# Patient Record
Sex: FEMALE | Race: WHITE | NOT HISPANIC OR LATINO | Employment: OTHER | URBAN - METROPOLITAN AREA
[De-identification: names, ages, dates, MRNs, and addresses within clinical notes are randomized per-mention and may not be internally consistent; named-entity substitution may affect disease eponyms.]

---

## 2020-11-20 ENCOUNTER — OFFICE VISIT (OUTPATIENT)
Dept: URGENT CARE | Facility: CLINIC | Age: 50
End: 2020-11-20
Payer: MEDICARE

## 2020-11-20 VITALS
SYSTOLIC BLOOD PRESSURE: 154 MMHG | HEIGHT: 64 IN | HEART RATE: 71 BPM | OXYGEN SATURATION: 99 % | BODY MASS INDEX: 38.41 KG/M2 | RESPIRATION RATE: 16 BRPM | DIASTOLIC BLOOD PRESSURE: 81 MMHG | WEIGHT: 225 LBS | TEMPERATURE: 98 F

## 2020-11-20 DIAGNOSIS — N30.00 ACUTE CYSTITIS WITHOUT HEMATURIA: Primary | ICD-10-CM

## 2020-11-20 DIAGNOSIS — R30.0 DYSURIA: ICD-10-CM

## 2020-11-20 LAB
BILIRUB UR QL STRIP: NEGATIVE
GLUCOSE UR QL STRIP: NEGATIVE
KETONES UR QL STRIP: NEGATIVE
LEUKOCYTE ESTERASE UR QL STRIP: POSITIVE
PH, POC UA: 7 (ref 5–8)
POC BLOOD, URINE: NEGATIVE
POC NITRATES, URINE: NEGATIVE
PROT UR QL STRIP: NEGATIVE
SP GR UR STRIP: 1.02 (ref 1–1.03)
UROBILINOGEN UR STRIP-ACNC: NORMAL (ref 0.1–1.1)

## 2020-11-20 PROCEDURE — 99203 PR OFFICE/OUTPT VISIT, NEW, LEVL III, 30-44 MIN: ICD-10-PCS | Mod: 25,S$GLB,, | Performed by: PHYSICIAN ASSISTANT

## 2020-11-20 PROCEDURE — 81003 URINALYSIS AUTO W/O SCOPE: CPT | Mod: QW,S$GLB,, | Performed by: PHYSICIAN ASSISTANT

## 2020-11-20 PROCEDURE — 99203 OFFICE O/P NEW LOW 30 MIN: CPT | Mod: 25,S$GLB,, | Performed by: PHYSICIAN ASSISTANT

## 2020-11-20 PROCEDURE — 81003 POCT URINALYSIS, DIPSTICK, AUTOMATED, W/O SCOPE: ICD-10-PCS | Mod: QW,S$GLB,, | Performed by: PHYSICIAN ASSISTANT

## 2020-11-20 RX ORDER — SERTRALINE HYDROCHLORIDE 50 MG/1
50 TABLET, FILM COATED ORAL DAILY
COMMUNITY
End: 2021-04-21 | Stop reason: SDUPTHER

## 2020-11-20 RX ORDER — CEPHALEXIN 500 MG/1
500 CAPSULE ORAL EVERY 6 HOURS
Qty: 28 CAPSULE | Refills: 0 | Status: SHIPPED | OUTPATIENT
Start: 2020-11-20 | End: 2020-11-27

## 2020-11-20 RX ORDER — LOSARTAN POTASSIUM 25 MG/1
25 TABLET ORAL DAILY
COMMUNITY
End: 2021-04-21 | Stop reason: SDUPTHER

## 2020-11-20 NOTE — PROGRESS NOTES
"Subjective:       Patient ID: Starla Fisher is a 50 y.o. female.    Vitals:  height is 5' 4" (1.626 m) and weight is 102.1 kg (225 lb). Her oral temperature is 97.9 °F (36.6 °C). Her blood pressure is 154/81 (abnormal) and her pulse is 71. Her respiration is 16 and oxygen saturation is 99%.     Chief Complaint: Urinary Tract Infection    Pt states she has a uti x3 days  Reports dysuria, frequency, urgency, foul-smelling urine, dark urine for the past 3 days.  Denies fevers.  She is nauseated but denies any vomiting.    Urinary Tract Infection   This is a new problem. The current episode started in the past 7 days. The problem occurs every urination. The problem has been gradually worsening. The quality of the pain is described as aching and burning. The pain is at a severity of 10/10. The pain is severe. There has been no fever. She is not sexually active. Associated symptoms include a discharge, frequency, nausea and urgency. Pertinent negatives include no chills, hematuria, vomiting or rash. She has tried nothing for the symptoms. The treatment provided no relief.       Constitution: Negative for chills, fatigue and fever.   HENT: Negative for congestion and sore throat.    Neck: Negative for painful lymph nodes.   Cardiovascular: Negative for chest pain and leg swelling.   Eyes: Negative for double vision and blurred vision.   Respiratory: Negative for cough and shortness of breath.    Gastrointestinal: Positive for nausea. Negative for abdominal pain, vomiting and diarrhea.   Genitourinary: Positive for frequency, urgency, urine decreased, vaginal pain, vaginal discharge and vaginal odor. Negative for dysuria, hematuria, history of kidney stones, painful menstruation, irregular menstruation, missed menses, heavy menstrual bleeding, ovarian cysts, genital trauma, vaginal bleeding, painful intercourse, genital sore, painful ejaculation and pelvic pain.   Musculoskeletal: Negative for joint pain, joint " swelling, back pain, muscle cramps and muscle ache.   Skin: Negative for color change, pale, rash, lesion and bruising.   Allergic/Immunologic: Negative for seasonal allergies.   Neurological: Negative for dizziness, history of vertigo, light-headedness, passing out and headaches.   Hematologic/Lymphatic: Negative for swollen lymph nodes.   Psychiatric/Behavioral: Negative for nervous/anxious, sleep disturbance and depression. The patient is not nervous/anxious.        Objective:      Physical Exam   Constitutional: She is oriented to person, place, and time. She appears well-developed.  Non-toxic appearance. She does not appear ill. No distress.   HENT:   Head: Normocephalic and atraumatic.   Ears:   Right Ear: External ear normal.   Left Ear: External ear normal.   Nose: Nose normal.   Mouth/Throat: Mucous membranes are normal.   Eyes: Conjunctivae and lids are normal.   Neck: Trachea normal and full passive range of motion without pain. Neck supple.   Cardiovascular: Normal rate, regular rhythm and normal heart sounds.   Pulmonary/Chest: Effort normal and breath sounds normal. No respiratory distress.   Abdominal: Soft. Normal appearance and bowel sounds are normal. She exhibits no distension, no abdominal bruit, no pulsatile midline mass and no mass. There is abdominal tenderness in the suprapubic area. There is no left CVA tenderness and no right CVA tenderness.   Musculoskeletal: Normal range of motion.   Neurological: She is alert and oriented to person, place, and time. She has normal strength.   Skin: Skin is warm, dry, intact, not diaphoretic and not pale. Psychiatric: Her speech is normal and behavior is normal. Judgment and thought content normal.   Nursing note and vitals reviewed.    Results for orders placed or performed in visit on 11/20/20   POCT Urinalysis, Dipstick, Automated, W/O Scope   Result Value Ref Range    POC Blood, Urine Negative Negative    POC Bilirubin, Urine Negative Negative    POC  Urobilinogen, Urine normal 0.1 - 1.1    POC Ketones, Urine Negative Negative    POC Protein, Urine Negative Negative    POC Nitrates, Urine Negative Negative    POC Glucose, Urine Negative Negative    pH, UA 7.0 5 - 8    POC Specific Gravity, Urine 1.020 1.003 - 1.029    POC Leukocytes, Urine Positive (A) Negative           Assessment:       1. Acute cystitis without hematuria    2. Dysuria        Plan:         Acute cystitis without hematuria  -     cephALEXin (KEFLEX) 500 MG capsule; Take 1 capsule (500 mg total) by mouth every 6 (six) hours. for 7 days  Dispense: 28 capsule; Refill: 0  -     Urine culture    Dysuria  -     POCT Urinalysis, Dipstick, Automated, W/O Scope         Labs reviewed, pertinent imaging reviewed, previous medical records, medical history, surgical history, social history, family history reviewed.    Patient Instructions   Start antibiotics, drink plenty of water.  Rest.  If you develop new, worsen, change in symptoms, please return or go to the emergency room.  Follow-up with primary care in 1 week    Please return here or go to the Emergency Department for any concerns or worsening of condition.  If you were prescribed antibiotics, please take them to completion.  If you were prescribed a narcotic medication, do not drive or operate heavy equipment or machinery while taking these medications.  Please follow up with your primary care doctor or specialist as needed.  Please drink plenty of fluids.  Please get plenty of rest.  If you were prescribed Pyridium (phenazopyridine), please be aware that if you wear contact lens that this medication may stain your contacts.  While taking this medication it is recommended that you do not wear your contacts until 24 hours after your last dose.  Please follow up with your primary care doctor or specialist as needed.    If you  smoke, please stop smoking.    Please arrange follow up with your primary medical clinic as soon as possible. You must  "understand that you've received an Urgent Care treatment only and that you may be released before all of your medical problems are known or treated. You, the patient, will arrange for follow up as instructed. If your symptoms worsen or fail to improve you should go to the Emergency Room.  WE CANNOT RULE OUT ALL POSSIBLE CAUSES OF YOUR SYMPTOMS IN THE URGENT CARE SETTING PLEASE GO TO THE ER IF YOU FEELS YOUR CONDITION IS WORSENING OR YOU WOULD LIKE EMERGENT EVALUATION.      Bladder Infection, Female (Adult)    Urine is normally doesn't have any bacteria in it. But bacteria can get into the urinary tract from the skin around the rectum. Or they can travel in the blood from elsewhere in the body. Once they are in your urinary tract, they can cause infection in the urethra (urethritis), the bladder (cystitis), or the kidneys (pyelonephritis).  The most common place for an infection is in the bladder. This is called a bladder infection. This is one of the most common infections in women. Most bladder infections are easily treated. They are not serious unless the infection spreads to the kidney.  The phrases "bladder infection," "UTI," and "cystitis" are often used to describe the same thing. But they are not always the same. Cystitis is an inflammation of the bladder. The most common cause of cystitis is an infection.  Symptoms  The infection causes inflammation in the urethra and bladder. This causes many of the symptoms. The most common symptoms of a bladder infection are:  · Pain or burning when urinating  · Having to urinate more often than usual  · Urgent need to urinate  · Only a small amount of urine comes out  · Blood in urine  · Abdominal discomfort. This is usually in the lower abdomen above the pubic bone.  · Cloudy urine  · Strong- or bad-smelling urine  · Unable to urinate (urinary retention)  · Unable to hold urine in (urinary incontinence)  · Fever  · Loss of appetite  · Confusion (in older " adults)  Causes  Bladder infections are not contagious. You can't get one from someone else, from a toilet seat, or from sharing a bath.  The most common cause of bladder infections is bacteria from the bowels. The bacteria get onto the skin around the opening of the urethra. From there, they can get into the urine and travel up to the bladder, causing inflammation and infection. This usually happens because of:  · Wiping improperly after urinating. Always wipe from front to back.  · Bowel incontinence  · Pregnancy  · Procedures such as having a catheter inserted  · Older age  · Not emptying your bladder. This can allow bacteria a chance to grow in your urine.  · Dehydration  · Constipation  · Sex  · Use of a diaphragm for birth control   Treatment  Bladder infections are diagnosed by a urine test. They are treated with antibiotics and usually clear up quickly without complications. Treatment helps prevent a more serious kidney infection.  Medicines  Medicines can help in the treatment of a bladder infection:  · Take antibiotics until they are used up, even if you feel better. It is important to finish them to make sure the infection has cleared.  · You can use acetaminophen or ibuprofen for pain, fever, or discomfort, unless another medicine was prescribed. If you have chronic liver or kidney disease, talk with your healthcare provider before using these medicines. Also talk with your provider if you've ever had a stomach ulcer or gastrointestinal bleeding, or are taking blood-thinner medicines.  · If you are given phenazopydridine to reduce burning with urination, it will cause your urine to become a bright orange color. This can stain clothing.  Care and prevention  These self-care steps can help prevent future infections:  · Drink plenty of fluids to prevent dehydration and flush out your bladder. Do this unless you must restrict fluids for other health reasons, or your doctor told you not to.  · Proper cleaning  after going to the bathroom is important. Wipe from front to back after using the toilet to prevent the spread of bacteria.  · Urinate more often. Don't try to hold urine in for a long time.  · Wear loose-fitting clothes and cotton underwear. Avoid tight-fitting pants.  · Improve your diet and prevent constipation. Eat more fresh fruit and vegetables, and fiber, and less junk and fatty foods.  · Avoid sex until your symptoms are gone.  · Avoid caffeine, alcohol, and spicy foods. These can irritate your bladder.  · Urinate right after intercourse to flush out your bladder.  · If you use birth control pills and have frequent bladder infections, discuss it with your doctor.  Follow-up care  Call your healthcare provider if all symptoms are not gone after 3 days of treatment. This is especially important if you have repeat infections.  If a culture was done, you will be told if your treatment needs to be changed. If directed, you can call to find out the results.  If X-rays were done, you will be told if the results will affect your treatment.  Call 911  Call 911 if any of the following occur:  · Trouble breathing  · Hard to wake up or confusion  · Fainting or loss of consciousness  · Rapid heart rate  When to seek medical advice  Call your healthcare provider right away if any of these occur:  · Fever of 100.4ºF (38.0ºC) or higher, or as directed by your healthcare provider  · Symptoms are not better by the third day of treatment  · Back or belly (abdominal) pain that gets worse  · Repeated vomiting, or unable to keep medicine down  · Weakness or dizziness  · Vaginal discharge  · Pain, redness, or swelling in the outer vaginal area (labia)  Date Last Reviewed: 10/1/2016  © 0345-0620 [employer]. [address] [place] [address], [place] [address]. All rights reserved. This information is not intended as a substitute for professional medical care. Always follow your healthcare professional's  instructions.

## 2020-11-21 NOTE — PATIENT INSTRUCTIONS
Start antibiotics, drink plenty of water.  Rest.  If you develop new, worsen, change in symptoms, please return or go to the emergency room.  Follow-up with primary care in 1 week    Please return here or go to the Emergency Department for any concerns or worsening of condition.  If you were prescribed antibiotics, please take them to completion.  If you were prescribed a narcotic medication, do not drive or operate heavy equipment or machinery while taking these medications.  Please follow up with your primary care doctor or specialist as needed.  Please drink plenty of fluids.  Please get plenty of rest.  If you were prescribed Pyridium (phenazopyridine), please be aware that if you wear contact lens that this medication may stain your contacts.  While taking this medication it is recommended that you do not wear your contacts until 24 hours after your last dose.  Please follow up with your primary care doctor or specialist as needed.    If you  smoke, please stop smoking.    Please arrange follow up with your primary medical clinic as soon as possible. You must understand that you've received an Urgent Care treatment only and that you may be released before all of your medical problems are known or treated. You, the patient, will arrange for follow up as instructed. If your symptoms worsen or fail to improve you should go to the Emergency Room.  WE CANNOT RULE OUT ALL POSSIBLE CAUSES OF YOUR SYMPTOMS IN THE URGENT CARE SETTING PLEASE GO TO THE ER IF YOU FEELS YOUR CONDITION IS WORSENING OR YOU WOULD LIKE EMERGENT EVALUATION.      Bladder Infection, Female (Adult)    Urine is normally doesn't have any bacteria in it. But bacteria can get into the urinary tract from the skin around the rectum. Or they can travel in the blood from elsewhere in the body. Once they are in your urinary tract, they can cause infection in the urethra (urethritis), the bladder (cystitis), or the kidneys (pyelonephritis).  The most common  "place for an infection is in the bladder. This is called a bladder infection. This is one of the most common infections in women. Most bladder infections are easily treated. They are not serious unless the infection spreads to the kidney.  The phrases "bladder infection," "UTI," and "cystitis" are often used to describe the same thing. But they are not always the same. Cystitis is an inflammation of the bladder. The most common cause of cystitis is an infection.  Symptoms  The infection causes inflammation in the urethra and bladder. This causes many of the symptoms. The most common symptoms of a bladder infection are:  · Pain or burning when urinating  · Having to urinate more often than usual  · Urgent need to urinate  · Only a small amount of urine comes out  · Blood in urine  · Abdominal discomfort. This is usually in the lower abdomen above the pubic bone.  · Cloudy urine  · Strong- or bad-smelling urine  · Unable to urinate (urinary retention)  · Unable to hold urine in (urinary incontinence)  · Fever  · Loss of appetite  · Confusion (in older adults)  Causes  Bladder infections are not contagious. You can't get one from someone else, from a toilet seat, or from sharing a bath.  The most common cause of bladder infections is bacteria from the bowels. The bacteria get onto the skin around the opening of the urethra. From there, they can get into the urine and travel up to the bladder, causing inflammation and infection. This usually happens because of:  · Wiping improperly after urinating. Always wipe from front to back.  · Bowel incontinence  · Pregnancy  · Procedures such as having a catheter inserted  · Older age  · Not emptying your bladder. This can allow bacteria a chance to grow in your urine.  · Dehydration  · Constipation  · Sex  · Use of a diaphragm for birth control   Treatment  Bladder infections are diagnosed by a urine test. They are treated with antibiotics and usually clear up quickly without " complications. Treatment helps prevent a more serious kidney infection.  Medicines  Medicines can help in the treatment of a bladder infection:  · Take antibiotics until they are used up, even if you feel better. It is important to finish them to make sure the infection has cleared.  · You can use acetaminophen or ibuprofen for pain, fever, or discomfort, unless another medicine was prescribed. If you have chronic liver or kidney disease, talk with your healthcare provider before using these medicines. Also talk with your provider if you've ever had a stomach ulcer or gastrointestinal bleeding, or are taking blood-thinner medicines.  · If you are given phenazopydridine to reduce burning with urination, it will cause your urine to become a bright orange color. This can stain clothing.  Care and prevention  These self-care steps can help prevent future infections:  · Drink plenty of fluids to prevent dehydration and flush out your bladder. Do this unless you must restrict fluids for other health reasons, or your doctor told you not to.  · Proper cleaning after going to the bathroom is important. Wipe from front to back after using the toilet to prevent the spread of bacteria.  · Urinate more often. Don't try to hold urine in for a long time.  · Wear loose-fitting clothes and cotton underwear. Avoid tight-fitting pants.  · Improve your diet and prevent constipation. Eat more fresh fruit and vegetables, and fiber, and less junk and fatty foods.  · Avoid sex until your symptoms are gone.  · Avoid caffeine, alcohol, and spicy foods. These can irritate your bladder.  · Urinate right after intercourse to flush out your bladder.  · If you use birth control pills and have frequent bladder infections, discuss it with your doctor.  Follow-up care  Call your healthcare provider if all symptoms are not gone after 3 days of treatment. This is especially important if you have repeat infections.  If a culture was done, you will be  told if your treatment needs to be changed. If directed, you can call to find out the results.  If X-rays were done, you will be told if the results will affect your treatment.  Call 911  Call 911 if any of the following occur:  · Trouble breathing  · Hard to wake up or confusion  · Fainting or loss of consciousness  · Rapid heart rate  When to seek medical advice  Call your healthcare provider right away if any of these occur:  · Fever of 100.4ºF (38.0ºC) or higher, or as directed by your healthcare provider  · Symptoms are not better by the third day of treatment  · Back or belly (abdominal) pain that gets worse  · Repeated vomiting, or unable to keep medicine down  · Weakness or dizziness  · Vaginal discharge  · Pain, redness, or swelling in the outer vaginal area (labia)  Date Last Reviewed: 10/1/2016  © 2195-4185 [employer]. [address] [place] [address], [place] [address]. All rights reserved. This information is not intended as a substitute for professional medical care. Always follow your healthcare professional's instructions.

## 2020-11-24 ENCOUNTER — OFFICE VISIT (OUTPATIENT)
Dept: URGENT CARE | Facility: CLINIC | Age: 50
End: 2020-11-24
Payer: MEDICARE

## 2020-11-24 VITALS
HEIGHT: 64 IN | HEART RATE: 72 BPM | SYSTOLIC BLOOD PRESSURE: 138 MMHG | RESPIRATION RATE: 18 BRPM | OXYGEN SATURATION: 95 % | WEIGHT: 250 LBS | DIASTOLIC BLOOD PRESSURE: 86 MMHG | BODY MASS INDEX: 42.68 KG/M2 | TEMPERATURE: 98 F

## 2020-11-24 DIAGNOSIS — R11.2 INTRACTABLE VOMITING WITH NAUSEA, UNSPECIFIED VOMITING TYPE: Primary | ICD-10-CM

## 2020-11-24 DIAGNOSIS — G43.919 INTRACTABLE MIGRAINE WITHOUT STATUS MIGRAINOSUS, UNSPECIFIED MIGRAINE TYPE: ICD-10-CM

## 2020-11-24 PROCEDURE — 99214 PR OFFICE/OUTPT VISIT, EST, LEVL IV, 30-39 MIN: ICD-10-PCS | Mod: 25,S$GLB,, | Performed by: FAMILY MEDICINE

## 2020-11-24 PROCEDURE — 96372 THER/PROPH/DIAG INJ SC/IM: CPT | Mod: S$GLB,,, | Performed by: FAMILY MEDICINE

## 2020-11-24 PROCEDURE — 96372 PR INJECTION,THERAP/PROPH/DIAG2ST, IM OR SUBCUT: ICD-10-PCS | Mod: S$GLB,,, | Performed by: FAMILY MEDICINE

## 2020-11-24 PROCEDURE — 99214 OFFICE O/P EST MOD 30 MIN: CPT | Mod: 25,S$GLB,, | Performed by: FAMILY MEDICINE

## 2020-11-24 RX ORDER — ONDANSETRON 4 MG/1
4 TABLET, ORALLY DISINTEGRATING ORAL EVERY 6 HOURS PRN
Qty: 25 TABLET | Refills: 0 | Status: SHIPPED | OUTPATIENT
Start: 2020-11-24 | End: 2021-04-21 | Stop reason: ALTCHOICE

## 2020-11-24 RX ORDER — SUMATRIPTAN 50 MG/1
TABLET, FILM COATED ORAL
Qty: 10 TABLET | Refills: 2 | Status: SHIPPED | OUTPATIENT
Start: 2020-11-24 | End: 2021-04-22 | Stop reason: ALTCHOICE

## 2020-11-24 RX ORDER — KETOROLAC TROMETHAMINE 30 MG/ML
60 INJECTION, SOLUTION INTRAMUSCULAR; INTRAVENOUS
Status: COMPLETED | OUTPATIENT
Start: 2020-11-24 | End: 2020-11-24

## 2020-11-24 RX ORDER — KETOROLAC TROMETHAMINE 10 MG/1
TABLET, FILM COATED ORAL
Qty: 20 TABLET | Refills: 0 | Status: SHIPPED | OUTPATIENT
Start: 2020-11-24 | End: 2021-04-21 | Stop reason: ALTCHOICE

## 2020-11-24 RX ORDER — ONDANSETRON 2 MG/ML
4 INJECTION INTRAMUSCULAR; INTRAVENOUS
Status: COMPLETED | OUTPATIENT
Start: 2020-11-24 | End: 2020-11-24

## 2020-11-24 RX ADMIN — KETOROLAC TROMETHAMINE 60 MG: 30 INJECTION, SOLUTION INTRAMUSCULAR; INTRAVENOUS at 06:11

## 2020-11-24 RX ADMIN — ONDANSETRON 4 MG: 2 INJECTION INTRAMUSCULAR; INTRAVENOUS at 05:11

## 2020-11-24 NOTE — PATIENT INSTRUCTIONS

## 2020-11-24 NOTE — PROGRESS NOTES
"Subjective:       Patient ID: Starla Fisher is a 50 y.o. female.    Vitals:  height is 5' 4" (1.626 m) and weight is 113.4 kg (250 lb).     Chief Complaint: Emesis    Pt c/o vomiting and dizzy  50-year-old female with long history of migraine headaches complains of nausea vomiting since this morning with these onset of headache, denies any fever denies any cough congestion or URI symptoms patient does have history of migraine and usually takes Imitrex took 1 tablet without much relief and usually gets relief with Imitrex patient recently moved here from [place] lives with mother    Emesis   This is a new problem. The current episode started today. The problem occurs intermittently. The problem has been gradually worsening. The emesis has an appearance of bile. There has been no fever. Associated symptoms include dizziness and headaches. Treatments tried: imitrex. The treatment provided no relief.       Gastrointestinal: Positive for vomiting.   Neurological: Positive for dizziness and headaches.       Objective:      Physical Exam   Constitutional: She is oriented to person, place, and time. She appears well-developed. She is cooperative.  Non-toxic appearance. She does not appear ill. No distress.   HENT:   Head: Normocephalic and atraumatic.   Ears:   Right Ear: Hearing, tympanic membrane, external ear and ear canal normal.   Left Ear: Hearing, tympanic membrane, external ear and ear canal normal.   Nose: Nose normal. No mucosal edema, rhinorrhea or nasal deformity. No epistaxis. Right sinus exhibits no maxillary sinus tenderness and no frontal sinus tenderness. Left sinus exhibits no maxillary sinus tenderness and no frontal sinus tenderness.   Mouth/Throat: Uvula is midline, oropharynx is clear and moist and mucous membranes are normal. No trismus in the jaw. Normal dentition. No uvula swelling. No posterior oropharyngeal erythema.   Eyes: Conjunctivae and lids are normal. Right eye exhibits no discharge. Left " eye exhibits no discharge. No scleral icterus.   Neck: Trachea normal, normal range of motion, full passive range of motion without pain and phonation normal. Neck supple.   Cardiovascular: Normal rate, regular rhythm, normal heart sounds and normal pulses.   Pulmonary/Chest: Effort normal and breath sounds normal. No respiratory distress.   Abdominal: Soft. Normal appearance and bowel sounds are normal. She exhibits no distension, no pulsatile midline mass and no mass. There is no abdominal tenderness.   Musculoskeletal: Normal range of motion.         General: No deformity.   Neurological: She is alert and oriented to person, place, and time. She displays weakness. A cranial nerve deficit is present. She exhibits normal muscle tone. Gait abnormal. Coordination normal.   Skin: Skin is warm, dry, intact, not diaphoretic and not pale. Psychiatric: Her speech is normal and behavior is normal. Judgment and thought content normal.   Nursing note and vitals reviewed.        Assessment:       1. Intractable vomiting with nausea, unspecified vomiting type    2. Intractable migraine without status migrainosus, unspecified migraine type        Plan:         Intractable vomiting with nausea, unspecified vomiting type    Intractable migraine without status migrainosus, unspecified migraine type    Other orders  -     ondansetron injection 4 mg  -     ketorolac injection 60 mg  -     ketorolac (TORADOL) 10 mg tablet; Take one po q 8 hrs prn pain  Dispense: 20 tablet; Refill: 0  -     sumatriptan (IMITREX) 50 MG tablet; Take one po q 12 hours prn severe migraine  Dispense: 10 tablet; Refill: 2  -     ondansetron (ZOFRAN-ODT) 4 MG TbDL; Take 1 tablet (4 mg total) by mouth every 6 (six) hours as needed.  Dispense: 25 tablet; Refill: 0

## 2020-12-07 ENCOUNTER — OFFICE VISIT (OUTPATIENT)
Dept: OBSTETRICS AND GYNECOLOGY | Facility: CLINIC | Age: 50
End: 2020-12-07
Payer: COMMERCIAL

## 2020-12-07 VITALS
DIASTOLIC BLOOD PRESSURE: 80 MMHG | SYSTOLIC BLOOD PRESSURE: 120 MMHG | WEIGHT: 287.06 LBS | BODY MASS INDEX: 49.01 KG/M2 | HEIGHT: 64 IN

## 2020-12-07 DIAGNOSIS — N39.0 URINARY TRACT INFECTION WITHOUT HEMATURIA, SITE UNSPECIFIED: ICD-10-CM

## 2020-12-07 DIAGNOSIS — N39.3 URINARY, INCONTINENCE, STRESS FEMALE: ICD-10-CM

## 2020-12-07 DIAGNOSIS — Z01.419 WELL WOMAN EXAM WITH ROUTINE GYNECOLOGICAL EXAM: ICD-10-CM

## 2020-12-07 DIAGNOSIS — Z12.31 OTHER SCREENING MAMMOGRAM: ICD-10-CM

## 2020-12-07 DIAGNOSIS — Z12.4 SCREENING FOR CERVICAL CANCER: ICD-10-CM

## 2020-12-07 DIAGNOSIS — N89.8 VAGINAL DISCHARGE: ICD-10-CM

## 2020-12-07 DIAGNOSIS — R10.2 PELVIC PAIN: Primary | ICD-10-CM

## 2020-12-07 PROCEDURE — 99999 PR PBB SHADOW E&M-EST. PATIENT-LVL III: ICD-10-PCS | Mod: PBBFAC,,, | Performed by: OBSTETRICS & GYNECOLOGY

## 2020-12-07 PROCEDURE — 99213 OFFICE O/P EST LOW 20 MIN: CPT | Mod: PBBFAC,PO | Performed by: OBSTETRICS & GYNECOLOGY

## 2020-12-07 PROCEDURE — 88175 CYTOPATH C/V AUTO FLUID REDO: CPT

## 2020-12-07 PROCEDURE — 87624 HPV HI-RISK TYP POOLED RSLT: CPT

## 2020-12-07 PROCEDURE — 99386 PREV VISIT NEW AGE 40-64: CPT | Mod: S$PBB,,, | Performed by: OBSTETRICS & GYNECOLOGY

## 2020-12-07 PROCEDURE — 99999 PR PBB SHADOW E&M-EST. PATIENT-LVL III: CPT | Mod: PBBFAC,,, | Performed by: OBSTETRICS & GYNECOLOGY

## 2020-12-07 PROCEDURE — 99386 PR PREVENTIVE VISIT,NEW,40-64: ICD-10-PCS | Mod: S$PBB,,, | Performed by: OBSTETRICS & GYNECOLOGY

## 2020-12-09 ENCOUNTER — TELEPHONE (OUTPATIENT)
Dept: OBSTETRICS AND GYNECOLOGY | Facility: CLINIC | Age: 50
End: 2020-12-09

## 2020-12-09 LAB
CANDIDA RRNA VAG QL PROBE: POSITIVE
G VAGINALIS RRNA GENITAL QL PROBE: POSITIVE
T VAGINALIS RRNA GENITAL QL PROBE: NEGATIVE

## 2020-12-09 RX ORDER — METRONIDAZOLE 500 MG/1
500 TABLET ORAL EVERY 12 HOURS
Qty: 14 TABLET | Refills: 0 | Status: SHIPPED | OUTPATIENT
Start: 2020-12-09 | End: 2020-12-16

## 2020-12-09 RX ORDER — TERCONAZOLE 4 MG/G
1 CREAM VAGINAL NIGHTLY
Qty: 1 TUBE | Refills: 0 | Status: SHIPPED | OUTPATIENT
Start: 2020-12-09 | End: 2020-12-16

## 2020-12-09 NOTE — PROGRESS NOTES
"GYNECOLOGY  :  Starla Fisher is a 50 y.o.    Here today for  gyn evaluation   Here today for  Persistent leakage of urine that started 3-6 weeks ago, worsening during the last 2 weeks,   Associated with urgency and dysuria and nocturia. Wakes at night several times to void, and usually does not have time to get to the toilet  Even after voiding feels the " urge" to go . Urine is dark and has strong odor     Patient with learning disability , with previous BTL and endometrial ablation , for definitive contraception and to prevent menstrual cycles, done 7 years ago   No menstrual cycles since ablation     LAst PAP and mammogram in 2019  In [place] ( recently moved )    Past Medical History:   Diagnosis Date    GERD (gastroesophageal reflux disease)     Hypertension     Migraine headache      Past Surgical History:   Procedure Laterality Date    BREAST SURGERY      ENDOMETRIAL ABLATION      EYE SURGERY      FOOT SURGERY Right 10/2018    bunion    TONSILLECTOMY       Family History   Problem Relation Age of Onset    Atrial fibrillation Mother     Thyroid disease Mother     Dementia Mother     Diabetes Father     Cancer Paternal Grandfather      Social History     Tobacco Use    Smoking status: Never Smoker   Substance Use Topics    Alcohol use: No    Drug use: No     OB History    Para Term  AB Living   0 0 0 0 0 0   SAB TAB Ectopic Multiple Live Births   0 0 0 0 0       /80   Ht 5' 4" (1.626 m)   Wt 130.2 kg (287 lb 0.6 oz)   BMI 49.27 kg/m²     Last PAP=   LMP = -  Last Mammogram = 2019  Last Colonoscopy  =-      COMPREHENSIVE GYN HISTORY:  G 0 P 0     PAP History: Denies abnormal Paps.  Infection History: Denies STDs. Denies PID.  Benign History: Denies uterine fibroids. Denies ovarian cysts. Denies endometriosis.   Cancer History: Denies cervical cancer. Denies uterine cancer or hyperplasia. Denies ovarian cancer. Denies vulvar cancer or pre-cancer. Denies " vaginal cancer or pre-cancer. Denies breast cancer. Denies colon cancer.  Sexual Activity History: ( - ) Yes   ( x )   no   Menstrual History: Age of menarche: ( 15  )  years.   Contraception:  BTL    ROS  GENERAL: Denies significant weight gain or weight loss. Feeling well overall.  SKIN: Denies rash or lesions.  Normal skin turgor  HEAD: Denies head injury or headache.   NODES: Denies enlarged lymph nodes.   CHEST: Denies chest pain or shortness of breath.   CARDIOVASCULAR: Denies palpitations or left sided chest pain.   ABDOMEN: No abdominal pain,no  diarrhea,constipation  nausea, vomiting or rectal bleeding.   URINARY: normal  Frequency,no  Dysuria or burning on urination.   REPRODUCTIVE: Per HPI   BREASTS: The patient sometimes performs breast self-examination and denies pain, lumps, or nipple discharge.   HEMATOLOGIC: No easy bruisability or excessive bleeding.   MUSCULOSKELETAL: Denies joint pain or swelling.   NEUROLOGIC: Denies syncope or weakness.   PSYCHIATRIC: Denies depression, anxiety or mood swings.    Physical Exam     Constitutional: She is oriented to person, place, and time. She appears well-developed and well-nourished. No distress.   HENT:   Head: Normocephalic.   NECK: Neck symmetric without masses or thyromegaly.  Cardiovascular: Normal rate and regular rhythm.   Pulmonary/Chest: Effort normal and breath sounds normal. No respiratory distress. She has no wheezes.   Breasts: Symmetrical, no skin changes or visible lesions. No palpable masses, nipple discharge or adenopathy bilaterally.  Abdominal: Soft not distended. Bowel sounds are normal. She exhibits   no masses . No tenderness to palpation.   Genitourinary: Pelvic exam was performed with patient supine.   External genitalia normal no lesions.Normal hair distribution   Bimanual exam : difficult to evaluate pelvic organs due to body habitus. Urethra and bladder normal  No cystourethrocele     Extremities normal no cyanosis ,edema.          A/P Starla Fisher 50 y.o.     Dx=  1- Urinary Incontinence      Urgency   2- -Cervical cancer screen  -Breast cancer screen     3-morbid obesity   4-Learning disability     Procedures/Orders:  Pap smear  Mammogram  UA/ UCx/Udip  Affirm  STD testing   US    Assessment /Plan:  Will review UA and Urine culture to rule out infection as cause of the urinary urgency symptoms   No urinary stress incontinence       Patient was counseled today on A.C.S. Pap guidelines and recommendations for yearly pelvic exams, mammograms and monthly self breast exams; to see her PCP for other health maintenance, nutrition and weight gain counseling, discussed lab values.  Discussed colonoscopy recommendations every 10 years starting at age 50   Calcium and vit D daily intake     F/u in 2-3 weeks after US     Jefferson Copeland M.D.   OB/GYN

## 2020-12-09 NOTE — TELEPHONE ENCOUNTER
Affirm resulted  Positive for BV and Yeast .  Rx for flagyl and diflucan sent to pharmacy on file  Please inform patient    Jefferson Copeland M.D.   OB/GYN

## 2020-12-14 LAB
HPV HR 12 DNA SPEC QL NAA+PROBE: NEGATIVE
HPV16 AG SPEC QL: NEGATIVE
HPV18 DNA SPEC QL NAA+PROBE: NEGATIVE

## 2021-01-08 ENCOUNTER — TELEPHONE (OUTPATIENT)
Dept: GASTROENTEROLOGY | Facility: CLINIC | Age: 51
End: 2021-01-08

## 2021-01-09 ENCOUNTER — TELEPHONE (OUTPATIENT)
Dept: OBSTETRICS AND GYNECOLOGY | Facility: HOSPITAL | Age: 51
End: 2021-01-09

## 2021-01-09 RX ORDER — METRONIDAZOLE 500 MG/1
500 TABLET ORAL EVERY 12 HOURS
Qty: 14 TABLET | Refills: 0 | Status: SHIPPED | OUTPATIENT
Start: 2021-01-09 | End: 2021-01-16

## 2021-01-09 RX ORDER — TERCONAZOLE 4 MG/G
1 CREAM VAGINAL NIGHTLY
Qty: 1 TUBE | Refills: 0 | Status: SHIPPED | OUTPATIENT
Start: 2021-01-09 | End: 2021-01-16

## 2021-01-12 ENCOUNTER — TELEPHONE (OUTPATIENT)
Dept: OBSTETRICS AND GYNECOLOGY | Facility: CLINIC | Age: 51
End: 2021-01-12

## 2021-01-17 LAB
FINAL PATHOLOGIC DIAGNOSIS: NORMAL
Lab: NORMAL

## 2021-03-29 ENCOUNTER — IMMUNIZATION (OUTPATIENT)
Dept: PRIMARY CARE CLINIC | Facility: CLINIC | Age: 51
End: 2021-03-29
Payer: MEDICARE

## 2021-03-29 DIAGNOSIS — Z23 NEED FOR VACCINATION: Primary | ICD-10-CM

## 2021-03-29 PROCEDURE — 91301 PR SARS-COV-2 COVID-19 VACCINE, NO PRSV, 100MCG/0.5ML, IM: ICD-10-PCS | Mod: S$GLB,,, | Performed by: INTERNAL MEDICINE

## 2021-03-29 PROCEDURE — 0011A PR IMMUNIZ ADMIN, SARS-COV-2 COVID-19 VACC, 100MCG/0.5ML, 1ST DOSE: CPT | Mod: CV19,S$GLB,, | Performed by: INTERNAL MEDICINE

## 2021-03-29 PROCEDURE — 0011A PR IMMUNIZ ADMIN, SARS-COV-2 COVID-19 VACC, 100MCG/0.5ML, 1ST DOSE: ICD-10-PCS | Mod: CV19,S$GLB,, | Performed by: INTERNAL MEDICINE

## 2021-03-29 PROCEDURE — 91301 PR SARS-COV-2 COVID-19 VACCINE, NO PRSV, 100MCG/0.5ML, IM: CPT | Mod: S$GLB,,, | Performed by: INTERNAL MEDICINE

## 2021-03-29 RX ADMIN — Medication 0.5 ML: at 01:03

## 2021-04-08 ENCOUNTER — TELEPHONE (OUTPATIENT)
Dept: PODIATRY | Facility: CLINIC | Age: 51
End: 2021-04-08

## 2021-04-12 ENCOUNTER — OFFICE VISIT (OUTPATIENT)
Dept: UROLOGY | Facility: CLINIC | Age: 51
End: 2021-04-12
Payer: MEDICARE

## 2021-04-12 VITALS
HEIGHT: 64 IN | SYSTOLIC BLOOD PRESSURE: 159 MMHG | DIASTOLIC BLOOD PRESSURE: 84 MMHG | HEART RATE: 76 BPM | BODY MASS INDEX: 49.27 KG/M2

## 2021-04-12 DIAGNOSIS — R35.1 NOCTURIA: ICD-10-CM

## 2021-04-12 DIAGNOSIS — R35.0 URINARY FREQUENCY: Primary | ICD-10-CM

## 2021-04-12 DIAGNOSIS — Z76.89 ENCOUNTER TO ESTABLISH CARE: ICD-10-CM

## 2021-04-12 DIAGNOSIS — N39.41 URINARY INCONTINENCE, URGE: ICD-10-CM

## 2021-04-12 LAB — POC RESIDUAL URINE VOLUME: 129 ML (ref 0–100)

## 2021-04-12 PROCEDURE — 3008F PR BODY MASS INDEX (BMI) DOCUMENTED: ICD-10-PCS | Mod: CPTII,S$GLB,, | Performed by: NURSE PRACTITIONER

## 2021-04-12 PROCEDURE — 3008F BODY MASS INDEX DOCD: CPT | Mod: CPTII,S$GLB,, | Performed by: NURSE PRACTITIONER

## 2021-04-12 PROCEDURE — 1125F PR PAIN SEVERITY QUANTIFIED, PAIN PRESENT: ICD-10-PCS | Mod: S$GLB,,, | Performed by: NURSE PRACTITIONER

## 2021-04-12 PROCEDURE — 99999 PR PBB SHADOW E&M-EST. PATIENT-LVL IV: CPT | Mod: PBBFAC,,, | Performed by: NURSE PRACTITIONER

## 2021-04-12 PROCEDURE — 99204 OFFICE O/P NEW MOD 45 MIN: CPT | Mod: S$GLB,,, | Performed by: NURSE PRACTITIONER

## 2021-04-12 PROCEDURE — 51798 US URINE CAPACITY MEASURE: CPT | Mod: S$GLB,,, | Performed by: NURSE PRACTITIONER

## 2021-04-12 PROCEDURE — 51798 PR MEAS,POST-VOID RES,US,NON-IMAGING: ICD-10-PCS | Mod: S$GLB,,, | Performed by: NURSE PRACTITIONER

## 2021-04-12 PROCEDURE — 1125F AMNT PAIN NOTED PAIN PRSNT: CPT | Mod: S$GLB,,, | Performed by: NURSE PRACTITIONER

## 2021-04-12 PROCEDURE — 99999 PR PBB SHADOW E&M-EST. PATIENT-LVL IV: ICD-10-PCS | Mod: PBBFAC,,, | Performed by: NURSE PRACTITIONER

## 2021-04-12 PROCEDURE — 99204 PR OFFICE/OUTPT VISIT, NEW, LEVL IV, 45-59 MIN: ICD-10-PCS | Mod: S$GLB,,, | Performed by: NURSE PRACTITIONER

## 2021-04-12 RX ORDER — TROSPIUM CHLORIDE 20 MG/1
20 TABLET, FILM COATED ORAL DAILY
Qty: 30 TABLET | Refills: 1 | Status: SHIPPED | OUTPATIENT
Start: 2021-04-12 | End: 2021-04-12

## 2021-04-13 ENCOUNTER — LAB VISIT (OUTPATIENT)
Dept: LAB | Facility: HOSPITAL | Age: 51
End: 2021-04-13
Attending: PEDIATRICS
Payer: MEDICARE

## 2021-04-13 DIAGNOSIS — R35.0 URINARY FREQUENCY: ICD-10-CM

## 2021-04-13 LAB
BILIRUB UR QL STRIP: NEGATIVE
CAOX CRY UR QL COMP ASSIST: NORMAL
CLARITY UR REFRACT.AUTO: CLEAR
COLOR UR AUTO: YELLOW
GLUCOSE UR QL STRIP: NEGATIVE
HGB UR QL STRIP: NEGATIVE
KETONES UR QL STRIP: NEGATIVE
LEUKOCYTE ESTERASE UR QL STRIP: NEGATIVE
MICROSCOPIC COMMENT: NORMAL
NITRITE UR QL STRIP: NEGATIVE
PH UR STRIP: 6 [PH] (ref 5–8)
PROT UR QL STRIP: NEGATIVE
SP GR UR STRIP: >=1.03 (ref 1–1.03)
URN SPEC COLLECT METH UR: ABNORMAL
UROBILINOGEN UR STRIP-ACNC: NEGATIVE EU/DL

## 2021-04-13 PROCEDURE — 81000 URINALYSIS NONAUTO W/SCOPE: CPT | Performed by: NURSE PRACTITIONER

## 2021-04-13 PROCEDURE — 87086 URINE CULTURE/COLONY COUNT: CPT | Performed by: NURSE PRACTITIONER

## 2021-04-18 LAB
BACTERIA UR CULT: NORMAL
BACTERIA UR CULT: NORMAL

## 2021-04-19 ENCOUNTER — TELEPHONE (OUTPATIENT)
Dept: UROLOGY | Facility: CLINIC | Age: 51
End: 2021-04-19

## 2021-04-21 ENCOUNTER — OFFICE VISIT (OUTPATIENT)
Dept: FAMILY MEDICINE | Facility: CLINIC | Age: 51
End: 2021-04-21
Payer: MEDICARE

## 2021-04-21 ENCOUNTER — LAB VISIT (OUTPATIENT)
Dept: LAB | Facility: HOSPITAL | Age: 51
End: 2021-04-21
Attending: STUDENT IN AN ORGANIZED HEALTH CARE EDUCATION/TRAINING PROGRAM
Payer: MEDICARE

## 2021-04-21 VITALS
SYSTOLIC BLOOD PRESSURE: 132 MMHG | DIASTOLIC BLOOD PRESSURE: 82 MMHG | BODY MASS INDEX: 41.67 KG/M2 | HEART RATE: 87 BPM | WEIGHT: 244.06 LBS | HEIGHT: 64 IN | OXYGEN SATURATION: 96 %

## 2021-04-21 DIAGNOSIS — I10 ESSENTIAL HYPERTENSION: ICD-10-CM

## 2021-04-21 DIAGNOSIS — Z12.31 ENCOUNTER FOR SCREENING MAMMOGRAM FOR MALIGNANT NEOPLASM OF BREAST: ICD-10-CM

## 2021-04-21 DIAGNOSIS — E66.01 SEVERE OBESITY (BMI >= 40): ICD-10-CM

## 2021-04-21 DIAGNOSIS — G43.809 OTHER MIGRAINE WITHOUT STATUS MIGRAINOSUS, NOT INTRACTABLE: ICD-10-CM

## 2021-04-21 DIAGNOSIS — Z11.59 NEED FOR HEPATITIS C SCREENING TEST: ICD-10-CM

## 2021-04-21 DIAGNOSIS — F32.A DEPRESSION, UNSPECIFIED DEPRESSION TYPE: Primary | ICD-10-CM

## 2021-04-21 DIAGNOSIS — R79.9 ABNORMAL FINDING OF BLOOD CHEMISTRY, UNSPECIFIED: ICD-10-CM

## 2021-04-21 DIAGNOSIS — Z76.89 ENCOUNTER TO ESTABLISH CARE: ICD-10-CM

## 2021-04-21 DIAGNOSIS — K21.9 GASTROESOPHAGEAL REFLUX DISEASE, UNSPECIFIED WHETHER ESOPHAGITIS PRESENT: ICD-10-CM

## 2021-04-21 PROCEDURE — 80061 LIPID PANEL: CPT | Performed by: STUDENT IN AN ORGANIZED HEALTH CARE EDUCATION/TRAINING PROGRAM

## 2021-04-21 PROCEDURE — 80053 COMPREHEN METABOLIC PANEL: CPT | Performed by: STUDENT IN AN ORGANIZED HEALTH CARE EDUCATION/TRAINING PROGRAM

## 2021-04-21 PROCEDURE — 99499 RISK ADDL DX/OHS AUDIT: ICD-10-PCS | Mod: S$GLB,,, | Performed by: STUDENT IN AN ORGANIZED HEALTH CARE EDUCATION/TRAINING PROGRAM

## 2021-04-21 PROCEDURE — 84443 ASSAY THYROID STIM HORMONE: CPT | Performed by: STUDENT IN AN ORGANIZED HEALTH CARE EDUCATION/TRAINING PROGRAM

## 2021-04-21 PROCEDURE — 86803 HEPATITIS C AB TEST: CPT | Performed by: STUDENT IN AN ORGANIZED HEALTH CARE EDUCATION/TRAINING PROGRAM

## 2021-04-21 PROCEDURE — 99999 PR PBB SHADOW E&M-EST. PATIENT-LVL V: CPT | Mod: PBBFAC,,, | Performed by: STUDENT IN AN ORGANIZED HEALTH CARE EDUCATION/TRAINING PROGRAM

## 2021-04-21 PROCEDURE — 99999 PR PBB SHADOW E&M-EST. PATIENT-LVL V: ICD-10-PCS | Mod: PBBFAC,,, | Performed by: STUDENT IN AN ORGANIZED HEALTH CARE EDUCATION/TRAINING PROGRAM

## 2021-04-21 PROCEDURE — 83036 HEMOGLOBIN GLYCOSYLATED A1C: CPT | Performed by: STUDENT IN AN ORGANIZED HEALTH CARE EDUCATION/TRAINING PROGRAM

## 2021-04-21 PROCEDURE — 3008F PR BODY MASS INDEX (BMI) DOCUMENTED: ICD-10-PCS | Mod: CPTII,S$GLB,, | Performed by: STUDENT IN AN ORGANIZED HEALTH CARE EDUCATION/TRAINING PROGRAM

## 2021-04-21 PROCEDURE — 3008F BODY MASS INDEX DOCD: CPT | Mod: CPTII,S$GLB,, | Performed by: STUDENT IN AN ORGANIZED HEALTH CARE EDUCATION/TRAINING PROGRAM

## 2021-04-21 PROCEDURE — 99204 OFFICE O/P NEW MOD 45 MIN: CPT | Mod: S$GLB,,, | Performed by: STUDENT IN AN ORGANIZED HEALTH CARE EDUCATION/TRAINING PROGRAM

## 2021-04-21 PROCEDURE — 36415 COLL VENOUS BLD VENIPUNCTURE: CPT | Mod: PO | Performed by: STUDENT IN AN ORGANIZED HEALTH CARE EDUCATION/TRAINING PROGRAM

## 2021-04-21 PROCEDURE — 99499 UNLISTED E&M SERVICE: CPT | Mod: S$GLB,,, | Performed by: STUDENT IN AN ORGANIZED HEALTH CARE EDUCATION/TRAINING PROGRAM

## 2021-04-21 PROCEDURE — 1126F PR PAIN SEVERITY QUANTIFIED, NO PAIN PRESENT: ICD-10-PCS | Mod: S$GLB,,, | Performed by: STUDENT IN AN ORGANIZED HEALTH CARE EDUCATION/TRAINING PROGRAM

## 2021-04-21 PROCEDURE — 99204 PR OFFICE/OUTPT VISIT, NEW, LEVL IV, 45-59 MIN: ICD-10-PCS | Mod: S$GLB,,, | Performed by: STUDENT IN AN ORGANIZED HEALTH CARE EDUCATION/TRAINING PROGRAM

## 2021-04-21 PROCEDURE — 1126F AMNT PAIN NOTED NONE PRSNT: CPT | Mod: S$GLB,,, | Performed by: STUDENT IN AN ORGANIZED HEALTH CARE EDUCATION/TRAINING PROGRAM

## 2021-04-21 RX ORDER — PANTOPRAZOLE SODIUM 40 MG/1
40 TABLET, DELAYED RELEASE ORAL DAILY
Qty: 90 TABLET | Refills: 0 | Status: SHIPPED | OUTPATIENT
Start: 2021-04-21 | End: 2021-06-22

## 2021-04-21 RX ORDER — LOSARTAN POTASSIUM 25 MG/1
25 TABLET ORAL DAILY
Qty: 90 TABLET | Refills: 3 | Status: SHIPPED | OUTPATIENT
Start: 2021-04-21 | End: 2021-08-11 | Stop reason: SDUPTHER

## 2021-04-21 RX ORDER — TRAMADOL HYDROCHLORIDE 50 MG/1
TABLET ORAL
COMMUNITY
Start: 2021-03-08 | End: 2021-12-01 | Stop reason: ALTCHOICE

## 2021-04-21 RX ORDER — SERTRALINE HYDROCHLORIDE 50 MG/1
50 TABLET, FILM COATED ORAL DAILY
Qty: 30 TABLET | Refills: 0 | Status: SHIPPED | OUTPATIENT
Start: 2021-04-21 | End: 2021-05-21

## 2021-04-22 ENCOUNTER — TELEPHONE (OUTPATIENT)
Dept: FAMILY MEDICINE | Facility: CLINIC | Age: 51
End: 2021-04-22

## 2021-04-22 ENCOUNTER — OFFICE VISIT (OUTPATIENT)
Dept: SLEEP MEDICINE | Facility: CLINIC | Age: 51
End: 2021-04-22
Payer: MEDICARE

## 2021-04-22 VITALS
BODY MASS INDEX: 50.02 KG/M2 | SYSTOLIC BLOOD PRESSURE: 152 MMHG | WEIGHT: 293 LBS | DIASTOLIC BLOOD PRESSURE: 91 MMHG | HEART RATE: 85 BPM | HEIGHT: 64 IN

## 2021-04-22 DIAGNOSIS — G43.109 MIGRAINE WITH AURA AND WITHOUT STATUS MIGRAINOSUS, NOT INTRACTABLE: ICD-10-CM

## 2021-04-22 DIAGNOSIS — G43.809 OTHER MIGRAINE WITHOUT STATUS MIGRAINOSUS, NOT INTRACTABLE: ICD-10-CM

## 2021-04-22 DIAGNOSIS — G43.719 INTRACTABLE CHRONIC MIGRAINE WITHOUT AURA AND WITHOUT STATUS MIGRAINOSUS: ICD-10-CM

## 2021-04-22 DIAGNOSIS — I10 ESSENTIAL HYPERTENSION: ICD-10-CM

## 2021-04-22 DIAGNOSIS — G47.30 SLEEP APNEA, UNSPECIFIED TYPE: Primary | ICD-10-CM

## 2021-04-22 DIAGNOSIS — E66.01 MORBID OBESITY WITH BMI OF 50.0-59.9, ADULT: ICD-10-CM

## 2021-04-22 LAB
ALBUMIN SERPL BCP-MCNC: 3.6 G/DL (ref 3.5–5.2)
ALP SERPL-CCNC: 97 U/L (ref 55–135)
ALT SERPL W/O P-5'-P-CCNC: 14 U/L (ref 10–44)
ANION GAP SERPL CALC-SCNC: 9 MMOL/L (ref 8–16)
AST SERPL-CCNC: 17 U/L (ref 10–40)
BILIRUB SERPL-MCNC: 0.5 MG/DL (ref 0.1–1)
BUN SERPL-MCNC: 12 MG/DL (ref 6–20)
CALCIUM SERPL-MCNC: 9.2 MG/DL (ref 8.7–10.5)
CHLORIDE SERPL-SCNC: 110 MMOL/L (ref 95–110)
CHOLEST SERPL-MCNC: 202 MG/DL (ref 120–199)
CHOLEST/HDLC SERPL: 3.2 {RATIO} (ref 2–5)
CO2 SERPL-SCNC: 22 MMOL/L (ref 23–29)
CREAT SERPL-MCNC: 0.8 MG/DL (ref 0.5–1.4)
EST. GFR  (AFRICAN AMERICAN): >60 ML/MIN/1.73 M^2
EST. GFR  (NON AFRICAN AMERICAN): >60 ML/MIN/1.73 M^2
ESTIMATED AVG GLUCOSE: 108 MG/DL (ref 68–131)
GLUCOSE SERPL-MCNC: 84 MG/DL (ref 70–110)
HBA1C MFR BLD: 5.4 % (ref 4–5.6)
HCV AB SERPL QL IA: NEGATIVE
HDLC SERPL-MCNC: 64 MG/DL (ref 40–75)
HDLC SERPL: 31.7 % (ref 20–50)
LDLC SERPL CALC-MCNC: 123.6 MG/DL (ref 63–159)
NONHDLC SERPL-MCNC: 138 MG/DL
POTASSIUM SERPL-SCNC: 3.8 MMOL/L (ref 3.5–5.1)
PROT SERPL-MCNC: 7.8 G/DL (ref 6–8.4)
SODIUM SERPL-SCNC: 141 MMOL/L (ref 136–145)
TRIGL SERPL-MCNC: 72 MG/DL (ref 30–150)
TSH SERPL DL<=0.005 MIU/L-ACNC: 1.29 UIU/ML (ref 0.4–4)

## 2021-04-22 PROCEDURE — 99204 OFFICE O/P NEW MOD 45 MIN: CPT | Mod: S$GLB,,, | Performed by: NURSE PRACTITIONER

## 2021-04-22 PROCEDURE — 99204 PR OFFICE/OUTPT VISIT, NEW, LEVL IV, 45-59 MIN: ICD-10-PCS | Mod: S$GLB,,, | Performed by: NURSE PRACTITIONER

## 2021-04-22 PROCEDURE — 3008F BODY MASS INDEX DOCD: CPT | Mod: CPTII,S$GLB,, | Performed by: NURSE PRACTITIONER

## 2021-04-22 PROCEDURE — 99999 PR PBB SHADOW E&M-EST. PATIENT-LVL III: CPT | Mod: PBBFAC,,, | Performed by: NURSE PRACTITIONER

## 2021-04-22 PROCEDURE — 99999 PR PBB SHADOW E&M-EST. PATIENT-LVL III: ICD-10-PCS | Mod: PBBFAC,,, | Performed by: NURSE PRACTITIONER

## 2021-04-22 PROCEDURE — 3008F PR BODY MASS INDEX (BMI) DOCUMENTED: ICD-10-PCS | Mod: CPTII,S$GLB,, | Performed by: NURSE PRACTITIONER

## 2021-04-22 RX ORDER — ERENUMAB-AOOE 140 MG/ML
140 INJECTION, SOLUTION SUBCUTANEOUS
Qty: 1 ML | Refills: 11 | Status: SHIPPED | OUTPATIENT
Start: 2021-04-22 | End: 2021-08-11 | Stop reason: SDUPTHER

## 2021-04-22 RX ORDER — CEFUROXIME AXETIL 250 MG/1
6 TABLET ORAL
Qty: 3 ML | Refills: 5 | Status: SHIPPED | OUTPATIENT
Start: 2021-04-22 | End: 2021-05-22

## 2021-04-22 RX ORDER — SUMATRIPTAN SUCCINATE 100 MG/1
50-100 TABLET ORAL
Qty: 9 TABLET | Refills: 5 | Status: SHIPPED | OUTPATIENT
Start: 2021-04-22 | End: 2021-05-22

## 2021-04-22 RX ORDER — PROMETHAZINE HYDROCHLORIDE 25 MG/1
25 TABLET ORAL EVERY 4 HOURS PRN
Qty: 30 TABLET | Refills: 5 | Status: SHIPPED | OUTPATIENT
Start: 2021-04-22 | End: 2021-12-01 | Stop reason: SDUPTHER

## 2021-04-22 RX ORDER — TOPIRAMATE 50 MG/1
50 TABLET, FILM COATED ORAL 3 TIMES DAILY
Qty: 90 TABLET | Refills: 1 | Status: SHIPPED | OUTPATIENT
Start: 2021-04-22 | End: 2021-08-11 | Stop reason: SDUPTHER

## 2021-04-26 ENCOUNTER — HOSPITAL ENCOUNTER (OUTPATIENT)
Dept: RADIOLOGY | Facility: HOSPITAL | Age: 51
Discharge: HOME | End: 2021-04-26
Attending: STUDENT IN AN ORGANIZED HEALTH CARE EDUCATION/TRAINING PROGRAM
Payer: MEDICARE

## 2021-04-26 DIAGNOSIS — Z12.31 ENCOUNTER FOR SCREENING MAMMOGRAM FOR MALIGNANT NEOPLASM OF BREAST: ICD-10-CM

## 2021-04-26 PROCEDURE — 77067 SCR MAMMO BI INCL CAD: CPT | Mod: TC

## 2021-04-26 PROCEDURE — 77067 MAMMO DIGITAL SCREENING BILAT WITH TOMO: ICD-10-PCS | Mod: 26,,, | Performed by: RADIOLOGY

## 2021-04-26 PROCEDURE — 77063 MAMMO DIGITAL SCREENING BILAT WITH TOMO: ICD-10-PCS | Mod: 26,,, | Performed by: RADIOLOGY

## 2021-04-26 PROCEDURE — 77067 SCR MAMMO BI INCL CAD: CPT | Mod: 26,,, | Performed by: RADIOLOGY

## 2021-04-26 PROCEDURE — 77063 BREAST TOMOSYNTHESIS BI: CPT | Mod: 26,,, | Performed by: RADIOLOGY

## 2021-04-27 ENCOUNTER — TELEPHONE (OUTPATIENT)
Dept: SLEEP MEDICINE | Facility: OTHER | Age: 51
End: 2021-04-27

## 2021-04-28 ENCOUNTER — IMMUNIZATION (OUTPATIENT)
Dept: PRIMARY CARE CLINIC | Facility: CLINIC | Age: 51
End: 2021-04-28
Payer: MEDICARE

## 2021-04-28 DIAGNOSIS — Z23 NEED FOR VACCINATION: Primary | ICD-10-CM

## 2021-04-28 PROCEDURE — 91301 PR SARS-COV-2 COVID-19 VACCINE, NO PRSV, 100MCG/0.5ML, IM: ICD-10-PCS | Mod: S$GLB,,, | Performed by: INTERNAL MEDICINE

## 2021-04-28 PROCEDURE — 0012A PR IMMUNIZ ADMIN, SARS-COV-2 COVID-19 VACC, 100MCG/0.5ML, 2ND DOSE: CPT | Mod: CV19,S$GLB,, | Performed by: INTERNAL MEDICINE

## 2021-04-28 PROCEDURE — 0012A PR IMMUNIZ ADMIN, SARS-COV-2 COVID-19 VACC, 100MCG/0.5ML, 2ND DOSE: ICD-10-PCS | Mod: CV19,S$GLB,, | Performed by: INTERNAL MEDICINE

## 2021-04-28 PROCEDURE — 91301 PR SARS-COV-2 COVID-19 VACCINE, NO PRSV, 100MCG/0.5ML, IM: CPT | Mod: S$GLB,,, | Performed by: INTERNAL MEDICINE

## 2021-04-28 RX ADMIN — Medication 0.5 ML: at 03:04

## 2021-05-18 ENCOUNTER — TELEPHONE (OUTPATIENT)
Dept: SLEEP MEDICINE | Facility: CLINIC | Age: 51
End: 2021-05-18

## 2021-05-18 ENCOUNTER — TELEPHONE (OUTPATIENT)
Dept: SLEEP MEDICINE | Facility: OTHER | Age: 51
End: 2021-05-18

## 2021-05-19 ENCOUNTER — TELEPHONE (OUTPATIENT)
Dept: SLEEP MEDICINE | Facility: CLINIC | Age: 51
End: 2021-05-19

## 2021-05-19 ENCOUNTER — TELEPHONE (OUTPATIENT)
Dept: SLEEP MEDICINE | Facility: OTHER | Age: 51
End: 2021-05-19

## 2021-05-20 ENCOUNTER — TELEPHONE (OUTPATIENT)
Dept: SLEEP MEDICINE | Facility: CLINIC | Age: 51
End: 2021-05-20

## 2021-05-21 ENCOUNTER — OFFICE VISIT (OUTPATIENT)
Dept: SLEEP MEDICINE | Facility: CLINIC | Age: 51
End: 2021-05-21
Payer: MEDICARE

## 2021-05-21 VITALS — WEIGHT: 293 LBS | HEIGHT: 64 IN | BODY MASS INDEX: 50.02 KG/M2

## 2021-05-21 DIAGNOSIS — G47.30 SLEEP APNEA, UNSPECIFIED TYPE: ICD-10-CM

## 2021-05-21 DIAGNOSIS — G43.009 MIGRAINE WITHOUT AURA AND WITHOUT STATUS MIGRAINOSUS, NOT INTRACTABLE: Primary | ICD-10-CM

## 2021-05-21 PROCEDURE — 1126F AMNT PAIN NOTED NONE PRSNT: CPT | Mod: S$GLB,,, | Performed by: NURSE PRACTITIONER

## 2021-05-21 PROCEDURE — 99999 PR PBB SHADOW E&M-EST. PATIENT-LVL III: ICD-10-PCS | Mod: PBBFAC,,, | Performed by: NURSE PRACTITIONER

## 2021-05-21 PROCEDURE — 3008F BODY MASS INDEX DOCD: CPT | Mod: CPTII,S$GLB,, | Performed by: NURSE PRACTITIONER

## 2021-05-21 PROCEDURE — 99214 PR OFFICE/OUTPT VISIT, EST, LEVL IV, 30-39 MIN: ICD-10-PCS | Mod: S$GLB,,, | Performed by: NURSE PRACTITIONER

## 2021-05-21 PROCEDURE — 3008F PR BODY MASS INDEX (BMI) DOCUMENTED: ICD-10-PCS | Mod: CPTII,S$GLB,, | Performed by: NURSE PRACTITIONER

## 2021-05-21 PROCEDURE — 99214 OFFICE O/P EST MOD 30 MIN: CPT | Mod: S$GLB,,, | Performed by: NURSE PRACTITIONER

## 2021-05-21 PROCEDURE — 1126F PR PAIN SEVERITY QUANTIFIED, NO PAIN PRESENT: ICD-10-PCS | Mod: S$GLB,,, | Performed by: NURSE PRACTITIONER

## 2021-05-21 PROCEDURE — 99999 PR PBB SHADOW E&M-EST. PATIENT-LVL III: CPT | Mod: PBBFAC,,, | Performed by: NURSE PRACTITIONER

## 2021-05-21 RX ORDER — ONDANSETRON 4 MG/1
4 TABLET, ORALLY DISINTEGRATING ORAL EVERY 6 HOURS PRN
Qty: 30 TABLET | Refills: 1 | Status: SHIPPED | OUTPATIENT
Start: 2021-05-21 | End: 2021-12-01 | Stop reason: ALTCHOICE

## 2021-05-21 RX ORDER — SUMATRIPTAN 20 MG/1
1 SPRAY NASAL
Qty: 6 EACH | Refills: 3 | Status: SHIPPED | OUTPATIENT
Start: 2021-05-21 | End: 2021-08-11 | Stop reason: SDUPTHER

## 2021-05-21 RX ORDER — GALCANEZUMAB 120 MG/ML
240 INJECTION, SOLUTION SUBCUTANEOUS ONCE
Qty: 2 ML | Refills: 0 | Status: SHIPPED | OUTPATIENT
Start: 2021-05-21 | End: 2021-06-01

## 2021-05-21 RX ORDER — GALCANEZUMAB 120 MG/ML
120 INJECTION, SOLUTION SUBCUTANEOUS
Qty: 1 ML | Refills: 11 | Status: SHIPPED | OUTPATIENT
Start: 2021-05-21 | End: 2021-12-01 | Stop reason: ALTCHOICE

## 2021-05-28 ENCOUNTER — TELEPHONE (OUTPATIENT)
Dept: OBSTETRICS AND GYNECOLOGY | Facility: CLINIC | Age: 51
End: 2021-05-28

## 2021-06-03 ENCOUNTER — TELEPHONE (OUTPATIENT)
Dept: OBSTETRICS AND GYNECOLOGY | Facility: CLINIC | Age: 51
End: 2021-06-03

## 2021-06-03 ENCOUNTER — OFFICE VISIT (OUTPATIENT)
Dept: UROLOGY | Facility: CLINIC | Age: 51
End: 2021-06-03
Payer: MEDICARE

## 2021-06-03 VITALS
WEIGHT: 293 LBS | HEART RATE: 78 BPM | BODY MASS INDEX: 52.77 KG/M2 | SYSTOLIC BLOOD PRESSURE: 150 MMHG | DIASTOLIC BLOOD PRESSURE: 89 MMHG

## 2021-06-03 DIAGNOSIS — R32 URINARY INCONTINENCE, UNSPECIFIED TYPE: Primary | ICD-10-CM

## 2021-06-03 DIAGNOSIS — R35.0 URINARY FREQUENCY: ICD-10-CM

## 2021-06-03 LAB — POC RESIDUAL URINE VOLUME: 32 ML (ref 0–100)

## 2021-06-03 PROCEDURE — 3008F BODY MASS INDEX DOCD: CPT | Mod: CPTII,S$GLB,, | Performed by: NURSE PRACTITIONER

## 2021-06-03 PROCEDURE — 99213 PR OFFICE/OUTPT VISIT, EST, LEVL III, 20-29 MIN: ICD-10-PCS | Mod: S$GLB,,, | Performed by: NURSE PRACTITIONER

## 2021-06-03 PROCEDURE — 99999 PR PBB SHADOW E&M-EST. PATIENT-LVL II: CPT | Mod: PBBFAC,,, | Performed by: NURSE PRACTITIONER

## 2021-06-03 PROCEDURE — 99999 PR PBB SHADOW E&M-EST. PATIENT-LVL II: ICD-10-PCS | Mod: PBBFAC,,, | Performed by: NURSE PRACTITIONER

## 2021-06-03 PROCEDURE — 51798 POCT BLADDER SCAN: ICD-10-PCS | Mod: S$GLB,,, | Performed by: NURSE PRACTITIONER

## 2021-06-03 PROCEDURE — 51798 US URINE CAPACITY MEASURE: CPT | Mod: S$GLB,,, | Performed by: NURSE PRACTITIONER

## 2021-06-03 PROCEDURE — 3008F PR BODY MASS INDEX (BMI) DOCUMENTED: ICD-10-PCS | Mod: CPTII,S$GLB,, | Performed by: NURSE PRACTITIONER

## 2021-06-03 PROCEDURE — 99213 OFFICE O/P EST LOW 20 MIN: CPT | Mod: S$GLB,,, | Performed by: NURSE PRACTITIONER

## 2021-06-03 RX ORDER — TROSPIUM CHLORIDE ER 60 MG/1
60 CAPSULE ORAL DAILY
Qty: 30 CAPSULE | Refills: 2 | Status: SHIPPED | OUTPATIENT
Start: 2021-06-03 | End: 2021-09-13

## 2021-06-18 ENCOUNTER — TELEPHONE (OUTPATIENT)
Dept: SLEEP MEDICINE | Facility: OTHER | Age: 51
End: 2021-06-18

## 2021-06-23 ENCOUNTER — TELEPHONE (OUTPATIENT)
Dept: CARDIOLOGY | Facility: CLINIC | Age: 51
End: 2021-06-23

## 2021-06-28 ENCOUNTER — TELEPHONE (OUTPATIENT)
Dept: SLEEP MEDICINE | Facility: OTHER | Age: 51
End: 2021-06-28

## 2021-07-08 ENCOUNTER — TELEPHONE (OUTPATIENT)
Dept: SLEEP MEDICINE | Facility: OTHER | Age: 51
End: 2021-07-08

## 2021-07-13 ENCOUNTER — TELEPHONE (OUTPATIENT)
Dept: SLEEP MEDICINE | Facility: CLINIC | Age: 51
End: 2021-07-13

## 2021-07-16 ENCOUNTER — TELEPHONE (OUTPATIENT)
Dept: SLEEP MEDICINE | Facility: OTHER | Age: 51
End: 2021-07-16

## 2021-07-22 ENCOUNTER — OFFICE VISIT (OUTPATIENT)
Dept: GASTROENTEROLOGY | Facility: CLINIC | Age: 51
End: 2021-07-22
Payer: MEDICARE

## 2021-07-22 VITALS — HEIGHT: 64 IN | BODY MASS INDEX: 50.02 KG/M2 | WEIGHT: 293 LBS

## 2021-07-22 DIAGNOSIS — Z12.11 SCREENING FOR COLON CANCER: ICD-10-CM

## 2021-07-22 DIAGNOSIS — K21.9 GASTROESOPHAGEAL REFLUX DISEASE, UNSPECIFIED WHETHER ESOPHAGITIS PRESENT: Primary | ICD-10-CM

## 2021-07-22 PROCEDURE — 1125F PR PAIN SEVERITY QUANTIFIED, PAIN PRESENT: ICD-10-PCS | Mod: CPTII,S$GLB,, | Performed by: NURSE PRACTITIONER

## 2021-07-22 PROCEDURE — 3008F PR BODY MASS INDEX (BMI) DOCUMENTED: ICD-10-PCS | Mod: CPTII,S$GLB,, | Performed by: NURSE PRACTITIONER

## 2021-07-22 PROCEDURE — 3008F BODY MASS INDEX DOCD: CPT | Mod: CPTII,S$GLB,, | Performed by: NURSE PRACTITIONER

## 2021-07-22 PROCEDURE — 99204 OFFICE O/P NEW MOD 45 MIN: CPT | Mod: S$GLB,,, | Performed by: NURSE PRACTITIONER

## 2021-07-22 PROCEDURE — 99204 PR OFFICE/OUTPT VISIT, NEW, LEVL IV, 45-59 MIN: ICD-10-PCS | Mod: S$GLB,,, | Performed by: NURSE PRACTITIONER

## 2021-07-22 PROCEDURE — 99999 PR PBB SHADOW E&M-EST. PATIENT-LVL III: ICD-10-PCS | Mod: PBBFAC,,, | Performed by: NURSE PRACTITIONER

## 2021-07-22 PROCEDURE — 99999 PR PBB SHADOW E&M-EST. PATIENT-LVL III: CPT | Mod: PBBFAC,,, | Performed by: NURSE PRACTITIONER

## 2021-07-22 PROCEDURE — 1125F AMNT PAIN NOTED PAIN PRSNT: CPT | Mod: CPTII,S$GLB,, | Performed by: NURSE PRACTITIONER

## 2021-07-22 RX ORDER — SODIUM, POTASSIUM,MAG SULFATES 17.5-3.13G
1 SOLUTION, RECONSTITUTED, ORAL ORAL DAILY
Qty: 1 KIT | Refills: 0 | Status: SHIPPED | OUTPATIENT
Start: 2021-07-22 | End: 2021-12-01 | Stop reason: ALTCHOICE

## 2021-07-22 RX ORDER — PANTOPRAZOLE SODIUM 40 MG/1
40 TABLET, DELAYED RELEASE ORAL 2 TIMES DAILY
Qty: 90 TABLET | Refills: 0 | Status: SHIPPED | OUTPATIENT
Start: 2021-07-22 | End: 2021-08-11 | Stop reason: SDUPTHER

## 2021-07-26 ENCOUNTER — OFFICE VISIT (OUTPATIENT)
Dept: OBSTETRICS AND GYNECOLOGY | Facility: CLINIC | Age: 51
End: 2021-07-26
Payer: MEDICARE

## 2021-07-26 VITALS — DIASTOLIC BLOOD PRESSURE: 64 MMHG | WEIGHT: 293 LBS | BODY MASS INDEX: 51.96 KG/M2 | SYSTOLIC BLOOD PRESSURE: 122 MMHG

## 2021-07-26 DIAGNOSIS — N39.41 URGE INCONTINENCE OF URINE: ICD-10-CM

## 2021-07-26 DIAGNOSIS — N89.8 VAGINAL IRRITATION: ICD-10-CM

## 2021-07-26 DIAGNOSIS — N89.8 VAGINAL DISCHARGE: Primary | ICD-10-CM

## 2021-07-26 DIAGNOSIS — N76.0 VAGINOSIS: ICD-10-CM

## 2021-07-26 PROCEDURE — 1159F MED LIST DOCD IN RCRD: CPT | Mod: CPTII,S$GLB,, | Performed by: OBSTETRICS & GYNECOLOGY

## 2021-07-26 PROCEDURE — 1126F AMNT PAIN NOTED NONE PRSNT: CPT | Mod: CPTII,S$GLB,, | Performed by: OBSTETRICS & GYNECOLOGY

## 2021-07-26 PROCEDURE — 99214 PR OFFICE/OUTPT VISIT, EST, LEVL IV, 30-39 MIN: ICD-10-PCS | Mod: S$GLB,,, | Performed by: OBSTETRICS & GYNECOLOGY

## 2021-07-26 PROCEDURE — 1126F PR PAIN SEVERITY QUANTIFIED, NO PAIN PRESENT: ICD-10-PCS | Mod: CPTII,S$GLB,, | Performed by: OBSTETRICS & GYNECOLOGY

## 2021-07-26 PROCEDURE — 3044F HG A1C LEVEL LT 7.0%: CPT | Mod: CPTII,S$GLB,, | Performed by: OBSTETRICS & GYNECOLOGY

## 2021-07-26 PROCEDURE — 3008F BODY MASS INDEX DOCD: CPT | Mod: CPTII,S$GLB,, | Performed by: OBSTETRICS & GYNECOLOGY

## 2021-07-26 PROCEDURE — 1160F RVW MEDS BY RX/DR IN RCRD: CPT | Mod: CPTII,S$GLB,, | Performed by: OBSTETRICS & GYNECOLOGY

## 2021-07-26 PROCEDURE — 1160F PR REVIEW ALL MEDS BY PRESCRIBER/CLIN PHARMACIST DOCUMENTED: ICD-10-PCS | Mod: CPTII,S$GLB,, | Performed by: OBSTETRICS & GYNECOLOGY

## 2021-07-26 PROCEDURE — 99999 PR PBB SHADOW E&M-EST. PATIENT-LVL III: CPT | Mod: PBBFAC,,, | Performed by: OBSTETRICS & GYNECOLOGY

## 2021-07-26 PROCEDURE — 3078F DIAST BP <80 MM HG: CPT | Mod: CPTII,S$GLB,, | Performed by: OBSTETRICS & GYNECOLOGY

## 2021-07-26 PROCEDURE — 3074F SYST BP LT 130 MM HG: CPT | Mod: CPTII,S$GLB,, | Performed by: OBSTETRICS & GYNECOLOGY

## 2021-07-26 PROCEDURE — 3044F PR MOST RECENT HEMOGLOBIN A1C LEVEL <7.0%: ICD-10-PCS | Mod: CPTII,S$GLB,, | Performed by: OBSTETRICS & GYNECOLOGY

## 2021-07-26 PROCEDURE — 3074F PR MOST RECENT SYSTOLIC BLOOD PRESSURE < 130 MM HG: ICD-10-PCS | Mod: CPTII,S$GLB,, | Performed by: OBSTETRICS & GYNECOLOGY

## 2021-07-26 PROCEDURE — 3008F PR BODY MASS INDEX (BMI) DOCUMENTED: ICD-10-PCS | Mod: CPTII,S$GLB,, | Performed by: OBSTETRICS & GYNECOLOGY

## 2021-07-26 PROCEDURE — 87481 CANDIDA DNA AMP PROBE: CPT | Mod: 59 | Performed by: OBSTETRICS & GYNECOLOGY

## 2021-07-26 PROCEDURE — 1159F PR MEDICATION LIST DOCUMENTED IN MEDICAL RECORD: ICD-10-PCS | Mod: CPTII,S$GLB,, | Performed by: OBSTETRICS & GYNECOLOGY

## 2021-07-26 PROCEDURE — 3078F PR MOST RECENT DIASTOLIC BLOOD PRESSURE < 80 MM HG: ICD-10-PCS | Mod: CPTII,S$GLB,, | Performed by: OBSTETRICS & GYNECOLOGY

## 2021-07-26 PROCEDURE — 99999 PR PBB SHADOW E&M-EST. PATIENT-LVL III: ICD-10-PCS | Mod: PBBFAC,,, | Performed by: OBSTETRICS & GYNECOLOGY

## 2021-07-26 PROCEDURE — 99214 OFFICE O/P EST MOD 30 MIN: CPT | Mod: S$GLB,,, | Performed by: OBSTETRICS & GYNECOLOGY

## 2021-07-26 RX ORDER — SULFAMETHOXAZOLE AND TRIMETHOPRIM 800; 160 MG/1; MG/1
TABLET ORAL
COMMUNITY
Start: 2021-04-13 | End: 2021-12-01 | Stop reason: ALTCHOICE

## 2021-07-26 RX ORDER — TROSPIUM CHLORIDE 20 MG/1
TABLET, FILM COATED ORAL
COMMUNITY
Start: 2021-06-22 | End: 2021-09-13

## 2021-07-26 RX ORDER — CEFUROXIME AXETIL 250 MG/1
TABLET ORAL
COMMUNITY
Start: 2021-04-22 | End: 2021-12-01 | Stop reason: SDUPTHER

## 2021-07-27 ENCOUNTER — TELEPHONE (OUTPATIENT)
Dept: OBSTETRICS AND GYNECOLOGY | Facility: CLINIC | Age: 51
End: 2021-07-27
Payer: MEDICARE

## 2021-07-29 ENCOUNTER — TELEPHONE (OUTPATIENT)
Dept: OBSTETRICS AND GYNECOLOGY | Facility: CLINIC | Age: 51
End: 2021-07-29

## 2021-07-29 DIAGNOSIS — N32.81 OVERACTIVE BLADDER: ICD-10-CM

## 2021-07-29 DIAGNOSIS — N39.41 URGENCY INCONTINENCE: Primary | ICD-10-CM

## 2021-07-29 LAB
BACTERIAL VAGINOSIS DNA: NEGATIVE
CANDIDA GLABRATA DNA: POSITIVE
CANDIDA KRUSEI DNA: NEGATIVE
CANDIDA RRNA VAG QL PROBE: NEGATIVE
T VAGINALIS RRNA GENITAL QL PROBE: NEGATIVE

## 2021-07-29 RX ORDER — CLOTRIMAZOLE AND BETAMETHASONE DIPROPIONATE 10; .64 MG/G; MG/G
CREAM TOPICAL 2 TIMES DAILY
Qty: 15 G | Refills: 1 | Status: SHIPPED | OUTPATIENT
Start: 2021-07-29

## 2021-07-31 ENCOUNTER — TELEPHONE (OUTPATIENT)
Dept: OBSTETRICS AND GYNECOLOGY | Facility: CLINIC | Age: 51
End: 2021-07-31

## 2021-07-31 DIAGNOSIS — N39.41 URGENCY INCONTINENCE: Primary | ICD-10-CM

## 2021-07-31 RX ORDER — TERCONAZOLE 4 MG/G
1 CREAM VAGINAL NIGHTLY
Qty: 1 TUBE | Refills: 0 | Status: SHIPPED | OUTPATIENT
Start: 2021-07-31 | End: 2021-08-07

## 2021-08-02 ENCOUNTER — TELEPHONE (OUTPATIENT)
Dept: GASTROENTEROLOGY | Facility: CLINIC | Age: 51
End: 2021-08-02

## 2021-08-02 DIAGNOSIS — Z01.812 PRE-PROCEDURE LAB EXAM: ICD-10-CM

## 2021-08-04 ENCOUNTER — TELEPHONE (OUTPATIENT)
Dept: ADMINISTRATIVE | Facility: OTHER | Age: 51
End: 2021-08-04

## 2021-08-10 ENCOUNTER — TELEPHONE (OUTPATIENT)
Dept: UROLOGY | Facility: CLINIC | Age: 51
End: 2021-08-10

## 2021-08-11 ENCOUNTER — TELEPHONE (OUTPATIENT)
Dept: OBSTETRICS AND GYNECOLOGY | Facility: CLINIC | Age: 51
End: 2021-08-11

## 2021-08-11 DIAGNOSIS — G43.809 OTHER MIGRAINE WITHOUT STATUS MIGRAINOSUS, NOT INTRACTABLE: ICD-10-CM

## 2021-08-11 DIAGNOSIS — K21.9 GASTROESOPHAGEAL REFLUX DISEASE, UNSPECIFIED WHETHER ESOPHAGITIS PRESENT: ICD-10-CM

## 2021-08-11 DIAGNOSIS — I10 ESSENTIAL HYPERTENSION: ICD-10-CM

## 2021-08-11 RX ORDER — TOPIRAMATE 50 MG/1
50 TABLET, FILM COATED ORAL 3 TIMES DAILY
Qty: 90 TABLET | Refills: 1 | Status: SHIPPED | OUTPATIENT
Start: 2021-08-11 | End: 2021-12-01 | Stop reason: SDUPTHER

## 2021-08-11 RX ORDER — SERTRALINE HYDROCHLORIDE 50 MG/1
50 TABLET, FILM COATED ORAL DAILY
Qty: 90 TABLET | Refills: 1 | Status: SHIPPED | OUTPATIENT
Start: 2021-08-11 | End: 2021-12-01 | Stop reason: SDUPTHER

## 2021-08-11 RX ORDER — SUMATRIPTAN 20 MG/1
1 SPRAY NASAL
Qty: 6 EACH | Refills: 3 | Status: SHIPPED | OUTPATIENT
Start: 2021-08-11 | End: 2021-08-18 | Stop reason: SDUPTHER

## 2021-08-11 RX ORDER — LOSARTAN POTASSIUM 25 MG/1
25 TABLET ORAL DAILY
Qty: 90 TABLET | Refills: 3 | Status: SHIPPED | OUTPATIENT
Start: 2021-08-11 | End: 2021-09-28 | Stop reason: SDUPTHER

## 2021-08-11 RX ORDER — PANTOPRAZOLE SODIUM 40 MG/1
40 TABLET, DELAYED RELEASE ORAL 2 TIMES DAILY
Qty: 60 TABLET | Refills: 0 | Status: SHIPPED | OUTPATIENT
Start: 2021-08-11 | End: 2021-12-01 | Stop reason: SDUPTHER

## 2021-08-11 RX ORDER — ERENUMAB-AOOE 140 MG/ML
140 INJECTION, SOLUTION SUBCUTANEOUS
Qty: 1 ML | Refills: 11 | Status: SHIPPED | OUTPATIENT
Start: 2021-08-11 | End: 2021-12-01 | Stop reason: ALTCHOICE

## 2021-08-13 ENCOUNTER — TELEPHONE (OUTPATIENT)
Dept: SLEEP MEDICINE | Facility: CLINIC | Age: 51
End: 2021-08-13

## 2021-08-16 ENCOUNTER — OFFICE VISIT (OUTPATIENT)
Dept: PODIATRY | Facility: CLINIC | Age: 51
End: 2021-08-16
Payer: MEDICARE

## 2021-08-16 ENCOUNTER — HOSPITAL ENCOUNTER (OUTPATIENT)
Dept: RADIOLOGY | Facility: HOSPITAL | Age: 51
Discharge: HOME | End: 2021-08-16
Attending: PODIATRIST
Payer: MEDICARE

## 2021-08-16 VITALS — HEIGHT: 64 IN | BODY MASS INDEX: 51.96 KG/M2

## 2021-08-16 DIAGNOSIS — G89.29 CHRONIC HEEL PAIN, RIGHT: Primary | ICD-10-CM

## 2021-08-16 DIAGNOSIS — M77.50 TENDONITIS OF ANKLE: ICD-10-CM

## 2021-08-16 DIAGNOSIS — M72.2 PLANTAR FASCIITIS, RIGHT: ICD-10-CM

## 2021-08-16 DIAGNOSIS — M79.671 CHRONIC HEEL PAIN, RIGHT: Primary | ICD-10-CM

## 2021-08-16 DIAGNOSIS — M79.671 CHRONIC HEEL PAIN, RIGHT: ICD-10-CM

## 2021-08-16 DIAGNOSIS — G89.29 CHRONIC HEEL PAIN, RIGHT: ICD-10-CM

## 2021-08-16 PROCEDURE — 1125F PR PAIN SEVERITY QUANTIFIED, PAIN PRESENT: ICD-10-PCS | Mod: CPTII,S$GLB,, | Performed by: PODIATRIST

## 2021-08-16 PROCEDURE — 3044F PR MOST RECENT HEMOGLOBIN A1C LEVEL <7.0%: ICD-10-PCS | Mod: CPTII,S$GLB,, | Performed by: PODIATRIST

## 2021-08-16 PROCEDURE — 1160F PR REVIEW ALL MEDS BY PRESCRIBER/CLIN PHARMACIST DOCUMENTED: ICD-10-PCS | Mod: CPTII,S$GLB,, | Performed by: PODIATRIST

## 2021-08-16 PROCEDURE — 1159F MED LIST DOCD IN RCRD: CPT | Mod: CPTII,S$GLB,, | Performed by: PODIATRIST

## 2021-08-16 PROCEDURE — 73650 X-RAY EXAM OF HEEL: CPT | Mod: 26,RT,, | Performed by: RADIOLOGY

## 2021-08-16 PROCEDURE — 99203 OFFICE O/P NEW LOW 30 MIN: CPT | Mod: S$GLB,,, | Performed by: PODIATRIST

## 2021-08-16 PROCEDURE — 1159F PR MEDICATION LIST DOCUMENTED IN MEDICAL RECORD: ICD-10-PCS | Mod: CPTII,S$GLB,, | Performed by: PODIATRIST

## 2021-08-16 PROCEDURE — 1125F AMNT PAIN NOTED PAIN PRSNT: CPT | Mod: CPTII,S$GLB,, | Performed by: PODIATRIST

## 2021-08-16 PROCEDURE — 3008F BODY MASS INDEX DOCD: CPT | Mod: CPTII,S$GLB,, | Performed by: PODIATRIST

## 2021-08-16 PROCEDURE — 1160F RVW MEDS BY RX/DR IN RCRD: CPT | Mod: CPTII,S$GLB,, | Performed by: PODIATRIST

## 2021-08-16 PROCEDURE — 3008F PR BODY MASS INDEX (BMI) DOCUMENTED: ICD-10-PCS | Mod: CPTII,S$GLB,, | Performed by: PODIATRIST

## 2021-08-16 PROCEDURE — 99203 PR OFFICE/OUTPT VISIT, NEW, LEVL III, 30-44 MIN: ICD-10-PCS | Mod: S$GLB,,, | Performed by: PODIATRIST

## 2021-08-16 PROCEDURE — 99999 PR PBB SHADOW E&M-EST. PATIENT-LVL III: CPT | Mod: PBBFAC,,, | Performed by: PODIATRIST

## 2021-08-16 PROCEDURE — 3044F HG A1C LEVEL LT 7.0%: CPT | Mod: CPTII,S$GLB,, | Performed by: PODIATRIST

## 2021-08-16 PROCEDURE — 73650 X-RAY EXAM OF HEEL: CPT | Mod: TC,PN,RT

## 2021-08-16 PROCEDURE — 99999 PR PBB SHADOW E&M-EST. PATIENT-LVL III: ICD-10-PCS | Mod: PBBFAC,,, | Performed by: PODIATRIST

## 2021-08-16 PROCEDURE — 73650 XR CALCANEUS 2 VIEW RIGHT: ICD-10-PCS | Mod: 26,RT,, | Performed by: RADIOLOGY

## 2021-08-16 RX ORDER — MELOXICAM 15 MG/1
15 TABLET ORAL DAILY
Qty: 30 TABLET | Refills: 1 | Status: SHIPPED | OUTPATIENT
Start: 2021-08-16 | End: 2021-08-17

## 2021-08-17 RX ORDER — MELOXICAM 15 MG/1
TABLET ORAL
Qty: 90 TABLET | Refills: 0 | Status: SHIPPED | OUTPATIENT
Start: 2021-08-17 | End: 2021-12-01 | Stop reason: ALTCHOICE

## 2021-08-18 ENCOUNTER — TELEPHONE (OUTPATIENT)
Dept: OBSTETRICS AND GYNECOLOGY | Facility: CLINIC | Age: 51
End: 2021-08-18

## 2021-08-18 ENCOUNTER — TELEPHONE (OUTPATIENT)
Dept: SLEEP MEDICINE | Facility: CLINIC | Age: 51
End: 2021-08-18

## 2021-08-18 RX ORDER — SUMATRIPTAN 20 MG/1
1 SPRAY NASAL
Qty: 6 EACH | Refills: 3 | Status: SHIPPED | OUTPATIENT
Start: 2021-08-18 | End: 2021-12-01 | Stop reason: SDUPTHER

## 2021-09-09 ENCOUNTER — TELEPHONE (OUTPATIENT)
Dept: ENDOSCOPY | Facility: HOSPITAL | Age: 51
End: 2021-09-09

## 2021-09-09 ENCOUNTER — PATIENT OUTREACH (OUTPATIENT)
Dept: ADMINISTRATIVE | Facility: HOSPITAL | Age: 51
End: 2021-09-09

## 2021-09-13 ENCOUNTER — OFFICE VISIT (OUTPATIENT)
Dept: UROLOGY | Facility: CLINIC | Age: 51
End: 2021-09-13
Payer: MEDICARE

## 2021-09-13 VITALS
DIASTOLIC BLOOD PRESSURE: 81 MMHG | WEIGHT: 293 LBS | SYSTOLIC BLOOD PRESSURE: 145 MMHG | BODY MASS INDEX: 50.02 KG/M2 | HEIGHT: 64 IN | HEART RATE: 84 BPM

## 2021-09-13 DIAGNOSIS — R32 URINARY INCONTINENCE, UNSPECIFIED TYPE: ICD-10-CM

## 2021-09-13 DIAGNOSIS — R35.0 URINARY FREQUENCY: ICD-10-CM

## 2021-09-13 DIAGNOSIS — N32.81 OVERACTIVE BLADDER: ICD-10-CM

## 2021-09-13 DIAGNOSIS — N39.41 URGENCY INCONTINENCE: ICD-10-CM

## 2021-09-13 PROCEDURE — 99214 OFFICE O/P EST MOD 30 MIN: CPT | Mod: S$GLB,,, | Performed by: UROLOGY

## 2021-09-13 PROCEDURE — 1159F PR MEDICATION LIST DOCUMENTED IN MEDICAL RECORD: ICD-10-PCS | Mod: CPTII,S$GLB,, | Performed by: UROLOGY

## 2021-09-13 PROCEDURE — 3077F SYST BP >= 140 MM HG: CPT | Mod: CPTII,S$GLB,, | Performed by: UROLOGY

## 2021-09-13 PROCEDURE — 1160F PR REVIEW ALL MEDS BY PRESCRIBER/CLIN PHARMACIST DOCUMENTED: ICD-10-PCS | Mod: CPTII,S$GLB,, | Performed by: UROLOGY

## 2021-09-13 PROCEDURE — 99214 PR OFFICE/OUTPT VISIT, EST, LEVL IV, 30-39 MIN: ICD-10-PCS | Mod: S$GLB,,, | Performed by: UROLOGY

## 2021-09-13 PROCEDURE — 99999 PR PBB SHADOW E&M-EST. PATIENT-LVL V: ICD-10-PCS | Mod: PBBFAC,,, | Performed by: UROLOGY

## 2021-09-13 PROCEDURE — 3079F DIAST BP 80-89 MM HG: CPT | Mod: CPTII,S$GLB,, | Performed by: UROLOGY

## 2021-09-13 PROCEDURE — 3077F PR MOST RECENT SYSTOLIC BLOOD PRESSURE >= 140 MM HG: ICD-10-PCS | Mod: CPTII,S$GLB,, | Performed by: UROLOGY

## 2021-09-13 PROCEDURE — 99999 PR PBB SHADOW E&M-EST. PATIENT-LVL V: CPT | Mod: PBBFAC,,, | Performed by: UROLOGY

## 2021-09-13 PROCEDURE — 1159F MED LIST DOCD IN RCRD: CPT | Mod: CPTII,S$GLB,, | Performed by: UROLOGY

## 2021-09-13 PROCEDURE — 4010F PR ACE/ARB THEARPY RXD/TAKEN: ICD-10-PCS | Mod: CPTII,S$GLB,, | Performed by: UROLOGY

## 2021-09-13 PROCEDURE — 3008F PR BODY MASS INDEX (BMI) DOCUMENTED: ICD-10-PCS | Mod: CPTII,S$GLB,, | Performed by: UROLOGY

## 2021-09-13 PROCEDURE — 1160F RVW MEDS BY RX/DR IN RCRD: CPT | Mod: CPTII,S$GLB,, | Performed by: UROLOGY

## 2021-09-13 PROCEDURE — 4010F ACE/ARB THERAPY RXD/TAKEN: CPT | Mod: CPTII,S$GLB,, | Performed by: UROLOGY

## 2021-09-13 PROCEDURE — 3044F HG A1C LEVEL LT 7.0%: CPT | Mod: CPTII,S$GLB,, | Performed by: UROLOGY

## 2021-09-13 PROCEDURE — 3008F BODY MASS INDEX DOCD: CPT | Mod: CPTII,S$GLB,, | Performed by: UROLOGY

## 2021-09-13 PROCEDURE — 3079F PR MOST RECENT DIASTOLIC BLOOD PRESSURE 80-89 MM HG: ICD-10-PCS | Mod: CPTII,S$GLB,, | Performed by: UROLOGY

## 2021-09-13 PROCEDURE — 3044F PR MOST RECENT HEMOGLOBIN A1C LEVEL <7.0%: ICD-10-PCS | Mod: CPTII,S$GLB,, | Performed by: UROLOGY

## 2021-09-14 ENCOUNTER — TELEPHONE (OUTPATIENT)
Dept: GASTROENTEROLOGY | Facility: CLINIC | Age: 51
End: 2021-09-14

## 2021-09-21 ENCOUNTER — TELEPHONE (OUTPATIENT)
Dept: ENDOSCOPY | Facility: HOSPITAL | Age: 51
End: 2021-09-21

## 2021-09-22 ENCOUNTER — TELEPHONE (OUTPATIENT)
Dept: ENDOSCOPY | Facility: HOSPITAL | Age: 51
End: 2021-09-22

## 2021-09-22 ENCOUNTER — TELEPHONE (OUTPATIENT)
Dept: GASTROENTEROLOGY | Facility: CLINIC | Age: 51
End: 2021-09-22

## 2021-09-25 ENCOUNTER — HOSPITAL ENCOUNTER (EMERGENCY)
Facility: HOSPITAL | Age: 51
Discharge: HOME | End: 2021-09-25
Attending: EMERGENCY MEDICINE
Payer: MEDICARE

## 2021-09-25 VITALS
TEMPERATURE: 98 F | RESPIRATION RATE: 18 BRPM | DIASTOLIC BLOOD PRESSURE: 90 MMHG | OXYGEN SATURATION: 99 % | HEART RATE: 78 BPM | SYSTOLIC BLOOD PRESSURE: 163 MMHG

## 2021-09-25 DIAGNOSIS — G43.809 OTHER MIGRAINE WITHOUT STATUS MIGRAINOSUS, NOT INTRACTABLE: Primary | ICD-10-CM

## 2021-09-25 PROCEDURE — 96372 THER/PROPH/DIAG INJ SC/IM: CPT | Mod: HCNC

## 2021-09-25 PROCEDURE — 99284 EMERGENCY DEPT VISIT MOD MDM: CPT | Mod: 25,HCNC

## 2021-09-25 PROCEDURE — 63600175 PHARM REV CODE 636 W HCPCS: Mod: HCNC | Performed by: EMERGENCY MEDICINE

## 2021-09-25 RX ORDER — KETOROLAC TROMETHAMINE 30 MG/ML
60 INJECTION, SOLUTION INTRAMUSCULAR; INTRAVENOUS
Status: COMPLETED | OUTPATIENT
Start: 2021-09-25 | End: 2021-09-25

## 2021-09-25 RX ORDER — PROMETHAZINE HYDROCHLORIDE 25 MG/ML
25 INJECTION, SOLUTION INTRAMUSCULAR; INTRAVENOUS
Status: COMPLETED | OUTPATIENT
Start: 2021-09-25 | End: 2021-09-25

## 2021-09-25 RX ADMIN — PROMETHAZINE HYDROCHLORIDE 25 MG: 25 INJECTION INTRAMUSCULAR; INTRAVENOUS at 11:09

## 2021-09-25 RX ADMIN — KETOROLAC TROMETHAMINE 60 MG: 30 INJECTION, SOLUTION INTRAMUSCULAR at 11:09

## 2021-09-27 ENCOUNTER — TELEPHONE (OUTPATIENT)
Dept: UROLOGY | Facility: CLINIC | Age: 51
End: 2021-09-27

## 2021-09-28 DIAGNOSIS — I10 ESSENTIAL HYPERTENSION: ICD-10-CM

## 2021-09-28 RX ORDER — LOSARTAN POTASSIUM 25 MG/1
25 TABLET ORAL DAILY
Qty: 90 TABLET | Refills: 3 | Status: SHIPPED | OUTPATIENT
Start: 2021-09-28 | End: 2021-12-01 | Stop reason: SDUPTHER

## 2021-10-07 ENCOUNTER — TELEPHONE (OUTPATIENT)
Dept: FAMILY MEDICINE | Facility: CLINIC | Age: 51
End: 2021-10-07

## 2021-10-14 ENCOUNTER — TELEPHONE (OUTPATIENT)
Dept: PODIATRY | Facility: CLINIC | Age: 51
End: 2021-10-14

## 2021-10-28 ENCOUNTER — TELEPHONE (OUTPATIENT)
Dept: GASTROENTEROLOGY | Facility: CLINIC | Age: 51
End: 2021-10-28
Payer: MEDICARE

## 2021-10-30 ENCOUNTER — HOSPITAL ENCOUNTER (OUTPATIENT)
Dept: RADIOLOGY | Facility: HOSPITAL | Age: 51
Discharge: HOME | End: 2021-10-30
Attending: PODIATRIST
Payer: MEDICARE

## 2021-10-30 DIAGNOSIS — M79.671 CHRONIC HEEL PAIN, RIGHT: ICD-10-CM

## 2021-10-30 DIAGNOSIS — G89.29 CHRONIC HEEL PAIN, RIGHT: ICD-10-CM

## 2021-10-30 DIAGNOSIS — M77.50 TENDONITIS OF ANKLE: ICD-10-CM

## 2021-10-30 DIAGNOSIS — M72.2 PLANTAR FASCIITIS, RIGHT: ICD-10-CM

## 2021-10-30 PROCEDURE — 73721 MRI JNT OF LWR EXTRE W/O DYE: CPT | Mod: 26,HCNC,RT, | Performed by: RADIOLOGY

## 2021-10-30 PROCEDURE — 73721 MRI JNT OF LWR EXTRE W/O DYE: CPT | Mod: TC,HCNC,RT

## 2021-10-30 PROCEDURE — 73721 MRI ANKLE WITHOUT CONTRAST RIGHT: ICD-10-PCS | Mod: 26,HCNC,RT, | Performed by: RADIOLOGY

## 2021-11-01 ENCOUNTER — TELEPHONE (OUTPATIENT)
Dept: PODIATRY | Facility: CLINIC | Age: 51
End: 2021-11-01
Payer: MEDICARE

## 2021-11-03 ENCOUNTER — TELEPHONE (OUTPATIENT)
Dept: PODIATRY | Facility: CLINIC | Age: 51
End: 2021-11-03
Payer: MEDICARE

## 2021-11-05 ENCOUNTER — TELEPHONE (OUTPATIENT)
Dept: PODIATRY | Facility: HOSPITAL | Age: 51
End: 2021-11-05
Payer: MEDICARE

## 2021-11-06 ENCOUNTER — HOSPITAL ENCOUNTER (EMERGENCY)
Facility: HOSPITAL | Age: 51
Discharge: HOME | End: 2021-11-07
Attending: EMERGENCY MEDICINE
Payer: MEDICARE

## 2021-11-06 DIAGNOSIS — R51.9 NONINTRACTABLE HEADACHE, UNSPECIFIED CHRONICITY PATTERN, UNSPECIFIED HEADACHE TYPE: Primary | ICD-10-CM

## 2021-11-06 PROCEDURE — 99284 EMERGENCY DEPT VISIT MOD MDM: CPT | Mod: 25,HCNC

## 2021-11-06 RX ORDER — KETOROLAC TROMETHAMINE 30 MG/ML
30 INJECTION, SOLUTION INTRAMUSCULAR; INTRAVENOUS
Status: COMPLETED | OUTPATIENT
Start: 2021-11-07 | End: 2021-11-07

## 2021-11-06 RX ORDER — PROMETHAZINE HYDROCHLORIDE 25 MG/ML
25 INJECTION, SOLUTION INTRAMUSCULAR; INTRAVENOUS
Status: COMPLETED | OUTPATIENT
Start: 2021-11-07 | End: 2021-11-07

## 2021-11-06 RX ORDER — DIPHENHYDRAMINE HYDROCHLORIDE 50 MG/ML
25 INJECTION INTRAMUSCULAR; INTRAVENOUS
Status: COMPLETED | OUTPATIENT
Start: 2021-11-07 | End: 2021-11-07

## 2021-11-07 VITALS
RESPIRATION RATE: 18 BRPM | TEMPERATURE: 98 F | DIASTOLIC BLOOD PRESSURE: 67 MMHG | HEART RATE: 87 BPM | OXYGEN SATURATION: 100 % | WEIGHT: 293 LBS | BODY MASS INDEX: 53.38 KG/M2 | SYSTOLIC BLOOD PRESSURE: 141 MMHG

## 2021-11-07 PROCEDURE — 96372 THER/PROPH/DIAG INJ SC/IM: CPT | Mod: HCNC

## 2021-11-07 PROCEDURE — 63600175 PHARM REV CODE 636 W HCPCS: Mod: HCNC | Performed by: EMERGENCY MEDICINE

## 2021-11-07 RX ORDER — PROMETHAZINE HYDROCHLORIDE 25 MG/1
25 TABLET ORAL EVERY 6 HOURS PRN
Qty: 12 TABLET | Refills: 0 | Status: SHIPPED | OUTPATIENT
Start: 2021-11-07 | End: 2021-12-30

## 2021-11-07 RX ADMIN — DIPHENHYDRAMINE HYDROCHLORIDE 25 MG: 50 INJECTION INTRAMUSCULAR; INTRAVENOUS at 12:11

## 2021-11-07 RX ADMIN — PROMETHAZINE HYDROCHLORIDE 25 MG: 25 INJECTION INTRAMUSCULAR; INTRAVENOUS at 12:11

## 2021-11-07 RX ADMIN — KETOROLAC TROMETHAMINE 30 MG: 30 INJECTION, SOLUTION INTRAMUSCULAR at 12:11

## 2021-12-01 ENCOUNTER — OFFICE VISIT (OUTPATIENT)
Dept: FAMILY MEDICINE | Facility: CLINIC | Age: 51
End: 2021-12-01
Payer: MEDICARE

## 2021-12-01 VITALS
BODY MASS INDEX: 50.02 KG/M2 | OXYGEN SATURATION: 97 % | WEIGHT: 293 LBS | SYSTOLIC BLOOD PRESSURE: 138 MMHG | HEART RATE: 99 BPM | DIASTOLIC BLOOD PRESSURE: 78 MMHG | HEIGHT: 64 IN

## 2021-12-01 DIAGNOSIS — G43.009 MIGRAINE WITHOUT AURA AND WITHOUT STATUS MIGRAINOSUS, NOT INTRACTABLE: ICD-10-CM

## 2021-12-01 DIAGNOSIS — G43.719 INTRACTABLE CHRONIC MIGRAINE WITHOUT AURA AND WITHOUT STATUS MIGRAINOSUS: ICD-10-CM

## 2021-12-01 DIAGNOSIS — I10 ESSENTIAL HYPERTENSION: ICD-10-CM

## 2021-12-01 DIAGNOSIS — K21.9 GASTROESOPHAGEAL REFLUX DISEASE, UNSPECIFIED WHETHER ESOPHAGITIS PRESENT: ICD-10-CM

## 2021-12-01 DIAGNOSIS — G43.809 OTHER MIGRAINE WITHOUT STATUS MIGRAINOSUS, NOT INTRACTABLE: ICD-10-CM

## 2021-12-01 DIAGNOSIS — E66.01 MORBID OBESITY WITH BMI OF 50.0-59.9, ADULT: Primary | ICD-10-CM

## 2021-12-01 DIAGNOSIS — Z12.11 SCREENING FOR COLON CANCER: ICD-10-CM

## 2021-12-01 PROCEDURE — 4010F PR ACE/ARB THEARPY RXD/TAKEN: ICD-10-PCS | Mod: HCNC,CPTII,S$GLB, | Performed by: FAMILY MEDICINE

## 2021-12-01 PROCEDURE — 99499 RISK ADDL DX/OHS AUDIT: ICD-10-PCS | Mod: HCNC,S$GLB,, | Performed by: FAMILY MEDICINE

## 2021-12-01 PROCEDURE — 4010F ACE/ARB THERAPY RXD/TAKEN: CPT | Mod: HCNC,CPTII,S$GLB, | Performed by: FAMILY MEDICINE

## 2021-12-01 PROCEDURE — 99214 OFFICE O/P EST MOD 30 MIN: CPT | Mod: HCNC,S$GLB,, | Performed by: FAMILY MEDICINE

## 2021-12-01 PROCEDURE — 99214 PR OFFICE/OUTPT VISIT, EST, LEVL IV, 30-39 MIN: ICD-10-PCS | Mod: HCNC,S$GLB,, | Performed by: FAMILY MEDICINE

## 2021-12-01 PROCEDURE — 99499 UNLISTED E&M SERVICE: CPT | Mod: HCNC,S$GLB,, | Performed by: FAMILY MEDICINE

## 2021-12-01 PROCEDURE — 99999 PR PBB SHADOW E&M-EST. PATIENT-LVL III: CPT | Mod: PBBFAC,HCNC,, | Performed by: FAMILY MEDICINE

## 2021-12-01 PROCEDURE — 99999 PR PBB SHADOW E&M-EST. PATIENT-LVL III: ICD-10-PCS | Mod: PBBFAC,HCNC,, | Performed by: FAMILY MEDICINE

## 2021-12-01 RX ORDER — TRAMADOL HYDROCHLORIDE 50 MG/1
TABLET ORAL
OUTPATIENT
Start: 2021-12-01

## 2021-12-01 RX ORDER — CEFUROXIME AXETIL 250 MG/1
6 TABLET ORAL
Qty: 1 ML | Refills: 5 | Status: SHIPPED | OUTPATIENT
Start: 2021-12-01 | End: 2022-01-14 | Stop reason: SDUPTHER

## 2021-12-01 RX ORDER — SUMATRIPTAN 20 MG/1
1 SPRAY NASAL
Qty: 6 EACH | Refills: 3 | OUTPATIENT
Start: 2021-12-01 | End: 2021-12-31

## 2021-12-01 RX ORDER — TOPIRAMATE 50 MG/1
50 TABLET, FILM COATED ORAL 3 TIMES DAILY
Qty: 90 TABLET | Refills: 1 | Status: SHIPPED | OUTPATIENT
Start: 2021-12-01 | End: 2022-02-16

## 2021-12-01 RX ORDER — MELOXICAM 15 MG/1
15 TABLET ORAL DAILY
Qty: 90 TABLET | Refills: 0 | OUTPATIENT
Start: 2021-12-01

## 2021-12-01 RX ORDER — LOSARTAN POTASSIUM 25 MG/1
25 TABLET ORAL DAILY
Qty: 90 TABLET | Refills: 3 | OUTPATIENT
Start: 2021-12-01

## 2021-12-01 RX ORDER — ERENUMAB-AOOE 140 MG/ML
140 INJECTION, SOLUTION SUBCUTANEOUS
Qty: 1 ML | Refills: 11 | OUTPATIENT
Start: 2021-12-01

## 2021-12-01 RX ORDER — SUMATRIPTAN 20 MG/1
1 SPRAY NASAL
Qty: 6 EACH | Refills: 3 | Status: SHIPPED | OUTPATIENT
Start: 2021-12-01 | End: 2022-01-14 | Stop reason: SDUPTHER

## 2021-12-01 RX ORDER — SERTRALINE HYDROCHLORIDE 50 MG/1
50 TABLET, FILM COATED ORAL DAILY
Qty: 90 TABLET | Refills: 1 | Status: SHIPPED | OUTPATIENT
Start: 2021-12-01 | End: 2022-02-23

## 2021-12-01 RX ORDER — GALCANEZUMAB 120 MG/ML
120 INJECTION, SOLUTION SUBCUTANEOUS
Qty: 1 ML | Refills: 11 | OUTPATIENT
Start: 2021-12-01

## 2021-12-01 RX ORDER — PROMETHAZINE HYDROCHLORIDE 25 MG/1
25 TABLET ORAL EVERY 6 HOURS PRN
Qty: 12 TABLET | Refills: 0 | OUTPATIENT
Start: 2021-12-01

## 2021-12-01 RX ORDER — SERTRALINE HYDROCHLORIDE 50 MG/1
50 TABLET, FILM COATED ORAL DAILY
Qty: 90 TABLET | Refills: 1 | OUTPATIENT
Start: 2021-12-01 | End: 2022-03-01

## 2021-12-01 RX ORDER — SULFAMETHOXAZOLE AND TRIMETHOPRIM 800; 160 MG/1; MG/1
TABLET ORAL
OUTPATIENT
Start: 2021-12-01

## 2021-12-01 RX ORDER — CEFUROXIME AXETIL 250 MG/1
TABLET ORAL
OUTPATIENT
Start: 2021-12-01

## 2021-12-01 RX ORDER — TOPIRAMATE 50 MG/1
50 TABLET, FILM COATED ORAL 3 TIMES DAILY
Qty: 90 TABLET | Refills: 1 | OUTPATIENT
Start: 2021-12-01

## 2021-12-01 RX ORDER — PANTOPRAZOLE SODIUM 40 MG/1
40 TABLET, DELAYED RELEASE ORAL 2 TIMES DAILY
Qty: 60 TABLET | Refills: 0 | OUTPATIENT
Start: 2021-12-01 | End: 2021-12-31

## 2021-12-01 RX ORDER — LOSARTAN POTASSIUM 25 MG/1
25 TABLET ORAL DAILY
Qty: 90 TABLET | Refills: 3 | Status: SHIPPED | OUTPATIENT
Start: 2021-12-01 | End: 2022-02-24 | Stop reason: DRUGHIGH

## 2021-12-01 RX ORDER — PANTOPRAZOLE SODIUM 40 MG/1
40 TABLET, DELAYED RELEASE ORAL 2 TIMES DAILY
Qty: 60 TABLET | Refills: 0 | Status: SHIPPED | OUTPATIENT
Start: 2021-12-01 | End: 2022-01-14 | Stop reason: SDUPTHER

## 2021-12-01 RX ORDER — PROMETHAZINE HYDROCHLORIDE 25 MG/1
25 TABLET ORAL EVERY 4 HOURS PRN
Qty: 30 TABLET | Refills: 5 | OUTPATIENT
Start: 2021-12-01

## 2021-12-01 RX ORDER — ONDANSETRON 4 MG/1
4 TABLET, ORALLY DISINTEGRATING ORAL EVERY 6 HOURS PRN
Qty: 30 TABLET | Refills: 1 | OUTPATIENT
Start: 2021-12-01

## 2021-12-01 RX ORDER — SODIUM, POTASSIUM,MAG SULFATES 17.5-3.13G
1 SOLUTION, RECONSTITUTED, ORAL ORAL DAILY
Qty: 1 KIT | Refills: 0 | OUTPATIENT
Start: 2021-12-01

## 2021-12-01 RX ORDER — CLOTRIMAZOLE AND BETAMETHASONE DIPROPIONATE 10; .64 MG/G; MG/G
CREAM TOPICAL 2 TIMES DAILY
Qty: 15 G | Refills: 1 | OUTPATIENT
Start: 2021-12-01

## 2021-12-07 ENCOUNTER — OUTPATIENT CASE MANAGEMENT (OUTPATIENT)
Dept: ADMINISTRATIVE | Facility: OTHER | Age: 51
End: 2021-12-07
Payer: MEDICARE

## 2021-12-07 ENCOUNTER — PATIENT OUTREACH (OUTPATIENT)
Dept: ADMINISTRATIVE | Facility: OTHER | Age: 51
End: 2021-12-07
Payer: MEDICARE

## 2021-12-09 ENCOUNTER — TELEPHONE (OUTPATIENT)
Dept: GASTROENTEROLOGY | Facility: CLINIC | Age: 51
End: 2021-12-09
Payer: MEDICARE

## 2021-12-09 ENCOUNTER — TELEPHONE (OUTPATIENT)
Dept: ENDOSCOPY | Facility: HOSPITAL | Age: 51
End: 2021-12-09
Payer: MEDICARE

## 2021-12-29 ENCOUNTER — TELEPHONE (OUTPATIENT)
Dept: PODIATRY | Facility: CLINIC | Age: 51
End: 2021-12-29
Payer: MEDICARE

## 2021-12-30 ENCOUNTER — OFFICE VISIT (OUTPATIENT)
Dept: UROLOGY | Facility: CLINIC | Age: 51
End: 2021-12-30
Payer: MEDICARE

## 2021-12-30 ENCOUNTER — TELEPHONE (OUTPATIENT)
Dept: UROLOGY | Facility: CLINIC | Age: 51
End: 2021-12-30

## 2021-12-30 DIAGNOSIS — R32 URINARY INCONTINENCE, UNSPECIFIED TYPE: Primary | ICD-10-CM

## 2021-12-30 DIAGNOSIS — R23.8 SKIN BREAKDOWN: ICD-10-CM

## 2021-12-30 PROCEDURE — 99214 OFFICE O/P EST MOD 30 MIN: CPT | Mod: HCNC,S$GLB,, | Performed by: NURSE PRACTITIONER

## 2021-12-30 PROCEDURE — 1160F RVW MEDS BY RX/DR IN RCRD: CPT | Mod: HCNC,CPTII,S$GLB, | Performed by: NURSE PRACTITIONER

## 2021-12-30 PROCEDURE — 3044F HG A1C LEVEL LT 7.0%: CPT | Mod: HCNC,CPTII,S$GLB, | Performed by: NURSE PRACTITIONER

## 2021-12-30 PROCEDURE — 4010F PR ACE/ARB THEARPY RXD/TAKEN: ICD-10-PCS | Mod: HCNC,CPTII,S$GLB, | Performed by: NURSE PRACTITIONER

## 2021-12-30 PROCEDURE — 1159F MED LIST DOCD IN RCRD: CPT | Mod: HCNC,CPTII,S$GLB, | Performed by: NURSE PRACTITIONER

## 2021-12-30 PROCEDURE — 99214 PR OFFICE/OUTPT VISIT, EST, LEVL IV, 30-39 MIN: ICD-10-PCS | Mod: HCNC,S$GLB,, | Performed by: NURSE PRACTITIONER

## 2021-12-30 PROCEDURE — 1160F PR REVIEW ALL MEDS BY PRESCRIBER/CLIN PHARMACIST DOCUMENTED: ICD-10-PCS | Mod: HCNC,CPTII,S$GLB, | Performed by: NURSE PRACTITIONER

## 2021-12-30 PROCEDURE — 99999 PR PBB SHADOW E&M-EST. PATIENT-LVL III: CPT | Mod: PBBFAC,HCNC,, | Performed by: NURSE PRACTITIONER

## 2021-12-30 PROCEDURE — 1159F PR MEDICATION LIST DOCUMENTED IN MEDICAL RECORD: ICD-10-PCS | Mod: HCNC,CPTII,S$GLB, | Performed by: NURSE PRACTITIONER

## 2021-12-30 PROCEDURE — 99999 PR PBB SHADOW E&M-EST. PATIENT-LVL III: ICD-10-PCS | Mod: PBBFAC,HCNC,, | Performed by: NURSE PRACTITIONER

## 2021-12-30 PROCEDURE — 3044F PR MOST RECENT HEMOGLOBIN A1C LEVEL <7.0%: ICD-10-PCS | Mod: HCNC,CPTII,S$GLB, | Performed by: NURSE PRACTITIONER

## 2021-12-30 PROCEDURE — 4010F ACE/ARB THERAPY RXD/TAKEN: CPT | Mod: HCNC,CPTII,S$GLB, | Performed by: NURSE PRACTITIONER

## 2022-01-03 ENCOUNTER — TELEPHONE (OUTPATIENT)
Dept: UROLOGY | Facility: CLINIC | Age: 52
End: 2022-01-03
Payer: MEDICARE

## 2022-01-03 NOTE — TELEPHONE ENCOUNTER
----- Message from Vanessa Tomas RN sent at 12/30/2021  5:17 PM CST -----  Regarding: FW: Recovery time    ----- Message -----  From: Vanessa Weinberg MA  Sent: 12/30/2021  12:14 PM CST  To: Lenore Mccollum LPN, Stephanie Hernandez Staff  Subject: Recovery time                                    Just spoke with the patients sister Ms Ritter. She would like a call back from the nurse about the recovery time for the interstim procedure along with if her sister needs anyone to stay with her until she's recovered. Can you give her a call back please.

## 2022-01-07 ENCOUNTER — TELEPHONE (OUTPATIENT)
Dept: UROLOGY | Facility: CLINIC | Age: 52
End: 2022-01-07
Payer: MEDICARE

## 2022-01-07 NOTE — TELEPHONE ENCOUNTER
Spoke with pt and explained protocol, she will await call from Vanessa day before with times, etc..

## 2022-01-07 NOTE — TELEPHONE ENCOUNTER
----- Message from Vanessa Tomas RN sent at 1/6/2022  5:00 PM CST -----  Contact: Patient    ----- Message -----  From: Judith House  Sent: 1/6/2022   3:37 PM CST  To: Stephanie Hernandez Staff    Patient call in regarding coming up appointments    Patient stated having 2 surgery and requesting time and date for surgeries     Patient stated confused     Please assist    Patient can be reach at [phone]

## 2022-01-12 ENCOUNTER — PATIENT OUTREACH (OUTPATIENT)
Dept: ADMINISTRATIVE | Facility: OTHER | Age: 52
End: 2022-01-12
Payer: MEDICARE

## 2022-01-12 ENCOUNTER — TELEPHONE (OUTPATIENT)
Dept: UROLOGY | Facility: CLINIC | Age: 52
End: 2022-01-12
Payer: MEDICARE

## 2022-01-12 NOTE — PROGRESS NOTES
Health Maintenance Due   Topic Date Due    HIV Screening  Never done    Colorectal Cancer Screening  Never done    COVID-19 Vaccine (3 - Booster for Moderna series) 10/28/2021     Updates were requested from care everywhere.  Chart was reviewed for overdue Proactive [facility] Encounters ([facility]UL) topics (CRS, Breast Cancer Screening, Eye exam)  Health Maintenance has been updated.  LINKS immunization registry triggered.  Immunizations were reconciled.  Pt has open order for colonoscopy. FIT kit not ordered.

## 2022-01-12 NOTE — TELEPHONE ENCOUNTER
Called pt to confirm arrival time of 100pm for procedure on 1-18. Gave pt NPO instructions and gave pt opportunity to ask questions. Pt verbalized understanding.     Pt was informed that only 1 person would be allowed to accompany them the morning of surgery.  Pt verbalized understanding.

## 2022-01-13 ENCOUNTER — TELEPHONE (OUTPATIENT)
Dept: FAMILY MEDICINE | Facility: CLINIC | Age: 52
End: 2022-01-13
Payer: MEDICARE

## 2022-01-13 NOTE — TELEPHONE ENCOUNTER
----- Message from Danika Lee sent at 1/13/2022  1:23 PM CST -----  Contact: [phone]  Type:  Same Day Appointment Request    Caller is requesting a same day appointment.  Caller declined first available appointment listed below.    Name of Caller: pt   When is the first available appointment? Schedule not coming up   Symptoms: sinus infection , sore throat   Best Call Back Number: [phone]  Additional Information:

## 2022-01-13 NOTE — TELEPHONE ENCOUNTER
Contacted pt - she states that she has a severe sinue infection.  She tested Negative for covid with a home test.  Pt scheduled for Friday.     ----- Message from Andrei Gamez sent at 1/13/2022 10:45 AM CST -----  Contact: pt.  .Type:  Same Day Appointment Request    Caller is requesting a same day appointment.  Caller declined first available appointment listed below.    Name of Caller:pt  When is the first available appointment?01/18/22  Symptoms:sinus infection  Best Call Back Number:[phone]  Additional Information: Pt. Is requesting a sooner appt.  Not feeling well.

## 2022-01-14 ENCOUNTER — ANESTHESIA EVENT (OUTPATIENT)
Dept: SURGERY | Facility: HOSPITAL | Age: 52
End: 2022-01-14
Payer: MEDICARE

## 2022-01-14 ENCOUNTER — OFFICE VISIT (OUTPATIENT)
Dept: FAMILY MEDICINE | Facility: CLINIC | Age: 52
End: 2022-01-14
Payer: MEDICARE

## 2022-01-14 VITALS
BODY MASS INDEX: 50.02 KG/M2 | TEMPERATURE: 98 F | HEIGHT: 64 IN | HEART RATE: 106 BPM | OXYGEN SATURATION: 97 % | WEIGHT: 293 LBS

## 2022-01-14 DIAGNOSIS — R05.9 COUGH: ICD-10-CM

## 2022-01-14 DIAGNOSIS — R51.9 HEAD ACHE: ICD-10-CM

## 2022-01-14 DIAGNOSIS — K21.9 GASTROESOPHAGEAL REFLUX DISEASE, UNSPECIFIED WHETHER ESOPHAGITIS PRESENT: ICD-10-CM

## 2022-01-14 LAB
CTP QC/QA: YES
FLUAV AG NPH QL: NEGATIVE
FLUBV AG NPH QL: NEGATIVE
SARS-COV-2 RNA RESP QL NAA+PROBE: POSITIVE

## 2022-01-14 PROCEDURE — 3008F PR BODY MASS INDEX (BMI) DOCUMENTED: ICD-10-PCS | Mod: HCNC,CPTII,S$GLB, | Performed by: FAMILY MEDICINE

## 2022-01-14 PROCEDURE — 1159F PR MEDICATION LIST DOCUMENTED IN MEDICAL RECORD: ICD-10-PCS | Mod: HCNC,CPTII,S$GLB, | Performed by: FAMILY MEDICINE

## 2022-01-14 PROCEDURE — 99499 RISK ADDL DX/OHS AUDIT: ICD-10-PCS | Mod: HCNC,S$GLB,, | Performed by: FAMILY MEDICINE

## 2022-01-14 PROCEDURE — 99999 PR PBB SHADOW E&M-EST. PATIENT-LVL III: CPT | Mod: PBBFAC,HCNC,, | Performed by: FAMILY MEDICINE

## 2022-01-14 PROCEDURE — 99499 UNLISTED E&M SERVICE: CPT | Mod: HCNC,S$GLB,, | Performed by: FAMILY MEDICINE

## 2022-01-14 PROCEDURE — 1160F PR REVIEW ALL MEDS BY PRESCRIBER/CLIN PHARMACIST DOCUMENTED: ICD-10-PCS | Mod: HCNC,CPTII,S$GLB, | Performed by: FAMILY MEDICINE

## 2022-01-14 PROCEDURE — 99214 PR OFFICE/OUTPT VISIT, EST, LEVL IV, 30-39 MIN: ICD-10-PCS | Mod: 25,HCNC,S$GLB, | Performed by: FAMILY MEDICINE

## 2022-01-14 PROCEDURE — 99999 PR PBB SHADOW E&M-EST. PATIENT-LVL III: ICD-10-PCS | Mod: PBBFAC,HCNC,, | Performed by: FAMILY MEDICINE

## 2022-01-14 PROCEDURE — 1160F RVW MEDS BY RX/DR IN RCRD: CPT | Mod: HCNC,CPTII,S$GLB, | Performed by: FAMILY MEDICINE

## 2022-01-14 PROCEDURE — 87804 INFLUENZA ASSAY W/OPTIC: CPT | Mod: QW,HCNC,S$GLB, | Performed by: FAMILY MEDICINE

## 2022-01-14 PROCEDURE — 1159F MED LIST DOCD IN RCRD: CPT | Mod: HCNC,CPTII,S$GLB, | Performed by: FAMILY MEDICINE

## 2022-01-14 PROCEDURE — 99214 OFFICE O/P EST MOD 30 MIN: CPT | Mod: 25,HCNC,S$GLB, | Performed by: FAMILY MEDICINE

## 2022-01-14 PROCEDURE — 3008F BODY MASS INDEX DOCD: CPT | Mod: HCNC,CPTII,S$GLB, | Performed by: FAMILY MEDICINE

## 2022-01-14 PROCEDURE — 87804 POCT INFLUENZA A/B: ICD-10-PCS | Mod: 59,QW,HCNC,S$GLB | Performed by: FAMILY MEDICINE

## 2022-01-14 RX ORDER — CODEINE PHOSPHATE AND GUAIFENESIN 10; 100 MG/5ML; MG/5ML
5 SOLUTION ORAL NIGHTLY PRN
Qty: 118 ML | Refills: 0 | Status: SHIPPED | OUTPATIENT
Start: 2022-01-14 | End: 2022-04-20

## 2022-01-14 NOTE — PROGRESS NOTES
Patient, Starla Fisher (MRN #[id number]), presented with a recorded BMI of 51.77 kg/m^2 consistent with the definition of morbid obesity (ICD-10 E66.01). The patient's morbid obesity was monitored, evaluated, addressed and/or treated. This addendum to the medical record is made on 01/14/2022.

## 2022-01-14 NOTE — PROGRESS NOTES
(Portions of this note were dictated using voice recognition software and may contain dictation related errors in spelling/grammar/syntax not found on text review)    CC:   Chief Complaint   Patient presents with    Sore Throat    Sinusitis    Nasal Congestion    Medication Refill       HPI: 51 y.o. female patient of Tahir Mosquera MD    Presents with sore throat, nasal congestion last 3 days.  No fevers or chills.  Has had a headache and facial pressure.  Has had a cough, worse at night.  No vomiting or diarrhea.  No body aches.  No loss of taste or smell.  No sore throat No known sick contacts.  Had COVID test at home which was negative.  Medical history otherwise as below        Flu vaccine 12/01/2021  COVID vaccine 03/29/2021, 04/28/2021, has not had booster      Past Medical History:   Diagnosis Date    GERD (gastroesophageal reflux disease)     Hypertension     Migraine headache        Past Surgical History:   Procedure Laterality Date    BREAST SURGERY      ENDOMETRIAL ABLATION      EYE SURGERY      FOOT SURGERY Right 10/2018    bunion    TONSILLECTOMY      TOTAL REDUCTION MAMMOPLASTY Bilateral        Family History   Problem Relation Age of Onset    Atrial fibrillation Mother     Thyroid disease Mother     Dementia Mother     Diabetes Father     Prostate cancer Father     Cancer Paternal Grandfather     Breast cancer Maternal Grandmother     Migraines Sister        Social History     Tobacco Use    Smoking status: Never Smoker    Smokeless tobacco: Never Used   Substance Use Topics    Alcohol use: No    Drug use: No       Lab Results   Component Value Date    CHOL 202 (H) 04/21/2021    TRIG 72 04/21/2021    HDL 64 04/21/2021    ALT 14 04/21/2021    AST 17 04/21/2021    BILITOT 0.5 04/21/2021    ALKPHOS 97 04/21/2021     04/21/2021    K 3.8 04/21/2021     04/21/2021    CREATININE 0.8 04/21/2021    ESTGFRAFRICA >60.0 04/21/2021    EGFRNONAA >60.0 04/21/2021    CALCIUM 9.2  "04/21/2021    ALBUMIN 3.6 04/21/2021    BUN 12 04/21/2021    CO2 22 (L) 04/21/2021    TSH 1.292 04/21/2021    HGBA1C 5.4 04/21/2021    LDLCALC 123.6 04/21/2021    GLU 84 04/21/2021             Vital signs reviewed  Pulse 106   Temp 97.7 °F (36.5 °C)   Ht 5' 4" (1.626 m)   Wt (!) 136.8 kg (301 lb 9.4 oz)   SpO2 97%   BMI 51.77 kg/m²       PE:   APPEARANCE: Well nourished, well developed, in no acute distress.    HEAD: Normocephalic, atraumatic.  Does have some mild frontal and maxillary sinus tenderness to palpation  EYES: PERRL. EOMI.   Conjunctivae noninjected.  EARS: TM's intact. Light reflex normal. No retraction or perforation.  Serous effusion bilaterally  NOSE: Mucosa pink. Airway clear.  MOUTH & THROAT: No tonsillar enlargement. No pharyngeal erythema or exudate.   NECK: Supple with no cervical lymphadenopathy.    CHEST: Good inspiratory effort. Lungs clear to auscultation with no wheezes or crackles.  CARDIOVASCULAR: Normal S1, S2. No rubs, murmurs, or gallops.  ABDOMEN: Bowel sounds normal. Not distended. Soft. No tenderness or masses. No organomegaly.         IMPRESSION  1. Cough    2. Head ache            PLAN  COVID NAAT test:  Positive  Flu NAAT test:  Negative     No fever.  Normal oxygen saturation.  Will treat symptomatically 1st, advise Claritin D, Mucinex DM, (can try Cheratussin syrup at night to help with worsening nighttime cough) ibuprofen.   Red flag symptoms discussed of severe shortness of breath or progressive weakness.  Advised adequate hydration, staying mobile in the home.  Discussed isolation               "

## 2022-01-14 NOTE — ANESTHESIA PREPROCEDURE EVALUATION
01/14/2022  Starla Fisher is a 51 y.o., female.    Anesthesia Evaluation    I have reviewed the Patient Summary Reports.    I have reviewed the Nursing Notes. I have reviewed the NPO Status.      Review of Systems  Anesthesia Hx:  Personal Hx of Anesthesia complications, Post-Operative Nausea/Vomiting, with every anesthetic, treatment not known   Cardiovascular:   Hypertension    Renal/:    Urinary incontinence   Hepatic/GI:   GERD    Neurological:  Dx of Headaches, Migraine Headache   Endocrine:  Metabolic Disorders, Morbid Obesity / BMI > 40      Physical Exam  General:  Morbid Obesity    Airway/Jaw/Neck:  Airway Findings: Mouth Opening: Small, but > 3cm Tongue: Normal  General Airway Assessment: Adult  Jaw/Neck Findings:  Neck Findings:  Short Neck      Dental:  Dental Findings: In tact   Chest/Lungs:  Chest/Lungs Findings: Clear to auscultation, Normal Respiratory Rate     Heart/Vascular:  Heart Findings: Rate: Normal  Rhythm: Regular Rhythm  Sounds: Normal        Mental Status:  Mental Status Findings:  Cooperative, Alert and Oriented         Anesthesia Plan  Type of Anesthesia, risks & benefits discussed:  Anesthesia Type:  general    Patient's Preference: ga  Plan Factors:          Intra-op Monitoring Plan: standard ASA monitors  Intra-op Monitoring Plan Comments:   Post Op Pain Control Plan: per primary service following discharge from PACU  Post Op Pain Control Plan Comments:     Induction:   IV  Beta Blocker:  Patient is not currently on a Beta-Blocker (No further documentation required).       Informed Consent: Patient understands risks and agrees with Anesthesia plan.  Questions answered. Anesthesia consent signed with patient.  ASA Score: 3     Day of Surgery Review of History & Physical:    H&P update referred to the surgeon.         Ready For Surgery From Anesthesia Perspective.

## 2022-01-14 NOTE — TELEPHONE ENCOUNTER
----- Message from Andrei Gamez sent at 1/14/2022  4:42 PM CST -----  Contact: pt.  .Type:  Needs Medical Advice    Who Called: pt    Pharmacy name and phone #:  [id number] - [address] AT [facility]   Phone:  [phone]  Fax:  [phone]  Would the patient rather a call back or a response via [facility]? Call back  Best Call Back Number: [phone]   Additional Information: Needs a refill on her pantoprazole (PROTONIX) 40 MG tablet, sumatriptan (IMITREX STATDOSE) 6 mg/0.5 mL kit, SUMAtriptan (IMITREX) 20 mg/actuation nasal spray, headache pill also

## 2022-01-14 NOTE — PRE-PROCEDURE INSTRUCTIONS
SURAJ PT - reports severe head congestion, cough and rhinorrhea. Denies fever - home Covid test - on 1/13/22.   Appointment today - Friday 1/14/22 w/FM at 1530.  Informed pt to call Dr. Goss's office if symptoms persist or PCP advises postponing your SX.  Pt given my direct # to call w/questions - r/b/c     Preop instructions(bathing/wear loose fitting clothing/fasting/directions/location of surgery/ and preop medication instructions reviewed with patient). Clear liquids are allowed up to 2 hours before procedure.Clear liquids are:water,apple juice, jello, gatorade & powerade. Patient instructed to hold/stop all blood thinning medications, prior to surgery, following the pre-surgery recommended guidelines. Instructed to follow the surgeon's instructions if they differ from these.    Patient verbalized understanding.    Denies reports h/o PONV    Patient advised of the updated visitor policy.  Patient aware of the need to have someone drive them home following same -day surgery.      Patient given arrival time of 1300 to [facility]

## 2022-01-14 NOTE — PATIENT INSTRUCTIONS
COVID 19 test positive          SORE THROAT:  You may take throat lozenges such as Halls cough drops for sore throat pain.  Also, gargling with warm salt water may help with pain symptoms also.  Chloraseptic Spray is an over-the-counter anesthetic spray that may provide relief for sore throat pain. Over-the-counter pain relievers such as Tylenol (acetaminophen), Motrin/Advil  (ibuprofen), or Aleve (naproxen) may also provide relief for sore throat pain. These medications will also help for body aches and/or fever.      NASAL CONGESTION  You may try an oral decongestant such as pseudoephedrine ( brand-name Sudafed).  Note that this medication is available only behind the pharmacy counter.  There are some over-the-counter decongestants with a medicine called phenylephrine but these may not be as effective.  There are some antihistamine/decongestant combination medications (for example, Claritin-D, Zyrtec-D, Allegra-D).  Since these medications already have pseudoephedrine in them, you may take these in place of plain Sudafed.  These may be effective additionally if there is early element of allergy symptoms contributing to your nasal congestion. Although there is a caution  about using decongestants in individuals with hypertension, if you are stable on blood pressure medications and her blood pressure is well-controlled, temporary use of these medications should be okay just as long as you continue to monitor your blood pressures.  If your blood pressure is not controlled on medication therapy, you may need to avoid these pseudoephedrine-containing medications and try antihistamines alone such as plain Claritin, Zyrtec, Allegra.     You may also use a device called a Neti Pot to help with nasal congestion.  This is an effective treatment for helping clear out nasal congestion and drainage.  A Neti pot is a device in which a saltwater solution is flushed through the nasal passages to help clear out any congestion and  help relieve swelling.  You may buy this at any pharmacy.   It is safe to take even if you  have uncontrolled hypertension or are taking certain medications and cannot take other therapies as listed above. However, it is important that she is sterile or distilled water to mix with the solution given to avoid any chance of contamination during the flushing process.    Cough  Cough is a common symptom with most upper respiratory tract infections. Many times this can occur late in the process of a cold and may be the last symptom to resolve.  This is usually a typical pattern for the upper respiratory virus, and usually not a sign that you have a chest infection.  You may have some coughing with mucus.  While no treatments are proven to be absolutely beneficial for cough, some available therapies include guaifenesin with dextromethorphan ( Mucinex DM, Robitussin-DM). This may help to settle the cough and to thin out any mucus associated with the cough.  It is important to stay well hydrated to avoid having the mucus become very thick and difficult to cough up. Many times the cough aspect of an upper respiratory infection may take up to 2 weeks to completely clear.       Most viral upper respiratory infections may take up to 7-10 days to clear.  Many people get better before this time, but some may take the full 7-10 days to completely get better.        Signs that may indicate something more than an upper respiratory viral infection may include:    - Persistent high fever above 100.4 ( note that early low-grade fever may even occur with a viral upper respiratory infection but usually gets better within a few days)    - While discolored mucus such as yellow or green drainage from the nose is not a sign by itself of a bacterial infection, if you find that symptoms are lasting longer than 7-10 days and nasal drainage is becoming progressively more thick and discolored, this may be indicative of a bacterial sinus  infection    - If overall your congestion and headache symptoms get worse beyond the 7-10 day time frame    - If your symptoms started to improve by the  7-10 day time frame but then suddenly got worse    -if there is any severe shortness of breath or high weakness had developing, you may need further evaluation in the emergency department

## 2022-01-18 ENCOUNTER — ANESTHESIA (OUTPATIENT)
Dept: SURGERY | Facility: HOSPITAL | Age: 52
End: 2022-01-18
Payer: MEDICARE

## 2022-01-18 RX ORDER — SUMATRIPTAN 20 MG/1
1 SPRAY NASAL
Qty: 6 EACH | Refills: 3 | Status: SHIPPED | OUTPATIENT
Start: 2022-01-18 | End: 2022-03-24 | Stop reason: SDUPTHER

## 2022-01-18 RX ORDER — CEFUROXIME AXETIL 250 MG/1
6 TABLET ORAL
Qty: 1 ML | Refills: 5 | Status: SHIPPED | OUTPATIENT
Start: 2022-01-18 | End: 2022-03-24 | Stop reason: SDUPTHER

## 2022-01-18 RX ORDER — PANTOPRAZOLE SODIUM 40 MG/1
40 TABLET, DELAYED RELEASE ORAL 2 TIMES DAILY
Qty: 60 TABLET | Refills: 0 | Status: SHIPPED | OUTPATIENT
Start: 2022-01-18 | End: 2022-02-22 | Stop reason: SDUPTHER

## 2022-01-25 ENCOUNTER — RESEARCH ENCOUNTER (OUTPATIENT)
Dept: RESEARCH | Facility: HOSPITAL | Age: 52
End: 2022-01-25
Payer: MEDICARE

## 2022-01-25 NOTE — PROGRESS NOTES
[employer] PEER2 Study  IRB# [id number]  PI: Dr. David Brown  Subject Number: [id number]     Date: 25-Jan-2022     ICF version (date of IRB approval stamp): 19-Nov-2021   Clinical Investigation Plan version: 2.0  Protocol version for cohort: 1.0     The patient was consented for the [employer] PEER2 study in clinic today. Patient was accompanied by her family member.     The patient read through the consent form and the  went through it with them. The purpose of the study, length of the study, study visit and procedures, risks, benefits, responsibilities, alternative treatments, costs, compensation for injury, withdrawal notices, HIPAA authorization were fully discussed.      Patient was given ample time to ask questions. All questions were answered and the patient agreed to participate in the study. Patient then signed the consent form. A copy of the signed consent was given to the patient for their records and a copy has been scanned in to Epic, see Media tab. No study related procedures were performed prior to consent.

## 2022-01-25 NOTE — PROGRESS NOTES
[employer] [id number]  IRB# 2021.246  PI: Dr. Brown  Subject number: [id number]     Date: 25Jan2022     Baseline visit:      Patient was consented today in clinic. After signing the consent form, the following procedures were completed:     I. Patient's eligibility was reviewed      II. Demographics  1. Age: 51  2. Sex: Female   3. Race: white     II. year of diagnosis 2017  1. Primary diagnosis- UUI  2. Secondary Diagnosis- urgency frequency     III. Vital Signs Date of collection 7-Jan-2022  1. Weight- 136.8kgs  2. Height- 162.6 cms     III. Medical History   1. Has the subject ever been pregnant- no  2. Menopausal status- post menopausal (endometrial ablation)   3. History or presence of cardiac issues- no  4. History or presence of chronic urinary tract infections- yes  5. History or presence of constipation- no  6. History or presence of depression/ anxiety- yes  7. History or presence of diabetes mellitus- no   8. History or presence of diarrhea - no  9. History or presence of falls and/or fractures- no  10. History or presence of hypertension - yes  12. History or presence of inflammatory bowel disease- no  13. History or presence of internal and/or external anal sphincter defects- no  14. History or presence of irritable bowel syndrome- no  15. History or presence of low anterior resection syndrome- no  16. History or presence of pelvic and/or rectal pain- no  17. History or presence of pelvic floor surgery-no  18. History of treatments   · Sacral neuromodulation- no  · PTNM/PTNS- no  · Botox- no  · Cohort specific medications- yes-currently not on any medications    8oz caffeinated beverages a day average: 1-2      IV. Questionnaires completed:          1. CAO          2. OAB-q     V. Diary Instructions and distribution  Subject was provided with IRB approved OAB cohort diary booklet ans was provided training on the following:   · Diary instructions  · Diary definitions  · GCP practices were reviewed  with the subject  Patient was provided with a paper diary and fed ex envelope. She will do her 3 diary starting tonight at midnight and will be done on 28Jan22 at 11:59 pm. She will bring her diary back on the day of her surgery.  Study coordinators contact information was provided should any questions arise.

## 2022-01-29 ENCOUNTER — LAB VISIT (OUTPATIENT)
Dept: PRIMARY CARE CLINIC | Facility: CLINIC | Age: 52
End: 2022-01-29
Payer: MEDICARE

## 2022-01-29 DIAGNOSIS — R32 URINARY INCONTINENCE, UNSPECIFIED TYPE: ICD-10-CM

## 2022-01-29 PROCEDURE — U0003 INFECTIOUS AGENT DETECTION BY NUCLEIC ACID (DNA OR RNA); SEVERE ACUTE RESPIRATORY SYNDROME CORONAVIRUS 2 (SARS-COV-2) (CORONAVIRUS DISEASE [COVID-19]), AMPLIFIED PROBE TECHNIQUE, MAKING USE OF HIGH THROUGHPUT TECHNOLOGIES AS DESCRIBED BY CMS-2020-01-R: HCPCS | Mod: HCNC | Performed by: UROLOGY

## 2022-01-29 PROCEDURE — U0005 INFEC AGEN DETEC AMPLI PROBE: HCPCS | Performed by: UROLOGY

## 2022-01-30 LAB
SARS-COV-2 RNA RESP QL NAA+PROBE: NOT DETECTED
SARS-COV-2- CYCLE NUMBER: NORMAL

## 2022-02-01 ENCOUNTER — HOSPITAL ENCOUNTER (OUTPATIENT)
Facility: HOSPITAL | Age: 52
Discharge: HOME | End: 2022-02-01
Attending: UROLOGY | Admitting: UROLOGY
Payer: MEDICARE

## 2022-02-01 ENCOUNTER — RESEARCH ENCOUNTER (OUTPATIENT)
Dept: RESEARCH | Facility: HOSPITAL | Age: 52
End: 2022-02-01
Payer: MEDICARE

## 2022-02-01 VITALS
RESPIRATION RATE: 19 BRPM | WEIGHT: 293 LBS | HEIGHT: 64 IN | SYSTOLIC BLOOD PRESSURE: 138 MMHG | HEART RATE: 84 BPM | DIASTOLIC BLOOD PRESSURE: 60 MMHG | BODY MASS INDEX: 50.02 KG/M2 | TEMPERATURE: 98 F | OXYGEN SATURATION: 92 %

## 2022-02-01 DIAGNOSIS — N32.81 OVERACTIVE BLADDER: Primary | ICD-10-CM

## 2022-02-01 PROCEDURE — 71000016 HC POSTOP RECOV ADDL HR: Mod: HCNC | Performed by: UROLOGY

## 2022-02-01 PROCEDURE — 95972 ALYS CPLX SP/PN NPGT W/PRGRM: CPT | Mod: 59,HCNC,, | Performed by: UROLOGY

## 2022-02-01 PROCEDURE — 63600175 PHARM REV CODE 636 W HCPCS: Mod: HCNC | Performed by: NURSE ANESTHETIST, CERTIFIED REGISTERED

## 2022-02-01 PROCEDURE — 37000008 HC ANESTHESIA 1ST 15 MINUTES: Mod: HCNC | Performed by: UROLOGY

## 2022-02-01 PROCEDURE — D9220A PRA ANESTHESIA: ICD-10-PCS | Mod: HCNC,ANES,, | Performed by: ANESTHESIOLOGY

## 2022-02-01 PROCEDURE — 25000003 PHARM REV CODE 250: Mod: HCNC | Performed by: STUDENT IN AN ORGANIZED HEALTH CARE EDUCATION/TRAINING PROGRAM

## 2022-02-01 PROCEDURE — 64581 OPN IMPLTJ NEA SACRAL NERVE: CPT | Mod: HCNC,,, | Performed by: UROLOGY

## 2022-02-01 PROCEDURE — C1778 LEAD, NEUROSTIMULATOR: HCPCS | Mod: HCNC | Performed by: UROLOGY

## 2022-02-01 PROCEDURE — 71000044 HC DOSC ROUTINE RECOVERY FIRST HOUR: Mod: HCNC | Performed by: UROLOGY

## 2022-02-01 PROCEDURE — 71000015 HC POSTOP RECOV 1ST HR: Mod: HCNC | Performed by: UROLOGY

## 2022-02-01 PROCEDURE — D9220A PRA ANESTHESIA: Mod: HCNC,ANES,, | Performed by: ANESTHESIOLOGY

## 2022-02-01 PROCEDURE — D9220A PRA ANESTHESIA: Mod: HCNC,CRNA,, | Performed by: NURSE ANESTHETIST, CERTIFIED REGISTERED

## 2022-02-01 PROCEDURE — 76000 FLUOROSCOPY <1 HR PHYS/QHP: CPT | Mod: 26,59,HCNC, | Performed by: UROLOGY

## 2022-02-01 PROCEDURE — 36000706: Mod: HCNC | Performed by: UROLOGY

## 2022-02-01 PROCEDURE — 36000707: Mod: HCNC | Performed by: UROLOGY

## 2022-02-01 PROCEDURE — D9220A PRA ANESTHESIA: ICD-10-PCS | Mod: HCNC,CRNA,, | Performed by: NURSE ANESTHETIST, CERTIFIED REGISTERED

## 2022-02-01 PROCEDURE — 95972 PR PRG SPNL GEN CMPLX: ICD-10-PCS | Mod: 59,HCNC,, | Performed by: UROLOGY

## 2022-02-01 PROCEDURE — C1767 GENERATOR, NEURO NON-RECHARG: HCPCS | Mod: HCNC | Performed by: UROLOGY

## 2022-02-01 PROCEDURE — 25000003 PHARM REV CODE 250: Mod: HCNC | Performed by: NURSE ANESTHETIST, CERTIFIED REGISTERED

## 2022-02-01 PROCEDURE — 63600175 PHARM REV CODE 636 W HCPCS: Mod: HCNC | Performed by: STUDENT IN AN ORGANIZED HEALTH CARE EDUCATION/TRAINING PROGRAM

## 2022-02-01 PROCEDURE — 37000009 HC ANESTHESIA EA ADD 15 MINS: Mod: HCNC | Performed by: UROLOGY

## 2022-02-01 PROCEDURE — 64581 PR IMPLANTATION, NEUROSTIM ELECT ARRAY, OPEN, SACRAL NERVE: ICD-10-PCS | Mod: HCNC,,, | Performed by: UROLOGY

## 2022-02-01 PROCEDURE — 25000242 PHARM REV CODE 250 ALT 637 W/ HCPCS: Mod: HCNC | Performed by: NURSE ANESTHETIST, CERTIFIED REGISTERED

## 2022-02-01 PROCEDURE — 25000003 PHARM REV CODE 250: Mod: HCNC | Performed by: UROLOGY

## 2022-02-01 PROCEDURE — 76000 PR  FLUOROSCOPE EXAMINATION: ICD-10-PCS | Mod: 26,59,HCNC, | Performed by: UROLOGY

## 2022-02-01 DEVICE — LEAD INTERSTIM 2 SURESCAN 33CM: Type: IMPLANTABLE DEVICE | Site: BACK | Status: FUNCTIONAL

## 2022-02-01 RX ORDER — PROPOFOL 10 MG/ML
VIAL (ML) INTRAVENOUS
Status: DISCONTINUED | OUTPATIENT
Start: 2022-02-01 | End: 2022-02-01

## 2022-02-01 RX ORDER — DEXAMETHASONE SODIUM PHOSPHATE 4 MG/ML
INJECTION, SOLUTION INTRA-ARTICULAR; INTRALESIONAL; INTRAMUSCULAR; INTRAVENOUS; SOFT TISSUE
Status: DISCONTINUED | OUTPATIENT
Start: 2022-02-01 | End: 2022-02-01

## 2022-02-01 RX ORDER — PHENYLEPHRINE HYDROCHLORIDE 10 MG/ML
INJECTION INTRAVENOUS
Status: DISCONTINUED | OUTPATIENT
Start: 2022-02-01 | End: 2022-02-01

## 2022-02-01 RX ORDER — SUCCINYLCHOLINE CHLORIDE 20 MG/ML
INJECTION INTRAMUSCULAR; INTRAVENOUS
Status: DISCONTINUED | OUTPATIENT
Start: 2022-02-01 | End: 2022-02-01

## 2022-02-01 RX ORDER — ALBUTEROL SULFATE 90 UG/1
AEROSOL, METERED RESPIRATORY (INHALATION)
Status: DISCONTINUED | OUTPATIENT
Start: 2022-02-01 | End: 2022-02-01

## 2022-02-01 RX ORDER — ROCURONIUM BROMIDE 10 MG/ML
INJECTION, SOLUTION INTRAVENOUS
Status: DISCONTINUED | OUTPATIENT
Start: 2022-02-01 | End: 2022-02-01

## 2022-02-01 RX ORDER — MIDAZOLAM HYDROCHLORIDE 1 MG/ML
INJECTION, SOLUTION INTRAMUSCULAR; INTRAVENOUS
Status: DISCONTINUED | OUTPATIENT
Start: 2022-02-01 | End: 2022-02-01

## 2022-02-01 RX ORDER — VASOPRESSIN 20 [USP'U]/ML
INJECTION, SOLUTION INTRAMUSCULAR; SUBCUTANEOUS
Status: DISCONTINUED | OUTPATIENT
Start: 2022-02-01 | End: 2022-02-01

## 2022-02-01 RX ORDER — SODIUM CHLORIDE 9 MG/ML
INJECTION, SOLUTION INTRAVENOUS CONTINUOUS
Status: ACTIVE | OUTPATIENT
Start: 2022-02-01

## 2022-02-01 RX ORDER — LIDOCAINE HYDROCHLORIDE 10 MG/ML
1 INJECTION, SOLUTION EPIDURAL; INFILTRATION; INTRACAUDAL; PERINEURAL ONCE
Status: ACTIVE | OUTPATIENT
Start: 2022-02-01

## 2022-02-01 RX ORDER — FAMOTIDINE 10 MG/ML
INJECTION INTRAVENOUS
Status: COMPLETED
Start: 2022-02-01 | End: 2022-02-01

## 2022-02-01 RX ORDER — BUPIVACAINE HYDROCHLORIDE AND EPINEPHRINE 2.5; 5 MG/ML; UG/ML
INJECTION, SOLUTION EPIDURAL; INFILTRATION; INTRACAUDAL; PERINEURAL
Status: DISCONTINUED | OUTPATIENT
Start: 2022-02-01 | End: 2022-02-01 | Stop reason: HOSPADM

## 2022-02-01 RX ORDER — FENTANYL CITRATE 50 UG/ML
25 INJECTION, SOLUTION INTRAMUSCULAR; INTRAVENOUS EVERY 5 MIN PRN
Status: DISCONTINUED | OUTPATIENT
Start: 2022-02-01 | End: 2022-02-01 | Stop reason: HOSPADM

## 2022-02-01 RX ORDER — FAMOTIDINE 10 MG/ML
INJECTION INTRAVENOUS
Status: DISCONTINUED | OUTPATIENT
Start: 2022-02-01 | End: 2022-02-01

## 2022-02-01 RX ORDER — VANCOMYCIN HCL IN 5 % DEXTROSE 1G/250ML
1000 PLASTIC BAG, INJECTION (ML) INTRAVENOUS
Status: COMPLETED | OUTPATIENT
Start: 2022-02-01 | End: 2022-02-01

## 2022-02-01 RX ORDER — LIDOCAINE HYDROCHLORIDE 20 MG/ML
INJECTION INTRAVENOUS
Status: DISCONTINUED | OUTPATIENT
Start: 2022-02-01 | End: 2022-02-01

## 2022-02-01 RX ORDER — TRAMADOL HYDROCHLORIDE 50 MG/1
50 TABLET ORAL EVERY 6 HOURS
Qty: 5 TABLET | Refills: 0 | Status: ON HOLD | OUTPATIENT
Start: 2022-02-01 | End: 2022-02-15 | Stop reason: SDUPTHER

## 2022-02-01 RX ORDER — FENTANYL CITRATE 50 UG/ML
INJECTION, SOLUTION INTRAMUSCULAR; INTRAVENOUS
Status: DISCONTINUED | OUTPATIENT
Start: 2022-02-01 | End: 2022-02-01

## 2022-02-01 RX ADMIN — VASOPRESSIN 1 UNITS: 20 INJECTION, SOLUTION INTRAMUSCULAR; SUBCUTANEOUS at 12:02

## 2022-02-01 RX ADMIN — ALBUTEROL SULFATE 6 PUFF: 108 INHALANT RESPIRATORY (INHALATION) at 11:02

## 2022-02-01 RX ADMIN — FENTANYL CITRATE 75 MCG: 50 INJECTION, SOLUTION INTRAMUSCULAR; INTRAVENOUS at 10:02

## 2022-02-01 RX ADMIN — ROCURONIUM BROMIDE 5 MG: 10 INJECTION, SOLUTION INTRAVENOUS at 10:02

## 2022-02-01 RX ADMIN — PHENYLEPHRINE HYDROCHLORIDE 100 MCG: 10 INJECTION INTRAVENOUS at 11:02

## 2022-02-01 RX ADMIN — FENTANYL CITRATE 25 MCG: 50 INJECTION, SOLUTION INTRAMUSCULAR; INTRAVENOUS at 11:02

## 2022-02-01 RX ADMIN — SUGAMMADEX 550 MG: 100 INJECTION, SOLUTION INTRAVENOUS at 11:02

## 2022-02-01 RX ADMIN — MIDAZOLAM HYDROCHLORIDE 2 MG: 1 INJECTION, SOLUTION INTRAMUSCULAR; INTRAVENOUS at 10:02

## 2022-02-01 RX ADMIN — SODIUM CHLORIDE: 0.9 INJECTION, SOLUTION INTRAVENOUS at 10:02

## 2022-02-01 RX ADMIN — PROPOFOL 100 MG: 10 INJECTION, EMULSION INTRAVENOUS at 11:02

## 2022-02-01 RX ADMIN — VASOPRESSIN 1 UNITS: 20 INJECTION, SOLUTION INTRAMUSCULAR; SUBCUTANEOUS at 11:02

## 2022-02-01 RX ADMIN — PROPOFOL 200 MG: 10 INJECTION, EMULSION INTRAVENOUS at 10:02

## 2022-02-01 RX ADMIN — VANCOMYCIN HYDROCHLORIDE 1000 MG: 1 INJECTION, POWDER, LYOPHILIZED, FOR SOLUTION INTRAVENOUS at 10:02

## 2022-02-01 RX ADMIN — SUCCINYLCHOLINE CHLORIDE 140 MG: 20 INJECTION, SOLUTION INTRAMUSCULAR; INTRAVENOUS at 10:02

## 2022-02-01 RX ADMIN — LIDOCAINE HYDROCHLORIDE 80 MG: 20 INJECTION, SOLUTION INTRAVENOUS at 10:02

## 2022-02-01 RX ADMIN — DEXAMETHASONE SODIUM PHOSPHATE 8 MG: 4 INJECTION, SOLUTION INTRAMUSCULAR; INTRAVENOUS at 11:02

## 2022-02-01 RX ADMIN — FAMOTIDINE 20 MG: 10 INJECTION, SOLUTION INTRAVENOUS at 10:02

## 2022-02-01 NOTE — H&P
Urology [facility]    CC: UUi    HPI:  Starla Fisher is a 51 y.o. female with OAB, UUI and frequency.  She has been on Santura and Myrbetriq and these have provided inadequate relief.   She reports copious incontinence, using several adult diapers per day. She presents for staged SNM testing.       ROS:  Neg except per HPI    Past Medical History:   Diagnosis Date    GERD (gastroesophageal reflux disease)     Hypertension     Migraine headache        Past Surgical History:   Procedure Laterality Date    BREAST SURGERY      ENDOMETRIAL ABLATION      EYE SURGERY      FOOT SURGERY Right 10/2018    bunion    TONSILLECTOMY      TOTAL REDUCTION MAMMOPLASTY Bilateral        Social History     Socioeconomic History    Marital status: Single   Tobacco Use    Smoking status: Never Smoker    Smokeless tobacco: Never Used   Substance and Sexual Activity    Alcohol use: No    Drug use: No    Sexual activity: Not Currently     Partners: Male       Family History   Problem Relation Age of Onset    Atrial fibrillation Mother     Thyroid disease Mother     Dementia Mother     Diabetes Father     Prostate cancer Father     Cancer Paternal Grandfather     Breast cancer Maternal Grandmother     Migraines Sister        Review of patient's allergies indicates:  No Known Allergies    No current facility-administered medications on file prior to encounter.     Current Outpatient Medications on File Prior to Encounter   Medication Sig Dispense Refill    losartan (COZAAR) 25 MG tablet Take 1 tablet (25 mg total) by mouth once daily. 90 tablet 3    multivit-minerals/folic acid (ADULT MULTIVITAMIN GUMMIES ORAL) Take 1 tablet by mouth Daily. Continue to hold until after Surgery.      sertraline (ZOLOFT) 50 MG tablet Take 1 tablet (50 mg total) by mouth once daily. 90 tablet 1    topiramate (TOPAMAX) 50 MG tablet Take 1 tablet (50 mg total) by mouth 3 (three) times daily. 90 tablet 1    clotrimazole-betamethasone  "1-0.05% (LOTRISONE) cream Apply topically 2 (two) times daily. (Patient not taking: No sig reported) 15 g 1    mirabegron (MYRBETRIQ) 50 mg Tb24 Take 1 tablet (50 mg total) by mouth once daily. (Patient not taking: No sig reported) 30 tablet 11       Anticoagulation:  No    Physical Exam:  Estimated body mass index is 51.77 kg/m² as calculated from the following:    Height as of this encounter: 5' 4" (1.626 m).    Weight as of this encounter: 136.8 kg (301 lb 9.4 oz).    General: No acute distress, well developed. AAOx3  Head: Normocephalic, Atraumatic  Eyes: Extra-occular movements intact, No discharge  Neck: supple, symmetrical, trachea midline  Lungs: normal respiratory effort, no respiratory distress, no wheezes  CV: regular rate, 2+ pulses  Abdomen: soft, non-tender, non-distended, no organomegaly    MSK: no edema, no deformities, normal ROM  Skin: skin color, texture, turgor normal.  Neurologic: no focal deficits, sensation intact    Labs:      No results found for: WBC, HGB, HCT, MCV, PLT        BMP  Lab Results   Component Value Date     04/21/2021    K 3.8 04/21/2021     04/21/2021    CO2 22 (L) 04/21/2021    BUN 12 04/21/2021    CREATININE 0.8 04/21/2021    CALCIUM 9.2 04/21/2021    ANIONGAP 9 04/21/2021    ESTGFRAFRICA >60.0 04/21/2021    EGFRNONAA >60.0 04/21/2021       No results found for: PSA        Assessment: Starla Fisher is a 51 y.o. female with refractory OAB, UUI and urinary frequency.    Plan:     1. To OR on today for Stage 1 SNM.  2. Consents signed   3. I have explained the risk, benefits, and alternatives of the procedure in detail. The patient voices understanding and all questions have been answered. The patient agrees to proceed as planned.       Sathish Briseno MD    "

## 2022-02-01 NOTE — H&P (VIEW-ONLY)
Urology [facility]    CC: UUi    HPI:  Starla Fisher is a 51 y.o. female with OAB, UUI and frequency.  She has been on Santura and Myrbetriq and these have provided inadequate relief.   She reports copious incontinence, using several adult diapers per day. She presents for staged SNM testing.       ROS:  Neg except per HPI    Past Medical History:   Diagnosis Date    GERD (gastroesophageal reflux disease)     Hypertension     Migraine headache        Past Surgical History:   Procedure Laterality Date    BREAST SURGERY      ENDOMETRIAL ABLATION      EYE SURGERY      FOOT SURGERY Right 10/2018    bunion    TONSILLECTOMY      TOTAL REDUCTION MAMMOPLASTY Bilateral        Social History     Socioeconomic History    Marital status: Single   Tobacco Use    Smoking status: Never Smoker    Smokeless tobacco: Never Used   Substance and Sexual Activity    Alcohol use: No    Drug use: No    Sexual activity: Not Currently     Partners: Male       Family History   Problem Relation Age of Onset    Atrial fibrillation Mother     Thyroid disease Mother     Dementia Mother     Diabetes Father     Prostate cancer Father     Cancer Paternal Grandfather     Breast cancer Maternal Grandmother     Migraines Sister        Review of patient's allergies indicates:  No Known Allergies    No current facility-administered medications on file prior to encounter.     Current Outpatient Medications on File Prior to Encounter   Medication Sig Dispense Refill    losartan (COZAAR) 25 MG tablet Take 1 tablet (25 mg total) by mouth once daily. 90 tablet 3    multivit-minerals/folic acid (ADULT MULTIVITAMIN GUMMIES ORAL) Take 1 tablet by mouth Daily. Continue to hold until after Surgery.      sertraline (ZOLOFT) 50 MG tablet Take 1 tablet (50 mg total) by mouth once daily. 90 tablet 1    topiramate (TOPAMAX) 50 MG tablet Take 1 tablet (50 mg total) by mouth 3 (three) times daily. 90 tablet 1    clotrimazole-betamethasone  "1-0.05% (LOTRISONE) cream Apply topically 2 (two) times daily. (Patient not taking: No sig reported) 15 g 1    mirabegron (MYRBETRIQ) 50 mg Tb24 Take 1 tablet (50 mg total) by mouth once daily. (Patient not taking: No sig reported) 30 tablet 11       Anticoagulation:  No    Physical Exam:  Estimated body mass index is 51.77 kg/m² as calculated from the following:    Height as of this encounter: 5' 4" (1.626 m).    Weight as of this encounter: 136.8 kg (301 lb 9.4 oz).    General: No acute distress, well developed. AAOx3  Head: Normocephalic, Atraumatic  Eyes: Extra-occular movements intact, No discharge  Neck: supple, symmetrical, trachea midline  Lungs: normal respiratory effort, no respiratory distress, no wheezes  CV: regular rate, 2+ pulses  Abdomen: soft, non-tender, non-distended, no organomegaly    MSK: no edema, no deformities, normal ROM  Skin: skin color, texture, turgor normal.  Neurologic: no focal deficits, sensation intact    Labs:      No results found for: WBC, HGB, HCT, MCV, PLT        BMP  Lab Results   Component Value Date     04/21/2021    K 3.8 04/21/2021     04/21/2021    CO2 22 (L) 04/21/2021    BUN 12 04/21/2021    CREATININE 0.8 04/21/2021    CALCIUM 9.2 04/21/2021    ANIONGAP 9 04/21/2021    ESTGFRAFRICA >60.0 04/21/2021    EGFRNONAA >60.0 04/21/2021       No results found for: PSA        Assessment: Staral Fisher is a 51 y.o. female with refractory OAB, UUI and urinary frequency.    Plan:     1. To OR on today for Stage 1 SNM.  2. Consents signed   3. I have explained the risk, benefits, and alternatives of the procedure in detail. The patient voices understanding and all questions have been answered. The patient agrees to proceed as planned.       Sathish Briseno MD    "

## 2022-02-01 NOTE — DISCHARGE SUMMARY
[facility]  Discharge Note  Short Stay    Admit Date: 2/1/2022    Discharge Date and Time: 02/01/2022 1:12 PM      Attending Physician: David Brown MD     Discharge Provider: Sathish Briseno MD    Diagnoses:  There are no hospital problems to display for this patient.      Discharged Condition: stable    Hospital Course: Patient was admitted for interstim 1 and tolerated the procedure well with no complications. She was discharged home in good condition on the same day.       Final Diagnoses: Same as principal problem.    Disposition: Home or Self Care    Follow up/Patient Instructions:    Medications:  Reconciled Home Medications:   Current Discharge Medication List      START taking these medications    Details   traMADoL (ULTRAM) 50 mg tablet Take 1 tablet (50 mg total) by mouth every 6 (six) hours.  Qty: 5 tablet, Refills: 0         CONTINUE these medications which have NOT CHANGED    Details   losartan (COZAAR) 25 MG tablet Take 1 tablet (25 mg total) by mouth once daily.  Qty: 90 tablet, Refills: 3    Comments: .  Associated Diagnoses: Essential hypertension      multivit-minerals/folic acid (ADULT MULTIVITAMIN GUMMIES ORAL) Take 1 tablet by mouth Daily. Continue to hold until after Surgery.      sertraline (ZOLOFT) 50 MG tablet Take 1 tablet (50 mg total) by mouth once daily.  Qty: 90 tablet, Refills: 1      topiramate (TOPAMAX) 50 MG tablet Take 1 tablet (50 mg total) by mouth 3 (three) times daily.  Qty: 90 tablet, Refills: 1      clotrimazole-betamethasone 1-0.05% (LOTRISONE) cream Apply topically 2 (two) times daily.  Qty: 15 g, Refills: 1      guaiFENesin-codeine 100-10 mg/5 ml (CHERATUSSIN AC)  mg/5 mL syrup Take 5 mLs by mouth nightly as needed for Cough.  Qty: 118 mL, Refills: 0      mirabegron (MYRBETRIQ) 50 mg Tb24 Take 1 tablet (50 mg total) by mouth once daily.  Qty: 30 tablet, Refills: 11      pantoprazole (PROTONIX) 40 MG tablet Take 1 tablet (40 mg total) by mouth 2  (two) times daily.  Qty: 60 tablet, Refills: 0    Associated Diagnoses: Gastroesophageal reflux disease, unspecified whether esophagitis present      sumatriptan (IMITREX STATDOSE) 6 mg/0.5 mL kit Inject 0.5 mLs (6 mg total) into the skin as needed for Migraine.  Qty: 1 mL, Refills: 5      SUMAtriptan (IMITREX) 20 mg/actuation nasal spray 1 spray (20 mg total) by Nasal route as needed for Migraine (max 40mg/24hr).  Qty: 6 each, Refills: 3           Discharge Procedure Orders   Notify your health care provider if you experience any of the following:  severe uncontrolled pain     Notify your health care provider if you experience any of the following:  persistent nausea and vomiting or diarrhea     Notify your health care provider if you experience any of the following:  temperature >100.4     Activity as tolerated

## 2022-02-01 NOTE — ANESTHESIA PROCEDURE NOTES
Intubation    Date/Time: 2/1/2022 11:08 AM  Performed by: Corina Morse CRNA  Authorized by: Rhonda G Leopold, MD     Intubation:     Intubated:  Postinduction    Mask Ventilation:  Moderately difficult with oral airway    Attempts:  1    Attempted By:  Student    Method of Intubation:  Video laryngoscopy    Blade:  Orozco 3    Laryngeal View Grade: Grade I - full view of cords      Difficult Airway Encountered?: No      Complications:  None    Airway Device:  Oral endotracheal tube    Airway Device Size:  7.0    Style/Cuff Inflation:  Cuffed (inflated to minimal occlusive pressure)    Inflation Amount (mL):  5    Tube secured:  22    Secured at:  The lips    Placement Verified By:  Capnometry and Revisualization with laryngoscopy    Complicating Factors:  None    Findings Post-Intubation:  BS equal bilateral and atraumatic/condition of teeth unchanged

## 2022-02-01 NOTE — PATIENT INSTRUCTIONS
Interstim Post-Operative Care    You play an important role in your own recovery. You will be given a daily diary to document the  effectiveness of the Interstim implant.     Caring for Your Wound   After surgery you will have a dressing over the surgical site.  Do not remove or change the dressing. If your dressing becomes saturated or undone, you  may reinforce it with more tape and/or gauze but you should call the office to be evaluated.    Post Surgical Pain Management   It is normal to experience some discomfort post operatively, however the stimulation should not  be painful.   Take Tylenol 650mg 1-2 tabs every 4-6 hours as needed if you feel tenderness from surgical  incision (Do not to exceed 4000mg daily) or Motrin 200mg 1-2 tabs every 4-6 hours.  Warning: Do not take additional Tylenol while taking Percocet or Vicodin. All of these  products contain Acetaminophen, which can be toxic if taken in excess.    Stimulation   Stimulation is the pulling or tingling sensation felt in the pelvic area (near the vagina, scrotum  or anal area.) It should not be painful. If it is painful or you feel the stimulation sensation move  down the legs decrease the stimulation and call the office and speak to a nurse.   During the trial period (stage 1) you may notice that the Interstim device has improved your  continence which means it is working and on the correct setting. If you are having episodes of  incontinence you will need to increase your device setting by moving the dial up slowly.    Activity   Avoid heavy lifting or strenuous activity for 14 days after your surgery.   Frontal Showers or Sponge bath only for two weeks after each surgery. Avoid immersion in any  water until after your post-op appointment.    Symptoms to Report   Severe or worsening pain, unrelieved by pain medications.   Fever, greater than 101.5ºF, chills or sweats   Painful or problems urinating   Any problems with the device    If  you experience any of the above symptoms, call the [facility] u[facility] clinic at [phone] during business hours on weekdays. If after hours or on weekends, call [phone] and ask to speak with the urology resident on call.    You may also call the [employer] Patient Help Line for specific help troubleshooting your device.  For help with the temporary implant Call [phone].  For help with the permanent implant Call [phone].

## 2022-02-01 NOTE — OP NOTE
Neuromodulation Operative Note    Preoperative Diagnosis:   1. Urinary frequency  2. Urinary urgency  3. Urgency incontinence    Postoperative Diagnosis:  1. Urinary frequency  2. Urinary urgency  3. Urgency incontinence    Procedure:  1. Incision for implantation of neurostimulator electrode array; sacral nerve (71441)  2. Complex analysis and programming of implanted neurostimulator pulse generator (02031)  3. Fluoroscopic guidance of needle (35631-17)    Attending Surgeon: David Brown MD    Anesthesia: General Endotracheal anesthesia     EBL: <10 mL    Complications: None    Findings:   1. Lead Placement: right  2. Placement of Interstim II lead    Reason for Procedure: Starla Fisher is a very pleasant 51 y.o. female with a history of frequency, urgency and urgency incontinence. Patient has had an adequate trial of pharmacologic therapy including Myrbetriq and Sanctura used for an adequate duration and adequate dosage. Patient either did not have a satisfactory response or had intolerable side effects with each of these medications.  After discussing risks and benefits in detail and answering all questions, the patient has been consented to proceed with staged testing of sacral neuromodulation.     Procedure in Detail: Our standard sacral neuromodulation infection protocol was utilized preoperatively, intraoperatively and postoperatively, including perioperative antibiotics in accordance with that protocol. After informed consent was obtained and documented in the chart, She was brought to the OR suite where general endotracheal anesthesia was undertaken by the OR staff. She was then gently rolled into a prone position with all pressure points padded and chest rolls used to facilitate ventilation. A formal timeout was performed. She was prepped and draped in accordance with our infection protocol.  A ground pad was placed on the bottom of the patient's foot and the proximal ends of the test stimulation  cable were connected to the ground pad and the external neurostimulator (ENS).     The c-arm was moved into AP position and  AP images were obtained of the sacrum. The medial borders of the S3 foramina were identified and marked on the skin. The c-arm was then moved into the lateral position to identify the S3 foramen.The 5.0 inch needle was used and inserted along the most upper and medial aspect of the S3 foramen bilaterally and appropriate needle depth was visualized utilizing fluoroscopy. Opening motor thresholds with the needle tip just at the anterior edge of the sacrum were found to be less than or equal to 2.0 V on all electrodes.  The right motor response was found to be superior and the decision was made to proceed on this side.     The foramen needle stylet was removed and a directional guide was placed through the needle using markers on the guide to assure appropriate depth. The foramen needle was removed by sliding over the directional guide. A small vertical incision was made inclusive of the directional guide through the skin. The lead introducer with dilator was placed over the directional guide and utilizing  fluoroscopic guidance, the lead introducer was advanced until the tip of the dilator sheath was seen at the anterior edge of the sacrum. The dilator sheath was removed along with the directional guide. Under fluoroscopic guidance, the 978BB1 (Interstim II) lead with the curved-tip stylet directed laterally was advanced such that the most proximal contact was situated at the anterior edge of the sacrum.    All four electrode contacts were tested, observing fam and plantar flexion of the great toe utilizing the test stimulation cable and the external neurostimulator and controller. Opening thresholds with electrode contacts 1-4 less than or equal to 2.0 V on all electrodes. The introducer was retracted over the lead, deploying the tines. Retesting of all four electrodes confirmed  appropriate opening responses.     The potential internal neurostimulator pocket site was identified below the iliac crest and lateral to the sacrum. Local anesthetic was administered and an approximately 2cm incision was made into the subcutaneous tissue creating a connection pocket site. A vicryl anchoring suture was pre-placed in the medial aspect of the pocket through the fascia at the base.     The tunneling tool with sheath and dilator tip was placed from the lead insertion site subcutaneously to the small incised connection pocket site. The tunneling tool was removed and the lead was fed through the sheath, exiting at the connection pocket site. The sheath was removed. An incision was made at the contralateral percutaneous extension site. The tunneling tool was reassembled with the dilator tip excluding the tunneling tube. The tunneling tool was inserted at the pocket connection site and tunneled to the percutaneous extension exit site. The dilator tip was removed and replaced with the carrier tip. The connector end of the percutaneous extension was pressed fit into the carrier tip and the tunneling tool was pulled back to the connection pocket site. The connector end of the percutaneous extension was detached and the tunneling tool and carrier tip was removed. The lead and connector end of the percutaneous extension were cleaned and dried. The lead was inserted into percutaneous extension connection hub. The setscrew was tightened with the torque wrench until an audible click was heard.     The anchor suture was tied down just distal to the percutaneous extension connector and tension on the external portion of the extension resulted in no migration of the connector. The incision was irrigated with sterile water and closed with 3-0 Monocryl suture. The incisions were then covered with Tegaderm dressing with the addition of a Biopatch at the percutaneous extension exit site.     The patient was transferred to  the recovery room in stable condition. Using the controller and external neurostimulator, the patient was programmed to the electrode of optimum sensation and provided utilization instructions prior to discharge. Patient will complete a voiding diary during testing period to help document results of this test procedure.

## 2022-02-01 NOTE — TRANSFER OF CARE
"Anesthesia Transfer of Care Note    Patient: Starla Fisher    Procedure(s) Performed: Procedure(s) (LRB):  INSERTION, NEUROSTIMULATOR, TEMPORARY, SACRAL (N/A)    Patient location: [facility]    Anesthesia Type: general    Transport from OR: Transported from OR on 6-10 L/min O2 by face mask with adequate spontaneous ventilation    Post pain: adequate analgesia    Post assessment: no apparent anesthetic complications and tolerated procedure well    Post vital signs: stable    Level of consciousness: responds to stimulation and lethargic    Nausea/Vomiting: no nausea/vomiting    Complications: none    Transfer of care protocol was followed      Last vitals:   Visit Vitals  BP (!) 140/67 (BP Location: Right arm, Patient Position: Lying)   Pulse 90   Temp 36.7 °C (98 °F) (Temporal)   Resp 19   Ht 5' 4" (1.626 m)   Wt (!) 136.8 kg (301 lb 9.4 oz)   SpO2 100%   Breastfeeding No   BMI 51.77 kg/m²     "

## 2022-02-02 NOTE — ANESTHESIA POSTPROCEDURE EVALUATION
Anesthesia Post Evaluation    Patient: Starla Fisher    Procedure(s) Performed: Procedure(s) (LRB):  INSERTION, NEUROSTIMULATOR, TEMPORARY, SACRAL (N/A)    Final Anesthesia Type: general      Patient location during evaluation: [facility]  Patient participation: Yes- Able to Participate  Level of consciousness: awake  Post-procedure vital signs: reviewed and stable  Pain management: adequate  Airway patency: patent    PONV status at discharge: No PONV  Anesthetic complications: no      Cardiovascular status: blood pressure returned to baseline and stable  Respiratory status: spontaneous ventilation, unassisted and room air  Hydration status: euvolemic  Follow-up not needed.          Vitals Value Taken Time   /60 02/01/22 1501   Temp 36.7 °C (98 °F) 02/01/22 1245   Pulse 89 02/01/22 1509   Resp 19 02/01/22 1245   SpO2 92 % 02/01/22 1509   Vitals shown include unvalidated device data.      No case tracking events are documented in the log.      Pain/Anca Score: Anca Score: 10 (2/1/2022  2:00 PM)

## 2022-02-04 NOTE — PROGRESS NOTES
[employer] PEER2 study  IRB# [id number]  PI: Dr. Brown  Subject number: [id number]     Date: 01-Feb-2022     Lead Implant: OAB cohort     I. Patient's eligibility was reviewed and confirmed by PI prior to lead implant procedure (patient sent us diary pictures for review- eligibility confirmed on 31Jan22 and again on 01Feb22)              -Patient diary Day 1: 12 voids and leaks all urgent                                     Day 2: 8 voids and 8 leaks all urgent                                    Day 3: 10 voids and leaks all urgent   We will use the first 3 days of her diary because they were reviewed and eligibility was confirmed, patient continued doing the diary post 3 days.  II. Confirmed- no new adverse events to report     III. Change in medication- none      IV. During lead placement procedure the following data was collected and documented on study provided case report forms including   1. Device and procedure related information  2. Motor threshold assessment  Intra op- Signal acquisition was performed by trained and delegated [employer] research personnel. The PEER System was briefly disconnected by delegated site personnel, from the subject while they were moved to recovery.     V. Post procedure:  1. Sensory threshold was assessed by [employer] device rep and programming related data was collected by study personnel on study source worksheet.  Post op- The PEER system was reconnected to the subject by delegated site personnel. Signal acquisition was performed by trained [employer] research personnel.     V. Diary Instructions and distribution  Subject was provided with IRB approved OAB cohort diary booklet and was re- trained on the following:   · Diary instructions  · Diary definitions  · GCP practices were reviewed with the subject     Patient was provided with a paper diary and fed ex envelope. She will do her 3 day therapy evaluation diary. The diary will be reviewed prior to her device implant.  She  will bring her diary back on the day of her surgery. An extra diary was also provided.   's contact information was provided.

## 2022-02-08 ENCOUNTER — TELEPHONE (OUTPATIENT)
Dept: UROLOGY | Facility: CLINIC | Age: 52
End: 2022-02-08
Payer: MEDICARE

## 2022-02-08 NOTE — TELEPHONE ENCOUNTER
----- Message from Bonnie Benson sent at 2/8/2022  7:59 AM CST -----  Regarding: Surgery Inquiry  Regarding:Pt  called in regards to procedure time and covid test ?      Can patient be contacted on [id number]:No    Requesting Call Back number: [phone]

## 2022-02-08 NOTE — TELEPHONE ENCOUNTER
Neuromodulation Staged Testing Update Note    2/8/22    Indications for SNM testing:  frequency, urgency and urgency incontinence.       Previous Therapies     Behavioral Therapy: (fluid/dietary modification timed voiding/bladder training) -  provided no benefit   Myrbetriq 50 mg (provided no benefit after 2 months)   Sanctura (trospium) 20 mg bid (provided no benefit after 6-7 months)     Summary of Staged Test:    Baseline During SNM Staged Test   Daytime Frequency: 20x daily Daytime Frequency: 8-10x daily   Noturia: 4-6x Nocturia: 2-3x   UUI Episodes: 6 UUI Episodes: 0   Daily Pads: 5/day Daily Pads: 1/day   Voiding Difficulty: no Voiding Difficulty: no   PVR: 32ml (scan) PVR: N/R     Over the last week of testing, the patient has shown at least 50% improvement over baseline symptoms. The patient has recorded at least a 50% decrease in incontinence episodes averaged daily over a 3-day diary improving from 6 episodes to 0 per day.  She meets the FDA-established criteria for implantation and would like to proceed with IPG implantation.

## 2022-02-14 ENCOUNTER — TELEPHONE (OUTPATIENT)
Dept: UROLOGY | Facility: CLINIC | Age: 52
End: 2022-02-14
Payer: MEDICARE

## 2022-02-14 ENCOUNTER — ANESTHESIA EVENT (OUTPATIENT)
Dept: SURGERY | Facility: HOSPITAL | Age: 52
End: 2022-02-14
Payer: MEDICARE

## 2022-02-14 NOTE — TELEPHONE ENCOUNTER
Called pt to confirm arrival time of 800am for procedure on 2-15. Gave pt NPO instructions and gave pt opportunity to ask questions. Pt verbalized understanding.     Pt was informed that only 1 person would be allowed to accompany them the morning of surgery.  Pt verbalized understanding.

## 2022-02-14 NOTE — ANESTHESIA PREPROCEDURE EVALUATION
02/14/2022  Starla Fisher is a 51 y.o., female.    Anesthesia Evaluation    I have reviewed the Patient Summary Reports.    I have reviewed the Nursing Notes. I have reviewed the NPO Status.   I have reviewed the Medications.     Review of Systems  Anesthesia Hx:  Feb 2022 : Moderately difficult MV with oral airway. VDLx1, Grade 1 view with Orozco 3. Personal Hx of Anesthesia complications, Post-Operative Nausea/Vomiting, with every anesthetic, treatment not known   Social:  Non-Smoker    Cardiovascular:   Exercise tolerance: good Hypertension    Renal/:    Urinary incontinence   Hepatic/GI:   GERD, poorly controlled    Neurological:  Dx of Headaches, Migraine Headache   Endocrine:  Metabolic Disorders, Morbid Obesity / BMI > 40      Physical Exam  General:  Obesity    Airway/Jaw/Neck:  Airway Findings: Mouth Opening: Normal General Airway Assessment: Adult  Mallampati: III  TM Distance: Normal, at least 6 cm        Eyes/Ears/Nose:  Eyes/Ears/Nose Findings:    Dental:  Dental Findings: In tact   Chest/Lungs:  Chest/Lungs Findings: Normal Respiratory Rate     Heart/Vascular:  Heart Findings: Rate: Normal        Mental Status:  Mental Status Findings:  Cooperative, Alert and Oriented         Anesthesia Plan  Type of Anesthesia, risks & benefits discussed:  Anesthesia Type:  MAC    Patient's Preference:   Plan Factors:          Intra-op Monitoring Plan: standard ASA monitors  Intra-op Monitoring Plan Comments:   Post Op Pain Control Plan: multimodal analgesia, IV/PO Opioids PRN and per primary service following discharge from PACU  Post Op Pain Control Plan Comments:     Induction:   IV  Beta Blocker:  Patient is not currently on a Beta-Blocker (No further documentation required).       Informed Consent: Patient understands risks and agrees with Anesthesia plan.  Questions answered. Anesthesia consent  signed with patient.  ASA Score: 3     Day of Surgery Review of History & Physical:            Ready For Surgery From Anesthesia Perspective.

## 2022-02-15 ENCOUNTER — HOSPITAL ENCOUNTER (OUTPATIENT)
Facility: HOSPITAL | Age: 52
Discharge: HOME | End: 2022-02-15
Attending: UROLOGY | Admitting: UROLOGY
Payer: MEDICARE

## 2022-02-15 ENCOUNTER — ANESTHESIA (OUTPATIENT)
Dept: SURGERY | Facility: HOSPITAL | Age: 52
End: 2022-02-15
Payer: MEDICARE

## 2022-02-15 ENCOUNTER — RESEARCH ENCOUNTER (OUTPATIENT)
Dept: RESEARCH | Facility: HOSPITAL | Age: 52
End: 2022-02-15
Payer: MEDICARE

## 2022-02-15 VITALS
OXYGEN SATURATION: 95 % | HEART RATE: 76 BPM | HEIGHT: 64 IN | SYSTOLIC BLOOD PRESSURE: 114 MMHG | WEIGHT: 293 LBS | RESPIRATION RATE: 16 BRPM | DIASTOLIC BLOOD PRESSURE: 55 MMHG | TEMPERATURE: 98 F | BODY MASS INDEX: 50.02 KG/M2

## 2022-02-15 DIAGNOSIS — N39.41 URGE INCONTINENCE: Primary | ICD-10-CM

## 2022-02-15 PROCEDURE — C1767 GENERATOR, NEURO NON-RECHARG: HCPCS | Mod: HCNC | Performed by: UROLOGY

## 2022-02-15 PROCEDURE — 95972 PR PRG SPNL GEN CMPLX: ICD-10-PCS | Mod: 59,HCNC,, | Performed by: UROLOGY

## 2022-02-15 PROCEDURE — 37000009 HC ANESTHESIA EA ADD 15 MINS: Mod: HCNC | Performed by: UROLOGY

## 2022-02-15 PROCEDURE — 64590 INS/RPL PRPH SAC/GSTR NPG/R: CPT | Mod: HCNC,58,, | Performed by: UROLOGY

## 2022-02-15 PROCEDURE — 71000016 HC POSTOP RECOV ADDL HR: Mod: HCNC | Performed by: UROLOGY

## 2022-02-15 PROCEDURE — D9220A PRA ANESTHESIA: Mod: HCNC,CRNA,, | Performed by: NURSE ANESTHETIST, CERTIFIED REGISTERED

## 2022-02-15 PROCEDURE — 71000045 HC DOSC ROUTINE RECOVERY EA ADD'L HR: Mod: HCNC | Performed by: UROLOGY

## 2022-02-15 PROCEDURE — 36000707: Mod: HCNC | Performed by: UROLOGY

## 2022-02-15 PROCEDURE — D9220A PRA ANESTHESIA: ICD-10-PCS | Mod: HCNC,ANES,, | Performed by: ANESTHESIOLOGY

## 2022-02-15 PROCEDURE — 71000015 HC POSTOP RECOV 1ST HR: Mod: HCNC | Performed by: UROLOGY

## 2022-02-15 PROCEDURE — 63600175 PHARM REV CODE 636 W HCPCS: Mod: HCNC | Performed by: NURSE ANESTHETIST, CERTIFIED REGISTERED

## 2022-02-15 PROCEDURE — 25000003 PHARM REV CODE 250: Mod: HCNC | Performed by: NURSE ANESTHETIST, CERTIFIED REGISTERED

## 2022-02-15 PROCEDURE — 25000003 PHARM REV CODE 250: Mod: HCNC | Performed by: STUDENT IN AN ORGANIZED HEALTH CARE EDUCATION/TRAINING PROGRAM

## 2022-02-15 PROCEDURE — 95972 ALYS CPLX SP/PN NPGT W/PRGRM: CPT | Mod: 59,HCNC,, | Performed by: UROLOGY

## 2022-02-15 PROCEDURE — 63600175 PHARM REV CODE 636 W HCPCS: Mod: HCNC | Performed by: STUDENT IN AN ORGANIZED HEALTH CARE EDUCATION/TRAINING PROGRAM

## 2022-02-15 PROCEDURE — 64590 PR IMPLANT PERIPH/GASTRIC NEUROSTIM/RECEIVER: ICD-10-PCS | Mod: HCNC,58,, | Performed by: UROLOGY

## 2022-02-15 PROCEDURE — C1787 PATIENT PROGR, NEUROSTIM: HCPCS | Mod: HCNC | Performed by: UROLOGY

## 2022-02-15 PROCEDURE — 25000003 PHARM REV CODE 250: Mod: HCNC | Performed by: UROLOGY

## 2022-02-15 PROCEDURE — D9220A PRA ANESTHESIA: ICD-10-PCS | Mod: HCNC,CRNA,, | Performed by: NURSE ANESTHETIST, CERTIFIED REGISTERED

## 2022-02-15 PROCEDURE — 37000008 HC ANESTHESIA 1ST 15 MINUTES: Mod: HCNC | Performed by: UROLOGY

## 2022-02-15 PROCEDURE — D9220A PRA ANESTHESIA: Mod: HCNC,ANES,, | Performed by: ANESTHESIOLOGY

## 2022-02-15 PROCEDURE — 36000706: Mod: HCNC | Performed by: UROLOGY

## 2022-02-15 PROCEDURE — 71000044 HC DOSC ROUTINE RECOVERY FIRST HOUR: Mod: HCNC | Performed by: UROLOGY

## 2022-02-15 DEVICE — SYS INTERSTIM X RECHARGE FREE: Type: IMPLANTABLE DEVICE | Site: BACK | Status: FUNCTIONAL

## 2022-02-15 RX ORDER — PROCHLORPERAZINE EDISYLATE 5 MG/ML
5 INJECTION INTRAMUSCULAR; INTRAVENOUS EVERY 30 MIN PRN
Status: CANCELLED | OUTPATIENT
Start: 2022-02-15

## 2022-02-15 RX ORDER — ONDANSETRON 2 MG/ML
4 INJECTION INTRAMUSCULAR; INTRAVENOUS DAILY PRN
Status: CANCELLED | OUTPATIENT
Start: 2022-02-15

## 2022-02-15 RX ORDER — ONDANSETRON 2 MG/ML
INJECTION INTRAMUSCULAR; INTRAVENOUS
Status: DISCONTINUED | OUTPATIENT
Start: 2022-02-15 | End: 2022-02-15

## 2022-02-15 RX ORDER — PROPOFOL 10 MG/ML
VIAL (ML) INTRAVENOUS
Status: DISCONTINUED | OUTPATIENT
Start: 2022-02-15 | End: 2022-02-15

## 2022-02-15 RX ORDER — FAMOTIDINE 10 MG/ML
INJECTION INTRAVENOUS
Status: DISCONTINUED | OUTPATIENT
Start: 2022-02-15 | End: 2022-02-15

## 2022-02-15 RX ORDER — FENTANYL CITRATE 50 UG/ML
INJECTION, SOLUTION INTRAMUSCULAR; INTRAVENOUS
Status: DISCONTINUED | OUTPATIENT
Start: 2022-02-15 | End: 2022-02-15

## 2022-02-15 RX ORDER — HYDROMORPHONE HYDROCHLORIDE 1 MG/ML
0.5 INJECTION, SOLUTION INTRAMUSCULAR; INTRAVENOUS; SUBCUTANEOUS EVERY 5 MIN PRN
Status: CANCELLED | OUTPATIENT
Start: 2022-02-15

## 2022-02-15 RX ORDER — TRAMADOL HYDROCHLORIDE 50 MG/1
50 TABLET ORAL EVERY 6 HOURS
Qty: 5 TABLET | Refills: 0 | Status: SHIPPED | OUTPATIENT
Start: 2022-02-15

## 2022-02-15 RX ORDER — PROMETHAZINE HYDROCHLORIDE 25 MG/1
25 TABLET ORAL EVERY 6 HOURS PRN
COMMUNITY
End: 2022-07-01 | Stop reason: SDUPTHER

## 2022-02-15 RX ORDER — LIDOCAINE HYDROCHLORIDE 20 MG/ML
INJECTION, SOLUTION EPIDURAL; INFILTRATION; INTRACAUDAL; PERINEURAL
Status: DISCONTINUED | OUTPATIENT
Start: 2022-02-15 | End: 2022-02-15

## 2022-02-15 RX ORDER — MIDAZOLAM HYDROCHLORIDE 1 MG/ML
INJECTION, SOLUTION INTRAMUSCULAR; INTRAVENOUS
Status: DISCONTINUED | OUTPATIENT
Start: 2022-02-15 | End: 2022-02-15

## 2022-02-15 RX ORDER — FENTANYL CITRATE 50 UG/ML
25 INJECTION, SOLUTION INTRAMUSCULAR; INTRAVENOUS EVERY 5 MIN PRN
Status: CANCELLED | OUTPATIENT
Start: 2022-02-15

## 2022-02-15 RX ORDER — OXYCODONE HYDROCHLORIDE 5 MG/1
5 TABLET ORAL
Status: CANCELLED | OUTPATIENT
Start: 2022-02-15

## 2022-02-15 RX ORDER — BUPIVACAINE HYDROCHLORIDE AND EPINEPHRINE 2.5; 5 MG/ML; UG/ML
INJECTION, SOLUTION EPIDURAL; INFILTRATION; INTRACAUDAL; PERINEURAL
Status: DISCONTINUED | OUTPATIENT
Start: 2022-02-15 | End: 2022-02-15 | Stop reason: HOSPADM

## 2022-02-15 RX ADMIN — FENTANYL CITRATE 50 MCG: 50 INJECTION, SOLUTION INTRAMUSCULAR; INTRAVENOUS at 09:02

## 2022-02-15 RX ADMIN — ONDANSETRON 4 MG: 2 INJECTION INTRAMUSCULAR; INTRAVENOUS at 09:02

## 2022-02-15 RX ADMIN — MIDAZOLAM HYDROCHLORIDE 1 MG: 1 INJECTION, SOLUTION INTRAMUSCULAR; INTRAVENOUS at 09:02

## 2022-02-15 RX ADMIN — PROPOFOL 30 MG: 10 INJECTION, EMULSION INTRAVENOUS at 09:02

## 2022-02-15 RX ADMIN — LIDOCAINE HYDROCHLORIDE 60 MG: 20 INJECTION, SOLUTION EPIDURAL; INFILTRATION; INTRACAUDAL at 09:02

## 2022-02-15 RX ADMIN — VANCOMYCIN HYDROCHLORIDE 1500 MG: 1.5 INJECTION, POWDER, LYOPHILIZED, FOR SOLUTION INTRAVENOUS at 09:02

## 2022-02-15 RX ADMIN — PROPOFOL 20 MG: 10 INJECTION, EMULSION INTRAVENOUS at 09:02

## 2022-02-15 RX ADMIN — SODIUM CHLORIDE: 0.9 INJECTION, SOLUTION INTRAVENOUS at 09:02

## 2022-02-15 RX ADMIN — FAMOTIDINE 20 MG: 10 INJECTION, SOLUTION INTRAVENOUS at 09:02

## 2022-02-15 NOTE — PLAN OF CARE
REP at bedside discussing with Pt how long device lasts (5years); shows patient what is being implanted Round battery under skin). Rep states Keep site dry for next 24 hours; f/u in approx 2 weeks to veiw site. Avoid submerging in water. Okay to Have MRI but device/therapy has to be powered off for the MRI.

## 2022-02-15 NOTE — ANESTHESIA POSTPROCEDURE EVALUATION
Anesthesia Post Evaluation    Patient: Starla Fisher    Procedure(s) Performed: Procedure(s) (LRB):  INSERTION, NEUROSTIMULATOR, PERMANENT, SACRAL (N/A)    Final Anesthesia Type: general      Patient location during evaluation: [facility]  Patient participation: Yes- Able to Participate  Level of consciousness: awake and alert  Post-procedure vital signs: reviewed and stable  Pain management: adequate  Airway patency: patent  CARL mitigation strategies: Extubation while patient is awake  PONV status at discharge: No PONV  Anesthetic complications: no      Cardiovascular status: stable  Respiratory status: unassisted and spontaneous ventilation  Hydration status: euvolemic  Follow-up not needed.          Vitals Value Taken Time   /77 02/15/22 1201   Temp 36.6 °C (97.8 °F) 02/15/22 1001   Pulse 74 02/15/22 1205   Resp 16 02/15/22 1001   SpO2 97 % 02/15/22 1205   Vitals shown include unvalidated device data.      No case tracking events are documented in the log.      Pain/Anca Score: Anca Score: 10 (2/15/2022 11:00 AM)

## 2022-02-15 NOTE — DISCHARGE SUMMARY
[facility]  Discharge Note  Short Stay    Admit Date: 2/15/2022    Discharge Date and Time: 02/15/2022 9:54 AM      Attending Physician: David Brown MD     Discharge Provider: Lazaro Langley    Diagnoses:  Past Medical History:   Diagnosis Date    GERD (gastroesophageal reflux disease)     Hypertension     Migraine headache        Discharged Condition: good    Hospital Course: Patient was admitted for stage II interstim and tolerated the procedure well with no complications. The patient was discharged home in good condition on the same day.       Final Diagnoses: Same as principal problem.    Disposition: Home or Self Care    Follow up/Patient Instructions:    Medications:  Reconciled Home Medications:   Current Discharge Medication List      START taking these medications    Details   traMADoL (ULTRAM) 50 mg tablet Take 1 tablet (50 mg total) by mouth every 6 (six) hours.  Qty: 5 tablet, Refills: 0    Comments: Quantity prescribed more than 7 day supply? No         CONTINUE these medications which have NOT CHANGED    Details   guaiFENesin-codeine 100-10 mg/5 ml (CHERATUSSIN AC)  mg/5 mL syrup Take 5 mLs by mouth nightly as needed for Cough.  Qty: 118 mL, Refills: 0      losartan (COZAAR) 25 MG tablet Take 1 tablet (25 mg total) by mouth once daily.  Qty: 90 tablet, Refills: 3    Comments: .  Associated Diagnoses: Essential hypertension      multivit-minerals/folic acid (ADULT MULTIVITAMIN GUMMIES ORAL) Take 1 tablet by mouth Daily. Continue to hold until after Surgery.      pantoprazole (PROTONIX) 40 MG tablet Take 1 tablet (40 mg total) by mouth 2 (two) times daily.  Qty: 60 tablet, Refills: 0    Associated Diagnoses: Gastroesophageal reflux disease, unspecified whether esophagitis present      topiramate (TOPAMAX) 50 MG tablet Take 1 tablet (50 mg total) by mouth 3 (three) times daily.  Qty: 90 tablet, Refills: 1      clotrimazole-betamethasone 1-0.05% (LOTRISONE) cream Apply  topically 2 (two) times daily.  Qty: 15 g, Refills: 1      mirabegron (MYRBETRIQ) 50 mg Tb24 Take 1 tablet (50 mg total) by mouth once daily.  Qty: 30 tablet, Refills: 11      promethazine (PHENERGAN) 25 MG tablet Take 25 mg by mouth every 6 (six) hours as needed for Nausea.      sertraline (ZOLOFT) 50 MG tablet Take 1 tablet (50 mg total) by mouth once daily.  Qty: 90 tablet, Refills: 1      sumatriptan (IMITREX STATDOSE) 6 mg/0.5 mL kit Inject 0.5 mLs (6 mg total) into the skin as needed for Migraine.  Qty: 1 mL, Refills: 5      SUMAtriptan (IMITREX) 20 mg/actuation nasal spray 1 spray (20 mg total) by Nasal route as needed for Migraine (max 40mg/24hr).  Qty: 6 each, Refills: 3           Discharge Procedure Orders   Notify your health care provider if you experience any of the following:  temperature >100.4     Notify your health care provider if you experience any of the following:  persistent nausea and vomiting or diarrhea     Notify your health care provider if you experience any of the following:  severe uncontrolled pain     Notify your health care provider if you experience any of the following:  difficulty breathing or increased cough     Notify your health care provider if you experience any of the following:  severe persistent headache     Notify your health care provider if you experience any of the following:  persistent dizziness, light-headedness, or visual disturbances     Notify your health care provider if you experience any of the following:  increased confusion or weakness     Other restrictions (specify):   Order Comments: Interstim Post-Operative Care    You play an important role in your own recovery. You will be given a daily diary to document the  effectiveness of the Interstim implant.     Caring for Your Wound  · After surgery you will have a dressing over the surgical site.  Do not remove or change the dressing. If your dressing becomes saturated or undone, you  may reinforce it with  "more tape and/or gauze but you should call the office to be evaluated.    Post Surgical Pain Management  · It is normal to experience some discomfort post operatively, however the stimulation should not  be painful.  · Take Tylenol 650mg 1-2 tabs every 4-6 hours as needed if you feel tenderness from surgical  incision (Do not to exceed 4000mg daily) or Motrin 200mg 1-2 tabs every 4-6 hours.  Warning: Do not take additional Tylenol while taking Percocet or Vicodin. All of these  products contain Acetaminophen, which can be toxic if taken in excess.    Stimulation  · Stimulation is the "pulling" or "tingling" sensation felt in the pelvic area (near the vagina, scrotum  or anal area.) It should not be painful. If it is painful or you feel the stimulation sensation move  down the legs decrease the stimulation and call the office and speak to a nurse.   · During the trial period (stage 1) you may notice that the Interstim device has improved your  continence which means it is working and on the correct setting. If you are having episodes of  incontinence you will need to increase your device setting by moving the dial up slowly.    Activity  · Avoid heavy lifting or strenuous activity for 14 days after your surgery.  · Frontal Showers or Sponge bath only for two weeks after each surgery. Avoid immersion in any  water until after your post-op appointment.    Symptoms to Report  · Severe or worsening pain, unrelieved by pain medications.  · Fever, greater than 101.5ºF, chills or sweats  · Painful or problems urinating  · Any problems with the device    If you experience any of the above symptoms, call the [facility] clinic at [phone] during business hours on weekdays. If after hours or on weekends, call [phone] and ask to speak with the urology resident on call.    You may also call the [employer] Patient Help Line for specific help troubleshooting your device.  For help with the temporary implant Call " [phone].  For help with the permanent implant Call [phone].      Follow-up Information     David Brown MD On 3/31/2022.    Specialty: Urology  Contact information:  [address]  [facility] [address]  [phone]                         Discharge Procedure Orders (must include Diet, Follow-up, Activity):   Discharge Procedure Orders (must include Diet, Follow-up, Activity)   Notify your health care provider if you experience any of the following:  temperature >100.4     Notify your health care provider if you experience any of the following:  persistent nausea and vomiting or diarrhea     Notify your health care provider if you experience any of the following:  severe uncontrolled pain     Notify your health care provider if you experience any of the following:  difficulty breathing or increased cough     Notify your health care provider if you experience any of the following:  severe persistent headache     Notify your health care provider if you experience any of the following:  persistent dizziness, light-headedness, or visual disturbances     Notify your health care provider if you experience any of the following:  increased confusion or weakness     Other restrictions (specify):   Order Comments: Interstim Post-Operative Care    You play an important role in your own recovery. You will be given a daily diary to document the  effectiveness of the Interstim implant.     Caring for Your Wound  · After surgery you will have a dressing over the surgical site.  Do not remove or change the dressing. If your dressing becomes saturated or undone, you  may reinforce it with more tape and/or gauze but you should call the office to be evaluated.    Post Surgical Pain Management  · It is normal to experience some discomfort post operatively, however the stimulation should not  be painful.  · Take Tylenol 650mg 1-2 tabs every 4-6 hours as needed if you feel tenderness from surgical  incision (Do not  "to exceed 4000mg daily) or Motrin 200mg 1-2 tabs every 4-6 hours.  Warning: Do not take additional Tylenol while taking Percocet or Vicodin. All of these  products contain Acetaminophen, which can be toxic if taken in excess.    Stimulation  · Stimulation is the "pulling" or "tingling" sensation felt in the pelvic area (near the vagina, scrotum  or anal area.) It should not be painful. If it is painful or you feel the stimulation sensation move  down the legs decrease the stimulation and call the office and speak to a nurse.   · During the trial period (stage 1) you may notice that the Interstim device has improved your  continence which means it is working and on the correct setting. If you are having episodes of  incontinence you will need to increase your device setting by moving the dial up slowly.    Activity  · Avoid heavy lifting or strenuous activity for 14 days after your surgery.  · Frontal Showers or Sponge bath only for two weeks after each surgery. Avoid immersion in any  water until after your post-op appointment.    Symptoms to Report  · Severe or worsening pain, unrelieved by pain medications.  · Fever, greater than 101.5ºF, chills or sweats  · Painful or problems urinating  · Any problems with the device    If you experience any of the above symptoms, call the [facility] clinic at [phone] during business hours on weekdays. If after hours or on weekends, call [phone] and ask to speak with the urology resident on call.    You may also call the [employer] Patient Help Line for specific help troubleshooting your device.  For help with the temporary implant Call [phone].  For help with the permanent implant Call [phone].        "

## 2022-02-15 NOTE — OP NOTE
Neuromodulation Operative Note    Preoperative Diagnosis:   1. Urinary frequency  2. Urinary urgency  3. Urgency incontinence    Postoperative Diagnosis:  1. Urinary frequency  2. Urinary urgency  3. Urgency incontinence    Procedure:  1. Insertion of implantable pulse generator for neurostimulation (23691)  2. Complex analysis and programming of implanted neurostimulator pulse generator (17707)    Attending Surgeon: David Brown MD    Anesthesia: Monitored Anesthesia Care    EBL: <10 mL    Complications: None    Findings:   1. IPG Placement: right  2. Placement of Interstim II generator    Reason for Procedure: Starla Fisher is a very pleasant 51 y.o. female with a history of frequency, urgency and urgency incontinence. Patient has had an adequate trial of pharmacologic therapy including Myrbetriq and Sanctura used for an adequate duration and adequate dosage. Patient either did not have a satisfactory response or had intolerable side effects with each of these medications.   Over the last 2 weeks of testing, effects of SNM have been monitored for patient response. The patient has recorded at least a 50% decrease in incontinence episodes averaged daily over a 3-day diary improving from 6 episodes to 0 per day.  Given that the patient has demonstrated clinically significant response to testing of sacral neuromodulation, and desires permanent implantation, it is appropriate to proceed with implantation of an implantable pulse generator.     PROCEDURE IN DETAIL: Informed consent was obtained by explaining all risks and benefits of the procedure to the patient in detail. She was given appropriate perioperative antibiotics within 30 minutes prior to beginning the procedure. She was brought to the OR suite, where monitored anesthesia care was administered by anesthesia staff in a prone position. She was prepped and draped in accordance with our standard sacral neuromodulation infection protocol, which was  "utilized preoperatively, intraoperatively, and postoperatively.     A #15 scalpel blade was used to open the percutaneous extension incision and the percutaneous extension was delivered. This was  from the lead, cut and delivered sterilely from the field. The quadripolar lead was then attached to the Interstim II implantable pulse generator, which was pre-placed within the pocket. The single setscrew was tightened until a single "click" was heard.  The pocket had been thoroughly irrigated with sterile water. After impedance testing demonstrated no abnormalities on all electrode pairs, the incision was closed in 2 layers, deeply with a 2-0 Vicryl and superficially with a running 3-0 Monocryl. The incision was covered with Dermabond. She tolerated the procedure well and was transferred in stable condition to the recovery room.     Once awake and alert, complex electronic analysis and programming of the SNM pulse generator was performed, including setting active contact groups, amplitude, pulse width, frequency, cycling, and lockout parameters.     POSTOPERATIVE PLAN: We will plan to see her back in 6-8 weeks for continued evaluation.     "

## 2022-02-15 NOTE — TRANSFER OF CARE
"Anesthesia Transfer of Care Note    Patient: Starla Fisher    Procedure(s) Performed: Procedure(s) (LRB):  INSERTION, NEUROSTIMULATOR, PERMANENT, SACRAL (N/A)    Patient location: [facility]    Anesthesia Type: MAC    Transport from OR: Transported from OR on 2-3 L/min O2 by NC with adequate spontaneous ventilation    Post pain: adequate analgesia    Post assessment: no apparent anesthetic complications and tolerated procedure well    Post vital signs: stable    Level of consciousness: awake and alert    Nausea/Vomiting: no nausea/vomiting    Complications: none    Transfer of care protocol was followed      Last vitals:   Visit Vitals  BP (!) 158/78 (BP Location: Right arm, Patient Position: Lying)   Pulse 76   Temp 36.6 °C (97.8 °F) (Temporal)   Resp 16   Ht 5' 4" (1.626 m)   Wt 136.1 kg (300 lb)   LMP  (LMP Unknown)   SpO2 97%   Breastfeeding No   BMI 51.49 kg/m²     "

## 2022-02-15 NOTE — INTERVAL H&P NOTE
The patient has been examined and the H&P has been reviewed:    I concur with the findings and no changes have occurred since H&P was written.    Surgery risks, benefits and alternative options discussed and understood by patient/family.    Patient was assessed preoperatively, found to be appropriate in behavior. The risks, benefits, and alternatives to procedures were discussed, and questions were answered. Plan to proceed with described procedure. Patient with documented improvement in frequency, urgency, UUI >50%.    Patient Active Problem List    Diagnosis Date Noted    Essential hypertension 04/22/2021    Morbid obesity with BMI of 50.0-59.9, adult 04/22/2021    Migraine with aura and without status migrainosus, not intractable 04/22/2021    Intractable chronic migraine without aura 04/22/2021

## 2022-02-15 NOTE — PROGRESS NOTES
[employer] PEER2 study  IRB# [id number]  PI: Dr. Brown  Subject number: [id number]     Date: 01-Feb-2022     Device Implant: OAB cohort     I. Follow up signal acquisition was done in the clinic before the implant procedure by [employer] and research personnel, programming data was obtained and uploaded.      II. Patient's eligibility was reviewed and confirmed by PI prior to device implant               -Patient diary Day 1: 6 voids and no leaks                                     Day 2: 7 voids and no leaks                                    Day 3: 5 voids and no leaks   We will use the first 3 days of patients diary for study purposes. Investigator considers this as a successful therapy evaluation based on patient report and diary      III. Confirmed- no new adverse events to report     IV. Change in medication- none      V. Diary Instructions and distribution  Subject was provided with IRB approved OAB cohort diary booklet and was re- trained on the following:   · Diary instructions  · Diary definitions  · GCP practices were reviewed with the subject     's contact information was provided.Patient received her full implant today, we will see her in 3 months for her final study visit with her diary and questionnaires.

## 2022-02-15 NOTE — DISCHARGE INSTRUCTIONS
"Patient Education       Anesthesia   The Basics   Written by the doctors and editors at [facility]   What is anesthesia? -- "Anesthesia" is a medical term for different types of medicine people get before and during surgery or another procedure. These medicines are given to make sure you do not feel pain during the procedure. In some cases, like when you are "put to sleep" for surgery, the anesthesia medicines also prevent you from remembering it afterwards.  Anesthesia medicines are given by a doctor called an "anesthesiologist." Sometimes a "nurse anesthetist" is involved, too. These are nurses with special training in anesthesia.  What are the different types of anesthesia? -- There are 3 main types of anesthesia:  · Local - This type of anesthesia uses medicine to numb a small part of your body so you don't feel pain. It can be given as a cream, gel, or spray on the skin. It can also be given by an injection (shot) into the skin. You might be awake when you get local anesthesia.  Anesthesiologists give local anesthesia before minor surgery such as a skin or breast biopsy. (A biopsy involves taking a tiny sample of tissue using a needle.)  · Regional - This type of anesthesia blocks pain in one area of your body, such as an arm, leg, or the lower half of your body. If you get regional anesthesia, you might be awake. Or you might get medicines to make you relax and feel sleepy, called "sedatives." Sedatives are given through a thin tube that goes into a vein, called an "IV."   One type of regional anesthesia is called a "spinal block." The anesthesiologist puts a small needle in your lower back, and injects medicine to numb the nerves in your spine. It can be used for surgery done on your legs or inside your belly. Another type is an "epidural." The anesthesiologist uses a needle to put a small tube (called a "catheter") into your lower back, near the nerves in your spine. Some women get an epidural during " childbirth. Other people get one for a surgical procedure or to control pain after surgery.  · General - This type of anesthesia makes you unconscious so you can't feel, see, or hear anything during surgery. Some of the medicines are given through an IV. Others are gases that you breathe. You might also get a breathing tube to help you breathe. If this happens, the anesthesiologist will carefully place the tube in your throat while you are asleep during general anesthesia, and remove it before you wake up.  What does an anesthesiologist do? -- An anesthesiologist will meet with you before your surgery and ask you many questions, including:  · Do you have any health problems?  · Do you have any dental problems, such as loose teeth or false teeth?  · What medicines do you take, including over-the-counter medicines and supplements?  · Do you smoke, drink alcohol, or use any illegal drugs?  · Do you have any allergies to foods or medicines?  · Have you or any of your relatives ever had a problem with anesthesia medicines?  The type of anesthesia you get depends on:  · Your answers to the questions above  · The type of surgery or procedure you are having  In some cases, you might have a choice between different types of anesthesia.  Your anesthesiologist will also tell you how your anesthesia will be given and answer any questions you have. They will continually check your breathing, blood pressure, and heart rate during the procedure. If you have general anesthesia, your anesthesiologist also makes sure you stay unconscious.  Can I wake up during general anesthesia? -- It is very rare to wake up during general anesthesia (less than 1 patient in every 15,000 operations). Your anesthesiologist constantly adjusts the medicines to keep you from waking up.  Are there any side effects from anesthesia? -- Each type of anesthesia has possible side effects.  If you have a spinal block or epidural, the numbness will last for a  "few hours after your procedure before wearing off. Other side effects can include:  · Headache - There is a small chance of getting a type of headache that can last for several days. This is sometimes called a "spinal headache." It usually goes away on its own, but pain-relieving medicines and other treatments can help.  · Trouble urinating - Some patients have trouble emptying their bladders for a few hours after surgery until the anesthetic wears off.   If you have general anesthesia, you will likely feel a little groggy or confused for a short time after waking up. Other side effects can include:  · Feeling sick to your stomach (nausea) and throwing up (vomiting) - Your anesthesiologist can give you medicines for this problem.  · A sore throat - This can happen if you had a breathing tube. It usually gets better quickly.  What else should I know about anesthesia? -- Keeping you safe is your anesthesiologist's main concern. Before surgery, you should feel comfortable asking your anesthesiologist any questions about the risks and benefits of anesthesia, and the type of anesthesia that is best for you. After surgery, your anesthesiologist will check on you as you recover.  All topics are updated as new evidence becomes available and our peer review process is complete.  This topic retrieved from [employer] on: Sep 21, 2021.  Topic 53542 Version 10.0  Release: 29.4.2 - C29.263  © 2021 UpToDate, Inc. and/or its affiliates. All rights reserved.  Consumer Information Use and Disclaimer   This information is not specific medical advice and does not replace information you receive from your health care provider. This is only a brief summary of general information. It does NOT include all information about conditions, illnesses, injuries, tests, procedures, treatments, therapies, discharge instructions or life-style choices that may apply to you. You must talk with your health care provider for complete information about " your health and treatment options. This information should not be used to decide whether or not to accept your health care provider's advice, instructions or recommendations. Only your health care provider has the knowledge and training to provide advice that is right for you. The use of this information is governed by the [employer] End User License Agreement, available at https://www.[employer]/en/solutions/[employer]/about/shailesh.The use of [employer] content is governed by the [employer] Terms of Use. ©2021 [employer] All rights reserved.  Copyright   © 2021 [employer] and/or its affiliates. All rights reserved.

## 2022-02-22 DIAGNOSIS — K21.9 GASTROESOPHAGEAL REFLUX DISEASE, UNSPECIFIED WHETHER ESOPHAGITIS PRESENT: ICD-10-CM

## 2022-02-22 RX ORDER — PANTOPRAZOLE SODIUM 40 MG/1
40 TABLET, DELAYED RELEASE ORAL 2 TIMES DAILY
Qty: 60 TABLET | Refills: 0 | Status: SHIPPED | OUTPATIENT
Start: 2022-02-22 | End: 2022-04-20 | Stop reason: SDUPTHER

## 2022-02-22 NOTE — TELEPHONE ENCOUNTER
----- Message from Radha Sanchez sent at 2/22/2022  1:54 PM CST -----  Type:  Needs Medical Advice    Who Called: self  Reason:needs a refill on pantoprazole (PROTONIX) 40 MG tablet  Would the patient rather a call back or a response via [facility]? call  Best Call Back Number: [phone]  Additional Information: [id number] - [place] - [address] AT [facility] [address]   Phone:  [phone]  Fax:  [phone]

## 2022-02-23 ENCOUNTER — OFFICE VISIT (OUTPATIENT)
Dept: PSYCHIATRY | Facility: CLINIC | Age: 52
End: 2022-02-23
Payer: MEDICARE

## 2022-02-23 ENCOUNTER — TELEPHONE (OUTPATIENT)
Dept: PODIATRY | Facility: CLINIC | Age: 52
End: 2022-02-23
Payer: MEDICARE

## 2022-02-23 VITALS
HEART RATE: 78 BPM | WEIGHT: 293 LBS | DIASTOLIC BLOOD PRESSURE: 78 MMHG | BODY MASS INDEX: 51.46 KG/M2 | SYSTOLIC BLOOD PRESSURE: 160 MMHG

## 2022-02-23 DIAGNOSIS — F41.0 GENERALIZED ANXIETY DISORDER WITH PANIC ATTACKS: ICD-10-CM

## 2022-02-23 DIAGNOSIS — G47.00 INSOMNIA DISORDER, WITH NON-SLEEP DISORDER MENTAL COMORBIDITY: ICD-10-CM

## 2022-02-23 DIAGNOSIS — F41.1 GENERALIZED ANXIETY DISORDER WITH PANIC ATTACKS: ICD-10-CM

## 2022-02-23 DIAGNOSIS — F33.1 MAJOR DEPRESSIVE DISORDER, RECURRENT, MODERATE: Primary | ICD-10-CM

## 2022-02-23 PROCEDURE — 99999 PR PBB SHADOW E&M-EST. PATIENT-LVL III: CPT | Mod: PBBFAC,HCNC,, | Performed by: NURSE PRACTITIONER

## 2022-02-23 PROCEDURE — 3077F PR MOST RECENT SYSTOLIC BLOOD PRESSURE >= 140 MM HG: ICD-10-PCS | Mod: HCNC,CPTII,S$GLB, | Performed by: NURSE PRACTITIONER

## 2022-02-23 PROCEDURE — 99999 PR PBB SHADOW E&M-EST. PATIENT-LVL III: ICD-10-PCS | Mod: PBBFAC,HCNC,, | Performed by: NURSE PRACTITIONER

## 2022-02-23 PROCEDURE — 1159F PR MEDICATION LIST DOCUMENTED IN MEDICAL RECORD: ICD-10-PCS | Mod: HCNC,CPTII,S$GLB, | Performed by: NURSE PRACTITIONER

## 2022-02-23 PROCEDURE — 1160F PR REVIEW ALL MEDS BY PRESCRIBER/CLIN PHARMACIST DOCUMENTED: ICD-10-PCS | Mod: HCNC,CPTII,S$GLB, | Performed by: NURSE PRACTITIONER

## 2022-02-23 PROCEDURE — 99205 PR OFFICE/OUTPT VISIT, NEW, LEVL V, 60-74 MIN: ICD-10-PCS | Mod: HCNC,S$GLB,, | Performed by: NURSE PRACTITIONER

## 2022-02-23 PROCEDURE — 3044F PR MOST RECENT HEMOGLOBIN A1C LEVEL <7.0%: ICD-10-PCS | Mod: HCNC,CPTII,S$GLB, | Performed by: NURSE PRACTITIONER

## 2022-02-23 PROCEDURE — 3044F HG A1C LEVEL LT 7.0%: CPT | Mod: HCNC,CPTII,S$GLB, | Performed by: NURSE PRACTITIONER

## 2022-02-23 PROCEDURE — 99499 UNLISTED E&M SERVICE: CPT | Mod: HCNC,S$GLB,, | Performed by: NURSE PRACTITIONER

## 2022-02-23 PROCEDURE — 99499 RISK ADDL DX/OHS AUDIT: ICD-10-PCS | Mod: HCNC,S$GLB,, | Performed by: NURSE PRACTITIONER

## 2022-02-23 PROCEDURE — 4010F ACE/ARB THERAPY RXD/TAKEN: CPT | Mod: HCNC,CPTII,S$GLB, | Performed by: NURSE PRACTITIONER

## 2022-02-23 PROCEDURE — 3078F PR MOST RECENT DIASTOLIC BLOOD PRESSURE < 80 MM HG: ICD-10-PCS | Mod: HCNC,CPTII,S$GLB, | Performed by: NURSE PRACTITIONER

## 2022-02-23 PROCEDURE — 4010F PR ACE/ARB THEARPY RXD/TAKEN: ICD-10-PCS | Mod: HCNC,CPTII,S$GLB, | Performed by: NURSE PRACTITIONER

## 2022-02-23 PROCEDURE — 1159F MED LIST DOCD IN RCRD: CPT | Mod: HCNC,CPTII,S$GLB, | Performed by: NURSE PRACTITIONER

## 2022-02-23 PROCEDURE — 1160F RVW MEDS BY RX/DR IN RCRD: CPT | Mod: HCNC,CPTII,S$GLB, | Performed by: NURSE PRACTITIONER

## 2022-02-23 PROCEDURE — 99205 OFFICE O/P NEW HI 60 MIN: CPT | Mod: HCNC,S$GLB,, | Performed by: NURSE PRACTITIONER

## 2022-02-23 PROCEDURE — 3078F DIAST BP <80 MM HG: CPT | Mod: HCNC,CPTII,S$GLB, | Performed by: NURSE PRACTITIONER

## 2022-02-23 PROCEDURE — 3008F PR BODY MASS INDEX (BMI) DOCUMENTED: ICD-10-PCS | Mod: HCNC,CPTII,S$GLB, | Performed by: NURSE PRACTITIONER

## 2022-02-23 PROCEDURE — 3008F BODY MASS INDEX DOCD: CPT | Mod: HCNC,CPTII,S$GLB, | Performed by: NURSE PRACTITIONER

## 2022-02-23 PROCEDURE — 3077F SYST BP >= 140 MM HG: CPT | Mod: HCNC,CPTII,S$GLB, | Performed by: NURSE PRACTITIONER

## 2022-02-23 RX ORDER — TRAZODONE HYDROCHLORIDE 150 MG/1
TABLET ORAL
Qty: 30 TABLET | Refills: 5 | Status: SHIPPED | OUTPATIENT
Start: 2022-02-23 | End: 2022-06-06 | Stop reason: SDUPTHER

## 2022-02-23 RX ORDER — DIAZEPAM 5 MG/1
5 TABLET ORAL DAILY PRN
Qty: 15 TABLET | Refills: 2 | Status: SHIPPED | OUTPATIENT
Start: 2022-02-23 | End: 2022-08-04 | Stop reason: SDUPTHER

## 2022-02-23 RX ORDER — ESCITALOPRAM OXALATE 10 MG/1
10 TABLET ORAL DAILY
Qty: 30 TABLET | Refills: 5 | Status: SHIPPED | OUTPATIENT
Start: 2022-02-23 | End: 2022-03-11

## 2022-02-23 NOTE — PATIENT INSTRUCTIONS
1) Start Lexapro 10 mg daily    2)  Take Trazodone 150 mg 1/2 - 1 tablet before bed for sleep    3) Valium 5 mg as needed for severe anxiety or panic attacks. Don't take more than one tablet in a day and be careful to not drive. May increase risk for fall.     4) Set appointment with me in 4-6 weeks for virtual visit follow-up

## 2022-02-23 NOTE — PROGRESS NOTES
Outpatient Psychiatry Initial Visit (MD/NP)    2/23/2022    Starla Fisher, a 51 y.o. female, presenting for initial evaluation visit. Met with patient and her cousin.    Reason for Encounter: self-referral. Patient complains of depression and anxiety.    History of Present Illness:     Pt is a 51-year old female who presents for psychiatric evaluation. Depressed mom recently passed 2021 and dad in 2019.  Having a lot of grief.  Dad had pneumonia from parkinson's and mom had dementia and a stroke.  Endorses anxiety and depression symptoms of sadness, poor sleep and appetite,, excessive worrying and panic attacks with anxiety-induced GI discomfort. Hx of passive SI.  Currently denies SI/HI/AVH.     Past Psychiatric History:  No hx of inpatient treatment, no hx of addiction problems    Psychiatric Medications: currently taking  Zoloft but not consistent    Past trials include: Zoloft    Psychosocial History: Live with sister who is her POA.      Stressors/Triggers: death of parents, was caregiver of her parents,     Coping Skills:    Medical History:  Diagnosis    GERD (gastroesophageal reflux disease)    Hypertension    Migraine headache     Review Of Systems:     GENERAL:  No weight gain or loss  SKIN:  No rashes or lacerations  HEAD:  No headaches  EYES:  No exophthalmos, jaundice or blindness  EARS:  No dizziness, tinnitus or hearing loss  NOSE:  No changes in smell  MOUTH & THROAT:  No dyskinetic movements or obvious goiter  CHEST:  No shortness of breath, hyperventilation or cough  CARDIOVASCULAR:  No tachycardia or chest pain  ABDOMEN:  No nausea, vomiting, pain, constipation or diarrhea  URINARY:  No frequency, dysuria or sexual dysfunction  ENDOCRINE:  No polydipsia, polyuria  MUSCULOSKELETAL:  No pain or stiffness of the joints  NEUROLOGIC:  No weakness, sensory changes, seizures, confusion, memory loss, tremor or other abnormal movements    Current Evaluation:     Nutritional Screening: Considering  the patient's height and weight, medications, medical history and preferences, should a referral be made to the dietitian? no    Constitutional  Vitals:  Most recent vital signs, dated greater than 90 days prior to this appointment, were reviewed.    Vitals:    02/23/22 1105   BP: (!) 160/78   Pulse: 78   Weight: 136 kg (299 lb 13.2 oz)        General:  unremarkable, age appropriate     Musculoskeletal  Muscle Strength/Tone:  no tremor, no tic   Gait & Station:  non-ataxic     Psychiatric  Speech:  no latency; no press   Mood & Affect:  steady  congruent and appropriate   Thought Process:  normal and logical   Associations:  intact   Thought Content:  normal, no suicidality, no homicidality, delusions, or paranoia   Insight:  intact   Judgement: behavior is adequate to circumstances   Orientation:  grossly intact   Memory: intact for content of interview   Language: grossly intact   Attention Span & Concentration:  able to focus   Fund of Knowledge:  intact and appropriate to age and level of education     Relevant Elements of Neurological Exam: normal gait    Functioning in Relationships:  Spouse/partner: see above HPI  Peers: see above HPI  Employers: see above HPI    Laboratory Data  Lab Visit on 01/29/2022   Component Date Value Ref Range Status    SARS-CoV2 (COVID-19) Qualitative P* 01/29/2022 Not Detected  Not Detected Final    SARS-COV-2- Cycle Number 01/29/2022 N/A   Final       Medications  Outpatient Encounter Medications as of 2/23/2022   Medication Sig Dispense Refill    clotrimazole-betamethasone 1-0.05% (LOTRISONE) cream Apply topically 2 (two) times daily. (Patient not taking: No sig reported) 15 g 1    guaiFENesin-codeine 100-10 mg/5 ml (CHERATUSSIN AC)  mg/5 mL syrup Take 5 mLs by mouth nightly as needed for Cough. 118 mL 0    losartan (COZAAR) 25 MG tablet Take 1 tablet (25 mg total) by mouth once daily. 90 tablet 3    mirabegron (MYRBETRIQ) 50 mg Tb24 Take 1 tablet (50 mg total) by  mouth once daily. (Patient not taking: No sig reported) 30 tablet 11    multivit-minerals/folic acid (ADULT MULTIVITAMIN GUMMIES ORAL) Take 1 tablet by mouth Daily. Continue to hold until after Surgery.      pantoprazole (PROTONIX) 40 MG tablet Take 1 tablet (40 mg total) by mouth 2 (two) times daily. 60 tablet 0    promethazine (PHENERGAN) 25 MG tablet Take 25 mg by mouth every 6 (six) hours as needed for Nausea.      sertraline (ZOLOFT) 50 MG tablet Take 1 tablet (50 mg total) by mouth once daily. 90 tablet 1    sumatriptan (IMITREX STATDOSE) 6 mg/0.5 mL kit Inject 0.5 mLs (6 mg total) into the skin as needed for Migraine. 1 mL 5    SUMAtriptan (IMITREX) 20 mg/actuation nasal spray 1 spray (20 mg total) by Nasal route as needed for Migraine (max 40mg/24hr). 6 each 3    topiramate (TOPAMAX) 50 MG tablet TAKE 1 TABLET(50 MG) BY MOUTH THREE TIMES DAILY 270 tablet 0    traMADoL (ULTRAM) 50 mg tablet Take 1 tablet (50 mg total) by mouth every 6 (six) hours. 5 tablet 0     Facility-Administered Encounter Medications as of 2/23/2022   Medication Dose Route Frequency Provider Last Rate Last Admin    0.9%  NaCl infusion   Intravenous Continuous Sathish Briseno MD   Stopped at 02/15/22 0948    LIDOcaine (PF) 10 mg/ml (1%) injection 10 mg  1 mL Intradermal Once Sathish Briseno MD           Assessment - Diagnosis - Goals:     Impression:       ICD-10-CM ICD-9-CM   1. Major depressive disorder, recurrent, moderate  F33.1 296.32   2. Insomnia disorder, with non-sleep disorder mental comorbidity  G47.00 780.52   3. Generalized anxiety disorder with panic attacks  F41.1 300.02    F41.0 300.01       Strengths and Liabilities: Strength: Patient is motivated for change., Strength: Patient has positive support network., Strength: Patient is stable., Liability: Patient has intellectual deficits., Liability: Patient lacks social skills., Liability: Patient lacks coping skills.    Treatment Goals:  Specify outcomes written  in observable, behavioral terms:   Anxiety: acquiring relapse prevention skills, reducing negative automatic thoughts, reducing physical symptoms of anxiety and reducing time spent worrying (<30 minutes/day)  Depression: acquiring relapse prevention skills, increasing interest in usual activities, increasing motivation, increasing self-reward for positive behaviors (one/day), increasing self-reward for positive thoughts (one/day), increasing social contacts (three/week), reducing excessive guilt and reducing fatigue    Treatment Plan/Recommendations:   · Medication Management: The risks and benefits of medication were discussed with the patient.  · The treatment plan and follow up plan were reviewed with the patient.   · Reviewed available medical records and labs  1) Start Lexapro 10 mg daily    2)  Take Trazodone 150 mg 1/2 - 1 tablet before bed for sleep    3) Valium 5 mg as needed for severe anxiety or panic attacks. Don't take more than one tablet in a day and be careful to not drive. May increase risk for fall.     4) Set appointment with me in 4-6 weeks for virtual visit follow-up    Return to Clinic: 3 months    Counseling time: 35 minutes  Total time: 60 minutes  Consulting clinician was informed of the encounter and consult note.

## 2022-02-23 NOTE — TELEPHONE ENCOUNTER
----- Message from Andrei Gamez sent at 2/23/2022  2:38 PM CST -----  .Type:  Sooner Apoointment Request    Caller is requesting a sooner appointment.  Caller declined first available appointment listed below.  Caller will not accept being placed on the waitlist and is requesting a message be sent to doctor.  Name of Caller:pt  When is the first available appointment?03/31/22  Symptoms:foot spores pain f/u MRI  Would the patient rather a call back or a [facility]? Call back  Best Call Back Number:[phone]  Additional Information:

## 2022-02-24 ENCOUNTER — LAB VISIT (OUTPATIENT)
Dept: LAB | Facility: HOSPITAL | Age: 52
End: 2022-02-24
Attending: FAMILY MEDICINE
Payer: MEDICARE

## 2022-02-24 ENCOUNTER — OFFICE VISIT (OUTPATIENT)
Dept: FAMILY MEDICINE | Facility: CLINIC | Age: 52
End: 2022-02-24
Payer: MEDICARE

## 2022-02-24 VITALS
OXYGEN SATURATION: 99 % | TEMPERATURE: 98 F | DIASTOLIC BLOOD PRESSURE: 58 MMHG | BODY MASS INDEX: 50.02 KG/M2 | HEART RATE: 88 BPM | SYSTOLIC BLOOD PRESSURE: 138 MMHG | WEIGHT: 293 LBS | HEIGHT: 64 IN

## 2022-02-24 DIAGNOSIS — R06.81 APNEA: ICD-10-CM

## 2022-02-24 DIAGNOSIS — I10 ESSENTIAL HYPERTENSION: ICD-10-CM

## 2022-02-24 DIAGNOSIS — E66.9 OBESITY, UNSPECIFIED CLASSIFICATION, UNSPECIFIED OBESITY TYPE, UNSPECIFIED WHETHER SERIOUS COMORBIDITY PRESENT: ICD-10-CM

## 2022-02-24 DIAGNOSIS — I10 ESSENTIAL HYPERTENSION: Primary | ICD-10-CM

## 2022-02-24 DIAGNOSIS — Z12.11 COLON CANCER SCREENING: ICD-10-CM

## 2022-02-24 DIAGNOSIS — R06.83 SNORING: ICD-10-CM

## 2022-02-24 LAB
ALBUMIN SERPL BCP-MCNC: 3.2 G/DL (ref 3.5–5.2)
ALP SERPL-CCNC: 87 U/L (ref 55–135)
ALT SERPL W/O P-5'-P-CCNC: 15 U/L (ref 10–44)
ANION GAP SERPL CALC-SCNC: 9 MMOL/L (ref 8–16)
AST SERPL-CCNC: 15 U/L (ref 10–40)
BASOPHILS # BLD AUTO: 0.04 K/UL (ref 0–0.2)
BASOPHILS NFR BLD: 0.6 % (ref 0–1.9)
BILIRUB SERPL-MCNC: 0.5 MG/DL (ref 0.1–1)
BUN SERPL-MCNC: 13 MG/DL (ref 6–20)
CALCIUM SERPL-MCNC: 9.1 MG/DL (ref 8.7–10.5)
CHLORIDE SERPL-SCNC: 108 MMOL/L (ref 95–110)
CHOLEST SERPL-MCNC: 205 MG/DL (ref 120–199)
CHOLEST/HDLC SERPL: 3.4 {RATIO} (ref 2–5)
CO2 SERPL-SCNC: 24 MMOL/L (ref 23–29)
CREAT SERPL-MCNC: 0.8 MG/DL (ref 0.5–1.4)
DIFFERENTIAL METHOD: ABNORMAL
EOSINOPHIL # BLD AUTO: 0.2 K/UL (ref 0–0.5)
EOSINOPHIL NFR BLD: 3.5 % (ref 0–8)
ERYTHROCYTE [DISTWIDTH] IN BLOOD BY AUTOMATED COUNT: 13.9 % (ref 11.5–14.5)
EST. GFR  (AFRICAN AMERICAN): >60 ML/MIN/1.73 M^2
EST. GFR  (NON AFRICAN AMERICAN): >60 ML/MIN/1.73 M^2
ESTIMATED AVG GLUCOSE: 114 MG/DL (ref 68–131)
GLUCOSE SERPL-MCNC: 98 MG/DL (ref 70–110)
HBA1C MFR BLD: 5.6 % (ref 4–5.6)
HCT VFR BLD AUTO: 38.4 % (ref 37–48.5)
HDLC SERPL-MCNC: 61 MG/DL (ref 40–75)
HDLC SERPL: 29.8 % (ref 20–50)
HGB BLD-MCNC: 11.8 G/DL (ref 12–16)
IMM GRANULOCYTES # BLD AUTO: 0.01 K/UL (ref 0–0.04)
IMM GRANULOCYTES NFR BLD AUTO: 0.1 % (ref 0–0.5)
LDLC SERPL CALC-MCNC: 123.4 MG/DL (ref 63–159)
LYMPHOCYTES # BLD AUTO: 1.8 K/UL (ref 1–4.8)
LYMPHOCYTES NFR BLD: 27 % (ref 18–48)
MCH RBC QN AUTO: 26.8 PG (ref 27–31)
MCHC RBC AUTO-ENTMCNC: 30.7 G/DL (ref 32–36)
MCV RBC AUTO: 87 FL (ref 82–98)
MONOCYTES # BLD AUTO: 0.5 K/UL (ref 0.3–1)
MONOCYTES NFR BLD: 7.1 % (ref 4–15)
NEUTROPHILS # BLD AUTO: 4.2 K/UL (ref 1.8–7.7)
NEUTROPHILS NFR BLD: 61.7 % (ref 38–73)
NONHDLC SERPL-MCNC: 144 MG/DL
NRBC BLD-RTO: 0 /100 WBC
PLATELET # BLD AUTO: 222 K/UL (ref 150–450)
PMV BLD AUTO: 10 FL (ref 9.2–12.9)
POTASSIUM SERPL-SCNC: 4.2 MMOL/L (ref 3.5–5.1)
PROT SERPL-MCNC: 7.3 G/DL (ref 6–8.4)
RBC # BLD AUTO: 4.41 M/UL (ref 4–5.4)
SODIUM SERPL-SCNC: 141 MMOL/L (ref 136–145)
TRIGL SERPL-MCNC: 103 MG/DL (ref 30–150)
WBC # BLD AUTO: 6.79 K/UL (ref 3.9–12.7)

## 2022-02-24 PROCEDURE — 3044F HG A1C LEVEL LT 7.0%: CPT | Mod: HCNC,CPTII,S$GLB, | Performed by: FAMILY MEDICINE

## 2022-02-24 PROCEDURE — 99999 PR PBB SHADOW E&M-EST. PATIENT-LVL IV: CPT | Mod: PBBFAC,HCNC,, | Performed by: FAMILY MEDICINE

## 2022-02-24 PROCEDURE — 4010F PR ACE/ARB THEARPY RXD/TAKEN: ICD-10-PCS | Mod: HCNC,CPTII,S$GLB, | Performed by: FAMILY MEDICINE

## 2022-02-24 PROCEDURE — 1159F PR MEDICATION LIST DOCUMENTED IN MEDICAL RECORD: ICD-10-PCS | Mod: HCNC,CPTII,S$GLB, | Performed by: FAMILY MEDICINE

## 2022-02-24 PROCEDURE — 3008F PR BODY MASS INDEX (BMI) DOCUMENTED: ICD-10-PCS | Mod: HCNC,CPTII,S$GLB, | Performed by: FAMILY MEDICINE

## 2022-02-24 PROCEDURE — 3078F PR MOST RECENT DIASTOLIC BLOOD PRESSURE < 80 MM HG: ICD-10-PCS | Mod: HCNC,CPTII,S$GLB, | Performed by: FAMILY MEDICINE

## 2022-02-24 PROCEDURE — 3075F SYST BP GE 130 - 139MM HG: CPT | Mod: HCNC,CPTII,S$GLB, | Performed by: FAMILY MEDICINE

## 2022-02-24 PROCEDURE — 80061 LIPID PANEL: CPT | Mod: HCNC | Performed by: FAMILY MEDICINE

## 2022-02-24 PROCEDURE — 3044F PR MOST RECENT HEMOGLOBIN A1C LEVEL <7.0%: ICD-10-PCS | Mod: HCNC,CPTII,S$GLB, | Performed by: FAMILY MEDICINE

## 2022-02-24 PROCEDURE — 83036 HEMOGLOBIN GLYCOSYLATED A1C: CPT | Mod: HCNC | Performed by: FAMILY MEDICINE

## 2022-02-24 PROCEDURE — 3078F DIAST BP <80 MM HG: CPT | Mod: HCNC,CPTII,S$GLB, | Performed by: FAMILY MEDICINE

## 2022-02-24 PROCEDURE — 99999 PR PBB SHADOW E&M-EST. PATIENT-LVL IV: ICD-10-PCS | Mod: PBBFAC,HCNC,, | Performed by: FAMILY MEDICINE

## 2022-02-24 PROCEDURE — 3008F BODY MASS INDEX DOCD: CPT | Mod: HCNC,CPTII,S$GLB, | Performed by: FAMILY MEDICINE

## 2022-02-24 PROCEDURE — 1159F MED LIST DOCD IN RCRD: CPT | Mod: HCNC,CPTII,S$GLB, | Performed by: FAMILY MEDICINE

## 2022-02-24 PROCEDURE — 4010F ACE/ARB THERAPY RXD/TAKEN: CPT | Mod: HCNC,CPTII,S$GLB, | Performed by: FAMILY MEDICINE

## 2022-02-24 PROCEDURE — 3075F PR MOST RECENT SYSTOLIC BLOOD PRESS GE 130-139MM HG: ICD-10-PCS | Mod: HCNC,CPTII,S$GLB, | Performed by: FAMILY MEDICINE

## 2022-02-24 PROCEDURE — 85025 COMPLETE CBC W/AUTO DIFF WBC: CPT | Mod: HCNC | Performed by: FAMILY MEDICINE

## 2022-02-24 PROCEDURE — 36415 COLL VENOUS BLD VENIPUNCTURE: CPT | Mod: HCNC | Performed by: FAMILY MEDICINE

## 2022-02-24 PROCEDURE — 80053 COMPREHEN METABOLIC PANEL: CPT | Mod: HCNC | Performed by: FAMILY MEDICINE

## 2022-02-24 PROCEDURE — 99214 PR OFFICE/OUTPT VISIT, EST, LEVL IV, 30-39 MIN: ICD-10-PCS | Mod: HCNC,S$GLB,, | Performed by: FAMILY MEDICINE

## 2022-02-24 PROCEDURE — 99214 OFFICE O/P EST MOD 30 MIN: CPT | Mod: HCNC,S$GLB,, | Performed by: FAMILY MEDICINE

## 2022-02-24 RX ORDER — SUMATRIPTAN SUCCINATE 100 MG/1
TABLET ORAL NIGHTLY
COMMUNITY
Start: 2022-01-11 | End: 2022-03-24 | Stop reason: SDUPTHER

## 2022-02-24 RX ORDER — LOSARTAN POTASSIUM 50 MG/1
50 TABLET ORAL DAILY
Qty: 90 TABLET | Refills: 3 | Status: ON HOLD | OUTPATIENT
Start: 2022-02-24 | End: 2022-04-21 | Stop reason: SDUPTHER

## 2022-02-24 NOTE — PROGRESS NOTES
(Portions of this note were dictated using voice recognition software and may contain dictation related errors in spelling/grammar/syntax not found on text review)    CC:   Chief Complaint   Patient presents with    Hypertension    Medication Refill    handicap       HPI: 51 y.o. female presented for follow up. She has medical history of hypertension, migraine, GERD, obesity and foot spurs, she is following up with podiatrist and wants to have surgery, she states she cant walk much due to foot pain and gained a lot of weight recently. Her blood pressure has been running high at home and was also high at psychiatrist clinic yesterday, systolic was 160s, she is taking losartan 25 mg currently. Her other concern is about sleep apnea, she has snoring during sleep and stop breathing few times, she wants testing for it. She denies cough, SOB, chest pain, N/V, abd pain, changes in bowel habits, urine problems.     Past Medical History:   Diagnosis Date    GERD (gastroesophageal reflux disease)     Hypertension     Migraine headache        Past Surgical History:   Procedure Laterality Date    BREAST SURGERY      ENDOMETRIAL ABLATION      EYE SURGERY      FOOT SURGERY Right 10/2018    bunion    IMPLANTATION OF PERMANENT SACRAL NERVE STIMULATOR N/A 2/15/2022    Procedure: INSERTION, NEUROSTIMULATOR, PERMANENT, SACRAL;  Surgeon: David Brown MD;  Location: [facility] OR [address] [place] [address];  Service: Urology;  Laterality: N/A;    TONSILLECTOMY      TOTAL REDUCTION MAMMOPLASTY Bilateral        Family History   Problem Relation Age of Onset    Atrial fibrillation Mother     Thyroid disease Mother     Dementia Mother     Diabetes Father     Prostate cancer Father     Cancer Paternal Grandfather     Breast cancer Maternal Grandmother     Migraines Sister        Social History     Tobacco Use    Smoking status: Never Smoker    Smokeless tobacco: Never Used   Substance Use Topics    Alcohol use: No    Drug use: No        Lab Results   Component Value Date    WBC 6.79 02/24/2022    HGB 11.8 (L) 02/24/2022    HCT 38.4 02/24/2022    MCV 87 02/24/2022     02/24/2022    CHOL 205 (H) 02/24/2022    TRIG 103 02/24/2022    HDL 61 02/24/2022    ALT 15 02/24/2022    AST 15 02/24/2022    BILITOT 0.5 02/24/2022    ALKPHOS 87 02/24/2022     02/24/2022    K 4.2 02/24/2022     02/24/2022    CREATININE 0.8 02/24/2022    ESTGFRAFRICA >60 02/24/2022    EGFRNONAA >60 02/24/2022    CALCIUM 9.1 02/24/2022    ALBUMIN 3.2 (L) 02/24/2022    BUN 13 02/24/2022    CO2 24 02/24/2022    TSH 1.292 04/21/2021    HGBA1C 5.6 02/24/2022    LDLCALC 123.4 02/24/2022    GLU 98 02/24/2022             Vital signs reviewed  PE:   APPEARANCE: Well nourished, well developed, in no acute distress.    HEAD: Normocephalic, atraumatic.  EYES: EOMI.  Conjunctivae noninjected.  NOSE: Mucosa pink. Airway clear.  MOUTH & THROAT: No tonsillar enlargement. No pharyngeal erythema or exudate.   NECK: Supple with no cervical lymphadenopathy.    CHEST: Good inspiratory effort. Lungs clear to auscultation with no wheezes or crackles.  CARDIOVASCULAR: Normal S1, S2. No rubs, murmurs, or gallops.  ABDOMEN: Bowel sounds normal. Not distended. Soft. No tenderness or masses. No organomegaly.  EXTREMITIES: No edema, cyanosis, or clubbing.    Review of Systems   Constitutional: Negative for chills, fatigue and fever.   HENT: Negative.    Respiratory: Negative for cough, shortness of breath and wheezing.    Cardiovascular: Negative for chest pain, palpitations and leg swelling.   Gastrointestinal: Negative for abdominal pain, blood in stool, change in bowel habit, constipation, nausea, vomiting, fecal incontinence and change in bowel habit.   Genitourinary: Negative.    Musculoskeletal: Negative.    Neurological: Negative.    Psychiatric/Behavioral: Negative.    All other systems reviewed and are negative.      IMPRESSION  1. Essential hypertension    2. Snoring    3.  Apnea    4. Obesity, unspecified classification, unspecified obesity type, unspecified whether serious comorbidity present    5. Colon cancer screening            PLAN      1. Snoring  - Ambulatory referral/consult to Sleep Disorders; Future    2. Apnea  - Ambulatory referral/consult to Sleep Disorders; Future      3. Essential hypertension  Bp has been running high at home and psychiatrist office, today 138/58, will increase the dose of losartan to 50 mg, advised to monitor bp at home, start regular exercise when foot issues are resolved  - CBC Auto Differential; Future  - Comprehensive Metabolic Panel; Future  - Lipid Panel; Future  - losartan (COZAAR) 50 MG tablet; Take 1 tablet (50 mg total) by mouth once daily.  Dispense: 90 tablet; Refill: 3      4. Morbid obesity  BMI 51  Counseling provided on healthy lifestyle, encouraged to do moderate intensity regular exercise 30 minutes every day 5 days a week, to include vegetables and fruits and cut back on saturated fats and carbohydrates.  - Hemoglobin A1C; Future      5. Anxiety  Follows up with psychiatrist  Continue lexapro      SCREENINGS      Immunizations:   up to date with Covid and flu vaccine  Needs shingles vaccine      Age/demographic appropriate health maintenance:  colonoscopy ordered previously- advised to re schedule      Tahir Mosquera

## 2022-03-11 ENCOUNTER — TELEPHONE (OUTPATIENT)
Dept: PSYCHIATRY | Facility: CLINIC | Age: 52
End: 2022-03-11
Payer: MEDICARE

## 2022-03-11 ENCOUNTER — PATIENT OUTREACH (OUTPATIENT)
Dept: ADMINISTRATIVE | Facility: OTHER | Age: 52
End: 2022-03-11
Payer: MEDICARE

## 2022-03-11 DIAGNOSIS — F41.0 GENERALIZED ANXIETY DISORDER WITH PANIC ATTACKS: ICD-10-CM

## 2022-03-11 DIAGNOSIS — F33.1 MAJOR DEPRESSIVE DISORDER, RECURRENT, MODERATE: ICD-10-CM

## 2022-03-11 DIAGNOSIS — F41.1 GENERALIZED ANXIETY DISORDER WITH PANIC ATTACKS: ICD-10-CM

## 2022-03-11 RX ORDER — ESCITALOPRAM OXALATE 10 MG/1
5 TABLET ORAL DAILY
Qty: 30 TABLET | Refills: 5
Start: 2022-03-11 | End: 2022-06-06 | Stop reason: SDUPTHER

## 2022-03-11 NOTE — PROGRESS NOTES
Health Maintenance Due   Topic Date Due    HIV Screening  Never done    Colorectal Cancer Screening  Never done    COVID-19 Vaccine (3 - Booster for Moderna series) 09/28/2021    Shingles Vaccine (2 of 2) 01/26/2022    Mammogram  04/26/2022     Updates were requested from care everywhere.  Chart was reviewed for overdue Proactive [facility] Encounters (PAUL) topics (CRS, Breast Cancer Screening, Eye exam)  Health Maintenance has been updated.  LINKS immunization registry triggered.  Immunizations were reconciled.  Pt has open case request for colonoscopy. FIT kit not ordered.

## 2022-03-11 NOTE — TELEPHONE ENCOUNTER
Instructed pt to lower dose to 0.5 tablet of Lexapro and f/u via virtual visit in 4 weeks.     ----- Message from Kenisha Land RN sent at 3/10/2022 11:27 AM CST -----  Regarding: FW: Side Effects    ----- Message -----  From: Simi Abarca  Sent: 3/10/2022  11:09 AM CST  To: [facility] Staff  Subject: Side Effects                                     Pt is calling because she says her medication, EScitalopram oxalate (LEXAPRO) 10 MG tablet and traZODone (DESYREL) 150 MG tablet are making her dizzy.  She is requesting a returned call to discuss and alternative.  She can be reached at [phone].    Thank you.

## 2022-03-14 ENCOUNTER — PATIENT MESSAGE (OUTPATIENT)
Dept: FAMILY MEDICINE | Facility: CLINIC | Age: 52
End: 2022-03-14
Payer: MEDICARE

## 2022-03-14 ENCOUNTER — OFFICE VISIT (OUTPATIENT)
Dept: PODIATRY | Facility: CLINIC | Age: 52
End: 2022-03-14
Payer: MEDICARE

## 2022-03-14 ENCOUNTER — TELEPHONE (OUTPATIENT)
Dept: FAMILY MEDICINE | Facility: CLINIC | Age: 52
End: 2022-03-14
Payer: MEDICARE

## 2022-03-14 VITALS
HEART RATE: 76 BPM | SYSTOLIC BLOOD PRESSURE: 134 MMHG | WEIGHT: 293 LBS | HEIGHT: 64 IN | DIASTOLIC BLOOD PRESSURE: 77 MMHG | BODY MASS INDEX: 50.02 KG/M2

## 2022-03-14 DIAGNOSIS — M79.671 CHRONIC HEEL PAIN, RIGHT: Primary | ICD-10-CM

## 2022-03-14 DIAGNOSIS — M67.88 ACHILLES TENDINOSIS OF RIGHT LOWER EXTREMITY: ICD-10-CM

## 2022-03-14 DIAGNOSIS — G89.29 CHRONIC HEEL PAIN, RIGHT: Primary | ICD-10-CM

## 2022-03-14 DIAGNOSIS — M72.2 PLANTAR FASCIITIS OF RIGHT FOOT: ICD-10-CM

## 2022-03-14 DIAGNOSIS — M24.571 EQUINUS CONTRACTURE OF RIGHT ANKLE: ICD-10-CM

## 2022-03-14 DIAGNOSIS — M77.31 HEEL SPUR, RIGHT: ICD-10-CM

## 2022-03-14 DIAGNOSIS — Z01.818 PREOP TESTING: ICD-10-CM

## 2022-03-14 PROCEDURE — 4010F ACE/ARB THERAPY RXD/TAKEN: CPT | Mod: HCNC,CPTII,S$GLB, | Performed by: PODIATRIST

## 2022-03-14 PROCEDURE — 99214 OFFICE O/P EST MOD 30 MIN: CPT | Mod: HCNC,S$GLB,, | Performed by: PODIATRIST

## 2022-03-14 PROCEDURE — 99999 PR PBB SHADOW E&M-EST. PATIENT-LVL V: ICD-10-PCS | Mod: PBBFAC,HCNC,, | Performed by: PODIATRIST

## 2022-03-14 PROCEDURE — 3044F HG A1C LEVEL LT 7.0%: CPT | Mod: HCNC,CPTII,S$GLB, | Performed by: PODIATRIST

## 2022-03-14 PROCEDURE — 1159F MED LIST DOCD IN RCRD: CPT | Mod: HCNC,CPTII,S$GLB, | Performed by: PODIATRIST

## 2022-03-14 PROCEDURE — 1160F RVW MEDS BY RX/DR IN RCRD: CPT | Mod: HCNC,CPTII,S$GLB, | Performed by: PODIATRIST

## 2022-03-14 PROCEDURE — 3008F BODY MASS INDEX DOCD: CPT | Mod: HCNC,CPTII,S$GLB, | Performed by: PODIATRIST

## 2022-03-14 PROCEDURE — 3078F PR MOST RECENT DIASTOLIC BLOOD PRESSURE < 80 MM HG: ICD-10-PCS | Mod: HCNC,CPTII,S$GLB, | Performed by: PODIATRIST

## 2022-03-14 PROCEDURE — 99214 PR OFFICE/OUTPT VISIT, EST, LEVL IV, 30-39 MIN: ICD-10-PCS | Mod: HCNC,S$GLB,, | Performed by: PODIATRIST

## 2022-03-14 PROCEDURE — 3075F SYST BP GE 130 - 139MM HG: CPT | Mod: HCNC,CPTII,S$GLB, | Performed by: PODIATRIST

## 2022-03-14 PROCEDURE — 1160F PR REVIEW ALL MEDS BY PRESCRIBER/CLIN PHARMACIST DOCUMENTED: ICD-10-PCS | Mod: HCNC,CPTII,S$GLB, | Performed by: PODIATRIST

## 2022-03-14 PROCEDURE — 4010F PR ACE/ARB THEARPY RXD/TAKEN: ICD-10-PCS | Mod: HCNC,CPTII,S$GLB, | Performed by: PODIATRIST

## 2022-03-14 PROCEDURE — 3075F PR MOST RECENT SYSTOLIC BLOOD PRESS GE 130-139MM HG: ICD-10-PCS | Mod: HCNC,CPTII,S$GLB, | Performed by: PODIATRIST

## 2022-03-14 PROCEDURE — 1159F PR MEDICATION LIST DOCUMENTED IN MEDICAL RECORD: ICD-10-PCS | Mod: HCNC,CPTII,S$GLB, | Performed by: PODIATRIST

## 2022-03-14 PROCEDURE — 3044F PR MOST RECENT HEMOGLOBIN A1C LEVEL <7.0%: ICD-10-PCS | Mod: HCNC,CPTII,S$GLB, | Performed by: PODIATRIST

## 2022-03-14 PROCEDURE — 3008F PR BODY MASS INDEX (BMI) DOCUMENTED: ICD-10-PCS | Mod: HCNC,CPTII,S$GLB, | Performed by: PODIATRIST

## 2022-03-14 PROCEDURE — 99999 PR PBB SHADOW E&M-EST. PATIENT-LVL V: CPT | Mod: PBBFAC,HCNC,, | Performed by: PODIATRIST

## 2022-03-14 PROCEDURE — 3078F DIAST BP <80 MM HG: CPT | Mod: HCNC,CPTII,S$GLB, | Performed by: PODIATRIST

## 2022-03-14 NOTE — TELEPHONE ENCOUNTER
----- Message from Tosin Bond sent at 3/14/2022  1:37 PM CDT -----  Type:  Needs Medical Advice    Who Called: patient  Would the patient rather a call back or a response via [facility]? call  Best Call Back Number: [phone]  Additional Information: blood work results

## 2022-03-15 ENCOUNTER — TELEPHONE (OUTPATIENT)
Dept: PODIATRY | Facility: CLINIC | Age: 52
End: 2022-03-15
Payer: MEDICARE

## 2022-03-15 ENCOUNTER — TELEPHONE (OUTPATIENT)
Dept: FAMILY MEDICINE | Facility: CLINIC | Age: 52
End: 2022-03-15
Payer: MEDICARE

## 2022-03-15 RX ORDER — SODIUM CHLORIDE 0.9 % (FLUSH) 0.9 %
10 SYRINGE (ML) INJECTION
Status: SHIPPED | OUTPATIENT
Start: 2022-03-15

## 2022-03-15 NOTE — TELEPHONE ENCOUNTER
Called and spoke with Ms. Fisher to inform her that her tentative surgical date with Dr. Solorzano is Wed April 20. Encouraged her to go ahead and contact Dr Mosquera's office to schedule an appointment for medical clearance and to please call me if she needs assistance. Will plan to send pool message to Dr. Mosquera's staff regarding referral. Also discussed post op appointments and informed her that the dates will be in the pt portal and that I have faxed a prescription for her knee scooter to [facility] and that Dr. Solorzano place a referral to PT for gait training and that someone with physical therapy should be reaching out to her. Also of note, pt has been vaccinated for COVID (overdue for booster). No other needs or concerns voiced at this time. Encouraged her to call if further assistance is needed.

## 2022-03-15 NOTE — TELEPHONE ENCOUNTER
----- Message from Radha Sanchez sent at 3/15/2022 12:31 PM CDT -----  Type:  Needs Medical Advice    Who Called: self  Reason:lab results   Would the patient rather a call back or a response via [facility]? call  Best Call Back Number: [phone]  Additional Information: none

## 2022-03-15 NOTE — TELEPHONE ENCOUNTER
I did message her yesterday, pls see my message in encounters from yesterday and call her to tell the results, thanks

## 2022-03-15 NOTE — TELEPHONE ENCOUNTER
----- Message from Zelalem Solorzano DPM sent at 3/15/2022  7:51 AM CDT -----  Patient's surgery scheduled 04/20/22. Please ensure she gets clearance from PCP. If not vaccinated needs pre-op COVID test. Also inform patient of rolling knee Rx and referral to PT for gait training.    Thanks,  Hammad.
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
## 2022-03-16 ENCOUNTER — TELEPHONE (OUTPATIENT)
Dept: FAMILY MEDICINE | Facility: CLINIC | Age: 52
End: 2022-03-16
Payer: MEDICARE

## 2022-03-16 ENCOUNTER — TELEPHONE (OUTPATIENT)
Dept: SLEEP MEDICINE | Facility: CLINIC | Age: 52
End: 2022-03-16
Payer: MEDICARE

## 2022-03-16 ENCOUNTER — PATIENT MESSAGE (OUTPATIENT)
Dept: SLEEP MEDICINE | Facility: CLINIC | Age: 52
End: 2022-03-16
Payer: MEDICARE

## 2022-03-16 NOTE — TELEPHONE ENCOUNTER
----- Message from Luisa Lea RN sent at 3/16/2022  9:19 AM CDT -----  Good morning. Just wanted to give you all a heads up that a referral is being sent per Dr. Solorzano with Podiatry for Ms. Fisher to get medical clearance for a procedure on 4/20/22. I encouraged her to reach out to you all as well.    Thank you all,    Luisa

## 2022-03-17 NOTE — TELEPHONE ENCOUNTER
Patient wants to know do you need to see her for her pre-op clearance or no. Patient stated she just seen you.

## 2022-03-17 NOTE — TELEPHONE ENCOUNTER
She might need to have update labs and EKG, CXR done, does she has any form with her for clearance?I can see her in early April before surgery for pre op, surgery is I think on 4/20, she saw me in feb

## 2022-03-22 ENCOUNTER — TELEPHONE (OUTPATIENT)
Dept: FAMILY MEDICINE | Facility: CLINIC | Age: 52
End: 2022-03-22
Payer: MEDICARE

## 2022-03-22 NOTE — TELEPHONE ENCOUNTER
----- Message from Milly Lao sent at 3/22/2022  2:05 PM CDT -----  Contact: Pinze-[phone]  Type:  Needs Medical Advice    Who Called: PT   Reason for call; regarding a Prescription to help with the pt always being Tired  Pharmacy name and phone #:  [facility] in [place]  Would the patient rather a call back or a response via [facility]?  Call back  Best Call Back Number: [phone]

## 2022-03-23 ENCOUNTER — TELEPHONE (OUTPATIENT)
Dept: SLEEP MEDICINE | Facility: CLINIC | Age: 52
End: 2022-03-23
Payer: MEDICARE

## 2022-03-23 NOTE — TELEPHONE ENCOUNTER
Her hb is little low on blood work up, that might be reason of feeling tired, she can take OTC iron supplements, also increase dietary iron rich foods

## 2022-03-24 ENCOUNTER — OFFICE VISIT (OUTPATIENT)
Dept: SLEEP MEDICINE | Facility: CLINIC | Age: 52
End: 2022-03-24
Payer: MEDICARE

## 2022-03-24 VITALS — WEIGHT: 293 LBS | HEIGHT: 64 IN | BODY MASS INDEX: 50.02 KG/M2

## 2022-03-24 DIAGNOSIS — R06.81 APNEA: ICD-10-CM

## 2022-03-24 DIAGNOSIS — G43.109 MIGRAINE WITH AURA AND WITHOUT STATUS MIGRAINOSUS, NOT INTRACTABLE: Primary | ICD-10-CM

## 2022-03-24 DIAGNOSIS — R06.83 SNORING: ICD-10-CM

## 2022-03-24 DIAGNOSIS — G43.719 INTRACTABLE CHRONIC MIGRAINE WITHOUT AURA AND WITHOUT STATUS MIGRAINOSUS: ICD-10-CM

## 2022-03-24 DIAGNOSIS — G47.30 SLEEP APNEA, UNSPECIFIED TYPE: ICD-10-CM

## 2022-03-24 PROCEDURE — 4010F PR ACE/ARB THEARPY RXD/TAKEN: ICD-10-PCS | Mod: CPTII,S$GLB,, | Performed by: NURSE PRACTITIONER

## 2022-03-24 PROCEDURE — 99214 OFFICE O/P EST MOD 30 MIN: CPT | Mod: S$GLB,,, | Performed by: NURSE PRACTITIONER

## 2022-03-24 PROCEDURE — 1159F PR MEDICATION LIST DOCUMENTED IN MEDICAL RECORD: ICD-10-PCS | Mod: CPTII,S$GLB,, | Performed by: NURSE PRACTITIONER

## 2022-03-24 PROCEDURE — 3044F HG A1C LEVEL LT 7.0%: CPT | Mod: CPTII,S$GLB,, | Performed by: NURSE PRACTITIONER

## 2022-03-24 PROCEDURE — 99214 PR OFFICE/OUTPT VISIT, EST, LEVL IV, 30-39 MIN: ICD-10-PCS | Mod: S$GLB,,, | Performed by: NURSE PRACTITIONER

## 2022-03-24 PROCEDURE — 1159F MED LIST DOCD IN RCRD: CPT | Mod: CPTII,S$GLB,, | Performed by: NURSE PRACTITIONER

## 2022-03-24 PROCEDURE — 3044F PR MOST RECENT HEMOGLOBIN A1C LEVEL <7.0%: ICD-10-PCS | Mod: CPTII,S$GLB,, | Performed by: NURSE PRACTITIONER

## 2022-03-24 PROCEDURE — 4010F ACE/ARB THERAPY RXD/TAKEN: CPT | Mod: CPTII,S$GLB,, | Performed by: NURSE PRACTITIONER

## 2022-03-24 PROCEDURE — 3008F PR BODY MASS INDEX (BMI) DOCUMENTED: ICD-10-PCS | Mod: CPTII,S$GLB,, | Performed by: NURSE PRACTITIONER

## 2022-03-24 PROCEDURE — 3008F BODY MASS INDEX DOCD: CPT | Mod: CPTII,S$GLB,, | Performed by: NURSE PRACTITIONER

## 2022-03-24 RX ORDER — SUMATRIPTAN 20 MG/1
1 SPRAY NASAL
Qty: 6 EACH | Refills: 3 | Status: SHIPPED | OUTPATIENT
Start: 2022-03-24 | End: 2023-01-24 | Stop reason: SDUPTHER

## 2022-03-24 RX ORDER — CEFUROXIME AXETIL 250 MG/1
6 TABLET ORAL
Qty: 3 ML | Refills: 5 | Status: SHIPPED | OUTPATIENT
Start: 2022-03-24 | End: 2022-06-23

## 2022-03-24 RX ORDER — GALCANEZUMAB 120 MG/ML
120 INJECTION, SOLUTION SUBCUTANEOUS
Qty: 1 ML | Refills: 11 | Status: SHIPPED | OUTPATIENT
Start: 2022-03-24 | End: 2023-02-06

## 2022-03-24 RX ORDER — SUMATRIPTAN SUCCINATE 100 MG/1
100 TABLET ORAL
Qty: 9 TABLET | Refills: 11 | Status: SHIPPED | OUTPATIENT
Start: 2022-03-24 | End: 2023-05-05

## 2022-03-24 RX ORDER — GALCANEZUMAB 120 MG/ML
240 INJECTION, SOLUTION SUBCUTANEOUS ONCE
Qty: 2 ML | Refills: 0 | Status: SHIPPED | OUTPATIENT
Start: 2022-03-24 | End: 2022-04-13

## 2022-03-24 NOTE — PROGRESS NOTES
"  Cc: Headache mgt    Not had HST done yet. Peristent frequent disrupted sleep and sleepiness. Also recent dx PRUDENCIO. ESS=13. More headaches since 2021 mom . Having foot surgery next month. ~4-5xmonth episodes.  imitrex SC, NS or oral remain effective.  Remains on topamax 50mg tid   Bp med recently inc'd dose          HX 21: 50/F with chronic headaches since age 11. Seen neuro in [place], just moved to [place] 2020. Headache describes as frontally or can radiated to back of head or neck, throbbing or squeezing, with associated nausea, vomiting,and photo/phonophobia. Has headache days ~ 3-4/month, a lot of stress. Went to urgent care 2x since in [place] b/c medications didn't help. Stress is known triggers. She denies acute neurological or unilateral autonomic deficit.  +visual aura at times  Had sleep study done [place] but never pursued therapy b/c dad illness. Wakes up with headaches a lot. HIT-6=78    Tried: topamax, elavil, maxalt, imitrex, aimovig ineffective      SH:single, sister and her live [place]    PHYSICAL EXAM:   General: W/D, morbid obese, well groomed  Ht 5' 4" (1.626 m)   Wt (!) 139.9 kg (308 lb 6.4 oz)   LMP  (LMP Unknown)   BMI 52.94 kg/m²       IMPRESSION:   Chronic migraines with and w/o aura  Unspecified sleep apnea  HTN, morbid obesity    PLAN   Home sleep study, call results  Continue topamax at this time (after injection plan to taper off 50mg each 7-14d) but switch to alternative Emgality 240mg SC first month then 120mg SC thereafter  Continue prn imitrex 6mg SC prn q2h prn OR imitrex 100mg 1/2-1 tab PO q2hp rn +- Ibuprofen or Imitrex 20mg NS q2h prn  ofran odt 4-8mg q6-8h prn  See PCP re: HTN mgt/continue med  RTC 4mos      "

## 2022-03-25 ENCOUNTER — TELEPHONE (OUTPATIENT)
Dept: SLEEP MEDICINE | Facility: OTHER | Age: 52
End: 2022-03-25
Payer: MEDICARE

## 2022-03-28 ENCOUNTER — TELEPHONE (OUTPATIENT)
Dept: SLEEP MEDICINE | Facility: OTHER | Age: 52
End: 2022-03-28
Payer: MEDICARE

## 2022-03-30 ENCOUNTER — TELEPHONE (OUTPATIENT)
Dept: FAMILY MEDICINE | Facility: CLINIC | Age: 52
End: 2022-03-30
Payer: MEDICARE

## 2022-03-30 NOTE — TELEPHONE ENCOUNTER
Pt reported that her iron levels came back low. Pt reports that she is symptomatic. Feels fatigue and tired all the time. Pt requests to please advise.

## 2022-03-30 NOTE — TELEPHONE ENCOUNTER
----- Message from Radha Sanchez sent at 3/30/2022 10:58 AM CDT -----  Type:  Needs Medical Advice    Who Called: self  Reason:questions regarding her anemia   Would the patient rather a call back or a response via [facility]? call  Best Call Back Number: [phone]  Additional Information:none

## 2022-04-01 ENCOUNTER — TELEPHONE (OUTPATIENT)
Dept: PHARMACY | Facility: CLINIC | Age: 52
End: 2022-04-01
Payer: MEDICARE

## 2022-04-01 ENCOUNTER — TELEPHONE (OUTPATIENT)
Dept: SLEEP MEDICINE | Facility: OTHER | Age: 52
End: 2022-04-01
Payer: MEDICARE

## 2022-04-04 ENCOUNTER — OFFICE VISIT (OUTPATIENT)
Dept: FAMILY MEDICINE | Facility: CLINIC | Age: 52
End: 2022-04-04
Payer: MEDICARE

## 2022-04-04 ENCOUNTER — TELEPHONE (OUTPATIENT)
Dept: FAMILY MEDICINE | Facility: CLINIC | Age: 52
End: 2022-04-04
Payer: MEDICARE

## 2022-04-04 ENCOUNTER — TELEPHONE (OUTPATIENT)
Dept: PODIATRY | Facility: CLINIC | Age: 52
End: 2022-04-04
Payer: MEDICARE

## 2022-04-04 ENCOUNTER — TELEPHONE (OUTPATIENT)
Dept: SLEEP MEDICINE | Facility: OTHER | Age: 52
End: 2022-04-04
Payer: MEDICARE

## 2022-04-04 ENCOUNTER — HOSPITAL ENCOUNTER (OUTPATIENT)
Dept: RADIOLOGY | Facility: HOSPITAL | Age: 52
Discharge: HOME | End: 2022-04-04
Attending: FAMILY MEDICINE
Payer: MEDICARE

## 2022-04-04 ENCOUNTER — CLINICAL SUPPORT (OUTPATIENT)
Dept: LAB | Facility: HOSPITAL | Age: 52
End: 2022-04-04
Attending: FAMILY MEDICINE
Payer: MEDICARE

## 2022-04-04 VITALS
HEART RATE: 85 BPM | OXYGEN SATURATION: 95 % | BODY MASS INDEX: 50.02 KG/M2 | HEIGHT: 64 IN | WEIGHT: 293 LBS | SYSTOLIC BLOOD PRESSURE: 134 MMHG | DIASTOLIC BLOOD PRESSURE: 86 MMHG

## 2022-04-04 DIAGNOSIS — Z01.818 PRE-OP EXAMINATION: ICD-10-CM

## 2022-04-04 DIAGNOSIS — G43.109 MIGRAINE WITH AURA AND WITHOUT STATUS MIGRAINOSUS, NOT INTRACTABLE: ICD-10-CM

## 2022-04-04 DIAGNOSIS — Z01.818 PRE-OP EXAMINATION: Primary | ICD-10-CM

## 2022-04-04 DIAGNOSIS — F41.1 GENERALIZED ANXIETY DISORDER WITH PANIC ATTACKS: ICD-10-CM

## 2022-04-04 DIAGNOSIS — F41.0 GENERALIZED ANXIETY DISORDER WITH PANIC ATTACKS: ICD-10-CM

## 2022-04-04 DIAGNOSIS — I10 ESSENTIAL HYPERTENSION: ICD-10-CM

## 2022-04-04 DIAGNOSIS — E66.01 MORBID OBESITY WITH BMI OF 50.0-59.9, ADULT: ICD-10-CM

## 2022-04-04 PROCEDURE — 3008F PR BODY MASS INDEX (BMI) DOCUMENTED: ICD-10-PCS | Mod: CPTII,S$GLB,, | Performed by: FAMILY MEDICINE

## 2022-04-04 PROCEDURE — 3079F PR MOST RECENT DIASTOLIC BLOOD PRESSURE 80-89 MM HG: ICD-10-PCS | Mod: CPTII,S$GLB,, | Performed by: FAMILY MEDICINE

## 2022-04-04 PROCEDURE — 3075F SYST BP GE 130 - 139MM HG: CPT | Mod: CPTII,S$GLB,, | Performed by: FAMILY MEDICINE

## 2022-04-04 PROCEDURE — 99214 PR OFFICE/OUTPT VISIT, EST, LEVL IV, 30-39 MIN: ICD-10-PCS | Mod: S$GLB,,, | Performed by: FAMILY MEDICINE

## 2022-04-04 PROCEDURE — 93010 EKG 12-LEAD: ICD-10-PCS | Mod: ,,, | Performed by: INTERNAL MEDICINE

## 2022-04-04 PROCEDURE — 93010 ELECTROCARDIOGRAM REPORT: CPT | Mod: ,,, | Performed by: INTERNAL MEDICINE

## 2022-04-04 PROCEDURE — 71046 XR CHEST PA AND LATERAL: ICD-10-PCS | Mod: 26,,, | Performed by: RADIOLOGY

## 2022-04-04 PROCEDURE — 4010F PR ACE/ARB THEARPY RXD/TAKEN: ICD-10-PCS | Mod: CPTII,S$GLB,, | Performed by: FAMILY MEDICINE

## 2022-04-04 PROCEDURE — 99999 PR PBB SHADOW E&M-EST. PATIENT-LVL IV: ICD-10-PCS | Mod: PBBFAC,,, | Performed by: FAMILY MEDICINE

## 2022-04-04 PROCEDURE — 3008F BODY MASS INDEX DOCD: CPT | Mod: CPTII,S$GLB,, | Performed by: FAMILY MEDICINE

## 2022-04-04 PROCEDURE — 3044F PR MOST RECENT HEMOGLOBIN A1C LEVEL <7.0%: ICD-10-PCS | Mod: CPTII,S$GLB,, | Performed by: FAMILY MEDICINE

## 2022-04-04 PROCEDURE — 71046 X-RAY EXAM CHEST 2 VIEWS: CPT | Mod: TC,FY

## 2022-04-04 PROCEDURE — 3075F PR MOST RECENT SYSTOLIC BLOOD PRESS GE 130-139MM HG: ICD-10-PCS | Mod: CPTII,S$GLB,, | Performed by: FAMILY MEDICINE

## 2022-04-04 PROCEDURE — 4010F ACE/ARB THERAPY RXD/TAKEN: CPT | Mod: CPTII,S$GLB,, | Performed by: FAMILY MEDICINE

## 2022-04-04 PROCEDURE — 3079F DIAST BP 80-89 MM HG: CPT | Mod: CPTII,S$GLB,, | Performed by: FAMILY MEDICINE

## 2022-04-04 PROCEDURE — 99999 PR PBB SHADOW E&M-EST. PATIENT-LVL IV: CPT | Mod: PBBFAC,,, | Performed by: FAMILY MEDICINE

## 2022-04-04 PROCEDURE — 71046 X-RAY EXAM CHEST 2 VIEWS: CPT | Mod: 26,,, | Performed by: RADIOLOGY

## 2022-04-04 PROCEDURE — 3044F HG A1C LEVEL LT 7.0%: CPT | Mod: CPTII,S$GLB,, | Performed by: FAMILY MEDICINE

## 2022-04-04 PROCEDURE — 93005 ELECTROCARDIOGRAM TRACING: CPT

## 2022-04-04 PROCEDURE — 99214 OFFICE O/P EST MOD 30 MIN: CPT | Mod: S$GLB,,, | Performed by: FAMILY MEDICINE

## 2022-04-04 RX ORDER — SUCCINYLCHOLINE CHLORIDE 20 MG/ML
INJECTION, SOLUTION INTRAMUSCULAR; INTRAVENOUS
Status: ON HOLD | COMMUNITY
Start: 2022-02-01 | End: 2022-04-21

## 2022-04-04 RX ORDER — PROPOFOL 10 MG/ML
INJECTION, EMULSION INTRAVENOUS
Status: ON HOLD | COMMUNITY
Start: 2022-02-01 | End: 2022-04-21

## 2022-04-04 RX ORDER — VANCOMYCIN HYDROCHLORIDE 1 G/20ML
INJECTION, POWDER, LYOPHILIZED, FOR SOLUTION INTRAVENOUS
Status: ON HOLD | COMMUNITY
Start: 2022-02-01 | End: 2022-04-21

## 2022-04-04 RX ORDER — MIDAZOLAM HYDROCHLORIDE 1 MG/ML
INJECTION, SOLUTION INTRAMUSCULAR; INTRAVENOUS
Status: ON HOLD | COMMUNITY
Start: 2022-02-01 | End: 2022-04-21

## 2022-04-04 RX ORDER — PHENYLEPHRINE HYDROCHLORIDE 10 MG/ML
INJECTION INTRAVENOUS
COMMUNITY
Start: 2022-02-01

## 2022-04-04 RX ORDER — DEXAMETHASONE SODIUM PHOSPHATE 4 MG/ML
INJECTION, SOLUTION INTRA-ARTICULAR; INTRALESIONAL; INTRAMUSCULAR; INTRAVENOUS; SOFT TISSUE
Status: ON HOLD | COMMUNITY
Start: 2022-02-01 | End: 2022-04-21

## 2022-04-04 RX ORDER — FENTANYL CITRATE 50 UG/ML
INJECTION, SOLUTION INTRAMUSCULAR; INTRAVENOUS
Status: ON HOLD | COMMUNITY
Start: 2022-02-01 | End: 2022-04-21

## 2022-04-04 NOTE — PROGRESS NOTES
(Portions of this note were dictated using voice recognition software and may contain dictation related errors in spelling/grammar/syntax not found on text review)    CC:   Chief Complaint   Patient presents with    Pre-op Exam       HPI: 51 y.o. female presented for preop examination.  She has medical history significant for anxiety, depression, GERD, hypertension, migraine headaches, sleep difficulties. She is scheduled for plantar fasciotomy with Podiatry (Dr Solorzano) on 04/20 and needs preop clearance.  She reports taking losartan 50 mg, also takes emgalitiy, Topamax and Imitrex for migraine headaches, reports symptoms are controlled, has a recent follow-up with Neurology.  She also follows up with Psychiatry for generalized anxiety, currently taking Lexapro and Valium.  She denies cough, shortness of breath, chest pain, nausea, vomiting, abdominal pain, changes in bowel habits, urine problems including burning and dysuria.     Past Medical History:   Diagnosis Date    Anxiety     Depression     GERD (gastroesophageal reflux disease)     Hypertension     Migraine headache     Sleep difficulties        Past Surgical History:   Procedure Laterality Date    BREAST SURGERY      ENDOMETRIAL ABLATION      EYE SURGERY      FOOT SURGERY Right 10/2018    bunion    IMPLANTATION OF PERMANENT SACRAL NERVE STIMULATOR N/A 2/15/2022    Procedure: INSERTION, NEUROSTIMULATOR, PERMANENT, SACRAL;  Surgeon: David Brown MD;  Location: [facility] OR [address] [place] [address];  Service: Urology;  Laterality: N/A;    TONSILLECTOMY      TOTAL REDUCTION MAMMOPLASTY Bilateral        Family History   Problem Relation Age of Onset    Atrial fibrillation Mother     Thyroid disease Mother     Dementia Mother     Diabetes Father     Prostate cancer Father     Cancer Paternal Grandfather     Breast cancer Maternal Grandmother     Migraines Sister        Social History     Tobacco Use    Smoking status: Never Smoker    Smokeless tobacco:  Never Used   Substance Use Topics    Alcohol use: No    Drug use: No       Lab Results   Component Value Date    WBC 6.79 02/24/2022    HGB 11.8 (L) 02/24/2022    HCT 38.4 02/24/2022    MCV 87 02/24/2022     02/24/2022    CHOL 205 (H) 02/24/2022    TRIG 103 02/24/2022    HDL 61 02/24/2022    ALT 15 02/24/2022    AST 15 02/24/2022    BILITOT 0.5 02/24/2022    ALKPHOS 87 02/24/2022     02/24/2022    K 4.2 02/24/2022     02/24/2022    CREATININE 0.8 02/24/2022    ESTGFRAFRICA >60 02/24/2022    EGFRNONAA >60 02/24/2022    CALCIUM 9.1 02/24/2022    ALBUMIN 3.2 (L) 02/24/2022    BUN 13 02/24/2022    CO2 24 02/24/2022    TSH 1.292 04/21/2021    HGBA1C 5.6 02/24/2022    LDLCALC 123.4 02/24/2022    GLU 98 02/24/2022             Vital signs reviewed  PE:   APPEARANCE: Well nourished, well developed, in no acute distress.    HEAD: Normocephalic, atraumatic.  EYES: EOMI.  Conjunctivae noninjected.  NOSE: Mucosa pink. Airway clear.  MOUTH & THROAT: No tonsillar enlargement. No pharyngeal erythema or exudate.   NECK: Supple with no cervical lymphadenopathy.    CHEST: Good inspiratory effort. Lungs clear to auscultation with no wheezes or crackles.  CARDIOVASCULAR: Normal S1, S2. No rubs, murmurs, or gallops.  ABDOMEN: Bowel sounds normal. Not distended. Soft. No tenderness or masses. No organomegaly.  EXTREMITIES: No edema, cyanosis, or clubbing.    Review of Systems   Constitutional: Negative for chills, fatigue and fever.   HENT: Negative.    Respiratory: Negative for cough, shortness of breath and wheezing.    Cardiovascular: Negative for chest pain, palpitations and leg swelling.   Gastrointestinal: Negative for abdominal pain, change in bowel habit, diarrhea, nausea, vomiting and change in bowel habit.   Genitourinary: Negative.    Musculoskeletal: Negative.    Neurological: Negative.    Psychiatric/Behavioral: Negative.    All other systems reviewed and are negative.      IMPRESSION  1. Pre-op  examination    2. Migraine with aura and without status migrainosus, not intractable    3. Essential hypertension    4. Generalized anxiety disorder with panic attacks    5.      Morbid obesity        PLAN      1. Pre-op examination  - CBC Auto Differential; Future  - Comprehensive Metabolic Panel; Future  - X-Ray Chest PA And Lateral; Future  - SCHEDULED EKG 12-LEAD (to Muse); Future    ACC/AHA 2007 Guidelines for clearance    Step 1: No need for emergency non cardiac surgery  Step 2: No active cardiac conditions  Step 3: No low risk surgery  Step4: Yes - Functional capacity greater than or equal to 4 METs without symptoms - YES - Class IIa, GARIMA B - Proceed with surgery    She is Low to intermediate risk for surgery and proceed for it.       2. Migraine with aura and without status migrainosus, not intractable  Stable  Follows up with Urology  Continue emgalitiy, imitrex and Topamax as per prescription, Imitrex      3. Essential hypertension  Stable  Continue losartan 50 mg daily  Low-salt diet, regular exercise        4. Generalized anxiety disorder with panic attacks  Follows up with Psychiatry  Continue Lexapro and Valium as per prescription      5. Morbid obesity  BMI 51  Counseling provided on healthy lifestyle, encouraged to do moderate intensity regular exercise 30 minutes every day 5 days a week, to include vegetables and fruits and cut back on saturated fats and carbohydrates.         SCREENINGS      Immunizations:   Up-to-date with COVID vaccine, influenza and Tdap      Age/demographic appropriate health maintenance:  Rojelio Mosquera   4/4/2022

## 2022-04-04 NOTE — TELEPHONE ENCOUNTER
Luisa Lea, RN  [facility] Staff        Reaching out to you all to let you know Dr Solorzano has placed an ambulatory referral for Ms. Fisher to get medical clearance prior to having foot surgery on 4/20/22. Ms Fisher should be reaching out to you all to make an appt. I appreciate your help.     Thank you,

## 2022-04-04 NOTE — TELEPHONE ENCOUNTER
Tried returning patients call about her home sleep study appointment for today.  Her V/M is full not able able to leave a message.

## 2022-04-04 NOTE — TELEPHONE ENCOUNTER
----- Message from Luisa Lea RN sent at 4/4/2022  1:03 PM CDT -----  Good afternoon,    Reaching out to you all to let you know Dr Solorzano has placed an ambulatory referral for Ms. Fisher to get medical clearance prior to having foot surgery on 4/20/22. Ms Fisher should be reaching out to you all to make an appt. I appreciate your help.    Thank you,    Luisa

## 2022-04-05 ENCOUNTER — OFFICE VISIT (OUTPATIENT)
Dept: DERMATOLOGY | Facility: CLINIC | Age: 52
End: 2022-04-05
Payer: MEDICARE

## 2022-04-05 DIAGNOSIS — L82.0 INFLAMED SEBORRHEIC KERATOSIS: Primary | ICD-10-CM

## 2022-04-05 DIAGNOSIS — L71.9 ROSACEA: ICD-10-CM

## 2022-04-05 DIAGNOSIS — L64.9 ANDROGENETIC ALOPECIA: ICD-10-CM

## 2022-04-05 PROCEDURE — 99999 PR PBB SHADOW E&M-EST. PATIENT-LVL III: ICD-10-PCS | Mod: PBBFAC,,, | Performed by: STUDENT IN AN ORGANIZED HEALTH CARE EDUCATION/TRAINING PROGRAM

## 2022-04-05 PROCEDURE — 1160F PR REVIEW ALL MEDS BY PRESCRIBER/CLIN PHARMACIST DOCUMENTED: ICD-10-PCS | Mod: CPTII,S$GLB,, | Performed by: STUDENT IN AN ORGANIZED HEALTH CARE EDUCATION/TRAINING PROGRAM

## 2022-04-05 PROCEDURE — 4010F ACE/ARB THERAPY RXD/TAKEN: CPT | Mod: CPTII,S$GLB,, | Performed by: STUDENT IN AN ORGANIZED HEALTH CARE EDUCATION/TRAINING PROGRAM

## 2022-04-05 PROCEDURE — 99204 OFFICE O/P NEW MOD 45 MIN: CPT | Mod: 25,S$GLB,, | Performed by: STUDENT IN AN ORGANIZED HEALTH CARE EDUCATION/TRAINING PROGRAM

## 2022-04-05 PROCEDURE — 3044F PR MOST RECENT HEMOGLOBIN A1C LEVEL <7.0%: ICD-10-PCS | Mod: CPTII,S$GLB,, | Performed by: STUDENT IN AN ORGANIZED HEALTH CARE EDUCATION/TRAINING PROGRAM

## 2022-04-05 PROCEDURE — 17110 DESTRUCTION B9 LES UP TO 14: CPT | Mod: S$GLB,,, | Performed by: STUDENT IN AN ORGANIZED HEALTH CARE EDUCATION/TRAINING PROGRAM

## 2022-04-05 PROCEDURE — 1159F PR MEDICATION LIST DOCUMENTED IN MEDICAL RECORD: ICD-10-PCS | Mod: CPTII,S$GLB,, | Performed by: STUDENT IN AN ORGANIZED HEALTH CARE EDUCATION/TRAINING PROGRAM

## 2022-04-05 PROCEDURE — 1160F RVW MEDS BY RX/DR IN RCRD: CPT | Mod: CPTII,S$GLB,, | Performed by: STUDENT IN AN ORGANIZED HEALTH CARE EDUCATION/TRAINING PROGRAM

## 2022-04-05 PROCEDURE — 99204 PR OFFICE/OUTPT VISIT, NEW, LEVL IV, 45-59 MIN: ICD-10-PCS | Mod: 25,S$GLB,, | Performed by: STUDENT IN AN ORGANIZED HEALTH CARE EDUCATION/TRAINING PROGRAM

## 2022-04-05 PROCEDURE — 4010F PR ACE/ARB THEARPY RXD/TAKEN: ICD-10-PCS | Mod: CPTII,S$GLB,, | Performed by: STUDENT IN AN ORGANIZED HEALTH CARE EDUCATION/TRAINING PROGRAM

## 2022-04-05 PROCEDURE — 17110 PR DESTRUCTION BENIGN LESIONS UP TO 14: ICD-10-PCS | Mod: S$GLB,,, | Performed by: STUDENT IN AN ORGANIZED HEALTH CARE EDUCATION/TRAINING PROGRAM

## 2022-04-05 PROCEDURE — 3044F HG A1C LEVEL LT 7.0%: CPT | Mod: CPTII,S$GLB,, | Performed by: STUDENT IN AN ORGANIZED HEALTH CARE EDUCATION/TRAINING PROGRAM

## 2022-04-05 PROCEDURE — 99999 PR PBB SHADOW E&M-EST. PATIENT-LVL III: CPT | Mod: PBBFAC,,, | Performed by: STUDENT IN AN ORGANIZED HEALTH CARE EDUCATION/TRAINING PROGRAM

## 2022-04-05 PROCEDURE — 1159F MED LIST DOCD IN RCRD: CPT | Mod: CPTII,S$GLB,, | Performed by: STUDENT IN AN ORGANIZED HEALTH CARE EDUCATION/TRAINING PROGRAM

## 2022-04-05 RX ORDER — METRONIDAZOLE 7.5 MG/G
CREAM TOPICAL 2 TIMES DAILY
Qty: 45 G | Refills: 3 | Status: SHIPPED | OUTPATIENT
Start: 2022-04-05 | End: 2023-06-07

## 2022-04-05 NOTE — PROGRESS NOTES
"  Subjective:       Patient ID:  Starla Fisher is a 51 y.o. female who presents for   Chief Complaint   Patient presents with    Spot     Right shoulder    Acne     Face     Patient is here today for a "mole" check.   Pt has a history of  mild sun exposure in the past.   Pt recalls several blistering sunburns in the past- unsure  Pt has history of tanning bed use- no  Pt has  had moles removed in the past- yes  Pt has history of melanoma in first degree relatives-  No    No h/o nmsc or mm    Other concerns: see below    Spot - Initial  Affected locations: right shoulder  Duration: 2 months  Signs / symptoms: burning and tender  Exacerbated by: certain movements.  Relieving factors/Treatments tried: nothing    Acne - Initial  Affected locations: face  Duration: 1 day  Signs / symptoms: redness  Aggravated by: nothing  Relieving factors/Treatments tried: nothing    Hair Loss - Initial  Affected locations: diffuse  Duration: progressing over years.  Signs / symptoms: asymptomatic  Severity: mild  Timing: constant  Aggravated by: nothing  Relieving factors/Treatments tried: nothing        Review of Systems   Skin: Positive for activity-related sunscreen use. Negative for daily sunscreen use, recent sunburn and wears hat.   Hematologic/Lymphatic: Bruises/bleeds easily.        Objective:    Physical Exam   Constitutional: She appears well-developed and well-nourished. No distress.   Neurological: She is alert and oriented to person, place, and time. She is not disoriented.   Psychiatric: She has a normal mood and affect.   Skin:   Areas Examined (abnormalities noted in diagram):   Head / Face Inspection Performed  RUE Inspected                   Diagram Legend     Erythematous scaling macule/papule c/w actinic keratosis       Vascular papule c/w angioma      Pigmented verrucoid papule/plaque c/w seborrheic keratosis      Yellow umbilicated papule c/w sebaceous hyperplasia      Irregularly shaped tan macule c/w " lentigo     1-2 mm smooth white papules consistent with Milia      Movable subcutaneous cyst with punctum c/w epidermal inclusion cyst      Subcutaneous movable cyst c/w pilar cyst      Firm pink to brown papule c/w dermatofibroma      Pedunculated fleshy papule(s) c/w skin tag(s)      Evenly pigmented macule c/w junctional nevus     Mildly variegated pigmented, slightly irregular-bordered macule c/w mildly atypical nevus      Flesh colored to evenly pigmented papule c/w intradermal nevus       Pink pearly papule/plaque c/w basal cell carcinoma      Erythematous hyperkeratotic cursted plaque c/w SCC      Surgical scar with no sign of skin cancer recurrence      Open and closed comedones      Inflammatory papules and pustules      Verrucoid papule consistent consistent with wart     Erythematous eczematous patches and plaques     Dystrophic onycholytic nail with subungual debris c/w onychomycosis     Umbilicated papule    Erythematous-base heme-crusted tan verrucoid plaque consistent with inflamed seborrheic keratosis     Erythematous Silvery Scaling Plaque c/w Psoriasis     See annotation      Assessment / Plan:        Inflamed seborrheic keratosis  Cryosurgery procedure note:    Verbal consent from the patient is obtained including, but not limited to, risk of hypopigmentation/hyperpigmentation, scar, recurrence of lesion. Liquid nitrogen cryosurgery is applied to 1 lesions to produce a freeze injury. The patient is aware that blisters may form and is instructed on wound care with gentle cleansing and use of vaseline ointment to keep moist until healed. The patient is supplied a handout on cryosurgery and is instructed to call if lesions do not completely resolve.    Rosacea  ETT type. Discussed triggers like sunlight, spicy foods, coffe/alcohol  - Avoid triggers  - Recommend daily sun protection/avoidance and use of at least SPF 30, broad spectrum sunscreen (OTC drug).   - Start metronidazole 0.75% (METROCREAM)  0.75 % Crea; Apply topically 2 (two) times daily.  Dispense: 45 g; Refill: 3    Androgenetic alopecia  Discussed etiology and treatments  - For now, start Rogaine foam daily 5%           No follow-ups on file.

## 2022-04-05 NOTE — PATIENT INSTRUCTIONS

## 2022-04-07 ENCOUNTER — TELEPHONE (OUTPATIENT)
Dept: PODIATRY | Facility: CLINIC | Age: 52
End: 2022-04-07
Payer: MEDICARE

## 2022-04-07 ENCOUNTER — CLINICAL SUPPORT (OUTPATIENT)
Dept: REHABILITATION | Facility: HOSPITAL | Age: 52
End: 2022-04-07
Attending: PODIATRIST
Payer: MEDICARE

## 2022-04-07 DIAGNOSIS — M24.571 EQUINUS CONTRACTURE OF RIGHT ANKLE: ICD-10-CM

## 2022-04-07 DIAGNOSIS — M62.81 MUSCLE WEAKNESS (GENERALIZED): ICD-10-CM

## 2022-04-07 DIAGNOSIS — R26.89 IMPAIRED GAIT AND MOBILITY: ICD-10-CM

## 2022-04-07 DIAGNOSIS — M67.88 ACHILLES TENDINOSIS OF RIGHT LOWER EXTREMITY: ICD-10-CM

## 2022-04-07 DIAGNOSIS — Z01.818 PREOP TESTING: ICD-10-CM

## 2022-04-07 DIAGNOSIS — G89.29 CHRONIC PAIN OF RIGHT ANKLE: ICD-10-CM

## 2022-04-07 DIAGNOSIS — M25.571 CHRONIC PAIN OF RIGHT ANKLE: ICD-10-CM

## 2022-04-07 PROCEDURE — 97161 PT EVAL LOW COMPLEX 20 MIN: CPT | Mod: PN

## 2022-04-07 NOTE — TELEPHONE ENCOUNTER
----- Message from Linn Jenkins sent at 4/7/2022 10:07 AM CDT -----  Needs advice from nurse:      Who Called:pt  Regarding:needs order for Home health and Home health PT after surgery  Would the patient [facility] a call back or [facility]?  Best Call Back number:[phone]  Additional Info:

## 2022-04-07 NOTE — TELEPHONE ENCOUNTER
Spoke with Ms Phillip regarding her concern for PT and home health post operatively. Told pt I will be sure to let Dr Solorzano know so case management can be on board post op if needed or he can preemptively place a referral if needed. No other needs voiced at this time. Also of note she has seen Dr. Mosquera for surgical clearance.

## 2022-04-08 ENCOUNTER — TELEPHONE (OUTPATIENT)
Dept: SLEEP MEDICINE | Facility: OTHER | Age: 52
End: 2022-04-08
Payer: MEDICARE

## 2022-04-08 ENCOUNTER — TELEPHONE (OUTPATIENT)
Dept: PODIATRY | Facility: CLINIC | Age: 52
End: 2022-04-08
Payer: MEDICARE

## 2022-04-08 PROBLEM — M25.571 RIGHT ANKLE PAIN: Status: ACTIVE | Noted: 2022-04-08

## 2022-04-08 PROBLEM — R26.89 IMPAIRED GAIT AND MOBILITY: Status: ACTIVE | Noted: 2022-04-08

## 2022-04-08 PROBLEM — M62.81 MUSCLE WEAKNESS (GENERALIZED): Status: ACTIVE | Noted: 2022-04-08

## 2022-04-08 NOTE — PLAN OF CARE
"[facility]   Physical Therapy Initial Evaluation     Date: 4/7/2022   Name: Starla Fisher  Clinic Number: [id number]    Therapy Diagnosis:   Encounter Diagnoses   Name Primary?    Achilles tendinosis of right lower extremity     Equinus contracture of right ankle     Preop testing     Chronic pain of right ankle     Impaired gait and mobility     Muscle weakness (generalized)      Physician: Zelalem Solorzano DPM    Physician Orders: PT Eval and Treat   Medical Diagnosis from Referral:   M67.88 (ICD-10-CM) - Achilles tendinosis of right lower extremity   M24.571 (ICD-10-CM) - Equinus contracture of right ankle   Z01.818 (ICD-10-CM) - Preop testing     Evaluation Date: 4/7/2022  Authorization Period Expiration: 12/31/2022  Plan of Care Expiration: 4/7/2022 to 04/08/2022  Visit # / Visits authorized: 1/ 1   FOTO: 1/1    Precautions: Standard     Time In: 8:10 am  Time Out: 9:00 am  Total Appointment Time (timed & untimed codes): 50 minutes (1 LCE)      SUBJECTIVE     Date of onset: > 13 years     History of current condition - Starla reports: Her podiatrist ordered PT before her upcoming foot surgery. She reports that she has plantar fasciitis and achilles tendinitis. She has been having pain in right foot and ankle that has been going on for a long time, unable to recall exact number of years. She has trouble walking on right foot and feels unsteady on her feet. Had surgery on left foot already for similar issues. Feels worse whenever she weight bears and walks on it. Endoscopic gastrocnemius recession scheduled for 4/20/2022.      Imaging, MRI studies: "Impression: No evidence for calcaneal stress fracture. Insertional Achilles tendinosis with retrocalcaneal bursitis."    Prior Therapy: None  Social History: lives with their sister   Occupation: none   Prior Level of Function: independent   Current Level of Function: independent but painful     Pain:  Current 8/10, worst 10/10, " best 6/10   Location: right ankles and feet   Description: Throbbing  Aggravating Factors: Standing and Walking  Easing Factors: rest    Patients goals: easiest post-op recovery possible      Medical History:   Past Medical History:   Diagnosis Date    Anxiety     Depression     GERD (gastroesophageal reflux disease)     Hypertension     Migraine headache     Sleep difficulties        Surgical History:   Starla Fisher  has a past surgical history that includes Endometrial ablation; Tonsillectomy; Breast surgery; Eye surgery; Foot surgery (Right, 10/2018); Total Reduction Mammoplasty (Bilateral); and Implantation of permanent sacral nerve stimulator (N/A, 2/15/2022).    Medications:   Starla has a current medication list which includes the following prescription(s): blood-glucose meter, clotrimazole-betamethasone 1-0.05%, dexamethasone, diazepam, escitalopram oxalate, fentanyl, emgality pen, guaifenesin-codeine 100-10 mg/5 ml, lancets, losartan, metronidazole 0.75%, midazolam, mirabegron, multivit-minerals/folic acid, pantoprazole, phenylephrine, promethazine, propofol, quelicin, sumatriptan, sumatriptan, sumatriptan, topiramate, tramadol, trazodone, and vancomycin, and the following Facility-Administered Medications: sodium chloride 0.9%, lidocaine (pf) 10 mg/ml (1%), and sodium chloride 0.9%.    Allergies:   Review of patient's allergies indicates:  No Known Allergies       OBJECTIVE     Gait: antalgic gait right side, lack of heel-toe gait pattern, ambulates with single point cane       Range of Motion: AROM:    Ankle Left Right   Dorsiflexion 10 degrees 10 degrees   Plantarflexion 38 degrees 45 degrees   Inversion 15 degrees 25 degrees   Eversion 10 degrees 15 degrees       Ankle Left Right   Dorsiflexion 5/5 4+/5   Plantarflexion 4/5 4-/5   Inversion 5/5 4+/5   Eversion 5/5 4/5       Palpation: TTP bilateral achilles tendon, calcaneus, right > left     Sensation: numbness/tingling right foot      Flexibility: limitations in gastroc bilaterally      Limitation/Restriction for FOTO Survey    Therapist reviewed FOTO scores for Starla Fisher on 4/7/2022.   FOTO documents entered into [employer] - see Media section.    Limitation Score: 75%  Predicted Score: 48%         TREATMENT     Total Treatment time (time-based codes) separate from Evaluation: 30 minutes      Starla received the treatments listed below:      gait training to improve functional mobility and safety for 30 minutes, including:  Ambulation 300 ft around clinic with knee scooter   Ambulation 150 ft around clinic with rolling walker   Curb navigation with cane/walker non-weightbearing right lower extremity  Sit to stand with rolling walker non-weightbearing right lower extremity       PATIENT EDUCATION AND HOME EXERCISES     Education provided:   - Proper use of knee scooter and rolling walker     Written Home Exercises Provided: yes. Exercises were reviewed and Starla was able to demonstrate them prior to the end of the session.  Starla demonstrated fair  understanding of the education provided. See EMR under Patient Instructions for exercises provided during therapy sessions.    ASSESSMENT     Starla is a 51 y.o. female referred to outpatient Physical Therapy with a medical diagnosis of achilles tendinosis of right lower extremity, equinus contracture of right ankle, and preop testing. Referred by Dr. Zelalem Solorzano for single session of gait training in preparation for post-surgical non-weightbearing status. Clinical examination reveals deficits in right ankle strength and flexibility, as well as TTP and pain in weight bearing at calcaneus and achilles tendon. Gait trained today on smooth level surface with knee scooter and rolling walker. Stair trained to mimic steps at home and worked on safe sit to stand transfers with assistive device. Patient demonstrated fair understanding of functional mobility while maintaining right non-weightbearing  status but will likely require assistance and supervision with transfers and stair navigation due to general strength deficits.     Patient prognosis is Excellent.   Patient will benefit from skilled outpatient Physical Therapy to address the deficits stated above and in the chart below, provide patient /family education, and to maximize patientt's level of independence.     Plan of care discussed with patient: Yes  Patient's spiritual, cultural and educational needs considered and patient is agreeable to the plan of care and goals as stated below:     Anticipated Barriers for therapy: hx of surgery on left lower extremity     Medical Necessity is demonstrated by the following  History  Co-morbidities and personal factors that may impact the plan of care Co-morbidities:    Anxiety      Depression      GERD (gastroesophageal reflux disease)      Hypertension      Migraine headache      Sleep difficulties      Personal Factors:   education level  coping style     low   Examination  Body Structures and Functions, activity limitations and participation restrictions that may impact the plan of care Body Regions:   lower extremities    Body Systems:    gross symmetry  ROM  strength  gross coordinated movement  balance  gait  transfers  motor control    Participation Restrictions:   none    Activity limitations:   Learning and applying knowledge  no deficits    General Tasks and Commands  undertaking multiple tasks    Communication  no deficits    Mobility  walking    Self care  no deficits    Domestic Life  shopping  cooking  doing house work (cleaning house, washing dishes, laundry)    Interactions/Relationships  no deficits    Life Areas  no deficits    Community and Social Life  no deficits         low   Clinical Presentation stable and uncomplicated low   Decision Making/ Complexity Score: low      Goals:  Short Term Goals: 1 weeks   1.  Patient will demonstrate understanding of home exercise program.  (Met)  2.  Patient will demonstrate ability to safely ambulate community distance with knee scooter to prepare for post-surgical status. (Met)  3. Patient will demonstrate ability to safely ambulate community distance with rolling walker to prepare for post-surgical status. (Met)    PLAN   Plan of care Certification: 4/7/2022 to 04/08/2022.    Outpatient Physical Therapy 1 times weekly for 1 weeks to include the following interventions: Gait Training, Patient Education, Therapeutic Activities and Therapeutic Exercise.     Douglas Hancock, PT, DPT       I CERTIFY THE NEED FOR THESE SERVICES FURNISHED UNDER THIS PLAN OF TREATMENT AND WHILE UNDER MY CARE   Physician's comments:     Physician's Signature: ___________________________________________________

## 2022-04-08 NOTE — TELEPHONE ENCOUNTER
Called and spoke with MsElda Phillip who voiced that she would like to go to a Rehab facility post op if she qualifies. Told her that I will let Dr. Solorzano know and that I was appreciative of her being forthcoming about her needs. No other needs voiced at this time.

## 2022-04-11 ENCOUNTER — HOSPITAL ENCOUNTER (OUTPATIENT)
Dept: SLEEP MEDICINE | Facility: OTHER | Age: 52
Discharge: HOME | End: 2022-04-11
Attending: NURSE PRACTITIONER
Payer: MEDICARE

## 2022-04-11 DIAGNOSIS — G47.33 OSA (OBSTRUCTIVE SLEEP APNEA): ICD-10-CM

## 2022-04-11 DIAGNOSIS — G47.30 SLEEP APNEA, UNSPECIFIED TYPE: ICD-10-CM

## 2022-04-11 PROCEDURE — 95800 SLP STDY UNATTENDED: CPT

## 2022-04-11 PROCEDURE — 95806 SLEEP STUDY UNATT&RESP EFFT: CPT | Mod: 26,,, | Performed by: PSYCHIATRY & NEUROLOGY

## 2022-04-11 PROCEDURE — 95806 PR SLEEP STUDY, UNATTENDED, SIMUL RECORD HR/O2 SAT/RESP FLOW/RESP EFFT: ICD-10-PCS | Mod: 26,,, | Performed by: PSYCHIATRY & NEUROLOGY

## 2022-04-11 NOTE — PROGRESS NOTES
Per physician orders, patient was given home sleep testing device and instructed on how to apply the device before going to bed tonight. I sized the device and showed the patient using a mirror how the device fits and what it should look like so they can use a mirror when putting it on themselves at home. We reviewed the instruction booklet and the number to call if they have any questions at night. Patient understood and was instructed to return the device the next day back to the sleep lab.

## 2022-04-13 ENCOUNTER — OFFICE VISIT (OUTPATIENT)
Dept: UROLOGY | Facility: CLINIC | Age: 52
End: 2022-04-13
Payer: MEDICARE

## 2022-04-13 VITALS
SYSTOLIC BLOOD PRESSURE: 152 MMHG | HEIGHT: 64 IN | HEART RATE: 84 BPM | WEIGHT: 293 LBS | DIASTOLIC BLOOD PRESSURE: 87 MMHG | BODY MASS INDEX: 50.02 KG/M2

## 2022-04-13 DIAGNOSIS — N32.81 OAB (OVERACTIVE BLADDER): Primary | ICD-10-CM

## 2022-04-13 PROCEDURE — 3044F HG A1C LEVEL LT 7.0%: CPT | Mod: CPTII,S$GLB,, | Performed by: UROLOGY

## 2022-04-13 PROCEDURE — 99024 POSTOP FOLLOW-UP VISIT: CPT | Mod: S$GLB,,, | Performed by: UROLOGY

## 2022-04-13 PROCEDURE — 1160F PR REVIEW ALL MEDS BY PRESCRIBER/CLIN PHARMACIST DOCUMENTED: ICD-10-PCS | Mod: CPTII,S$GLB,, | Performed by: UROLOGY

## 2022-04-13 PROCEDURE — 99024 PR POST-OP FOLLOW-UP VISIT: ICD-10-PCS | Mod: S$GLB,,, | Performed by: UROLOGY

## 2022-04-13 PROCEDURE — 4010F PR ACE/ARB THEARPY RXD/TAKEN: ICD-10-PCS | Mod: CPTII,S$GLB,, | Performed by: UROLOGY

## 2022-04-13 PROCEDURE — 3079F DIAST BP 80-89 MM HG: CPT | Mod: CPTII,S$GLB,, | Performed by: UROLOGY

## 2022-04-13 PROCEDURE — 1160F RVW MEDS BY RX/DR IN RCRD: CPT | Mod: CPTII,S$GLB,, | Performed by: UROLOGY

## 2022-04-13 PROCEDURE — 3044F PR MOST RECENT HEMOGLOBIN A1C LEVEL <7.0%: ICD-10-PCS | Mod: CPTII,S$GLB,, | Performed by: UROLOGY

## 2022-04-13 PROCEDURE — 3008F PR BODY MASS INDEX (BMI) DOCUMENTED: ICD-10-PCS | Mod: CPTII,S$GLB,, | Performed by: UROLOGY

## 2022-04-13 PROCEDURE — 3077F SYST BP >= 140 MM HG: CPT | Mod: CPTII,S$GLB,, | Performed by: UROLOGY

## 2022-04-13 PROCEDURE — 1159F PR MEDICATION LIST DOCUMENTED IN MEDICAL RECORD: ICD-10-PCS | Mod: CPTII,S$GLB,, | Performed by: UROLOGY

## 2022-04-13 PROCEDURE — 1159F MED LIST DOCD IN RCRD: CPT | Mod: CPTII,S$GLB,, | Performed by: UROLOGY

## 2022-04-13 PROCEDURE — 3008F BODY MASS INDEX DOCD: CPT | Mod: CPTII,S$GLB,, | Performed by: UROLOGY

## 2022-04-13 PROCEDURE — 4010F ACE/ARB THERAPY RXD/TAKEN: CPT | Mod: CPTII,S$GLB,, | Performed by: UROLOGY

## 2022-04-13 PROCEDURE — 3077F PR MOST RECENT SYSTOLIC BLOOD PRESSURE >= 140 MM HG: ICD-10-PCS | Mod: CPTII,S$GLB,, | Performed by: UROLOGY

## 2022-04-13 PROCEDURE — 99999 PR PBB SHADOW E&M-EST. PATIENT-LVL III: CPT | Mod: PBBFAC,,, | Performed by: UROLOGY

## 2022-04-13 PROCEDURE — 3079F PR MOST RECENT DIASTOLIC BLOOD PRESSURE 80-89 MM HG: ICD-10-PCS | Mod: CPTII,S$GLB,, | Performed by: UROLOGY

## 2022-04-13 PROCEDURE — 99999 PR PBB SHADOW E&M-EST. PATIENT-LVL III: ICD-10-PCS | Mod: PBBFAC,,, | Performed by: UROLOGY

## 2022-04-13 NOTE — PROGRESS NOTES
[facility]      Overactive Bladder Return Note    4/13/2022    Patient Name: Starla Fisher  Referred by:No ref. provider found      Patient returns for postoperative visit after successful implant of SNM. Doing very well. Continues on Program 1 at 1.1 with appropriate stimulation. Still notes resolution of UUI from 6 episodes per day.     A review of 10+ systems was conducted with pertinent positive and negative findings documented in HPI with all other systems reviewed and negative.    Past medical, family, surgical and social history reviewed as documented in chart with pertinent positive medical, family, surgical and social history detailed in HPI.    Const: no acute distress, conversant and alert  Eyes: anicteric, extraocular muscles intact  ENMT: normocephalic, Nl oral membranes  Cardio: no cyanosis, nl cap refill  Pulm: no tachypnea; no resp distress  Abd: soft, no tenderness  Musc: no laceration, no tenderness  Neuro: alert; oriented x 3  Skin: warm, dry; no petichiae  Psych: no anxiety; normal speech     Assessment/Plan:    Overactive Bladder with Urgency Incontinence (estab,improved): Her history is suggestive of Overactive Bladder (OAB) with predominantly Urgency Urinary Incontinence (UUI). She is significantly improved and is satisfied with the current treatment.      1. We will continue current therapy.

## 2022-04-14 ENCOUNTER — HOSPITAL ENCOUNTER (OUTPATIENT)
Dept: PREADMISSION TESTING | Facility: HOSPITAL | Age: 52
Discharge: HOME | End: 2022-04-14
Attending: PODIATRIST
Payer: MEDICARE

## 2022-04-14 VITALS — BODY MASS INDEX: 50.02 KG/M2 | HEIGHT: 64 IN | WEIGHT: 293 LBS

## 2022-04-14 RX ORDER — LIDOCAINE HYDROCHLORIDE 10 MG/ML
1 INJECTION, SOLUTION EPIDURAL; INFILTRATION; INTRACAUDAL; PERINEURAL ONCE
Status: CANCELLED | OUTPATIENT
Start: 2022-04-14 | End: 2022-04-14

## 2022-04-14 RX ORDER — SODIUM CHLORIDE, SODIUM LACTATE, POTASSIUM CHLORIDE, CALCIUM CHLORIDE 600; 310; 30; 20 MG/100ML; MG/100ML; MG/100ML; MG/100ML
INJECTION, SOLUTION INTRAVENOUS CONTINUOUS
Status: CANCELLED | OUTPATIENT
Start: 2022-04-14

## 2022-04-14 NOTE — DISCHARGE INSTRUCTIONS
Your surgery is scheduled for 4/20/22.    Please report to [facility]obby on the 1st Floor at 0530 a.m.    THIS TIME IS SUBJECT TO CHANGE.  YOU WILL RECEIVE A PHONE CALL THE DAY BEFORE SURGERY BY 3:30 PM TO CONFIRM YOUR TIME OF ARRIVAL.  IF YOU HAVE NOT RECEIVED A PHONE CALL BY 3:30 PM THE DAY BEFORE YOUR SURGERY PLEASE CALL [phone]     INSTRUCTIONS IMPORTANT!!!  ¨ Do not eat or drink after 12 midnight-including water, candy, gum, & mints. OK to brush teeth.      ____  Proceed to [facility] on *** for additional testing.        ____  Do not wear makeup, including mascara.  ____  No powder, lotions or creams to surgical area.  ____  Please remove all jewelry, including piercings and leave at home.  ____  No money or valuables needed. Please leave at home.  ____  Please bring any documents given by your doctor.  ____  If going home the same day, arrange for a ride home. You will not be able to             drive if Anesthesia was used.  ____  Children under 18 years require a parent / guardian present the entire time             they are in surgery / recovery.  ____  Wear loose fitting clothing. Allow for dressings, bandages.  ____  Stop Aspirin, Ibuprofen, Motrin, Aleve, Goody's/BC powders, Excedrine and Naproxen (NSAIDS) at least 3-5 days before surgery, unless otherwise instructed by your doctor, or the nurse.   You MAY use Tylenol/acetaminophen until day of surgery.  ____  Wash the surgical area with Hibiclens the night before surgery, and again the             morning of surgery.  Be sure to rinse hibiclens off completely (if instructed by   nurse).  ____  If you take diabetic medication, do not take am of surgery unless instructed by Doctor.  ____  Call MD for temperature above 101 degrees or any other signs of infection such as Urinary (bladder) infection, Upper respiratory infection, skin boils, etc.  ____ Stop taking any Fish Oil supplement or any Vitamins that contain Vitamin E at  least 5 days prior to surgery.  ____ Do Not wear your contact lenses the day of your procedure.  You may wear your glasses.      ____Do not shave surgical site for 3 days prior to surgery.  ____ Practice Good hand washing before, during, and after procedure.      I have read or had read and explained to me, and understand the above information.  Additional comments or instructions:  For additional questions call [phone]      ANESTHESIA SIDE EFFECTS  -For the first 24 hours after surgery:  Do not drive, use heavy equipment, make important decisions, or drink alcohol  -It is normal to feel sleepy for several hours.  Rest until you are more awake.  -Have someone stay with you, if needed.  They can watch for problems and help keep you safe.  -Some possible post anesthesia side effects include: nausea and vomiting, sore throat and hoarseness, sleepiness, and dizziness.        Pre-Op Bathing Instructions    Before surgery, you can play an important role in your own health.    Because skin is not sterile, we need to be sure that your skin is as free of germs as possible. By following the instructions below, you can reduce the number of germs on your skin before surgery.    IMPORTANT: You will need to shower with a special soap called Hibiclens*, available at any pharmacy.  If you are allergic to Chlorhexidine (the antiseptic in Hibiclens), use an antibacterial soap such as Dial Soap for your preoperative shower.  You will shower with Hibiclens both the night before your surgery and the morning of your surgery.  Do not use Hibiclens on the head, face or genitals to avoid injury to those areas.    STEP #1: THE NIGHT BEFORE YOUR SURGERY     Do not shave the area of your body where your surgery will be performed.  Shower and wash your hair and body as usual with your normal soap and shampoo.  Rinse your hair and body thoroughly after you shower to remove all soap residue.  With your hand, apply one packet of Hibiclens soap to  the surgical site.   Wash the site gently for five (5) minutes. Do not scrub your skin too hard.   Do not wash with your regular soap after Hibiclens is used.  Rinse your body thoroughly.  Pat yourself dry with a clean, soft towel.  Do not use lotion, cream, or powder.  Wear clean clothes.    STEP #2: THE MORNING OF YOUR SURGERY     Repeat Step #1.    * Not to be used by people allergic to Chlorhexidine.

## 2022-04-14 NOTE — PRE-PROCEDURE INSTRUCTIONS
Riya Fisher [phone]    Allergies, medical, surgical, family and psychosocial histories reviewed with patient. Periop plan of care reviewed. Preop instructions given, including medications to take and to hold. Hibiclens soap and instructions on use given. Time allotted for questions to be addressed.  Patient verbalized understanding.

## 2022-04-18 ENCOUNTER — TELEPHONE (OUTPATIENT)
Dept: PODIATRY | Facility: CLINIC | Age: 52
End: 2022-04-18
Payer: MEDICARE

## 2022-04-18 NOTE — TELEPHONE ENCOUNTER
Spoke with Ms. Fisher in regards to Rehab. Discussed with her that per Dr. Solorzano that she may not qualify for Rehab due to the surgical procedure not being considered major surgery. Voiced concern in regards to dressing changes and assistance provided at home. Discussed that post operatively she may qualify for home health to provide her assistance with dressing changes. Will be sure to relay message to Dr. Solorzano. Discussed ways to help promote healing (such as monitoring carb intake, high protein foods, vitamin c). Discussed that per MD she can be partial weight bearing. Reports that she did receive her knee scooter. Again, will let Dr. Solorzano know that pt will need to speak with case management in regards to home health referral. No other needs voiced.

## 2022-04-18 NOTE — TELEPHONE ENCOUNTER
----- Message from Andrei Gamez sent at 4/18/2022  1:45 PM CDT -----  Contact: pt.  .Type:  Needs Medical Advice    Who Called: pt    Would the patient rather a call back or a response via [facility]? Call back  Best Call Back Number: [phone]  Additional Information: Pt. Is calling in regarding to her rehab orders.

## 2022-04-20 ENCOUNTER — ANESTHESIA EVENT (OUTPATIENT)
Dept: CARDIOLOGY | Facility: HOSPITAL | Age: 52
End: 2022-04-20
Payer: MEDICARE

## 2022-04-20 ENCOUNTER — ANESTHESIA (OUTPATIENT)
Dept: CARDIOLOGY | Facility: HOSPITAL | Age: 52
End: 2022-04-20
Payer: MEDICARE

## 2022-04-20 ENCOUNTER — HOSPITAL ENCOUNTER (OUTPATIENT)
Facility: HOSPITAL | Age: 52
Discharge: HOME | End: 2022-04-21
Attending: EMERGENCY MEDICINE | Admitting: INTERNAL MEDICINE
Payer: MEDICARE

## 2022-04-20 DIAGNOSIS — I48.92 ATRIAL FLUTTER: Primary | ICD-10-CM

## 2022-04-20 DIAGNOSIS — M72.2 PLANTAR FASCIITIS OF RIGHT FOOT: ICD-10-CM

## 2022-04-20 DIAGNOSIS — I48.91 ATRIAL FIBRILLATION STATUS POST CARDIOVERSION: ICD-10-CM

## 2022-04-20 DIAGNOSIS — M24.571 EQUINUS CONTRACTURE OF RIGHT ANKLE: ICD-10-CM

## 2022-04-20 DIAGNOSIS — I10 ESSENTIAL HYPERTENSION: ICD-10-CM

## 2022-04-20 DIAGNOSIS — M67.88 ACHILLES TENDINOSIS OF RIGHT LOWER EXTREMITY: ICD-10-CM

## 2022-04-20 DIAGNOSIS — I48.3 TYPICAL ATRIAL FLUTTER: ICD-10-CM

## 2022-04-20 DIAGNOSIS — R00.0 TACHYCARDIA: ICD-10-CM

## 2022-04-20 LAB
ALBUMIN SERPL BCP-MCNC: 3.6 G/DL (ref 3.5–5.2)
ALP SERPL-CCNC: 91 U/L (ref 55–135)
ALT SERPL W/O P-5'-P-CCNC: 15 U/L (ref 10–44)
ANION GAP SERPL CALC-SCNC: 12 MMOL/L (ref 8–16)
AST SERPL-CCNC: 13 U/L (ref 10–40)
BASOPHILS # BLD AUTO: 0.04 K/UL (ref 0–0.2)
BASOPHILS NFR BLD: 0.5 % (ref 0–1.9)
BILIRUB SERPL-MCNC: 0.4 MG/DL (ref 0.1–1)
BNP SERPL-MCNC: 140 PG/ML (ref 0–99)
BUN SERPL-MCNC: 13 MG/DL (ref 6–20)
CALCIUM SERPL-MCNC: 9.2 MG/DL (ref 8.7–10.5)
CHLORIDE SERPL-SCNC: 106 MMOL/L (ref 95–110)
CO2 SERPL-SCNC: 25 MMOL/L (ref 23–29)
CREAT SERPL-MCNC: 0.9 MG/DL (ref 0.5–1.4)
DIFFERENTIAL METHOD: ABNORMAL
EOSINOPHIL # BLD AUTO: 0.2 K/UL (ref 0–0.5)
EOSINOPHIL NFR BLD: 2.8 % (ref 0–8)
ERYTHROCYTE [DISTWIDTH] IN BLOOD BY AUTOMATED COUNT: 14.3 % (ref 11.5–14.5)
EST. GFR  (AFRICAN AMERICAN): >60 ML/MIN/1.73 M^2
EST. GFR  (NON AFRICAN AMERICAN): >60 ML/MIN/1.73 M^2
GLUCOSE SERPL-MCNC: 121 MG/DL (ref 70–110)
HCT VFR BLD AUTO: 38.6 % (ref 37–48.5)
HGB BLD-MCNC: 11.9 G/DL (ref 12–16)
IMM GRANULOCYTES # BLD AUTO: 0.01 K/UL (ref 0–0.04)
IMM GRANULOCYTES NFR BLD AUTO: 0.1 % (ref 0–0.5)
LYMPHOCYTES # BLD AUTO: 2.7 K/UL (ref 1–4.8)
LYMPHOCYTES NFR BLD: 35.7 % (ref 18–48)
MAGNESIUM SERPL-MCNC: 2 MG/DL (ref 1.6–2.6)
MCH RBC QN AUTO: 27.3 PG (ref 27–31)
MCHC RBC AUTO-ENTMCNC: 30.8 G/DL (ref 32–36)
MCV RBC AUTO: 89 FL (ref 82–98)
MONOCYTES # BLD AUTO: 0.7 K/UL (ref 0.3–1)
MONOCYTES NFR BLD: 8.8 % (ref 4–15)
NEUTROPHILS # BLD AUTO: 3.9 K/UL (ref 1.8–7.7)
NEUTROPHILS NFR BLD: 52.1 % (ref 38–73)
NRBC BLD-RTO: 0 /100 WBC
PLATELET # BLD AUTO: 255 K/UL (ref 150–450)
PMV BLD AUTO: 10.4 FL (ref 9.2–12.9)
POTASSIUM SERPL-SCNC: 4.6 MMOL/L (ref 3.5–5.1)
PROT SERPL-MCNC: 7.2 G/DL (ref 6–8.4)
RBC # BLD AUTO: 4.36 M/UL (ref 4–5.4)
SODIUM SERPL-SCNC: 143 MMOL/L (ref 136–145)
TROPONIN I SERPL DL<=0.01 NG/ML-MCNC: 0.04 NG/ML (ref 0–0.03)
TROPONIN I SERPL DL<=0.01 NG/ML-MCNC: <0.006 NG/ML (ref 0–0.03)
TSH SERPL DL<=0.005 MIU/L-ACNC: 2.64 UIU/ML (ref 0.4–4)
WBC # BLD AUTO: 7.53 K/UL (ref 3.9–12.7)

## 2022-04-20 PROCEDURE — 84443 ASSAY THYROID STIM HORMONE: CPT | Performed by: EMERGENCY MEDICINE

## 2022-04-20 PROCEDURE — 25000003 PHARM REV CODE 250: Performed by: NURSE ANESTHETIST, CERTIFIED REGISTERED

## 2022-04-20 PROCEDURE — 85025 COMPLETE CBC W/AUTO DIFF WBC: CPT | Performed by: EMERGENCY MEDICINE

## 2022-04-20 PROCEDURE — 00410 ANES INTEG SYS CONV ARRHYT: CPT | Performed by: INTERNAL MEDICINE

## 2022-04-20 PROCEDURE — G0378 HOSPITAL OBSERVATION PER HR: HCPCS

## 2022-04-20 PROCEDURE — 93010 EKG 12-LEAD: ICD-10-PCS | Mod: ,,, | Performed by: INTERNAL MEDICINE

## 2022-04-20 PROCEDURE — 99220 PR INITIAL OBSERVATION CARE,LEVL III: CPT | Mod: 25,GC,, | Performed by: INTERNAL MEDICINE

## 2022-04-20 PROCEDURE — 93010 ELECTROCARDIOGRAM REPORT: CPT | Mod: 76,,, | Performed by: INTERNAL MEDICINE

## 2022-04-20 PROCEDURE — 99220 PR INITIAL OBSERVATION CARE,LEVL III: ICD-10-PCS | Mod: 25,GC,, | Performed by: INTERNAL MEDICINE

## 2022-04-20 PROCEDURE — 96374 THER/PROPH/DIAG INJ IV PUSH: CPT | Mod: 59

## 2022-04-20 PROCEDURE — 25000003 PHARM REV CODE 250: Performed by: EMERGENCY MEDICINE

## 2022-04-20 PROCEDURE — 96375 TX/PRO/DX INJ NEW DRUG ADDON: CPT

## 2022-04-20 PROCEDURE — 96360 HYDRATION IV INFUSION INIT: CPT | Mod: 59

## 2022-04-20 PROCEDURE — 93010 ELECTROCARDIOGRAM REPORT: CPT | Mod: ,,, | Performed by: INTERNAL MEDICINE

## 2022-04-20 PROCEDURE — 93005 ELECTROCARDIOGRAM TRACING: CPT

## 2022-04-20 PROCEDURE — 37000009 HC ANESTHESIA EA ADD 15 MINS: Performed by: INTERNAL MEDICINE

## 2022-04-20 PROCEDURE — 37000008 HC ANESTHESIA 1ST 15 MINUTES: Performed by: INTERNAL MEDICINE

## 2022-04-20 PROCEDURE — 92960 CARDIOVERSION ELECTRIC EXT: CPT | Performed by: INTERNAL MEDICINE

## 2022-04-20 PROCEDURE — 84484 ASSAY OF TROPONIN QUANT: CPT | Mod: 91 | Performed by: EMERGENCY MEDICINE

## 2022-04-20 PROCEDURE — 80053 COMPREHEN METABOLIC PANEL: CPT | Performed by: EMERGENCY MEDICINE

## 2022-04-20 PROCEDURE — 93010 EKG 12-LEAD: ICD-10-PCS | Mod: 76,,, | Performed by: INTERNAL MEDICINE

## 2022-04-20 PROCEDURE — 83735 ASSAY OF MAGNESIUM: CPT | Performed by: EMERGENCY MEDICINE

## 2022-04-20 PROCEDURE — 94761 N-INVAS EAR/PLS OXIMETRY MLT: CPT

## 2022-04-20 PROCEDURE — 99292 CRITICAL CARE ADDL 30 MIN: CPT

## 2022-04-20 PROCEDURE — 63600175 PHARM REV CODE 636 W HCPCS: Performed by: EMERGENCY MEDICINE

## 2022-04-20 PROCEDURE — 83880 ASSAY OF NATRIURETIC PEPTIDE: CPT | Performed by: EMERGENCY MEDICINE

## 2022-04-20 PROCEDURE — 63600175 PHARM REV CODE 636 W HCPCS: Performed by: NURSE ANESTHETIST, CERTIFIED REGISTERED

## 2022-04-20 PROCEDURE — 96361 HYDRATE IV INFUSION ADD-ON: CPT

## 2022-04-20 PROCEDURE — 99291 CRITICAL CARE FIRST HOUR: CPT | Mod: 25

## 2022-04-20 PROCEDURE — 96376 TX/PRO/DX INJ SAME DRUG ADON: CPT

## 2022-04-20 PROCEDURE — 25000003 PHARM REV CODE 250: Performed by: INTERNAL MEDICINE

## 2022-04-20 RX ORDER — LIDOCAINE HCL/PF 100 MG/5ML
SYRINGE (ML) INTRAVENOUS
Status: DISCONTINUED | OUTPATIENT
Start: 2022-04-20 | End: 2022-04-20

## 2022-04-20 RX ORDER — DILTIAZEM HYDROCHLORIDE 5 MG/ML
20 INJECTION INTRAVENOUS
Status: COMPLETED | OUTPATIENT
Start: 2022-04-20 | End: 2022-04-20

## 2022-04-20 RX ORDER — PANTOPRAZOLE SODIUM 40 MG/1
40 TABLET, DELAYED RELEASE ORAL 2 TIMES DAILY
COMMUNITY
End: 2022-08-08 | Stop reason: SDUPTHER

## 2022-04-20 RX ORDER — PROPOFOL 10 MG/ML
VIAL (ML) INTRAVENOUS
Status: DISCONTINUED | OUTPATIENT
Start: 2022-04-20 | End: 2022-04-20

## 2022-04-20 RX ORDER — ASPIRIN 325 MG
325 TABLET ORAL
Status: COMPLETED | OUTPATIENT
Start: 2022-04-20 | End: 2022-04-20

## 2022-04-20 RX ORDER — ESCITALOPRAM OXALATE 5 MG/1
5 TABLET ORAL DAILY
Status: DISCONTINUED | OUTPATIENT
Start: 2022-04-20 | End: 2022-04-21 | Stop reason: HOSPADM

## 2022-04-20 RX ORDER — DIGOXIN 0.25 MG/ML
500 INJECTION INTRAMUSCULAR; INTRAVENOUS
Status: COMPLETED | OUTPATIENT
Start: 2022-04-20 | End: 2022-04-20

## 2022-04-20 RX ORDER — DIGOXIN 0.25 MG/ML
250 INJECTION INTRAMUSCULAR; INTRAVENOUS
Status: COMPLETED | OUTPATIENT
Start: 2022-04-20 | End: 2022-04-20

## 2022-04-20 RX ORDER — DILTIAZEM HYDROCHLORIDE 180 MG/1
180 CAPSULE, COATED, EXTENDED RELEASE ORAL DAILY
Status: DISCONTINUED | OUTPATIENT
Start: 2022-04-20 | End: 2022-04-21

## 2022-04-20 RX ORDER — CEFAZOLIN SODIUM 2 G/50ML
2 SOLUTION INTRAVENOUS
Status: DISCONTINUED | OUTPATIENT
Start: 2022-04-20 | End: 2022-04-21 | Stop reason: HOSPADM

## 2022-04-20 RX ORDER — SODIUM CHLORIDE 0.9 % (FLUSH) 0.9 %
10 SYRINGE (ML) INJECTION
Status: DISCONTINUED | OUTPATIENT
Start: 2022-04-20 | End: 2022-04-21 | Stop reason: HOSPADM

## 2022-04-20 RX ORDER — SODIUM CHLORIDE, SODIUM LACTATE, POTASSIUM CHLORIDE, CALCIUM CHLORIDE 600; 310; 30; 20 MG/100ML; MG/100ML; MG/100ML; MG/100ML
INJECTION, SOLUTION INTRAVENOUS CONTINUOUS
Status: DISCONTINUED | OUTPATIENT
Start: 2022-04-20 | End: 2022-04-21 | Stop reason: HOSPADM

## 2022-04-20 RX ORDER — LABETALOL HYDROCHLORIDE 5 MG/ML
5 INJECTION, SOLUTION INTRAVENOUS
Status: COMPLETED | OUTPATIENT
Start: 2022-04-20 | End: 2022-04-20

## 2022-04-20 RX ORDER — OXYBUTYNIN CHLORIDE 5 MG/1
5 TABLET, EXTENDED RELEASE ORAL DAILY
Refills: 11 | Status: DISCONTINUED | OUTPATIENT
Start: 2022-04-20 | End: 2022-04-21 | Stop reason: HOSPADM

## 2022-04-20 RX ORDER — DILTIAZEM HYDROCHLORIDE 5 MG/ML
15 INJECTION INTRAVENOUS
Status: COMPLETED | OUTPATIENT
Start: 2022-04-20 | End: 2022-04-20

## 2022-04-20 RX ORDER — DIAZEPAM 5 MG/1
5 TABLET ORAL DAILY PRN
Status: DISCONTINUED | OUTPATIENT
Start: 2022-04-20 | End: 2022-04-21 | Stop reason: HOSPADM

## 2022-04-20 RX ORDER — PANTOPRAZOLE SODIUM 40 MG/1
40 TABLET, DELAYED RELEASE ORAL 2 TIMES DAILY
Status: DISCONTINUED | OUTPATIENT
Start: 2022-04-20 | End: 2022-04-21 | Stop reason: HOSPADM

## 2022-04-20 RX ORDER — LOSARTAN POTASSIUM 50 MG/1
50 TABLET ORAL DAILY
Status: DISCONTINUED | OUTPATIENT
Start: 2022-04-20 | End: 2022-04-20

## 2022-04-20 RX ADMIN — PROPOFOL 80 MG: 10 INJECTION, EMULSION INTRAVENOUS at 01:04

## 2022-04-20 RX ADMIN — LIDOCAINE HYDROCHLORIDE 100 MG: 20 INJECTION, SOLUTION INTRAVENOUS at 01:04

## 2022-04-20 RX ADMIN — DILTIAZEM HYDROCHLORIDE 180 MG: 180 CAPSULE, COATED, EXTENDED RELEASE ORAL at 03:04

## 2022-04-20 RX ADMIN — SODIUM CHLORIDE 250 ML: 0.9 INJECTION, SOLUTION INTRAVENOUS at 07:04

## 2022-04-20 RX ADMIN — ASPIRIN 325 MG ORAL TABLET 325 MG: 325 PILL ORAL at 08:04

## 2022-04-20 RX ADMIN — DILTIAZEM HYDROCHLORIDE 20 MG: 5 INJECTION INTRAVENOUS at 07:04

## 2022-04-20 RX ADMIN — DILTIAZEM HYDROCHLORIDE 15 MG: 5 INJECTION INTRAVENOUS at 07:04

## 2022-04-20 RX ADMIN — PROPOFOL 50 MG: 10 INJECTION, EMULSION INTRAVENOUS at 01:04

## 2022-04-20 RX ADMIN — APIXABAN 5 MG: 5 TABLET, FILM COATED ORAL at 03:04

## 2022-04-20 RX ADMIN — APIXABAN 5 MG: 5 TABLET, FILM COATED ORAL at 08:04

## 2022-04-20 RX ADMIN — PANTOPRAZOLE SODIUM 40 MG: 40 TABLET, DELAYED RELEASE ORAL at 08:04

## 2022-04-20 RX ADMIN — SODIUM CHLORIDE 1000 ML: 0.9 INJECTION, SOLUTION INTRAVENOUS at 07:04

## 2022-04-20 RX ADMIN — LABETALOL HYDROCHLORIDE 5 MG: 5 INJECTION INTRAVENOUS at 08:04

## 2022-04-20 RX ADMIN — DIGOXIN 500 MCG: 0.25 INJECTION INTRAMUSCULAR; INTRAVENOUS at 08:04

## 2022-04-20 RX ADMIN — DIGOXIN 250 MCG: 0.25 INJECTION INTRAMUSCULAR; INTRAVENOUS at 09:04

## 2022-04-20 NOTE — NURSING
pts sister at bedside; pt reports throat sore and post procedure teaching done and verbalizes understanding; pt has an occ dry cough and no other complaints; pt has mask and wearing glasses;chart at bedside

## 2022-04-20 NOTE — NURSING
Patient received from ED on ED stretcher with ED nurse Jaylin LEWIS And bedside report received; pt aao; pt has been NPO for procedure and reports NPO since 7 pm on 4-19; pt connected to monitor, pulse ox and bp cuff and defib pads applied to front and back and on monitor; A Flutter on monitor; iv access patent rt arm; rails up x 2; yellow socks in place; pre/post procedure teaching discussed and pt verbalized understanding and sister in waiting room; skin wm dry color pk; no chest pain reported

## 2022-04-20 NOTE — ED PROVIDER NOTES
Encounter Date: 4/20/2022    SCRIBE #1 NOTE: I, Stalin Herring, am scribing for, and in the presence of,  Zeferino Ponce MD. I have scribed the following portions of the note - Other sections scribed: HPI, ROS, PE.       History     Chief Complaint   Patient presents with    Tachycardia     Pt was in out pt sx for sx on foot, and sent to ed for asymptomatic tachycardia in the 150's       Starla Fisher is a 51 y.o. female who presents to the ED for evaluation of palpitations, onset today.  Pt notes that she visited Dr. White today for surgery, when he discovered she was tachycardic and sent her to the ED.  Pt notes that she can feel her heart fluttering in her chest.  Pt notes that she received normal blood work, EKG, and chest X-ray a few weeks ago during pre-op.  Pt notes daily use of blood pressure medicine.  Pt also notes dizziness.  Pt denies any history of similar symptoms.  Pt denies any history of Thyroid problems.  Pt denies chest pain, abdominal pain, or any other associated symptoms.    The history is provided by the patient. No  was used.     Review of patient's allergies indicates:  No Known Allergies  Past Medical History:   Diagnosis Date    Anxiety     Depression     GERD (gastroesophageal reflux disease)     Hypertension     Migraine headache     Sleep difficulties      Past Surgical History:   Procedure Laterality Date    BREAST SURGERY      ENDOMETRIAL ABLATION      EYE SURGERY      FOOT SURGERY Right 10/2018    bunion    IMPLANTATION OF PERMANENT SACRAL NERVE STIMULATOR N/A 2/15/2022    Procedure: INSERTION, NEUROSTIMULATOR, PERMANENT, SACRAL;  Surgeon: David Brown MD;  Location: [facility] OR [address] [place] [address];  Service: Urology;  Laterality: N/A;    TONSILLECTOMY      TOTAL REDUCTION MAMMOPLASTY Bilateral      Family History   Problem Relation Age of Onset    Atrial fibrillation Mother     Thyroid disease Mother     Dementia Mother     Diabetes Father      Prostate cancer Father     Cancer Paternal Grandfather     Breast cancer Maternal Grandmother     Migraines Sister      Social History     Tobacco Use    Smoking status: Never Smoker    Smokeless tobacco: Never Used   Substance Use Topics    Alcohol use: No    Drug use: No     Review of Systems   Cardiovascular: Positive for palpitations. Negative for chest pain.   Gastrointestinal: Negative for abdominal pain.   Neurological: Positive for dizziness.   All other systems reviewed and are negative.      Physical Exam     Initial Vitals [04/20/22 0642]   BP Pulse Resp Temp SpO2   118/73 (!) 135 (!) 23 -- 98 %      MAP       --         Physical Exam    Nursing note and vitals reviewed.  Constitutional: She appears well-developed and well-nourished.   HENT:   Head: Normocephalic.   Right Ear: Hearing and tympanic membrane normal.   Left Ear: Hearing and tympanic membrane normal.   Nose: Nose normal.   Mouth/Throat: Oropharynx is clear and moist.   Eyes: Lids are normal. Pupils are equal, round, and reactive to light.   Neck:   Normal range of motion.  Cardiovascular: Tachycardia present.    Pulmonary/Chest: Breath sounds normal. No respiratory distress. She has no wheezes. She has no rhonchi.   Abdominal: Abdomen is soft. There is no abdominal tenderness.   Musculoskeletal:         General: Normal range of motion.      Cervical back: Normal range of motion. No rigidity.      Comments: No lower extremity edema.     Neurological: She is alert and oriented to person, place, and time.   Skin: Skin is warm and dry. No rash noted.   Psychiatric: She has a normal mood and affect. Her behavior is normal. Judgment and thought content normal.         ED Course   Critical Care    Date/Time: 4/20/2022 12:09 PM  Performed by: Zeferino Ponce MD  Authorized by: Zeferino Ponce MD   Direct patient critical care time: 90 minutes  Additional history critical care time: 5 minutes  Ordering / reviewing critical care  time: 15 minutes  Documentation critical care time: 15 minutes  Consulting other physicians critical care time: 10 minutes  Total critical care time (exclusive of procedural time) : 135 minutes  Critical care was time spent personally by me on the following activities: examination of patient, ordering and performing treatments and interventions, ordering and review of radiographic studies, re-evaluation of patient's condition, review of old charts, pulse oximetry, ordering and review of laboratory studies, obtaining history from patient or surrogate, evaluation of patient's response to treatment and discussions with consultants.        Labs Reviewed   CBC W/ AUTO DIFFERENTIAL - Abnormal; Notable for the following components:       Result Value    Hemoglobin 11.9 (*)     MCHC 30.8 (*)     All other components within normal limits   COMPREHENSIVE METABOLIC PANEL - Abnormal; Notable for the following components:    Glucose 121 (*)     All other components within normal limits   TROPONIN I - Abnormal; Notable for the following components:    Troponin I 0.043 (*)     All other components within normal limits   B-TYPE NATRIURETIC PEPTIDE - Abnormal; Notable for the following components:     (*)     All other components within normal limits   MAGNESIUM   TSH   TROPONIN I        ECG Results          EKG 12-lead (In process)  Result time 04/20/22 08:58:47    In process by Interface, Lab In [facility] (04/20/22 08:58:47)                 Narrative:    Test Reason : I48.92,    Vent. Rate : 145 BPM     Atrial Rate : 312 BPM     P-R Int : 000 ms          QRS Dur : 070 ms      QT Int : 312 ms       P-R-T Axes : 000 061 -18 degrees     QTc Int : 484 ms    Atrial flutter with variable A-V block  Low voltage QRS  T wave abnormality, consider inferior ischemia  T wave abnormality, consider anterolateral ischemia  Abnormal ECG  When compared with ECG of 20-APR-2022 06:38,  T wave inversion now evident in Anterior leads    Referred  By: RAZ   SELF           Confirmed By:                              EKG 12-lead (In process)  Result time 04/20/22 08:58:20    In process by Interface, Lab In [facility] (04/20/22 08:58:20)                 Narrative:    Test Reason : R00.0,    Vent. Rate : 148 BPM     Atrial Rate : 315 BPM     P-R Int : 000 ms          QRS Dur : 066 ms      QT Int : 184 ms       P-R-T Axes : 218 054 013 degrees     QTc Int : 288 ms    Atrial flutter with variable A-V block  Low voltage QRS  Nonspecific ST and T wave abnormality  Abnormal ECG  When compared with ECG of 20-APR-2022 06:12,  Atrial flutter has replaced Sinus rhythm  Non-specific change in ST segment in Inferior leads  Nonspecific T wave abnormality, worse in Lateral leads    Referred By: [email or web address]   SELF           Confirmed By:                             Imaging Results          X-Ray Chest 1 View (Final result)  Result time 04/20/22 07:23:58    Final result by Abraham Us MD (04/20/22 07:23:58)                 Impression:      No convincing evidence of acute cardiopulmonary disease.      Electronically signed by: Abraham Us  Date:    04/20/2022  Time:    07:23             Narrative:    EXAMINATION:  XR CHEST 1 VIEW    CLINICAL HISTORY:  tachycardia;    TECHNIQUE:  Single frontal view of the chest was performed.    COMPARISON:  Chest radiograph performed 04/04/2022    FINDINGS:  Examination limited at the lung apices due to overlying soft tissue structures of the neck.    Monitoring leads overlie the chest.  The cardiomediastinal contour appears grossly unchanged with enlargement the cardiac silhouette.  Prominence of the central pulmonary vasculature is again noted.    No definite pneumothorax or large volume pleural effusion.  No acute findings suggested in the visualized abdomen.  Osseous and soft tissue structures appear without definite acute abnormality.                                 Medications   diltiaZEM injection 15 mg (15 mg Intravenous Given  4/20/22 0707)   sodium chloride 0.9% bolus 1,000 mL (0 mLs Intravenous Stopped 4/20/22 0744)   diltiaZEM injection 20 mg (20 mg Intravenous Given 4/20/22 0729)   sodium chloride 0.9% bolus 250 mL (0 mLs Intravenous Stopped 4/20/22 0847)   digoxin injection 500 mcg (500 mcg Intravenous Given 4/20/22 0811)   aspirin tablet 325 mg (325 mg Oral Given 4/20/22 0850)   labetaloL injection 5 mg (5 mg Intravenous Given 4/20/22 0850)   digoxin injection 250 mcg (250 mcg Intravenous Given 4/20/22 0956)     Medical Decision Making:   History:   Old Medical Records: I decided to obtain old medical records.  Initial Assessment:   Starla Fisher is a 51 y.o. female who presents to the ED for evaluation of palpitations, onset today.  Clinical Tests:   Lab Tests: Ordered and Reviewed  Radiological Study: Ordered and Reviewed  ED Management:  Patient was initially given intravenous Cardizem x2, with no affect on heart rate.  Dr. Holbrook was consulted who suggested giving the patient digoxin.  In spite of multiple doses of this patient has remained with a heart rate in the 140s.  She will be admitted for observation and possible cardioversion.          Scribe Attestation:   Scribe #1: I performed the above scribed service and the documentation accurately describes the services I performed. I attest to the accuracy of the note.                 Scribe attestation: I, Dr. Ponce, personally performed the services described in this documentation.  All medical record entries made by the scribe were at my direction and in my presence.  I have reviewed the chart and agree that the record reflects my personal performance and is accurate and complete.    Clinical Impression:   Final diagnoses:  [R00.0] Tachycardia  [I48.92] Atrial flutter          ED Disposition Condition    Observation               Zeferino Ponce MD  04/20/22 1210

## 2022-04-20 NOTE — ANESTHESIA POSTPROCEDURE EVALUATION
Anesthesia Post Evaluation    Patient: Starla Fisher    Procedure(s) Performed: Procedure(s) (LRB):  Transesophageal echo (ASTRID) intra-procedure log documentation (N/A)    Final Anesthesia Type: MAC      Patient location during evaluation: Cath Lab  Patient participation: Yes- Able to Participate  Level of consciousness: awake and alert  Post-procedure vital signs: reviewed and stable  Pain management: adequate  Airway patency: patent    PONV status at discharge: No PONV  Anesthetic complications: no      Cardiovascular status: blood pressure returned to baseline and hemodynamically stable  Respiratory status: unassisted, spontaneous ventilation and room air  Hydration status: euvolemic  Follow-up not needed.          Vitals Value Taken Time   /65 04/20/22 1353   Temp 98 04/20/22 1353   Pulse 93 04/20/22 1353   Resp 14 04/20/22 1353   SpO2 100 04/20/22 1353         No case tracking events are documented in the log.      Pain/Anca Score: Anca Score: 8 (4/20/2022  1:50 PM)

## 2022-04-20 NOTE — PLAN OF CARE
VN cued into room to complete admit assessment. VIP model introduced; VN working alongside bedside treatment team.  Plan of care reviewed with patient. Patient informed of fall risk, fall precautions, call light within reach, side rails x2 elevated. Patient notified to ask staff for assistance. Patient verbalized complete understanding. Time allowed for questions. Will continue to monitor and intervene as needed.

## 2022-04-20 NOTE — CONSULTS
U Cardiology Consult Note     Consulting physician: Dr. Alexis Ingram  Fellow : Dr. Hogue   Resident: Maria Paula Reyes Ramirez    Date of Admit: 4/20/2022    Chief Complaint     Tachycardia (Pt was in out pt surgery for on right foot, and sent to ed for asymptomatic tachycardia in the 150's  )   for less than 1 day     Subjective:      History of Present Illness:  Starla Fisher is a 51 y.o. female who  has a past medical history of Anxiety autism, Depression, GERD (gastroesophageal reflux disease), Hypertension, Migraine headache. The patient presented to [facility] on 4/20/2022 with a primary complaint of Tachycardia (Pt was in out pt sx for sx on foot, and sent to ed for asymptomatic tachycardia in the 150's  )  for less than 1 day.     The patient was in their usual state of health until this morning when she felt lightheaded and dizzy while on her way to the podiatrist as she was scheduled to have surgery of her right foot. She felt palpitations and was feeling very anxious.When they measure her HR they noticed she was very tachycardic and sent her to the ED. Associated with the palpitations she says she had some shortness of breath and chest pain which she described as mild, lasting a few minutes, which went away on its own.    She says that for the past months she has been getting increasingly short of breath, to the point that just walking to the mailbox from her house makes her uncomfortable. 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 She admits to having orthopnea, sometimes PND? (questionable, also getting studies to investigate possibility of CARL), she has also been endorsing bilateral leg swelling.     At this moment, her symptoms include mild shortness of breath and mild chest pain (nonradiating, pressure-like).       Past Medical History:  Past Medical History:   Diagnosis Date    Anxiety     Depression     GERD  "(gastroesophageal reflux disease)     Hypertension     Migraine headache     Sleep difficulties    autism     Past Surgical History:  Past Surgical History:   Procedure Laterality Date    BREAST SURGERY      ENDOMETRIAL ABLATION      EYE SURGERY      FOOT SURGERY Right 10/2018    bunion    IMPLANTATION OF PERMANENT SACRAL NERVE STIMULATOR N/A 2/15/2022    Procedure: INSERTION, NEUROSTIMULATOR, PERMANENT, SACRAL;  Surgeon: David Brown MD;  Location: [facility] OR [address] [place] [address];  Service: Urology;  Laterality: N/A;    TONSILLECTOMY      TOTAL REDUCTION MAMMOPLASTY Bilateral        Allergies:  Review of patient's allergies indicates:  No Known Allergies    Medications :   Losartan 50 mg   Sumatriptan prn  and monoclonal antibody (monthly for migraines)   escitalopram ? Patient unsure, trazodone on her med list but she did not report this med.   protonix 20 mg  Valium prn for anxiety     Family History:  Family History   Problem Relation Age of Onset    Atrial fibrillation Mother     Thyroid disease Mother     Dementia Mother     Diabetes Father     Prostate cancer Father     Cancer Paternal Grandfather     Breast cancer Maternal Grandmother     Migraines Sister        Social History:  Social History     Tobacco Use    Smoking status: Never Smoker    Smokeless tobacco: Never Used   Substance Use Topics    Alcohol use: No    Drug use: No   lives with sister     Review of Systems:  Pertinent items are noted in HPI. All other systems are reviewed and are negative.     Objective:   Last 24 Hour Vital Signs:  BP  Min: 96/61  Max: 123/65  Temp  Av.6 °F (37 °C)  Min: 98.6 °F (37 °C)  Max: 98.6 °F (37 °C)  Pulse  Av.4  Min: 110  Max: 154  Resp  Av.1  Min: 15  Max: 37  SpO2  Av.7 %  Min: 92 %  Max: 98 %  Height  Av' 4" (162.6 cm)  Min: 5' 4" (162.6 cm)  Max: 5' 4" (162.6 cm)  Weight  Av.1 kg (300 lb)  Min: 136.1 kg (300 lb)  Max: 136.1 kg (300 lb)  Body mass index is 51.49 " kg/m².  No intake/output data recorded.    Physical Examination:  General: alert and oriented, no acute distress  HEENT: Normocephalic, atraumatic, EOMi  CV: regularly irregular rhythm,  normal s1 and s2, no murmurs auscultated.   Respiratory: CTAB, no crackles or wheezes  Abdomen: Soft, nontender, nondistended, normoactive bowel sounds  Extremities: No visible or palpable edema. Atraumatic. Distal Pulses intact.  Skin: Warm and dry, no rashes/lesions/bruising  Neuro: AAOx3. No focal deficits.    Laboratory:  Most Recent Data:  CBC:   Lab Results   Component Value Date    WBC 7.53 04/20/2022    HGB 11.9 (L) 04/20/2022    HCT 38.6 04/20/2022     04/20/2022    MCV 89 04/20/2022    RDW 14.3 04/20/2022     BMP:   Lab Results   Component Value Date     04/20/2022    K 4.6 04/20/2022     04/20/2022    CO2 25 04/20/2022    BUN 13 04/20/2022    CREATININE 0.9 04/20/2022     (H) 04/20/2022    CALCIUM 9.2 04/20/2022    MG 2.0 04/20/2022     LFTs:   Lab Results   Component Value Date    PROT 7.2 04/20/2022    ALBUMIN 3.6 04/20/2022    BILITOT 0.4 04/20/2022    AST 13 04/20/2022    ALKPHOS 91 04/20/2022    ALT 15 04/20/2022     Coags: No results found for: INR, PROTIME, PTT  FLP:   Lab Results   Component Value Date    CHOL 205 (H) 02/24/2022    HDL 61 02/24/2022    LDLCALC 123.4 02/24/2022    TRIG 103 02/24/2022    CHOLHDL 29.8 02/24/2022     DM:   Lab Results   Component Value Date    HGBA1C 5.6 02/24/2022    HGBA1C 5.4 04/21/2021    LDLCALC 123.4 02/24/2022    CREATININE 0.9 04/20/2022     Thyroid:   Lab Results   Component Value Date    TSH 2.637 04/20/2022     Anemia: No results found for: IRON, TIBC, FERRITIN, OESVXWWD23, FOLATE  Cardiac: No results found for: TROPONINI, CKTOTAL, CKMB, BNP  Urinalysis:   Lab Results   Component Value Date    LABURIN  04/13/2021     Multiple organisms isolated. None in predominance.  Repeat if    LABURIN clinically necessary. 04/13/2021    COLORU Yellow 04/13/2021     SPECGRAV >=1.030 (A) 04/13/2021    NITRITE Negative 04/13/2021    KETONESU Negative 04/13/2021    UROBILINOGEN Negative 04/13/2021       Trended Lab Data:  Recent Labs   Lab 04/20/22  0711   WBC 7.53   HGB 11.9*   HCT 38.6      MCV 89   RDW 14.3      K 4.6      CO2 25   BUN 13   CREATININE 0.9   *   PROT 7.2   ALBUMIN 3.6   BILITOT 0.4   AST 13   ALKPHOS 91   ALT 15       Trended Cardiac Data:  No results for input(s): TROPONINI, CKTOTAL, CKMB, BNP in the last 168 hours.    Microbiology Data:    Other Results:  EKG (my interpretation): Atrial flutter with variable av block.  Axis: 60.     Radiology:  Imaging Results          X-Ray Chest 1 View (Final result)  Result time 04/20/22 07:23:58    Final result by Abraham Us MD (04/20/22 07:23:58)                 Impression:      No convincing evidence of acute cardiopulmonary disease.      Electronically signed by: Abraham Us  Date:    04/20/2022  Time:    07:23             Narrative:    EXAMINATION:  XR CHEST 1 VIEW    CLINICAL HISTORY:  tachycardia;    TECHNIQUE:  Single frontal view of the chest was performed.    COMPARISON:  Chest radiograph performed 04/04/2022    FINDINGS:  Examination limited at the lung apices due to overlying soft tissue structures of the neck.    Monitoring leads overlie the chest.  The cardiomediastinal contour appears grossly unchanged with enlargement the cardiac silhouette.  Prominence of the central pulmonary vasculature is again noted.    No definite pneumothorax or large volume pleural effusion.  No acute findings suggested in the visualized abdomen.  Osseous and soft tissue structures appear without definite acute abnormality.                                     Assessment and Plan:    Patient with Past medical history of HTN, autism, depression and anxiety who were consulted for tachycardia. She has no previous history of cardiac disease, this is first diagnosed episode. Possible triggers  could be PAC, undiagnosed sleep apnea (undergoing workup as outpatient) , morbid obesity, among others.    Atrial Flutter with variable AV block   -bedside echo with normal LV and RV function, mitral and aortic valve unremarkable. No wall motion abnormalities  -CHADsVASC score of 2, anticoagulation will be discussed.  - s/p diltiazem boluses keeping HR in the 140's.   -started digoxin load 500 mcg, will give 250 mcg of IV digoxin in 2 hours and evaluate for conversion to sinus rhythm.       Code Status:     Full     Maria Paula Reyes Ramirez, MD  [facility] HO-2

## 2022-04-20 NOTE — ANESTHESIA PREPROCEDURE EVALUATION
"                                                                                                             04/20/2022  Starla Fisher is a 51 y.o., female scheduled for ASTRID cardioversion for a flutter in pre op    H&P completed 4/4/22  Per Dr. Mosquera with family medicine,   "She is Low to intermediate risk for surgery and proceed for it".     Past Medical History:   Diagnosis Date    Anxiety     Depression     GERD (gastroesophageal reflux disease)     Hypertension     Migraine headache     Sleep difficulties      Past Surgical History:   Procedure Laterality Date    BREAST SURGERY      ENDOMETRIAL ABLATION      EYE SURGERY      FOOT SURGERY Right 10/2018    bunion    IMPLANTATION OF PERMANENT SACRAL NERVE STIMULATOR N/A 2/15/2022    Procedure: INSERTION, NEUROSTIMULATOR, PERMANENT, SACRAL;  Surgeon: David Brown MD;  Location: [facility] OR [address] [place] [address];  Service: Urology;  Laterality: N/A;    TONSILLECTOMY      TOTAL REDUCTION MAMMOPLASTY Bilateral          Pre-op Assessment    I have reviewed the Patient Summary Reports.     I have reviewed the Nursing Notes.    I have reviewed the Medications.     Review of Systems  Anesthesia Hx:  Hx of Anesthetic complications  History of prior surgery of interest to airway management or planning: Personal Hx of Anesthesia complications, Post-Operative Nausea/Vomiting, with every anesthetic, treatment not known   Social:  Non-Smoker, No Alcohol Use    Hematology/Oncology:  Hematology Normal   Oncology Normal     EENT/Dental:EENT/Dental Normal   Cardiovascular:   Exercise tolerance: poor Hypertension  Denies Angina.    Pulmonary:   Sleep Apnea (in the process of being tested for CARL)    Renal/:  Renal/ Normal   Devices/Procedures/Surgery (interstim in place).   Hepatic/GI:   GERD, poorly controlled Denies Liver Disease.    Musculoskeletal:  Musculoskeletal Normal    Neurological:   Denies TIA. Denies CVA. Headaches Denies Seizures.    Endocrine:  Endocrine " Normal  Morbid Obesity / BMI > 40  Psych:   Psychiatric History anxiety depression          Physical Exam  General: Well nourished and Cooperative    Airway:  Mallampati: I   Mouth Opening: Normal  TM Distance: Normal  Tongue: Normal  Neck ROM: Normal ROM    Dental:  Intact, Retainer    Chest/Lungs:  Clear to auscultation, Normal Respiratory Rate    Heart:  Rate: Normal  Rhythm: Regular Rhythm  Sounds: Normal    EKG 4/4/22  Normal sinus rhythm   Normal ECG   No previous ECGs available   Confirmed by Radu Rivera MD ([id number]) on 4/5/2022 5:45:11 PM     CXR 4/4/22  The lungs are clear, with normal appearance of pulmonary vasculature and no pleural effusion or pneumothorax.     The cardiac silhouette is enlarged in size. The hilar and mediastinal contours are unremarkable.     Bones are intact.     Impression:     No acute abnormality.  Enlarged cardiac silhouette.    Anesthesia Plan  Type of Anesthesia, risks & benefits discussed:    Anesthesia Type: MAC  Intra-op Monitoring Plan: Standard ASA Monitors  Post Op Pain Control Plan: multimodal analgesia  Induction:  IV  Informed Consent: Informed consent signed with the Patient and all parties understand the risks and agree with anesthesia plan.  All questions answered.   ASA Score: 3 Emergent  Anesthesia Plan Notes:       Ready For Surgery From Anesthesia Perspective.     .

## 2022-04-20 NOTE — ED NOTES
Patient reports feeling dizzy and having 7/10 chest pain this morning in pre-op surgery to cardiology team. Patient reports SOB upon exertion (walking to mailbox, etc.) that has been going on for a while but has gotten worse lately. Patient has a hx of HTN. Patient states she is feeling SOB and 7/10 chest pain at this time. Dr. Stack and cardiology @ bedside and aware. Preforming bedside Echocardiogram w/portable machine.

## 2022-04-20 NOTE — ED NOTES
Patient HR remains between 110-150s. Patient continues to report improvement of SOB, dizziness/lightheadedness, and chest pain.

## 2022-04-20 NOTE — NURSING
Patient was transfer to the floor from the ER after completing ASTRID and cardioversion. Telemetry monitoring initiated, normal sinus rhythm. Medications administered per order. Bed in low position, wheels locked, call bell at reach. Patient up with assistance or the use of cane located at bedside. Patient instructed to call for assistance as needed.

## 2022-04-20 NOTE — PROGRESS NOTES
Remains in atrial flutter with fast v entricular rate in spite of diltiazem given by ER and digoxin. Discussed this with patient and feel best option is ASTRID cardioversion. She is agreeable and will proceed with anesthesia sedation.

## 2022-04-20 NOTE — ED NOTES
"Patient now reporting 9/10 mid sternal chest pain that she reports as feeling "like a pressure pain". Patient also reporting dizziness; denies headache. /65 -151 a flutter. Patient reports improved SOB on 2L NC @ 99% RR 14. Dr. Banuelos Notified.   "

## 2022-04-20 NOTE — ED NOTES
"Pt brought down from out- patient surgery for c/o "SVT". Pt has no complaint at this time, no chest pain, no SOB, no N/V or feeling faint. Pt A & O x 3, denies SOB, fever, chills, cough and N/V/D. Respirations are even and unlabored. Skin is warm, dry and pink. VS. LAZARO x 3mm. BBS- CTA. Abd- SNT. PSM x 4 exts. Pt is connected to the pulse ox, B/P cuff and EKG monitor. Bed is locked and in the low position w/ the side rails up and locked for pt safety. Call bell and sister @ the BS. Will continue to monitor closely.  "

## 2022-04-20 NOTE — ED NOTES
Patient sitting up in bed, talking with ED staff. Sister @ bedside. Transporting patient to pre/post cath lab for ASTRID/cardioversion. AAOx4, on 2L NC, a flutter on monitor. Reprt called to Zeny.

## 2022-04-20 NOTE — PHARMACY MED REC
"  [facility]           Pharmacy  Admission Medication History     The home medication history was taken by Marla Guerrero.      Medication history obtained from Medications listed below were obtained from: Patient/family    Based on information gathered for medication list, you may go to "Admission" then "Reconcile Home Medications" tabs to review and/or act upon those items.      The home medication list has been updated by the Pharmacy department.    Please read ALL comments highlighted in yellow.    Please address this information as you see fit.     Feel free to contact us if you have any questions or require assistance.    The medications listed below were removed from the home medication list.  Please reorder if appropriate:     Patient reports NOT TAKING the following medication(s):  o cheratussin ac 10-100mg/5ml syrup        Current Facility-Administered Medications on File Prior to Encounter   Medication Dose Route Frequency Provider Last Rate Last Admin    0.9%  NaCl infusion   Intravenous Continuous Sathish Briseno MD   Stopped at 02/15/22 0948    LIDOcaine (PF) 10 mg/ml (1%) injection 10 mg  1 mL Intradermal Once Sathish Briseno MD        sodium chloride 0.9% flush 10 mL  10 mL Intravenous PRN Zelalem Solorzano DPM        [DISCONTINUED] cefazolin (ANCEF) 2 gram in dextrose 5% 50 mL IVPB (premix)  2 g Intravenous On Call Procedure Zelalem Solorzano DPM        [DISCONTINUED] lactated ringers infusion   Intravenous Continuous Jessica Storm DNP        [DISCONTINUED] LIDOcaine (PF) 10 mg/ml (1%) injection 10 mg  1 mL Intradermal Once Jessica Storm DNP         Current Outpatient Medications on File Prior to Encounter   Medication Sig Dispense Refill    clotrimazole-betamethasone 1-0.05% (LOTRISONE) cream Apply topically 2 (two) times daily. 15 g 1    diazePAM (VALIUM) 5 MG tablet Take 1 tablet (5 mg total) by mouth daily as needed for Anxiety. 15 tablet 2 "    EScitalopram oxalate (LEXAPRO) 10 MG tablet Take 0.5 tablets (5 mg total) by mouth once daily. 30 tablet 5    galcanezumab-gnlm (EMGALITY PEN) 120 mg/mL PnIj Inject 120 mg into the skin every 28 days. 1 mL 11    losartan (COZAAR) 50 MG tablet Take 1 tablet (50 mg total) by mouth once daily. 90 tablet 3    metronidazole 0.75% (METROCREAM) 0.75 % Crea Apply topically 2 (two) times daily. 45 g 3    mirabegron (MYRBETRIQ) 50 mg Tb24 Take 1 tablet (50 mg total) by mouth once daily. 30 tablet 11    multivit-minerals/folic acid (ADULT MULTIVITAMIN GUMMIES ORAL) Take 1 tablet by mouth Daily. Continue to hold until after Surgery.      pantoprazole (PROTONIX) 40 MG tablet Take 40 mg by mouth 2 (two) times daily.      promethazine (PHENERGAN) 25 MG tablet Take 25 mg by mouth every 6 (six) hours as needed for Nausea.      sumatriptan (IMITREX STATDOSE) 6 mg/0.5 mL kit Inject 0.5 mLs (6 mg total) into the skin as needed for Migraine. 3 mL 5    sumatriptan (IMITREX) 100 MG tablet Take 1 tablet (100 mg total) by mouth every 2 (two) hours as needed for Migraine. 9 tablet 11    SUMAtriptan (IMITREX) 20 mg/actuation nasal spray 1 spray (20 mg total) by Nasal route as needed for Migraine (max 40mg/24hr). 6 each 3    traMADoL (ULTRAM) 50 mg tablet Take 1 tablet (50 mg total) by mouth every 6 (six) hours. 5 tablet 0    traZODone (DESYREL) 150 MG tablet Take half or whole tablet by mouth before bed as needed for insomnia (Patient taking differently: Take  mg by mouth nightly as needed for Insomnia.) 30 tablet 5    blood-glucose meter (TRUE METRIX GLUCOSE METER) kit Check BS as needed 1 each 0    dexamethasone (DECADRON) 4 mg/mL injection       fentaNYL (SUBLIMAZE) 50 mcg/mL injection       lancets (TRUEPLUS LANCETS) 33 gauge Misc Inject 1 lancet into the skin 3 (three) times daily. 200 each 0    midazolam (VERSED) 1 mg/mL injection       phenylephrine (TEODORA-SYNEPHRINE) 10 mg/mL injection       propofoL  (DIPRIVAN) 10 mg/mL infusion       QUELICIN 20 mg/mL injection       topiramate (TOPAMAX) 50 MG tablet TAKE 1 TABLET(50 MG) BY MOUTH THREE TIMES DAILY (Patient not taking: No sig reported) 270 tablet 0    vancomycin (VANCOCIN) 1,000 mg injection       [DISCONTINUED] guaiFENesin-codeine 100-10 mg/5 ml (CHERATUSSIN AC)  mg/5 mL syrup Take 5 mLs by mouth nightly as needed for Cough. 118 mL 0    [DISCONTINUED] pantoprazole (PROTONIX) 40 MG tablet Take 1 tablet (40 mg total) by mouth 2 (two) times daily. 60 tablet 0       Please address this information as you see fit.  Feel free to contact us if you have any questions or require assistance.    Marla Guerrero  [phone]                .

## 2022-04-20 NOTE — NURSING
Hand off telephone report to Zeny nurse for pt to be admitted to room 404 and tele box to bedside and applied to pt for transport; pt tolerating po fluids and snack; sister at bedside; pt has belongings and mask for transport; SR on monitor; pt has a sore throat and no other complaints; iv acces intact rt forearm

## 2022-04-20 NOTE — ED NOTES
Spoke with Dr. Banuelos. Patient's BP decreased to 101/68 -150 post IV push diltiazem. MD updated. Additional fluid bolus running.

## 2022-04-20 NOTE — PROCEDURES
ASTRID - CARDIOVERSION REPORT:    Indication: Typical Atrial Flutter  Supervising Cardiologist: Dr. Consuelo Ingram MD  Cardiology Fellow: Dc Hogue MD      - Risks and benefits of the procedure were explained to the patient. Consent obtained.   - The patient was brought to the testing area, and positioned in the usual fashion.  - Procedure done under monitored anesthesia Care administered    Procedure:   - ASTRID was performed. BERE visualized without evidence of BERE thrombus. LV function appeared normal.   - Pt underwent synchronized cardioversion with 100J x1 with conversion to normal sinus rhythm. No complications observed.     Plan:   - Uninterrupted anticoagulation with Eliquis 5 mg BID for minimum 30 days.  - Will start diltiazem cd 180 mg daily.   - Monitor overnight on telemetry.

## 2022-04-20 NOTE — TRANSFER OF CARE
12-Hour CC    Monitor Summary  NSR/ST   bpm  .14/.07/.42   Anesthesia Transfer of Care Note    Patient: Starla Fisher    Procedure(s) Performed: Procedure(s) (LRB):  Transesophageal echo (ASTRID) intra-procedure log documentation (N/A)    Patient location: Cath Lab    Anesthesia Type: MAC    Transport from OR: Transported from OR on room air with adequate spontaneous ventilation    Post pain: adequate analgesia    Post assessment: no apparent anesthetic complications    Post vital signs: stable    Level of consciousness: awake    Nausea/Vomiting: no nausea/vomiting    Complications: none    Transfer of care protocol was followed      Last vitals:   Visit Vitals  BP (!) 156/90   Pulse (!) 147   Resp 17   Wt 136.1 kg (300 lb)   LMP  (LMP Unknown)   SpO2 100%   BMI 51.49 kg/m²

## 2022-04-20 NOTE — NURSING
Patient in room 404 and got off stretcher with assistance and ambulated to bed; pt aao; no complaints; sister present; pt has mask and wearing her glasses; tele box on pt; bedside report given to nurse Zeny; chart to nurse; bed low and locked; call bell in reach; yellow socks in place

## 2022-04-21 ENCOUNTER — TELEPHONE (OUTPATIENT)
Dept: FAMILY MEDICINE | Facility: CLINIC | Age: 52
End: 2022-04-21
Payer: MEDICARE

## 2022-04-21 VITALS
DIASTOLIC BLOOD PRESSURE: 68 MMHG | HEART RATE: 82 BPM | WEIGHT: 293 LBS | BODY MASS INDEX: 54 KG/M2 | OXYGEN SATURATION: 97 % | RESPIRATION RATE: 17 BRPM | SYSTOLIC BLOOD PRESSURE: 141 MMHG | TEMPERATURE: 98 F

## 2022-04-21 LAB
BSA FOR ECHO PROCEDURE: 2.48 M2
EJECTION FRACTION: 55 %
POCT GLUCOSE: 133 MG/DL (ref 70–110)

## 2022-04-21 PROCEDURE — 25000003 PHARM REV CODE 250: Performed by: INTERNAL MEDICINE

## 2022-04-21 PROCEDURE — 99226 PR SUBSEQUENT OBSERVATION CARE,LEVEL III: ICD-10-PCS | Mod: ,,, | Performed by: INTERNAL MEDICINE

## 2022-04-21 PROCEDURE — 99226 PR SUBSEQUENT OBSERVATION CARE,LEVEL III: CPT | Mod: ,,, | Performed by: INTERNAL MEDICINE

## 2022-04-21 PROCEDURE — 25000003 PHARM REV CODE 250: Performed by: STUDENT IN AN ORGANIZED HEALTH CARE EDUCATION/TRAINING PROGRAM

## 2022-04-21 PROCEDURE — G0378 HOSPITAL OBSERVATION PER HR: HCPCS

## 2022-04-21 PROCEDURE — 94761 N-INVAS EAR/PLS OXIMETRY MLT: CPT

## 2022-04-21 RX ORDER — LOSARTAN POTASSIUM 25 MG/1
25 TABLET ORAL DAILY
Status: DISCONTINUED | OUTPATIENT
Start: 2022-04-21 | End: 2022-04-21 | Stop reason: HOSPADM

## 2022-04-21 RX ORDER — DILTIAZEM HYDROCHLORIDE 240 MG/1
240 CAPSULE, COATED, EXTENDED RELEASE ORAL DAILY
Qty: 30 CAPSULE | Refills: 11 | Status: SHIPPED | OUTPATIENT
Start: 2022-04-22 | End: 2022-11-10 | Stop reason: SDUPTHER

## 2022-04-21 RX ORDER — LOSARTAN POTASSIUM 50 MG/1
50 TABLET ORAL DAILY
Status: DISCONTINUED | OUTPATIENT
Start: 2022-04-21 | End: 2022-04-21

## 2022-04-21 RX ORDER — DILTIAZEM HYDROCHLORIDE 120 MG/1
240 CAPSULE, COATED, EXTENDED RELEASE ORAL DAILY
Status: DISCONTINUED | OUTPATIENT
Start: 2022-04-21 | End: 2022-04-21 | Stop reason: HOSPADM

## 2022-04-21 RX ORDER — LOSARTAN POTASSIUM 25 MG/1
25 TABLET ORAL DAILY
Qty: 90 TABLET | Refills: 3 | Status: SHIPPED | OUTPATIENT
Start: 2022-04-21 | End: 2022-11-14 | Stop reason: SDUPTHER

## 2022-04-21 RX ADMIN — PANTOPRAZOLE SODIUM 40 MG: 40 TABLET, DELAYED RELEASE ORAL at 08:04

## 2022-04-21 RX ADMIN — APIXABAN 5 MG: 5 TABLET, FILM COATED ORAL at 08:04

## 2022-04-21 RX ADMIN — OXYBUTYNIN CHLORIDE 5 MG: 5 TABLET, EXTENDED RELEASE ORAL at 08:04

## 2022-04-21 RX ADMIN — ESCITALOPRAM 5 MG: 5 TABLET, FILM COATED ORAL at 08:04

## 2022-04-21 RX ADMIN — DILTIAZEM HYDROCHLORIDE 240 MG: 120 CAPSULE, COATED, EXTENDED RELEASE ORAL at 08:04

## 2022-04-21 RX ADMIN — LOSARTAN POTASSIUM 25 MG: 25 TABLET, FILM COATED ORAL at 08:04

## 2022-04-21 NOTE — NURSING
Patient safety maintained. Medications administered per order per pharmacy. Bed in low position, wheels locked, call bell at reach. Patient and family instructed to call for assistance as needed. UP to the bathroom with cane and standby assistance. Printed documentation provided for discharge and medications delivered per pharmacy at bedside. IV and telemetry removed, patient tolerated well.

## 2022-04-21 NOTE — PROGRESS NOTES
[facility]                    Pharmacy       Discharge Medication Education    Patient ACCEPTED medication education. Pharmacy has provided education on the name, indication, and possible side effects of the medication(s) prescribed, using teach-back method.     The following medications have also been discussed, during this admission.        Medication List        START taking these medications      apixaban 5 mg Tab  Commonly known as: ELIQUIS  Take 1 tablet (5 mg total) by mouth 2 (two) times daily.     diltiaZEM 240 MG 24 hr capsule  Commonly known as: CARDIZEM CD  Take 1 capsule (240 mg total) by mouth once daily.  Start taking on: April 22, 2022            CHANGE how you take these medications      losartan 25 MG tablet  Commonly known as: COZAAR  Take 1 tablet (25 mg total) by mouth once daily.  What changed:   medication strength  how much to take     traZODone 150 MG tablet  Commonly known as: DESYREL  Take half or whole tablet by mouth before bed as needed for insomnia  What changed:   how much to take  how to take this  when to take this  reasons to take this  additional instructions            CONTINUE taking these medications      ADULT MULTIVITAMIN GUMMIES ORAL     blood-glucose meter kit  Commonly known as: TRUE METRIX GLUCOSE METER  Check BS as needed     clotrimazole-betamethasone 1-0.05% cream  Commonly known as: LOTRISONE  Apply topically 2 (two) times daily.     dexamethasone 4 mg/mL injection  Commonly known as: DECADRON     diazePAM 5 MG tablet  Commonly known as: VALIUM  Take 1 tablet (5 mg total) by mouth daily as needed for Anxiety.     EMGALITY  mg/mL Pnij  Generic drug: galcanezumab-gnlm  Inject 120 mg into the skin every 28 days.     EScitalopram oxalate 10 MG tablet  Commonly known as: LEXAPRO  Take 0.5 tablets (5 mg total) by mouth once daily.     fentaNYL 50 mcg/mL injection  Commonly known as: SUBLIMAZE     lancets 33 gauge Misc  Commonly known as: TRUEPLUS  LANCETS  Inject 1 lancet into the skin 3 (three) times daily.     metronidazole 0.75% 0.75 % Crea  Commonly known as: METROCREAM  Apply topically 2 (two) times daily.     midazolam 1 mg/mL injection  Commonly known as: VERSED     mirabegron 50 mg Tb24  Commonly known as: MYRBETRIQ  Take 1 tablet (50 mg total) by mouth once daily.     pantoprazole 40 MG tablet  Commonly known as: PROTONIX     phenylephrine 10 mg/mL injection  Commonly known as: TEODORA-SYNEPHRINE     promethazine 25 MG tablet  Commonly known as: PHENERGAN     propofoL 10 mg/mL infusion  Commonly known as: DIPRIVAN     QUELICIN 20 mg/mL injection  Generic drug: succinylcholine     SUMAtriptan 20 mg/actuation nasal spray  Commonly known as: IMITREX  1 spray (20 mg total) by Nasal route as needed for Migraine (max 40mg/24hr).     * sumatriptan 6 mg/0.5 mL kit  Commonly known as: IMITREX STATDOSE  Inject 0.5 mLs (6 mg total) into the skin as needed for Migraine.     * sumatriptan 100 MG tablet  Commonly known as: IMITREX  Take 1 tablet (100 mg total) by mouth every 2 (two) hours as needed for Migraine.     topiramate 50 MG tablet  Commonly known as: TOPAMAX  TAKE 1 TABLET(50 MG) BY MOUTH THREE TIMES DAILY     traMADoL 50 mg tablet  Commonly known as: ULTRAM  Take 1 tablet (50 mg total) by mouth every 6 (six) hours.           * This list has 2 medication(s) that are the same as other medications prescribed for you. Read the directions carefully, and ask your doctor or other care provider to review them with you.                   Where to Get Your Medications        These medications were sent to [facility]  [address], [place] [address]      Hours: Mon-Fri, 8a-5:30p Phone: [phone]   apixaban 5 mg Tab  diltiaZEM 240 MG 24 hr capsule  losartan 25 MG tablet          Thank you  Shellie Jolly, PharmD  [phone]

## 2022-04-21 NOTE — PLAN OF CARE
Discharge orders noted. Additional clinical references attached. Patient's discharge instructions given by bedside RN and reviewed by this VN with patient. Education provided on home care instructions, new and previous medications, diagnosis, when to seek medical attention, and follow-up appointments. New medications delivered by pharmacy. Patient verbalized understanding and teach back method was used. Patient's family to provide ride/transportation home. All questions answered. Transport to [facility] requested. Floor nurse notified.

## 2022-04-21 NOTE — PLAN OF CARE
04/21/22 1300   Post-Acute Status   Post-Acute Authorization Other   Other Status No Post-Acute Service Needs     Future Appointments   Date Time Provider Department Center   4/25/2022  1:30 PM Samy Addison MD [facility] FAM MED [facility]   5/5/2022  1:15 PM Alexis Ingram MD [facility]  [facility]   5/12/2022 10:15 AM Zelalem Solorzano DPM [facility] PODIAT [place]   5/18/2022  1:20 PM David Brown MD [facility] UROLOGY [facility]   6/23/2022  1:00 PM Jaswinder Poe DO [facility] NEURO [facility]   7/28/2022  9:20 AM Ely Johnson, ESTEVAN [facility] SLEEP [facility]

## 2022-04-21 NOTE — PLAN OF CARE
[facility] - Telemetry  Initial Discharge Assessment       Primary Care Provider: Tahir Mosquera MD    Admission Diagnosis: Atrial flutter [I48.92]  Tachycardia [R00.0]  Typical atrial flutter [I48.3]  Plantar fasciitis of right foot [M72.2]  Achilles tendinosis of right lower extremity [M67.88]  Equinus contracture of right ankle [M24.571]    Admission Date: 4/20/2022  Expected Discharge Date: 4/21/2022    Consult: cards    Discharge Barriers Identified: None    Payor: HU[employer] MANAGED MEDICARE / Plan: HU[employer] SNP (SPECIAL NEEDS PLAN) / Product Type: Medicare Advantage /     Extended Emergency Contact Information  Primary Emergency Contact: Riya Fisher  Address: [address] [place] [address]           [place] [address]  Mobile Phone: [phone]  Relation: Sister  Secondary Emergency Contact: Clementina Sharp  Mobile Phone: [phone]  Relation: Relative    Discharge Plan A: Home with family  Discharge Plan B: [facility]      [id number] - [place] - [address] AT [facility] [address]  [address]  [place] [address]  Phone: [phone] Fax: [phone]    [facility] [place] [id number] [facility]  [address] [facility] [address]  Phone: [phone] Fax: [phone]      Initial Assessment (most recent)       Adult Discharge Assessment - 04/21/22 1050          Discharge Assessment    Assessment Type Discharge Planning Assessment     Confirmed/corrected address, phone number and insurance Yes     Confirmed Demographics Correct on Facesheet     Source of Information patient     Communicated CHUCHO with patient/caregiver Yes     Lives With sibling(s)   Riya kiran ([phone])    Do you expect to return to your current living situation? Yes     Do you have help at home or someone to help you manage your care at home? Yes     Prior to hospitilization cognitive status: Alert/Oriented     Current cognitive status: Alert/Oriented     Walking  or Climbing Stairs Difficulty ambulation difficulty, requires equipment     Dressing/Bathing Difficulty none     Equipment Currently Used at Home cane, quad;grab bar;walker, standard;other (see comments)   BP machine    Readmission within 30 days? No     Patient currently being followed by outpatient case management? No     Do you currently have service(s) that help you manage your care at home? No     Do you take prescription medications? Yes     Do you have prescription coverage? Yes     Do you have any problems affording any of your prescribed medications? No     Is the patient taking medications as prescribed? yes     How do you get to doctors appointments? car, drives self;family or friend will provide     Are you on dialysis? No     Do you take coumadin? No     Discharge Plan A Home with family     Discharge Plan B Home Health     DME Needed Upon Discharge  other (see comments)   tbd    Discharge Plan discussed with: Patient;Sibling     Discharge Barriers Identified None                     Patient resting quietly in bed with sister, Riya Fisher, at the bedside when CM rounded. Patient was admitted with atrial flutter & is being followed by [facility]. Patient had a ASTRID w/cardioversion done on 4/20/2022.    Patient lives with Riya, has equipment to assist with ADLs (quad cane noted at the bedside),& denied the need for assistance with transportation at time of discharge.     Patient in agreement with plan to discharge home with no needs today & verbalized understanding regarding the hospital follow up appointment with Dr. Ingram ([facility]) on 5/5/2022 at 1315. CM informed the patient that Dr. Tahir Mosquera's  will call the patient with a hospital follow up appointment date & time. Information added to the patient's discharge paperwork.       Will continue to follow.

## 2022-04-21 NOTE — PROGRESS NOTES
[facility] Progress Note     Consulting physician: Dr. Alexis Ingram  Fellow : Dr. Hogue   Resident: Maria Paula Reyes Ramirez    Date of Admit: 2022    Subjective:    Patient did not have any acute events overnight. She however still continues to endorse mild shortness of breath, lightheadedness and very mild chest pain.     Review of Systems:  Pertinent items are noted in HPI. All other systems are reviewed and are negative.     Objective:   Last 24 Hour Vital Signs:  BP  Min: 89/53  Max: 185/86  Temp  Av.9 °F (36.1 °C)  Min: 96.2 °F (35.7 °C)  Max: 97.8 °F (36.6 °C)  Pulse  Av.1  Min: 71  Max: 147  Resp  Av.3  Min: 13  Max: 26  SpO2  Av.4 %  Min: 92 %  Max: 100 %  Weight  Av.7 kg (314 lb 9.5 oz)  Min: 142.7 kg (314 lb 9.5 oz)  Max: 142.7 kg (314 lb 9.5 oz)  Body mass index is 54 kg/m².  No intake/output data recorded.    Physical Examination:  General: alert and oriented, no acute distress  HEENT: Normocephalic, atraumatic, EOMi  CV: regularly irregular rhythm,  normal s1 and s2, no murmurs auscultated.   Respiratory: CTAB, no crackles or wheezes  Abdomen: Soft, nontender, nondistended, normoactive bowel sounds  Extremities: No visible or palpable edema. Atraumatic. Distal Pulses intact.  Skin: Warm and dry, no rashes/lesions/bruising  Neuro: AAOx3. No focal deficits.    Laboratory:  Most Recent Data:  CBC:   Lab Results   Component Value Date    WBC 7.53 2022    HGB 11.9 (L) 2022    HCT 38.6 2022     2022    MCV 89 2022    RDW 14.3 2022     BMP:   Lab Results   Component Value Date     2022    K 4.6 2022     2022    CO2 25 2022    BUN 13 2022    CREATININE 0.9 2022     (H) 2022    CALCIUM 9.2 2022    MG 2.0 2022     LFTs:   Lab Results   Component Value Date    PROT 7.2 2022    ALBUMIN 3.6 2022    BILITOT 0.4 2022    AST 13  04/20/2022    ALKPHOS 91 04/20/2022    ALT 15 04/20/2022     Coags: No results found for: INR, PROTIME, PTT  FLP:   Lab Results   Component Value Date    CHOL 205 (H) 02/24/2022    HDL 61 02/24/2022    LDLCALC 123.4 02/24/2022    TRIG 103 02/24/2022    CHOLHDL 29.8 02/24/2022     DM:   Lab Results   Component Value Date    HGBA1C 5.6 02/24/2022    HGBA1C 5.4 04/21/2021    LDLCALC 123.4 02/24/2022    CREATININE 0.9 04/20/2022     Thyroid:   Lab Results   Component Value Date    TSH 2.637 04/20/2022     Anemia: No results found for: IRON, TIBC, FERRITIN, HZDUBVCX12, FOLATE  Cardiac:   Lab Results   Component Value Date    TROPONINI <0.006 04/20/2022     (H) 04/20/2022     Urinalysis:   Lab Results   Component Value Date    LABURIN  04/13/2021     Multiple organisms isolated. None in predominance.  Repeat if    LABURIN clinically necessary. 04/13/2021    COLORU Yellow 04/13/2021    SPECGRAV >=1.030 (A) 04/13/2021    NITRITE Negative 04/13/2021    KETONESU Negative 04/13/2021    UROBILINOGEN Negative 04/13/2021       Trended Lab Data:  Recent Labs   Lab 04/20/22  0711   WBC 7.53   HGB 11.9*   HCT 38.6      MCV 89   RDW 14.3      K 4.6      CO2 25   BUN 13   CREATININE 0.9   *   PROT 7.2   ALBUMIN 3.6   BILITOT 0.4   AST 13   ALKPHOS 91   ALT 15       Trended Cardiac Data:  Recent Labs   Lab 04/20/22  0711 04/20/22  0948   TROPONINI 0.043* <0.006   *  --        Microbiology Data:    Other Results:  EKG (my interpretation): normal sinus rhythm after cardioversion     Radiology:  Imaging Results          X-Ray Chest 1 View (Final result)  Result time 04/20/22 07:23:58    Final result by Abraham Us MD (04/20/22 07:23:58)                 Impression:      No convincing evidence of acute cardiopulmonary disease.      Electronically signed by: Abraham Us  Date:    04/20/2022  Time:    07:23             Narrative:    EXAMINATION:  XR CHEST 1 VIEW    CLINICAL  HISTORY:  tachycardia;    TECHNIQUE:  Single frontal view of the chest was performed.    COMPARISON:  Chest radiograph performed 04/04/2022    FINDINGS:  Examination limited at the lung apices due to overlying soft tissue structures of the neck.    Monitoring leads overlie the chest.  The cardiomediastinal contour appears grossly unchanged with enlargement the cardiac silhouette.  Prominence of the central pulmonary vasculature is again noted.    No definite pneumothorax or large volume pleural effusion.  No acute findings suggested in the visualized abdomen.  Osseous and soft tissue structures appear without definite acute abnormality.                                     Assessment and Plan:    Patient with Past medical history of HTN, autism, depression and anxiety who were consulted for tachycardia. She has no previous history of cardiac disease, this is first diagnosed episode. Possible triggers could be PAC, undiagnosed sleep apnea (undergoing workup as outpatient) , morbid obesity, among others.Patient underwent cardioversion yesterday afternoon after being unable to convert to sinus rhythm on digoxin. She is doing well, her symptoms which are presistent seem to be more related to deconditioning and obesity, cannot really be explained by her heart rhythm as she is currently in sinus rhythm and has been sinus since the cardioversion.     Atrial Flutter with variable AV block s/p cardioversion   -ASTRID and bedside echo with normal LV and RV function, mitral and aortic valve unremarkable. No wall motion abnormalities  -CHADsVASC score of 2, anticoagulation with apixaban started at 5 mg bid, must continue this uninterrupted for at least 30 days. No contraindication for surgery if surgeon feels comfortable to do foot surgery on anticoagulation, however if discontinuation of anticoagulation is preferred we would recommend this to be done after 30 days of anticoagulation.   -s/p diltiazem and digoxin without conversion  to sinus, had to undergo cardioversion on 04/20/2022.   -switch diltiazem to oral 240 mg per day  -patient continues to show frequent PAC's, therefor being at higher risk of going back into A flutter, will be referred to EP as outpatient for possible ablation.   -recommend outpatient TTE.     Essential hypertension   -BP initially on low side in setting of diltiazem so losartan was held, it has been restarted today as BP up to 180's/80's.  -continue losartan but at 25 mg as we are starting her on diltiazem and increasing dose today to 240 mg per day.       Dispo : Likely discharge today    Code Status:     Full     Maria Paula Reyes Ramirez, MD  [facility] HO-2

## 2022-04-21 NOTE — TELEPHONE ENCOUNTER
Date: 4/25/2022 Status: [place]   Appt Time: 1:30 PM Length: 30   Visit Type: HOSPITAL FOLLOW UP [2329] Copay: $0.00   Provider: MD Ladonna Herrera will be out until May 11. She will be seeing  in the same office as .

## 2022-04-21 NOTE — TELEPHONE ENCOUNTER
----- Message from Rene Morales sent at 4/21/2022 10:01 AM CDT -----  Regarding: Hosp f/u  Patient is preparing for discharge from [facility] and is requiring a hospital follow up  appointment with Dr. Haro within 7 days. I'm unable to schedule an appointment within   that time frame. If possible, can you please assist with scheduling this patient and message me back?    DX Typical atrial flutter    Thank you,     Rene Morales  Access Navigator/ [facility]

## 2022-04-21 NOTE — PROCEDURES
PHYSICIAN INTERPRETATION AND COMMENTS: Findings are consistent with mild, obstructive sleep apnea (CARL) (G47.33),  by overall AHI (apnea hypopnea index). However, findings on this study suggest that the degree of sleep disordered  breathing is in the moderate range, when RDI is measured. This study was technically adequate to allow for interpretation.  CLINICAL HISTORY: 51 year old female presented with: 16 inch neck, BMI of 51.5, an [place] sleepiness score of 9, history of  hypertension, depression and symptoms of nocturnal waking up choking. Based on the clinical history, the patient has a  high pre-test probability of having Moderate CARL.  SLEEP STUDY FINDINGS: Patient underwent a 1 night Home Sleep Test and by behavioral criteria, slept for approximately  5.97 hours, with a sleep latency of 13 minutes and a sleep efficiency of 85.3%. Mild sleep disordered breathing (AHI=12) is  noted based on a 4% hypopnea desaturation criteria. The patient slept supine 41.3% of the night based on valid recording  time of 5.97 hours and is 1.2 times as likely to have apneas/hypopneas when supine. When considering more subtle  measures of sleep disordered breathing, the overall respiratory disturbance index is moderate(RDI=21) based on a 1%  hypopnea desaturation criteria with confirmation by surrogate arousal indicators. The apneas/hypopneas are  accompanied by mild oxygen desaturation (percent time below 90% SpO2: 5.7%, Min SpO2: 73.6%). The average  desaturation across all sleep disordered breathing events is 3.3%. The mean pulse rate is 74.6 BPM, with frequent pulse  rate variability (60 events with >= 6 BPM increase/decrease per hour).  TREATMENT CONSIDERATIONS: Consider trial of Auto-titrating CPAP 6-20 cm, mask of patient's choice, and heated  humidification. If patient has difficulty with CPAP adherence or ongoing CARL symptoms or despite CPAP adherence, then  consider an in-lab titration sleep study in order to  determine optimal fixed CPAP setting. Alternatively consider oral  appliance fitted by a dentist specializing in these devices, or surgical consultation for uvulopalatopharyngoplasty (UPPP)  for treatment of obstructive sleep apnea.  DISEASE MANAGEMENT CONSIDERATIONS: Definitive treatment for CARL is recommended. Consider Sleep Clinic referral for  CARL management.  Signature: Date: 04- 13:26:26 EST

## 2022-04-21 NOTE — DISCHARGE SUMMARY
[facility] Discharge Summary    Primary Team: [facility]iology [facility]   Attending Physician: Alexis Ingram MD  Fellow: Dc Hogue MD  Resident: Maria Paula Reyes Ramirez       Date of Admit: 4/20/2022  Date of Discharge: 4/21/2022    Discharge to: Home  Condition: Stable     Discharge Diagnoses     Patient Active Problem List   Diagnosis    Essential hypertension    Morbid obesity with BMI of 50.0-59.9, adult    Migraine with aura and without status migrainosus, not intractable    Intractable chronic migraine without aura    Major depressive disorder, recurrent, moderate    Insomnia disorder, with non-sleep disorder mental comorbidity    Generalized anxiety disorder with panic attacks    Right ankle pain    Impaired gait and mobility    Muscle weakness (generalized)    Sleep apnea    Typical atrial flutter       Consultants and Procedures     Consultants:  Cardiology     Procedures:   Cardioversion with ASTRID    Imaging:  CXR    Brief History of Present Illness     Mrs Fisher is  51 y o female patient with no previous cardiac history and pmh of HTN, autism, depression who was sent from surgeon's office after being found to be tachycardic for less than 1 day. Patient was feeling lightheadedness, chest pain and shortness of breath. She was found to be in a flutter and was started on diltiazem, digoxin without improvement of heart rate or conversion to sinus rhythm. Decision was made to proceed with cardioversion which was done successfully with return to normal sinus rhythm.  Patient is feeling well today and is ready to go home. She will be discharged with follow up appointment with Dr Ingram.     For the full HPI please refer to the History & Physical from this admission.    Hospital Course By Problem with Pertinent Findings     Atrial Flutter with variable AV block s/p cardioversion   -ASTRID and bedside echo with normal LV and RV function, mitral and aortic valve unremarkable. No  wall motion abnormalities  -CHADsVASC score of 2, anticoagulation with apixaban started at 5 mg bid, must continue this uninterrupted for at least 30 days. No contraindication for surgery if surgeon feels comfortable to do foot surgery on anticoagulation, however if discontinuation of anticoagulation is preferred we would recommend this to be done after 30 days of anticoagulation.   -s/p diltiazem and digoxin without conversion to sinus, had to undergo cardioversion on 04/20/2022.   -continue Diltiazem 240 mg per day   -follow up with Dr Ingram as outpatient     Essential hypertension   -continue losartan but at reduced dose : 25 mg as we are starting her on diltiazem and increased dose today to 240 mg per day    Discharge Medications        Medication List      START taking these medications    apixaban 5 mg Tab  Commonly known as: ELIQUIS  Take 1 tablet (5 mg total) by mouth 2 (two) times daily.     diltiaZEM 240 MG 24 hr capsule  Commonly known as: CARDIZEM CD  Take 1 capsule (240 mg total) by mouth once daily.  Start taking on: April 22, 2022        CHANGE how you take these medications    losartan 25 MG tablet  Commonly known as: COZAAR  Take 1 tablet (25 mg total) by mouth once daily.  What changed:   · medication strength  · how much to take     traZODone 150 MG tablet  Commonly known as: DESYREL  Take half or whole tablet by mouth before bed as needed for insomnia  What changed:   · how much to take  · how to take this  · when to take this  · reasons to take this  · additional instructions        CONTINUE taking these medications    ADULT MULTIVITAMIN GUMMIES ORAL     blood-glucose meter kit  Commonly known as: TRUE METRIX GLUCOSE METER  Check BS as needed     clotrimazole-betamethasone 1-0.05% cream  Commonly known as: LOTRISONE  Apply topically 2 (two) times daily.     dexamethasone 4 mg/mL injection  Commonly known as: DECADRON     diazePAM 5 MG tablet  Commonly known as: VALIUM  Take 1 tablet (5 mg  total) by mouth daily as needed for Anxiety.     EMGALITY  mg/mL Pnij  Generic drug: galcanezumab-gnlm  Inject 120 mg into the skin every 28 days.     EScitalopram oxalate 10 MG tablet  Commonly known as: LEXAPRO  Take 0.5 tablets (5 mg total) by mouth once daily.     fentaNYL 50 mcg/mL injection  Commonly known as: SUBLIMAZE     lancets 33 gauge Misc  Commonly known as: TRUEPLUS LANCETS  Inject 1 lancet into the skin 3 (three) times daily.     metronidazole 0.75% 0.75 % Crea  Commonly known as: METROCREAM  Apply topically 2 (two) times daily.     midazolam 1 mg/mL injection  Commonly known as: VERSED     mirabegron 50 mg Tb24  Commonly known as: MYRBETRIQ  Take 1 tablet (50 mg total) by mouth once daily.     pantoprazole 40 MG tablet  Commonly known as: PROTONIX     phenylephrine 10 mg/mL injection  Commonly known as: TEODORA-SYNEPHRINE     promethazine 25 MG tablet  Commonly known as: PHENERGAN     propofoL 10 mg/mL infusion  Commonly known as: DIPRIVAN     QUELICIN 20 mg/mL injection  Generic drug: succinylcholine     SUMAtriptan 20 mg/actuation nasal spray  Commonly known as: IMITREX  1 spray (20 mg total) by Nasal route as needed for Migraine (max 40mg/24hr).     * sumatriptan 6 mg/0.5 mL kit  Commonly known as: IMITREX STATDOSE  Inject 0.5 mLs (6 mg total) into the skin as needed for Migraine.     * sumatriptan 100 MG tablet  Commonly known as: IMITREX  Take 1 tablet (100 mg total) by mouth every 2 (two) hours as needed for Migraine.     topiramate 50 MG tablet  Commonly known as: TOPAMAX  TAKE 1 TABLET(50 MG) BY MOUTH THREE TIMES DAILY     traMADoL 50 mg tablet  Commonly known as: ULTRAM  Take 1 tablet (50 mg total) by mouth every 6 (six) hours.         * This list has 2 medication(s) that are the same as other medications prescribed for you. Read the directions carefully, and ask your doctor or other care provider to review them with you.               Where to Get Your Medications      These medications  were sent to [facility]  [address] [place] [address]    Hours: Mon-Fri, 8a-5:30p Phone: [phone]   · apixaban 5 mg Tab  · diltiaZEM 240 MG 24 hr capsule  · losartan 25 MG tablet           Discharge Information:   Diet:  Low sodium, low fat , low CHO    Physical Activity:  As tolerated              Instructions:  1. Take all medications as prescribed  2. Keep all follow-up appointments  3. Return to the hospital or call your primary care physicians if any worsening symptoms such as fever, chest pain, shortness of breath, return of symptoms, or any other concerns.    Follow-Up Appointments:  With Dr Ingram and PCP    Maria Paula Reyes Ramirez, MD  [facility] Internal Medicine, [facility]

## 2022-04-22 ENCOUNTER — PATIENT MESSAGE (OUTPATIENT)
Dept: SLEEP MEDICINE | Facility: CLINIC | Age: 52
End: 2022-04-22
Payer: MEDICARE

## 2022-04-22 DIAGNOSIS — G47.33 OBSTRUCTIVE SLEEP APNEA: Primary | ICD-10-CM

## 2022-04-22 NOTE — PLAN OF CARE
[facility] - Telemetry  Discharge Final Note    Primary Care Provider: Tahir Mosquera MD    Expected Discharge Date: 4/21/2022    Final Discharge Note (most recent)       Final Note - 04/22/22 0817          Final Note    Assessment Type Final Discharge Note (P)      Anticipated Discharge Disposition Home or Self Care (P)                      Important Message from Medicare             Contact Info       Alexis Ingram MD   Specialty: Cardiology, Interventional Cardiology    [address]  [address]  [place] [address]   Phone: [phone]       Next Steps: Schedule an appointment as soon as possible for a visit on 5/5/2022    Instructions: at 1:15pm; cardiology hospital follow up appointment    Tahir Mosquera MD   Specialty: Family Medicine   Relationship: PCP - General    [address]  [address]  [facility] [place] [address]   Phone: [phone]       Next Steps: Follow up in 1 week(s)    Instructions: Dr. Mosquera's  to call the patient with hospital follow up appointment date & time.    Zelalem Solorzano DPM   Specialty: Podiatry, Wound Care    [address]  [address]  [place] [address]   Phone: [phone]       Next Steps: Follow up on 5/12/2022    Instructions: at 10:15 AM; previously scheduled podiatry appointment    David Brown MD   Specialty: Urology    [address] [place] [id number]   Phone: [phone]       Next Steps: Follow up on 5/18/2022    Instructions: at 1:20pm; previously schedule urology appointment

## 2022-04-25 ENCOUNTER — OFFICE VISIT (OUTPATIENT)
Dept: FAMILY MEDICINE | Facility: CLINIC | Age: 52
End: 2022-04-25
Payer: MEDICARE

## 2022-04-25 VITALS
BODY MASS INDEX: 50.02 KG/M2 | WEIGHT: 293 LBS | DIASTOLIC BLOOD PRESSURE: 86 MMHG | HEART RATE: 83 BPM | OXYGEN SATURATION: 98 % | SYSTOLIC BLOOD PRESSURE: 136 MMHG | HEIGHT: 64 IN | TEMPERATURE: 98 F

## 2022-04-25 DIAGNOSIS — E66.01 MORBID OBESITY WITH BMI OF 50.0-59.9, ADULT: ICD-10-CM

## 2022-04-25 DIAGNOSIS — Z09 HOSPITAL DISCHARGE FOLLOW-UP: ICD-10-CM

## 2022-04-25 DIAGNOSIS — I10 ESSENTIAL HYPERTENSION: ICD-10-CM

## 2022-04-25 DIAGNOSIS — I48.3 TYPICAL ATRIAL FLUTTER: Primary | ICD-10-CM

## 2022-04-25 PROCEDURE — 3075F PR MOST RECENT SYSTOLIC BLOOD PRESS GE 130-139MM HG: ICD-10-PCS | Mod: CPTII,S$GLB,, | Performed by: FAMILY MEDICINE

## 2022-04-25 PROCEDURE — 3044F HG A1C LEVEL LT 7.0%: CPT | Mod: CPTII,S$GLB,, | Performed by: FAMILY MEDICINE

## 2022-04-25 PROCEDURE — 4010F ACE/ARB THERAPY RXD/TAKEN: CPT | Mod: CPTII,S$GLB,, | Performed by: FAMILY MEDICINE

## 2022-04-25 PROCEDURE — 3075F SYST BP GE 130 - 139MM HG: CPT | Mod: CPTII,S$GLB,, | Performed by: FAMILY MEDICINE

## 2022-04-25 PROCEDURE — 99999 PR PBB SHADOW E&M-EST. PATIENT-LVL IV: CPT | Mod: PBBFAC,,, | Performed by: FAMILY MEDICINE

## 2022-04-25 PROCEDURE — 3079F DIAST BP 80-89 MM HG: CPT | Mod: CPTII,S$GLB,, | Performed by: FAMILY MEDICINE

## 2022-04-25 PROCEDURE — 3008F PR BODY MASS INDEX (BMI) DOCUMENTED: ICD-10-PCS | Mod: CPTII,S$GLB,, | Performed by: FAMILY MEDICINE

## 2022-04-25 PROCEDURE — 3044F PR MOST RECENT HEMOGLOBIN A1C LEVEL <7.0%: ICD-10-PCS | Mod: CPTII,S$GLB,, | Performed by: FAMILY MEDICINE

## 2022-04-25 PROCEDURE — 4010F PR ACE/ARB THEARPY RXD/TAKEN: ICD-10-PCS | Mod: CPTII,S$GLB,, | Performed by: FAMILY MEDICINE

## 2022-04-25 PROCEDURE — 1159F PR MEDICATION LIST DOCUMENTED IN MEDICAL RECORD: ICD-10-PCS | Mod: CPTII,S$GLB,, | Performed by: FAMILY MEDICINE

## 2022-04-25 PROCEDURE — 99215 PR OFFICE/OUTPT VISIT, EST, LEVL V, 40-54 MIN: ICD-10-PCS | Mod: S$GLB,,, | Performed by: FAMILY MEDICINE

## 2022-04-25 PROCEDURE — 3079F PR MOST RECENT DIASTOLIC BLOOD PRESSURE 80-89 MM HG: ICD-10-PCS | Mod: CPTII,S$GLB,, | Performed by: FAMILY MEDICINE

## 2022-04-25 PROCEDURE — 99999 PR PBB SHADOW E&M-EST. PATIENT-LVL IV: ICD-10-PCS | Mod: PBBFAC,,, | Performed by: FAMILY MEDICINE

## 2022-04-25 PROCEDURE — 1159F MED LIST DOCD IN RCRD: CPT | Mod: CPTII,S$GLB,, | Performed by: FAMILY MEDICINE

## 2022-04-25 PROCEDURE — 3008F BODY MASS INDEX DOCD: CPT | Mod: CPTII,S$GLB,, | Performed by: FAMILY MEDICINE

## 2022-04-25 PROCEDURE — 99215 OFFICE O/P EST HI 40 MIN: CPT | Mod: S$GLB,,, | Performed by: FAMILY MEDICINE

## 2022-04-25 NOTE — PROGRESS NOTES
(Portions of this note were dictated using voice recognition software and may contain dictation related errors in spelling/grammar/syntax not found on text review)    CC:   Chief Complaint   Patient presents with    Hospital Follow Up     Atrial flutter       HPI: 51 y.o. female patient of Dr. Mosquera, presents to me for follow-up of hospital stay    Was planning for foot surgery and was sent to ED secondary to tachycardia.  Was found to be in atrial flutter.  Started on diltiazem, digoxin without improvement of heart rate.  Proceed with cardioversion which was successful.  Given chads Vasc score of 2, started on Eliquis 5 mg b.i.d., recommended continuing uninterrupted for at least 30 days.  According to discharge documentation was felt no contraindication for surgery if felt comfortable to do surgery on anticoagulation, however if anticoagulation needs to be discontinued is preferred to wait 30 day time frame.    She currently continues on diltiazem 240 mg daily.  Additionally on losartan 25 mg daily for blood pressure control.      Chest x-ray 04/20/2022 clear  Cardiac testing  EKG 04/20/2022:  A flutter  EKG status post cardioversion:  Normal sinus rhythm  Echo 04/20/2022:  Normal left ventricular diastolic function.  Estimated ejection fraction 55%      Interval history:  Overall feeling okay, occasionally will get fatigue and some episodes of shortness of breath from time to time  Cardiology appointment upcoming May 5th  Occasionally gets some mild swelling of feet and ankles  Does admit to some dietary indiscretions and high sodium intake at times.      Past Medical History:   Diagnosis Date    Anxiety     Depression     GERD (gastroesophageal reflux disease)     Hypertension     Migraine headache     Sleep difficulties     Typical atrial flutter 4/20/2022       Past Surgical History:   Procedure Laterality Date    BREAST SURGERY      ENDOMETRIAL ABLATION      EYE SURGERY      FOOT SURGERY Right  "10/2018    bunion    IMPLANTATION OF PERMANENT SACRAL NERVE STIMULATOR N/A 2/15/2022    Procedure: INSERTION, NEUROSTIMULATOR, PERMANENT, SACRAL;  Surgeon: David Brown MD;  Location: [facility] OR [address] [place] [address];  Service: Urology;  Laterality: N/A;    TONSILLECTOMY      TOTAL REDUCTION MAMMOPLASTY Bilateral        Family History   Problem Relation Age of Onset    Atrial fibrillation Mother     Thyroid disease Mother     Dementia Mother     Diabetes Father     Prostate cancer Father     Cancer Paternal Grandfather     Breast cancer Maternal Grandmother     Migraines Sister        Social History     Tobacco Use    Smoking status: Never Smoker    Smokeless tobacco: Never Used   Substance Use Topics    Alcohol use: No    Drug use: No       Lab Results   Component Value Date    WBC 7.53 04/20/2022    HGB 11.9 (L) 04/20/2022    HCT 38.6 04/20/2022    MCV 89 04/20/2022     04/20/2022    CHOL 205 (H) 02/24/2022    TRIG 103 02/24/2022    HDL 61 02/24/2022    ALT 15 04/20/2022    AST 13 04/20/2022    BILITOT 0.4 04/20/2022    ALKPHOS 91 04/20/2022     04/20/2022    K 4.6 04/20/2022     04/20/2022    CREATININE 0.9 04/20/2022    ESTGFRAFRICA >60 04/20/2022    EGFRNONAA >60 04/20/2022    CALCIUM 9.2 04/20/2022    ALBUMIN 3.6 04/20/2022    BUN 13 04/20/2022    CO2 25 04/20/2022    TSH 2.637 04/20/2022    HGBA1C 5.6 02/24/2022    LDLCALC 123.4 02/24/2022     (H) 04/20/2022             Vital signs reviewed  Vitals:    04/25/22 1327   BP: 136/86   BP Location: Left arm   Patient Position: Sitting   BP Method: Medium (Manual)   Pulse: 83   Temp: 98.1 °F (36.7 °C)   TempSrc: Oral   SpO2: 98%   Weight: (!) 137.6 kg (303 lb 5.7 oz)   Height: 5' 4" (1.626 m)       Wt Readings from Last 4 Encounters:   04/20/22 (!) 142.7 kg (314 lb 9.5 oz)   04/20/22 136.1 kg (300 lb)   04/14/22 (!) 138 kg (304 lb 5.5 oz)   04/13/22 135.9 kg (299 lb 9.7 oz)       PE:   APPEARANCE: Well nourished, well developed, in " no acute distress.    HEAD: Normocephalic, atraumatic.  EYES:    Conjunctivae noninjected.  NECK: Supple with no cervical lymphadenopathy.  No carotid bruits, no thyromegaly  CHEST: Good inspiratory effort. Lungs clear to auscultation with no wheezes or crackles.  CARDIOVASCULAR: Normal S1, S2. No rubs, murmurs, or gallops.  ABDOMEN: Bowel sounds normal. Not distended. Soft. No tenderness or masses. No organomegaly.  EXTREMITIES:  Minimal edema bilateral lower extremities.      IMPRESSION  1. Typical atrial flutter    2. Hospital discharge follow-up    3. Essential hypertension            PLAN  Described diagnosis of a flutter in detail along with monitoring and treatment and need for anticoagulation.    Reviewed hospitalization summary, labs, imaging    Blood pressure is very stable at this time along with heart rate.  No irregularities noted on auscultation.    We discussed given her anticoagulation to avoid any NSAIDs and to look out for any concerns for bleeding.    Has occasional weakness and shortness of breath episode, not sure if this is concordant with any recurrence of a flutter.  Has cardiology appointment upcoming, may need some extended cardiac testing like Holter or event monitoring to further delineate repeated episodes especially if tied into symptoms    As per Cardiology documentation on discharge summary, recommending if surgery must be done with interruption of anticoagulation that she wait 30 days from initiation which would be May 20th.  She will check back with her podiatrist on potential reschedule date for her foot surgery    She can follow-up with her PCP in the next 2-3 months or sooner if needed     Total time spent including face-to-face time and chart time:  40 min

## 2022-04-26 DIAGNOSIS — R00.0 TACHYCARDIA: Primary | ICD-10-CM

## 2022-04-26 DIAGNOSIS — I48.3 TYPICAL ATRIAL FLUTTER: ICD-10-CM

## 2022-04-27 ENCOUNTER — TELEPHONE (OUTPATIENT)
Dept: FAMILY MEDICINE | Facility: CLINIC | Age: 52
End: 2022-04-27
Payer: MEDICARE

## 2022-04-27 DIAGNOSIS — Z12.31 ENCOUNTER FOR SCREENING MAMMOGRAM FOR BREAST CANCER: Primary | ICD-10-CM

## 2022-04-27 NOTE — TELEPHONE ENCOUNTER
----- Message from Radha Holguin sent at 4/27/2022  4:22 PM CDT -----  Type:  Patient Requesting Call Back    Who Called:Starla Fisher  Would the patient rather a call back or a response via [facility]? Call back  Best Call Back Number:[phone]  Additional Information: Patient Requesting Call Back regarding mammogram referral.

## 2022-04-29 ENCOUNTER — TELEPHONE (OUTPATIENT)
Dept: FAMILY MEDICINE | Facility: CLINIC | Age: 52
End: 2022-04-29
Payer: MEDICARE

## 2022-05-05 ENCOUNTER — OFFICE VISIT (OUTPATIENT)
Dept: CARDIOLOGY | Facility: CLINIC | Age: 52
End: 2022-05-05
Payer: MEDICARE

## 2022-05-05 VITALS
WEIGHT: 293 LBS | SYSTOLIC BLOOD PRESSURE: 141 MMHG | DIASTOLIC BLOOD PRESSURE: 74 MMHG | BODY MASS INDEX: 50.02 KG/M2 | HEIGHT: 64 IN | HEART RATE: 81 BPM

## 2022-05-05 DIAGNOSIS — I10 ESSENTIAL HYPERTENSION: Primary | ICD-10-CM

## 2022-05-05 DIAGNOSIS — I48.92 ATRIAL FLUTTER: ICD-10-CM

## 2022-05-05 PROCEDURE — 3044F HG A1C LEVEL LT 7.0%: CPT | Mod: CPTII,S$GLB,, | Performed by: INTERNAL MEDICINE

## 2022-05-05 PROCEDURE — 3078F PR MOST RECENT DIASTOLIC BLOOD PRESSURE < 80 MM HG: ICD-10-PCS | Mod: CPTII,S$GLB,, | Performed by: INTERNAL MEDICINE

## 2022-05-05 PROCEDURE — 99999 PR PBB SHADOW E&M-EST. PATIENT-LVL IV: CPT | Mod: PBBFAC,,, | Performed by: INTERNAL MEDICINE

## 2022-05-05 PROCEDURE — 3077F PR MOST RECENT SYSTOLIC BLOOD PRESSURE >= 140 MM HG: ICD-10-PCS | Mod: CPTII,S$GLB,, | Performed by: INTERNAL MEDICINE

## 2022-05-05 PROCEDURE — 3008F PR BODY MASS INDEX (BMI) DOCUMENTED: ICD-10-PCS | Mod: CPTII,S$GLB,, | Performed by: INTERNAL MEDICINE

## 2022-05-05 PROCEDURE — 99214 PR OFFICE/OUTPT VISIT, EST, LEVL IV, 30-39 MIN: ICD-10-PCS | Mod: S$GLB,,, | Performed by: INTERNAL MEDICINE

## 2022-05-05 PROCEDURE — 3044F PR MOST RECENT HEMOGLOBIN A1C LEVEL <7.0%: ICD-10-PCS | Mod: CPTII,S$GLB,, | Performed by: INTERNAL MEDICINE

## 2022-05-05 PROCEDURE — 1159F PR MEDICATION LIST DOCUMENTED IN MEDICAL RECORD: ICD-10-PCS | Mod: CPTII,S$GLB,, | Performed by: INTERNAL MEDICINE

## 2022-05-05 PROCEDURE — 3078F DIAST BP <80 MM HG: CPT | Mod: CPTII,S$GLB,, | Performed by: INTERNAL MEDICINE

## 2022-05-05 PROCEDURE — 1159F MED LIST DOCD IN RCRD: CPT | Mod: CPTII,S$GLB,, | Performed by: INTERNAL MEDICINE

## 2022-05-05 PROCEDURE — 99999 PR PBB SHADOW E&M-EST. PATIENT-LVL IV: ICD-10-PCS | Mod: PBBFAC,,, | Performed by: INTERNAL MEDICINE

## 2022-05-05 PROCEDURE — 4010F ACE/ARB THERAPY RXD/TAKEN: CPT | Mod: CPTII,S$GLB,, | Performed by: INTERNAL MEDICINE

## 2022-05-05 PROCEDURE — 1160F PR REVIEW ALL MEDS BY PRESCRIBER/CLIN PHARMACIST DOCUMENTED: ICD-10-PCS | Mod: CPTII,S$GLB,, | Performed by: INTERNAL MEDICINE

## 2022-05-05 PROCEDURE — 3077F SYST BP >= 140 MM HG: CPT | Mod: CPTII,S$GLB,, | Performed by: INTERNAL MEDICINE

## 2022-05-05 PROCEDURE — 4010F PR ACE/ARB THEARPY RXD/TAKEN: ICD-10-PCS | Mod: CPTII,S$GLB,, | Performed by: INTERNAL MEDICINE

## 2022-05-05 PROCEDURE — 99214 OFFICE O/P EST MOD 30 MIN: CPT | Mod: S$GLB,,, | Performed by: INTERNAL MEDICINE

## 2022-05-05 PROCEDURE — 3008F BODY MASS INDEX DOCD: CPT | Mod: CPTII,S$GLB,, | Performed by: INTERNAL MEDICINE

## 2022-05-05 PROCEDURE — 1160F RVW MEDS BY RX/DR IN RCRD: CPT | Mod: CPTII,S$GLB,, | Performed by: INTERNAL MEDICINE

## 2022-05-05 RX ORDER — DILTIAZEM HYDROCHLORIDE 120 MG/1
120 CAPSULE, COATED, EXTENDED RELEASE ORAL DAILY
Qty: 90 CAPSULE | Refills: 1 | Status: SHIPPED | OUTPATIENT
Start: 2022-05-05 | End: 2022-08-15

## 2022-05-05 NOTE — PROGRESS NOTES
[facility] [address]     SUBJECTIVE:     History of Present Illness:  Patient is a 52 y.o. female presents with followup after atrial flutter cardioversion . Seen in the hospital when she was admitted for plantar fascitis surgery and noted to be in atrial flutter . Cardioverted electrically and sent home on medications     Primary Diagnosis:  1. Morbid obesity  2. Hypertension  3. Atrial flutter with rapid ventricular rate   ROS  Since hospitalizaiton:  A. Has not monitored her heart rate.   B. No chest pains  C. No syncope  D. Feels tired only   E. Blood pressures normal at home   Past Hospitalization    Review of patient's allergies indicates:  No Known Allergies    Past Medical History:   Diagnosis Date    Anxiety     Depression     GERD (gastroesophageal reflux disease)     Hypertension     Migraine headache     Sleep difficulties     Typical atrial flutter 4/20/2022       Past Surgical History:   Procedure Laterality Date    BREAST SURGERY      ENDOMETRIAL ABLATION      EYE SURGERY      FOOT SURGERY Right 10/2018    bunion    IMPLANTATION OF PERMANENT SACRAL NERVE STIMULATOR N/A 2/15/2022    Procedure: INSERTION, NEUROSTIMULATOR, PERMANENT, SACRAL;  Surgeon: David Brown MD;  Location: [facility] OR [address] [place] [address];  Service: Urology;  Laterality: N/A;    TONSILLECTOMY      TOTAL REDUCTION MAMMOPLASTY Bilateral        Family History   Problem Relation Age of Onset    Atrial fibrillation Mother     Thyroid disease Mother     Dementia Mother     Diabetes Father     Prostate cancer Father     Cancer Paternal Grandfather     Breast cancer Maternal Grandmother     Migraines Sister        Social History     Tobacco Use    Smoking status: Never Smoker    Smokeless tobacco: Never Used   Substance Use Topics    Alcohol use: No    Drug use: No        Home meds:  Current Outpatient Medications on File Prior to Visit   Medication Sig Dispense Refill    apixaban (ELIQUIS) 5 mg Tab Take 1 tablet (5 mg  total) by mouth 2 (two) times daily. 60 tablet 11    blood-glucose meter (TRUE METRIX GLUCOSE METER) kit Check BS as needed 1 each 0    clotrimazole-betamethasone 1-0.05% (LOTRISONE) cream Apply topically 2 (two) times daily. 15 g 1    diazePAM (VALIUM) 5 MG tablet Take 1 tablet (5 mg total) by mouth daily as needed for Anxiety. 15 tablet 2    diltiaZEM (CARDIZEM CD) 240 MG 24 hr capsule Take 1 capsule (240 mg total) by mouth once daily. 30 capsule 11    EScitalopram oxalate (LEXAPRO) 10 MG tablet Take 0.5 tablets (5 mg total) by mouth once daily. 30 tablet 5    galcanezumab-gnlm (EMGALITY PEN) 120 mg/mL PnIj Inject 120 mg into the skin every 28 days. 1 mL 11    lancets (TRUEPLUS LANCETS) 33 gauge Misc Inject 1 lancet into the skin 3 (three) times daily. 200 each 0    losartan (COZAAR) 25 MG tablet Take 1 tablet (25 mg total) by mouth once daily. 90 tablet 3    metronidazole 0.75% (METROCREAM) 0.75 % Crea Apply topically 2 (two) times daily. 45 g 3    mirabegron (MYRBETRIQ) 50 mg Tb24 Take 1 tablet (50 mg total) by mouth once daily. 30 tablet 11    multivit-minerals/folic acid (ADULT MULTIVITAMIN GUMMIES ORAL) Take 1 tablet by mouth Daily. Continue to hold until after Surgery.      pantoprazole (PROTONIX) 40 MG tablet Take 40 mg by mouth 2 (two) times daily.      phenylephrine (TEODORA-SYNEPHRINE) 10 mg/mL injection       promethazine (PHENERGAN) 25 MG tablet Take 25 mg by mouth every 6 (six) hours as needed for Nausea.      sumatriptan (IMITREX STATDOSE) 6 mg/0.5 mL kit Inject 0.5 mLs (6 mg total) into the skin as needed for Migraine. 3 mL 5    sumatriptan (IMITREX) 100 MG tablet Take 1 tablet (100 mg total) by mouth every 2 (two) hours as needed for Migraine. 9 tablet 11    SUMAtriptan (IMITREX) 20 mg/actuation nasal spray 1 spray (20 mg total) by Nasal route as needed for Migraine (max 40mg/24hr). 6 each 3    topiramate (TOPAMAX) 50 MG tablet TAKE 1 TABLET(50 MG) BY MOUTH THREE TIMES DAILY (Patient  not taking: No sig reported) 270 tablet 0    traMADoL (ULTRAM) 50 mg tablet Take 1 tablet (50 mg total) by mouth every 6 (six) hours. 5 tablet 0    traZODone (DESYREL) 150 MG tablet Take half or whole tablet by mouth before bed as needed for insomnia (Patient taking differently: Take  mg by mouth nightly as needed for Insomnia.) 30 tablet 5     Current Facility-Administered Medications on File Prior to Visit   Medication Dose Route Frequency Provider Last Rate Last Admin    0.9%  NaCl infusion   Intravenous Continuous Sathish Briseno MD   Stopped at 02/15/22 0948    LIDOcaine (PF) 10 mg/ml (1%) injection 10 mg  1 mL Intradermal Once Sathish Briseno MD        sodium chloride 0.9% flush 10 mL  10 mL Intravenous PRN Zelalem Solorzano DPM           Cardiac meds:   eliquis 5 mg BID  Diltiazem 240 mg daily  Losartan 25 mg daily           OBJECTIVE:     Vital Signs (Most Recent)  There were no vitals filed for this visit.      Physical Exam:  Neck: normal carotids, no bruits; normal JVP  Lungs :clear  Heart: fast heart rate , normal S1,S2, no murmurs, no gallops  Abd: no masses; no bruits;   Exts: normal DP and PT pulses bilaterally, no edema noted           LABS    CMP  Recent Labs   Lab Result Units 22  0711   Potassium mmol/L 4.6       LIPID  Recent Labs   Lab Result Units 22  1202   HDL mg/dL 61   Cholesterol mg/dL 205*   Triglycerides mg/dL 103   LDL Cholesterol mg/dL 123.4   HDL/Cholesterol Ratio % 29.8   Total Cholesterol/HDL Ratio  3.4           Old Results:      Diagnostic Results:    EK/22: atrial flutter to sinus after cardioversion   1b. EK22: atrial flutter with better ventricular rate and variable AV block   Echo: 22: normal EF       ASSESSMENT/PLAN:     1. Recurrence of atrial flutter    Plan: 1. Continue eliquis  2. Increase diltiazem to 360 mg for better ventricular rate control rather than adding an antiarrythmic   3. Refer to EP for ablation  for  atrial flutter ablation   4. Return after above     Alexis Ingram MD

## 2022-05-13 ENCOUNTER — TELEPHONE (OUTPATIENT)
Dept: PODIATRY | Facility: CLINIC | Age: 52
End: 2022-05-13
Payer: MEDICARE

## 2022-05-13 ENCOUNTER — TELEPHONE (OUTPATIENT)
Dept: CARDIOLOGY | Facility: CLINIC | Age: 52
End: 2022-05-13
Payer: MEDICARE

## 2022-05-13 NOTE — TELEPHONE ENCOUNTER
----- Message from Salima Marte sent at 5/13/2022  4:00 PM CDT -----  Type: Requesting to speak with nurse         Who Called: PT  Regarding:  pt wondering about---  Refer to EP for ablation  for atrial flutter ablation  Would the patient rather a call back or a response via [facility]? Call back  Best Call Back Number: [phone]  Additional Information: n/a

## 2022-05-13 NOTE — TELEPHONE ENCOUNTER
----- Message from Salima Marte sent at 5/13/2022  3:58 PM CDT -----  Type: Requesting to speak with nurse         Who Called: PT  Regarding: Pt is needing to get appt rescheduled please advise   Would the patient rather a call back or a response via [employer]? Call back  Best Call Back Number: [phone]  Additional Information: n/a

## 2022-05-17 ENCOUNTER — PATIENT OUTREACH (OUTPATIENT)
Dept: ADMINISTRATIVE | Facility: OTHER | Age: 52
End: 2022-05-17
Payer: MEDICARE

## 2022-05-17 DIAGNOSIS — Z12.11 COLON CANCER SCREENING: Primary | ICD-10-CM

## 2022-05-17 NOTE — PROGRESS NOTES
Health Maintenance Due   Topic Date Due    HIV Screening  Never done    Colorectal Cancer Screening  Never done    COVID-19 Vaccine (3 - Booster for Moderna series) 09/28/2021    Shingles Vaccine (2 of 2) 01/26/2022     Updates were requested from care everywhere.  Chart was reviewed for overdue Proactive [facility] Encounters (PAUL) topics (CRS, Breast Cancer Screening, Eye exam)  Health Maintenance has been updated.  LINKS immunization registry triggered.  Immunizations were reconciled.  Orders entered:  Fit kit

## 2022-05-18 ENCOUNTER — OFFICE VISIT (OUTPATIENT)
Dept: UROLOGY | Facility: CLINIC | Age: 52
End: 2022-05-18
Payer: MEDICARE

## 2022-05-18 VITALS
BODY MASS INDEX: 50.02 KG/M2 | WEIGHT: 293 LBS | SYSTOLIC BLOOD PRESSURE: 138 MMHG | HEIGHT: 64 IN | DIASTOLIC BLOOD PRESSURE: 74 MMHG | HEART RATE: 95 BPM

## 2022-05-18 DIAGNOSIS — N32.81 OAB (OVERACTIVE BLADDER): Primary | ICD-10-CM

## 2022-05-18 PROCEDURE — 3075F SYST BP GE 130 - 139MM HG: CPT | Mod: CPTII,S$GLB,, | Performed by: UROLOGY

## 2022-05-18 PROCEDURE — 99213 PR OFFICE/OUTPT VISIT, EST, LEVL III, 20-29 MIN: ICD-10-PCS | Mod: S$GLB,,, | Performed by: UROLOGY

## 2022-05-18 PROCEDURE — 3044F PR MOST RECENT HEMOGLOBIN A1C LEVEL <7.0%: ICD-10-PCS | Mod: CPTII,S$GLB,, | Performed by: UROLOGY

## 2022-05-18 PROCEDURE — 1159F MED LIST DOCD IN RCRD: CPT | Mod: CPTII,S$GLB,, | Performed by: UROLOGY

## 2022-05-18 PROCEDURE — 3075F PR MOST RECENT SYSTOLIC BLOOD PRESS GE 130-139MM HG: ICD-10-PCS | Mod: CPTII,S$GLB,, | Performed by: UROLOGY

## 2022-05-18 PROCEDURE — 1159F PR MEDICATION LIST DOCUMENTED IN MEDICAL RECORD: ICD-10-PCS | Mod: CPTII,S$GLB,, | Performed by: UROLOGY

## 2022-05-18 PROCEDURE — 3078F PR MOST RECENT DIASTOLIC BLOOD PRESSURE < 80 MM HG: ICD-10-PCS | Mod: CPTII,S$GLB,, | Performed by: UROLOGY

## 2022-05-18 PROCEDURE — 3078F DIAST BP <80 MM HG: CPT | Mod: CPTII,S$GLB,, | Performed by: UROLOGY

## 2022-05-18 PROCEDURE — 4010F PR ACE/ARB THEARPY RXD/TAKEN: ICD-10-PCS | Mod: CPTII,S$GLB,, | Performed by: UROLOGY

## 2022-05-18 PROCEDURE — 99999 PR PBB SHADOW E&M-EST. PATIENT-LVL III: ICD-10-PCS | Mod: PBBFAC,,, | Performed by: UROLOGY

## 2022-05-18 PROCEDURE — 99999 PR PBB SHADOW E&M-EST. PATIENT-LVL III: CPT | Mod: PBBFAC,,, | Performed by: UROLOGY

## 2022-05-18 PROCEDURE — 3008F PR BODY MASS INDEX (BMI) DOCUMENTED: ICD-10-PCS | Mod: CPTII,S$GLB,, | Performed by: UROLOGY

## 2022-05-18 PROCEDURE — 99213 OFFICE O/P EST LOW 20 MIN: CPT | Mod: S$GLB,,, | Performed by: UROLOGY

## 2022-05-18 PROCEDURE — 3044F HG A1C LEVEL LT 7.0%: CPT | Mod: CPTII,S$GLB,, | Performed by: UROLOGY

## 2022-05-18 PROCEDURE — 4010F ACE/ARB THERAPY RXD/TAKEN: CPT | Mod: CPTII,S$GLB,, | Performed by: UROLOGY

## 2022-05-18 PROCEDURE — 3008F BODY MASS INDEX DOCD: CPT | Mod: CPTII,S$GLB,, | Performed by: UROLOGY

## 2022-05-18 NOTE — PROGRESS NOTES
[facility]        Overactive Bladder Return Note     5/18/2022     Patient Name: Starla Fisher  Referred by:No ref. provider found                    Patient returns for postoperative visit after successful implant of SNM 3 months ago. Doing very well. Continues on Program 1 at 1.1 with appropriate stimulation. Still notes resolution of UUI from 6 episodes per day.      A review of 10+ systems was conducted with pertinent positive and negative findings documented in HPI with all other systems reviewed and negative.     Past medical, family, surgical and social history reviewed as documented in chart with pertinent positive medical, family, surgical and social history detailed in HPI.     Const: no acute distress, conversant and alert  Eyes: anicteric, extraocular muscles intact  ENMT: normocephalic, Nl oral membranes  Cardio: no cyanosis, nl cap refill  Pulm: no tachypnea; no resp distress  Abd: soft, no tenderness  Musc: no laceration, no tenderness  Neuro: alert; oriented x 3  Skin: warm, dry; no petichiae  Psych: no anxiety; normal speech      Assessment/Plan:     Overactive Bladder with Urgency Incontinence (estab,improved): Her history is suggestive of Overactive Bladder (OAB) with predominantly Urgency Urinary Incontinence (UUI). She is significantly improved and is satisfied with the current treatment.       1. We will continue current therapy.

## 2022-05-19 ENCOUNTER — HOSPITAL ENCOUNTER (OUTPATIENT)
Dept: RADIOLOGY | Facility: HOSPITAL | Age: 52
Discharge: HOME | End: 2022-05-19
Attending: FAMILY MEDICINE
Payer: MEDICARE

## 2022-05-19 DIAGNOSIS — Z12.31 ENCOUNTER FOR SCREENING MAMMOGRAM FOR BREAST CANCER: ICD-10-CM

## 2022-05-19 PROCEDURE — 77063 MAMMO DIGITAL SCREENING BILAT WITH TOMO: ICD-10-PCS | Mod: 26,,, | Performed by: RADIOLOGY

## 2022-05-19 PROCEDURE — 77067 MAMMO DIGITAL SCREENING BILAT WITH TOMO: ICD-10-PCS | Mod: 26,,, | Performed by: RADIOLOGY

## 2022-05-19 PROCEDURE — 77067 SCR MAMMO BI INCL CAD: CPT | Mod: 26,,, | Performed by: RADIOLOGY

## 2022-05-19 PROCEDURE — 77063 BREAST TOMOSYNTHESIS BI: CPT | Mod: 26,,, | Performed by: RADIOLOGY

## 2022-05-19 PROCEDURE — 77063 BREAST TOMOSYNTHESIS BI: CPT | Mod: TC

## 2022-05-20 ENCOUNTER — TELEPHONE (OUTPATIENT)
Dept: ALLERGY | Facility: CLINIC | Age: 52
End: 2022-05-20
Payer: MEDICARE

## 2022-05-20 NOTE — TELEPHONE ENCOUNTER
----- Message from Dhara Shields MA sent at 5/16/2022  4:05 PM CDT -----  Contact: Patient    ----- Message -----  From: Judith House  Sent: 5/16/2022  11:51 AM CDT  To: Nikolas SCHULTE Staff    Type:  Appointment Request      Name of Caller:Patient   When is the first available appointment?no appointment generated   Symptoms:cysts on forehead   Would the patient rather a call back or a response via [facility]? Call back   Best Call Back Number:[phone]  Additional Information: Please assist

## 2022-05-24 ENCOUNTER — PATIENT MESSAGE (OUTPATIENT)
Dept: FAMILY MEDICINE | Facility: CLINIC | Age: 52
End: 2022-05-24
Payer: MEDICARE

## 2022-05-26 ENCOUNTER — TELEPHONE (OUTPATIENT)
Dept: OBSTETRICS AND GYNECOLOGY | Facility: CLINIC | Age: 52
End: 2022-05-26
Payer: MEDICARE

## 2022-05-26 NOTE — TELEPHONE ENCOUNTER
----- Message from Andrei Gamez sent at 5/26/2022 11:00 AM CDT -----  Contact: pt  .Type:  Sooner Apoointment Request    Caller is requesting a sooner appointment.  Caller declined first available appointment listed below.  Caller will not accept being placed on the waitlist and is requesting a message be sent to doctor.  Name of Caller:pt  When is the first available appointment?06/01/2022  Symptoms:problem getting worse  Would the patient rather a call back or a response via [facility]? Call back  Best Call Back Number:[phone]  Additional Information: Bad odor

## 2022-05-26 NOTE — TELEPHONE ENCOUNTER
----- Message from Lennie Ramachandran sent at 5/26/2022  3:46 PM CDT -----  Contact: [phone]/Self  Who Called: PT  Regarding: being seen sooner than scheduled appt   Would the patient rather a call back or a response via [facility]? Call back  Best Call Back Number: [phone]  Additional Information: N/a

## 2022-05-30 ENCOUNTER — OFFICE VISIT (OUTPATIENT)
Dept: OBSTETRICS AND GYNECOLOGY | Facility: CLINIC | Age: 52
End: 2022-05-30
Payer: MEDICARE

## 2022-05-30 VITALS — SYSTOLIC BLOOD PRESSURE: 162 MMHG | DIASTOLIC BLOOD PRESSURE: 82 MMHG | WEIGHT: 293 LBS | BODY MASS INDEX: 52.94 KG/M2

## 2022-05-30 DIAGNOSIS — Z12.11 COLON CANCER SCREENING: Primary | ICD-10-CM

## 2022-05-30 DIAGNOSIS — R30.0 DYSURIA: Primary | ICD-10-CM

## 2022-05-30 DIAGNOSIS — Z12.4 CERVICAL CANCER SCREENING: ICD-10-CM

## 2022-05-30 DIAGNOSIS — N89.8 VAGINAL IRRITATION: ICD-10-CM

## 2022-05-30 PROCEDURE — 1159F PR MEDICATION LIST DOCUMENTED IN MEDICAL RECORD: ICD-10-PCS | Mod: CPTII,S$GLB,, | Performed by: OBSTETRICS & GYNECOLOGY

## 2022-05-30 PROCEDURE — 4010F PR ACE/ARB THEARPY RXD/TAKEN: ICD-10-PCS | Mod: CPTII,S$GLB,, | Performed by: OBSTETRICS & GYNECOLOGY

## 2022-05-30 PROCEDURE — 99214 PR OFFICE/OUTPT VISIT, EST, LEVL IV, 30-39 MIN: ICD-10-PCS | Mod: S$GLB,,, | Performed by: OBSTETRICS & GYNECOLOGY

## 2022-05-30 PROCEDURE — 87624 HPV HI-RISK TYP POOLED RSLT: CPT | Performed by: OBSTETRICS & GYNECOLOGY

## 2022-05-30 PROCEDURE — 99999 PR PBB SHADOW E&M-EST. PATIENT-LVL III: CPT | Mod: PBBFAC,,, | Performed by: OBSTETRICS & GYNECOLOGY

## 2022-05-30 PROCEDURE — 3044F HG A1C LEVEL LT 7.0%: CPT | Mod: CPTII,S$GLB,, | Performed by: OBSTETRICS & GYNECOLOGY

## 2022-05-30 PROCEDURE — 3044F PR MOST RECENT HEMOGLOBIN A1C LEVEL <7.0%: ICD-10-PCS | Mod: CPTII,S$GLB,, | Performed by: OBSTETRICS & GYNECOLOGY

## 2022-05-30 PROCEDURE — 3008F BODY MASS INDEX DOCD: CPT | Mod: CPTII,S$GLB,, | Performed by: OBSTETRICS & GYNECOLOGY

## 2022-05-30 PROCEDURE — 87086 URINE CULTURE/COLONY COUNT: CPT | Performed by: OBSTETRICS & GYNECOLOGY

## 2022-05-30 PROCEDURE — 3079F PR MOST RECENT DIASTOLIC BLOOD PRESSURE 80-89 MM HG: ICD-10-PCS | Mod: CPTII,S$GLB,, | Performed by: OBSTETRICS & GYNECOLOGY

## 2022-05-30 PROCEDURE — 3008F PR BODY MASS INDEX (BMI) DOCUMENTED: ICD-10-PCS | Mod: CPTII,S$GLB,, | Performed by: OBSTETRICS & GYNECOLOGY

## 2022-05-30 PROCEDURE — 3077F SYST BP >= 140 MM HG: CPT | Mod: CPTII,S$GLB,, | Performed by: OBSTETRICS & GYNECOLOGY

## 2022-05-30 PROCEDURE — 1160F PR REVIEW ALL MEDS BY PRESCRIBER/CLIN PHARMACIST DOCUMENTED: ICD-10-PCS | Mod: CPTII,S$GLB,, | Performed by: OBSTETRICS & GYNECOLOGY

## 2022-05-30 PROCEDURE — 88175 CYTOPATH C/V AUTO FLUID REDO: CPT | Performed by: OBSTETRICS & GYNECOLOGY

## 2022-05-30 PROCEDURE — 87481 CANDIDA DNA AMP PROBE: CPT | Mod: 59 | Performed by: OBSTETRICS & GYNECOLOGY

## 2022-05-30 PROCEDURE — 3079F DIAST BP 80-89 MM HG: CPT | Mod: CPTII,S$GLB,, | Performed by: OBSTETRICS & GYNECOLOGY

## 2022-05-30 PROCEDURE — 4010F ACE/ARB THERAPY RXD/TAKEN: CPT | Mod: CPTII,S$GLB,, | Performed by: OBSTETRICS & GYNECOLOGY

## 2022-05-30 PROCEDURE — 87801 DETECT AGNT MULT DNA AMPLI: CPT | Performed by: OBSTETRICS & GYNECOLOGY

## 2022-05-30 PROCEDURE — 99999 PR PBB SHADOW E&M-EST. PATIENT-LVL III: ICD-10-PCS | Mod: PBBFAC,,, | Performed by: OBSTETRICS & GYNECOLOGY

## 2022-05-30 PROCEDURE — 3077F PR MOST RECENT SYSTOLIC BLOOD PRESSURE >= 140 MM HG: ICD-10-PCS | Mod: CPTII,S$GLB,, | Performed by: OBSTETRICS & GYNECOLOGY

## 2022-05-30 PROCEDURE — 1160F RVW MEDS BY RX/DR IN RCRD: CPT | Mod: CPTII,S$GLB,, | Performed by: OBSTETRICS & GYNECOLOGY

## 2022-05-30 PROCEDURE — 99214 OFFICE O/P EST MOD 30 MIN: CPT | Mod: S$GLB,,, | Performed by: OBSTETRICS & GYNECOLOGY

## 2022-05-30 PROCEDURE — 1159F MED LIST DOCD IN RCRD: CPT | Mod: CPTII,S$GLB,, | Performed by: OBSTETRICS & GYNECOLOGY

## 2022-05-30 RX ORDER — FLUCONAZOLE 150 MG/1
TABLET ORAL
Qty: 2 TABLET | Refills: 0 | Status: SHIPPED | OUTPATIENT
Start: 2022-05-30 | End: 2024-02-02

## 2022-05-30 RX ORDER — NYSTATIN 100000 [USP'U]/G
POWDER TOPICAL
Qty: 60 G | Refills: 3 | Status: SHIPPED | OUTPATIENT
Start: 2022-05-30 | End: 2024-02-02

## 2022-05-30 NOTE — PROGRESS NOTES
"GYNECOLOGY  :  Starla Fisher is a 52 y.o.    Here today for body odor, strong over the last week   Noticed by  Family members   No urinary leakage like before the urology procedure  No vaginal discharge       Has seen Urology  For overactive blsdder.   S/p in 2022     Postoperative Diagnosis:  1. Urinary frequency  2. Urinary urgency  3. Urgency incontinence     Procedure:  1. Insertion of implantable pulse generator for neurostimulation (89946)  2. Complex analysis and programming of implanted neurostimulator pulse generator (93458)      2020=   Persistent leakage of urine that started 3-6 weeks ago, worsening during the last 2 weeks,   Associated with urgency and dysuria and nocturia. Wakes at night several times to void, and usually does not have time to get to the toilet  Even after voiding feels the " urge" to go . Urine is dark and has strong odor     Patient with learning disability , with previous BTL and endometrial ablation , for definitive contraception and to prevent menstrual cycles, done 7 years ago   No menstrual cycles since ablation     LAst PAP and mammogram in 2019  In [place] ( recently moved )    Past Medical History:   Diagnosis Date    Anxiety     Depression     GERD (gastroesophageal reflux disease)     Hypertension     Migraine headache     Sleep difficulties     Typical atrial flutter 2022     Past Surgical History:   Procedure Laterality Date    BREAST SURGERY      ENDOMETRIAL ABLATION      EYE SURGERY      FOOT SURGERY Right 10/2018    bunion    IMPLANTATION OF PERMANENT SACRAL NERVE STIMULATOR N/A 2/15/2022    Procedure: INSERTION, NEUROSTIMULATOR, PERMANENT, SACRAL;  Surgeon: David Brown MD;  Location: [facility] OR [address] [place] [address];  Service: Urology;  Laterality: N/A;    TONSILLECTOMY      TOTAL REDUCTION MAMMOPLASTY Bilateral      Family History   Problem Relation Age of Onset    Atrial fibrillation Mother     Thyroid disease Mother     Dementia Mother  "    Diabetes Father     Prostate cancer Father     Cancer Paternal Grandfather     Breast cancer Maternal Grandmother     Migraines Sister      Social History     Tobacco Use    Smoking status: Never Smoker    Smokeless tobacco: Never Used   Substance Use Topics    Alcohol use: No    Drug use: No     OB History    Para Term  AB Living   0 0 0 0 0 0   SAB IAB Ectopic Multiple Live Births   0 0 0 0 0       BP (!) 162/82   Wt (!) 139.9 kg (308 lb 6.8 oz)   LMP  (LMP Unknown)   BMI 52.94 kg/m²     Last PAP=   LMP = -  Last Mammogram =   Last Colonoscopy  =-      COMPREHENSIVE GYN HISTORY:  G 0 P 0     PAP History: Denies abnormal Paps.  Infection History: Denies STDs. Denies PID.  Benign History: Denies uterine fibroids. Denies ovarian cysts. Denies endometriosis.   Cancer History: Denies cervical cancer. Denies uterine cancer or hyperplasia. Denies ovarian cancer. Denies vulvar cancer or pre-cancer. Denies vaginal cancer or pre-cancer. Denies breast cancer. Denies colon cancer.  Sexual Activity History: ( - ) Yes   ( x )   no   Menstrual History: Age of menarche: ( 15  )  years.   Contraception:  BTL    ROS  GENERAL: Denies significant weight gain or weight loss. Feeling well overall.  SKIN: Denies rash or lesions.  Normal skin turgor  HEAD: Denies head injury or headache.   NODES: Denies enlarged lymph nodes.   CHEST: Denies chest pain or shortness of breath.   CARDIOVASCULAR: Denies palpitations or left sided chest pain.   ABDOMEN: No abdominal pain,no  diarrhea,constipation  nausea, vomiting or rectal bleeding.   URINARY: normal  Frequency,no  Dysuria or burning on urination.   REPRODUCTIVE: Per HPI   BREASTS: The patient sometimes performs breast self-examination and denies pain, lumps, or nipple discharge.   HEMATOLOGIC: No easy bruisability or excessive bleeding.   MUSCULOSKELETAL: Denies joint pain or swelling.       Physical Exam     Constitutional: She is oriented to person,  place, and time. She appears well-developed and well-nourished. No distress.   HENT:   Head: Normocephalic.   NECK: Neck symmetric without masses or thyromegaly.  Cardiovascular: Normal rate and regular rhythm.   Pulmonary/Chest: Effort normal and breath sounds normal. No respiratory distress. She has no wheezes.   Breasts: Symmetrical, no skin changes or visible lesions. No palpable masses, nipple discharge or adenopathy bilaterally. Under both breast  Severe erythema consistent with yeast infection, malodorous secretion     Abdominal: Soft not distended. Bowel sounds are normal. She exhibits   no masses . No tenderness to palpation.   Genitourinary: Pelvic exam was performed with patient supine.   External genitalia normal no lesions.Normal hair distribution   Bimanual exam : difficult to evaluate pelvic organs due to body habitus. Urethra and bladder normal  No cystourethrocele     Extremities normal no cyanosis ,edema.     Skin : yeast infection , on inguinal folds, under breasts and external genitalia       A/P Starla Fisher 52 y.o.     Dx=  1- Skin yeast infection   3-morbid obesity   4-Learning disability   5- Urinary incontinence     Procedures/Orders:  Urine dip (-)  Urine culture     Assessment /Plan:  Significant improvement in urinary incontinence after urologic procedure   Fluconazole  PO  Rx sent to [facility]   Nystatin powder for topical  Application in skin folds         Patient was counseled today on A.C.S. Pap guidelines and recommendations for yearly pelvic exams, mammograms and monthly self breast exams; to see her PCP for other health maintenance, nutrition and weight gain counseling, discussed lab values.  Discussed colonoscopy recommendations every 10 years starting at age 50   Calcium and vit D daily intake       I spent a total of 30 minutes on the day of the visit.This includes face to face time and non-face to face time preparing to see the patient (eg, review of tests),  Obtaining and/or reviewing separately obtained history, Documenting clinical information in the electronic or other health record, Independently interpreting resultsand communicating results to the patient/family/caregiver, or Care coordination.      Jefferson Copeland M.D.   OB/GYN    5/30/2022        Jefferson Copeland M.D.   OB/GYN

## 2022-05-31 ENCOUNTER — PATIENT MESSAGE (OUTPATIENT)
Dept: ADMINISTRATIVE | Facility: HOSPITAL | Age: 52
End: 2022-05-31
Payer: MEDICARE

## 2022-06-01 LAB
BACTERIA UR CULT: NO GROWTH
BACTERIAL VAGINOSIS DNA: NEGATIVE
CANDIDA GLABRATA DNA: NEGATIVE
CANDIDA KRUSEI DNA: NEGATIVE
CANDIDA RRNA VAG QL PROBE: NEGATIVE
T VAGINALIS RRNA GENITAL QL PROBE: NEGATIVE

## 2022-06-02 ENCOUNTER — TELEPHONE (OUTPATIENT)
Dept: CARDIOLOGY | Facility: CLINIC | Age: 52
End: 2022-06-02
Payer: MEDICARE

## 2022-06-02 NOTE — TELEPHONE ENCOUNTER
----- Message from Marisol Simon sent at 6/2/2022  3:29 PM CDT -----  Regarding: issues with surgery  Contact: [phone]  Patient is requesting a call back regarding her insurance has denied her procedure.   Would the patient rather a call back or a response via [facility]?  Call   Best Call Back Number:  [phone]  Additional Information:

## 2022-06-03 LAB
FINAL PATHOLOGIC DIAGNOSIS: NORMAL
Lab: NORMAL

## 2022-06-05 NOTE — PROGRESS NOTES
Outpatient Psychiatry Follow-Up Visit (MD/NP)    6/6/2022    Clinical Status of Patient:  Outpatient (Ambulatory)    Chief Complaint:  Starla Fisher is a 52 y.o. female who presents today for follow-up of depression and anxiety.  Met with patient, sister, and cousin.      Last visit was: 2/23/22. Chart and  reviewed.    Interval History and Content of Current Session:  Current Psychiatric Medications/changes  · Start Lexapro 10 mg daily  · Take Trazodone 150 mg 1/2 - 1 tablet before bed for sleep  · Valium 5 mg as needed for severe anxiety or panic attacks. Don't take more than one tablet in a day and be careful to not drive. May increase risk for fall.     Sleep improved a little.  Family reports she is inconsistent with medication. Recently started back on 10 mg lexapro and denies side effects. Family reports that she has been depressed and making passive SI remarks. Pt denies active SI or plan but has intellectual deficits which may cause challenge in expressing her feelings.  Discussed feeling lonely due to not talking with her friends. Family reports poor frustration tolerance. Discussed strategies to improve compliance with medications: pill box, text message reminders, and switching to 90 day bottle.  Pt agreed to be more consistent. Denies SI/HI/AVH.       Psychotherapy:  · Target symptoms: anxiety   · Why chosen therapy is appropriate versus another modality: relevant to diagnosis  · Outcome monitoring methods: self-report  · Therapeutic intervention type: insight oriented psychotherapy  · Topics discussed/themes: building skills sets for symptom management, symptom recognition  · The patient's response to the intervention is accepting. The patient's progress toward treatment goals is good.   · Duration of intervention: 13 minutes.    Review of Systems   · PSYCHIATRIC: Pertinant items are noted in the narrative.  · CONSTITUTIONAL: No weight gain or loss.   · MUSCULOSKELETAL: No pain or stiffness of  the joints.  · NEUROLOGIC: No weakness, sensory changes, seizures, confusion, memory loss, tremor or other abnormal movements.  · ENDOCRINE: No polydipsia or polyuria.  · INTEGUMENTARY: No rashes or lacerations.  · EYES: No exophthalmos, jaundice or blindness.  · ENT: No dizziness, tinnitus or hearing loss.  · RESPIRATORY: No shortness of breath.  · CARDIOVASCULAR: No tachycardia or chest pain.  · GASTROINTESTINAL: No nausea, vomiting, pain, constipation or diarrhea.  · GENITOURINARY: No frequency, dysuria or sexual dysfunction.  · HEMATOLOGIC/LYMPHATIC: No excessive bleeding, prolonged or excessive bleeding after dental extraction/injury.  · ALLERGIC/IMMUNOLOGIC: No allergic response to materials, foods or animals at this time.    Past Medical, Family and Social History: The patient's past medical, family and social history have been reviewed and updated as appropriate within the electronic medical record - see encounter notes.    Compliance: yes    Side effects: see above    Risk Parameters:  Patient reports no suicidal ideation  Patient reports no homicidal ideation  Patient reports no self-injurious behavior  Patient reports no violent behavior    Exam (detailed: at least 9 elements; comprehensive: all 15 elements)   Constitutional  Vitals:  Most recent vital signs, dated greater than 90 days prior to this appointment, were reviewed.   Vitals:    06/06/22 0848   BP: (!) 160/82   Pulse: 76   Weight: (!) 140.9 kg (310 lb 11.8 oz)        General:  unremarkable     Musculoskeletal  Muscle Strength/Tone:  no tremor, no tic   Gait & Station:  in wheelchair     Psychiatric  Speech:  no latency; no press   Mood & Affect:  euthymic  congruent and appropriate   Thought Process:  concrete   Associations:  intact   Thought Content:  normal, no suicidality, no homicidality, delusions, or paranoia   Insight:  limited awareness of illness   Judgement: limited   Orientation:  grossly intact   Memory: intact for content of  interview   Language: grossly intact   Attention Span & Concentration:  able to focus   Fund of Knowledge:  impaired     Assessment and Diagnosis   Status/Progress: Based on the examination today, the patient's problem(s) is/are inadequately controlled.  New problems have not been presented today.   Co-morbidities and Lack of compliance are complicating management of the primary condition.  There are no active rule-out diagnoses for this patient at this time.     General Impression:       ICD-10-CM ICD-9-CM   1. Major depressive disorder, recurrent, moderate  F33.1 296.32   2. Generalized anxiety disorder with panic attacks  F41.1 300.02    F41.0 300.01   3. Insomnia disorder, with non-sleep disorder mental comorbidity  G47.00 780.52       Intervention/Counseling/Treatment Plan   · Medication Management: The risks and benefits of medication were discussed with the patient.  · Restart Lexapro 10 mg daily  · Continue Trazodone 150 mg 1/2 - 1 tablet before bed for sleep  · Valium 5 mg as needed for severe anxiety or panic attacks. Don't take more than one tablet in a day and be careful to not drive. May increase risk for fall.  · Will follow-up in 1 month to determine need dosage increase or med change      Return to Clinic: 1 month    Risks, benefits, side effects and alternative treatments discussed with patient. Patient agrees with the current plan as documented.  Encouraged Patient to keep future appointments.  Take medications as prescribed and abstain from substance abuse.  Pt to present to ED for thoughts to harm herself or others

## 2022-06-06 ENCOUNTER — OFFICE VISIT (OUTPATIENT)
Dept: PSYCHIATRY | Facility: CLINIC | Age: 52
End: 2022-06-06
Payer: MEDICARE

## 2022-06-06 ENCOUNTER — RESEARCH ENCOUNTER (OUTPATIENT)
Dept: RESEARCH | Facility: HOSPITAL | Age: 52
End: 2022-06-06
Payer: MEDICARE

## 2022-06-06 ENCOUNTER — TELEPHONE (OUTPATIENT)
Dept: OBSTETRICS AND GYNECOLOGY | Facility: CLINIC | Age: 52
End: 2022-06-06
Payer: MEDICARE

## 2022-06-06 ENCOUNTER — PATIENT MESSAGE (OUTPATIENT)
Dept: PSYCHIATRY | Facility: CLINIC | Age: 52
End: 2022-06-06
Payer: MEDICARE

## 2022-06-06 VITALS
SYSTOLIC BLOOD PRESSURE: 160 MMHG | WEIGHT: 293 LBS | HEART RATE: 76 BPM | BODY MASS INDEX: 53.34 KG/M2 | DIASTOLIC BLOOD PRESSURE: 82 MMHG

## 2022-06-06 DIAGNOSIS — F33.1 MAJOR DEPRESSIVE DISORDER, RECURRENT, MODERATE: Primary | ICD-10-CM

## 2022-06-06 DIAGNOSIS — F41.1 GENERALIZED ANXIETY DISORDER WITH PANIC ATTACKS: ICD-10-CM

## 2022-06-06 DIAGNOSIS — F41.0 GENERALIZED ANXIETY DISORDER WITH PANIC ATTACKS: ICD-10-CM

## 2022-06-06 DIAGNOSIS — G47.00 INSOMNIA DISORDER, WITH NON-SLEEP DISORDER MENTAL COMORBIDITY: ICD-10-CM

## 2022-06-06 PROCEDURE — 3079F PR MOST RECENT DIASTOLIC BLOOD PRESSURE 80-89 MM HG: ICD-10-PCS | Mod: CPTII,S$GLB,, | Performed by: NURSE PRACTITIONER

## 2022-06-06 PROCEDURE — 3077F PR MOST RECENT SYSTOLIC BLOOD PRESSURE >= 140 MM HG: ICD-10-PCS | Mod: CPTII,S$GLB,, | Performed by: NURSE PRACTITIONER

## 2022-06-06 PROCEDURE — 3044F HG A1C LEVEL LT 7.0%: CPT | Mod: CPTII,S$GLB,, | Performed by: NURSE PRACTITIONER

## 2022-06-06 PROCEDURE — 99213 OFFICE O/P EST LOW 20 MIN: CPT | Mod: S$GLB,,, | Performed by: NURSE PRACTITIONER

## 2022-06-06 PROCEDURE — 3079F DIAST BP 80-89 MM HG: CPT | Mod: CPTII,S$GLB,, | Performed by: NURSE PRACTITIONER

## 2022-06-06 PROCEDURE — 3044F PR MOST RECENT HEMOGLOBIN A1C LEVEL <7.0%: ICD-10-PCS | Mod: CPTII,S$GLB,, | Performed by: NURSE PRACTITIONER

## 2022-06-06 PROCEDURE — 99213 PR OFFICE/OUTPT VISIT, EST, LEVL III, 20-29 MIN: ICD-10-PCS | Mod: S$GLB,,, | Performed by: NURSE PRACTITIONER

## 2022-06-06 PROCEDURE — 99999 PR PBB SHADOW E&M-EST. PATIENT-LVL II: CPT | Mod: PBBFAC,,, | Performed by: NURSE PRACTITIONER

## 2022-06-06 PROCEDURE — 99999 PR PBB SHADOW E&M-EST. PATIENT-LVL II: ICD-10-PCS | Mod: PBBFAC,,, | Performed by: NURSE PRACTITIONER

## 2022-06-06 PROCEDURE — 4010F ACE/ARB THERAPY RXD/TAKEN: CPT | Mod: CPTII,S$GLB,, | Performed by: NURSE PRACTITIONER

## 2022-06-06 PROCEDURE — 3008F BODY MASS INDEX DOCD: CPT | Mod: CPTII,S$GLB,, | Performed by: NURSE PRACTITIONER

## 2022-06-06 PROCEDURE — 3077F SYST BP >= 140 MM HG: CPT | Mod: CPTII,S$GLB,, | Performed by: NURSE PRACTITIONER

## 2022-06-06 PROCEDURE — 3008F PR BODY MASS INDEX (BMI) DOCUMENTED: ICD-10-PCS | Mod: CPTII,S$GLB,, | Performed by: NURSE PRACTITIONER

## 2022-06-06 PROCEDURE — 4010F PR ACE/ARB THEARPY RXD/TAKEN: ICD-10-PCS | Mod: CPTII,S$GLB,, | Performed by: NURSE PRACTITIONER

## 2022-06-06 RX ORDER — ESCITALOPRAM OXALATE 10 MG/1
10 TABLET ORAL DAILY
Qty: 90 TABLET | Refills: 3 | Status: SHIPPED | OUTPATIENT
Start: 2022-06-06 | End: 2022-08-04 | Stop reason: SDUPTHER

## 2022-06-06 RX ORDER — TRAZODONE HYDROCHLORIDE 150 MG/1
TABLET ORAL
Qty: 90 TABLET | Refills: 3 | Status: SHIPPED | OUTPATIENT
Start: 2022-06-06 | End: 2023-11-29 | Stop reason: SDUPTHER

## 2022-06-06 NOTE — TELEPHONE ENCOUNTER
----- Message from Jefferson Copeland MD sent at 6/3/2022  6:04 PM CDT -----  PAP result reviewed\  Satisfactory specimen  Negative for malignancy  Please inform patient  RTC as scheduled  Jefferson Copeland M.D.   OB/GYN

## 2022-06-08 ENCOUNTER — TELEPHONE (OUTPATIENT)
Dept: UROLOGY | Facility: CLINIC | Age: 52
End: 2022-06-08
Payer: MEDICARE

## 2022-06-08 NOTE — PROGRESS NOTES
[employer] PEER2 study  IRB# 2021.246  PI: Dr. Brown  Subject number: [id number]     Date: 06-Jun-2022     3 month visit: OAB cohort           II. Patient returned her 3 day diary   and PGI and OABq      III. Confirmed- no new adverse events to report     IV. Change in medication- none           Patient was exited from the study today

## 2022-06-08 NOTE — TELEPHONE ENCOUNTER
----- Message from Tracy Cordero sent at 6/8/2022  9:29 AM CDT -----  Regarding: EJ  .Type: Patient Call Back    Who called: Ann Rome     What is the request in detail: needs info on a rep from regarding bladder stimulators.    Can the clinic reply by [facility]? No     Would the patient rather a call back or a response via [facility]? Call     Best call back number: [phone]

## 2022-06-09 ENCOUNTER — TELEPHONE (OUTPATIENT)
Dept: UROLOGY | Facility: CLINIC | Age: 52
End: 2022-06-09
Payer: MEDICARE

## 2022-06-09 NOTE — TELEPHONE ENCOUNTER
Have called several numbers in an attempt to speak with Dr. Afshin Storm re: this pt. and her Interstim directives for having cardiac surgery.  A message was to be sent to Dr Storm from [facility], at [phone], asking him to return a call to me.

## 2022-06-10 ENCOUNTER — TELEPHONE (OUTPATIENT)
Dept: UROLOGY | Facility: CLINIC | Age: 52
End: 2022-06-10

## 2022-06-10 NOTE — TELEPHONE ENCOUNTER
Again, trying to reach Dr. Afshin Storm, spoke with [place], who has sent msgs., and she will again

## 2022-06-13 ENCOUNTER — PATIENT MESSAGE (OUTPATIENT)
Dept: OBSTETRICS AND GYNECOLOGY | Facility: CLINIC | Age: 52
End: 2022-06-13
Payer: MEDICARE

## 2022-06-15 ENCOUNTER — PATIENT MESSAGE (OUTPATIENT)
Dept: PSYCHIATRY | Facility: CLINIC | Age: 52
End: 2022-06-15
Payer: MEDICARE

## 2022-06-16 ENCOUNTER — TELEPHONE (OUTPATIENT)
Dept: PODIATRY | Facility: CLINIC | Age: 52
End: 2022-06-16
Payer: MEDICARE

## 2022-06-16 ENCOUNTER — OFFICE VISIT (OUTPATIENT)
Dept: PSYCHIATRY | Facility: CLINIC | Age: 52
End: 2022-06-16
Payer: MEDICARE

## 2022-06-16 DIAGNOSIS — F33.1 MDD (MAJOR DEPRESSIVE DISORDER), RECURRENT EPISODE, MODERATE: Primary | ICD-10-CM

## 2022-06-16 DIAGNOSIS — F41.1 GAD (GENERALIZED ANXIETY DISORDER): ICD-10-CM

## 2022-06-16 PROCEDURE — 4010F PR ACE/ARB THEARPY RXD/TAKEN: ICD-10-PCS | Mod: CPTII,95,, | Performed by: SOCIAL WORKER

## 2022-06-16 PROCEDURE — 90791 PSYCH DIAGNOSTIC EVALUATION: CPT | Mod: 95,,, | Performed by: SOCIAL WORKER

## 2022-06-16 PROCEDURE — 90791 PR PSYCHIATRIC DIAGNOSTIC EVALUATION: ICD-10-PCS | Mod: 95,,, | Performed by: SOCIAL WORKER

## 2022-06-16 PROCEDURE — 1159F MED LIST DOCD IN RCRD: CPT | Mod: CPTII,95,, | Performed by: SOCIAL WORKER

## 2022-06-16 PROCEDURE — 3044F HG A1C LEVEL LT 7.0%: CPT | Mod: CPTII,95,, | Performed by: SOCIAL WORKER

## 2022-06-16 PROCEDURE — 1159F PR MEDICATION LIST DOCUMENTED IN MEDICAL RECORD: ICD-10-PCS | Mod: CPTII,95,, | Performed by: SOCIAL WORKER

## 2022-06-16 PROCEDURE — 3044F PR MOST RECENT HEMOGLOBIN A1C LEVEL <7.0%: ICD-10-PCS | Mod: CPTII,95,, | Performed by: SOCIAL WORKER

## 2022-06-16 PROCEDURE — 4010F ACE/ARB THERAPY RXD/TAKEN: CPT | Mod: CPTII,95,, | Performed by: SOCIAL WORKER

## 2022-06-16 NOTE — TELEPHONE ENCOUNTER
----- Message from Natasha Khalil sent at 6/16/2022  1:07 PM CDT -----  Name Of Caller: Starla     Provider Name: Rashad Masterson     Does patient feel the need to be seen today? Had one at 1pm     Relationship to the Pt?: pt    Contact Preference?: [phone]    What is the nature of the call?:Pt called and would like to reschedule appt she has but next opening is in August she said she cannot wait that long to please call her back to see what day you can squeeze her in. She does not have transportation today

## 2022-06-16 NOTE — TELEPHONE ENCOUNTER
Spoke with Ms Fisher who reports she is having a procedure to her heart the last week of June. It was agreed upon for her to follow up with Dr. Solorzano in August. New appt date and time 8/1/22 at 3:45pm. Reinforced the importance of making sure that her heart health is in good standing prior to any surgical procedures with Dr. Solorzano. Ms Fisher verbalized understanding. No other needs voiced at this time. Encouraged to call if further assistance is needed.

## 2022-06-18 NOTE — PROGRESS NOTES
Psychiatry Initial Visit (PhD/LCSW)  Diagnostic Interview - CPT 02048    Date: 2022    Site: [facility]    Referral source: MD    Clinical status of patient: Outpatient    Starla Fisher, a 52 y.o. female, for initial evaluation visit.  Met with patient. Also her cousin who is an LCSW Clementina Sharp was present, the client does not live with [place], however, lives with her sister age 43, loss and grief issus, are present also.    Chief complaint/reason for encounter: depression, mood swings, anxiety, sleep, appetite, behavior, somatic and interpersonal, loss and grief, isolation, health, sleep and pain.    History of present illness: the client took care of her mother who  two years ago and also too care of her father who  in 2019. She has loss and grief issues and sadness, and isolation. Some health issues also and pain, sleep concerns. She has heart flutters, migraines, blood pressure issues, anxiety, panic attacks, and depression, and shuts down at times, is not suicidal and a safety plan was discussed due to at one time a few months ago, she did feel suicidal. Call us, call family, call 911, or the an ER and or call National Suicidal Hotline, if can not reach anyone else. Her sister who lives with her is named Riya Fisher and they get along and sister is helpful to her. Client does not like to leave the house and will for medical appointments, and or going to a store.    Pain: 4    Symptoms:   · Mood: depressed mood, diminished interest, weight gain, fatigue, worthlessness/guilt, poor concentration, tearfulness and social isolation  · Anxiety: decreased memory, excessive anxiety/worry, restlessness/keyed up, irritability, muscle tension and panic attacks  · Substance abuse: denied  · Cognitive functioning: denied  · Health behaviors: stress, health, blood pressure, walking, getting around, pain, and has to take blood thinners.     Psychiatric history: has participated in  counseling/psychotherapy on an outpatient basis in the past and currently under psychiatric care    Medical history: see the above    Family history of psychiatric illness: not known    Social history (marriage, employment, etc.): lives with her sister, is disabled. Does not work. Asked her to get an appointment with her MD and or an MD in our department, psychiatry and get a review of her meds. Suggested group therapy after I saw her a few times, and sent her a book on grief and loss.    Substance use:   Alcohol: none   Drugs: none   Tobacco: none   Caffeine: none    Current medications and drug reactions (include OTC, herbal): see medication list see med list.    Strengths and liabilities: Strength: Patient accepts guidance/feedback, Strength: Patient is expressive/articulate., Strength: Patient is intelligent., Strength: Patient is motivated for change., Strength: Patient has positive support network., Strength: Patient has reasonable judgment., Liability: Patient lacks coping skills.    Current Evaluation:     Mental Status Exam:  General Appearance:  unremarkable, age appropriate   Speech: normal tone, normal rate, normal pitch, normal volume      Level of Cooperation: cooperative      Thought Processes: normal and logical   Mood: sad      Thought Content: normal, no suicidality, no homicidality, delusions, or paranoia   Affect: congruent and appropriate   Orientation: Oriented x3   Memory: recent >  intact, remote >  intact   Attention Span & Concentration: intact   Fund of General Knowledge: intact and appropriate to age and level of education   Abstract Reasoning: interpretation of similarities was concrete   Judgment & Insight: limited     Language  intact     Diagnostic Impression - Plan:       ICD-10-CM ICD-9-CM   1. MDD (major depressive disorder), recurrent episode, moderate  F33.1 296.32   2. ROSIBEL (generalized anxiety disorder)  F41.1 300.02       Plan:individual psychotherapy, group psychotherapy,  consult psychiatrist for medication evaluation and medication management by physician    Return to Clinic: 2 weeks    Length of Service (minutes): 45

## 2022-06-23 ENCOUNTER — OFFICE VISIT (OUTPATIENT)
Dept: NEUROLOGY | Facility: CLINIC | Age: 52
End: 2022-06-23
Payer: MEDICARE

## 2022-06-23 VITALS
WEIGHT: 293 LBS | DIASTOLIC BLOOD PRESSURE: 87 MMHG | SYSTOLIC BLOOD PRESSURE: 141 MMHG | BODY MASS INDEX: 50.02 KG/M2 | HEART RATE: 85 BPM | HEIGHT: 64 IN

## 2022-06-23 DIAGNOSIS — I48.3 TYPICAL ATRIAL FLUTTER: ICD-10-CM

## 2022-06-23 DIAGNOSIS — G47.33 OSA (OBSTRUCTIVE SLEEP APNEA): ICD-10-CM

## 2022-06-23 DIAGNOSIS — G43.719 INTRACTABLE CHRONIC MIGRAINE WITHOUT AURA AND WITHOUT STATUS MIGRAINOSUS: Primary | ICD-10-CM

## 2022-06-23 PROCEDURE — 3044F PR MOST RECENT HEMOGLOBIN A1C LEVEL <7.0%: ICD-10-PCS | Mod: CPTII,S$GLB,, | Performed by: PSYCHIATRY & NEUROLOGY

## 2022-06-23 PROCEDURE — 99499 RISK ADDL DX/OHS AUDIT: ICD-10-PCS | Mod: S$GLB,,, | Performed by: PSYCHIATRY & NEUROLOGY

## 2022-06-23 PROCEDURE — 3079F DIAST BP 80-89 MM HG: CPT | Mod: CPTII,S$GLB,, | Performed by: PSYCHIATRY & NEUROLOGY

## 2022-06-23 PROCEDURE — 3077F SYST BP >= 140 MM HG: CPT | Mod: CPTII,S$GLB,, | Performed by: PSYCHIATRY & NEUROLOGY

## 2022-06-23 PROCEDURE — 3008F BODY MASS INDEX DOCD: CPT | Mod: CPTII,S$GLB,, | Performed by: PSYCHIATRY & NEUROLOGY

## 2022-06-23 PROCEDURE — 99215 PR OFFICE/OUTPT VISIT, EST, LEVL V, 40-54 MIN: ICD-10-PCS | Mod: S$GLB,,, | Performed by: PSYCHIATRY & NEUROLOGY

## 2022-06-23 PROCEDURE — 1159F MED LIST DOCD IN RCRD: CPT | Mod: CPTII,S$GLB,, | Performed by: PSYCHIATRY & NEUROLOGY

## 2022-06-23 PROCEDURE — 99999 PR PBB SHADOW E&M-EST. PATIENT-LVL V: ICD-10-PCS | Mod: PBBFAC,,, | Performed by: PSYCHIATRY & NEUROLOGY

## 2022-06-23 PROCEDURE — 1159F PR MEDICATION LIST DOCUMENTED IN MEDICAL RECORD: ICD-10-PCS | Mod: CPTII,S$GLB,, | Performed by: PSYCHIATRY & NEUROLOGY

## 2022-06-23 PROCEDURE — 3008F PR BODY MASS INDEX (BMI) DOCUMENTED: ICD-10-PCS | Mod: CPTII,S$GLB,, | Performed by: PSYCHIATRY & NEUROLOGY

## 2022-06-23 PROCEDURE — 3079F PR MOST RECENT DIASTOLIC BLOOD PRESSURE 80-89 MM HG: ICD-10-PCS | Mod: CPTII,S$GLB,, | Performed by: PSYCHIATRY & NEUROLOGY

## 2022-06-23 PROCEDURE — 4010F ACE/ARB THERAPY RXD/TAKEN: CPT | Mod: CPTII,S$GLB,, | Performed by: PSYCHIATRY & NEUROLOGY

## 2022-06-23 PROCEDURE — 3044F HG A1C LEVEL LT 7.0%: CPT | Mod: CPTII,S$GLB,, | Performed by: PSYCHIATRY & NEUROLOGY

## 2022-06-23 PROCEDURE — 99215 OFFICE O/P EST HI 40 MIN: CPT | Mod: S$GLB,,, | Performed by: PSYCHIATRY & NEUROLOGY

## 2022-06-23 PROCEDURE — 99499 UNLISTED E&M SERVICE: CPT | Mod: S$GLB,,, | Performed by: PSYCHIATRY & NEUROLOGY

## 2022-06-23 PROCEDURE — 1160F PR REVIEW ALL MEDS BY PRESCRIBER/CLIN PHARMACIST DOCUMENTED: ICD-10-PCS | Mod: CPTII,S$GLB,, | Performed by: PSYCHIATRY & NEUROLOGY

## 2022-06-23 PROCEDURE — 3077F PR MOST RECENT SYSTOLIC BLOOD PRESSURE >= 140 MM HG: ICD-10-PCS | Mod: CPTII,S$GLB,, | Performed by: PSYCHIATRY & NEUROLOGY

## 2022-06-23 PROCEDURE — 99999 PR PBB SHADOW E&M-EST. PATIENT-LVL V: CPT | Mod: PBBFAC,,, | Performed by: PSYCHIATRY & NEUROLOGY

## 2022-06-23 PROCEDURE — 1160F RVW MEDS BY RX/DR IN RCRD: CPT | Mod: CPTII,S$GLB,, | Performed by: PSYCHIATRY & NEUROLOGY

## 2022-06-23 PROCEDURE — 4010F PR ACE/ARB THEARPY RXD/TAKEN: ICD-10-PCS | Mod: CPTII,S$GLB,, | Performed by: PSYCHIATRY & NEUROLOGY

## 2022-06-23 RX ORDER — CEFUROXIME AXETIL 250 MG/1
TABLET ORAL
Qty: 3 ML | Refills: 5 | Status: SHIPPED | OUTPATIENT
Start: 2022-06-23 | End: 2023-01-24 | Stop reason: SDUPTHER

## 2022-06-23 NOTE — TELEPHONE ENCOUNTER
Spoke to patient in regards to message we received. Patient wanted ton know if there are CPAP machines available. I informed her on the shortage of machines. I let her know that [facility] did put in an order for her back in April. I gave her the number to contact [facility] to check on the status of her machine.

## 2022-06-23 NOTE — PROGRESS NOTES
NEUROLOGY HEADACHE NOTE?     CHIEF COMPLAINT?   Ms. Starla Fisher chief concern is uncontrolled headache.     Name of the referring physician Self, Aaareferral     Name of primary care provider Tahir Mosquera MD      Subjective:    HPI:  Ms. Starla Fisher presents today to discuss their ongoing uncontrolled headaches.  The patient shares she has been suffering with headaches for decades but has noted an increase in frequency and intensity over the last handful of years.  She has been recently diagnosed with obstructive sleep apnea but awaits CPAP machine.  Outside of severe headaches, she also suffers with frequent tension headaches described as bandlike pressure about the head especially above and below the eyes in the frontal region.    Due to cardiac arrhythmia, the patient is on Eliquis in followed closely by Cardiology.    Recent eye exam; denies any history of papilledema.    Age of onset: 11 YO  When did they become more of a problem: in her youth  # of Total headache days per month: 15  # of Migraine or severe days per month: 10  Intensity of average and most severe headaches: 3/10, 10/10  Duration of headache pain: >4 hrs untreated   Quality of pain: Pulsating/Throbbing  Laterality: unilateral, not side locked; eventually all become bilateral   Location: temporal and radiates out   Photophobia and phonophobia: yes  Nausea and vomiting: yes  Causes avoidance of physical activity: yes  Worsened by physical activity: yes  Preventive Medications failed: topiramate (>3 months, ineffective), emgality (4 months, ineffective), losartan (>3 months, ineffective), diltiazem (>3 months, ineffective), aimovig (>3 months, ineffective), amitriptyline (>3 months, ineffective)  Acute medications failed: sumatriptan (inj, pill, and nasal spray; <10 days per month), rizatriptan (>3 months, ineffective)  OTC Medications: none  Hx of (Bolded items are positive): depression, anxiety, fibromyalgia, autoimmune disorder, head  trauma, neurological infection, glaucoma, asthma, nephrolithiasis, MI, Stroke, Raynaud's phen., Cardiac arrhythmia   Is medication overuse contributing to the patient's current migraine burden: No  Aura: no  Autonomic symptoms: no  Family Hx of migraines: no    LATEST IMAGING:  None    PAST MEDICAL HISTORY:  Past Medical History:   Diagnosis Date    Anxiety     Depression     GERD (gastroesophageal reflux disease)     Hypertension     Migraine headache     Sleep difficulties     Typical atrial flutter 4/20/2022       PAST SURGICAL HISTORY:  Past Surgical History:   Procedure Laterality Date    BREAST SURGERY      ENDOMETRIAL ABLATION      EYE SURGERY      FOOT SURGERY Right 10/2018    bunion    IMPLANTATION OF PERMANENT SACRAL NERVE STIMULATOR N/A 2/15/2022    Procedure: INSERTION, NEUROSTIMULATOR, PERMANENT, SACRAL;  Surgeon: David Brown MD;  Location: [facility] OR [address] [place] [address];  Service: Urology;  Laterality: N/A;    TONSILLECTOMY      TOTAL REDUCTION MAMMOPLASTY Bilateral        CURRENT MEDS:  Current Outpatient Medications   Medication Sig Dispense Refill    apixaban (ELIQUIS) 5 mg Tab Take 1 tablet (5 mg total) by mouth 2 (two) times daily. 60 tablet 11    blood-glucose meter (TRUE METRIX GLUCOSE METER) kit Check BS as needed 1 each 0    clotrimazole-betamethasone 1-0.05% (LOTRISONE) cream Apply topically 2 (two) times daily. 15 g 1    diazePAM (VALIUM) 5 MG tablet Take 1 tablet (5 mg total) by mouth daily as needed for Anxiety. 15 tablet 2    diltiaZEM (CARDIZEM CD) 120 MG Cp24 Take 1 capsule (120 mg total) by mouth once daily. 90 capsule 1    diltiaZEM (CARDIZEM CD) 240 MG 24 hr capsule Take 1 capsule (240 mg total) by mouth once daily. 30 capsule 11    EScitalopram oxalate (LEXAPRO) 10 MG tablet Take 1 tablet (10 mg total) by mouth once daily. 90 tablet 3    fluconazole (DIFLUCAN) 150 MG Tab Take one tablet today and one in a week 2 tablet 0    galcanezumab-gnlm (EMGALITY PEN) 120 mg/mL  PnIj Inject 120 mg into the skin every 28 days. 1 mL 11    lancets (TRUEPLUS LANCETS) 33 gauge Misc Inject 1 lancet into the skin 3 (three) times daily. 200 each 0    losartan (COZAAR) 25 MG tablet Take 1 tablet (25 mg total) by mouth once daily. 90 tablet 3    metronidazole 0.75% (METROCREAM) 0.75 % Crea Apply topically 2 (two) times daily. 45 g 3    mirabegron (MYRBETRIQ) 50 mg Tb24 Take 1 tablet (50 mg total) by mouth once daily. 30 tablet 11    multivit-minerals/folic acid (ADULT MULTIVITAMIN GUMMIES ORAL) Take 1 tablet by mouth Daily. Continue to hold until after Surgery.      nystatin (MYCOSTATIN) powder Apply to affected area 3 times daily 60 g 3    pantoprazole (PROTONIX) 40 MG tablet Take 40 mg by mouth 2 (two) times daily.      phenylephrine (TEODORA-SYNEPHRINE) 10 mg/mL injection       promethazine (PHENERGAN) 25 MG tablet Take 25 mg by mouth every 6 (six) hours as needed for Nausea.      sumatriptan (IMITREX STATDOSE) 6 mg/0.5 mL kit INJECT 0.5 ML(6 MG TOTAL) UNDER THE SKIN EVERY 2 HOURS AS NEEDED FOR MIGRAINE 3 mL 5    sumatriptan (IMITREX) 100 MG tablet Take 1 tablet (100 mg total) by mouth every 2 (two) hours as needed for Migraine. 9 tablet 11    topiramate (TOPAMAX) 50 MG tablet TAKE 1 TABLET(50 MG) BY MOUTH THREE TIMES DAILY 270 tablet 0    traMADoL (ULTRAM) 50 mg tablet Take 1 tablet (50 mg total) by mouth every 6 (six) hours. 5 tablet 0    traZODone (DESYREL) 150 MG tablet Take half or whole tablet by mouth before bed as needed for insomnia 90 tablet 3    SUMAtriptan (IMITREX) 20 mg/actuation nasal spray 1 spray (20 mg total) by Nasal route as needed for Migraine (max 40mg/24hr). 6 each 3     Current Facility-Administered Medications   Medication Dose Route Frequency Provider Last Rate Last Admin    sodium chloride 0.9% flush 10 mL  10 mL Intravenous PRN Zelalem Solorzano DPM         Facility-Administered Medications Ordered in Other Visits   Medication Dose Route Frequency Provider  "Last Rate Last Admin    0.9%  NaCl infusion   Intravenous Continuous Sathish Briseno MD   Stopped at 02/15/22 0948    LIDOcaine (PF) 10 mg/ml (1%) injection 10 mg  1 mL Intradermal Once Sathish Briseno MD           ALLERGIES:  Review of patient's allergies indicates:  No Known Allergies    FAMILY HISTORY:  Family History   Problem Relation Age of Onset    Atrial fibrillation Mother     Thyroid disease Mother     Dementia Mother     Diabetes Father     Prostate cancer Father     Cancer Paternal Grandfather     Breast cancer Maternal Grandmother     Migraines Sister        SOCIAL HISTORY:  Social History     Tobacco Use    Smoking status: Never Smoker    Smokeless tobacco: Never Used   Substance Use Topics    Alcohol use: No    Drug use: No       Review of Systems:  Gen: no fever, no chills, no generalized feeling of weakness   HEENT: no double vision, no blurred vision, no eye pain, no eye exudates.?    Heart: no chest pain, no SOB    Lungs: no SOB, no cough    MSK: no weakness of legs, intact ROM    ABD: no abd pain, no N/V/D/C, no difficulty with defecation .    Extremities: No leg pain, no edema.    Objective:  PHYSICAL EXAMINATION??   Starla Fisher is a 52 y.o. female patient who has the following vital signs with   Vitals:    06/23/22 1306   BP: (!) 141/87   Pulse: 85   Weight: (!) 140.6 kg (310 lb)   Height: 5' 4" (1.626 m)   , body surface area is 2.52 meters squared.     General: female in NAD, alert and awake, Aox3, well groomed, obesity. ?    ? ?    HEENT: Head is NC/AT EOMI, pupil size: 4 mm B/L, no nystagmus noted; hearing grossly intact b/l. Mucous membrane moist, uvula midline, no pharyngeal erythema,exudates or discharges.    Neck: Supple. no nuchal rigidity.      Cardiovascular: well perfused, no cyanosis        Respiratory: Symmetric chest rise noted       Musculoskeletal: Muscle tone noted to be adequate for patient age, muscle mass is WNL. No spontaneous movements or " fasciculations noted during this examination.       Extremities: No pedal edema or calf tenderness. No cogwheel rigidity noted on B/L UE extremities.       Neurological Examination.    Mental status: AA&O x3; Affect/mood is euthymic/congruent; no aphasia noted during examination. Patient answers simple questions appropriately & follows simple commands; no dysarthria or expressive aphasia; no debra-neglect or extinction. Vocabulary/word finding: excellent.       Cranial Nerves: II-XII grossly intact bilaterally.     Muscle Function: Tone WNL and Muscle bulk WNL. Full and symmetric strength, 5/5, throughout.      Sensory: ?light touch is grossly intact in bilateral upper and lower extremities and face.     Reflexes: Left and Right biceps, triceps, brachioradialis are 2+/4. patellar 2+/4 on Left and 2+/4 on Right     Coordination: no dysmetria (finger to nose negative)     Gait:  Walks with a slight limp, uses cane for stability    REVIEW   We reviewed their current medical history, medications, allergies, past medical history, surgical history, social history, family history, and review of systems, and updated all the information.     ASSESSMENT AND PLAN   Farhad is a very pleasant, 52 y.o. with complicated diagnosis of     1. Intractable chronic migraine without aura and without status migrainosus  Prior authorization Order   2. CARL (obstructive sleep apnea)     3. Typical atrial flutter       Starla has worsening headaches that meet International Headache Society Criteria for  chronic migraine present more than 15 days month.   Medication overuse is not likely contributing to this patient's headaches though the patient is at increased risk given her frequent headaches. Extensive counseling provided at the time of the encounter.   Patient feels not only severe disabling pain, but is also unable to function due to headaches when they are present.     My approach to every patient is multimodal.   I focused our discussion  on addressing modifiable risk factors and trying to decrease them.  After discussion with the patient, I recommend the followin. Preventive medications; these medications have to be taken every single day to decrease headache frequency and severity; it takes at least minimum of few weeks to see any results; and have to be taken for at least 6-12 months. The medication should be started at a small dose and increased if there are no significant side effects. Patient compliance is key for effectiveness of the preventive medications. (we discussed the options of beta-blockers, calcium channel blockers, SSRIs, SNRIs, neuron modulating agents)   Failed:   topiramate (>3 months, ineffective),   emgality (4 months, ineffective),   losartan (>3 months, ineffective),   diltiazem (>3 months, ineffective),   aimovig (>3 months, ineffective),   amitriptyline (>3 months, ineffective)    STOP Emgality when botox is initiated; we will not continue CGRP preventives while using botox for migraine.    START: BOTOX FOR MIGRAINE  =========================================  How BOTOX® works:   BOTOX® is a focally acting neurotoxin which paralyzes the muscles into which it is injected. It is used to treat a number of conditions within the body, including chronic migraine. It contains the active substance Botulinum toxin type A and is injected into the muscles, or deep into the skin. It works by partially blocking the nerve impulses to any muscles that have been injected and reduces excessive contractions of these muscles. In the case of chronic migraine, it is thought that BOTOX®  blocks pain signals, which indirectly block the development of a migraine.    Specifically, it inhibits the release of neurotransmitters involved in pain transmission (glutamate, substance P, CGRP).1 This inhibits neurogenic inflammation, blocks peripheral sensitization of nociceptive nerve fibers and indirectly decreases central sensitization.1 It also  leads to decreased tension in the face, neck and head muscles due to potent, selective and long-lasting blockade of acetylcholine release.       Who does BOTOX® work for?      BOTOX is used to prevent headaches in adult patients with chronic migraine. Chronic migraine is a disease affecting the nervous system. To be diagnosed with chronic migraine, you must have headaches 15 days or more a month. In addition, on 8 or more days a month, your headaches must have at least two of the following characteristics as described by the International Classification of Headache Disorders (ICHD-3 beta):        affect only one side of the head     cause a pulsating pain     cause moderate to severe pain     are aggravated by routine physical activity    And they must cause at least one of the following:     nausea, vomiting, or both     sensitivity to light and sound.       Headaches must be following this pattern for at least 3 months to be considered chronic.       Research supporting BOTOX®:   The two clinical trials that supported FDA approval of BOTOX® for chronic migraine were called the PHASE III Research Evaluating Migraine Prophylaxis Therapy (PREEMPT) protocol. 2 This extensively tested protocol resulted in the technique of 31 small injections of 5 units each of BOTOX® into designated locations over the forehead, temples, back of the head (occipital region) and neck (trapezius). The amount of medicine approved and studied is 155 units, however the vials come in 100 or 200 units. Some providers will offer the additional units to be injected in particular areas of pain, calling this the follow the pain approach. Many providers who participated in the PREEMPT study performed this technique. Unfortunately, it is not fully known whether this provides additional benefit or not, so your provider may decide to limit treatment to the designated areas only.       When to expect results:  In clinical trials, BOTOX®?provided  a significant reduction in headache days after the first treatment. After the second treatment (at 24 weeks), BOTOX®?prevented even greater reduction in headache, up to 9 headache days a month. The results vary amongst individuals. It can provide benefit as soon as 2 weeks and as long as 24 weeks. Because it can take up to  6 months to see results, it is important to continue treatment for at least two rounds of BOTOX to give this therapy a true test. BOTOX has been shown to significantly reduce the frequency of days with headache and to improve the quality of life of patients suffering from chronic migraine. After two treatment sessions, approximately 47% of patients had a 50% or greater reduction from baseline in the number of days with headache they experienced. BOTOX does not prevent headaches in patients with episodic migraine, which occur less than 15 days a month.       How frequently do I need injections?   The BOTOX® effect lasts for about 3 months. Most insurance companies will only allow for you to have injections every 3 months for this reason. It is also important not to have too frequent of injections due to the potential of developing antibodies that make the toxin less effective. It is not recommended to have the injections more often than every 2-3 months depending on the indication. For chronic migraine, we recommend every 3 months.       Here's what to expect during your treatment:   BOTOX®?must only be injected by providers with specific skills and experience on how to use the medicine. At the  [facility], all of our providers have gone through specific training and mentoring in BOTOX®?injection techniques. BOTOX®?is injected into your muscles (intramuscularly), or into the skin (intradermally). It is injected directly into the forehead, temples, back of the head and shoulders.  We use the smallest needle available, so for some people this pain is barely noticeable (especially when  compared to their migraine pain!), but for other people they may find this to be uncomfortable. We can offer ice packs or numbing cream to use before the procedure for these individuals, but most people are okay without.       Injections to 7 key areas of the head and neck   Very fine needles are used for 31 total injections   Injections feel like tiny pinpricks--we use very small needles, however this may be painful, especially if you have a sensitive scalp.       Side effects:   -The most common side effect in Chronic Migraine clinical studies was neck pain-- experienced by 9% of BOTOX®?patients (vs 3% in placebo)2.    -Some individuals will have worse headache for the day or two after injection, though not consistently. If the BOTOX®?spreads slightly or is injected too low in the forehead, it can cause eye droop. If this happens, the effects will last the duration of the BOTOX®, usually 3 months and then will return to normal. This is not a permanent side effect.  Other side effects include slight or partial facial paralysis, musculoskeletal stiffness, muscle weakness, muscle pain, injection-site pain, dry mouth and high blood pressure.    -As with any injection, it is possible for the procedure to result in infection, pain, swelling, burning and stinging, increased sensitivity, tenderness, redness, and/or bleeding/bruising at the site of injection.    -Spread of toxin effects.?   -Side effects possibly related to the spread of toxin distant from the site of administration have been reported with botulinum toxin. Symptoms could include muscle weakness, difficulty swallowing, double vision, blurred vision, hoarseness or change or loss of voice, or trouble breathing.  This is a particular risk for patients with an underlying illness that makes them susceptible to these symptoms.    -There has not  been a confirmed serious case of spread of toxin effect away from the injection site when BOTOX®?has been used at the  recommended dose to treat chronic migraine.   -To help prevent spread of toxin effects, notify us if you have any muscle or nerve conditions?such as amyotrophic lateral sclerosis (ALS or Cathie Gehrig's disease), myasthenia gravis, or Lambert-Eaton syndrome, as you may be at increased risk of serious side effects including severe dysphagia (difficulty swallowing) and respiratory compromise (difficulty breathing) from typical doses of BOTOX®.      To prevent other serious complications, notify us if you have other medical conditions, for example if you:?have or have had bleeding problems; have plans to have surgery; had surgery on your face; weakness of forehead muscles, such as trouble raising your eyebrows; drooping eyelids; any other abnormal facial change; are pregnant or plan to become pregnant (it is not known if BOTOX®?can harm your unborn baby); are breastfeeding or plan to breastfeed (it is not known if BOTOX®passes into breast milk).      Do not take BOTOX®?if you:?are allergic to any of the ingredients in BOTOX®?(see Medication Guide for ingredients); had an allergic reaction to any other botulinum toxin product such as?Myobloc®(rimabotulinumtoxinB),?Dysport®?(abobotulinumtoxinA), or?Xeomin®?(incobotulinumtoxinA); have a skin infection at the planned injection site.      Serious and/or immediate allergic reactions have been reported.?      These reactions include itching, rash, red itchy welts, wheezing, asthma symptoms, or dizziness or feeling faint. Tell your provider or get medical help right away if you experience any such symptoms; further injection of BOTOX®?should be discontinued.      Be sure to notify us if you are taking certain medications.   Using BOTOX®with certain other medicines may cause serious side effects.?Do not start any new medicines until you have told your provider that you have received BOTOX®?in the past.      Especially tell your provider if you: have received any other botulinum  toxin product in the last 4 months; have received injections of botulinum toxin such asMyobloc®,?Dysport®, or?Xeomin®?in the past; have recently received an antibiotic by injection; take muscle relaxants; take an allergy or cold medicine; take a sleep medicine; take anti-platelets (aspirin-like products) or anti-coagulants (blood thinners).      You are encouraged to report negative side effects of prescription drugs to the FDA. Visit?www.fda.gov/medMobule?or call 0-888-MSV-4874.   This does not cover all the possible serious side effects of BOTOX®.?      Corin would receive 155U of BOTOX in 31 injections.      The following muscles would be injected:    Amount            Muscle   5U                    Proceros    5U                     Supercilii (left)    5U                     Supercilii (right)    10U                  Epicranius - Occipitofrontalis Tonny Frontalis (right)    10U                  Epicranius - Occipitofrontalis Tonny Frontalis (left)    20U                  Temporalis (right)    20U                  Temporalis (left)    15U                  Tonny Occipitalis (right)    15U                  Tonny Occipitalis (left)    10U                  Semispinalis capitis (left)    10U                  Semispinalis capitis (right)    15U                  Trapezius (right)    15U                  Trapezius (left)       References:  -Alma NIEVES, Moni FINLEY. Neurotherapeutics to inhibit exocytosis from sensory neurons for the control of chronic pain. Current opinion in pharmacology. 2012;12(1):100-108.   -Wendi S, Lillian D, Roseann HC, et al. OnabotulinumtoxinA for chronic migraine: efficacy, safety, and tolerability in patients who received all five treatment cycles in the PREEMPT clinical program. Acta neurologica Scandinavica. 2014;129(1):61-70.   =========================================    2. Acute (abortive) medications- these medications are to be taken only at the onset of severe  headache, and please limit the total of any combination of these medications to less than 10 days per month on average because they can cause medication overuse headaches.   Failed:   Rizatriptan    Continue: Sumatriptan abscess prescribed by outside provider with strict limit of 10 days per month total use.    3. Natural approaches to increasing brain energy and serotonin:    SAMe 200 mg a day    Vitamin B2 400 mg daily    Vitamin B12 1000 mcg daily    Getting early morning light to increase natural serotonin, melatonin. Could also consider light therapy box, 10,000 LUX.     4. Headache prevention and acute treatment over-the counter supplements    Boswellia 800 mg 2-3 times per day, example brand Funes's, natural anti inflammatory herb    Sleep? modulation- melatonin at night 5 to 10 mg 30 minutes prior to sleep    Feverfew Tea 1-3 cups per day. Can get from [employer] or [employer]- A Natural anti inflammatory approach that does not cause further sensitization of the system.     5. DIET: I recommend a diet that heavily favors fruits and vegetables while reducing carbs. More information is available upon request.     6. EXERCISE: Gentle movement exercises, concentrate on combination of muscle building and aerobic. I also recommend adding gentle Yoga therapy. The goal is 150 minutes per week of moderate to rigorous exercise, but you should start at your own pace and progress slowly and safely as discussed today.    7. ANXIETY/STRESS- Control stressors with yoga, meditation, counseling, or other methods of mindfulness.     8. SLEEP- aim for 7-8 hrs of restful sleep per night.     9. FUN- Increase fun time spend with friends and family     10. Continue close follow-up with Sleep Medicine for treatment of recently identified obstructive sleep apnea.      Benefits, side effects, and alternatives were discussed extensively with the patient.Kailee Cha verbally endorsed understanding of all the  recommendations.?     she is going to follow up with me in approximately 3 weeks for 1st round of Botox injections then approximately every 90 days..?     I spent a total of 40 minutes on the day of the visit. This includes face to face time and non-face to face time preparing to see the patient (eg, review of tests), obtaining and/or reviewing separately obtained history, documenting clinical information in the electronic or other health record, independently interpreting results and communicating results to the patient/family/caregiver, or care coordinator.    A dictation device was used to produce this documentation which can sometimes result in grammatical errors or the replacement of similar sounding words.    Jaswinder Poe, DO

## 2022-06-23 NOTE — TELEPHONE ENCOUNTER
----- Message from Miguelina Jernigan sent at 6/23/2022  4:22 PM CDT -----  Type:  Patient  Call    Who Called: Pt     Would the patient rather a call back or a response via [facility]? Call back   Best Call Back Number: [phone]  Additional Information:  pt is checking to see if there is available machines for sleep apnea

## 2022-07-01 RX ORDER — PROMETHAZINE HYDROCHLORIDE 25 MG/1
25 TABLET ORAL EVERY 6 HOURS PRN
Qty: 30 TABLET | Refills: 0 | Status: SHIPPED | OUTPATIENT
Start: 2022-07-01 | End: 2023-01-17

## 2022-07-01 NOTE — TELEPHONE ENCOUNTER
----- Message from Andrei Gamez sent at 7/1/2022  4:35 PM CDT -----  Contact: pt.  .Type:  Needs Medical Advice    Who Called: pt  Symptoms (please be specific): headaches /nausea  How long has patient had these symptoms:    Pharmacy name and phone #:  [id number] - [place] - [address] AT [facility]   Phone: [phone]  Fax:  [phone]  Would the patient rather a call back or a response via [facility]? Call back  Best Call Back Number: [phone]   Additional Information: Pt. Is requesting a refill on her promethazine (PHENERGAN) 25 MG tablet

## 2022-07-11 ENCOUNTER — PROCEDURE VISIT (OUTPATIENT)
Dept: NEUROLOGY | Facility: CLINIC | Age: 52
End: 2022-07-11
Payer: MEDICARE

## 2022-07-11 VITALS
SYSTOLIC BLOOD PRESSURE: 149 MMHG | BODY MASS INDEX: 50.02 KG/M2 | DIASTOLIC BLOOD PRESSURE: 79 MMHG | HEIGHT: 64 IN | HEART RATE: 82 BPM | WEIGHT: 293 LBS

## 2022-07-11 DIAGNOSIS — G43.719 INTRACTABLE CHRONIC MIGRAINE WITHOUT AURA AND WITHOUT STATUS MIGRAINOSUS: Primary | ICD-10-CM

## 2022-07-11 PROCEDURE — 64615 CHEMODENERV MUSC MIGRAINE: CPT | Mod: S$GLB,,, | Performed by: PSYCHIATRY & NEUROLOGY

## 2022-07-11 PROCEDURE — 64615 PR CHEMODENERVATION OF MUSCLE FOR CHRONIC MIGRAINE: ICD-10-PCS | Mod: S$GLB,,, | Performed by: PSYCHIATRY & NEUROLOGY

## 2022-07-11 NOTE — PROCEDURES
"NEUROLOGY PROCEDURE NOTE - BOTOX FOR CHRONIC MIGRAINE    ID:   Starla Fisher is a 52 y.o. person who presents for medically necessary Botox injections for chronic migraine.    DIAGNOSIS:   1. Intractable chronic migraine without aura and without status migrainosus  onabotulinumtoxina injection 200 Units        BOTOX APPROVAL:   Botox injections have been approved for the patient today: Payor: [employer] MEDICARE / Plan: [employer] (SPECIAL NEEDS PLAN) / Product Type: Medicare Advantage /        INTERVAL HISTORY:   Patient presents today for Botox injection for chronic migraine.  They endorse that their headaches have been stable since previous encounter.  All questions and concerns were answered to the patient's satisfaction prior to the procedure.     ============================   Per previous clinic documentation on 06/23/22:   "# of Total headache days per month: 15  # of Migraine or severe days per month: 10  Intensity of average and most severe headaches: 3/10, 10/10  Duration of headache pain: >4 hrs untreated "    ============================     OBJECTIVE:   BP (!) 149/79   Pulse 82   Ht 5' 4" (1.626 m)   Wt (!) 140.6 kg (309 lb 15.5 oz)   LMP  (LMP Unknown)   BMI 53.21 kg/m²      General: Pleasant, well-appearing in no acute distress.    Neurological Exam:    Mental status: Speech clear, fluent. Normal recent and remote memory.    Cranial nerves: EOMI, face symmetric, hearing intact to voice.   Motor: Moves all four extremities spontaneously.     PRE-PROCEDURE VERIFICATION:   Allergies and relevant documentation was verified. Standard hand hygiene was observed.      PROCEDURE CODES:    - Botulinum Toxin A - Botox 200U    66525 - Chemodenervation of muscles for chronic migraine    PROCEDURE:   The risk and benefits of the procedure were explained. Starla agreed to undergo the procedure. Starla and I signed the written consent form. Two vials of BOTOX 100U (total 200U) were opened. Each vial " of Botox was mixed in 2cc of sterile, preservative-free saline. Starla received 155U of BOTOX in 31 injections. 45U were unavoidably wasted.      The following muscles were injected:    Amount Muscle   5U   Proceros    5U    Supercilii (left)    5U    Supercilii (right)    10U   Epicranius - Occipitofrontalis Tonny Frontalis (right)    10U   Epicranius - Occipitofrontalis Tonny Frontalis (left)    20U   Temporalis (right)    20U   Temporalis (left)    15U   Tonny Occipitalis (right)    15U   Tonny Occipitalis (left)    10U   Semispinalis capitis (left)    10U   Semispinalis capitis (right)    15U   Trapezius (right)    15U   Trapezius (left)    45 units were unavoidably wasted. The patient tolerated the procedure well with no immediate side effects.      LIMITATIONS OF BOTOX: Safety and effectiveness have not been established for the prophylaxis of episodic migraine (14 headache days or fewer per month) in 7 placebo-controlled studies.   ADVERSE REACTIONS: The most frequently reported adverse reactions following injection of BOTOX® for chronic migraine include neck pain (9%), headache (5%), eyelid ptosis (4%), migraine (4%), muscular weakness (4%), musculoskeletal stiffness (4%), bronchitis (3%), injection-site pain (3%), musculoskeletal pain (3%), myalgia (3%), facial paresis (2%), hypertension (2%), and muscle spasms (2%). Post Marketing Experience:There have been spontaneous reports of death, sometimes associated with dysphagia, pneumonia, and/or other significant debility or anaphylaxis, after treatment with botulinum toxin. There have also been reports of adverse events involving the cardiovascular system, including arrhythmia and myocardial infarction, some with fatal outcomes. Some of these patients had risk factors including cardiovascular disease. The exact relationship of these events to the botulinum toxin injection has not been established. Distant Spread of Toxin Effect:  Post-marketing reports indicate that the effects of BOTOX® and all botulinum toxin products may spread from the area of injection to produce symptoms consistent with botulinum toxin effects. These may include asthenia, generalized muscle weakness, diplopia, ptosis, dysphagia, dysphonia, dysarthria, urinary incontinence, and breathing difficulties. These symptoms have been reported hours to weeks after injection. Swallowing and breathing difficulties can be life threatening, and there have been reports of death. The risk of symptoms is probably greatest in children treated for spasticity, but symptoms can also occur in adults treated for spasticity and other conditions, particularly in those patients who have underlying conditions that would predispose them to these symptoms. In unapproved uses, including spasticity in children, and in approved indications, cases of spread of effect have been reported at doses comparable to those used to treat cervical dystonia and at lower doses.    CONTRAINDICATIONS: BOTOX® is contraindicated in the presence of infection at the proposed injection site(s) and in individuals with known hypersensitivity to any botulinum toxin preparation or to any of the components in the formulation. Hypersensitivity Reactions: Serious and/or immediate hypersensitivity reactions have been reported. These reactions include anaphylaxis, serum sickness, urticaria, soft-tissue edema, and dyspnea. If such a reaction occurs, further injection of BOTOX® should be discontinued and appropriate medical therapy immediately instituted. One fatal case of anaphylaxis has been reported in which lidocaine was used as the diluent, and consequently the causal agent cannot be reliably determined.    Note: If the patient is covered by commercial insurance and has a diagnosis of chronic migraine, the Botox company may be able to assist with out-of-pocket co-payment up to $700 per treatment. In order to get assistance,  please call [employer] at [phone] or go to  [email or web address].      ASSESSMENT/PLAN:  Starla presents today for medically necessary Botox injections for chronic migraine. We proceeded with Botox 155 units today.     Diagnosis:   1. Intractable chronic migraine without aura and without status migrainosus  onabotulinumtoxina injection 200 Units     Starla should follow-up for the next cycle of Botox injections in 3 months or earlier for a clinical follow-up visit if needed.

## 2022-07-14 ENCOUNTER — OFFICE VISIT (OUTPATIENT)
Dept: CARDIOLOGY | Facility: CLINIC | Age: 52
End: 2022-07-14
Payer: MEDICARE

## 2022-07-14 VITALS
BODY MASS INDEX: 50.02 KG/M2 | DIASTOLIC BLOOD PRESSURE: 88 MMHG | HEART RATE: 84 BPM | SYSTOLIC BLOOD PRESSURE: 175 MMHG | WEIGHT: 293 LBS | HEIGHT: 64 IN

## 2022-07-14 DIAGNOSIS — I48.3 TYPICAL ATRIAL FLUTTER: Primary | ICD-10-CM

## 2022-07-14 DIAGNOSIS — Z98.890 S/P ABLATION OF ATRIAL FLUTTER: ICD-10-CM

## 2022-07-14 DIAGNOSIS — I10 ESSENTIAL HYPERTENSION: ICD-10-CM

## 2022-07-14 DIAGNOSIS — Z86.79 S/P ABLATION OF ATRIAL FLUTTER: ICD-10-CM

## 2022-07-14 PROCEDURE — 4010F PR ACE/ARB THEARPY RXD/TAKEN: ICD-10-PCS | Mod: CPTII,S$GLB,, | Performed by: INTERNAL MEDICINE

## 2022-07-14 PROCEDURE — 1160F RVW MEDS BY RX/DR IN RCRD: CPT | Mod: CPTII,S$GLB,, | Performed by: INTERNAL MEDICINE

## 2022-07-14 PROCEDURE — 1159F PR MEDICATION LIST DOCUMENTED IN MEDICAL RECORD: ICD-10-PCS | Mod: CPTII,S$GLB,, | Performed by: INTERNAL MEDICINE

## 2022-07-14 PROCEDURE — 3079F DIAST BP 80-89 MM HG: CPT | Mod: CPTII,S$GLB,, | Performed by: INTERNAL MEDICINE

## 2022-07-14 PROCEDURE — 99214 PR OFFICE/OUTPT VISIT, EST, LEVL IV, 30-39 MIN: ICD-10-PCS | Mod: S$GLB,,, | Performed by: INTERNAL MEDICINE

## 2022-07-14 PROCEDURE — 99999 PR PBB SHADOW E&M-EST. PATIENT-LVL IV: CPT | Mod: PBBFAC,,, | Performed by: INTERNAL MEDICINE

## 2022-07-14 PROCEDURE — 3008F PR BODY MASS INDEX (BMI) DOCUMENTED: ICD-10-PCS | Mod: CPTII,S$GLB,, | Performed by: INTERNAL MEDICINE

## 2022-07-14 PROCEDURE — 3077F SYST BP >= 140 MM HG: CPT | Mod: CPTII,S$GLB,, | Performed by: INTERNAL MEDICINE

## 2022-07-14 PROCEDURE — 3008F BODY MASS INDEX DOCD: CPT | Mod: CPTII,S$GLB,, | Performed by: INTERNAL MEDICINE

## 2022-07-14 PROCEDURE — 1160F PR REVIEW ALL MEDS BY PRESCRIBER/CLIN PHARMACIST DOCUMENTED: ICD-10-PCS | Mod: CPTII,S$GLB,, | Performed by: INTERNAL MEDICINE

## 2022-07-14 PROCEDURE — 1159F MED LIST DOCD IN RCRD: CPT | Mod: CPTII,S$GLB,, | Performed by: INTERNAL MEDICINE

## 2022-07-14 PROCEDURE — 3079F PR MOST RECENT DIASTOLIC BLOOD PRESSURE 80-89 MM HG: ICD-10-PCS | Mod: CPTII,S$GLB,, | Performed by: INTERNAL MEDICINE

## 2022-07-14 PROCEDURE — 4010F ACE/ARB THERAPY RXD/TAKEN: CPT | Mod: CPTII,S$GLB,, | Performed by: INTERNAL MEDICINE

## 2022-07-14 PROCEDURE — 3077F PR MOST RECENT SYSTOLIC BLOOD PRESSURE >= 140 MM HG: ICD-10-PCS | Mod: CPTII,S$GLB,, | Performed by: INTERNAL MEDICINE

## 2022-07-14 PROCEDURE — 99214 OFFICE O/P EST MOD 30 MIN: CPT | Mod: S$GLB,,, | Performed by: INTERNAL MEDICINE

## 2022-07-14 PROCEDURE — 99999 PR PBB SHADOW E&M-EST. PATIENT-LVL IV: ICD-10-PCS | Mod: PBBFAC,,, | Performed by: INTERNAL MEDICINE

## 2022-07-14 PROCEDURE — 3044F PR MOST RECENT HEMOGLOBIN A1C LEVEL <7.0%: ICD-10-PCS | Mod: CPTII,S$GLB,, | Performed by: INTERNAL MEDICINE

## 2022-07-14 PROCEDURE — 3044F HG A1C LEVEL LT 7.0%: CPT | Mod: CPTII,S$GLB,, | Performed by: INTERNAL MEDICINE

## 2022-07-14 NOTE — PROGRESS NOTES
[facility]     SUBJECTIVE:     History of Present Illness:  Patient is a 52 y.o. female presents with followup after atrial flutter cardioversion . Seen in the hospital when she was admitted for plantar fascitis surgery and noted to be in atrial flutter . Cardioverted electrically and sent home on medications . Had ablation 6/22    Primary Diagnosis:  1. Morbid obesity  2. Hypertension  3. Atrial flutter with rapid ventricular rate - s/p ablation  6/29/22  ROS  Since last visit 5/22:   A. Has not monitored her heart rate.   B. No chest pains  C. No syncope  D. Feels tired only   E. Blood pressures normal at home but variesl lowest reading is 150's    Past Hospitalization    Review of patient's allergies indicates:  No Known Allergies    Past Medical History:   Diagnosis Date    Anxiety     Depression     GERD (gastroesophageal reflux disease)     Hypertension     Migraine headache     Sleep difficulties     Typical atrial flutter 4/20/2022       Past Surgical History:   Procedure Laterality Date    BREAST SURGERY      ENDOMETRIAL ABLATION      EYE SURGERY      FOOT SURGERY Right 10/2018    bunion    IMPLANTATION OF PERMANENT SACRAL NERVE STIMULATOR N/A 2/15/2022    Procedure: INSERTION, NEUROSTIMULATOR, PERMANENT, SACRAL;  Surgeon: David Brown MD;  Location: [facility] OR [address] [place] [address];  Service: Urology;  Laterality: N/A;    TONSILLECTOMY      TOTAL REDUCTION MAMMOPLASTY Bilateral        Family History   Problem Relation Age of Onset    Atrial fibrillation Mother     Thyroid disease Mother     Dementia Mother     Diabetes Father     Prostate cancer Father     Cancer Paternal Grandfather     Breast cancer Maternal Grandmother     Migraines Sister        Social History     Tobacco Use    Smoking status: Never Smoker    Smokeless tobacco: Never Used   Substance Use Topics    Alcohol use: No    Drug use: No        Home meds:  Current Outpatient Medications on File Prior to Visit    Medication Sig Dispense Refill    apixaban (ELIQUIS) 5 mg Tab Take 1 tablet (5 mg total) by mouth 2 (two) times daily. 60 tablet 11    blood-glucose meter (TRUE METRIX GLUCOSE METER) kit Check BS as needed 1 each 0    clotrimazole-betamethasone 1-0.05% (LOTRISONE) cream Apply topically 2 (two) times daily. 15 g 1    diazePAM (VALIUM) 5 MG tablet Take 1 tablet (5 mg total) by mouth daily as needed for Anxiety. 15 tablet 2    diltiaZEM (CARDIZEM CD) 120 MG Cp24 Take 1 capsule (120 mg total) by mouth once daily. 90 capsule 1    diltiaZEM (CARDIZEM CD) 240 MG 24 hr capsule Take 1 capsule (240 mg total) by mouth once daily. 30 capsule 11    EScitalopram oxalate (LEXAPRO) 10 MG tablet Take 1 tablet (10 mg total) by mouth once daily. 90 tablet 3    fluconazole (DIFLUCAN) 150 MG Tab Take one tablet today and one in a week 2 tablet 0    galcanezumab-gnlm (EMGALITY PEN) 120 mg/mL PnIj Inject 120 mg into the skin every 28 days. 1 mL 11    lancets (TRUEPLUS LANCETS) 33 gauge Misc Inject 1 lancet into the skin 3 (three) times daily. 200 each 0    losartan (COZAAR) 25 MG tablet Take 1 tablet (25 mg total) by mouth once daily. 90 tablet 3    metronidazole 0.75% (METROCREAM) 0.75 % Crea Apply topically 2 (two) times daily. 45 g 3    mirabegron (MYRBETRIQ) 50 mg Tb24 Take 1 tablet (50 mg total) by mouth once daily. 30 tablet 11    multivit-minerals/folic acid (ADULT MULTIVITAMIN GUMMIES ORAL) Take 1 tablet by mouth Daily. Continue to hold until after Surgery.      nystatin (MYCOSTATIN) powder Apply to affected area 3 times daily 60 g 3    pantoprazole (PROTONIX) 40 MG tablet Take 40 mg by mouth 2 (two) times daily.      phenylephrine (TEODORA-SYNEPHRINE) 10 mg/mL injection       promethazine (PHENERGAN) 25 MG tablet Take 1 tablet (25 mg total) by mouth every 6 (six) hours as needed for Nausea. 30 tablet 0    sumatriptan (IMITREX STATDOSE) 6 mg/0.5 mL kit INJECT 0.5 ML(6 MG TOTAL) UNDER THE SKIN EVERY 2 HOURS AS  "NEEDED FOR MIGRAINE 3 mL 5    sumatriptan (IMITREX) 100 MG tablet Take 1 tablet (100 mg total) by mouth every 2 (two) hours as needed for Migraine. 9 tablet 11    traMADoL (ULTRAM) 50 mg tablet Take 1 tablet (50 mg total) by mouth every 6 (six) hours. 5 tablet 0    traZODone (DESYREL) 150 MG tablet Take half or whole tablet by mouth before bed as needed for insomnia 90 tablet 3    SUMAtriptan (IMITREX) 20 mg/actuation nasal spray 1 spray (20 mg total) by Nasal route as needed for Migraine (max 40mg/24hr). 6 each 3     Current Facility-Administered Medications on File Prior to Visit   Medication Dose Route Frequency Provider Last Rate Last Admin    0.9%  NaCl infusion   Intravenous Continuous Sathish Briseno MD   Stopped at 02/15/22 0948    LIDOcaine (PF) 10 mg/ml (1%) injection 10 mg  1 mL Intradermal Once Sathish Briseno MD        sodium chloride 0.9% flush 10 mL  10 mL Intravenous PRN Zelalem Solorzano DPM           Cardiac meds:   eliquis 5 mg BID  Diltiazem 240 mg daily  Losartan 25 mg daily           OBJECTIVE:     Vital Signs (Most Recent)  Vitals:    22 1452   BP: (!) 175/88   Pulse: 84   Weight: (!) 139.5 kg (307 lb 10.4 oz)   Height: 5' 4" (1.626 m)         Physical Exam:  Neck: normal carotids, no bruits; normal JVP  Lungs :clear  Heart: fast heart rate , normal S1,S2, no murmurs, no gallops  Abd: no masses; no bruits;   Exts: normal DP and PT pulses bilaterally, no edema noted           LABS    CMP  Recent Labs   Lab Result Units 22  0711   Potassium mmol/L 4.6       LIPID  No results for input(s): HDL, CHOL, TRIG, LDLCALC, CHOLHDL, NONHDL, TOTALCHOLEST in the last 2160 hours.        Old Results:      Diagnostic Results:    EK/22: atrial flutter to sinus after cardioversion   1b. EK22: atrial flutter with better ventricular rate and variable AV block  1c. EK22: NSR:    Echo: 22: normal EF       ASSESSMENT/PLAN:     1. Recurrence of atrial flutter now in sinus " post ablation     Plan: 1. Continue eliquis  2. Increase losartan to 50 mg  3. Continue the same dosage of diltiazem   4. Return 4 months     Alexis Ingram MD

## 2022-07-25 ENCOUNTER — TELEPHONE (OUTPATIENT)
Dept: PODIATRY | Facility: CLINIC | Age: 52
End: 2022-07-25
Payer: MEDICARE

## 2022-07-25 NOTE — TELEPHONE ENCOUNTER
----- Message from Tosin Bond sent at 7/25/2022  5:15 PM CDT -----  Type:  Patient Returning Call    Who Called: patient  Who Left Message for Patient: nurse  Does the patient know what this is regarding?:no  Would the patient rather a call back or a response via [facility]? call  Best Call Back Number:[phone]  Additional Information: none

## 2022-07-25 NOTE — TELEPHONE ENCOUNTER
Spoke with Ms iFsher who reports she has a conflicting MD appt on 8/1/22. Accepted new appt date and time of 8/5/22 at 4:15 pm. No other needs voiced at this time. Encouraged to call if further assistance is needed.

## 2022-07-25 NOTE — TELEPHONE ENCOUNTER
Called pt in regards to her message on the portal. Pt didn't answer the phone. I wasn't able to leave a VM because the VM was full. ----- Message from Margaret Workman sent at 7/25/2022  2:46 PM CDT -----  Regarding: call back  Contact: [phone]  Who Called: PT     Patient is calling to talk to nurse Moran regards to her appointment on 8/1/22. Please advice        AMA (saw a physician/midlevel provider and clinician was able to provide reasons for staying for treatment & form is signed)

## 2022-07-27 ENCOUNTER — TELEPHONE (OUTPATIENT)
Dept: SLEEP MEDICINE | Facility: CLINIC | Age: 52
End: 2022-07-27
Payer: MEDICARE

## 2022-07-28 ENCOUNTER — PATIENT MESSAGE (OUTPATIENT)
Dept: ADMINISTRATIVE | Facility: HOSPITAL | Age: 52
End: 2022-07-28
Payer: MEDICARE

## 2022-07-28 ENCOUNTER — OFFICE VISIT (OUTPATIENT)
Dept: SLEEP MEDICINE | Facility: CLINIC | Age: 52
End: 2022-07-28
Payer: MEDICARE

## 2022-07-28 ENCOUNTER — PATIENT OUTREACH (OUTPATIENT)
Dept: ADMINISTRATIVE | Facility: HOSPITAL | Age: 52
End: 2022-07-28
Payer: MEDICARE

## 2022-07-28 VITALS
DIASTOLIC BLOOD PRESSURE: 61 MMHG | HEART RATE: 80 BPM | BODY MASS INDEX: 52.7 KG/M2 | WEIGHT: 293 LBS | SYSTOLIC BLOOD PRESSURE: 135 MMHG

## 2022-07-28 DIAGNOSIS — G43.719 INTRACTABLE CHRONIC MIGRAINE WITHOUT AURA AND WITHOUT STATUS MIGRAINOSUS: Primary | ICD-10-CM

## 2022-07-28 DIAGNOSIS — G47.33 OSA (OBSTRUCTIVE SLEEP APNEA): ICD-10-CM

## 2022-07-28 PROCEDURE — 3008F PR BODY MASS INDEX (BMI) DOCUMENTED: ICD-10-PCS | Mod: CPTII,S$GLB,, | Performed by: NURSE PRACTITIONER

## 2022-07-28 PROCEDURE — 3044F PR MOST RECENT HEMOGLOBIN A1C LEVEL <7.0%: ICD-10-PCS | Mod: CPTII,S$GLB,, | Performed by: NURSE PRACTITIONER

## 2022-07-28 PROCEDURE — 3078F PR MOST RECENT DIASTOLIC BLOOD PRESSURE < 80 MM HG: ICD-10-PCS | Mod: CPTII,S$GLB,, | Performed by: NURSE PRACTITIONER

## 2022-07-28 PROCEDURE — 3078F DIAST BP <80 MM HG: CPT | Mod: CPTII,S$GLB,, | Performed by: NURSE PRACTITIONER

## 2022-07-28 PROCEDURE — 3075F SYST BP GE 130 - 139MM HG: CPT | Mod: CPTII,S$GLB,, | Performed by: NURSE PRACTITIONER

## 2022-07-28 PROCEDURE — 4010F ACE/ARB THERAPY RXD/TAKEN: CPT | Mod: CPTII,S$GLB,, | Performed by: NURSE PRACTITIONER

## 2022-07-28 PROCEDURE — 4010F PR ACE/ARB THEARPY RXD/TAKEN: ICD-10-PCS | Mod: CPTII,S$GLB,, | Performed by: NURSE PRACTITIONER

## 2022-07-28 PROCEDURE — 1159F MED LIST DOCD IN RCRD: CPT | Mod: CPTII,S$GLB,, | Performed by: NURSE PRACTITIONER

## 2022-07-28 PROCEDURE — 99999 PR PBB SHADOW E&M-EST. PATIENT-LVL III: CPT | Mod: PBBFAC,,, | Performed by: NURSE PRACTITIONER

## 2022-07-28 PROCEDURE — 1159F PR MEDICATION LIST DOCUMENTED IN MEDICAL RECORD: ICD-10-PCS | Mod: CPTII,S$GLB,, | Performed by: NURSE PRACTITIONER

## 2022-07-28 PROCEDURE — 99499 NO LOS: ICD-10-PCS | Mod: S$GLB,,, | Performed by: NURSE PRACTITIONER

## 2022-07-28 PROCEDURE — 3044F HG A1C LEVEL LT 7.0%: CPT | Mod: CPTII,S$GLB,, | Performed by: NURSE PRACTITIONER

## 2022-07-28 PROCEDURE — 3075F PR MOST RECENT SYSTOLIC BLOOD PRESS GE 130-139MM HG: ICD-10-PCS | Mod: CPTII,S$GLB,, | Performed by: NURSE PRACTITIONER

## 2022-07-28 PROCEDURE — 99499 UNLISTED E&M SERVICE: CPT | Mod: S$GLB,,, | Performed by: NURSE PRACTITIONER

## 2022-07-28 PROCEDURE — 99999 PR PBB SHADOW E&M-EST. PATIENT-LVL III: ICD-10-PCS | Mod: PBBFAC,,, | Performed by: NURSE PRACTITIONER

## 2022-07-28 PROCEDURE — 3008F BODY MASS INDEX DOCD: CPT | Mod: CPTII,S$GLB,, | Performed by: NURSE PRACTITIONER

## 2022-07-28 NOTE — PROGRESS NOTES
Cc:CARL      Peristent frequent disrupted sleep and sleepiness. Also recent dx PRUDENCIO. Did not get calls last month about APAP machine setup. Seeing Dr. Poe now headaches/botox adm.     SH:single, sister and her live Danielle    HST 4/2022 AHI 12*(RDI 21(I/low sat 73%    PHYSICAL EXAM:   General: W/D, morbid obese, well groomed  /61 (BP Location: Right arm, Patient Position: Sitting, BP Method: Medium (Automatic))   Pulse 80   Wt (!) 139.3 kg (307 lb)   LMP  (LMP Unknown)   BMI 52.70 kg/m²       IMPRESSION: CARL, mild (mod by RDI), ready for setup  HTN, morbid obesity    PLAN   apap setup as prescribed, rtc 4.5-5 wks AFTER setup adherence  See neuro as advised

## 2022-07-28 NOTE — PROGRESS NOTES
Non-compliant [facility] report chart review - Chart review completed for the following HM test if overdue  (Mammogram, Colonoscopy, Cervical Cancer Screening,  Diabetic lab testing, and/or Dilated EYE EXAM)  07/28/2022    Care Everywhere and Media reports - updates requested and reviewed.          Health Maintenance Due   Topic Date Due    HIV Screening  Never done    Colorectal Cancer Screening  Never done    COVID-19 Vaccine (3 - Booster for Moderna series) 09/28/2021    Shingles Vaccine (2 of 2) 01/26/2022

## 2022-08-04 ENCOUNTER — PATIENT OUTREACH (OUTPATIENT)
Dept: ADMINISTRATIVE | Facility: HOSPITAL | Age: 52
End: 2022-08-04
Payer: MEDICARE

## 2022-08-04 ENCOUNTER — OFFICE VISIT (OUTPATIENT)
Dept: PSYCHIATRY | Facility: CLINIC | Age: 52
End: 2022-08-04
Payer: MEDICARE

## 2022-08-04 VITALS — HEART RATE: 87 BPM | DIASTOLIC BLOOD PRESSURE: 86 MMHG | SYSTOLIC BLOOD PRESSURE: 149 MMHG

## 2022-08-04 DIAGNOSIS — F41.0 GENERALIZED ANXIETY DISORDER WITH PANIC ATTACKS: ICD-10-CM

## 2022-08-04 DIAGNOSIS — F33.1 MAJOR DEPRESSIVE DISORDER, RECURRENT, MODERATE: ICD-10-CM

## 2022-08-04 DIAGNOSIS — F41.1 GENERALIZED ANXIETY DISORDER WITH PANIC ATTACKS: ICD-10-CM

## 2022-08-04 PROCEDURE — 99999 PR PBB SHADOW E&M-EST. PATIENT-LVL IV: ICD-10-PCS | Mod: PBBFAC,,, | Performed by: NURSE PRACTITIONER

## 2022-08-04 PROCEDURE — 90833 PSYTX W PT W E/M 30 MIN: CPT | Mod: S$GLB,,, | Performed by: NURSE PRACTITIONER

## 2022-08-04 PROCEDURE — 3079F DIAST BP 80-89 MM HG: CPT | Mod: CPTII,S$GLB,, | Performed by: NURSE PRACTITIONER

## 2022-08-04 PROCEDURE — 1159F PR MEDICATION LIST DOCUMENTED IN MEDICAL RECORD: ICD-10-PCS | Mod: CPTII,S$GLB,, | Performed by: NURSE PRACTITIONER

## 2022-08-04 PROCEDURE — 3044F HG A1C LEVEL LT 7.0%: CPT | Mod: CPTII,S$GLB,, | Performed by: NURSE PRACTITIONER

## 2022-08-04 PROCEDURE — 3077F SYST BP >= 140 MM HG: CPT | Mod: CPTII,S$GLB,, | Performed by: NURSE PRACTITIONER

## 2022-08-04 PROCEDURE — 1160F PR REVIEW ALL MEDS BY PRESCRIBER/CLIN PHARMACIST DOCUMENTED: ICD-10-PCS | Mod: CPTII,S$GLB,, | Performed by: NURSE PRACTITIONER

## 2022-08-04 PROCEDURE — 4010F ACE/ARB THERAPY RXD/TAKEN: CPT | Mod: CPTII,S$GLB,, | Performed by: NURSE PRACTITIONER

## 2022-08-04 PROCEDURE — 1159F MED LIST DOCD IN RCRD: CPT | Mod: CPTII,S$GLB,, | Performed by: NURSE PRACTITIONER

## 2022-08-04 PROCEDURE — 4010F PR ACE/ARB THEARPY RXD/TAKEN: ICD-10-PCS | Mod: CPTII,S$GLB,, | Performed by: NURSE PRACTITIONER

## 2022-08-04 PROCEDURE — 3044F PR MOST RECENT HEMOGLOBIN A1C LEVEL <7.0%: ICD-10-PCS | Mod: CPTII,S$GLB,, | Performed by: NURSE PRACTITIONER

## 2022-08-04 PROCEDURE — 3079F PR MOST RECENT DIASTOLIC BLOOD PRESSURE 80-89 MM HG: ICD-10-PCS | Mod: CPTII,S$GLB,, | Performed by: NURSE PRACTITIONER

## 2022-08-04 PROCEDURE — 99999 PR PBB SHADOW E&M-EST. PATIENT-LVL IV: CPT | Mod: PBBFAC,,, | Performed by: NURSE PRACTITIONER

## 2022-08-04 PROCEDURE — 99213 OFFICE O/P EST LOW 20 MIN: CPT | Mod: S$GLB,,, | Performed by: NURSE PRACTITIONER

## 2022-08-04 PROCEDURE — 90833 PR PSYCHOTHERAPY W/PATIENT W/E&M, 30 MIN (ADD ON): ICD-10-PCS | Mod: S$GLB,,, | Performed by: NURSE PRACTITIONER

## 2022-08-04 PROCEDURE — 3077F PR MOST RECENT SYSTOLIC BLOOD PRESSURE >= 140 MM HG: ICD-10-PCS | Mod: CPTII,S$GLB,, | Performed by: NURSE PRACTITIONER

## 2022-08-04 PROCEDURE — 99213 PR OFFICE/OUTPT VISIT, EST, LEVL III, 20-29 MIN: ICD-10-PCS | Mod: S$GLB,,, | Performed by: NURSE PRACTITIONER

## 2022-08-04 PROCEDURE — 1160F RVW MEDS BY RX/DR IN RCRD: CPT | Mod: CPTII,S$GLB,, | Performed by: NURSE PRACTITIONER

## 2022-08-04 RX ORDER — DIAZEPAM 5 MG/1
5 TABLET ORAL DAILY PRN
Qty: 15 TABLET | Refills: 2 | Status: SHIPPED | OUTPATIENT
Start: 2022-08-04 | End: 2023-11-29 | Stop reason: SDUPTHER

## 2022-08-04 RX ORDER — ESCITALOPRAM OXALATE 20 MG/1
20 TABLET ORAL DAILY
Qty: 90 TABLET | Refills: 1 | Status: SHIPPED | OUTPATIENT
Start: 2022-08-04 | End: 2023-04-20 | Stop reason: SDUPTHER

## 2022-08-04 NOTE — PROGRESS NOTES
Outpatient Psychiatry Follow-Up Visit (MD/NP)    8/4/2022    Clinical Status of Patient:  Outpatient (Ambulatory)    Chief Complaint:  Starla Fisher is a 52 y.o. female who presents today for follow-up of depression and anxiety.  Met with patient, sister, and cousin.      Last visit was: 6/06/22. Chart and  reviewed.    Interval History and Content of Current Session:  Current Psychiatric Medications/changes  · Restart Lexapro 10 mg daily  · Continue Trazodone 150 mg 1/2 - 1 tablet before bed for sleep  · Valium 5 mg as needed for severe anxiety or panic attacks. Don't take more than one tablet in a day and be careful to not drive. May increase risk for fall.    Reports that she is taking Lexapro every day but still has some mild depression. Reports some improvement. Reports some improvement from Trazodone.      Affect appears bright with euthymic mood. Thought processes are linear, clear, and organized. Denies SI/HI/AVH. Discussed coping skills for anxiety and mood.      Psychotherapy:  · Target symptoms: anxiety   · Why chosen therapy is appropriate versus another modality: relevant to diagnosis  · Outcome monitoring methods: self-report  · Therapeutic intervention type: insight oriented psychotherapy  · Topics discussed/themes: building skills sets for symptom management, symptom recognition  · The patient's response to the intervention is accepting. The patient's progress toward treatment goals is good.   · Duration of intervention: 19 minutes.    Review of Systems   · PSYCHIATRIC: Pertinant items are noted in the narrative.  · CONSTITUTIONAL: No weight gain or loss.   · MUSCULOSKELETAL: No pain or stiffness of the joints.  · NEUROLOGIC: No weakness, sensory changes, seizures, confusion, memory loss, tremor or other abnormal movements.  · ENDOCRINE: No polydipsia or polyuria.  · INTEGUMENTARY: No rashes or lacerations.  · EYES: No exophthalmos, jaundice or blindness.  · ENT: No dizziness, tinnitus or  hearing loss.  · RESPIRATORY: No shortness of breath.  · CARDIOVASCULAR: No tachycardia or chest pain.  · GASTROINTESTINAL: No nausea, vomiting, pain, constipation or diarrhea.  · GENITOURINARY: No frequency, dysuria or sexual dysfunction.  · HEMATOLOGIC/LYMPHATIC: No excessive bleeding, prolonged or excessive bleeding after dental extraction/injury.  · ALLERGIC/IMMUNOLOGIC: No allergic response to materials, foods or animals at this time.    Past Medical, Family and Social History: The patient's past medical, family and social history have been reviewed and updated as appropriate within the electronic medical record - see encounter notes.    Compliance: yes    Side effects: see above    Risk Parameters:  Patient reports no suicidal ideation  Patient reports no homicidal ideation  Patient reports no self-injurious behavior  Patient reports no violent behavior    Exam (detailed: at least 9 elements; comprehensive: all 15 elements)   Constitutional  Vitals:  Most recent vital signs, dated greater than 90 days prior to this appointment, were reviewed.   Vitals:    08/04/22 1405   BP: (!) 149/86   Pulse: 87        General:  unremarkable     Musculoskeletal  Muscle Strength/Tone:  no tremor, no tic   Gait & Station:  in wheelchair     Psychiatric  Speech:  no latency; no press   Mood & Affect:  euthymic  congruent and appropriate   Thought Process:  concrete   Associations:  intact   Thought Content:  normal, no suicidality, no homicidality, delusions, or paranoia   Insight:  limited awareness of illness   Judgement: limited   Orientation:  grossly intact   Memory: intact for content of interview   Language: grossly intact   Attention Span & Concentration:  able to focus   Fund of Knowledge:  impaired     Assessment and Diagnosis   Status/Progress: Based on the examination today, the patient's problem(s) is/are improved and adequately but not ideally controlled.  New problems have not been presented today.    Co-morbidities and Lack of compliance are complicating management of the primary condition.  There are no active rule-out diagnoses for this patient at this time.     General Impression:       ICD-10-CM ICD-9-CM   1. Major depressive disorder, recurrent, moderate  F33.1 296.32   2. Generalized anxiety disorder with panic attacks  F41.1 300.02    F41.0 300.01       Intervention/Counseling/Treatment Plan   · Medication Management: The risks and benefits of medication were discussed with the patient.   Increase to Lexapro 20 mg daily   Take Trazodone 150 mg before bed as needed for insomnia  · Valium 5 mg as needed for severe anxiety or panic attacks. Don't take more than one tablet in a day and be careful to not drive. May increase risk for fall.  · Will follow-up in 1 month to determine need dosage increase or med change    Return to Clinic: 3 months    Risks, benefits, side effects and alternative treatments discussed with patient. Patient agrees with the current plan as documented.  Encouraged Patient to keep future appointments.  Take medications as prescribed and abstain from substance abuse.  Pt to present to ED for thoughts to harm herself or others

## 2022-08-05 ENCOUNTER — OFFICE VISIT (OUTPATIENT)
Dept: PODIATRY | Facility: CLINIC | Age: 52
End: 2022-08-05
Payer: MEDICARE

## 2022-08-05 VITALS
DIASTOLIC BLOOD PRESSURE: 80 MMHG | HEART RATE: 95 BPM | BODY MASS INDEX: 52.7 KG/M2 | HEIGHT: 64 IN | SYSTOLIC BLOOD PRESSURE: 155 MMHG

## 2022-08-05 DIAGNOSIS — Z01.818 PREOP TESTING: ICD-10-CM

## 2022-08-05 DIAGNOSIS — M67.88 ACHILLES TENDINOSIS OF RIGHT LOWER EXTREMITY: ICD-10-CM

## 2022-08-05 DIAGNOSIS — M79.671 CHRONIC HEEL PAIN, RIGHT: Primary | ICD-10-CM

## 2022-08-05 DIAGNOSIS — M24.571 EQUINUS CONTRACTURE OF RIGHT ANKLE: ICD-10-CM

## 2022-08-05 DIAGNOSIS — G89.29 CHRONIC HEEL PAIN, RIGHT: Primary | ICD-10-CM

## 2022-08-05 PROCEDURE — 1159F MED LIST DOCD IN RCRD: CPT | Mod: CPTII,S$GLB,, | Performed by: PODIATRIST

## 2022-08-05 PROCEDURE — 4010F ACE/ARB THERAPY RXD/TAKEN: CPT | Mod: CPTII,S$GLB,, | Performed by: PODIATRIST

## 2022-08-05 PROCEDURE — 3008F PR BODY MASS INDEX (BMI) DOCUMENTED: ICD-10-PCS | Mod: CPTII,S$GLB,, | Performed by: PODIATRIST

## 2022-08-05 PROCEDURE — 99999 PR PBB SHADOW E&M-EST. PATIENT-LVL V: CPT | Mod: PBBFAC,,, | Performed by: PODIATRIST

## 2022-08-05 PROCEDURE — 3008F BODY MASS INDEX DOCD: CPT | Mod: CPTII,S$GLB,, | Performed by: PODIATRIST

## 2022-08-05 PROCEDURE — 3044F PR MOST RECENT HEMOGLOBIN A1C LEVEL <7.0%: ICD-10-PCS | Mod: CPTII,S$GLB,, | Performed by: PODIATRIST

## 2022-08-05 PROCEDURE — 1160F RVW MEDS BY RX/DR IN RCRD: CPT | Mod: CPTII,S$GLB,, | Performed by: PODIATRIST

## 2022-08-05 PROCEDURE — 3044F HG A1C LEVEL LT 7.0%: CPT | Mod: CPTII,S$GLB,, | Performed by: PODIATRIST

## 2022-08-05 PROCEDURE — 1159F PR MEDICATION LIST DOCUMENTED IN MEDICAL RECORD: ICD-10-PCS | Mod: CPTII,S$GLB,, | Performed by: PODIATRIST

## 2022-08-05 PROCEDURE — 99214 OFFICE O/P EST MOD 30 MIN: CPT | Mod: 57,S$GLB,, | Performed by: PODIATRIST

## 2022-08-05 PROCEDURE — 1160F PR REVIEW ALL MEDS BY PRESCRIBER/CLIN PHARMACIST DOCUMENTED: ICD-10-PCS | Mod: CPTII,S$GLB,, | Performed by: PODIATRIST

## 2022-08-05 PROCEDURE — 3079F PR MOST RECENT DIASTOLIC BLOOD PRESSURE 80-89 MM HG: ICD-10-PCS | Mod: CPTII,S$GLB,, | Performed by: PODIATRIST

## 2022-08-05 PROCEDURE — 3077F SYST BP >= 140 MM HG: CPT | Mod: CPTII,S$GLB,, | Performed by: PODIATRIST

## 2022-08-05 PROCEDURE — 99999 PR PBB SHADOW E&M-EST. PATIENT-LVL V: ICD-10-PCS | Mod: PBBFAC,,, | Performed by: PODIATRIST

## 2022-08-05 PROCEDURE — 4010F PR ACE/ARB THEARPY RXD/TAKEN: ICD-10-PCS | Mod: CPTII,S$GLB,, | Performed by: PODIATRIST

## 2022-08-05 PROCEDURE — 3079F DIAST BP 80-89 MM HG: CPT | Mod: CPTII,S$GLB,, | Performed by: PODIATRIST

## 2022-08-05 PROCEDURE — 3077F PR MOST RECENT SYSTOLIC BLOOD PRESSURE >= 140 MM HG: ICD-10-PCS | Mod: CPTII,S$GLB,, | Performed by: PODIATRIST

## 2022-08-05 PROCEDURE — 99214 PR OFFICE/OUTPT VISIT, EST, LEVL IV, 30-39 MIN: ICD-10-PCS | Mod: 57,S$GLB,, | Performed by: PODIATRIST

## 2022-08-05 RX ORDER — SODIUM CHLORIDE 0.9 % (FLUSH) 0.9 %
10 SYRINGE (ML) INJECTION
Status: SHIPPED | OUTPATIENT
Start: 2022-08-05

## 2022-08-05 RX ORDER — CEFAZOLIN SODIUM 2 G/50ML
2 SOLUTION INTRAVENOUS
Status: CANCELLED | OUTPATIENT
Start: 2022-08-05
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
## 2022-08-08 ENCOUNTER — TELEPHONE (OUTPATIENT)
Dept: FAMILY MEDICINE | Facility: CLINIC | Age: 52
End: 2022-08-08
Payer: MEDICARE

## 2022-08-08 RX ORDER — PANTOPRAZOLE SODIUM 40 MG/1
40 TABLET, DELAYED RELEASE ORAL DAILY
Qty: 30 TABLET | Refills: 1 | Status: SHIPPED | OUTPATIENT
Start: 2022-08-08 | End: 2022-10-04 | Stop reason: SDUPTHER

## 2022-08-08 NOTE — TELEPHONE ENCOUNTER
----- Message from Sarah Ramirez sent at 8/8/2022  8:01 AM CDT -----  Contact: [phone]/ Self  Type: Requesting to speak with nurse    Who Called: Pt   Regarding: requesting a refill on pantoprazole (PROTONIX) 40 MG tablet   Would the patient rather a call back or a response via [employer]? Call back  Best Call Back Number: [phone]  Additional Information: [facility] [address] and [address]. Pt is out of medication.

## 2022-08-08 NOTE — TELEPHONE ENCOUNTER
----- Message from Danyelle Torres sent at 8/8/2022 11:55 AM CDT -----  Type:  Patient Returning Call    Who Called:Pt   Would the patient rather a call back or a response via [facility]? Call back   Best Call Back Number: [phone]  Additional Information:     Call back regarding medical clearance for procedure on 08/19

## 2022-08-08 NOTE — TELEPHONE ENCOUNTER
Date: 8/11/2022 Status: [facility]   Appt Time: 10:40 AM Length: 20   Visit Type: EP - PRIMARY CARE ([facility]) [486] Copay: $0.00   Provider: Tahir Mosquera MD Dept: [facility], Family Medicine             Date: 8/11/2022 Status: [facility]  Appt Time: 10:40 AM Length: 20  Visit Type: EP - PRIMARY CARE ([facility]) [486] Copay: $0.00  Provider: Tahir Mosquera MD Dept: [facility], Family Medicine

## 2022-08-10 ENCOUNTER — TELEPHONE (OUTPATIENT)
Dept: FAMILY MEDICINE | Facility: CLINIC | Age: 52
End: 2022-08-10
Payer: MEDICARE

## 2022-08-10 NOTE — TELEPHONE ENCOUNTER
----- Message from Mirian Barrett sent at 8/10/2022 11:10 AM CDT -----  Type:  Needs Medical Advice    Who Called: pt  Symptoms (please be specific): pt is trying to get the number for a medical supply place for her C-Pap machine      Would the patient rather a call back or a response via [facility]? call  Best Call Back Number: [phone]  Additional Information:

## 2022-08-11 ENCOUNTER — TELEPHONE (OUTPATIENT)
Dept: CARDIOLOGY | Facility: CLINIC | Age: 52
End: 2022-08-11
Payer: MEDICARE

## 2022-08-11 ENCOUNTER — OFFICE VISIT (OUTPATIENT)
Dept: FAMILY MEDICINE | Facility: CLINIC | Age: 52
End: 2022-08-11
Payer: MEDICARE

## 2022-08-11 ENCOUNTER — HOSPITAL ENCOUNTER (OUTPATIENT)
Dept: RADIOLOGY | Facility: HOSPITAL | Age: 52
Discharge: HOME | End: 2022-08-11
Attending: FAMILY MEDICINE
Payer: MEDICARE

## 2022-08-11 ENCOUNTER — CLINICAL SUPPORT (OUTPATIENT)
Dept: LAB | Facility: HOSPITAL | Age: 52
End: 2022-08-11
Attending: FAMILY MEDICINE
Payer: MEDICARE

## 2022-08-11 VITALS
SYSTOLIC BLOOD PRESSURE: 116 MMHG | DIASTOLIC BLOOD PRESSURE: 78 MMHG | TEMPERATURE: 98 F | WEIGHT: 293 LBS | OXYGEN SATURATION: 98 % | BODY MASS INDEX: 50.02 KG/M2 | HEIGHT: 64 IN | HEART RATE: 75 BPM

## 2022-08-11 DIAGNOSIS — I10 ESSENTIAL HYPERTENSION: ICD-10-CM

## 2022-08-11 DIAGNOSIS — I48.3 TYPICAL ATRIAL FLUTTER: ICD-10-CM

## 2022-08-11 DIAGNOSIS — Z01.818 PRE-OP EXAMINATION: Primary | ICD-10-CM

## 2022-08-11 DIAGNOSIS — E66.01 MORBID OBESITY WITH BMI OF 50.0-59.9, ADULT: ICD-10-CM

## 2022-08-11 DIAGNOSIS — Z01.818 PRE-OP EXAMINATION: ICD-10-CM

## 2022-08-11 PROCEDURE — 3074F SYST BP LT 130 MM HG: CPT | Mod: CPTII,S$GLB,, | Performed by: FAMILY MEDICINE

## 2022-08-11 PROCEDURE — 71046 X-RAY EXAM CHEST 2 VIEWS: CPT | Mod: 26,,, | Performed by: STUDENT IN AN ORGANIZED HEALTH CARE EDUCATION/TRAINING PROGRAM

## 2022-08-11 PROCEDURE — 1159F PR MEDICATION LIST DOCUMENTED IN MEDICAL RECORD: ICD-10-PCS | Mod: CPTII,S$GLB,, | Performed by: FAMILY MEDICINE

## 2022-08-11 PROCEDURE — 3078F PR MOST RECENT DIASTOLIC BLOOD PRESSURE < 80 MM HG: ICD-10-PCS | Mod: CPTII,S$GLB,, | Performed by: FAMILY MEDICINE

## 2022-08-11 PROCEDURE — 4010F ACE/ARB THERAPY RXD/TAKEN: CPT | Mod: CPTII,S$GLB,, | Performed by: FAMILY MEDICINE

## 2022-08-11 PROCEDURE — 99214 PR OFFICE/OUTPT VISIT, EST, LEVL IV, 30-39 MIN: ICD-10-PCS | Mod: S$GLB,,, | Performed by: FAMILY MEDICINE

## 2022-08-11 PROCEDURE — 3008F PR BODY MASS INDEX (BMI) DOCUMENTED: ICD-10-PCS | Mod: CPTII,S$GLB,, | Performed by: FAMILY MEDICINE

## 2022-08-11 PROCEDURE — 3008F BODY MASS INDEX DOCD: CPT | Mod: CPTII,S$GLB,, | Performed by: FAMILY MEDICINE

## 2022-08-11 PROCEDURE — 99214 OFFICE O/P EST MOD 30 MIN: CPT | Mod: S$GLB,,, | Performed by: FAMILY MEDICINE

## 2022-08-11 PROCEDURE — 99999 PR PBB SHADOW E&M-EST. PATIENT-LVL V: ICD-10-PCS | Mod: PBBFAC,,, | Performed by: FAMILY MEDICINE

## 2022-08-11 PROCEDURE — 3078F DIAST BP <80 MM HG: CPT | Mod: CPTII,S$GLB,, | Performed by: FAMILY MEDICINE

## 2022-08-11 PROCEDURE — 3044F HG A1C LEVEL LT 7.0%: CPT | Mod: CPTII,S$GLB,, | Performed by: FAMILY MEDICINE

## 2022-08-11 PROCEDURE — 71046 X-RAY EXAM CHEST 2 VIEWS: CPT | Mod: TC,FY

## 2022-08-11 PROCEDURE — 3044F PR MOST RECENT HEMOGLOBIN A1C LEVEL <7.0%: ICD-10-PCS | Mod: CPTII,S$GLB,, | Performed by: FAMILY MEDICINE

## 2022-08-11 PROCEDURE — 1159F MED LIST DOCD IN RCRD: CPT | Mod: CPTII,S$GLB,, | Performed by: FAMILY MEDICINE

## 2022-08-11 PROCEDURE — 99999 PR PBB SHADOW E&M-EST. PATIENT-LVL V: CPT | Mod: PBBFAC,,, | Performed by: FAMILY MEDICINE

## 2022-08-11 PROCEDURE — 3074F PR MOST RECENT SYSTOLIC BLOOD PRESSURE < 130 MM HG: ICD-10-PCS | Mod: CPTII,S$GLB,, | Performed by: FAMILY MEDICINE

## 2022-08-11 PROCEDURE — 71046 XR CHEST PA AND LATERAL: ICD-10-PCS | Mod: 26,,, | Performed by: STUDENT IN AN ORGANIZED HEALTH CARE EDUCATION/TRAINING PROGRAM

## 2022-08-11 PROCEDURE — 4010F PR ACE/ARB THEARPY RXD/TAKEN: ICD-10-PCS | Mod: CPTII,S$GLB,, | Performed by: FAMILY MEDICINE

## 2022-08-11 RX ORDER — MULTIVITAMIN
1 TABLET ORAL
COMMUNITY

## 2022-08-11 RX ORDER — MELOXICAM 15 MG/1
TABLET ORAL
COMMUNITY
Start: 2022-06-23 | End: 2024-01-08

## 2022-08-11 RX ORDER — TOPIRAMATE 50 MG/1
TABLET, FILM COATED ORAL
COMMUNITY
Start: 2022-06-23 | End: 2022-09-21

## 2022-08-11 RX ORDER — DIGOXIN 0.25 MG/ML
INJECTION INTRAMUSCULAR; INTRAVENOUS
COMMUNITY
Start: 2022-04-20 | End: 2022-08-15

## 2022-08-11 RX ORDER — FERROUS SULFATE 325(65) MG
325 TABLET ORAL
COMMUNITY

## 2022-08-11 NOTE — TELEPHONE ENCOUNTER
----- Message from Danylele Torres sent at 8/11/2022 11:57 AM CDT -----  Type:  Patient Returning Call    Who Called:Pt   Would the patient rather a call back or a response via [facility]? Call back  Best Call Back Number:[phone]  Additional Information:     Call back regarding medical clearance prior to procedure on 08/19

## 2022-08-11 NOTE — PROGRESS NOTES
(Portions of this note were dictated using voice recognition software and may contain dictation related errors in spelling/grammar/syntax not found on text review)    CC:   Chief Complaint   Patient presents with    Pre-op Exam       HPI: 52 y.o. female presented for preop examination.  She has medical history significant for anxiety, depression, GERD, hypertension, migraine headaches, sleep difficulties, atrial flutter. She was scheduled for plantar fasciotomy with Podiatry (Dr Solorzano) on 04/20 and had atrial flutter before surgery, she was transferred to hospital for ablation, she was started on Eliquis 5 mg bid, digoxin and Cardizem. She followed up with electrophysiologist, Dr Evin Arceo at [facility] on 8/8/2022, eliquis was discontinued. She decided to do plantar fasciotomy now and scheduled for endoscopic gastrocnemius recession on 8/19, presented for pre op examination. She reports taking losartan 50 mg, also takes emgalitiy, Topamax and Imitrex for migraine headaches, reports symptoms are controlled. She denies cough, shortness of breath, chest pain, nausea, vomiting, abdominal pain, changes in bowel habits, urine problems including burning and dysuria.     Past Medical History:   Diagnosis Date    Anxiety     Depression     GERD (gastroesophageal reflux disease)     Hypertension     Migraine headache     Sleep difficulties     Typical atrial flutter 4/20/2022       Past Surgical History:   Procedure Laterality Date    BREAST SURGERY      ENDOMETRIAL ABLATION      EYE SURGERY      FOOT SURGERY Right 10/2018    bunion    IMPLANTATION OF PERMANENT SACRAL NERVE STIMULATOR N/A 2/15/2022    Procedure: INSERTION, NEUROSTIMULATOR, PERMANENT, SACRAL;  Surgeon: David Brown MD;  Location: [facility] OR [address] [place] [address];  Service: Urology;  Laterality: N/A;    TONSILLECTOMY      TOTAL REDUCTION MAMMOPLASTY Bilateral        Family History   Problem Relation Age of Onset    Atrial fibrillation Mother      Thyroid disease Mother     Dementia Mother     Diabetes Father     Prostate cancer Father     Cancer Paternal Grandfather     Breast cancer Maternal Grandmother     Migraines Sister        Social History     Tobacco Use    Smoking status: Never Smoker    Smokeless tobacco: Never Used   Substance Use Topics    Alcohol use: No    Drug use: No       Lab Results   Component Value Date    WBC 7.53 04/20/2022    HGB 11.9 (L) 04/20/2022    HCT 38.6 04/20/2022    MCV 89 04/20/2022     04/20/2022    CHOL 205 (H) 02/24/2022    TRIG 103 02/24/2022    HDL 61 02/24/2022    ALT 15 04/20/2022    AST 13 04/20/2022    BILITOT 0.4 04/20/2022    ALKPHOS 91 04/20/2022     04/20/2022    K 4.6 04/20/2022     04/20/2022    CREATININE 0.9 04/20/2022    ESTGFRAFRICA >60 04/20/2022    EGFRNONAA >60 04/20/2022    CALCIUM 9.2 04/20/2022    ALBUMIN 3.6 04/20/2022    BUN 13 04/20/2022    CO2 25 04/20/2022    TSH 2.637 04/20/2022    HGBA1C 5.6 02/24/2022    LDLCALC 123.4 02/24/2022     (H) 04/20/2022             Vital signs reviewed  PE:   APPEARANCE: Well nourished, well developed, in no acute distress.    HEAD: Normocephalic, atraumatic.  EYES: EOMI.  Conjunctivae noninjected.   NECK: Supple with no cervical lymphadenopathy.    CHEST: Good inspiratory effort. Lungs clear to auscultation with no wheezes or crackles.  CARDIOVASCULAR: Normal S1, S2. No rubs, murmurs, or gallops.  ABDOMEN: Bowel sounds normal. Not distended. Soft. No tenderness or masses. No organomegaly.  EXTREMITIES: No edema, cyanosis, or clubbing.    Review of Systems   Constitutional: Negative for chills, fatigue and fever.   HENT: Negative.    Respiratory: Negative for cough, shortness of breath and wheezing.    Cardiovascular: Negative for chest pain, palpitations and leg swelling.   Gastrointestinal: Negative.    Genitourinary: Negative.    Musculoskeletal: Negative.    Neurological: Negative.    Psychiatric/Behavioral: Negative.    All  other systems reviewed and are negative.      IMPRESSION  1. Pre-op examination    2. Essential hypertension    3. Typical atrial flutter    4. Morbid obesity with BMI of 50.0-59.9, adult          PLAN      1. Pre-op examination  - CBC Auto Differential; Future  - Comprehensive Metabolic Panel; Future  - X-Ray Chest PA And Lateral; Future  - SCHEDULED EKG 12-LEAD (to Muse); Future    ACC/AHA 2007 Guidelines for clearance    Step 1: No need for emergency non cardiac surgery  Step 2: No active cardiac conditions  Step 3: No low risk surgery  Step4: Yes - Functional capacity greater than or equal to 4 METs without symptoms - YES - Class IIa, GARIMA B - Proceed with surgery    Pts medically conditions are optimized and she can proceed to scheduled procedure. She will also follow up with cardiology for risk stratification in context of recent ablation.      2. Essential hypertension  Stable  Continue losartan      3. Typical atrial flutter  S/p CTI ablation  As per note from most recent EPI visit on 8/8/2022-   Okay to discontinue Eliquis as she is > 1 month post ablation without any recurrence, she can have foot surgery from EP perspective        4. Morbid obesity with BMI of 50.0-59.9, adult  BMI 53  Counseling provided on healthy lifestyle, encouraged to do moderate intensity regular exercise 30 minutes every day 5 days a week, to include vegetables and fruits and cut back on saturated fats and carbohydrates.         Age/demographic appropriate health maintenance:    Health Maintenance Due   Topic Date Due    HIV Screening  Never done    Colorectal Cancer Screening  Never done    COVID-19 Vaccine (3 - Booster for Moderna series) 09/28/2021    Shingles Vaccine (2 of 2) 01/26/2022             Tahir Mosquera   8/11/2022

## 2022-08-12 ENCOUNTER — PATIENT MESSAGE (OUTPATIENT)
Dept: FAMILY MEDICINE | Facility: CLINIC | Age: 52
End: 2022-08-12
Payer: MEDICARE

## 2022-08-15 ENCOUNTER — OFFICE VISIT (OUTPATIENT)
Dept: CARDIOLOGY | Facility: CLINIC | Age: 52
End: 2022-08-15
Payer: MEDICARE

## 2022-08-15 ENCOUNTER — HOSPITAL ENCOUNTER (OUTPATIENT)
Dept: PREADMISSION TESTING | Facility: HOSPITAL | Age: 52
Discharge: HOME | End: 2022-08-15
Attending: PODIATRIST
Payer: MEDICARE

## 2022-08-15 ENCOUNTER — ANESTHESIA EVENT (OUTPATIENT)
Dept: SURGERY | Facility: HOSPITAL | Age: 52
End: 2022-08-15
Payer: MEDICARE

## 2022-08-15 VITALS
BODY MASS INDEX: 50.02 KG/M2 | DIASTOLIC BLOOD PRESSURE: 80 MMHG | HEIGHT: 64 IN | WEIGHT: 293 LBS | SYSTOLIC BLOOD PRESSURE: 184 MMHG | HEART RATE: 79 BPM

## 2022-08-15 VITALS
SYSTOLIC BLOOD PRESSURE: 179 MMHG | RESPIRATION RATE: 16 BRPM | HEART RATE: 74 BPM | HEIGHT: 64 IN | TEMPERATURE: 98 F | OXYGEN SATURATION: 97 % | BODY MASS INDEX: 50.02 KG/M2 | WEIGHT: 293 LBS | DIASTOLIC BLOOD PRESSURE: 87 MMHG

## 2022-08-15 DIAGNOSIS — Z98.890 S/P ABLATION OF ATRIAL FLUTTER: ICD-10-CM

## 2022-08-15 DIAGNOSIS — I48.3 TYPICAL ATRIAL FLUTTER: Primary | ICD-10-CM

## 2022-08-15 DIAGNOSIS — Z01.818 PREOPERATIVE CLEARANCE: ICD-10-CM

## 2022-08-15 DIAGNOSIS — I10 ESSENTIAL HYPERTENSION: ICD-10-CM

## 2022-08-15 DIAGNOSIS — Z86.79 S/P ABLATION OF ATRIAL FLUTTER: ICD-10-CM

## 2022-08-15 PROCEDURE — 3079F DIAST BP 80-89 MM HG: CPT | Mod: CPTII,S$GLB,, | Performed by: INTERNAL MEDICINE

## 2022-08-15 PROCEDURE — 3008F PR BODY MASS INDEX (BMI) DOCUMENTED: ICD-10-PCS | Mod: CPTII,S$GLB,, | Performed by: INTERNAL MEDICINE

## 2022-08-15 PROCEDURE — 99214 PR OFFICE/OUTPT VISIT, EST, LEVL IV, 30-39 MIN: ICD-10-PCS | Mod: S$GLB,,, | Performed by: INTERNAL MEDICINE

## 2022-08-15 PROCEDURE — 99999 PR PBB SHADOW E&M-EST. PATIENT-LVL III: ICD-10-PCS | Mod: PBBFAC,,, | Performed by: INTERNAL MEDICINE

## 2022-08-15 PROCEDURE — 3077F PR MOST RECENT SYSTOLIC BLOOD PRESSURE >= 140 MM HG: ICD-10-PCS | Mod: CPTII,S$GLB,, | Performed by: INTERNAL MEDICINE

## 2022-08-15 PROCEDURE — 1159F PR MEDICATION LIST DOCUMENTED IN MEDICAL RECORD: ICD-10-PCS | Mod: CPTII,S$GLB,, | Performed by: INTERNAL MEDICINE

## 2022-08-15 PROCEDURE — 1160F RVW MEDS BY RX/DR IN RCRD: CPT | Mod: CPTII,S$GLB,, | Performed by: INTERNAL MEDICINE

## 2022-08-15 PROCEDURE — 3079F PR MOST RECENT DIASTOLIC BLOOD PRESSURE 80-89 MM HG: ICD-10-PCS | Mod: CPTII,S$GLB,, | Performed by: INTERNAL MEDICINE

## 2022-08-15 PROCEDURE — 3044F PR MOST RECENT HEMOGLOBIN A1C LEVEL <7.0%: ICD-10-PCS | Mod: CPTII,S$GLB,, | Performed by: INTERNAL MEDICINE

## 2022-08-15 PROCEDURE — 4010F PR ACE/ARB THEARPY RXD/TAKEN: ICD-10-PCS | Mod: CPTII,S$GLB,, | Performed by: INTERNAL MEDICINE

## 2022-08-15 PROCEDURE — 1160F PR REVIEW ALL MEDS BY PRESCRIBER/CLIN PHARMACIST DOCUMENTED: ICD-10-PCS | Mod: CPTII,S$GLB,, | Performed by: INTERNAL MEDICINE

## 2022-08-15 PROCEDURE — 3077F SYST BP >= 140 MM HG: CPT | Mod: CPTII,S$GLB,, | Performed by: INTERNAL MEDICINE

## 2022-08-15 PROCEDURE — 3008F BODY MASS INDEX DOCD: CPT | Mod: CPTII,S$GLB,, | Performed by: INTERNAL MEDICINE

## 2022-08-15 PROCEDURE — 3044F HG A1C LEVEL LT 7.0%: CPT | Mod: CPTII,S$GLB,, | Performed by: INTERNAL MEDICINE

## 2022-08-15 PROCEDURE — 99214 OFFICE O/P EST MOD 30 MIN: CPT | Mod: S$GLB,,, | Performed by: INTERNAL MEDICINE

## 2022-08-15 PROCEDURE — 4010F ACE/ARB THERAPY RXD/TAKEN: CPT | Mod: CPTII,S$GLB,, | Performed by: INTERNAL MEDICINE

## 2022-08-15 PROCEDURE — 99999 PR PBB SHADOW E&M-EST. PATIENT-LVL III: CPT | Mod: PBBFAC,,, | Performed by: INTERNAL MEDICINE

## 2022-08-15 PROCEDURE — 1159F MED LIST DOCD IN RCRD: CPT | Mod: CPTII,S$GLB,, | Performed by: INTERNAL MEDICINE

## 2022-08-15 RX ORDER — LIDOCAINE HYDROCHLORIDE 10 MG/ML
1 INJECTION, SOLUTION EPIDURAL; INFILTRATION; INTRACAUDAL; PERINEURAL ONCE
Status: CANCELLED | OUTPATIENT
Start: 2022-08-15 | End: 2022-08-15

## 2022-08-15 RX ORDER — SODIUM CHLORIDE, SODIUM LACTATE, POTASSIUM CHLORIDE, CALCIUM CHLORIDE 600; 310; 30; 20 MG/100ML; MG/100ML; MG/100ML; MG/100ML
INJECTION, SOLUTION INTRAVENOUS CONTINUOUS
Status: CANCELLED | OUTPATIENT
Start: 2022-08-15

## 2022-08-15 RX ORDER — SCOLOPAMINE TRANSDERMAL SYSTEM 1 MG/1
1 PATCH, EXTENDED RELEASE TRANSDERMAL
Status: CANCELLED | OUTPATIENT
Start: 2022-08-15

## 2022-08-15 NOTE — ANESTHESIA PREPROCEDURE EVALUATION
"                                                                                                             08/15/2022      Starla Fisher is a 52 y.o., female scheduled for  RECESSION, GASTROCNEMIUS, ENDOSCOPIC (Right ) - Arthrex centerline   aysha notified       FASCIOTOMY, PLANTAR, ENDOSCOPIC (Right ) 8/19/22.    Per family medicine Dr. Mosquera, "Pts medically conditions are optimized and she can proceed to scheduled procedure. She will also follow up with cardiology for risk stratification in context of recent ablation".    Per cardiology Dr. Ingram, "Low risk for surgery".    Past Medical History:   Diagnosis Date    Anxiety     Depression     GERD (gastroesophageal reflux disease)     Hypertension     Migraine headache     Sleep difficulties     Typical atrial flutter 4/20/2022     Past Surgical History:   Procedure Laterality Date    BREAST SURGERY      ENDOMETRIAL ABLATION      EYE SURGERY      FOOT SURGERY Right 10/2018    bunion    IMPLANTATION OF PERMANENT SACRAL NERVE STIMULATOR N/A 2/15/2022    Procedure: INSERTION, NEUROSTIMULATOR, PERMANENT, SACRAL;  Surgeon: David Brown MD;  Location: [facility] OR [address] [place] [address];  Service: Urology;  Laterality: N/A;    TONSILLECTOMY      TOTAL REDUCTION MAMMOPLASTY Bilateral        Pre-op Assessment    I have reviewed the Patient Summary Reports.     I have reviewed the Nursing Notes. I have reviewed the NPO Status.   I have reviewed the Medications.     Review of Systems  Anesthesia Hx:  Hx of Anesthetic complications  Personal Hx of Anesthesia complications, Post-Operative Nausea/Vomiting, in the past, but not with recent anesthetics / prophylaxis   Social:  Non-Smoker, No Alcohol Use    Cardiovascular:   Exercise tolerance: good Hypertension Dysrhythmias  Denies Angina.    Pulmonary:   Sleep Apnea (in process of getting CPAP), CPAP    Education provided regarding risk of obstructive sleep apnea     Renal/:  Renal/ Normal     Hepatic/GI:   " GERD, poorly controlled    Neurological:   Denies TIA. Denies CVA. Headaches Denies Seizures.    Endocrine:   Denies Diabetes. Denies Hypothyroidism. Denies Hyperthyroidism.  Morbid Obesity / BMI > 40  Psych:   anxiety depression          Physical Exam  General: Well nourished and Cooperative    Airway:  Mallampati: IV / III  Mouth Opening: Small, but > 3cm  TM Distance: Normal  Tongue: Normal  Neck ROM: Normal ROM    Dental:  Intact, Retainer  Upper and lower permanent retainers  Chest/Lungs:  Clear to auscultation, Normal Respiratory Rate    Heart:  Rate: Normal  Rhythm: Regular Rhythm  Sounds: Normal        Anesthesia Plan  Type of Anesthesia, risks & benefits discussed:    Anesthesia Type: Regional  Intra-op Monitoring Plan: Standard ASA Monitors  Post Op Pain Control Plan: multimodal analgesia  Induction:  IV  ASA Score: 3  Anesthesia Plan Notes: Anesthesia consent to be signed prior to surgery 8/19/22      Ready For Surgery From Anesthesia Perspective.     .

## 2022-08-15 NOTE — PROGRESS NOTES
[facility] Cardiology [id number]     SUBJECTIVE:     History of Present Illness:  Patient is a 52 y.o. female presents with followup after atrial flutter cardioversion   Cardioverted electrically and sent home on medications . Had ablation 6/22    Primary Diagnosis:  1. Morbid obesity  2. Hypertension  3. Atrial flutter with rapid ventricular rate - s/p ablation  6/29/22  ROS  Since last visit 7/22:   A. Has not monitored her heart rate.no palpitations    B. No chest pains  C. No syncope  D. Feels tired only   E. Blood pressures normal at home variable     Past Hospitalization    Review of patient's allergies indicates:  No Known Allergies    Past Medical History:   Diagnosis Date    Anxiety     Depression     GERD (gastroesophageal reflux disease)     Hypertension     Migraine headache     Sleep difficulties     Typical atrial flutter 4/20/2022       Past Surgical History:   Procedure Laterality Date    BREAST SURGERY      ENDOMETRIAL ABLATION      EYE SURGERY      FOOT SURGERY Right 10/2018    bunion    IMPLANTATION OF PERMANENT SACRAL NERVE STIMULATOR N/A 2/15/2022    Procedure: INSERTION, NEUROSTIMULATOR, PERMANENT, SACRAL;  Surgeon: David Brown MD;  Location: [facility] OR [address] [place] [address];  Service: Urology;  Laterality: N/A;    TONSILLECTOMY      TOTAL REDUCTION MAMMOPLASTY Bilateral        Family History   Problem Relation Age of Onset    Atrial fibrillation Mother     Thyroid disease Mother     Dementia Mother     Diabetes Father     Prostate cancer Father     Cancer Paternal Grandfather     Breast cancer Maternal Grandmother     Migraines Sister        Social History     Tobacco Use    Smoking status: Never Smoker    Smokeless tobacco: Never Used   Substance Use Topics    Alcohol use: No    Drug use: No        Home meds:  Current Outpatient Medications on File Prior to Visit   Medication Sig Dispense Refill    blood-glucose meter (TRUE METRIX GLUCOSE METER) kit Check BS as needed 1 each 0     clotrimazole-betamethasone 1-0.05% (LOTRISONE) cream Apply topically 2 (two) times daily. 15 g 1    diazePAM (VALIUM) 5 MG tablet Take 1 tablet (5 mg total) by mouth daily as needed for Anxiety. 15 tablet 2    digoxin (LANOXIN) 250 mcg/mL (0.25 mg/mL) Soln       diltiaZEM (CARDIZEM CD) 120 MG Cp24 Take 1 capsule (120 mg total) by mouth once daily. 90 capsule 1    diltiaZEM (CARDIZEM CD) 240 MG 24 hr capsule Take 1 capsule (240 mg total) by mouth once daily. 30 capsule 11    EScitalopram oxalate (LEXAPRO) 20 MG tablet Take 1 tablet (20 mg total) by mouth once daily. 90 tablet 1    Xc-G-hymqa-B12-L.acid,plan-inu 65 mg iron- 50 mg-1 mg DFE Cap Take by mouth.      ferrous sulfate (FEOSOL) 325 mg (65 mg iron) Tab tablet Take 325 mg by mouth.      fluconazole (DIFLUCAN) 150 MG Tab Take one tablet today and one in a week 2 tablet 0    galcanezumab-gnlm (EMGALITY PEN) 120 mg/mL PnIj Inject 120 mg into the skin every 28 days. 1 mL 11    lancets (TRUEPLUS LANCETS) 33 gauge Misc Inject 1 lancet into the skin 3 (three) times daily. 200 each 0    losartan (COZAAR) 25 MG tablet Take 1 tablet (25 mg total) by mouth once daily. 90 tablet 3    meloxicam (MOBIC) 15 MG tablet       metronidazole 0.75% (METROCREAM) 0.75 % Crea Apply topically 2 (two) times daily. 45 g 3    mirabegron (MYRBETRIQ) 50 mg Tb24 Take 1 tablet (50 mg total) by mouth once daily. 30 tablet 11    multivit-minerals/folic acid (ADULT MULTIVITAMIN GUMMIES ORAL) Take 1 tablet by mouth Daily. Continue to hold until after Surgery.      multivitamin (THERAGRAN) per tablet Take 1 tablet by mouth.      nystatin (MYCOSTATIN) powder Apply to affected area 3 times daily 60 g 3    pantoprazole (PROTONIX) 40 MG tablet Take 1 tablet (40 mg total) by mouth once daily. 30 tablet 1    phenylephrine (TEODORA-SYNEPHRINE) 10 mg/mL injection       promethazine (PHENERGAN) 25 MG tablet Take 1 tablet (25 mg total) by mouth every 6 (six) hours as needed for Nausea. 30  "tablet 0    sumatriptan (IMITREX STATDOSE) 6 mg/0.5 mL kit INJECT 0.5 ML(6 MG TOTAL) UNDER THE SKIN EVERY 2 HOURS AS NEEDED FOR MIGRAINE 3 mL 5    sumatriptan (IMITREX) 100 MG tablet Take 1 tablet (100 mg total) by mouth every 2 (two) hours as needed for Migraine. 9 tablet 11    topiramate (TOPAMAX) 50 MG tablet       traMADoL (ULTRAM) 50 mg tablet Take 1 tablet (50 mg total) by mouth every 6 (six) hours. 5 tablet 0    traZODone (DESYREL) 150 MG tablet Take half or whole tablet by mouth before bed as needed for insomnia 90 tablet 3    SUMAtriptan (IMITREX) 20 mg/actuation nasal spray 1 spray (20 mg total) by Nasal route as needed for Migraine (max 40mg/24hr). 6 each 3     Current Facility-Administered Medications on File Prior to Visit   Medication Dose Route Frequency Provider Last Rate Last Admin    0.9%  NaCl infusion   Intravenous Continuous Sathish Briseno MD   Stopped at 02/15/22 0948    LIDOcaine (PF) 10 mg/ml (1%) injection 10 mg  1 mL Intradermal Once Sathish Briseno MD        sodium chloride 0.9% flush 10 mL  10 mL Intravenous PRN Zelalem Solorzano DPM        sodium chloride 0.9% flush 10 mL  10 mL Intravenous PRN Zelalem Solorzano DPM           Cardiac meds:   eliquis 5 mg BID  Diltiazem 240 mg daily  Losartan 50 mg daily           OBJECTIVE:     Vital Signs (Most Recent)  Vitals:    08/15/22 0905   BP: (!) 184/80   Pulse: 79   Weight: (!) 140.6 kg (310 lb 1.2 oz)   Height: 5' 4" (1.626 m)         Physical Exam:  Neck: normal carotids, no bruits; normal JVP  Lungs :clear  Heart: fast heart rate , normal S1,S2, no murmurs, no gallops  Abd: no masses; no bruits;   Exts: normal DP and PT pulses bilaterally, no edema noted           LABS    CMP  Recent Labs   Lab Result Units 22  1333   Potassium mmol/L 4.2       LIPID  No results for input(s): HDL, CHOL, TRIG, LDLCALC, CHOLHDL, NONHDL, TOTALCHOLEST in the last 2160 hours.        Old Results:      Diagnostic Results:    EK/22: atrial " flutter to sinus after cardioversion   1b. EK22: atrial flutter with better ventricular rate and variable AV block  1c. EK22: NSR:    Echo: 22: normal EF       ASSESSMENT/PLAN:     1. Recurrence of atrial flutter now in sinus post ablation   2. No symptoms of heart failure or angina  3. Hypertension: unknown control at home   Plan: 1.according to EP since she is more than one month out from the ablation and has remained in sinus, she may discontinue the eliquis permanently  2. Low risk for surgery  3. If her blood pressure remains high, would increase the losartan to 100 mg - monitor at home if over 140 consitently , increase the losartan   4. Return 6 months     Alexis Ingram MD

## 2022-08-15 NOTE — DISCHARGE INSTRUCTIONS
Your surgery is scheduled for 8/19/22.    Please report to [facility] on the 1st Floor at 1100 a.m.    THIS TIME IS SUBJECT TO CHANGE.  YOU WILL RECEIVE A PHONE CALL THE DAY BEFORE SURGERY BY 3:30 PM TO CONFIRM YOUR TIME OF ARRIVAL.  IF YOU HAVE NOT RECEIVED A PHONE CALL BY 3:30 PM THE DAY BEFORE YOUR SURGERY PLEASE CALL [phone]     INSTRUCTIONS IMPORTANT!!!  ¨ Do not eat or drink after 12 midnight-including water, candy, gum, & mints. OK to brush teeth.      ____  Proceed to [facility] on *** for additional testing.        ____  Do not wear makeup, including mascara.  ____  No powder, lotions or creams to surgical area.  ____  Please remove all jewelry, including piercings and leave at home.  ____  No money or valuables needed. Please leave at home.  ____  Please bring any documents given by your doctor.  ____  If going home the same day, arrange for a ride home. You will not be able to             drive if Anesthesia was used.  ____  Children under 18 years require a parent / guardian present the entire time             they are in surgery / recovery.  ____  Wear loose fitting clothing. Allow for dressings, bandages.  ____  Stop Aspirin, Ibuprofen, Motrin, Aleve, Goody's/BC powders, Excedrine and Naproxen (NSAIDS) at least 3-5 days before surgery, unless otherwise instructed by your doctor, or the nurse.   You MAY use Tylenol/acetaminophen until day of surgery.  ____  Wash the surgical area with Hibiclens the night before surgery, and again the             morning of surgery.  Be sure to rinse hibiclens off completely (if instructed by   nurse).  ____  If you take diabetic medication, do not take am of surgery unless instructed by Doctor.  ____  Call MD for temperature above 101 degrees or any other signs of infection such as Urinary (bladder) infection, Upper respiratory infection, skin boils, etc.  ____ Stop taking any Fish Oil supplement or any Vitamins that contain Vitamin E at  least 5 days prior to surgery.  ____ Do Not wear your contact lenses the day of your procedure.  You may wear your glasses.      ____Do not shave surgical site for 3 days prior to surgery.  ____ Practice Good hand washing before, during, and after procedure.      I have read or had read and explained to me, and understand the above information.  Additional comments or instructions:  For additional questions call [phone]      ANESTHESIA SIDE EFFECTS  -For the first 24 hours after surgery:  Do not drive, use heavy equipment, make important decisions, or drink alcohol  -It is normal to feel sleepy for several hours.  Rest until you are more awake.  -Have someone stay with you, if needed.  They can watch for problems and help keep you safe.  -Some possible post anesthesia side effects include: nausea and vomiting, sore throat and hoarseness, sleepiness, and dizziness.        Pre-Op Bathing Instructions    Before surgery, you can play an important role in your own health.    Because skin is not sterile, we need to be sure that your skin is as free of germs as possible. By following the instructions below, you can reduce the number of germs on your skin before surgery.    IMPORTANT: You will need to shower with a special soap called Hibiclens*, available at any pharmacy.  If you are allergic to Chlorhexidine (the antiseptic in Hibiclens), use an antibacterial soap such as Dial Soap for your preoperative shower.  You will shower with Hibiclens both the night before your surgery and the morning of your surgery.  Do not use Hibiclens on the head, face or genitals to avoid injury to those areas.    STEP #1: THE NIGHT BEFORE YOUR SURGERY     Do not shave the area of your body where your surgery will be performed.  Shower and wash your hair and body as usual with your normal soap and shampoo.  Rinse your hair and body thoroughly after you shower to remove all soap residue.  With your hand, apply one packet of Hibiclens soap to  the surgical site.   Wash the site gently for five (5) minutes. Do not scrub your skin too hard.   Do not wash with your regular soap after Hibiclens is used.  Rinse your body thoroughly.  Pat yourself dry with a clean, soft towel.  Do not use lotion, cream, or powder.  Wear clean clothes.    STEP #2: THE MORNING OF YOUR SURGERY     Repeat Step #1.    * Not to be used by people allergic to Chlorhexidine.

## 2022-08-15 NOTE — PRE-PROCEDURE INSTRUCTIONS
Kimberlee Fisher [phone]    Allergies, medical, surgical, family and psychosocial histories reviewed with patient. Periop plan of care reviewed. Preop instructions given, including medications to take and to hold. Hibiclens soap and instructions on use given. Time allotted for questions to be addressed.  Patient verbalized understanding.

## 2022-08-18 ENCOUNTER — TELEPHONE (OUTPATIENT)
Dept: PODIATRY | Facility: CLINIC | Age: 52
End: 2022-08-18
Payer: MEDICARE

## 2022-08-18 DIAGNOSIS — M76.60 INSERTIONAL ACHILLES TENDINOPATHY: ICD-10-CM

## 2022-08-18 DIAGNOSIS — Z98.890 POSTOPERATIVE STATE: Primary | ICD-10-CM

## 2022-08-18 DIAGNOSIS — M72.2 PLANTAR FASCIITIS OF RIGHT FOOT: ICD-10-CM

## 2022-08-18 DIAGNOSIS — M24.571 EQUINUS CONTRACTURE OF RIGHT ANKLE: ICD-10-CM

## 2022-08-18 RX ORDER — CALCIUM CARBONATE 160(400)MG
TABLET,CHEWABLE ORAL
Qty: 1 EACH | Refills: 0 | Status: SHIPPED | OUTPATIENT
Start: 2022-08-18

## 2022-08-18 NOTE — TELEPHONE ENCOUNTER
----- Message from Luisa Lea RN sent at 8/18/2022  1:14 PM CDT -----  Dr. Solorzano:    If possible can you order a shower chair and walker (rollator) for Ms Fisher per her request?    Thank you,    Luisa  ----- Message -----  From: Jani Layne  Sent: 8/18/2022  10:39 AM CDT  To: Rashad Masterson Staff    .Type:  Needs Medical Advice    Who Called:  self  Would the patient rather a call back or a response via [facility]? Call back   Best Call Back Number: [phone]  Additional Information: pt would like a call back about a few concerns she has before her surgery tomorrow.

## 2022-08-18 NOTE — TELEPHONE ENCOUNTER
----- Message from Jani Layne sent at 8/18/2022 10:37 AM CDT -----  .Type:  Needs Medical Advice    Who Called:  self  Would the patient rather a call back or a response via [facility]? Call back   Best Call Back Number: [phone]  Additional Information: pt would like a call back about a few concerns she has before her surgery tomorrow.

## 2022-08-18 NOTE — TELEPHONE ENCOUNTER
Spoke with Ms Fisher who is requesting a prescription for a shower chair and a rolling walker/rollator. Surgery is tomorrow. Let her know I will be sure to let Dr. Solorzano know; however, if she needs to she can let him know the day of surgery as well and she can take her prescriptions to [facility] to be filled. Verbalized understanding. If orders are received per  will route DME orders.

## 2022-08-19 ENCOUNTER — ANESTHESIA (OUTPATIENT)
Dept: SURGERY | Facility: HOSPITAL | Age: 52
End: 2022-08-19
Payer: MEDICARE

## 2022-08-19 ENCOUNTER — HOSPITAL ENCOUNTER (OUTPATIENT)
Facility: HOSPITAL | Age: 52
Discharge: HOME | End: 2022-08-19
Attending: PODIATRIST | Admitting: PODIATRIST
Payer: MEDICARE

## 2022-08-19 VITALS
RESPIRATION RATE: 16 BRPM | HEART RATE: 76 BPM | WEIGHT: 293 LBS | TEMPERATURE: 98 F | HEIGHT: 64 IN | BODY MASS INDEX: 50.02 KG/M2 | DIASTOLIC BLOOD PRESSURE: 73 MMHG | SYSTOLIC BLOOD PRESSURE: 142 MMHG | OXYGEN SATURATION: 97 %

## 2022-08-19 DIAGNOSIS — M24.571 EQUINUS CONTRACTURE OF RIGHT ANKLE: Primary | ICD-10-CM

## 2022-08-19 DIAGNOSIS — G89.29 CHRONIC HEEL PAIN, RIGHT: ICD-10-CM

## 2022-08-19 DIAGNOSIS — M67.88 ACHILLES TENDINOSIS OF RIGHT LOWER EXTREMITY: ICD-10-CM

## 2022-08-19 DIAGNOSIS — F41.0 GENERALIZED ANXIETY DISORDER WITH PANIC ATTACKS: ICD-10-CM

## 2022-08-19 DIAGNOSIS — F41.1 GENERALIZED ANXIETY DISORDER WITH PANIC ATTACKS: ICD-10-CM

## 2022-08-19 DIAGNOSIS — M72.2 PLANTAR FASCIITIS OF RIGHT FOOT: ICD-10-CM

## 2022-08-19 DIAGNOSIS — M79.671 CHRONIC HEEL PAIN, RIGHT: ICD-10-CM

## 2022-08-19 PROCEDURE — 27201423 OPTIME MED/SURG SUP & DEVICES STERILE SUPPLY: Performed by: PODIATRIST

## 2022-08-19 PROCEDURE — 36000709 HC OR TIME LEV III EA ADD 15 MIN: Performed by: PODIATRIST

## 2022-08-19 PROCEDURE — 25000003 PHARM REV CODE 250: Performed by: NURSE PRACTITIONER

## 2022-08-19 PROCEDURE — 25000003 PHARM REV CODE 250: Performed by: PODIATRIST

## 2022-08-19 PROCEDURE — 29893 ENDOSCOPIC PLANTR FASCIOTOMY: CPT | Mod: RT,,, | Performed by: PODIATRIST

## 2022-08-19 PROCEDURE — 25000003 PHARM REV CODE 250: Performed by: NURSE ANESTHETIST, CERTIFIED REGISTERED

## 2022-08-19 PROCEDURE — 29999 UNLISTED PX ARTHROSCOPY: CPT | Mod: ,,, | Performed by: PODIATRIST

## 2022-08-19 PROCEDURE — 63600175 PHARM REV CODE 636 W HCPCS: Performed by: NURSE ANESTHETIST, CERTIFIED REGISTERED

## 2022-08-19 PROCEDURE — 64447 NJX AA&/STRD FEMORAL NRV IMG: CPT | Performed by: STUDENT IN AN ORGANIZED HEALTH CARE EDUCATION/TRAINING PROGRAM

## 2022-08-19 PROCEDURE — 71000015 HC POSTOP RECOV 1ST HR: Performed by: PODIATRIST

## 2022-08-19 PROCEDURE — 37000008 HC ANESTHESIA 1ST 15 MINUTES: Performed by: PODIATRIST

## 2022-08-19 PROCEDURE — 97116 GAIT TRAINING THERAPY: CPT

## 2022-08-19 PROCEDURE — 29893 PR ANKLE SCOPE,PLANTAR FASCIOTOMY: ICD-10-PCS | Mod: RT,,, | Performed by: PODIATRIST

## 2022-08-19 PROCEDURE — 37000009 HC ANESTHESIA EA ADD 15 MINS: Performed by: PODIATRIST

## 2022-08-19 PROCEDURE — 29999 PR ARTHOSCOPIC GASTROCNEMIUS RECESSION: ICD-10-PCS | Mod: ,,, | Performed by: PODIATRIST

## 2022-08-19 PROCEDURE — 36000708 HC OR TIME LEV III 1ST 15 MIN: Performed by: PODIATRIST

## 2022-08-19 PROCEDURE — 71000016 HC POSTOP RECOV ADDL HR: Performed by: PODIATRIST

## 2022-08-19 PROCEDURE — 97161 PT EVAL LOW COMPLEX 20 MIN: CPT

## 2022-08-19 PROCEDURE — 63600175 PHARM REV CODE 636 W HCPCS: Performed by: STUDENT IN AN ORGANIZED HEALTH CARE EDUCATION/TRAINING PROGRAM

## 2022-08-19 PROCEDURE — 63600175 PHARM REV CODE 636 W HCPCS: Performed by: PODIATRIST

## 2022-08-19 PROCEDURE — 71000033 HC RECOVERY, INTIAL HOUR: Performed by: PODIATRIST

## 2022-08-19 RX ORDER — ONDANSETRON 2 MG/ML
INJECTION INTRAMUSCULAR; INTRAVENOUS
Status: DISCONTINUED | OUTPATIENT
Start: 2022-08-19 | End: 2022-08-19

## 2022-08-19 RX ORDER — DIPHENHYDRAMINE HYDROCHLORIDE 50 MG/ML
12.5 INJECTION INTRAMUSCULAR; INTRAVENOUS EVERY 6 HOURS PRN
Status: DISCONTINUED | OUTPATIENT
Start: 2022-08-19 | End: 2022-08-19 | Stop reason: HOSPADM

## 2022-08-19 RX ORDER — CEPHALEXIN 500 MG/1
500 CAPSULE ORAL 4 TIMES DAILY
Qty: 28 CAPSULE | Refills: 0 | Status: SHIPPED | OUTPATIENT
Start: 2022-08-19 | End: 2023-02-06 | Stop reason: ALTCHOICE

## 2022-08-19 RX ORDER — HYDROCODONE BITARTRATE AND ACETAMINOPHEN 5; 325 MG/1; MG/1
1 TABLET ORAL EVERY 4 HOURS PRN
Qty: 30 TABLET | Refills: 0 | Status: SHIPPED | OUTPATIENT
Start: 2022-08-19

## 2022-08-19 RX ORDER — FENTANYL CITRATE 50 UG/ML
INJECTION, SOLUTION INTRAMUSCULAR; INTRAVENOUS
Status: DISCONTINUED | OUTPATIENT
Start: 2022-08-19 | End: 2022-08-19

## 2022-08-19 RX ORDER — SODIUM CHLORIDE 0.9 % (FLUSH) 0.9 %
3 SYRINGE (ML) INJECTION
Status: DISCONTINUED | OUTPATIENT
Start: 2022-08-19 | End: 2022-08-19 | Stop reason: HOSPADM

## 2022-08-19 RX ORDER — HYDROMORPHONE HYDROCHLORIDE 2 MG/ML
0.5 INJECTION, SOLUTION INTRAMUSCULAR; INTRAVENOUS; SUBCUTANEOUS EVERY 5 MIN PRN
Status: DISCONTINUED | OUTPATIENT
Start: 2022-08-19 | End: 2022-08-19 | Stop reason: HOSPADM

## 2022-08-19 RX ORDER — CEFAZOLIN SODIUM 2 G/50ML
2 SOLUTION INTRAVENOUS
Status: COMPLETED | OUTPATIENT
Start: 2022-08-19 | End: 2022-08-19

## 2022-08-19 RX ORDER — PHENYLEPHRINE HYDROCHLORIDE 10 MG/ML
INJECTION INTRAVENOUS
Status: DISCONTINUED | OUTPATIENT
Start: 2022-08-19 | End: 2022-08-19

## 2022-08-19 RX ORDER — ROPIVACAINE HYDROCHLORIDE 5 MG/ML
INJECTION, SOLUTION EPIDURAL; INFILTRATION; PERINEURAL
Status: DISCONTINUED | OUTPATIENT
Start: 2022-08-19 | End: 2022-08-19

## 2022-08-19 RX ORDER — LIDOCAINE HYDROCHLORIDE 20 MG/ML
INJECTION, SOLUTION EPIDURAL; INFILTRATION; INTRACAUDAL; PERINEURAL
Status: DISCONTINUED | OUTPATIENT
Start: 2022-08-19 | End: 2022-08-19

## 2022-08-19 RX ORDER — ONDANSETRON 2 MG/ML
4 INJECTION INTRAMUSCULAR; INTRAVENOUS DAILY PRN
Status: DISCONTINUED | OUTPATIENT
Start: 2022-08-19 | End: 2022-08-19 | Stop reason: HOSPADM

## 2022-08-19 RX ORDER — SODIUM CHLORIDE, SODIUM LACTATE, POTASSIUM CHLORIDE, CALCIUM CHLORIDE 600; 310; 30; 20 MG/100ML; MG/100ML; MG/100ML; MG/100ML
INJECTION, SOLUTION INTRAVENOUS CONTINUOUS
Status: DISCONTINUED | OUTPATIENT
Start: 2022-08-19 | End: 2022-08-19 | Stop reason: HOSPADM

## 2022-08-19 RX ORDER — HYDROCODONE BITARTRATE AND ACETAMINOPHEN 5; 325 MG/1; MG/1
1 TABLET ORAL EVERY 4 HOURS PRN
Status: DISCONTINUED | OUTPATIENT
Start: 2022-08-19 | End: 2022-08-19 | Stop reason: HOSPADM

## 2022-08-19 RX ORDER — LIDOCAINE HYDROCHLORIDE 10 MG/ML
1 INJECTION, SOLUTION EPIDURAL; INFILTRATION; INTRACAUDAL; PERINEURAL ONCE
Status: DISCONTINUED | OUTPATIENT
Start: 2022-08-19 | End: 2022-08-19 | Stop reason: HOSPADM

## 2022-08-19 RX ORDER — PROPOFOL 10 MG/ML
VIAL (ML) INTRAVENOUS CONTINUOUS PRN
Status: DISCONTINUED | OUTPATIENT
Start: 2022-08-19 | End: 2022-08-19

## 2022-08-19 RX ORDER — PROPOFOL 10 MG/ML
VIAL (ML) INTRAVENOUS
Status: DISCONTINUED | OUTPATIENT
Start: 2022-08-19 | End: 2022-08-19

## 2022-08-19 RX ORDER — MIDAZOLAM HYDROCHLORIDE 1 MG/ML
INJECTION, SOLUTION INTRAMUSCULAR; INTRAVENOUS
Status: DISCONTINUED | OUTPATIENT
Start: 2022-08-19 | End: 2022-08-19

## 2022-08-19 RX ORDER — KETAMINE HCL IN 0.9 % NACL 50 MG/5 ML
SYRINGE (ML) INTRAVENOUS
Status: DISCONTINUED | OUTPATIENT
Start: 2022-08-19 | End: 2022-08-19

## 2022-08-19 RX ORDER — FAMOTIDINE 10 MG/ML
INJECTION INTRAVENOUS
Status: DISCONTINUED | OUTPATIENT
Start: 2022-08-19 | End: 2022-08-19

## 2022-08-19 RX ORDER — SCOLOPAMINE TRANSDERMAL SYSTEM 1 MG/1
1 PATCH, EXTENDED RELEASE TRANSDERMAL
Status: DISCONTINUED | OUTPATIENT
Start: 2022-08-19 | End: 2022-08-19 | Stop reason: HOSPADM

## 2022-08-19 RX ADMIN — PHENYLEPHRINE HYDROCHLORIDE 100 MCG: 10 INJECTION INTRAVENOUS at 07:08

## 2022-08-19 RX ADMIN — HYDROCODONE BITARTRATE AND ACETAMINOPHEN 1 TABLET: 5; 325 TABLET ORAL at 08:08

## 2022-08-19 RX ADMIN — LIDOCAINE HYDROCHLORIDE 100 MG: 20 INJECTION, SOLUTION EPIDURAL; INFILTRATION; INTRACAUDAL; PERINEURAL at 07:08

## 2022-08-19 RX ADMIN — PROPOFOL 60 MG: 10 INJECTION, EMULSION INTRAVENOUS at 07:08

## 2022-08-19 RX ADMIN — PROPOFOL 100 MCG/KG/MIN: 10 INJECTION, EMULSION INTRAVENOUS at 07:08

## 2022-08-19 RX ADMIN — GLYCOPYRROLATE 0.2 MG: 0.2 INJECTION, SOLUTION INTRAMUSCULAR; INTRAVITREAL at 07:08

## 2022-08-19 RX ADMIN — CEFAZOLIN SODIUM 3 G: 2 SOLUTION INTRAVENOUS at 07:08

## 2022-08-19 RX ADMIN — FAMOTIDINE 40 MG: 10 INJECTION INTRAVENOUS at 07:08

## 2022-08-19 RX ADMIN — MIDAZOLAM 1 MG: 1 INJECTION INTRAMUSCULAR; INTRAVENOUS at 07:08

## 2022-08-19 RX ADMIN — FENTANYL CITRATE 50 MCG: 50 INJECTION, SOLUTION INTRAMUSCULAR; INTRAVENOUS at 07:08

## 2022-08-19 RX ADMIN — MIDAZOLAM 2 MG: 1 INJECTION INTRAMUSCULAR; INTRAVENOUS at 06:08

## 2022-08-19 RX ADMIN — LIDOCAINE HYDROCHLORIDE 80 MG: 20 INJECTION, SOLUTION EPIDURAL; INFILTRATION; INTRACAUDAL; PERINEURAL at 07:08

## 2022-08-19 RX ADMIN — ONDANSETRON 8 MG: 2 INJECTION, SOLUTION INTRAMUSCULAR; INTRAVENOUS at 07:08

## 2022-08-19 RX ADMIN — SODIUM CHLORIDE, SODIUM LACTATE, POTASSIUM CHLORIDE, AND CALCIUM CHLORIDE: .6; .31; .03; .02 INJECTION, SOLUTION INTRAVENOUS at 06:08

## 2022-08-19 RX ADMIN — ROPIVACAINE HYDROCHLORIDE 50 ML: 5 INJECTION, SOLUTION EPIDURAL; INFILTRATION; PERINEURAL at 06:08

## 2022-08-19 RX ADMIN — SCOPALAMINE 1 PATCH: 1 PATCH, EXTENDED RELEASE TRANSDERMAL at 05:08

## 2022-08-19 RX ADMIN — Medication 20 MG: at 07:08

## 2022-08-19 NOTE — BRIEF OP NOTE
"[facility] - Surgery (Hospital)  Brief Operative Note    Surgery Date: 8/19/2022     Surgeon(s) and Role:     * Zelalem Solorzano DPM - Primary    Assisting Surgeon: None    Pre-op Diagnosis:  Chronic heel pain, right [M79.671, G89.29]  Achilles tendinosis of right lower extremity [M67.88]  Equinus contracture of right ankle [M24.571]    Post-op Diagnosis:  Post-Op Diagnosis Codes:     * Chronic heel pain, right [M79.671, G89.29]     * Achilles tendinosis of right lower extremity [M67.88]     * Equinus contracture of right ankle [M24.571]    Procedure(s) (LRB):  RECESSION, GASTROCNEMIUS, ENDOSCOPIC (Right)  FASCIOTOMY, PLANTAR, ENDOSCOPIC (Right)    Anesthesia: Regional    Operative Findings: per above    Estimated Blood Loss: 3 mL       Specimens:   Specimen (24h ago, onward)            None            Discharge Note    OUTCOME: Patient tolerated treatment/procedure well without complication and is now ready for discharge.    DISPOSITION: Home or Self Care    FINAL DIAGNOSIS:  Chronic heel pain, right    FOLLOWUP: In clinic    DISCHARGE INSTRUCTIONS:    Discharge Procedure Orders   WALKER FOR HOME USE     Order Specific Question Answer Comments   Type of Walker: Adult (5'4"-6'6")    With wheels? Yes    Height: 5' 4" (1.626 m)    Weight: 141.1 kg (311 lb)    Length of need (1-99 months): 99    Please check all that apply: Walker will be used for gait training.      Diet general     Keep surgical extremity elevated     Ice to affected area   Order Comments: Behind right knee as needed for 30 minute intervals throughout the day.     Call MD for:  temperature >100.4     Call MD for:  persistent nausea and vomiting     Call MD for:  severe uncontrolled pain     Call MD for:  difficulty breathing, headache or visual disturbances     Remove dressing in 72 hours   Order Comments: May wash in shower but do not submerge directly into water. Apply large padded band aids to incisions to protect followed by below knee compression " sock.     Weight bearing restrictions (specify)   Order Comments: As tolerated with orthopedic boot and walker

## 2022-08-19 NOTE — ANESTHESIA PROCEDURE NOTES
Peripheral Block    Patient location during procedure: pre-op   Block not for primary anesthetic.  Reason for block: at surgeon's request and post-op pain management   Post-op Pain Location: Right leg   Start time: 8/19/2022 6:44 AM  Timeout: 8/19/2022 6:42 AM   End time: 8/19/2022 6:54 AM    Staffing  Authorizing Provider: Andrew Wilson MD  Performing Provider: Cristian Mccormack MD    Preanesthetic Checklist  Completed: patient identified, IV checked, site marked, risks and benefits discussed, surgical consent, monitors and equipment checked, pre-op evaluation and timeout performed  Peripheral Block  Patient position: supine  Prep: ChloraPrep  Patient monitoring: heart rate, cardiac monitor, continuous pulse ox, continuous capnometry and frequent blood pressure checks  Block type: adductor canal  Laterality: right  Injection technique: single shot  Needle  Needle type: Stimuplex   Needle gauge: 21 G  Needle length: 4 in  Needle localization: anatomical landmarks and ultrasound guidance     Assessment  Injection assessment: negative aspiration, negative parasthesia and local visualized surrounding nerve  Paresthesia pain: none  Heart rate change: no  Slow fractionated injection: yes  Pain Tolerance: comfortable throughout block and no complaints      Additional Notes  VSS.  [facility] RN monitoring vitals throughout procedure.  Patient tolerated procedure well.     Tibial nerve, common peroneal nerve, popliteal artery and vein, biceps femoris, and semimembranosis identified under ultrasound. Area cleaned with chloraprep prior to insertion of stimuplex needle. Prior to local injection, aspiration negative for heme. Common peroneal and tibial nerve surrounded by local anesthetic and visualized under ultrasound - tracked distally to see nerve with continuous local anesthetic coverage. No complaints of parasthesias or discomfort throughout block.     Saphenous nerve, Femoral artery and vein, sartorius, vastus  medialis and adductor shari identified under ultrasound. Area cleaned with chloraprep prior to insertion of stimuplex needle. Prior to local injection, aspiration negative for heme. Saphenous nerve surrounded by local anesthetic and visualized under ultrasound - tracked distally to see nerve with continuous local anesthetic coverage. No complaints of parasthesias or discomfort throughout block.

## 2022-08-19 NOTE — ANESTHESIA POSTPROCEDURE EVALUATION
Anesthesia Post Evaluation    Patient: Starla Fisher    Procedure(s) Performed: Procedure(s) (LRB):  RECESSION, GASTROCNEMIUS, ENDOSCOPIC (Right)  FASCIOTOMY, PLANTAR, ENDOSCOPIC (Right)    Final Anesthesia Type: general      Patient location during evaluation: PACU  Patient participation: Yes- Able to Participate  Level of consciousness: awake and alert, oriented and awake  Post-procedure vital signs: reviewed and stable  Pain management: adequate  Airway patency: patent    PONV status at discharge: No PONV  Anesthetic complications: no      Cardiovascular status: blood pressure returned to baseline  Respiratory status: unassisted and room air  Hydration status: euvolemic  Follow-up not needed.          Vitals Value Taken Time   /80 08/19/22 0855   Temp 36.5 °C (97.7 °F) 08/19/22 0820   Pulse 76 08/19/22 0900   Resp 13 08/19/22 0900   SpO2 99 % 08/19/22 0900   Vitals shown include unvalidated device data.      Event Time   Out of Recovery 09:02:35         Pain/Anca Score: Pain Rating Prior to Med Admin: 10 (8/19/2022  8:55 AM)  Anca Score: 8 (8/19/2022  8:20 AM)

## 2022-08-19 NOTE — OP NOTE
[facility] - Surgery (Hospital)  Operative Note      Date of Procedure: 8/19/2022     Procedure: Procedure(s) (LRB):  RECESSION, GASTROCNEMIUS, ENDOSCOPIC (Right)  FASCIOTOMY, PLANTAR, ENDOSCOPIC (Right)     Surgeon(s) and Role:     * Zelalem Solorzano, DPM - Primary       Fantasma Kraft DPM - Assist    Pre-Operative Diagnosis: Chronic heel pain, right [M79.671, G89.29]  Achilles tendinosis of right lower extremity [M67.88]  Equinus contracture of right ankle [M24.571]    Post-Operative Diagnosis: Post-Op Diagnosis Codes:     * Chronic heel pain, right [M79.671, G89.29]     * Achilles tendinosis of right lower extremity [M67.88]     * Equinus contracture of right ankle [M24.571]    Anesthesia: Regional    Description of Technical Procedures: The patient was brought to the operating room on a stretcher and was transferred to the OR table in supine position. Anesthesia was induced.  A tourniquet was applied to the right thigh.  The right lower limb was prepped and draped in a sterile manner. Tourniquet(s) inflated to 300 mmHg.     Using a 15 blade a longitudinal portal was made posteromedially at the leg at the level of the gastrocnemius musculotendinous junction. Blunt dissection was carried down to the aponeurosis and a freer elevator was used to create an interval between aponeurosis and subcutaneous tissue, cannula and blade were placed into the interval as far laterally as possible. The blade was deployed and drawn medially to transect the aponeurosis to the level of the medial portal while the ankle was held in dorsiflexion, muscle belly was visualized deep to the transected aponeurosis. Cannula and blade were removed. Site primarily closed with 3-0 nylon suture. Ankle dorsiflexion PROM improved.     Using a 15 blade a longitudinal portal was made medially at the distal heel. Tourniquet deflated then reinflated. A plane finder was placed through the portal and slid along the superficial surface of the plantar  fascia through to the lateral side of the foot, the skin at the lateral distal heel was tented then incised with a 15 blade. The plane finder was pushed through the lateral portal and left within both portals. The cannula was inserted through the lateral portal over the plane finder and then through the medial portal. The plane finder was removed. Rasp and then cotton tipped applicators were used to clear fat from the cannula. Endoscope was inserted into the lateral side of the cannula and plantar fascia was visualized. Hook blade was inserted into the medial side of the cannula. Using the hook blade the central and medial bands of the plantar fascia were transected, revealing underlying muscle belly. Instruments removed. Site primarily closed with 3-0 nylon suture. 4 mg dexamethasone locally infiltrated.     Dressed with xeroform, 4x4 gauze, cast padding, ACE wrap, cast padding, ACE wrap. Placed in tall surgical boot. Tourniquet(s) deflated.       The patient was transferred to the recovery room with vital signs stable. Following a period of post op monitoring, the patient will be discharged home with the following postop instructions:   1. Keep dressing clean, dry, and intact until next seen by podiatry.   2. Weightbearing status: weightbearing as tolerated.   3. All necessary prescriptions were ordered and medical management will continue.   4. Contact podiatry with any postop questions or concerns.       Estimated Blood Loss (EBL): 3 mL           Implants: * No implants in log *    Specimens:   Specimen (24h ago, onward)                None                    Condition: Good    Disposition: PACU - hemodynamically stable.    Attestation: I was present and scrubbed for the entire procedure.    Discharge Note    OUTCOME: Patient tolerated treatment/procedure well without complication and is now ready for discharge.    DISPOSITION: Home or Self Care    FINAL DIAGNOSIS:  Chronic heel pain, right    FOLLOWUP: In  "clinic    DISCHARGE INSTRUCTIONS:    Discharge Procedure Orders   WALKER FOR HOME USE     Order Specific Question Answer Comments   Type of Walker: Adult (5'4"-6'6")    With wheels? Yes    Height: 5' 4" (1.626 m)    Weight: 141.1 kg (311 lb)    Length of need (1-99 months): 99    Please check all that apply: Walker will be used for gait training.      Diet general     Keep surgical extremity elevated     Ice to affected area   Order Comments: Behind right knee as needed for 30 minute intervals throughout the day.     Call MD for:  temperature >100.4     Call MD for:  persistent nausea and vomiting     Call MD for:  severe uncontrolled pain     Call MD for:  difficulty breathing, headache or visual disturbances     Remove dressing in 72 hours   Order Comments: May wash in shower but do not submerge directly into water. Apply large padded band aids to incisions to protect followed by below knee compression sock.     Weight bearing restrictions (specify)   Order Comments: As tolerated with orthopedic boot and walker   I was present and scrubbed for the entire care.  Zelalem HAGANM, FACFAS    "

## 2022-08-19 NOTE — H&P
"H&P     Starla Fisher is a 52 y.o., female scheduled for  RECESSION, GASTROCNEMIUS, ENDOSCOPIC (Right ) - [facility] centerline   aysha notified       FASCIOTOMY, PLANTAR, ENDOSCOPIC (Right ) 8/19/22.    Per family medicine Dr. Mosquera, "Pts medically conditions are optimized and she can proceed to scheduled procedure. She will also follow up with cardiology for risk stratification in context of recent ablation".    Per cardiology Dr. Ingram, "Low risk for surgery".    Past Medical History:   Diagnosis Date    Anxiety     Depression     GERD (gastroesophageal reflux disease)     Hypertension     Migraine headache     Sleep difficulties     Typical atrial flutter 4/20/2022     Past Surgical History:   Procedure Laterality Date    BREAST SURGERY      ENDOMETRIAL ABLATION      EYE SURGERY      FOOT SURGERY Right 10/2018    bunion    IMPLANTATION OF PERMANENT SACRAL NERVE STIMULATOR N/A 2/15/2022    Procedure: INSERTION, NEUROSTIMULATOR, PERMANENT, SACRAL;  Surgeon: David Brown MD;  Location: [facility] OR [address] [place] [address];  Service: Urology;  Laterality: N/A;    TONSILLECTOMY      TOTAL REDUCTION MAMMOPLASTY Bilateral        Pre-op Assessment    I have reviewed the Patient Summary Reports.     I have reviewed the Nursing Notes.    I have reviewed the Medications.     Review of Systems  Anesthesia Hx:  Hx of Anesthetic complications  Personal Hx of Anesthesia complications, Post-Operative Nausea/Vomiting, in the past, but not with recent anesthetics / prophylaxis   Social:  Non-Smoker, No Alcohol Use    Cardiovascular:   Exercise tolerance: good Hypertension Dysrhythmias  Denies Angina.    Pulmonary:   Sleep Apnea (in process of getting CPAP), CPAP    Education provided regarding risk of obstructive sleep apnea     Renal/:  Renal/ Normal     Hepatic/GI:   GERD, poorly controlled    Neurological:   Denies TIA. Denies CVA. Headaches Denies Seizures.    Endocrine:   Denies Diabetes. Denies Hypothyroidism. " "Denies Hyperthyroidism.  Morbid Obesity / BMI > 40  Psych:   anxiety depression          Physical Exam  General: Well nourished and Cooperative    Airway:  Mallampati: IV / III  Mouth Opening: Small, but > 3cm  TM Distance: Normal  Tongue: Normal  Neck ROM: Normal ROM    Dental:  Intact, Retainer  Upper and lower permanent retainers  Chest/Lungs:  Clear to auscultation, Normal Respiratory Rate    Heart:  Rate: Normal  Rhythm: Regular Rhythm  Sounds: Normal        Anesthesia Plan  Type of Anesthesia, risks & benefits discussed:    Anesthesia Type: Regional  Intra-op Monitoring Plan: Standard ASA Monitors  Post Op Pain Control Plan: multimodal analgesia  Induction:  IV  ASA Score: 3  Anesthesia Plan Notes: Anesthesia consent to be signed prior to surgery 8/19/22                                                                                              08/15/2022      Starla Fisher is a 52 y.o., female scheduled for  RECESSION, GASTROCNEMIUS, ENDOSCOPIC (Right ) - Arthrex centerline   aysha notified       FASCIOTOMY, PLANTAR, ENDOSCOPIC (Right ) 8/19/22.    Per family medicine Dr. Moqsuera, "Pts medically conditions are optimized and she can proceed to scheduled procedure. She will also follow up with cardiology for risk stratification in context of recent ablation".    Per cardiology Dr. Ingram, "Low risk for surgery".    Past Medical History:   Diagnosis Date    Anxiety     Depression     GERD (gastroesophageal reflux disease)     Hypertension     Migraine headache     Sleep difficulties     Typical atrial flutter 4/20/2022     Past Surgical History:   Procedure Laterality Date    BREAST SURGERY      ENDOMETRIAL ABLATION      EYE SURGERY      FOOT SURGERY Right 10/2018    bunion    IMPLANTATION OF PERMANENT SACRAL NERVE STIMULATOR N/A 2/15/2022    Procedure: INSERTION, NEUROSTIMULATOR, PERMANENT, SACRAL;  Surgeon: David Brown MD;  Location: [facility] OR [address] [place] [address];  Service: Urology;  " Laterality: N/A;    TONSILLECTOMY      TOTAL REDUCTION MAMMOPLASTY Bilateral        Pre-op Assessment    I have reviewed the Patient Summary Reports.     I have reviewed the Nursing Notes.    I have reviewed the Medications.     Review of Systems  Anesthesia Hx:  Hx of Anesthetic complications  Personal Hx of Anesthesia complications, Post-Operative Nausea/Vomiting, in the past, but not with recent anesthetics / prophylaxis   Social:  Non-Smoker, No Alcohol Use    Cardiovascular:   Exercise tolerance: good Hypertension Dysrhythmias  Denies Angina.    Pulmonary:   Sleep Apnea (in process of getting CPAP), CPAP    Education provided regarding risk of obstructive sleep apnea     Renal/:  Renal/ Normal     Hepatic/GI:   GERD, poorly controlled    Neurological:   Denies TIA. Denies CVA. Headaches Denies Seizures.    Endocrine:   Denies Diabetes. Denies Hypothyroidism. Denies Hyperthyroidism.  Morbid Obesity / BMI > 40  Psych:   anxiety depression          Physical Exam  General: Well nourished and Cooperative    Airway:  Mallampati: IV / III  Mouth Opening: Small, but > 3cm  TM Distance: Normal  Tongue: Normal  Neck ROM: Normal ROM    Dental:  Intact, Retainer  Upper and lower permanent retainers  Chest/Lungs:  Clear to auscultation, Normal Respiratory Rate    Heart:  Rate: Normal  Rhythm: Regular Rhythm  Sounds: Normal        Anesthesia Plan  Type of Anesthesia, risks & benefits discussed:    Anesthesia Type: Regional  Intra-op Monitoring Plan: Standard ASA Monitors  Post Op Pain Control Plan: multimodal analgesia  Induction:  IV  ASA Score: 3  Anesthesia Plan Notes: Anesthesia consent to be signed prior to surgery 8/19/22

## 2022-08-19 NOTE — ANESTHESIA PROCEDURE NOTES
Intubation    Date/Time: 8/19/2022 7:41 AM  Performed by: Olinda Addison CRNA  Authorized by: Andrew Wilson MD     Intubation:     Induction:  Intravenous    Intubated:  Postinduction    Mask Ventilation:  Easy with oral airway    Attempts:  1    Attempted By:  Student    Difficult Airway Encountered?: No      Complications:  None    Airway Device:  Supraglottic airway/LMA    Airway Device Size:  4.0

## 2022-08-19 NOTE — PT/OT/SLP PROGRESS
Physical Therapy WalkerEvaluation/Training    Starla Fisher   MRN: [id number]   Admitting Diagnosis: Chronic heel pain, right    PT Received On: 08/19/22  PT Start Time: 1042     PT Stop Time: 1105    PT Total Time (min): 23 min       Billable Minutes:  Evaluation 10 and Gait Training 13     Order: PT gait training  Order Date: 8/19/2022    Precautions Weight Bearing Status: weight bearing as tolerated: right leg    Patient Active Problem List   Diagnosis    Essential hypertension    Morbid obesity with BMI of 50.0-59.9, adult    Migraine with aura and without status migrainosus, not intractable    Intractable chronic migraine without aura and without status migrainosus    Major depressive disorder, recurrent, moderate    Insomnia disorder, with non-sleep disorder mental comorbidity    Generalized anxiety disorder with panic attacks    Right ankle pain    Impaired gait and mobility    Muscle weakness (generalized)    CARL (obstructive sleep apnea)    Typical atrial flutter    Chronic heel pain, right    Achilles tendinosis of right lower extremity    Equinus contracture of right ankle    Plantar fasciitis of right foot     Past Medical History:   Diagnosis Date    Anxiety     Depression     GERD (gastroesophageal reflux disease)     Hypertension     Migraine headache     Sleep difficulties     Typical atrial flutter 4/20/2022     Past Surgical History:   Procedure Laterality Date    BREAST SURGERY      ENDOMETRIAL ABLATION      EYE SURGERY      FOOT SURGERY Right 10/2018    bunion    IMPLANTATION OF PERMANENT SACRAL NERVE STIMULATOR N/A 2/15/2022    Procedure: INSERTION, NEUROSTIMULATOR, PERMANENT, SACRAL;  Surgeon: David Brown MD;  Location: [facility] OR [address] [place] [address];  Service: Urology;  Laterality: N/A;    TONSILLECTOMY      TOTAL REDUCTION MAMMOPLASTY Bilateral        Subjective Information     Prior level of function: independent  Residence: lives with her sister in a [facility] with  threshold entrance and WIS with built in seat     Support available: family  Equipment owned: SPC, wheelchair  Mental Status: Alert  Skin: Intact  Sensation: decreased sensation to RLE since surgery  Posture: Good  Hearing: Intact  Vision:  Intact    Pain at time of assessment: not rated    Objective findings/Assessment  Bed mobility: mod I supine<>sit  Transfers: bed to toilet with RW and CGA/SBA    Patient requires walker fitting and training. Bariatric walker delivered to pt's room and PT fit RW to pt.    Treatment  Gait: 35 ft with RW and SBA primarily but required CGA 1x to prevent LOB  Stairs: educated in threshold negotiation with RW    Education provided in form of: demonstration and teach back    AM-PAC 6 CLICK MOBILITY  How much help from another person does this patient currently need?   1 = Unable, Total/Dependent Assistance  2 = A lot, Maximum/Moderate Assistance  3 = A little, Minimum/Contact Guard/Supervision  4 = None, Modified [place]/Independent    Turning over in bed (including adjusting bedclothes, sheets and blankets)?: 4  Sitting down on and standing up from a chair with arms (e.g., wheelchair, bedside commode, etc.): 3  Moving from lying on back to sitting on the side of the bed?: 4  Moving to and from a bed to a chair (including a wheelchair)?: 3  Need to walk in hospital room?: 3  Climbing 3-5 steps with a railing?: 3  Basic Mobility Total Score: 20     AM-PAC Raw Score CMS G-Code Modifier Level of Impairment Assistance   6 % Total / Unable   7 - 9 CM 80 - 100% Maximal Assist   10 - 14 CL 60 - 80% Moderate Assist   15 - 19 CK 40 - 60% Moderate Assist   20 - 22 CJ 20 - 40% Minimal Assist   23 CI 1-20% SBA / CGA   24 CH 0% Independent/ Mod I     Goals/Discharge Status  Patient safely and effectively ambulates with walker with  standy by assistance,  weight bearing as tolerated: right leg on level surfaces.    Recommended Plan:  Patient to be discharged to home with family  support.    08/19/2022

## 2022-08-19 NOTE — DISCHARGE INSTRUCTIONS
Discharge Instructions for Foot Surgery  Arrange to have an adult drive you home after surgery. If you had general anesthesia, it may take a day or more to fully recover. So, for at least the next 24 hours: Do not drive or use machinery or power tools; do not drink alcohol; and do not make any major decisions.  Diet  Here are some dietary suggestions following surgery:   Start with liquids and light foods (like dry toast, bananas, and applesauce). As you feel up to it, slowly return to your normal diet.  Drink at least 6 to 8 glasses of water or other nonalcoholic fluids a day.  To avoid nausea, eat before taking narcotic pain medicines.  Medicines  It is important to follow these directions:   Take all medicines as instructed.  Take pain medicines on time. Do not wait until the pain is bad before taking your medicines.  Avoid alcohol while on pain medicines.  Activity  These instructions are to help with your recovery:   Sit or lie down when possible. Put a pillow under your heel to raise your foot above the level of your heart.  Wrap an ice pack or bag of frozen peas in a thin cloth. Place it over your bandaged foot for no longer than 20 minutes. Do this three times a day.  You can drive again in seven days or as instructed by your healthcare provider.  Wear your surgical shoe at all times unless told otherwise by your healthcare provider.  Use crutches or a cane as directed.  Follow your healthcare providers instructions about putting weight on your foot.    Bandage and cast care  Here are tips to follow:   Do not shower for 48 hours.  When you can shower again, cover the bandage or cast with a plastic bag to keep it dry.  Dont remove your bandage until your healthcare provider tells you to. If your bandage gets wet or dirty, check with your healthcare provider. You can likely replace it with a clean, dry one.    What to expect  It is normal to have the following:  Bruising and slight swelling of the foot and  toes  A small amount of blood on the dressing  Call your healthcare provider   Contact your healthcare provider right away if you have any of the following:   Continuous bleeding through the bandage  Excessive swelling, increased bleeding, or redness  Fever over 100.4°F (38°C) or chills  Pain unrelieved by pain medicines  Foot feels cold to the touch or numb  Increased ache in your leg or foot  Chest pain or shortness of breath  Anything unusual that concerns you      ANESTHESIA  -For the first 24 hours after surgery:  Do not drive, use heavy equipment, make important decisions, or drink alcohol  -It is normal to feel sleepy for several hours.  Rest until you are more awake.  -Have someone stay with you, if needed.  They can watch for problems and help keep you safe.  -Some possible post anesthesia side effects include: nausea and vomiting, sore throat and hoarseness, sleepiness, and dizziness.    PAIN  -If you have pain after surgery, pain medicine will help you feel better.  Take it as directed, before pain becomes severe.  Most pain relievers taken by mouth need at least 20-30 minutes to start working.  -Do not drive or drink alcohol while taking pain medicine.  -Pain medication can upset your stomach.  Taking them with a little food may help.  -Other ways to help control pain: elevation, ice, and relaxation  -Call your surgeon if still having unmanageable pain an hour after taking pain medicine.  -Pain medicine can cause constipation.  Taking an over-the counter stool softener while on prescription pain medicine and drinking plenty of fluids can prevent this side effect.  -Call your surgeon if you have severe side effects like: breathing problems, trouble waking up, dizziness, confusion, or severe constipation.    NAUSEA  -Some people have nausea after surgery.  This is often because of anesthesia, pain, pain medicine, or the stress of surgery.  -Do not push yourself to eat.  Start off with clear liquids and  soup.  Slowly move to solid foods.  Don't eat fatty, rich, spicy foods at first.  Eat smaller amounts.  -If you develop persistent nausea and vomiting please notify your surgeon immediately.    BLEEDING  -Different types of surgery require different types of care and dressing changes.  It is important to follow all instructions and advice from your surgeon.  Change dressing as directed.  Call your surgeon for any concerns regarding postop bleeding.    SIGNS OF INFECTION  -Signs of infection include: fever, swelling, drainage, and redness  -Notify your surgeon if you have a fever of 100.4 F (38.0 C) or higher.  -Notify your surgeon if you notice redness, swelling, increased pain, pus, or a foul smell at the incision site.    Notify Dr. Solorzano for any questions or concerns.

## 2022-08-19 NOTE — TRANSFER OF CARE
"Anesthesia Transfer of Care Note    Patient: Starla Fisher    Procedure(s) Performed: Procedure(s) (LRB):  RECESSION, GASTROCNEMIUS, ENDOSCOPIC (Right)  FASCIOTOMY, PLANTAR, ENDOSCOPIC (Right)    Patient location: PACU    Anesthesia Type: general    Transport from OR: Transported from OR on 6-10 L/min O2 by face mask with adequate spontaneous ventilation    Post pain: adequate analgesia    Post assessment: no apparent anesthetic complications    Post vital signs: stable    Level of consciousness: awake and lethargic    Nausea/Vomiting: no nausea/vomiting    Complications: none    Transfer of care protocol was followed      Last vitals:   Visit Vitals  BP (!) 168/82   Pulse 79   Temp 37.1 °C (98.8 °F) (Skin)   Resp 16   Ht 5' 4" (1.626 m)   Wt (!) 141.1 kg (311 lb)   LMP  (LMP Unknown)   SpO2 96%   Breastfeeding No   BMI 53.38 kg/m²     "

## 2022-08-26 ENCOUNTER — TELEPHONE (OUTPATIENT)
Dept: PODIATRY | Facility: CLINIC | Age: 52
End: 2022-08-26

## 2022-08-26 ENCOUNTER — OFFICE VISIT (OUTPATIENT)
Dept: PODIATRY | Facility: CLINIC | Age: 52
End: 2022-08-26
Payer: MEDICARE

## 2022-08-26 VITALS
DIASTOLIC BLOOD PRESSURE: 86 MMHG | HEIGHT: 64 IN | SYSTOLIC BLOOD PRESSURE: 132 MMHG | BODY MASS INDEX: 53.38 KG/M2 | HEART RATE: 81 BPM

## 2022-08-26 DIAGNOSIS — Z98.890 POSTOPERATIVE STATE: Primary | ICD-10-CM

## 2022-08-26 PROCEDURE — 99024 PR POST-OP FOLLOW-UP VISIT: ICD-10-PCS | Mod: S$GLB,,, | Performed by: PODIATRIST

## 2022-08-26 PROCEDURE — 4010F PR ACE/ARB THEARPY RXD/TAKEN: ICD-10-PCS | Mod: CPTII,S$GLB,, | Performed by: PODIATRIST

## 2022-08-26 PROCEDURE — 99999 PR PBB SHADOW E&M-EST. PATIENT-LVL IV: ICD-10-PCS | Mod: PBBFAC,,, | Performed by: PODIATRIST

## 2022-08-26 PROCEDURE — 3008F BODY MASS INDEX DOCD: CPT | Mod: CPTII,S$GLB,, | Performed by: PODIATRIST

## 2022-08-26 PROCEDURE — 3075F SYST BP GE 130 - 139MM HG: CPT | Mod: CPTII,S$GLB,, | Performed by: PODIATRIST

## 2022-08-26 PROCEDURE — 3044F PR MOST RECENT HEMOGLOBIN A1C LEVEL <7.0%: ICD-10-PCS | Mod: CPTII,S$GLB,, | Performed by: PODIATRIST

## 2022-08-26 PROCEDURE — 1160F RVW MEDS BY RX/DR IN RCRD: CPT | Mod: CPTII,S$GLB,, | Performed by: PODIATRIST

## 2022-08-26 PROCEDURE — 1160F PR REVIEW ALL MEDS BY PRESCRIBER/CLIN PHARMACIST DOCUMENTED: ICD-10-PCS | Mod: CPTII,S$GLB,, | Performed by: PODIATRIST

## 2022-08-26 PROCEDURE — 99024 POSTOP FOLLOW-UP VISIT: CPT | Mod: S$GLB,,, | Performed by: PODIATRIST

## 2022-08-26 PROCEDURE — 99999 PR PBB SHADOW E&M-EST. PATIENT-LVL IV: CPT | Mod: PBBFAC,,, | Performed by: PODIATRIST

## 2022-08-26 PROCEDURE — 3079F PR MOST RECENT DIASTOLIC BLOOD PRESSURE 80-89 MM HG: ICD-10-PCS | Mod: CPTII,S$GLB,, | Performed by: PODIATRIST

## 2022-08-26 PROCEDURE — 3008F PR BODY MASS INDEX (BMI) DOCUMENTED: ICD-10-PCS | Mod: CPTII,S$GLB,, | Performed by: PODIATRIST

## 2022-08-26 PROCEDURE — 1159F MED LIST DOCD IN RCRD: CPT | Mod: CPTII,S$GLB,, | Performed by: PODIATRIST

## 2022-08-26 PROCEDURE — 1159F PR MEDICATION LIST DOCUMENTED IN MEDICAL RECORD: ICD-10-PCS | Mod: CPTII,S$GLB,, | Performed by: PODIATRIST

## 2022-08-26 PROCEDURE — 4010F ACE/ARB THERAPY RXD/TAKEN: CPT | Mod: CPTII,S$GLB,, | Performed by: PODIATRIST

## 2022-08-26 PROCEDURE — 3075F PR MOST RECENT SYSTOLIC BLOOD PRESS GE 130-139MM HG: ICD-10-PCS | Mod: CPTII,S$GLB,, | Performed by: PODIATRIST

## 2022-08-26 PROCEDURE — 3044F HG A1C LEVEL LT 7.0%: CPT | Mod: CPTII,S$GLB,, | Performed by: PODIATRIST

## 2022-08-26 PROCEDURE — 3079F DIAST BP 80-89 MM HG: CPT | Mod: CPTII,S$GLB,, | Performed by: PODIATRIST

## 2022-08-26 NOTE — PATIENT INSTRUCTIONS
Encounter Date:06/13/2019      Patient ID: Akshat Winkler is a 63 y.o. male.        Subjective:     Chief Complaint: Follow-up and Congestive Heart Failure     History of Present Illness presents to the office today for ongoing evaluation of his chronic diastolic heart failure.  He has complaints of a 4 pound weight gain in the last 2 days with associated abdominal fullness, pedal edema and mild dyspnea.  He is wearing his compression socks daily and reports that he is using his pneumatic compression socks.  He is trying to follow a low-sodium diet.  He does deny chest pain.  He notes that he was evaluated by nephrology earlier this week and he reports that nephrology was pleased with his readings.  Patient Active Problem List   Diagnosis   • Chronic diastolic heart failure (CMS/HCC)   • Type 2 diabetes mellitus without complication, without long-term current use of insulin (CMS/HCC)   • Hyperlipidemia LDL goal <70   • Essential hypertension   • Coronary artery disease involving native coronary artery of native heart with angina pectoris (CMS/HCC)   • Severe obstructive sleep apnea. On home CPAP   • Class 3 obesity in adult   • Chronic anticoagulation   • Persistent atrial fibrillation/flutter   • Compression fracture of lumbar vertebra (CMS/HCC)   • Spondylosis of lumbar region without myelopathy or radiculopathy   • Lumbar stenosis with neurogenic claudication   • Lumbar disc herniation   • Myofascial pain   • Pathologic compression fracture of vertebra (CMS/HCC)   • Chronic bilateral severe lower extremity edema   • Chronically elevated bilirubin and alkaline phosphatase. Probable chronic liver congestion versus occult cirrhosis   • Mechanical low back pain   • History of kyphoplasty       Past Surgical History:   Procedure Laterality Date   • CARDIAC CATHETERIZATION     • CARDIOVERSION      multiple   • COLONOSCOPY      about 14 years ago   • KYPHOPLASTY N/A 3/5/2018    Procedure: KYPHOPLASTY L4;  Surgeon:  You may remove the orthopedic boot at rest and perform light range of motion exercises 2 to 3 times per day however please continue to sleep with the boot on at night.  You may stand and walk on the foot using the boot as tolerated.  You may shower however avoid soaking the right foot and leg directly into water.  Protect the incisions with a Band-Aid and wear a light compression sock.   Akshat Davidson MD;  Location:  [facility];  Service:    • KYPHOPLASTY N/A 4/16/2018    Procedure: KYPHOPLASTY L1 AND L2;  Surgeon: Akshat Davidson MD;  Location:  [facility];  Service: Neurosurgery   • OTHER SURGICAL HISTORY  06/29/2015    PVA       Allergies   Allergen Reactions   • Bisoprolol Other (See Comments)     Severe Hypotension   • Flecainide Other (See Comments)     Syncope / collapse   • Metoprolol Other (See Comments)      Bradycardia, Severe Hypotension   • Tikosyn [Dofetilide] Other (See Comments)     Wide complex tachycardia         Current Outpatient Medications:   •  apixaban (ELIQUIS) 5 MG tablet tablet, Take 1 tablet by mouth Every 12 (Twelve) Hours for 360 days., Disp: 180 tablet, Rfl: 3  •  bumetanide (BUMEX) 1 MG tablet, 1 po qd, Disp: 30 tablet, Rfl: 5  •  bumetanide (BUMEX) 2 MG tablet, Take 1 tablet by mouth Every Morning. On less busy days at work, Disp: 30 tablet, Rfl: 5  •  metOLazone (ZAROXOLYN) 2.5 MG tablet, Take 1 tablet by mouth Every Other Day. Take 1 tablet with bumex QOD M, W, F (Patient taking differently: Take 2.5 mg by mouth 2 (Two) Times a Week. WEEKENDS), Disp: 20 tablet, Rfl: 3  •  potassium chloride (K-DUR) 10 MEQ CR tablet, Take 3 tablets daily, Disp: 90 tablet, Rfl: 5  •  rosuvastatin (CRESTOR) 20 MG tablet, TAKE ONE TABLET BY MOUTH DAILY, Disp: 90 tablet, Rfl: 2  •  spironolactone (ALDACTONE) 25 MG tablet, Take 1 tablet by mouth Daily., Disp: 30 tablet, Rfl: 6    The following portions of the chart were reviewed today and updated as appropriate: Allergies, current medications, past family history, social history, past medical history.     Review of Systems   Constitution: Positive for malaise/fatigue and weight gain. Negative for chills, decreased appetite, diaphoresis, fever, weakness, night sweats and weight loss.   HENT: Negative for congestion, hearing loss, hoarse voice and nosebleeds.    Eyes: Negative for blurred vision, visual disturbance and visual halos.  "  Cardiovascular: Positive for dyspnea on exertion and leg swelling. Negative for chest pain, claudication, cyanosis, irregular heartbeat, near-syncope, orthopnea, palpitations, paroxysmal nocturnal dyspnea and syncope.   Respiratory: Negative for cough, hemoptysis, shortness of breath, sleep disturbances due to breathing, snoring, sputum production and wheezing.    Hematologic/Lymphatic: Negative for bleeding problem. Does not bruise/bleed easily.   Skin: Negative for dry skin, itching and rash.   Musculoskeletal: Negative for arthritis, falls, joint pain, joint swelling and myalgias.   Gastrointestinal: Positive for bloating. Negative for abdominal pain, constipation, diarrhea, flatus, heartburn, hematemesis, hematochezia, melena, nausea and vomiting.   Genitourinary: Negative for dysuria, frequency, hematuria, nocturia and urgency.   Neurological: Negative for excessive daytime sleepiness, dizziness, headaches, light-headedness and loss of balance.   Psychiatric/Behavioral: Negative for depression. The patient does not have insomnia and is not nervous/anxious.            Objective:     Vitals:    06/13/19 1423   BP: 121/69   BP Location: Left arm   Patient Position: Sitting   Cuff Size: Large Adult   Pulse: 100   Resp: 18   Temp: 97.2 °F (36.2 °C)   TempSrc: Temporal   SpO2: 97%   Weight: 122 kg (269 lb 4 oz)   Height: 175.3 cm (69\")         Physical Exam   Constitutional: He is oriented to person, place, and time. He appears well-developed and well-nourished. He is active and cooperative. No distress.   HENT:   Head: Normocephalic and atraumatic.   Mouth/Throat: Oropharynx is clear and moist.   Eyes: Conjunctivae and EOM are normal. Pupils are equal, round, and reactive to light.   Neck: Normal range of motion. Neck supple. No JVD present. No tracheal deviation present. No thyromegaly present.   Cardiovascular: Normal rate, normal heart sounds and intact distal pulses. An irregularly irregular rhythm present. "   Pulmonary/Chest: Effort normal. He has rales.   Abdominal: Soft. Bowel sounds are normal. He exhibits distension. There is tenderness.   Musculoskeletal: Normal range of motion.   Neurological: He is alert and oriented to person, place, and time.   Skin: Skin is warm, dry and intact.   Psychiatric: He has a normal mood and affect. His behavior is normal.   Nursing note and vitals reviewed.      Lab and Diagnostic Review:   [facility] will request those for review      Assessment and Plan:         1. Acute on chronic diastolic heart failure (CMS/HCC)  IV diuresis today in office. Patient received 2 mg Ndc [id number]  today through a butterfly in the left ac over slow IV push. During IV diuresis, vitals were monitored and stable. Please see IV diuresis record for those vitals. Patient voided 325 ml in the office prior to discharge from the office. butterfly was d/c'd and area was free of erythema, ecchymosis, or drainage.  Patient was  instructed to record urinary output for the next 24 hours. Patient will receive a follow up call from the HF center in 24 hours to evaluate urinary output and reassess signs and symptoms. Heart failure education today including signs and symptoms, the role of the heart failure center, daily weights, low sodium diet (less than 1500 mg per day), and daily physical activity. Reviewed HF Zones with patient and family.  Patient to continue current medications as previously ordered.     2. Persistent atrial fibrillation/flutter  Rate controlled, continue Eliquis    3. Essential hypertension  Under good control  HTN Education provided today including signs and symptoms, medication management, daily blood pressure monitoring. Patient encouraged to call the [facility] center with any abnormal readings.     4. Chronic venous insufficiency  Continue compression socks    It has been a pleasure to participate in the care of this patient.  Patient was  instructed to call the [facility] with any questions, concerns, or worsening symptoms.        * Please note that portions of this note were completed with a voice recognition program. Efforts were made to edit the dictation but occasionally words are transcribed.

## 2022-08-26 NOTE — TELEPHONE ENCOUNTER
----- Message from Radha Sanchez sent at 8/26/2022  2:04 PM CDT -----  Type:  Needs Medical Advice    Who Called: self  Reason:reschedule post op  Would the patient rather a call back or a response via [facility]? nancy  Best Call Back Number: [phone]  Additional Information: none
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
## 2022-08-26 NOTE — TELEPHONE ENCOUNTER
Ms Fisher requesting possible appointment change on 9/9/22. Did inform Ms Fisher that the clinic will be closed for Labor Day on 9/5/22 and the only available days that she will be able to see Dr. Solorzano for suture removal for her post op f/u will be 9/8 Thursday or 9/9 Friday. Upon further discussion, Ms Fisher is fine with keeping her 9/9/22 appt at this time,and per pt has someone who can bring her. With no other needs voiced.

## 2022-09-09 ENCOUNTER — OFFICE VISIT (OUTPATIENT)
Dept: PODIATRY | Facility: CLINIC | Age: 52
End: 2022-09-09
Payer: MEDICARE

## 2022-09-09 VITALS
DIASTOLIC BLOOD PRESSURE: 79 MMHG | SYSTOLIC BLOOD PRESSURE: 133 MMHG | WEIGHT: 293 LBS | HEART RATE: 79 BPM | BODY MASS INDEX: 50.02 KG/M2 | HEIGHT: 64 IN

## 2022-09-09 DIAGNOSIS — M62.81 MUSCLE WEAKNESS OF LOWER EXTREMITY: ICD-10-CM

## 2022-09-09 DIAGNOSIS — M76.60 INSERTIONAL ACHILLES TENDINOPATHY: ICD-10-CM

## 2022-09-09 DIAGNOSIS — Z98.890 POSTOPERATIVE STATE: Primary | ICD-10-CM

## 2022-09-09 PROCEDURE — 4010F PR ACE/ARB THEARPY RXD/TAKEN: ICD-10-PCS | Mod: CPTII,S$GLB,, | Performed by: PODIATRIST

## 2022-09-09 PROCEDURE — 3078F DIAST BP <80 MM HG: CPT | Mod: CPTII,S$GLB,, | Performed by: PODIATRIST

## 2022-09-09 PROCEDURE — 3008F BODY MASS INDEX DOCD: CPT | Mod: CPTII,S$GLB,, | Performed by: PODIATRIST

## 2022-09-09 PROCEDURE — 3044F HG A1C LEVEL LT 7.0%: CPT | Mod: CPTII,S$GLB,, | Performed by: PODIATRIST

## 2022-09-09 PROCEDURE — 4010F ACE/ARB THERAPY RXD/TAKEN: CPT | Mod: CPTII,S$GLB,, | Performed by: PODIATRIST

## 2022-09-09 PROCEDURE — 3044F PR MOST RECENT HEMOGLOBIN A1C LEVEL <7.0%: ICD-10-PCS | Mod: CPTII,S$GLB,, | Performed by: PODIATRIST

## 2022-09-09 PROCEDURE — 99999 PR PBB SHADOW E&M-EST. PATIENT-LVL V: CPT | Mod: PBBFAC,,, | Performed by: PODIATRIST

## 2022-09-09 PROCEDURE — 99999 PR PBB SHADOW E&M-EST. PATIENT-LVL V: ICD-10-PCS | Mod: PBBFAC,,, | Performed by: PODIATRIST

## 2022-09-09 PROCEDURE — 3008F PR BODY MASS INDEX (BMI) DOCUMENTED: ICD-10-PCS | Mod: CPTII,S$GLB,, | Performed by: PODIATRIST

## 2022-09-09 PROCEDURE — 99024 PR POST-OP FOLLOW-UP VISIT: ICD-10-PCS | Mod: S$GLB,,, | Performed by: PODIATRIST

## 2022-09-09 PROCEDURE — 3075F SYST BP GE 130 - 139MM HG: CPT | Mod: CPTII,S$GLB,, | Performed by: PODIATRIST

## 2022-09-09 PROCEDURE — 3075F PR MOST RECENT SYSTOLIC BLOOD PRESS GE 130-139MM HG: ICD-10-PCS | Mod: CPTII,S$GLB,, | Performed by: PODIATRIST

## 2022-09-09 PROCEDURE — 1159F PR MEDICATION LIST DOCUMENTED IN MEDICAL RECORD: ICD-10-PCS | Mod: CPTII,S$GLB,, | Performed by: PODIATRIST

## 2022-09-09 PROCEDURE — 1159F MED LIST DOCD IN RCRD: CPT | Mod: CPTII,S$GLB,, | Performed by: PODIATRIST

## 2022-09-09 PROCEDURE — 99024 POSTOP FOLLOW-UP VISIT: CPT | Mod: S$GLB,,, | Performed by: PODIATRIST

## 2022-09-09 PROCEDURE — 1160F PR REVIEW ALL MEDS BY PRESCRIBER/CLIN PHARMACIST DOCUMENTED: ICD-10-PCS | Mod: CPTII,S$GLB,, | Performed by: PODIATRIST

## 2022-09-09 PROCEDURE — 1160F RVW MEDS BY RX/DR IN RCRD: CPT | Mod: CPTII,S$GLB,, | Performed by: PODIATRIST

## 2022-09-09 PROCEDURE — 3078F PR MOST RECENT DIASTOLIC BLOOD PRESSURE < 80 MM HG: ICD-10-PCS | Mod: CPTII,S$GLB,, | Performed by: PODIATRIST

## 2022-09-09 NOTE — PROGRESS NOTES
"Subjective:      Patient ID: Starla Fisher is a 52 y.o. female.    Chief Complaint: Post-op Evaluation (3 week post op)      Presents today complaining of chronic pain to the bottom of the right heel for more than 3 years duration.  She has moved here from [place] and received a plantar fasciotomy approximately 3 years ago to left heel which helped.  She still has some pain to this area however is mild compared to the right.  Relates the pain is worse when she initially stands in or walks.  The pain will also worsen towards the end of the day after being on her feet.    03/14/2022:  Returns complaining of persistent pain to the bottom of the right heel also acknowledges pain to the back of the heel when she is standing for extended periods of time.   Patient did not follow up after MRI completed in August of 2021. States she was displaced secondary to [place].  Pain aggravated by standing or walking it is somewhat alleviated at rest.  Patient requesting surgical intervention.    08/05/2022:  Returns requesting surgical intervention for chronic pain at the bottom the right heel and overlying the Achilles tendon right leg.  The pain will wax and wane depending on her activity.  Previous surgical intervention scheduled in April 2022 was canceled secondary to SVT.  She has since had surgical intervention per her electrophysiologist Dr. Afshin Storm and has pending appointment with him on 08/08/2022 for medical clearance.    08/26/2022: Post endoscopic gastroc recession and plantar fasciotomy right lower extremity on 08/19/2022. Dressing remained C/D/I and boot has remained on. WB as tolerated. Accompanied by her sister.     09/09/2022: Approximately 3 weeks postop.  Reports no pain.  She is been ambulating with the cam boot.  Accompanied by her sister.    Vitals:    09/09/22 1432   BP: 133/79   Pulse: 79   Weight: (!) 141.1 kg (311 lb)   Height: 5' 4" (1.626 m)   PainSc:   2   PainLoc: Foot      Past Medical " History:   Diagnosis Date    Anxiety     Depression     GERD (gastroesophageal reflux disease)     Hypertension     Migraine headache     Sleep difficulties     Typical atrial flutter 4/20/2022       Past Surgical History:   Procedure Laterality Date    BREAST SURGERY      ENDOMETRIAL ABLATION      ENDOSCOPIC GASTROCNEMIUS RECESSION Right 8/19/2022    Procedure: RECESSION, GASTROCNEMIUS, ENDOSCOPIC;  Surgeon: Zelalem Solorzano DPM;  Location: [facility] OR;  Service: Podiatry;  Laterality: Right;  Arthrex centerline  Stella/Arthrex/CONFIRMED/WD,RN 8/18@1333    ENDOSCOPIC PLANTAR FASCIOTOMY Right 8/19/2022    Procedure: FASCIOTOMY, PLANTAR, ENDOSCOPIC;  Surgeon: Zelalem Solorzano DPM;  Location: [facility] OR;  Service: Podiatry;  Laterality: Right;  Arthrex set    EYE SURGERY      FOOT SURGERY Right 10/2018    bunion    IMPLANTATION OF PERMANENT SACRAL NERVE STIMULATOR N/A 2/15/2022    Procedure: INSERTION, NEUROSTIMULATOR, PERMANENT, SACRAL;  Surgeon: David Brown MD;  Location: [address];  Service: Urology;  Laterality: N/A;    TONSILLECTOMY      TOTAL REDUCTION MAMMOPLASTY Bilateral        Family History   Problem Relation Age of Onset    Atrial fibrillation Mother     Thyroid disease Mother     Dementia Mother     Diabetes Father     Prostate cancer Father     Cancer Paternal Grandfather     Breast cancer Maternal Grandmother     Migraines Sister        Social History     Socioeconomic History    Marital status: Single   Tobacco Use    Smoking status: Never    Smokeless tobacco: Never   Substance and Sexual Activity    Alcohol use: No    Drug use: No    Sexual activity: Not Currently     Partners: Male       Current Outpatient Medications   Medication Sig Dispense Refill    blood-glucose meter (TRUE METRIX GLUCOSE METER) kit Check BS as needed 1 each 0    clotrimazole-betamethasone 1-0.05% (LOTRISONE) cream Apply topically 2 (two) times daily. 15 g 1    diazePAM (VALIUM) 5 MG tablet Take 1 tablet (5 mg  total) by mouth daily as needed for Anxiety. 15 tablet 2    diltiaZEM (CARDIZEM CD) 240 MG 24 hr capsule Take 1 capsule (240 mg total) by mouth once daily. 30 capsule 11    EScitalopram oxalate (LEXAPRO) 20 MG tablet Take 1 tablet (20 mg total) by mouth once daily. 90 tablet 1    Yz-I-bquef-B12-L.acid,plan-inu 65 mg iron- 50 mg-1 mg DFE Cap Take by mouth.      ferrous sulfate (FEOSOL) 325 mg (65 mg iron) Tab tablet Take 325 mg by mouth.      fluconazole (DIFLUCAN) 150 MG Tab Take one tablet today and one in a week 2 tablet 0    galcanezumab-gnlm (EMGALITY PEN) 120 mg/mL PnIj Inject 120 mg into the skin every 28 days. 1 mL 11    HYDROcodone-acetaminophen (NORCO) 5-325 mg per tablet Take 1 tablet by mouth every 4 (four) hours as needed for Pain. 30 tablet 0    lancets (TRUEPLUS LANCETS) 33 gauge Misc Inject 1 lancet into the skin 3 (three) times daily. 200 each 0    losartan (COZAAR) 25 MG tablet Take 1 tablet (25 mg total) by mouth once daily. 90 tablet 3    meloxicam (MOBIC) 15 MG tablet       metronidazole 0.75% (METROCREAM) 0.75 % Crea Apply topically 2 (two) times daily. 45 g 3    mirabegron (MYRBETRIQ) 50 mg Tb24 Take 1 tablet (50 mg total) by mouth once daily. 30 tablet 11    multivit-minerals/folic acid (ADULT MULTIVITAMIN GUMMIES ORAL) Take 1 tablet by mouth Daily. Continue to hold until after Surgery.      multivitamin (THERAGRAN) per tablet Take 1 tablet by mouth.      nystatin (MYCOSTATIN) powder Apply to affected area 3 times daily 60 g 3    pantoprazole (PROTONIX) 40 MG tablet Take 1 tablet (40 mg total) by mouth once daily. 30 tablet 1    phenylephrine (TEODORA-SYNEPHRINE) 10 mg/mL injection       promethazine (PHENERGAN) 25 MG tablet Take 1 tablet (25 mg total) by mouth every 6 (six) hours as needed for Nausea. 30 tablet 0    sumatriptan (IMITREX STATDOSE) 6 mg/0.5 mL kit INJECT 0.5 ML(6 MG TOTAL) UNDER THE SKIN EVERY 2 HOURS AS NEEDED FOR MIGRAINE 3 mL 5    sumatriptan (IMITREX) 100 MG tablet Take 1  tablet (100 mg total) by mouth every 2 (two) hours as needed for Migraine. 9 tablet 11    topiramate (TOPAMAX) 50 MG tablet       traMADoL (ULTRAM) 50 mg tablet Take 1 tablet (50 mg total) by mouth every 6 (six) hours. 5 tablet 0    traZODone (DESYREL) 150 MG tablet Take half or whole tablet by mouth before bed as needed for insomnia 90 tablet 3    walker (ULTRA-LIGHT ROLLATOR) Misc Rolator for stability and balance postoperatively as needed while standing and walking. 1 each 0    cephALEXin (KEFLEX) 500 MG capsule Take 1 capsule (500 mg total) by mouth 4 (four) times daily. (Patient not taking: Reported on 9/9/2022) 28 capsule 0    SUMAtriptan (IMITREX) 20 mg/actuation nasal spray 1 spray (20 mg total) by Nasal route as needed for Migraine (max 40mg/24hr). 6 each 3     Current Facility-Administered Medications   Medication Dose Route Frequency Provider Last Rate Last Admin    sodium chloride 0.9% flush 10 mL  10 mL Intravenous PRN Zelalem Solorzano DPM        sodium chloride 0.9% flush 10 mL  10 mL Intravenous PRN Zelalem Solorzano DPM         Facility-Administered Medications Ordered in Other Visits   Medication Dose Route Frequency Provider Last Rate Last Admin    0.9%  NaCl infusion   Intravenous Continuous Sathish Briseno MD   Stopped at 02/15/22 0948    LIDOcaine (PF) 10 mg/ml (1%) injection 10 mg  1 mL Intradermal Once Sathish Briseno MD           Review of patient's allergies indicates:  No Known Allergies      Review of Systems   Constitutional: Negative for chills, fever and malaise/fatigue.   HENT:  Negative for congestion and hearing loss.    Cardiovascular:  Negative for chest pain, claudication and leg swelling.   Respiratory:  Negative for cough and shortness of breath.    Skin:  Negative for color change, itching and rash.   Musculoskeletal:  Negative for back pain, joint pain, muscle cramps and muscle weakness.   Gastrointestinal:  Negative for nausea and vomiting.   Neurological:  Negative  for numbness, paresthesias and weakness.   Psychiatric/Behavioral:  Negative for altered mental status.          Objective:      Physical Exam  Constitutional:       General: She is not in acute distress.     Appearance: She is obese. She is not ill-appearing.   Cardiovascular:      Pulses:           Dorsalis pedis pulses are 2+ on the right side and 2+ on the left side.        Posterior tibial pulses are 2+ on the right side and 2+ on the left side.   Musculoskeletal:      Comments: Right ankle range of motion improved noting greater than 10° of dorsiflexion with the knee extended.  No pain on palpation overlying the surgical incision site posterior right leg.  No pain on palpation at the Achilles tendon insertion to the posterior right heel.  Slight to mild pain on palpation to the plantar right heel.  Very mild edema to the right lower extremity.       Skin:     General: Skin is warm.      Capillary Refill: Capillary refill takes less than 2 seconds.      Findings: No ecchymosis or erythema.      Nails: There is no clubbing.      Comments: Superficial dehiscence noted at the medial right heel incision with intact sutures.  The lateral right heel and posterior right leg incisions are healed with intact sutures.  No signs infection.   Neurological:      Mental Status: She is alert and oriented to person, place, and time.      Sensory: Sensation is intact.      Motor: Motor function is intact.             Assessment:       Encounter Diagnoses   Name Primary?    Postoperative state Yes    Insertional Achilles tendinopathy     Muscle weakness of lower extremity          Plan:       Starla was seen today for post-op evaluation.    Diagnoses and all orders for this visit:    Postoperative state  -     Ambulatory referral/consult to Physical/Occupational Therapy; Future    Insertional Achilles tendinopathy  -     Ambulatory referral/consult to Physical/Occupational Therapy; Future    Muscle weakness of lower extremity  -      Ambulatory referral/consult to Physical/Occupational Therapy; Future    I counseled the patient on her conditions, their implications and medical management.    Sutures removed.  Mepilex border applied to the medial right heel incision.  Home care instructions reviewed in detail with daily Betadine and Band-Aid.      Patient may remove the boot at rest and keep it off.  She is to use the boot with standing and walking on the right foot.      In 2 weeks she is to begin gradual 30 minute daily increments out of the boot to a tennis shoe.    Referred to physical therapy for gradual strengthening of the right lower extremity.    RTC within 6 weeks or p.r.n. as discussed.    A portion of this note was generated by voice recognition software and may contain spelling and grammar errors.      .

## 2022-09-09 NOTE — PATIENT INSTRUCTIONS
You may remove the boot at rest and only wear with standing and walking on the right foot.    In 2 weeks begin gradual 30 minute daily increments to transition to a tennis shoe.    May participate in low impact exercise such as stationary bike, elliptical and swimming.     Avoid high impact activities such as running, jumping and squatting.

## 2022-09-14 ENCOUNTER — CLINICAL SUPPORT (OUTPATIENT)
Dept: REHABILITATION | Facility: HOSPITAL | Age: 52
End: 2022-09-14
Payer: MEDICARE

## 2022-09-14 DIAGNOSIS — M25.571 ACUTE RIGHT ANKLE PAIN: ICD-10-CM

## 2022-09-14 DIAGNOSIS — R26.89 IMPAIRED GAIT AND MOBILITY: Primary | ICD-10-CM

## 2022-09-14 DIAGNOSIS — M76.60 INSERTIONAL ACHILLES TENDINOPATHY: ICD-10-CM

## 2022-09-14 DIAGNOSIS — M62.81 MUSCLE WEAKNESS OF LOWER EXTREMITY: ICD-10-CM

## 2022-09-14 DIAGNOSIS — G89.18 ACUTE POSTOPERATIVE PAIN OF RIGHT FOOT: ICD-10-CM

## 2022-09-14 DIAGNOSIS — Z98.890 POSTOPERATIVE STATE: ICD-10-CM

## 2022-09-14 DIAGNOSIS — M79.671 ACUTE POSTOPERATIVE PAIN OF RIGHT FOOT: ICD-10-CM

## 2022-09-14 PROCEDURE — 97110 THERAPEUTIC EXERCISES: CPT | Mod: PN

## 2022-09-14 PROCEDURE — 97161 PT EVAL LOW COMPLEX 20 MIN: CPT | Mod: PN

## 2022-09-14 NOTE — PLAN OF CARE
[facility]  Physical Therapy Initial Evaluation    Name: Starla Fisher  Clinic Number: [id number]    Therapy Diagnosis:   Encounter Diagnoses   Name Primary?    Postoperative state     Insertional Achilles tendinopathy     Muscle weakness of lower extremity     Acute postoperative pain of right foot     Impaired gait and mobility Yes    Acute right ankle pain      Physician: Zelalem Solorzano DPM    Physician Orders: PT Eval and Treat  Medical Diagnosis:   Z98.890 (ICD-10-CM) - Postoperative state   M76.60 (ICD-10-CM) - Insertional Achilles tendinopathy   M62.81 (ICD-10-CM) - Muscle weakness of lower extremity     Evaluation Date: 9/14/2022  Authorization Period Expiration: 09/09/2022  Plan of Care Certification Period: 9/14/2022 to 12/09/2022  Visit # / Visits authorized: 1/20  FOTO: 1/5  PTA visits: 0/5    Time In: 1410  Time Out: 1450  Total Billable Time: 40 minutes (1 LCE, 1 TE)  Precautions: post-operative state; bootwalker with WBAT    Procedure: Procedure(s) (LRB):  RECESSION, GASTROCNEMIUS, ENDOSCOPIC (Right)  FASCIOTOMY, PLANTAR, ENDOSCOPIC (Right)     Per visit note on 09/09/2022, 'in 2 weeks she is to begin gradual 30 minute daily increments out of the boot to a tennis shoe.'    Subjective   Starla reports to OPPT following right gastrocnemius recession and plantar fasciotomy by Dr. Solorzano.  Performed endoscopically.  Also cleared per referral to physical therapy for gradual strengthening of the right lower extremity.  Surgery 08/19/2022  Post-op week 4.  Denies any issue other than post-operative soreness.  History of similar procedure with good results 3 years ago on left foot.        Past Medical History:   Diagnosis Date    Anxiety     Depression     GERD (gastroesophageal reflux disease)     Hypertension     Migraine headache     Sleep difficulties     Typical atrial flutter 4/20/2022     Starla Fisher  has a past surgical history that includes Endometrial  ablation; Tonsillectomy; Breast surgery; Eye surgery; Foot surgery (Right, 10/2018); Total Reduction Mammoplasty (Bilateral); Implantation of permanent sacral nerve stimulator (N/A, 2/15/2022); Endoscopic gastrocnemius recession (Right, 8/19/2022); and Endoscopic plantar fasciotomy (Right, 8/19/2022).    Starla has a current medication list which includes the following prescription(s): blood-glucose meter, cephalexin, clotrimazole-betamethasone 1-0.05%, diazepam, diltiazem, escitalopram oxalate, bs-o-glwbc-b12-l.acid,plan-inu, ferrous sulfate, fluconazole, emgality pen, hydrocodone-acetaminophen, lancets, losartan, meloxicam, metronidazole 0.75%, mirabegron, multivit-minerals/folic acid, multivitamin, nystatin, pantoprazole, phenylephrine, promethazine, sumatriptan, sumatriptan, sumatriptan, topiramate, tramadol, trazodone, and ultra-light rollator, and the following Facility-Administered Medications: sodium chloride 0.9%, lidocaine (pf) 10 mg/ml (1%), sodium chloride 0.9%, and sodium chloride 0.9%.    Review of patient's allergies indicates:  No Known Allergies     Imaging: pre-operative imaging available  Prior Therapy: none  Social History: Lives at home with her adult sister.    Occupation: not working.  Prior Level of Function: progressive worsening of right foot pain.  Was having to use a straight cane for walking.   Current Level of Function: ambulating with a tall boot walker and rolling walker.     Pain:  Current 7/10, worst 8/10, best 5/10   Location: right calf and foot  Description: Aching and Tight    Pts goals:  1. To return to walking out of the bootwalker.     Objective   Palpation/Observation:  posterior leg surgical wound closed and non-draining.  Medial and lateral foot wounds dressed with bandaid; no s/s of infection.     Posture: High body mass index; android body type.    Gross Movement Analysis:  - Gait: using rolling walker for gait; bootwalker donned      Range of Motion:   LE Right Left    Hip flexion 100 100   Hip abduction 40 40   Hip ER 40 40   Hip IR  WNL WNL   Hip extension 10 10   Knee 0 - 110 0 - 120   Ankle DF: Knee extended:  12  Knee flexed:        10 Knee extended:  15  Knee flexed:        15   Ankle PF: 40 WNL   Ankle Inversion 30 WNL   Ankle Eversion: 15 WNL   Great toe extension WNL WNL     Lower Extremity Strength                 LE           Right           Left   Hip flexion: 3+/5 3+/5   Hip abduction 3+/5 3+/5   Hip extension deferred 3+/5   Knee flexion 3+/5 4/5   Knee extension 3+/5; anterior pain 4/5   Ankle dorsiflexion: 3+/5 5/5   Ankle eversion 3+/5    Ankle inversion 3/5 4/5   Ankle plantarflexion: deferred 3+/5 NW   Great toe extension: 5/5 5/5   Toe curls 3+/5 5/5     Special Tests: deferred  Sensation: impaired light touch sensation to right toes throughout.       CMS Impairment/Limitation/Restriction for FOTO Survey    Therapist reviewed FOTO scores for Starla Andradenchard on 9/14/2022.   FOTO documents entered into [employer] - see Media section.    Limitation Score: 90%  Predicted Limitation Score: 49 %  LEFS: 2/80         TREATMENT   Treatment Time In: 1435  Treatment Time Out: 1450  Total Treatment time separate from Evaluation time:15    Starla received therapeutic exercises to develop strength, endurance, ROM and flexibility for 10 minutes including:  Seated ankle dorsiflexion/plantarflexion 2x10  LAQs 2x10  Hip marching 20x    Starla received the following manual therapy techniques: 5 minutes, including:  Re-application of new bandaids to medial foot surgical wound and       Home Exercises and Patient Education Provided  Education provided re:  - PT diagnosis and recommended treatment course.   - home exercise program  - bring shoe to next appointment.     Written Home Exercises Provided: yes.  Exercises were reviewed and Starla was able to demonstrate them prior to the end of the session.   Pt received a written copy of exercises to perform at home. Starla demonstrated good   understanding of the education provided.     See EMR under patient instructions for exercises given @ initial evaluation.      Assessment   Starla is a 52 y.o. female referred to outpatient Physical Therapy with a medical diagnosis of 1) Postoperative state, 2) Insertional Achilles tendinopathy, 3) Muscle weakness of lower extremity Pt presents with s/p endoscopic right gastrocnemius recession and plantar fasciotomy procedures.  Surgery date: 08/19/2022.  Post-op week 3. Ambulating with bootwalker and rolling walker support.  Moderate pain. Seems to be no sural nerve impairments.  Good foot and ankle muscle activation.  Set to potentially begin weaning to shoe next week. Issued home exercise program and discussed graded loading program.  Will see in 1 week to begin OPPT post-op course.     Pt prognosis is Good.   Pt will benefit from skilled outpatient Physical Therapy to address the deficits stated above and in the chart below, provide pt/family education, and to maximize pt's level of independence.     Plan of care discussed with patient: Yes  Pt's spiritual, cultural and educational needs considered and patient is agreeable to the plan of care and goals as stated below:     Anticipated Barriers for therapy: co-morbidities.     Medical Necessity is demonstrated by the following  History  Co-morbidities and personal factors that may impact the plan of care Co-morbidities:   BMI over 30, Congestive Heart Failure or Heart Disease, Depression, Headaches,  High Blood Pressure    Personal Factors:        moderate   Examination  Body Structures and Functions, activity limitations and participation restrictions that may impact the plan of care Body Regions:   lower extremities    Body Systems:    ROM  strength  balance  gait  transfers  motor control  edema  scar formation    Participation Restrictions:   See above    Activity limitations:   Learning and applying knowledge  no deficits    General Tasks and Commands  no  deficits    Communication  no deficits    Mobility  lifting and carrying objects  walking  driving (bike, car, motorcycle)    Self care  dressing    Domestic Life  cooking  doing house work (cleaning house, washing dishes, laundry)    Interactions/Relationships  no deficits    Life Areas  no deficits    Community and Social Life  community life         moderate   Clinical Presentation stable and uncomplicated low   Decision Making/ Complexity Score: low     Goals:  Short Term Goals: 3 weeks:  1.  Patient will demonstrate understanding of and compliance with home exercise program.   2.  Patient will demonstrate appropriate tolerance to transition out of the bootwalker 2-3 times daily in 30 minute interval  3.  Patient will report >10 point improvement in LEFS score.     Long Term Goals: 12 weeks  1.  Patient will demonstrate ability to complete 6MWT with appropriate assistive device for tolerance to community ambulation distance.   2.  Patient will report full weaning to tennis shoe.   3.  Patient will report <49% limitation on FOTO survey.      Plan   Certification Period/Plan of care expiration: 9/14/2022 to 12/09/2022.    Outpatient Physical Therapy 1 times weekly for 12 weeks to include the following interventions: Gait Training, Manual Therapy, Moist Heat/ Ice, Neuromuscular Re-ed, Orthotic Management and Training, Patient Education, Self Care, Therapeutic Activities, and Therapeutic Exercise.     Tristin Moreira, PT, DPT, OCS

## 2022-09-15 PROBLEM — M79.671 ACUTE POSTOPERATIVE PAIN OF RIGHT FOOT: Status: ACTIVE | Noted: 2022-09-15

## 2022-09-15 PROBLEM — G89.18 ACUTE POSTOPERATIVE PAIN OF RIGHT FOOT: Status: ACTIVE | Noted: 2022-09-15

## 2022-09-18 NOTE — PROGRESS NOTES
"[facility]   Physical Therapy Treatment Note     Name: Starla Fisher  Clinic Number: [id number]    Therapy Diagnosis: No diagnosis found.  Physician: Zelalem Solorzano DPM    Visit Date: 9/19/2022    Physician Orders: PT Eval and Treat  Medical Diagnosis:   Z98.890 (ICD-10-CM) - Postoperative state   M76.60 (ICD-10-CM) - Insertional Achilles tendinopathy   M62.81 (ICD-10-CM) - Muscle weakness of lower extremity     Evaluation Date: 9/14/2022  Authorization Period Expiration: 09/09/2022  Plan of Care Certification Period: 9/14/2022 to 12/09/2022  Visit # / Visits authorized: 2/20  FOTO: 2/5  PTA visits: 0/5     Time In: ***  Time Out: ***  Total Billable Time: *** minutes (***)  Precautions: post-operative state; bootwalker with WBAT     Procedure: Procedure(s) (LRB):  RECESSION, GASTROCNEMIUS, ENDOSCOPIC (Right)  FASCIOTOMY, PLANTAR, ENDOSCOPIC (Right)      Per visit note on 09/09/2022, 'in 2 weeks she is to begin gradual 30 minute daily increments out of the boot to a tennis shoe.'    SUBJECTIVE     Pt reports: ***.  She {Actions; was/was not:33365} compliant with home exercise program.  Response to previous treatment: ***  Functional change: ***    Pain: {0-10:92791::"0"}/10  Location: {RIGHT/LEFT/BILATERAL:43377} {LOCATION ON BODY:51699}     OBJECTIVE     Objective Measures updated at progress report unless specified.     Treatment     Starla received the treatments listed below:      Starla received therapeutic exercises to develop strength, endurance, ROM and flexibility for *** minutes including:  Seated ankle dorsiflexion/plantarflexion 2x10  LAQs 2x10  Hip marching 20x     Starla received the following manual therapy techniques: *** minutes, including:      neuromuscular re-education activities to improve: {AMB PT PROGRESS NEURO RE-ED:34280} for *** minutes. The following activities were included:  ***    therapeutic activities to improve functional performance for ***  minutes, " "including:  ***    gait training to improve functional mobility and safety for ***  minutes, including:  ***        Patient Education and Home Exercises     Home Exercises Provided and Patient Education Provided   Education provided:   - ***    Written Home Exercises Provided: {Blank single:92580::"yes","Patient instructed to cont prior HEP"}. Exercises were reviewed and Starla was able to demonstrate them prior to the end of the session.  Starla demonstrated {Desc; good/fair/poor:22397} understanding of the education provided. See EMR under Patient Instructions for exercises provided during therapy sessions    ASSESSMENT   Starla is a 52 y.o. female referred to outpatient Physical Therapy with a medical diagnosis of 1) Postoperative state, 2) Insertional Achilles tendinopathy, 3) Muscle weakness of lower extremity Pt presents with s/p endoscopic right gastrocnemius recession and plantar fasciotomy procedures.  Surgery date: 08/19/2022.  Post-op week 4 (+ 2 days) (POD#30). Ambulating with bootwalker and rolling walker support upon arrival. Pain/irritability: ***Moderate pain. Seems to be no sural nerve impairments.  Good foot and ankle muscle activation.  Set to potentially begin weaning to shoe next week. Issued home exercise program and discussed graded loading program.  Will see in 1 week to begin OPPT post-op course.     Starla Is progressing well towards her goals.   Pt prognosis is Good.     Pt will continue to benefit from skilled outpatient physical therapy to address the deficits listed in the problem list box on initial evaluation, provide pt/family education and to maximize pt's level of independence in the home and community environment.   Pt's spiritual, cultural and educational needs considered and pt agreeable to plan of care and goals.     Anticipated barriers to physical therapy: co-morbidities.     Goals:   Short Term Goals: 3 weeks:  1.  Patient will demonstrate understanding of and compliance with " home exercise program. progressing towards; not met  2.  Patient will demonstrate appropriate tolerance to transition out of the bootwalker 2-3 times daily in 30 minute interval. progressing towards; not met  3.  Patient will report >10 point improvement in LEFS score.  progressing towards; not met     Long Term Goals: 12 weeks  1.  Patient will demonstrate ability to complete 6MWT with appropriate assistive device for tolerance to community ambulation distance.  progressing towards; not met  2.  Patient will report full weaning to tennis shoe. progressing towards; not met  3.  Patient will report <49% limitation on FOTO survey.  progressing towards; not met        PLAN   Slowly wean into tennis shoes  Ankle/foot strength --> gradual incorporation of standing activities from week-to-week    Tristin Moreira, PT, DPT, OCS

## 2022-09-19 ENCOUNTER — CLINICAL SUPPORT (OUTPATIENT)
Dept: REHABILITATION | Facility: HOSPITAL | Age: 52
End: 2022-09-19
Payer: MEDICARE

## 2022-09-19 ENCOUNTER — TELEPHONE (OUTPATIENT)
Dept: PODIATRY | Facility: CLINIC | Age: 52
End: 2022-09-19
Payer: MEDICARE

## 2022-09-19 DIAGNOSIS — M79.671 ACUTE POSTOPERATIVE PAIN OF RIGHT FOOT: Primary | ICD-10-CM

## 2022-09-19 DIAGNOSIS — G89.18 ACUTE POSTOPERATIVE PAIN OF RIGHT FOOT: Primary | ICD-10-CM

## 2022-09-19 PROCEDURE — 97110 THERAPEUTIC EXERCISES: CPT | Mod: PN,CQ

## 2022-09-19 NOTE — TELEPHONE ENCOUNTER
Called pt back and she said the strap on her walking boot broke. Told her I will call her back in the morning after I talked to Carol/CANDIDA and see if she may has an extra strap we can give to her. She was o.k. with that

## 2022-09-19 NOTE — PROGRESS NOTES
"[facility]   Physical Therapy Treatment Note     Name: Starla Fisher  Clinic Number: [id number]    Therapy Diagnosis:   Encounter Diagnosis   Name Primary?    Acute postoperative pain of right foot Yes     Physician: Zelalem Solorzano DPM    Visit Date: 9/19/2022    Physician Orders: PT Eval and Treat  Medical Diagnosis:   Z98.890 (ICD-10-CM) - Postoperative state   M76.60 (ICD-10-CM) - Insertional Achilles tendinopathy   M62.81 (ICD-10-CM) - Muscle weakness of lower extremity      Evaluation Date: 9/14/2022  Authorization Period Expiration: 09/09/2022  Plan of Care Certification Period: 9/14/2022 to 12/09/2022  Visit # / Visits authorized: 1/20  FOTO: 2/5  PTA Visit #: 1/5     Time In: 10:00  Time Out: 10:55  Total Billable Time: 55 minutes (4TE)    Precautions: post-operative state; bootwalker with WBAT     Procedure: Procedure(s) (LRB):  RECESSION, GASTROCNEMIUS, ENDOSCOPIC (Right)  FASCIOTOMY, PLANTAR, ENDOSCOPIC (Right)      Per visit note on 09/09/2022, 'in 2 weeks she is to begin gradual 30 minute daily increments out of the boot to a tennis shoe.'    SUBJECTIVE     Pt reports: Presents with 6/10 pain. Soreness with most movement.  She was compliant with home exercise program.  Response to previous treatment: Eval only  Functional change: Ongoing    Pain: 6/10  Location: right calf and foot      OBJECTIVE     Objective Measures updated at progress report unless specified.     Treatment     Starla received the treatments listed below:      therapeutic exercises to develop strength, endurance, ROM, flexibility, and posture for 55 minutes including:  Seated ankle dorsiflexion/plantarflexion 2x10  LAQs 2x10  Hip marching 20x  Seated hip adduction with yellow ball 5" 2 x 10  Seated hip abduction with yellow band 2 x 10  Gentle gastroc and soleus stretch with towel 3 x 30" - cues to perform gently  SLR 2 x 10  SL hip abduction 2 x 10      Patient Education and Home Exercises     Home " Exercises Provided and Patient Education Provided     Education provided:   - PT diagnosis and recommended treatment course.   - home exercise program  - bring shoe to next appointment.     Written Home Exercises Provided: Patient instructed to cont prior HEP. Exercises were reviewed and Starla was able to demonstrate them prior to the end of the session.  Starla demonstrated good  understanding of the education provided. See EMR under Patient Instructions for exercises provided during therapy sessions    ASSESSMENT     Starla is a 52 y.o. female referred to outpatient Physical Therapy with a medical diagnosis of 1) Postoperative state, 2) Insertional Achilles tendinopathy, 3) Muscle weakness of lower extremity Pt presents with s/p endoscopic right gastrocnemius recession and plantar fasciotomy procedures.  Surgery date: 08/19/2022.  Post-op week 5 (+ 3 days) . Ambulating with bootwalker and rolling walker support upon arrival. Pain/irritability: Moderate pain. Seems to be no sural nerve impairments. Good foot and ankle muscle activation. Requires heavy cuing throughout session for proper completion of therex. Set to potentially begin weaning to shoe next week.     Starla Is progressing well towards her goals.   Pt prognosis is Good.     Pt will continue to benefit from skilled outpatient physical therapy to address the deficits listed in the problem list box on initial evaluation, provide pt/family education and to maximize pt's level of independence in the home and community environment.     Pt's spiritual, cultural and educational needs considered and pt agreeable to plan of care and goals.     Anticipated barriers to physical therapy: co- morbidities    Goals:   Short Term Goals: 3 weeks:  1.  Patient will demonstrate understanding of and compliance with home exercise program. progressing towards; not met  2.  Patient will demonstrate appropriate tolerance to transition out of the bootwalker 2-3 times daily in 30  minute interval. progressing towards; not met  3.  Patient will report >10 point improvement in LEFS score.  progressing towards; not met     Long Term Goals: 12 weeks  1.  Patient will demonstrate ability to complete 6MWT with appropriate assistive device for tolerance to community ambulation distance.  progressing towards; not met  2.  Patient will report full weaning to tennis shoe. progressing towards; not met  3.  Patient will report <49% limitation on FOTO survey.  progressing towards; not met     PLAN   Plan of Care Certification Period: 9/14/2022 to 12/09/2022    Slowly wean into tennis shoes  Ankle/foot strength --> gradual incorporation of standing activities from week-to-week    Laura Dozier, PTA

## 2022-09-19 NOTE — TELEPHONE ENCOUNTER
----- Message from Shaylee Fajardo sent at 9/19/2022  4:11 PM CDT -----  Type:  Needs Medical Advice    Who Called:  pt  Symptoms (please be specific):  boot straquique popped   How long has patient had these symptoms:   today    Would the patient rather a call back or a response via [facility]?  call  Best Call Back Number:  [phone]  Additional Information:

## 2022-09-20 ENCOUNTER — PATIENT MESSAGE (OUTPATIENT)
Dept: PODIATRY | Facility: CLINIC | Age: 52
End: 2022-09-20
Payer: MEDICARE

## 2022-09-21 ENCOUNTER — TELEPHONE (OUTPATIENT)
Dept: PODIATRY | Facility: CLINIC | Age: 52
End: 2022-09-21
Payer: MEDICARE

## 2022-09-21 NOTE — TELEPHONE ENCOUNTER
----- Message from Radha Sanchez sent at 9/21/2022  2:08 PM CDT -----  Type:  Needs Medical Advice    Who Called:self  Reason:issues with her walking boot  Would the patient rather a call back or a response via [facility]? call  Best Call Back Number: [phone]  Additional Information: none

## 2022-09-21 NOTE — TELEPHONE ENCOUNTER
Called pt back, long conversation about her walking boot, ask her to find out when she is able to come in and let us look at the boot and let Brandy/CANDIDA look at it as well. Ask her to send us a message when she find somebody to bring her. She understood and will let us know

## 2022-09-22 ENCOUNTER — PATIENT MESSAGE (OUTPATIENT)
Dept: PODIATRY | Facility: CLINIC | Age: 52
End: 2022-09-22
Payer: MEDICARE

## 2022-09-22 ENCOUNTER — TELEPHONE (OUTPATIENT)
Dept: PODIATRY | Facility: CLINIC | Age: 52
End: 2022-09-22
Payer: MEDICARE

## 2022-09-22 NOTE — TELEPHONE ENCOUNTER
Called pt 2nd time, no answer, unable to leave a message. Wrote her 2nd message on [employer] to let her know that I tried to reach her. Ask her to call back and leave us a detailed message how we can assist/help her

## 2022-09-22 NOTE — TELEPHONE ENCOUNTER
----- Message from [facility] sent at 9/22/2022  3:01 PM CDT -----  Type:  Needs Medical Advice    Who Called: Pt   Would the patient rather a call back or a response via [facility]? Callback   Best Call Back Number: [phone]  Additional Information:pt requesting callback from office to discuss scheduling appt for tomorrow due to broken boot

## 2022-09-22 NOTE — TELEPHONE ENCOUNTER
Spoke with Ms Fisher who will be able to come to the Podiatry clinic in [address] tomorrow to have her walker boot assessed (strap broken). It was agreed upon that come tomorrow morning prior to 11 am. No other needs voiced at this time. Encouraged to call if further assistance is needed

## 2022-09-22 NOTE — TELEPHONE ENCOUNTER
----- Message from Kati Garcia sent at 9/22/2022  2:27 PM CDT -----  Type:  Sooner Apoointment Request    Caller is requesting a sooner appointment.  Caller declined first available appointment listed below.  Caller will not accept being placed on the waitlist and is requesting a message be sent to doctor.  Name of Caller:pt  When is the first available appointment? 11/10  Symptoms:Pt states strap on boot is broken and boot is loose. Pt feels she may fall with boot this way  Would the patient rather a call back or a response via [facility]? Call  Best Call Back Number: [phone]  Additional Information: Pt wants to discuss an earlier appt and possibly another boot to correct issue.   Pt states boot is moving up and down. When walking boot is not steady. Pt would like to be seen tomorrow am as early as possible. Pt has transportation that can bring her early in the morning.

## 2022-09-22 NOTE — TELEPHONE ENCOUNTER
Called pt back, no answer, unable to leave a message voice mail box is full. Wrote patient a message that I tried to call her and will try it again later

## 2022-09-22 NOTE — TELEPHONE ENCOUNTER
Attempted to reach out to Ms Fisher to arrange for her to come to clinic to have a new boot dispensed. Voicemail box is full and I cannot leave a message. Message sent to pt through pt portal to inquire how we can assist.

## 2022-09-22 NOTE — TELEPHONE ENCOUNTER
----- Message from Radha Sanchez sent at 9/22/2022  9:35 AM CDT -----  Type:  Needs Medical Advice    Who Called: self  Reason:returning nancy  Would the patient rather a call back or a response via [employer]? call  Best Call Back Number: [phone]  Additional Information: none

## 2022-09-22 NOTE — TELEPHONE ENCOUNTER
----- Message from Radha Sanchez sent at 9/22/2022  3:12 PM CDT -----  Type:  Needs Medical Advice    Who Called: self  Reason:returning call  Would the patient rather a [phone] back or a response via [facility]? call  Best Call Back Number:   Additional Information: none

## 2022-09-26 ENCOUNTER — CLINICAL SUPPORT (OUTPATIENT)
Dept: REHABILITATION | Facility: HOSPITAL | Age: 52
End: 2022-09-26
Payer: MEDICARE

## 2022-09-26 DIAGNOSIS — G89.18 ACUTE POSTOPERATIVE PAIN OF RIGHT FOOT: Primary | ICD-10-CM

## 2022-09-26 DIAGNOSIS — M79.671 ACUTE POSTOPERATIVE PAIN OF RIGHT FOOT: Primary | ICD-10-CM

## 2022-09-26 PROCEDURE — 97110 THERAPEUTIC EXERCISES: CPT | Mod: PN

## 2022-09-26 NOTE — PROGRESS NOTES
"[facility]   Physical Therapy Treatment Note     Name: Starla Fisher  Clinic Number: [id number]    Therapy Diagnosis:   Encounter Diagnosis   Name Primary?    Acute postoperative pain of right foot Yes     Physician: Zelalem Solorzano DPM    Visit Date: 9/26/2022    Physician Orders: PT Eval and Treat  Medical Diagnosis:   Z98.890 (ICD-10-CM) - Postoperative state   M76.60 (ICD-10-CM) - Insertional Achilles tendinopathy   M62.81 (ICD-10-CM) - Muscle weakness of lower extremity      Evaluation Date: 9/14/2022  Authorization Period Expiration: 09/09/2022  Plan of Care Certification Period: 9/14/2022 to 12/09/2022  Visit # / Visits authorized: 2/20 (+ eval)  FOTO: 3/5  PTA Visit #: 0/5     Time In: 1100  Time Out: 1145  Total Billable Time: 30 minutes (2 TE)  Precautions: post-operative state; bootwalker with WBAT     Procedure: Procedure(s) (LRB):  RECESSION, GASTROCNEMIUS, ENDOSCOPIC (Right)  FASCIOTOMY, PLANTAR, ENDOSCOPIC (Right)      Per visit note on 09/09/2022, 'in 2 weeks she is to begin gradual 30 minute daily increments out of the boot to a tennis shoe.'    SUBJECTIVE     Pt reports: improving pain and wound healing.  Got a new bootwalker because her old bootwalker was broken.  Forgot her tennis shoe today.  Still using rolling walker.   She was compliant with home exercise program.  Response to previous treatment: no adverse effects.   Functional change: Ongoing    Pain: 4-5/10  Location: right calf and foot      OBJECTIVE     Objective Measures updated at progress report unless specified.     Treatment     Starla received the treatments listed below:      therapeutic exercises to develop strength, endurance, ROM, flexibility, and posture for 30 minutes with PT 1:1, including:  Seated ankle dorsiflexion/plantarflexion 2x20  LAQs 2x10  Seated hip adduction with yellow ball 5" 2 x 10  Seated hip abduction with yellow band 2 x 10  Gentle gastroc and soleus stretch with towel 5 x " "30" - cues to perform gently  SLR 2 x 10  SL hip abduction 2 x 10  Nu-step Lv2 x 8'      Patient Education and Home Exercises     Home Exercises Provided and Patient Education Provided   Education provided:   - will begin shoe weaning 30 minutes per day at home.   - Needs to bring shoe next visit.     Written Home Exercises Provided: Patient instructed to cont prior HEP. Exercises were reviewed and Starla was able to demonstrate them prior to the end of the session.  Starla demonstrated good  understanding of the education provided. See EMR under Patient Instructions for exercises provided during therapy sessions    ASSESSMENT     Starla is a 52 y.o. female referred to outpatient Physical Therapy with a medical diagnosis of 1) Postoperative state, 2) Insertional Achilles tendinopathy, 3) Muscle weakness of lower extremity Pt presents with s/p endoscopic right gastrocnemius recession and plantar fasciotomy procedures.  Surgery date: 08/19/2022.  Post-op week 5 (+ 3 days) . Ambulating with bootwalker and rolling walker support upon arrival. Pain/irritability: low-to-moderate. Seems to be no sural nerve impairments. Good foot and ankle muscle activation. Requires heavy cuing throughout session for proper completion of therex. Were set to formally begin weaning to shoe but patient did not bring her shoe.  Okay to begin 30 minutes per day at home but no more at this time.      Starla Is progressing well towards her goals.   Pt prognosis is Good.     Pt will continue to benefit from skilled outpatient physical therapy to address the deficits listed in the problem list box on initial evaluation, provide pt/family education and to maximize pt's level of independence in the home and community environment.     Pt's spiritual, cultural and educational needs considered and pt agreeable to plan of care and goals.     Anticipated barriers to physical therapy: co- morbidities    Goals:   Short Term Goals: 3 weeks:  1.  Patient will " demonstrate understanding of and compliance with home exercise program. progressing towards; not met  2.  Patient will demonstrate appropriate tolerance to transition out of the bootwalker 2-3 times daily in 30 minute interval. progressing towards; not met  3.  Patient will report >10 point improvement in LEFS score.  progressing towards; not met     Long Term Goals: 12 weeks  1.  Patient will demonstrate ability to complete 6MWT with appropriate assistive device for tolerance to community ambulation distance.  progressing towards; not met  2.  Patient will report full weaning to tennis shoe. progressing towards; not met  3.  Patient will report <49% limitation on FOTO survey.  progressing towards; not met     PLAN     Slowly wean into tennis shoes  Ankle/foot strength --> gradual incorporation of standing activities from week-to-week    Tristin Moreira, PT, DPT, OCS

## 2022-09-29 ENCOUNTER — PATIENT OUTREACH (OUTPATIENT)
Dept: ADMINISTRATIVE | Facility: HOSPITAL | Age: 52
End: 2022-09-29
Payer: MEDICARE

## 2022-09-29 ENCOUNTER — TELEPHONE (OUTPATIENT)
Dept: NEUROLOGY | Facility: CLINIC | Age: 52
End: 2022-09-29
Payer: MEDICARE

## 2022-10-04 ENCOUNTER — TELEPHONE (OUTPATIENT)
Dept: FAMILY MEDICINE | Facility: CLINIC | Age: 52
End: 2022-10-04
Payer: MEDICARE

## 2022-10-04 DIAGNOSIS — K21.9 GASTROESOPHAGEAL REFLUX DISEASE, UNSPECIFIED WHETHER ESOPHAGITIS PRESENT: Primary | ICD-10-CM

## 2022-10-04 RX ORDER — PANTOPRAZOLE SODIUM 40 MG/1
40 TABLET, DELAYED RELEASE ORAL DAILY
Qty: 30 TABLET | Refills: 1 | Status: SHIPPED | OUTPATIENT
Start: 2022-10-04 | End: 2022-10-19

## 2022-10-04 NOTE — TELEPHONE ENCOUNTER
----- Message from Bryan Quinn sent at 10/3/2022 11:44 AM CDT -----  Contact: pt  Type: Requesting to speak with nurse        Who Called: PT  Regarding: concerns with her acid reflux. Would  like a call back from nurse or ma as soon as possible   Would the patient rather a call back or a response via [facility]? Call back  Best Call Back Number: [phone]  Additional Information:

## 2022-10-04 NOTE — TELEPHONE ENCOUNTER
Response to cancel request received from pharmacy.  Received: Today  Interface, Surescripts In  P Demetri Haro Staff  The pharmacy accepted the electronic cancel request and canceled the prescription. Additional follow up tasks may be necessary based on the pharmacy response noted below.     Pharmacy Note: Some or all of Rx dispensed; Last fill:09/09/22           Pharmacy    [id number] - [place] - [address] AT [facility]   [address] [address]   Phone: [phone] Fax: [phone]   Hours: Not open 24 hours       Outpatient Medication Detail     Disp Refills Start End    pantoprazole (PROTONIX) 40 MG tablet (Discontinued) 30 tablet 1 8/8/2022 10/4/2022    Sig - Route: Take 1 tablet (40 mg total) by mouth once daily. - Oral    Sent to pharmacy as: pantoprazole (PROTONIX) 40 MG tablet    Reason for Discontinue: Reorder    E-Prescribing Status: Receipt confirmed by pharmacy (8/8/2022  9:56 AM CDT)    E-Cancel Status: Request approved by pharmacy (10/4/2022  4:09 PM CDT)       E-Cancel Status Note: Some or all of Rx dispensed; Last fill:09/09/22        Order Providers    Prescribing Provider Encounter Provider   MD Tahir Narvaez MD

## 2022-10-07 ENCOUNTER — DOCUMENTATION ONLY (OUTPATIENT)
Dept: REHABILITATION | Facility: HOSPITAL | Age: 52
End: 2022-10-07

## 2022-10-07 NOTE — PROGRESS NOTES
Patient had canceled today's appointment because transportation had canceled her appointment.  Right after that they picked her up and brought her to the clinic for her original appointment time of 2:00, however that time slot had been filled.  The next available slot was for 3:00 so the patient waited.  At 3:10 the transportation service arrived and the , in a very unfriendly tone, told the PAR that she was not going to come back here later to pick the patient up.  The patient and I understood the situation so she left.  Today's appointment was canceled.

## 2022-10-10 ENCOUNTER — TELEPHONE (OUTPATIENT)
Dept: NEUROLOGY | Facility: CLINIC | Age: 52
End: 2022-10-10
Payer: MEDICARE

## 2022-10-10 NOTE — TELEPHONE ENCOUNTER
Her botox procedure is rescheduled for 10/21/22 at 2:40pm with Dr. Poe.    ----- Message from Marina Brizuela sent at 10/10/2022 10:32 AM CDT -----  Type: Patient Returning Call      Who Called: Patient   Who Left Message for Patient: NA    Does the patient know what this is regarding?: Needs to reschedule botox appointment. She asks if it is possible to be seen on 10/21/2022 sometime after 2 PM.     Would the patient rather a call back or a response via [facility]? Call  Best Call Back Number: [phone]  Additional Information:  Please assist, thank you!

## 2022-10-10 NOTE — TELEPHONE ENCOUNTER
Starla cancelled her botox procedure scheduled on Thursday, 10/13/22 at 2:40pm with Dr. Poe because she doesn't have a ride. She will find a ride and call back to reschedule her procedure as soon as she can.

## 2022-10-11 ENCOUNTER — TELEPHONE (OUTPATIENT)
Dept: GASTROENTEROLOGY | Facility: CLINIC | Age: 52
End: 2022-10-11
Payer: MEDICARE

## 2022-10-11 ENCOUNTER — TELEPHONE (OUTPATIENT)
Dept: SLEEP MEDICINE | Facility: CLINIC | Age: 52
End: 2022-10-11
Payer: MEDICARE

## 2022-10-11 NOTE — TELEPHONE ENCOUNTER
Patient has c/o Gerd with anything she eats. Appt scheduled with Yuly Roberts on 10/19/22 at 10:30am.

## 2022-10-11 NOTE — TELEPHONE ENCOUNTER
----- Message from Danyelle Torres sent at 10/11/2022  2:23 PM CDT -----  Type:  Needs Medical Advice    Who Called: pt   Symptoms (please be specific): acid reflux  Throat burns  Trouble swallowing  Heart burn    How long has patient had these symptoms:  for a while  Would the patient rather a call back or a response via [facility]? Call back   Best Call Back Number:  [phone]  Additional Information: Pt stated she was suppose to receive a call awhile back put no one has called  Pt stated she can't wait it's getting worse each day  Books were closed

## 2022-10-11 NOTE — TELEPHONE ENCOUNTER
Await setup/scheduling. DME tried several times in June and she had appt actually in Aug fo rsetup but not sure what happened, she did have surgery in Aug. Anyway DME will add her back to schedule list

## 2022-10-12 ENCOUNTER — CLINICAL SUPPORT (OUTPATIENT)
Dept: REHABILITATION | Facility: HOSPITAL | Age: 52
End: 2022-10-12
Payer: MEDICARE

## 2022-10-12 DIAGNOSIS — M79.671 ACUTE POSTOPERATIVE PAIN OF RIGHT FOOT: Primary | ICD-10-CM

## 2022-10-12 DIAGNOSIS — G89.18 ACUTE POSTOPERATIVE PAIN OF RIGHT FOOT: Primary | ICD-10-CM

## 2022-10-12 PROCEDURE — 97116 GAIT TRAINING THERAPY: CPT | Mod: PN

## 2022-10-12 PROCEDURE — 97140 MANUAL THERAPY 1/> REGIONS: CPT | Mod: PN

## 2022-10-12 PROCEDURE — 97110 THERAPEUTIC EXERCISES: CPT | Mod: PN

## 2022-10-12 NOTE — PROGRESS NOTES
[facility]   Physical Therapy Treatment Note     Name: Starla Fisher  Clinic Number: [id number]    Therapy Diagnosis:   Encounter Diagnosis   Name Primary?    Acute postoperative pain of right foot Yes     Physician: Zelalem Solorzano DPM    Visit Date: 10/12/2022    Physician Orders: PT Eval and Treat  Medical Diagnosis:   Z98.890 (ICD-10-CM) - Postoperative state   M76.60 (ICD-10-CM) - Insertional Achilles tendinopathy   M62.81 (ICD-10-CM) - Muscle weakness of lower extremity      Evaluation Date: 9/14/2022  Authorization Period Expiration: 09/09/2022  Plan of Care Certification Period: 9/14/2022 to 12/09/2022  Visit # / Visits authorized: 3/20 (+ eval)  FOTO: 4/5  PTA Visit #: 0/5     Time In: 1200  Time Out: 1255  Total Billable Time: 55 minutes (1 MT, 2 TE,1 GT)  Precautions: post-operative state; bootwalker with WBAT     Procedure: Procedure(s) (LRB):  RECESSION, GASTROCNEMIUS, ENDOSCOPIC (Right)  FASCIOTOMY, PLANTAR, ENDOSCOPIC (Right)      Per visit note on 09/09/2022, 'in 2 weeks she is to begin gradual 30 minute daily increments out of the boot to a tennis shoe.'    SUBJECTIVE     Pt reports: foot/calf feeling well.  No increased pain with wearing tennis shoe at home.  Wearing in 30' intervals twice daily.  Performing standing household activities while in the tennis shoe such as laundry.  Continues to use rolling walker.   She was compliant with home exercise program.  Response to previous treatment: no adverse effects.   Functional change: Ongoing    Pain: 4-5/10  Location: right calf and foot      OBJECTIVE     Objective Measures updated at progress report unless specified.     Treatment     Starla received the treatments listed below:      Manual therapy: total time 10 minutes, including:  --grade III subtalar joint mobs  --grade III talocrural joint mobs  -- ankle passive physiological dorsiflexion, plantarflexion, inversion/eversion    Therapeutic exercises to develop  "strength, endurance, ROM, flexibility, and posture for 25 minutes, including:  Gentle gastroc and soleus stretch with towel 5 x 30" - cues to perform gently  Seated ankle dorsiflexion/plantarflexion 2x20  (+) Long-sitting ankle dorsiflexion/plantarflexion green TB 3x10/each  (+) sit-to-stand 3x5 (21" surface)    Gait training: total time 20 minutes, including:  (+) rolling walker ambulation 2 loops x 2 rounds (~2.5-3.0' each round) (sitting break x 3' in between bouts)  (+) standing lateral, forward/backward weight shifts // bars 2 rounds/each x 10 reps  - Education on weaning from bootwalker      Patient Education and Home Exercises     Home Exercises Provided and Patient Education Provided   Education provided:   - begin shoe weaning 60 minutes twice per day at home.     Written Home Exercises Provided: Patient instructed to cont prior HEP. Exercises were reviewed and Starla was able to demonstrate them prior to the end of the session.  Starla demonstrated good  understanding of the education provided. See EMR under Patient Instructions for exercises provided during therapy sessions    ASSESSMENT     Starla is a 52 y.o. female referred to outpatient Physical Therapy with a medical diagnosis of 1) Postoperative state, 2) Insertional Achilles tendinopathy, 3) Muscle weakness of lower extremity Pt presents with s/p endoscopic right gastrocnemius recession and plantar fasciotomy procedures.  Surgery date: 08/19/2022.  Post-op 7 weeks (+ 5 days) . Ambulating with bootwalker and rolling walker support upon arrival. Wearing shoe 30' twice daily at home without issue. Pain/irritability: low. Surgical wounds healed and closed. No sural nerve impairments.  Noted right posterior leg muscle atrophy compared to the left. Good foot and ankle muscle activation. Introduced standing balance activities with upper extremity support.  Introduced continuous walking program; tolerated without increased pain.     Starla Is progressing well " towards her goals.   Pt prognosis is Good.     Pt will continue to benefit from skilled outpatient physical therapy to address the deficits listed in the problem list box on initial evaluation, provide pt/family education and to maximize pt's level of independence in the home and community environment.   Pt's spiritual, cultural and educational needs considered and pt agreeable to plan of care and goals.     Anticipated barriers to physical therapy: co- morbidities    Goals:   Short Term Goals: 3 weeks:  1.  Patient will demonstrate understanding of and compliance with home exercise program. Met 10/12/2022  2.  Patient will demonstrate appropriate tolerance to transition out of the bootwalker 2-3 times daily in 30 minute interval. Met 10/12/2022  3.  Patient will report >10 point improvement in LEFS score.  progressing towards; not met     Long Term Goals: 12 weeks  1.  Patient will demonstrate ability to complete 6MWT with appropriate assistive device for tolerance to community ambulation distance.  progressing towards; not met  2.  Patient will report full weaning to tennis shoe. progressing towards; not met  3.  Patient will report <49% limitation on FOTO survey.  progressing towards; not met     PLAN   Slowly wean into tennis shoes  Ankle/foot strength --> gradual incorporation of standing activities from week-to-week    Tristin Moreira, PT, DPT, OCS

## 2022-10-17 ENCOUNTER — CLINICAL SUPPORT (OUTPATIENT)
Dept: REHABILITATION | Facility: HOSPITAL | Age: 52
End: 2022-10-17
Payer: MEDICARE

## 2022-10-17 ENCOUNTER — TELEPHONE (OUTPATIENT)
Dept: NEUROLOGY | Facility: CLINIC | Age: 52
End: 2022-10-17
Payer: MEDICARE

## 2022-10-17 DIAGNOSIS — G89.18 ACUTE POSTOPERATIVE PAIN OF RIGHT FOOT: Primary | ICD-10-CM

## 2022-10-17 DIAGNOSIS — M79.671 ACUTE POSTOPERATIVE PAIN OF RIGHT FOOT: Primary | ICD-10-CM

## 2022-10-17 PROCEDURE — 97140 MANUAL THERAPY 1/> REGIONS: CPT | Mod: PN

## 2022-10-17 PROCEDURE — 97110 THERAPEUTIC EXERCISES: CPT | Mod: PN

## 2022-10-17 NOTE — PROGRESS NOTES
[facility]   Physical Therapy Treatment Note     Name: Starla Fisher  Clinic Number: [id number]    Therapy Diagnosis:   Encounter Diagnosis   Name Primary?    Acute postoperative pain of right foot Yes     Physician: Zelalem Solorzano DPM    Visit Date: 10/17/2022    Physician Orders: PT Eval and Treat  Medical Diagnosis:   Z98.890 (ICD-10-CM) - Postoperative state   M76.60 (ICD-10-CM) - Insertional Achilles tendinopathy   M62.81 (ICD-10-CM) - Muscle weakness of lower extremity      Evaluation Date: 9/14/2022  Authorization Period Expiration: 09/09/2022  Plan of Care Certification Period: 9/14/2022 to 12/09/2022  Visit # / Visits authorized: 3/20 (+ eval)  FOTO: 4/5  PTA Visit #: 0/5     Time In: 1100  Time Out: 1150  Total Billable Time: 30 minutes (1 MT, 1 TE)  Precautions: post-operative state; bootwalker with WBAT     Procedure: Procedure(s) (LRB):  RECESSION, GASTROCNEMIUS, ENDOSCOPIC (Right)  FASCIOTOMY, PLANTAR, ENDOSCOPIC (Right)      Per visit note on 09/09/2022, 'in 2 weeks she is to begin gradual 30 minute daily increments out of the boot to a tennis shoe.'    SUBJECTIVE     Pt reports: falling at a restaurant.  She was ambulating with a straight cane. Reports increased bilateral knee and ankle/foot pain on the right today.  Prior to this she was tolerating wearing her shoe for 30 minutes per day at home without issue.     She was compliant with home exercise program.  Response to previous treatment: no adverse effects.   Functional change: Ongoing    Pain: 4-5/10  Location: right calf and foot      OBJECTIVE     Objective Measures updated at progress report unless specified.     Treatment     Starla received the treatments listed below:      Manual therapy: total time 10 minutes with PT 1:1, including:  --grade III subtalar joint mobs  --grade III talocrural joint mobs  -- ankle passive physiological dorsiflexion, plantarflexion, inversion/eversion    Therapeutic exercises  "to develop strength, endurance, ROM, flexibility, and posture for 10 minutes with PT 1:1, including:  Gentle gastroc and soleus stretch with towel 5 x 30" - cues to perform gently  Seated ankle dorsiflexion/plantarflexion 2x20  Long-sitting ankle dorsiflexion/plantarflexion green TB 3x10/each  sit-to-stand 3x5 (21" surface)    Gait training: total time 10 minutes with PT 1:1, including:  (+) rolling walker ambulation 2 loops x 2 rounds (~2.5-3.0' each round) (sitting break x 3' in between bouts)  (+) standing lateral, forward/backward weight shifts // bars 2 rounds/each x 10 reps  - Education on weaning from bootwalker      Patient Education and Home Exercises     Home Exercises Provided and Patient Education Provided   Education provided:   - begin shoe weaning 60 minutes twice per day at home.     Written Home Exercises Provided: Patient instructed to cont prior HEP. Exercises were reviewed and Starla was able to demonstrate them prior to the end of the session.  Starla demonstrated good  understanding of the education provided. See EMR under Patient Instructions for exercises provided during therapy sessions    ASSESSMENT     Starla is a 52 y.o. female referred to outpatient Physical Therapy with a medical diagnosis of 1) Postoperative state, 2) Insertional Achilles tendinopathy, 3) Muscle weakness of lower extremity Pt presents with s/p endoscopic right gastrocnemius recession and plantar fasciotomy procedures.  Surgery date: 08/19/2022.  Post-op 8 weeks (+ 2 days) . More painful today following a community fall.  No bruising or signs of acute inflammation around her surgical sites  More antalgic gait today during ambulation with shoe donned.      Starla Is progressing well towards her goals.   Pt prognosis is Good.     Pt will continue to benefit from skilled outpatient physical therapy to address the deficits listed in the problem list box on initial evaluation, provide pt/family education and to maximize pt's " level of independence in the home and community environment.   Pt's spiritual, cultural and educational needs considered and pt agreeable to plan of care and goals.     Anticipated barriers to physical therapy: co- morbidities    Goals:   Short Term Goals: 3 weeks:  1.  Patient will demonstrate understanding of and compliance with home exercise program. Met 10/12/2022  2.  Patient will demonstrate appropriate tolerance to transition out of the bootwalker 2-3 times daily in 30 minute interval. Met 10/12/2022  3.  Patient will report >10 point improvement in LEFS score.  progressing towards; not met     Long Term Goals: 12 weeks  1.  Patient will demonstrate ability to complete 6MWT with appropriate assistive device for tolerance to community ambulation distance.  progressing towards; not met  2.  Patient will report full weaning to tennis shoe. progressing towards; not met  3.  Patient will report <49% limitation on FOTO survey.  progressing towards; not met     PLAN   Slowly wean into tennis shoes  Ankle/foot strength --> gradual incorporation of standing activities from week-to-week    Tristin Moreira, PT, DPT, OCS

## 2022-10-19 ENCOUNTER — OFFICE VISIT (OUTPATIENT)
Dept: GASTROENTEROLOGY | Facility: CLINIC | Age: 52
End: 2022-10-19
Payer: MEDICARE

## 2022-10-19 VITALS — WEIGHT: 293 LBS | HEIGHT: 64 IN | BODY MASS INDEX: 50.02 KG/M2

## 2022-10-19 DIAGNOSIS — Z12.11 SCREENING FOR COLON CANCER: ICD-10-CM

## 2022-10-19 DIAGNOSIS — K21.9 GASTROESOPHAGEAL REFLUX DISEASE, UNSPECIFIED WHETHER ESOPHAGITIS PRESENT: Primary | ICD-10-CM

## 2022-10-19 PROCEDURE — 99214 OFFICE O/P EST MOD 30 MIN: CPT | Mod: S$GLB,,, | Performed by: NURSE PRACTITIONER

## 2022-10-19 PROCEDURE — 1159F PR MEDICATION LIST DOCUMENTED IN MEDICAL RECORD: ICD-10-PCS | Mod: CPTII,S$GLB,, | Performed by: NURSE PRACTITIONER

## 2022-10-19 PROCEDURE — 3044F HG A1C LEVEL LT 7.0%: CPT | Mod: CPTII,S$GLB,, | Performed by: NURSE PRACTITIONER

## 2022-10-19 PROCEDURE — 1159F MED LIST DOCD IN RCRD: CPT | Mod: CPTII,S$GLB,, | Performed by: NURSE PRACTITIONER

## 2022-10-19 PROCEDURE — 99999 PR PBB SHADOW E&M-EST. PATIENT-LVL IV: CPT | Mod: PBBFAC,,, | Performed by: NURSE PRACTITIONER

## 2022-10-19 PROCEDURE — 4010F PR ACE/ARB THEARPY RXD/TAKEN: ICD-10-PCS | Mod: CPTII,S$GLB,, | Performed by: NURSE PRACTITIONER

## 2022-10-19 PROCEDURE — 3044F PR MOST RECENT HEMOGLOBIN A1C LEVEL <7.0%: ICD-10-PCS | Mod: CPTII,S$GLB,, | Performed by: NURSE PRACTITIONER

## 2022-10-19 PROCEDURE — 99999 PR PBB SHADOW E&M-EST. PATIENT-LVL IV: ICD-10-PCS | Mod: PBBFAC,,, | Performed by: NURSE PRACTITIONER

## 2022-10-19 PROCEDURE — 99214 PR OFFICE/OUTPT VISIT, EST, LEVL IV, 30-39 MIN: ICD-10-PCS | Mod: S$GLB,,, | Performed by: NURSE PRACTITIONER

## 2022-10-19 PROCEDURE — 4010F ACE/ARB THERAPY RXD/TAKEN: CPT | Mod: CPTII,S$GLB,, | Performed by: NURSE PRACTITIONER

## 2022-10-19 RX ORDER — OMEPRAZOLE 40 MG/1
40 CAPSULE, DELAYED RELEASE ORAL DAILY
Qty: 30 CAPSULE | Refills: 11 | Status: SHIPPED | OUTPATIENT
Start: 2022-10-19 | End: 2022-12-30 | Stop reason: SDUPTHER

## 2022-10-19 NOTE — PROGRESS NOTES
GASTROENTEROLOGY CLINIC NOTE    Chief Complaint: The primary encounter diagnosis was Gastroesophageal reflux disease, unspecified whether esophagitis present. A diagnosis of Screening for colon cancer was also pertinent to this visit.  Referring provider/PCP: Tahir Mosquera MD    HPI:  Starla Fisher is a 52 y.o. female who is a new patient to me with a PMH that is significant for GERD (gastroesophageal reflux disease), Hypertension, and Migraine headache.  She is here today to establish care for reflux. This is not a new problem as patient reports a longstanding h/o reflux but reports it has worsened over the last 2-3 months. Symptoms are worse after meals and with bending over or laying down.  She endorses a globus sensation, epigastric burning that travels up her esophagus to the back of her throat, water brash, dysphagia, and nocturnal symptoms.  Dysphagia occurs with solids, liquids, and medications. Additionally, she reports dark stools. Denies changes in bowel habits or hematochezia. BMs are generally once a day and soft in consistency. She has not identified any specific food triggers to her reflux symptoms.  Denies caffeine, ETOH, or significant NSAID use. She has tried Prilosec Nexium, OTC Antacids, and Protonix for her reflux symptoms. She is currently taking Protonix 40mg daily and reports she has been taking the Protonix for several years.     Interval Note 10/19/2022  Ms. Starla Fisher who is known to me presents to clinic for follow up visit.  Last seen 7/2021. At that time EGD/Colonoscopy recommended and Protonix was increased to 40mg BID for worsening reflux.  Presents today for continued reflux which has worsened over the last few months.  Symptoms occur daily and are worse after eating.  Endorses regurgitation of acid, pyrosis, water brash, dysphagia, and nocturnal symptoms.  Epigastric burning travels up esophagus to back of throat. Dysphagia occurs with solids, liquids, and medications.   Points to sternal notch as location of where feels food sticking.  Problem occurs after swallowing.  Bowel movements alternating between constipation and diarrhea; neither occurs more frequently. No nocturnal bowel movements, melena, or hematochezia.  PCP recently restarted protonix daily although patient reports she is currently taking twice a day without relief. Has also tried Pepto Bismol.    Prior Upper Endoscopy: No  Prior Colonoscopy: No  Family h/o Colon Cancer:  Grandfather  Family h/o Crohn's Disease or Ulcerative Colitis:  Abdominal Surgeries: No    NSAIDs: No  Anticoagulation or Antiplatelet: No    Review of Systems   Constitutional:  Negative for weight loss.   HENT:  Negative for sore throat.    Eyes:  Negative for blurred vision.   Respiratory:  Negative for cough.    Cardiovascular:  Negative for chest pain.   Gastrointestinal:  Positive for abdominal pain (epigastric), constipation, diarrhea, heartburn and nausea. Negative for blood in stool, melena and vomiting.   Genitourinary:  Negative for dysuria.   Musculoskeletal:  Negative for myalgias.   Skin:  Negative for rash.   Neurological:  Negative for headaches.   Endo/Heme/Allergies:  Negative for environmental allergies.   Psychiatric/Behavioral:  Negative for suicidal ideas. The patient is not nervous/anxious.      Past Medical History: has a past medical history of Anxiety, Depression, GERD (gastroesophageal reflux disease), Hypertension, Migraine headache, Sleep difficulties, and Typical atrial flutter.    Past Surgical History: has a past surgical history that includes Endometrial ablation; Tonsillectomy; Breast surgery; Eye surgery; Foot surgery (Right, 10/2018); Total Reduction Mammoplasty (Bilateral); Implantation of permanent sacral nerve stimulator (N/A, 2/15/2022); Endoscopic gastrocnemius recession (Right, 8/19/2022); and Endoscopic plantar fasciotomy (Right, 8/19/2022).    Family History:family history includes Atrial fibrillation in  her mother; Breast cancer in her maternal grandmother; Cancer in her paternal grandfather; Colon cancer in her paternal aunt, paternal grandmother, and paternal uncle; Dementia in her mother; Diabetes in her father; Migraines in her sister; Prostate cancer in her father; Stomach cancer in her sister; Thyroid disease in her mother.    Allergies: Review of patient's allergies indicates:  No Known Allergies    Social History: reports that she has never smoked. She has never used smokeless tobacco. She reports that she does not drink alcohol and does not use drugs.    Home medications:   Current Outpatient Medications on File Prior to Visit   Medication Sig Dispense Refill    blood-glucose meter (TRUE METRIX GLUCOSE METER) kit Check BS as needed 1 each 0    cephALEXin (KEFLEX) 500 MG capsule Take 1 capsule (500 mg total) by mouth 4 (four) times daily. 28 capsule 0    clotrimazole-betamethasone 1-0.05% (LOTRISONE) cream Apply topically 2 (two) times daily. 15 g 1    diazePAM (VALIUM) 5 MG tablet Take 1 tablet (5 mg total) by mouth daily as needed for Anxiety. 15 tablet 2    diltiaZEM (CARDIZEM CD) 240 MG 24 hr capsule Take 1 capsule (240 mg total) by mouth once daily. 30 capsule 11    EScitalopram oxalate (LEXAPRO) 20 MG tablet Take 1 tablet (20 mg total) by mouth once daily. 90 tablet 1    Qc-W-qzeha-B12-L.acid,plan-inu 65 mg iron- 50 mg-1 mg DFE Cap Take by mouth.      ferrous sulfate (FEOSOL) 325 mg (65 mg iron) Tab tablet Take 325 mg by mouth.      fluconazole (DIFLUCAN) 150 MG Tab Take one tablet today and one in a week 2 tablet 0    galcanezumab-gnlm (EMGALITY PEN) 120 mg/mL PnIj Inject 120 mg into the skin every 28 days. 1 mL 11    HYDROcodone-acetaminophen (NORCO) 5-325 mg per tablet Take 1 tablet by mouth every 4 (four) hours as needed for Pain. 30 tablet 0    lancets (TRUEPLUS LANCETS) 33 gauge Misc Inject 1 lancet into the skin 3 (three) times daily. 200 each 0    losartan (COZAAR) 25 MG tablet Take 1 tablet  (25 mg total) by mouth once daily. 90 tablet 3    meloxicam (MOBIC) 15 MG tablet       metronidazole 0.75% (METROCREAM) 0.75 % Crea Apply topically 2 (two) times daily. 45 g 3    multivit-minerals/folic acid (ADULT MULTIVITAMIN GUMMIES ORAL) Take 1 tablet by mouth Daily. Continue to hold until after Surgery.      multivitamin (THERAGRAN) per tablet Take 1 tablet by mouth.      nystatin (MYCOSTATIN) powder Apply to affected area 3 times daily 60 g 3    phenylephrine (TEODORA-SYNEPHRINE) 10 mg/mL injection       promethazine (PHENERGAN) 25 MG tablet Take 1 tablet (25 mg total) by mouth every 6 (six) hours as needed for Nausea. 30 tablet 0    sumatriptan (IMITREX STATDOSE) 6 mg/0.5 mL kit INJECT 0.5 ML(6 MG TOTAL) UNDER THE SKIN EVERY 2 HOURS AS NEEDED FOR MIGRAINE 3 mL 5    sumatriptan (IMITREX) 100 MG tablet Take 1 tablet (100 mg total) by mouth every 2 (two) hours as needed for Migraine. 9 tablet 11    topiramate (TOPAMAX) 50 MG tablet TAKE 1 TABLET(50 MG) BY MOUTH THREE TIMES DAILY 270 tablet 0    traMADoL (ULTRAM) 50 mg tablet Take 1 tablet (50 mg total) by mouth every 6 (six) hours. 5 tablet 0    traZODone (DESYREL) 150 MG tablet Take half or whole tablet by mouth before bed as needed for insomnia 90 tablet 3    walker (ULTRA-LIGHT ROLLATOR) Misc Rolator for stability and balance postoperatively as needed while standing and walking. 1 each 0    [DISCONTINUED] pantoprazole (PROTONIX) 40 MG tablet Take 1 tablet (40 mg total) by mouth once daily. 30 tablet 1    mirabegron (MYRBETRIQ) 50 mg Tb24 Take 1 tablet (50 mg total) by mouth once daily. 30 tablet 11    SUMAtriptan (IMITREX) 20 mg/actuation nasal spray 1 spray (20 mg total) by Nasal route as needed for Migraine (max 40mg/24hr). 6 each 3     Current Facility-Administered Medications on File Prior to Visit   Medication Dose Route Frequency Provider Last Rate Last Admin    0.9%  NaCl infusion   Intravenous Continuous Sathish Briseno MD   Stopped at 02/15/22 0999     "LIDOcaine (PF) 10 mg/ml (1%) injection 10 mg  1 mL Intradermal Once Sathish Briseno MD        sodium chloride 0.9% flush 10 mL  10 mL Intravenous PRN Zelalem Solorzano DPM        sodium chloride 0.9% flush 10 mL  10 mL Intravenous PRN Zelalem Solorzano DPM           Vital signs:  Ht 5' 4" (1.626 m)   Wt (!) 144.2 kg (317 lb 14.5 oz)   LMP  (LMP Unknown)   BMI 54.57 kg/m²     Physical Exam  Vitals reviewed.   Constitutional:       General: She is not in acute distress.     Appearance: Normal appearance. She is obese. She is not ill-appearing.   HENT:      Head: Normocephalic.   Cardiovascular:      Rate and Rhythm: Normal rate and regular rhythm.      Heart sounds: Normal heart sounds. No murmur heard.  Pulmonary:      Effort: Pulmonary effort is normal. No respiratory distress.      Breath sounds: Normal breath sounds.   Chest:      Chest wall: No tenderness.   Abdominal:      General: Bowel sounds are normal. There is no distension.      Palpations: Abdomen is soft.      Tenderness: There is no abdominal tenderness. Negative signs include Huerta's sign.      Hernia: No hernia is present.   Musculoskeletal:      Comments: Ambulates with walker; Therapeutic boot on right foot.    Skin:     General: Skin is warm.   Neurological:      Mental Status: She is alert and oriented to person, place, and time.   Psychiatric:         Mood and Affect: Mood normal.         Behavior: Behavior normal.       Routine labs:  Lab Results   Component Value Date    WBC 7.03 08/11/2022    HGB 11.8 (L) 08/11/2022    HCT 38.4 08/11/2022    MCV 87 08/11/2022     08/11/2022     No results found for: INR  No results found for: IRON, FERRITIN, TIBC, FESATURATED  Lab Results   Component Value Date     08/11/2022    K 4.2 08/11/2022     08/11/2022    CO2 27 08/11/2022    BUN 17 08/11/2022    CREATININE 0.8 08/11/2022     Lab Results   Component Value Date    ALBUMIN 3.7 08/11/2022    ALT 16 08/11/2022    AST 17 " 08/11/2022    ALKPHOS 105 08/11/2022    BILITOT 0.6 08/11/2022     No results found for: GLUCOSE  Lab Results   Component Value Date    TSH 2.637 04/20/2022     Lab Results   Component Value Date    CALCIUM 9.6 08/11/2022       Imaging:      I have reviewed prior labs, imaging, and notes.      Assessment:  1. Gastroesophageal reflux disease, unspecified whether esophagitis present    2. Screening for colon cancer          Plan:  Orders Placed This Encounter    omeprazole (PRILOSEC) 40 MG capsule    sod sulf-pot chloride-mag sulf (SUTAB) 1.479-0.188- 0.225 gram tablet    Case Request Endoscopy: COLONOSCOPY, EGD (ESOPHAGOGASTRODUODENOSCOPY)       EGD to further evaluate worsening reflux. Consider biopsy for H.pylori.   Colonoscopy for colon cancer screening. Sutab for bowel prep.   Stop Protonix and start Omeprazole 40mg daily.     Consider adding Carafate  Consider starting Metamucil     Plan of care discussed with patient who is in agreement and verbalized understanding.     I have explained the planned procedures to the patient.The risks, benefits and alternatives of the procedure were also explained in detail. Patient verbalized understanding, all questions were answered. The patient agrees to proceed as planned    Follow Up: As Needed Pending Above Workup          Yuly Pairkh, SILVIA,FNP-BC  [facility]

## 2022-10-19 NOTE — PATIENT INSTRUCTIONS
SUTAB Instructions    [facility] [address]  Clinic Office [phone]  Endoscopy Lab [phone]    You are scheduled for a Colonoscopy with Dr. Walsh on 12/14/2022 at [facility] in [place].    Check in at the Hospital -1st floor, Information desk. A covid test will be required 3 days before your procedure.  Call [phone] to reschedule.    An adult friend/family member must come with you to drive you home.  You cannot drive, take a taxi, Uber/Lyft or bus to leave the Endoscopy Center alone.  If you do not have someone to drive you home, your test will be cancelled.     Please follow the directions of your doctor if you take any pills that thin your blood. If you take these meds: Aggrenox, Brilinta, Effient, Eliquis, Lovenox, Plavix, Pletal, Pradaxa, Ticilid, Xarelto or Coumadin, let the doctor's office know.    DON'T: On the morning of the test do not take insulin or pills for diabetes.     DO: On the morning of the test, do take any pills for blood pressure, heart, anti-rejection and or seizures with a small sip of water. Bring any inhalers with you.    To have a good prep, you must follow these instructions - please do not use the directions from the pharmacy.    The doctor will send a prescription for the SUTAB.    The Day Before the test:    You can only drink CLEAR LIQUIDS the whole day before your test.  You can't eat any food for the whole day.    You CAN have:  Water, Coffee or decaf coffee (no milk or cream)  Tea  Soft drinks - regular and sugar free  Jell-O (green or yellow)  Apple Juice, grape juice, white cranberry juice  Gatorade, Power Aid, Crystal Light, Richy Aid  Lemonade and Limeade  Bouillon, clear soup  Snowball, popsicles  YOU CAN'T DRINK ANYTHING RED, PURPLE ORANGE OR BLUE   YOU CAN'T DRINK ALCOHOL  ONLY DRINK WHAT IS ON THE LIST      At 5 pm the night before your test:    Open the bottle of 12 tablets. Fill the provided cup with water up to the line =  16 oz. Swallow each pill with water. Drink the rest of the water in the cup in 20 minutes.    At 6 pm:  Fill the cup with water up to the line = 16 oz. Drink the cup of water in 30 minutes.    At 7 pm:  Fill the cup with water up to the line = 16 oz. Drink the cup of water in 30 minutes.     Continue to drink water or clear liquids until you go to sleep.      The Day of the test - We will call you 2 days before your test to tell you what time to get to the hospital. We will also tell you when to do the next steps.    5 hours before you come to the hospital (this may be in the middle of the night): ___________________= time  Open the bottle of 12 tablets. Fill the cup with water up to the line = 16 oz. Swallow each pill with water. Drink the rest of the water in the cup in 20 minutes.    At 4 hours before you come to the hospital: ______________= time  Fill the cup with water up to the line = 16 oz. Drink the cup of water in 30 minutes.     At 3 hours before you come to the hospital: ______________= time    YOU CAN'T EAT OR DRINK ANYTHING ELSE ONCE YOU FINISH THE WATER AT _____________ = TIME    Leave all valuables and jewelry at home. You will be at the hospital for 2-4 hours.    Call the Endoscopy department at [phone] with any questions about your procedure.          Please give this Coupon Code to your pharmacist.    BIN: [id number]  PCN: [id number]  GROUP: [id number]  MEMBER ID: [id number]

## 2022-10-21 ENCOUNTER — OFFICE VISIT (OUTPATIENT)
Dept: PODIATRY | Facility: CLINIC | Age: 52
End: 2022-10-21
Payer: MEDICARE

## 2022-10-21 ENCOUNTER — PROCEDURE VISIT (OUTPATIENT)
Dept: NEUROLOGY | Facility: CLINIC | Age: 52
End: 2022-10-21
Payer: MEDICARE

## 2022-10-21 VITALS
SYSTOLIC BLOOD PRESSURE: 170 MMHG | DIASTOLIC BLOOD PRESSURE: 81 MMHG | TEMPERATURE: 98 F | BODY MASS INDEX: 50.02 KG/M2 | DIASTOLIC BLOOD PRESSURE: 97 MMHG | HEART RATE: 88 BPM | BODY MASS INDEX: 50.02 KG/M2 | HEIGHT: 64 IN | WEIGHT: 293 LBS | HEIGHT: 64 IN | SYSTOLIC BLOOD PRESSURE: 134 MMHG | HEART RATE: 77 BPM | WEIGHT: 293 LBS

## 2022-10-21 DIAGNOSIS — Z98.890 POSTOPERATIVE STATE: Primary | ICD-10-CM

## 2022-10-21 DIAGNOSIS — M62.81 MUSCLE WEAKNESS OF LOWER EXTREMITY: ICD-10-CM

## 2022-10-21 DIAGNOSIS — G43.719 INTRACTABLE CHRONIC MIGRAINE WITHOUT AURA AND WITHOUT STATUS MIGRAINOSUS: Primary | ICD-10-CM

## 2022-10-21 PROCEDURE — 3077F PR MOST RECENT SYSTOLIC BLOOD PRESSURE >= 140 MM HG: ICD-10-PCS | Mod: CPTII,S$GLB,, | Performed by: PODIATRIST

## 2022-10-21 PROCEDURE — 3080F DIAST BP >= 90 MM HG: CPT | Mod: CPTII,S$GLB,, | Performed by: PODIATRIST

## 2022-10-21 PROCEDURE — 1159F MED LIST DOCD IN RCRD: CPT | Mod: CPTII,S$GLB,, | Performed by: PODIATRIST

## 2022-10-21 PROCEDURE — 3077F SYST BP >= 140 MM HG: CPT | Mod: CPTII,S$GLB,, | Performed by: PODIATRIST

## 2022-10-21 PROCEDURE — 64615 CHEMODENERV MUSC MIGRAINE: CPT | Mod: S$GLB,,, | Performed by: PSYCHIATRY & NEUROLOGY

## 2022-10-21 PROCEDURE — 99024 PR POST-OP FOLLOW-UP VISIT: ICD-10-PCS | Mod: S$GLB,,, | Performed by: PODIATRIST

## 2022-10-21 PROCEDURE — 1159F PR MEDICATION LIST DOCUMENTED IN MEDICAL RECORD: ICD-10-PCS | Mod: CPTII,S$GLB,, | Performed by: PODIATRIST

## 2022-10-21 PROCEDURE — 4010F ACE/ARB THERAPY RXD/TAKEN: CPT | Mod: CPTII,S$GLB,, | Performed by: PODIATRIST

## 2022-10-21 PROCEDURE — 99999 PR PBB SHADOW E&M-EST. PATIENT-LVL V: CPT | Mod: PBBFAC,,, | Performed by: PODIATRIST

## 2022-10-21 PROCEDURE — 99999 PR PBB SHADOW E&M-EST. PATIENT-LVL V: ICD-10-PCS | Mod: PBBFAC,,, | Performed by: PODIATRIST

## 2022-10-21 PROCEDURE — 3044F PR MOST RECENT HEMOGLOBIN A1C LEVEL <7.0%: ICD-10-PCS | Mod: CPTII,S$GLB,, | Performed by: PODIATRIST

## 2022-10-21 PROCEDURE — 3044F HG A1C LEVEL LT 7.0%: CPT | Mod: CPTII,S$GLB,, | Performed by: PODIATRIST

## 2022-10-21 PROCEDURE — 1160F PR REVIEW ALL MEDS BY PRESCRIBER/CLIN PHARMACIST DOCUMENTED: ICD-10-PCS | Mod: CPTII,S$GLB,, | Performed by: PODIATRIST

## 2022-10-21 PROCEDURE — 99024 POSTOP FOLLOW-UP VISIT: CPT | Mod: S$GLB,,, | Performed by: PODIATRIST

## 2022-10-21 PROCEDURE — 64615 PR CHEMODENERVATION OF MUSCLE FOR CHRONIC MIGRAINE: ICD-10-PCS | Mod: S$GLB,,, | Performed by: PSYCHIATRY & NEUROLOGY

## 2022-10-21 PROCEDURE — 4010F PR ACE/ARB THEARPY RXD/TAKEN: ICD-10-PCS | Mod: CPTII,S$GLB,, | Performed by: PODIATRIST

## 2022-10-21 PROCEDURE — 1160F RVW MEDS BY RX/DR IN RCRD: CPT | Mod: CPTII,S$GLB,, | Performed by: PODIATRIST

## 2022-10-21 PROCEDURE — 3080F PR MOST RECENT DIASTOLIC BLOOD PRESSURE >= 90 MM HG: ICD-10-PCS | Mod: CPTII,S$GLB,, | Performed by: PODIATRIST

## 2022-10-21 NOTE — PROCEDURES
"NEUROLOGY PROCEDURE NOTE - BOTOX FOR CHRONIC MIGRAINE    ID:   Starla Fisher is a 52 y.o. person who presents for medically necessary Botox injections for chronic migraine.    DIAGNOSIS:   1. Intractable chronic migraine without aura and without status migrainosus  onabotulinumtoxina injection 200 Units           BOTOX APPROVAL:   Botox injections have been approved for the patient today: Payor: [employer] MEDICARE / Plan: [employer] (SPECIAL NEEDS PLAN) / Product Type: Medicare Advantage /        INTERVAL HISTORY:   Patient presents today for Botox injection for chronic migraine.  They endorse that their headaches have been stable since previous encounter.  All questions and concerns were answered to the patient's satisfaction prior to the procedure.   Fantastic response to therapy thus far.     Percent improvement:    Total days of headache per month:  1-2  Headache days per month severe:  0  Headache days per month with migraine:  0  Duration of headache pain:  n/a  Average pain score:  2/10    OBJECTIVE:   /81   Pulse 77   Temp 98 °F (36.7 °C)   Ht 5' 4" (1.626 m)   Wt (!) 143 kg (315 lb 4.1 oz)   LMP  (LMP Unknown)   BMI 54.11 kg/m²      General: Pleasant, well-appearing in no acute distress.    Neurological Exam:    Mental status: Speech clear, fluent. Normal recent and remote memory.    Cranial nerves: EOMI, face symmetric, hearing intact to voice.   Motor: Moves all four extremities spontaneously.     PRE-PROCEDURE VERIFICATION:   Allergies and relevant documentation was verified. Standard hand hygiene was observed.      PROCEDURE CODES:    - Botulinum Toxin A - Botox 200U    64128 - Chemodenervation of muscles for chronic migraine    PROCEDURE:   The risk and benefits of the procedure were explained. Starla agreed to undergo the procedure. Starla and I signed the written consent form. Two vials of BOTOX 100U (total 200U) were opened. Each vial of Botox was mixed in 2cc of sterile, " preservative-free saline. Starla received 155U of BOTOX in 31 injections. 45U were unavoidably wasted.      The following muscles were injected:    Amount Muscle   5U   Proceros    5U    Supercilii (left)    5U    Supercilii (right)    10U   Epicranius - Occipitofrontalis Tonny Frontalis (right)    10U   Epicranius - Occipitofrontalis Tonny Frontalis (left)    20U   Temporalis (right)    20U   Temporalis (left)    15U   Tonny Occipitalis (right)    15U   Tonny Occipitalis (left)    10U   Semispinalis capitis (left)    10U   Semispinalis capitis (right)    15U   Trapezius (right)    15U   Trapezius (left)    45 units were unavoidably wasted. The patient tolerated the procedure well with no immediate side effects.      LIMITATIONS OF BOTOX: Safety and effectiveness have not been established for the prophylaxis of episodic migraine (14 headache days or fewer per month) in 7 placebo-controlled studies.   ADVERSE REACTIONS: The most frequently reported adverse reactions following injection of BOTOX® for chronic migraine include neck pain (9%), headache (5%), eyelid ptosis (4%), migraine (4%), muscular weakness (4%), musculoskeletal stiffness (4%), bronchitis (3%), injection-site pain (3%), musculoskeletal pain (3%), myalgia (3%), facial paresis (2%), hypertension (2%), and muscle spasms (2%). Post Marketing Experience:There have been spontaneous reports of death, sometimes associated with dysphagia, pneumonia, and/or other significant debility or anaphylaxis, after treatment with botulinum toxin. There have also been reports of adverse events involving the cardiovascular system, including arrhythmia and myocardial infarction, some with fatal outcomes. Some of these patients had risk factors including cardiovascular disease. The exact relationship of these events to the botulinum toxin injection has not been established. Distant Spread of Toxin Effect: Post-marketing reports indicate that the  effects of BOTOX® and all botulinum toxin products may spread from the area of injection to produce symptoms consistent with botulinum toxin effects. These may include asthenia, generalized muscle weakness, diplopia, ptosis, dysphagia, dysphonia, dysarthria, urinary incontinence, and breathing difficulties. These symptoms have been reported hours to weeks after injection. Swallowing and breathing difficulties can be life threatening, and there have been reports of death. The risk of symptoms is probably greatest in children treated for spasticity, but symptoms can also occur in adults treated for spasticity and other conditions, particularly in those patients who have underlying conditions that would predispose them to these symptoms. In unapproved uses, including spasticity in children, and in approved indications, cases of spread of effect have been reported at doses comparable to those used to treat cervical dystonia and at lower doses.    CONTRAINDICATIONS: BOTOX® is contraindicated in the presence of infection at the proposed injection site(s) and in individuals with known hypersensitivity to any botulinum toxin preparation or to any of the components in the formulation. Hypersensitivity Reactions: Serious and/or immediate hypersensitivity reactions have been reported. These reactions include anaphylaxis, serum sickness, urticaria, soft-tissue edema, and dyspnea. If such a reaction occurs, further injection of BOTOX® should be discontinued and appropriate medical therapy immediately instituted. One fatal case of anaphylaxis has been reported in which lidocaine was used as the diluent, and consequently the causal agent cannot be reliably determined.    Note: If the patient is covered by commercial insurance and has a diagnosis of chronic migraine, the Botox company may be able to assist with out-of-pocket co-payment up to $700 per treatment. In order to get assistance, please call [employer] at [phone] or  go to  [email or web address].      ASSESSMENT/PLAN:  Starla presents today for medically necessary Botox injections for chronic migraine. Botox injections have reduced their migraine frequency/intensity by far greater than 50%. We proceeded with Botox 155 units today.     Diagnosis:   1. Intractable chronic migraine without aura and without status migrainosus  onabotulinumtoxina injection 200 Units          Starla should follow-up for the next cycle of Botox injections in 3 months or earlier for a clinical follow-up visit if needed.
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
## 2022-10-25 ENCOUNTER — DOCUMENTATION ONLY (OUTPATIENT)
Dept: REHABILITATION | Facility: HOSPITAL | Age: 52
End: 2022-10-25
Payer: MEDICARE

## 2022-10-25 NOTE — PROGRESS NOTES
Face to face meeting completed with Tristin Moreira PT regarding current status and progress of   Starla Fisher .     Laura Dozier, PTA

## 2022-10-26 ENCOUNTER — CLINICAL SUPPORT (OUTPATIENT)
Dept: REHABILITATION | Facility: HOSPITAL | Age: 52
End: 2022-10-26
Payer: MEDICARE

## 2022-10-26 DIAGNOSIS — M79.671 ACUTE POSTOPERATIVE PAIN OF RIGHT FOOT: Primary | ICD-10-CM

## 2022-10-26 DIAGNOSIS — G89.18 ACUTE POSTOPERATIVE PAIN OF RIGHT FOOT: Primary | ICD-10-CM

## 2022-10-26 PROCEDURE — 97110 THERAPEUTIC EXERCISES: CPT | Mod: PN

## 2022-10-26 PROCEDURE — 97116 GAIT TRAINING THERAPY: CPT | Mod: PN

## 2022-10-26 NOTE — PROGRESS NOTES
[facility]   Physical Therapy Treatment Note     Name: Starla Fisher  Clinic Number: [id number]    Therapy Diagnosis:   Encounter Diagnosis   Name Primary?    Acute postoperative pain of right foot Yes     Physician: Zelalem Solorzano DPM    Visit Date: 10/26/2022    Physician Orders: PT Eval and Treat  Medical Diagnosis:   Z98.890 (ICD-10-CM) - Postoperative state   M76.60 (ICD-10-CM) - Insertional Achilles tendinopathy   M62.81 (ICD-10-CM) - Muscle weakness of lower extremity      Evaluation Date: 9/14/2022  Authorization Period Expiration: 09/09/2022  Plan of Care Certification Period: 9/14/2022 to 12/09/2022  Visit # / Visits authorized: 5/20 (+ eval)  FOTO: 6/5  PTA Visit #: 0/5     Time In: 1000  Time Out: 1050  Total Billable Time: 30 minutes (1 GT, 1 TE)  Precautions: post-operative state; bootwalker with WBAT     Procedure: Procedure(s) (LRB):  RECESSION, GASTROCNEMIUS, ENDOSCOPIC (Right)  FASCIOTOMY, PLANTAR, ENDOSCOPIC (Right)      Per visit note on 09/09/2022, 'in 2 weeks she is to begin gradual 30 minute daily increments out of the boot to a tennis shoe.'    SUBJECTIVE     Pt reports: good visit with Podiatry team.  Cleared for more time out of bootwalker.  Comes to PT in shoes today ambulating with cane.  Voices fearful walking with shoes + cane due to feeling off balance.  As she mentioned last time, she has a recent fall.     She was compliant with home exercise program.  Response to previous treatment: no adverse effects.   Functional change: Ongoing    Pain: 2/10  Location: right calf and foot      OBJECTIVE     Objective Measures updated at progress report unless specified.     Treatment     Starla received the treatments listed below:      Manual therapy: total time 0 minutes:  --grade III subtalar joint mobs  --grade III talocrural joint mobs  -- ankle passive physiological dorsiflexion, plantarflexion, inversion/eversion    Therapeutic exercises to develop  "strength, endurance, ROM, flexibility, and posture for 15 minutes with PT 1:1, including:  Gentle gastroc and soleus stretch with towel 5 x 30" - cues to perform gently  Seated ankle dorsiflexion/plantarflexion 2x20  Long-sitting ankle dorsiflexion/plantarflexion green TB 3x10/each  sit-to-stand 3x5 (21" surface)  Standing heel raises 2x10 dL    Gait training: total time 15 minutes with PT 1:1, including:  (+) lateral stepping // bars 4 rounds  (+) retro gait // bars 4 rounds  (+) tandem gait 4 rounds // bars      Patient Education and Home Exercises     Home Exercises Provided and Patient Education Provided   Education provided:   - begin shoe weaning 60 minutes twice per day at home.     Written Home Exercises Provided: Patient instructed to cont prior HEP. Exercises were reviewed and Starla was able to demonstrate them prior to the end of the session.  Starla demonstrated good  understanding of the education provided. See EMR under Patient Instructions for exercises provided during therapy sessions    ASSESSMENT     Starla is a 52 y.o. female referred to outpatient Physical Therapy with a medical diagnosis of 1) Postoperative state, 2) Insertional Achilles tendinopathy, 3) Muscle weakness of lower extremity Pt presents with s/p endoscopic right gastrocnemius recession and plantar fasciotomy procedures.  Surgery date: 08/19/2022.  Post-op 9 weeks (+ 5 days) . Improving ankle strength.  Improving tolerance to shoe wear total daily time.  Balance/gait training focus at this time.     Starla Is progressing well towards her goals.   Pt prognosis is Good.     Pt will continue to benefit from skilled outpatient physical therapy to address the deficits listed in the problem list box on initial evaluation, provide pt/family education and to maximize pt's level of independence in the home and community environment.   Pt's spiritual, cultural and educational needs considered and pt agreeable to plan of care and goals.   "   Anticipated barriers to physical therapy: co- morbidities    Goals:   Short Term Goals: 3 weeks:  1.  Patient will demonstrate understanding of and compliance with home exercise program. Met 10/12/2022  2.  Patient will demonstrate appropriate tolerance to transition out of the bootwalker 2-3 times daily in 30 minute interval. Met 10/12/2022  3.  Patient will report >10 point improvement in LEFS score.  progressing towards; not met     Long Term Goals: 12 weeks  1.  Patient will demonstrate ability to complete 6MWT with appropriate assistive device for tolerance to community ambulation distance.  progressing towards; not met  2.  Patient will report full weaning to tennis shoe. progressing towards; not met  3.  Patient will report <49% limitation on FOTO survey.  progressing towards; not met     PLAN   Slowly wean into tennis shoes  Ankle/foot strength --> gradual incorporation of standing activities from week-to-week    Tristin Moreira, PT, DPT, OCS

## 2022-10-31 ENCOUNTER — CLINICAL SUPPORT (OUTPATIENT)
Dept: REHABILITATION | Facility: HOSPITAL | Age: 52
End: 2022-10-31
Payer: MEDICARE

## 2022-10-31 DIAGNOSIS — M79.671 ACUTE POSTOPERATIVE PAIN OF RIGHT FOOT: Primary | ICD-10-CM

## 2022-10-31 DIAGNOSIS — G89.18 ACUTE POSTOPERATIVE PAIN OF RIGHT FOOT: Primary | ICD-10-CM

## 2022-10-31 PROCEDURE — 97112 NEUROMUSCULAR REEDUCATION: CPT | Mod: PN

## 2022-10-31 PROCEDURE — 97110 THERAPEUTIC EXERCISES: CPT | Mod: PN

## 2022-10-31 PROCEDURE — 97116 GAIT TRAINING THERAPY: CPT | Mod: PN

## 2022-10-31 NOTE — PROGRESS NOTES
"[facility]   Physical Therapy Treatment Note     Name: Starla Fisher  Clinic Number: [id number]    Therapy Diagnosis:   Encounter Diagnosis   Name Primary?    Acute postoperative pain of right foot Yes     Physician: Zelalem Solorzano DPM    Visit Date: 10/31/2022    Physician Orders: PT Eval and Treat  Medical Diagnosis:   Z98.890 (ICD-10-CM) - Postoperative state   M76.60 (ICD-10-CM) - Insertional Achilles tendinopathy   M62.81 (ICD-10-CM) - Muscle weakness of lower extremity      Evaluation Date: 9/14/2022  Authorization Period Expiration: 09/09/2022  Plan of Care Certification Period: 9/14/2022 to 12/09/2022  Visit # / Visits authorized: 6/20 (+ eval)  FOTO: 6/5  PTA Visit #: 0/5     Time In: 1100  Time Out: 1200  Total Billable Time: 60 minutes (2 GT, 1 TE, 1 NMRE)  Precautions: post-operative state; bootwalker with WBAT     Procedure: Procedure(s) (LRB):  RECESSION, GASTROCNEMIUS, ENDOSCOPIC (Right)  FASCIOTOMY, PLANTAR, ENDOSCOPIC (Right)      Per visit note on 09/09/2022, 'in 2 weeks she is to begin gradual 30 minute daily increments out of the boot to a tennis shoe.'    SUBJECTIVE     Pt reports: no pain medication today. Feeling good.     She was compliant with home exercise program.  Response to previous treatment: no adverse effects.   Functional change: Ongoing    Pain: 0/10  Location: right calf and foot      OBJECTIVE     Objective Measures updated at progress report unless specified.     Treatment     Starla received the treatments listed below:      Manual therapy: total time 0 minutes:  --grade III subtalar joint mobs  --grade III talocrural joint mobs  -- ankle passive physiological dorsiflexion, plantarflexion, inversion/eversion    Therapeutic exercises to develop strength, endurance, ROM, flexibility, and posture for10 minutes with PT 1:1, including:  Gentle gastroc and soleus stretch with towel 5 x 30" - cues to perform gently not today  Seated ankle " "dorsiflexion/plantarflexion 2x20  Long-sitting ankle dorsiflexion/plantarflexion green TB 3x10/each not today  Sit-to-stand 4x5 (21" surface)  Standing heel raises 2x10 dL  +Step-ups forward x 10 reps each at staircase 6'' step  +Step-ups lateral x 10 reps each at staircase, 6'' step  +Standing gastroc stretch x 20'' hold x 3 on incline bilaterally    Neuromuscular re-education: 15 mins  In parallel bars:  +Narrow base of support 30'' hold- easy  +Tandem stance: 30'' hold x 2 trials -difficult  +Hip abduction with light upper extremity support x 10 reps each  +Hip extension with upper extremity support x 10 reps each    Gait training: total time 25 minutes with PT 1:1, including:  lateral stepping // bars 6 rounds  retro gait // bars 6 rounds  tandem gait 6 rounds // bars  +Ambulation outside of parallel bars x 8 ft x 6 rounds- cues for heel toe ambulation  +Ambulation around gym 1 trial, 150 ft with no cane- mid-foot initial contact bilaterally, shuffling gait pattern, increased lateral sway (supervision)      Patient Education and Home Exercises     Home Exercises Provided and Patient Education Provided   Education provided:   - begin shoe weaning 60 minutes twice per day at home.     Written Home Exercises Provided: Patient instructed to cont prior HEP. Exercises were reviewed and Starla was able to demonstrate them prior to the end of the session.  Starla demonstrated good  understanding of the education provided. See EMR under Patient Instructions for exercises provided during therapy sessions    ASSESSMENT     Starla is a 52 y.o. female referred to outpatient Physical Therapy with a medical diagnosis of 1) Postoperative state, 2) Insertional Achilles tendinopathy, 3) Muscle weakness of lower extremity Pt presents with s/p endoscopic right gastrocnemius recession and plantar fasciotomy procedures.  Surgery date: 08/19/2022.  Post-op 10 weeks (+ 3 days) . Initiated more standing activities to address balance, gait " pattern and overall strength. Did well with all activities with moderate fatigue, requiring multiple seated rest breaks. Pt was able to ambulate once around the gym with no assistive device, requiring cueing for heel-toe gait pattern. Recommend continuing to focus on balance/gait training.    Starla Is progressing well towards her goals.   Pt prognosis is Good.     Pt will continue to benefit from skilled outpatient physical therapy to address the deficits listed in the problem list box on initial evaluation, provide pt/family education and to maximize pt's level of independence in the home and community environment.   Pt's spiritual, cultural and educational needs considered and pt agreeable to plan of care and goals.     Anticipated barriers to physical therapy: co- morbidities    Goals:   Short Term Goals: 3 weeks:  1.  Patient will demonstrate understanding of and compliance with home exercise program. Met 10/12/2022  2.  Patient will demonstrate appropriate tolerance to transition out of the bootwalker 2-3 times daily in 30 minute interval. Met 10/12/2022  3.  Patient will report >10 point improvement in LEFS score.  progressing towards; not met     Long Term Goals: 12 weeks  1.  Patient will demonstrate ability to complete 6MWT with appropriate assistive device for tolerance to community ambulation distance.  progressing towards; not met  2.  Patient will report full weaning to tennis shoe. progressing towards; not met  3.  Patient will report <49% limitation on FOTO survey.  progressing towards; not met     PLAN   Ankle/foot strength --> gradual incorporation of standing activities from week-to-week  Interval walking, gait mechanics.    Andreia Heredia, PT, DPT  10/31/2022

## 2022-11-08 ENCOUNTER — CLINICAL SUPPORT (OUTPATIENT)
Dept: REHABILITATION | Facility: HOSPITAL | Age: 52
End: 2022-11-08
Attending: FAMILY MEDICINE
Payer: MEDICARE

## 2022-11-08 DIAGNOSIS — G89.18 ACUTE POSTOPERATIVE PAIN OF RIGHT FOOT: Primary | ICD-10-CM

## 2022-11-08 DIAGNOSIS — M79.671 ACUTE POSTOPERATIVE PAIN OF RIGHT FOOT: Primary | ICD-10-CM

## 2022-11-08 PROCEDURE — 97110 THERAPEUTIC EXERCISES: CPT | Mod: PN

## 2022-11-08 PROCEDURE — 97116 GAIT TRAINING THERAPY: CPT | Mod: PN

## 2022-11-08 PROCEDURE — 97112 NEUROMUSCULAR REEDUCATION: CPT | Mod: PN

## 2022-11-08 NOTE — PROGRESS NOTES
"[facility]   Physical Therapy Treatment Note     Name: Starla Fisher  Clinic Number: [id number]    Therapy Diagnosis:   Encounter Diagnosis   Name Primary?    Acute postoperative pain of right foot Yes       Physician: Zelalem Solorzano DPM    Visit Date: 11/8/2022    Physician Orders: PT Eval and Treat  Medical Diagnosis:   Z98.890 (ICD-10-CM) - Postoperative state   M76.60 (ICD-10-CM) - Insertional Achilles tendinopathy   M62.81 (ICD-10-CM) - Muscle weakness of lower extremity      Evaluation Date: 9/14/2022  Authorization Period Expiration: 09/09/2022  Plan of Care Certification Period: 9/14/2022 to 12/09/2022  Visit # / Visits authorized: 7/20 (+ eval)  FOTO: 7/5  PTA Visit #: 0/5     Time In: 1100  Time Out: 1200  Total Billable Time: 60 minutes (2 GT, 1 TE, 1 NMRE)  Precautions: post-operative state; bootwalker with WBAT     Procedure: Procedure(s) (LRB):  RECESSION, GASTROCNEMIUS, ENDOSCOPIC (Right)  FASCIOTOMY, PLANTAR, ENDOSCOPIC (Right)      Per visit note on 09/09/2022, 'in 2 weeks she is to begin gradual 30 minute daily increments out of the boot to a tennis shoe.'    SUBJECTIVE     Pt reports: no pain medication today. Feeling good.  Feels more comfortable ambulating with her single point cane.  Has been trying to walk more at home without a cane.     She was compliant with home exercise program.  Response to previous treatment: no adverse effects.   Functional change: Ongoing    Pain: 0/10  Location: right calf and foot      OBJECTIVE     Objective Measures updated at progress report unless specified.     Treatment     Starla received the treatments listed below:      Therapeutic exercises to develop strength, endurance, ROM, flexibility, and posture for  25 minutes, including:  Gentle gastroc and soleus stretch with towel 5 x 30" - cues to perform gently not today  Seated ankle dorsiflexion/plantarflexion 2x20  Sit-to-stand 4x5 (21" surface)  Standing heel raises 2x10 " "dL  Step-ups forward 2x10 reps each at staircase 6'' step      Neuromuscular re-education: 10 minutes, including:   In parallel bars:  -- Tandem stance: 30'' hold x 4 trials   -(+) single-leg stance 10x5"/each alternating    Gait training: total time 20 minutes with PT 1:1, including:  lateral stepping // bars 6 rounds  retro gait // bars 6 rounds  tandem gait 6 rounds // bars  Ambulation outside of parallel bars x 8 ft x 6 rounds- cues for heel toe ambulation  Ambulation around gym 3 trials, 150 ft with no cane (supervision)      Patient Education and Home Exercises     Home Exercises Provided and Patient Education Provided   Education provided:   - begin shoe weaning 60 minutes twice per day at home.     Written Home Exercises Provided: Patient instructed to cont prior HEP. Exercises were reviewed and Starla was able to demonstrate them prior to the end of the session.  Starla demonstrated good  understanding of the education provided. See EMR under Patient Instructions for exercises provided during therapy sessions    ASSESSMENT     Starla is a 52 y.o. female referred to outpatient Physical Therapy with a medical diagnosis of 1) Postoperative state, 2) Insertional Achilles tendinopathy, 3) Muscle weakness of lower extremity Pt presents with s/p endoscopic right gastrocnemius recession and plantar fasciotomy procedures.  Surgery date: 08/19/2022.  Post-op 11 weeks (+ 4 days).  Improved gait velocity, selena, and safety with single-point cane as compared to 1-2 weeks ago.  Still very challenged with gait without assistive device.  Most likely multifactorial including posterior leg weakness.  Fear of falling.    Starla Is progressing well towards her goals.   Pt prognosis is Good.     Pt will continue to benefit from skilled outpatient physical therapy to address the deficits listed in the problem list box on initial evaluation, provide pt/family education and to maximize pt's level of independence in the home and " community environment.   Pt's spiritual, cultural and educational needs considered and pt agreeable to plan of care and goals.     Anticipated barriers to physical therapy: co- morbidities    Goals:   Short Term Goals: 3 weeks:  1.  Patient will demonstrate understanding of and compliance with home exercise program. Met 10/12/2022  2.  Patient will demonstrate appropriate tolerance to transition out of the bootwalker 2-3 times daily in 30 minute interval. Met 10/12/2022  3.  Patient will report >10 point improvement in LEFS score.  progressing towards; not met     Long Term Goals: 12 weeks  1.  Patient will demonstrate ability to complete 6MWT with appropriate assistive device for tolerance to community ambulation distance.  progressing towards; not met  2.  Patient will report full weaning to tennis shoe. progressing towards; not met  3.  Patient will report <49% limitation on FOTO survey.  progressing towards; not met     PLAN   Ankle/foot strength --> gradual incorporation of standing activities from week-to-week  Interval walking, gait mechanics.    Tristin Moreira, PT, DPT, OCS  11/10/2022

## 2022-11-10 RX ORDER — INSULIN PUMP SYRINGE, 3 ML
EACH MISCELLANEOUS
Refills: 0 | OUTPATIENT
Start: 2022-11-10

## 2022-11-10 RX ORDER — DILTIAZEM HYDROCHLORIDE 240 MG/1
240 CAPSULE, COATED, EXTENDED RELEASE ORAL DAILY
Qty: 30 CAPSULE | Refills: 11 | Status: SHIPPED | OUTPATIENT
Start: 2022-11-10 | End: 2022-11-14 | Stop reason: SDUPTHER

## 2022-11-10 RX ORDER — DILTIAZEM HYDROCHLORIDE 240 MG/1
CAPSULE, COATED, EXTENDED RELEASE ORAL
Qty: 90 CAPSULE | Refills: 0 | OUTPATIENT
Start: 2022-11-10

## 2022-11-10 RX ORDER — LANCETS 33 GAUGE
EACH MISCELLANEOUS
Qty: 200 EACH | Refills: 0 | OUTPATIENT
Start: 2022-11-10

## 2022-11-10 RX ORDER — LANCETS 33 GAUGE
1 EACH MISCELLANEOUS 3 TIMES DAILY
Qty: 200 EACH | Refills: 0 | Status: SHIPPED | OUTPATIENT
Start: 2022-11-10 | End: 2023-01-06

## 2022-11-10 RX ORDER — CEPHALEXIN 500 MG/1
CAPSULE ORAL
Qty: 360 CAPSULE | Refills: 0 | OUTPATIENT
Start: 2022-11-10

## 2022-11-10 RX ORDER — INSULIN PUMP SYRINGE, 3 ML
EACH MISCELLANEOUS
Qty: 1 EACH | Refills: 0 | Status: SHIPPED | OUTPATIENT
Start: 2022-11-10 | End: 2023-08-11

## 2022-11-14 ENCOUNTER — OFFICE VISIT (OUTPATIENT)
Dept: CARDIOLOGY | Facility: CLINIC | Age: 52
End: 2022-11-14
Payer: MEDICARE

## 2022-11-14 VITALS
HEART RATE: 83 BPM | WEIGHT: 293 LBS | HEIGHT: 64 IN | DIASTOLIC BLOOD PRESSURE: 86 MMHG | SYSTOLIC BLOOD PRESSURE: 122 MMHG | BODY MASS INDEX: 50.02 KG/M2 | OXYGEN SATURATION: 95 %

## 2022-11-14 DIAGNOSIS — I10 ESSENTIAL HYPERTENSION: ICD-10-CM

## 2022-11-14 DIAGNOSIS — Z86.79 S/P ABLATION OF ATRIAL FLUTTER: ICD-10-CM

## 2022-11-14 DIAGNOSIS — Z01.818 PREOP TESTING: ICD-10-CM

## 2022-11-14 DIAGNOSIS — M79.671 CHRONIC HEEL PAIN, RIGHT: ICD-10-CM

## 2022-11-14 DIAGNOSIS — M67.88 ACHILLES TENDINOSIS OF RIGHT LOWER EXTREMITY: ICD-10-CM

## 2022-11-14 DIAGNOSIS — G89.29 CHRONIC HEEL PAIN, RIGHT: ICD-10-CM

## 2022-11-14 DIAGNOSIS — Z98.890 S/P ABLATION OF ATRIAL FLUTTER: ICD-10-CM

## 2022-11-14 DIAGNOSIS — I48.3 TYPICAL ATRIAL FLUTTER: Primary | ICD-10-CM

## 2022-11-14 PROCEDURE — 99999 PR PBB SHADOW E&M-EST. PATIENT-LVL V: CPT | Mod: PBBFAC,,, | Performed by: INTERNAL MEDICINE

## 2022-11-14 PROCEDURE — 4010F PR ACE/ARB THEARPY RXD/TAKEN: ICD-10-PCS | Mod: CPTII,S$GLB,, | Performed by: INTERNAL MEDICINE

## 2022-11-14 PROCEDURE — 3044F PR MOST RECENT HEMOGLOBIN A1C LEVEL <7.0%: ICD-10-PCS | Mod: CPTII,S$GLB,, | Performed by: INTERNAL MEDICINE

## 2022-11-14 PROCEDURE — 3044F HG A1C LEVEL LT 7.0%: CPT | Mod: CPTII,S$GLB,, | Performed by: INTERNAL MEDICINE

## 2022-11-14 PROCEDURE — 1159F PR MEDICATION LIST DOCUMENTED IN MEDICAL RECORD: ICD-10-PCS | Mod: CPTII,S$GLB,, | Performed by: INTERNAL MEDICINE

## 2022-11-14 PROCEDURE — 1160F PR REVIEW ALL MEDS BY PRESCRIBER/CLIN PHARMACIST DOCUMENTED: ICD-10-PCS | Mod: CPTII,S$GLB,, | Performed by: INTERNAL MEDICINE

## 2022-11-14 PROCEDURE — 99213 PR OFFICE/OUTPT VISIT, EST, LEVL III, 20-29 MIN: ICD-10-PCS | Mod: S$GLB,,, | Performed by: INTERNAL MEDICINE

## 2022-11-14 PROCEDURE — 3079F DIAST BP 80-89 MM HG: CPT | Mod: CPTII,S$GLB,, | Performed by: INTERNAL MEDICINE

## 2022-11-14 PROCEDURE — 4010F ACE/ARB THERAPY RXD/TAKEN: CPT | Mod: CPTII,S$GLB,, | Performed by: INTERNAL MEDICINE

## 2022-11-14 PROCEDURE — 3079F PR MOST RECENT DIASTOLIC BLOOD PRESSURE 80-89 MM HG: ICD-10-PCS | Mod: CPTII,S$GLB,, | Performed by: INTERNAL MEDICINE

## 2022-11-14 PROCEDURE — 3074F PR MOST RECENT SYSTOLIC BLOOD PRESSURE < 130 MM HG: ICD-10-PCS | Mod: CPTII,S$GLB,, | Performed by: INTERNAL MEDICINE

## 2022-11-14 PROCEDURE — 3074F SYST BP LT 130 MM HG: CPT | Mod: CPTII,S$GLB,, | Performed by: INTERNAL MEDICINE

## 2022-11-14 PROCEDURE — 3008F PR BODY MASS INDEX (BMI) DOCUMENTED: ICD-10-PCS | Mod: CPTII,S$GLB,, | Performed by: INTERNAL MEDICINE

## 2022-11-14 PROCEDURE — 1160F RVW MEDS BY RX/DR IN RCRD: CPT | Mod: CPTII,S$GLB,, | Performed by: INTERNAL MEDICINE

## 2022-11-14 PROCEDURE — 99213 OFFICE O/P EST LOW 20 MIN: CPT | Mod: S$GLB,,, | Performed by: INTERNAL MEDICINE

## 2022-11-14 PROCEDURE — 99999 PR PBB SHADOW E&M-EST. PATIENT-LVL V: ICD-10-PCS | Mod: PBBFAC,,, | Performed by: INTERNAL MEDICINE

## 2022-11-14 PROCEDURE — 3008F BODY MASS INDEX DOCD: CPT | Mod: CPTII,S$GLB,, | Performed by: INTERNAL MEDICINE

## 2022-11-14 PROCEDURE — 1159F MED LIST DOCD IN RCRD: CPT | Mod: CPTII,S$GLB,, | Performed by: INTERNAL MEDICINE

## 2022-11-14 RX ORDER — DILTIAZEM HYDROCHLORIDE 240 MG/1
240 CAPSULE, COATED, EXTENDED RELEASE ORAL DAILY
Qty: 90 CAPSULE | Refills: 1 | Status: SHIPPED | OUTPATIENT
Start: 2022-11-14 | End: 2023-08-07 | Stop reason: SDUPTHER

## 2022-11-14 RX ORDER — LOSARTAN POTASSIUM 50 MG/1
50 TABLET ORAL DAILY
Qty: 90 TABLET | Refills: 3 | Status: SHIPPED | OUTPATIENT
Start: 2022-11-14 | End: 2023-06-15 | Stop reason: SDUPTHER

## 2022-11-14 NOTE — PROGRESS NOTES
[facility] [address]     SUBJECTIVE:     History of Present Illness:  Patient is a 52 y.o. female presents with followup after atrial flutter cardioversion   Cardioverted electrically and sent home on medications . Had ablation 6/22. Had foot surgery without issues     Primary Diagnosis:  1. Morbid obesity  2. Hypertension  3. Atrial flutter with rapid ventricular rate - s/p ablation  6/29/22  ROS  Since last visit 8/22:   A. Has not monitored her heart rate.no palpitations; no irregularities     B. No chest pains  C. No syncope  D. Feels tired only   E. Blood pressures normal at home variable     Past Hospitalization    Review of patient's allergies indicates:  No Known Allergies    Past Medical History:   Diagnosis Date    Anxiety     Depression     GERD (gastroesophageal reflux disease)     Hypertension     Migraine headache     Sleep difficulties     Typical atrial flutter 4/20/2022       Past Surgical History:   Procedure Laterality Date    BREAST SURGERY      ENDOMETRIAL ABLATION      ENDOSCOPIC GASTROCNEMIUS RECESSION Right 8/19/2022    Procedure: RECESSION, GASTROCNEMIUS, ENDOSCOPIC;  Surgeon: Zelalem Solorzano DPM;  Location: [facility] OR;  Service: Podiatry;  Laterality: Right;  [facility]ella/[facility]/CHRISTOPHE/REJI,RN 8/18@1333    ENDOSCOPIC PLANTAR FASCIOTOMY Right 8/19/2022    Procedure: FASCIOTOMY, PLANTAR, ENDOSCOPIC;  Surgeon: Zelalem Solorzano DPM;  Location: [facility] OR;  Service: Podiatry;  Laterality: Right;  [facility] set    EYE SURGERY      FOOT SURGERY Right 10/2018    bunion    IMPLANTATION OF PERMANENT SACRAL NERVE STIMULATOR N/A 2/15/2022    Procedure: INSERTION, NEUROSTIMULATOR, PERMANENT, SACRAL;  Surgeon: David Brown MD;  Location: [address];  Service: Urology;  Laterality: N/A;    TONSILLECTOMY      TOTAL REDUCTION MAMMOPLASTY Bilateral        Family History   Problem Relation Age of Onset    Atrial fibrillation Mother     Thyroid disease Mother     Dementia Mother      Diabetes Father     Prostate cancer Father     Stomach cancer Sister     Migraines Sister     Colon cancer Paternal Aunt     Colon cancer Paternal Uncle     Breast cancer Maternal Grandmother     Colon cancer Paternal Grandmother     Cancer Paternal Grandfather        Social History     Tobacco Use    Smoking status: Never     Passive exposure: Never    Smokeless tobacco: Never   Substance Use Topics    Alcohol use: No    Drug use: No        Home meds:  Current Outpatient Medications on File Prior to Visit   Medication Sig Dispense Refill    blood-glucose meter (TRUE METRIX GLUCOSE METER) kit Check BS as needed 1 each 0    cephALEXin (KEFLEX) 500 MG capsule Take 1 capsule (500 mg total) by mouth 4 (four) times daily. 28 capsule 0    clotrimazole-betamethasone 1-0.05% (LOTRISONE) cream Apply topically 2 (two) times daily. 15 g 1    diazePAM (VALIUM) 5 MG tablet Take 1 tablet (5 mg total) by mouth daily as needed for Anxiety. 15 tablet 2    diltiaZEM (CARDIZEM CD) 240 MG 24 hr capsule Take 1 capsule (240 mg total) by mouth once daily. 30 capsule 11    EScitalopram oxalate (LEXAPRO) 20 MG tablet Take 1 tablet (20 mg total) by mouth once daily. 90 tablet 1    Jz-C-jbwzm-B12-L.acid,plan-inu 65 mg iron- 50 mg-1 mg DFE Cap Take by mouth.      ferrous sulfate (FEOSOL) 325 mg (65 mg iron) Tab tablet Take 325 mg by mouth.      flu vacc [id number] 6mos up,PF, (FLUARIX QUAD [id number], PF,) 60 mcg (15 mcg x 4)/0.5 mL Syrg Inject 0.5ml by [facility] 0.5 mL 0    fluconazole (DIFLUCAN) 150 MG Tab Take one tablet today and one in a week 2 tablet 0    galcanezumab-gnlm (EMGALITY PEN) 120 mg/mL PnIj Inject 120 mg into the skin every 28 days. 1 mL 11    HYDROcodone-acetaminophen (NORCO) 5-325 mg per tablet Take 1 tablet by mouth every 4 (four) hours as needed for Pain. 30 tablet 0    lancets (TRUEPLUS LANCETS) 33 gauge Misc Inject 1 lancet into the skin 3 (three) times daily. 200 each 0    losartan (COZAAR) 25 MG tablet Take 1 tablet (25 mg  total) by mouth once daily. 90 tablet 3    meloxicam (MOBIC) 15 MG tablet       metronidazole 0.75% (METROCREAM) 0.75 % Crea Apply topically 2 (two) times daily. 45 g 3    mirabegron (MYRBETRIQ) 50 mg Tb24 Take 1 tablet (50 mg total) by mouth once daily. 30 tablet 11    multivit-minerals/folic acid (ADULT MULTIVITAMIN GUMMIES ORAL) Take 1 tablet by mouth Daily. Continue to hold until after Surgery.      multivitamin (THERAGRAN) per tablet Take 1 tablet by mouth.      nystatin (MYCOSTATIN) powder Apply to affected area 3 times daily 60 g 3    omeprazole (PRILOSEC) 40 MG capsule Take 1 capsule (40 mg total) by mouth once daily. 30 capsule 11    phenylephrine (TEODORA-SYNEPHRINE) 10 mg/mL injection       promethazine (PHENERGAN) 25 MG tablet Take 1 tablet (25 mg total) by mouth every 6 (six) hours as needed for Nausea. 30 tablet 0    sumatriptan (IMITREX STATDOSE) 6 mg/0.5 mL kit INJECT 0.5 ML(6 MG TOTAL) UNDER THE SKIN EVERY 2 HOURS AS NEEDED FOR MIGRAINE 3 mL 5    sumatriptan (IMITREX) 100 MG tablet Take 1 tablet (100 mg total) by mouth every 2 (two) hours as needed for Migraine. 9 tablet 11    SUMAtriptan (IMITREX) 20 mg/actuation nasal spray 1 spray (20 mg total) by Nasal route as needed for Migraine (max 40mg/24hr). 6 each 3    topiramate (TOPAMAX) 50 MG tablet TAKE 1 TABLET(50 MG) BY MOUTH THREE TIMES DAILY 270 tablet 0    traMADoL (ULTRAM) 50 mg tablet Take 1 tablet (50 mg total) by mouth every 6 (six) hours. 5 tablet 0    traZODone (DESYREL) 150 MG tablet Take half or whole tablet by mouth before bed as needed for insomnia 90 tablet 3    walker (ULTRA-LIGHT ROLLATOR) Misc Rolator for stability and balance postoperatively as needed while standing and walking. 1 each 0     Current Facility-Administered Medications on File Prior to Visit   Medication Dose Route Frequency Provider Last Rate Last Admin    0.9%  NaCl infusion   Intravenous Continuous Sathish Briseno MD   Stopped at 02/15/22 0948    LIDOcaine (PF) 10  mg/ml (1%) injection 10 mg  1 mL Intradermal Once Sathish Briseno MD        sodium chloride 0.9% flush 10 mL  10 mL Intravenous PRN Zelalem Solorzano DPM        sodium chloride 0.9% flush 10 mL  10 mL Intravenous PRN Zelalem Solorzano DPM           Cardiac meds:   eliquis 5 mg BID - off this   Diltiazem 240 mg daily  Losartan 50 mg daily           OBJECTIVE:     Vital Signs (Most Recent)  There were no vitals filed for this visit.        Physical Exam:  Neck: normal carotids, no bruits; normal JVP  Lungs :clear  Heart: fast heart rate , normal S1,S2, no murmurs, no gallops  Abd: no masses; no bruits;   Exts: normal DP and PT pulses bilaterally, no edema noted           LABS    CMP  No results for input(s): K in the last 2160 hours.    Invalid input(s): GFR      LIPID  No results for input(s): HDL, CHOL, TRIG, LDLCALC, CHOLHDL, NONHDL, TOTALCHOLEST in the last 2160 hours.        Old Results:      Diagnostic Results:    EK/22: atrial flutter to sinus after cardioversion   1b. EK22: atrial flutter with better ventricular rate and variable AV block  1c. EK22: NSR:    Echo: 22: normal EF       ASSESSMENT/PLAN:     1. Recurrence of atrial flutter now in sinus post ablation and remaining in sinus   2. No symptoms of heart failure or angina  3. Hypertension: unknown control at home   Plan: 1.start ASA 81 mg daily  2. Continue all medications   3. Return 6 months   Alexis Ingram MD

## 2022-11-15 ENCOUNTER — CLINICAL SUPPORT (OUTPATIENT)
Dept: REHABILITATION | Facility: HOSPITAL | Age: 52
End: 2022-11-15
Attending: FAMILY MEDICINE
Payer: MEDICARE

## 2022-11-15 DIAGNOSIS — M79.671 ACUTE POSTOPERATIVE PAIN OF RIGHT FOOT: Primary | ICD-10-CM

## 2022-11-15 DIAGNOSIS — G89.18 ACUTE POSTOPERATIVE PAIN OF RIGHT FOOT: Primary | ICD-10-CM

## 2022-11-15 PROCEDURE — 97110 THERAPEUTIC EXERCISES: CPT | Mod: PN

## 2022-11-15 PROCEDURE — 97112 NEUROMUSCULAR REEDUCATION: CPT | Mod: PN

## 2022-11-16 ENCOUNTER — PES CALL (OUTPATIENT)
Dept: ADMINISTRATIVE | Facility: CLINIC | Age: 52
End: 2022-11-16
Payer: MEDICARE

## 2022-11-16 NOTE — PROGRESS NOTES
"[facility]   Physical Therapy Treatment Note     Name: Starla Fisher  Clinic Number: [id number]    Therapy Diagnosis:   Encounter Diagnosis   Name Primary?    Acute postoperative pain of right foot Yes       Physician: Zelalem Solorzano DPM    Visit Date: 11/15/2022    Physician Orders: PT Eval and Treat  Medical Diagnosis:   Z98.890 (ICD-10-CM) - Postoperative state   M76.60 (ICD-10-CM) - Insertional Achilles tendinopathy   M62.81 (ICD-10-CM) - Muscle weakness of lower extremity      Evaluation Date: 9/14/2022  Authorization Period Expiration: 09/09/2022  Plan of Care Certification Period: 9/14/2022 to 12/09/2022  Visit # / Visits authorized: 8/20 (+ eval)  FOTO: 8/5  PTA Visit #: 0/5     Time In: 1100  Time Out: 1150  Total Billable Time: 30 minutes (1 NM, 1 TE)  Precautions: post-operative state; bootwalker with WBAT     Procedure: Procedure(s) (LRB):  RECESSION, GASTROCNEMIUS, ENDOSCOPIC (Right)  FASCIOTOMY, PLANTAR, ENDOSCOPIC (Right)      Per visit note on 09/09/2022, 'in 2 weeks she is to begin gradual 30 minute daily increments out of the boot to a tennis shoe.'    SUBJECTIVE     Pt reports:feeling better with walking on her cane.  Shoe wear consistent now.  No increased pain with weaning almost fully out of the tennis shoe now.      She was compliant with home exercise program.  Response to previous treatment: no adverse effects.   Functional change: Ongoing    Pain: 0/10  Location: right calf and foot      OBJECTIVE     Objective Measures updated at progress report unless specified.     Treatment     Starla received the treatments listed below:      Therapeutic exercises to develop strength, endurance, ROM, flexibility, and posture for  15 minutes with PT 1:1, including:  Gentle gastroc and soleus stretch with towel 5 x 30" - cues to perform gently not today  Seated ankle dorsiflexion/plantarflexion 2x20  Sit-to-stand 4x5 (21" surface)  Standing heel raises 2x10 dL  Step-ups " "forward 2x10 reps each at staircase 6'' step      Neuromuscular re-education: 15 minutes with PT 1:1, including:   In parallel bars:  -- Tandem stance: 30'' hold x 4 trials   -- single-leg stance 10x5"/each alternating  --lateral stepping // bars 6 rounds  --retro gait // bars 6 rounds  --tandem gait 6 rounds // bars          Patient Education and Home Exercises     Home Exercises Provided and Patient Education Provided   Education provided:   - begin shoe weaning 60 minutes twice per day at home.     Written Home Exercises Provided: Patient instructed to cont prior HEP. Exercises were reviewed and Starla was able to demonstrate them prior to the end of the session.  Starla demonstrated good  understanding of the education provided. See EMR under Patient Instructions for exercises provided during therapy sessions    ASSESSMENT     Starla is a 52 y.o. female referred to outpatient Physical Therapy with a medical diagnosis of 1) Postoperative state, 2) Insertional Achilles tendinopathy, 3) Muscle weakness of lower extremity Pt presents with s/p endoscopic right gastrocnemius recession and plantar fasciotomy procedures.  Surgery date: 08/19/2022.  Post-op 12 weeks (+ 3 days).  Improved gait velocity, selena, and safety with single-point cane.  Less fear with walking with cane.  Challenged greatly with walking without assistive device. Probably will need assistive device for several months moving forward with slow weaning off of cane.  Primarily using the cane for balance.  DC planning.     Starla Is progressing well towards her goals.   Pt prognosis is Good.     Pt will continue to benefit from skilled outpatient physical therapy to address the deficits listed in the problem list box on initial evaluation, provide pt/family education and to maximize pt's level of independence in the home and community environment.   Pt's spiritual, cultural and educational needs considered and pt agreeable to plan of care and goals.   "   Anticipated barriers to physical therapy: co- morbidities    Goals:   Short Term Goals: 3 weeks:  1.  Patient will demonstrate understanding of and compliance with home exercise program. Met 10/12/2022  2.  Patient will demonstrate appropriate tolerance to transition out of the bootwalker 2-3 times daily in 30 minute interval. Met 10/12/2022  3.  Patient will report >10 point improvement in LEFS score.  progressing towards; not met     Long Term Goals: 12 weeks  1.  Patient will demonstrate ability to complete 6MWT with appropriate assistive device for tolerance to community ambulation distance.  progressing towards; not met  2.  Patient will report full weaning to tennis shoe. progressing towards; not met  3.  Patient will report <49% limitation on FOTO survey.  progressing towards; not met     PLAN   Ankle/foot strength --> gradual incorporation of standing activities from week-to-week  Interval walking, gait mechanics.    Tristin Moreira, PT, DPT, OCS  11/16/2022

## 2022-12-02 ENCOUNTER — CLINICAL SUPPORT (OUTPATIENT)
Dept: REHABILITATION | Facility: HOSPITAL | Age: 52
End: 2022-12-02
Payer: MEDICARE

## 2022-12-02 DIAGNOSIS — G89.18 ACUTE POSTOPERATIVE PAIN OF RIGHT FOOT: Primary | ICD-10-CM

## 2022-12-02 DIAGNOSIS — M79.671 ACUTE POSTOPERATIVE PAIN OF RIGHT FOOT: Primary | ICD-10-CM

## 2022-12-02 PROCEDURE — 97112 NEUROMUSCULAR REEDUCATION: CPT | Mod: PN,CQ

## 2022-12-02 PROCEDURE — 97110 THERAPEUTIC EXERCISES: CPT | Mod: PN,CQ

## 2022-12-02 NOTE — PROGRESS NOTES
"[facility]   Physical Therapy Treatment Note     Name: Starla Fisher  Clinic Number: [id number]    Therapy Diagnosis:   Encounter Diagnosis   Name Primary?    Acute postoperative pain of right foot Yes             Physician: Zelalem Solorzano DPM    Visit Date: 12/2/2022    Physician Orders: PT Eval and Treat  Medical Diagnosis:   Z98.890 (ICD-10-CM) - Postoperative state   M76.60 (ICD-10-CM) - Insertional Achilles tendinopathy   M62.81 (ICD-10-CM) - Muscle weakness of lower extremity      Evaluation Date: 9/14/2022  Authorization Period Expiration: 09/09/2022  Plan of Care Certification Period: 9/14/2022 to 12/09/2022  Visit # / Visits authorized: 9/20 (+ eval)  FOTO: 9/5  PTA Visit #: 1/5     Time In: 1200  Time Out: 1255  Total Billable Time: 30 minutes (1 NM, 1 TE)  Precautions: post-operative state; bootwalker with WBAT     Procedure: Procedure(s) (LRB):  RECESSION, GASTROCNEMIUS, ENDOSCOPIC (Right)  FASCIOTOMY, PLANTAR, ENDOSCOPIC (Right)      Per visit note on 09/09/2022, 'in 2 weeks she is to begin gradual 30 minute daily increments out of the boot to a tennis shoe.'    SUBJECTIVE     Pt reports:feeling better with walking on her cane.  Shoe wear consistent now.  No increased pain with weaning almost fully out of the tennis shoe now.      She was compliant with home exercise program.  Response to previous treatment: no adverse effects.   Functional change: Ongoing    Pain: 0/10  Location: right calf and foot      OBJECTIVE     Objective Measures updated at progress report unless specified.     Treatment     Starla received the treatments listed below:      Therapeutic exercises to develop strength, endurance, ROM, flexibility, and posture for  15 minutes with PTA 1:1, including: Additional time supervised   Gentle gastroc and soleus stretch with towel 5 x 30" - cues to perform gently not today  Seated ankle dorsiflexion/plantarflexion 2x20  Sit-to-stand 4x5 (21" " "surface)  Standing heel raises 2x10 dL  Step-ups forward 2x10 reps each at staircase 6'' step      Neuromuscular re-education: 15 minutes with PTA 1:1, including: Additional time supervised  In parallel bars:  -- Tandem stance: 30'' hold x 4 trials   -- single-leg stance on Airex foam  3x30" /each alternating  -- lateral stepping // bars 6 rounds  -- retro gait // bars 6 laps  -- tandem gait 6 rounds // bars          Patient Education and Home Exercises     Home Exercises Provided and Patient Education Provided   Education provided:   - begin shoe weaning 60 minutes twice per day at home.     Written Home Exercises Provided: Patient instructed to cont prior HEP. Exercises were reviewed and Starla was able to demonstrate them prior to the end of the session.  Starla demonstrated good  understanding of the education provided. See EMR under Patient Instructions for exercises provided during therapy sessions    ASSESSMENT     Starla is a 52 y.o. female referred to outpatient Physical Therapy with a medical diagnosis of 1) Postoperative state, 2) Insertional Achilles tendinopathy, 3) Muscle weakness of lower extremity Pt presents with s/p endoscopic right gastrocnemius recession and plantar fasciotomy procedures.  Surgery date: 08/19/2022.  Post-op 12 weeks (+ 3 days).  Improved gait velocity, selena, and safety with single-point cane.  Walking more confidently.   Uses ane for community ambulation.      Starla Is progressing well towards her goals.   Pt prognosis is Good.     Pt will continue to benefit from skilled outpatient physical therapy to address the deficits listed in the problem list box on initial evaluation, provide pt/family education and to maximize pt's level of independence in the home and community environment.   Pt's spiritual, cultural and educational needs considered and pt agreeable to plan of care and goals.     Anticipated barriers to physical therapy: co- morbidities    Goals:   Short Term Goals: 3 " weeks:  1.  Patient will demonstrate understanding of and compliance with home exercise program. Met 10/12/2022  2.  Patient will demonstrate appropriate tolerance to transition out of the bootwalker 2-3 times daily in 30 minute interval. Met 10/12/2022  3.  Patient will report >10 point improvement in LEFS score.  progressing towards; not met     Long Term Goals: 12 weeks  1.  Patient will demonstrate ability to complete 6MWT with appropriate assistive device for tolerance to community ambulation distance.  progressing towards; not met  2.  Patient will report full weaning to tennis shoe. progressing towards; not met  3.  Patient will report <49% limitation on FOTO survey.  progressing towards; not met     PLAN   Ankle/foot strength --> gradual incorporation of standing activities from week-to-week  Interval walking, gait mechanics.  Patient to schedule one more visit with Tristin Moreira DPT for anticipated D/C     Wayne Morejon, PTA  12/2/2022

## 2022-12-07 ENCOUNTER — TELEPHONE (OUTPATIENT)
Dept: GASTROENTEROLOGY | Facility: CLINIC | Age: 52
End: 2022-12-07
Payer: MEDICARE

## 2022-12-07 NOTE — TELEPHONE ENCOUNTER
Spoke with patient and she would like to reschedule the EGD/Colonoscopy because she will not have a  home on 12/14/22. I offered to move the procedures to 12/9/22 and patient declined. I informed patient that Dr. Walsh is booked until February and I would add her on to the waitlist if someone cancels.

## 2022-12-07 NOTE — TELEPHONE ENCOUNTER
----- Message from Kenn Allan sent at 12/7/2022 12:33 PM CST -----  Type:  Needs Medical Advice    Who Called: Pt  Would the patient rather a call back or a response via [facility]? call  Best Call Back Number: [phone]  Additional Information: pt calling in regards to her schedule Procedures set for 12/14/2022 she would like a call to reschedule to a later date the week of 12/19/2022 please call pt

## 2022-12-08 ENCOUNTER — TELEPHONE (OUTPATIENT)
Dept: GASTROENTEROLOGY | Facility: CLINIC | Age: 52
End: 2022-12-08
Payer: MEDICARE

## 2022-12-08 NOTE — TELEPHONE ENCOUNTER
----- Message from Anay Storm sent at 12/7/2022  5:05 PM CST -----  Type:  Needs Medical Advice    Who Called: Patient     Would the patient rather a call back or a response via [facility]? Call back  Best Call Back Number: [phone]  Additional Information: Patient would like to know if her insurance transportation can bring her home after her colonoscopy.

## 2022-12-08 NOTE — TELEPHONE ENCOUNTER
Spoke with patient and she would like to schedule the EGD/Colonoscopy back on 12/14/22 because she will have medical transportation.

## 2022-12-12 ENCOUNTER — TELEPHONE (OUTPATIENT)
Dept: ENDOSCOPY | Facility: HOSPITAL | Age: 52
End: 2022-12-12
Payer: MEDICARE

## 2022-12-12 NOTE — TELEPHONE ENCOUNTER
Mail box full, unable to leave voice message.  Left text message instructing patient to call dept @ [phone] between 8am-3pm.    Arrival time to be given @5760  EGD/Colon (Sutab)  Covid -vacc  (Message sent via [facility] portal)

## 2022-12-12 NOTE — TELEPHONE ENCOUNTER
Spoke with patient about arrival time @ 1330.   EGD/Colon  Covid test = vacc    Prep instructions reviewed: the day before the procedure, follow a clear liquid diet all day, then start the first 1/2 of prep at 5pm and take 2nd 1/2 of prep @ 0830.  Pt must be completely NPO when prep completed @ 1030.  Instruction for Sutab prep sent to portal.            Medications: Do not take Insulin or oral diabetic medications the day of the procedure.  Take as prescribed: heart, seizure and blood pressure medication in the morning with a sip of water (less than an ounce).  Take any breathing medications and bring inhalers to hospital with you Leave all valuables and jewelry at home.     Wear comfortable clothes to procedure to change into hospital gown You cannot drive for 24 hours after your procedure because you will receive sedation for your procedure to make you comfortable.  A ride must be provided at discharge.

## 2022-12-14 ENCOUNTER — ANESTHESIA EVENT (OUTPATIENT)
Dept: ENDOSCOPY | Facility: HOSPITAL | Age: 52
End: 2022-12-14
Payer: MEDICARE

## 2022-12-14 ENCOUNTER — HOSPITAL ENCOUNTER (OUTPATIENT)
Facility: HOSPITAL | Age: 52
Discharge: HOME | End: 2022-12-14
Attending: INTERNAL MEDICINE | Admitting: INTERNAL MEDICINE
Payer: MEDICARE

## 2022-12-14 ENCOUNTER — ANESTHESIA (OUTPATIENT)
Dept: ENDOSCOPY | Facility: HOSPITAL | Age: 52
End: 2022-12-14
Payer: MEDICARE

## 2022-12-14 VITALS
OXYGEN SATURATION: 100 % | RESPIRATION RATE: 22 BRPM | BODY MASS INDEX: 50.02 KG/M2 | HEART RATE: 79 BPM | SYSTOLIC BLOOD PRESSURE: 117 MMHG | DIASTOLIC BLOOD PRESSURE: 45 MMHG | HEIGHT: 64 IN | WEIGHT: 293 LBS | TEMPERATURE: 98 F

## 2022-12-14 DIAGNOSIS — Z12.11 SCREEN FOR COLON CANCER: ICD-10-CM

## 2022-12-14 PROCEDURE — 88305 TISSUE EXAM BY PATHOLOGIST: ICD-10-PCS | Mod: 26,,, | Performed by: PATHOLOGY

## 2022-12-14 PROCEDURE — 88342 IMHCHEM/IMCYTCHM 1ST ANTB: CPT | Mod: 26,,, | Performed by: PATHOLOGY

## 2022-12-14 PROCEDURE — 43239 PR EGD, FLEX, W/BIOPSY, SGL/MULTI: ICD-10-PCS | Mod: 51,,, | Performed by: INTERNAL MEDICINE

## 2022-12-14 PROCEDURE — 88342 CHG IMMUNOCYTOCHEMISTRY: ICD-10-PCS | Mod: 26,,, | Performed by: PATHOLOGY

## 2022-12-14 PROCEDURE — G0121 COLON CA SCRN NOT HI RSK IND: ICD-10-PCS | Mod: ,,, | Performed by: INTERNAL MEDICINE

## 2022-12-14 PROCEDURE — 25000003 PHARM REV CODE 250: Performed by: NURSE ANESTHETIST, CERTIFIED REGISTERED

## 2022-12-14 PROCEDURE — 88305 TISSUE EXAM BY PATHOLOGIST: CPT | Mod: 26,,, | Performed by: PATHOLOGY

## 2022-12-14 PROCEDURE — 63600175 PHARM REV CODE 636 W HCPCS: Performed by: NURSE ANESTHETIST, CERTIFIED REGISTERED

## 2022-12-14 PROCEDURE — 37000009 HC ANESTHESIA EA ADD 15 MINS: Performed by: INTERNAL MEDICINE

## 2022-12-14 PROCEDURE — 25000003 PHARM REV CODE 250: Performed by: INTERNAL MEDICINE

## 2022-12-14 PROCEDURE — 43239 EGD BIOPSY SINGLE/MULTIPLE: CPT | Mod: 51,,, | Performed by: INTERNAL MEDICINE

## 2022-12-14 PROCEDURE — 27201012 HC FORCEPS, HOT/COLD, DISP: Performed by: INTERNAL MEDICINE

## 2022-12-14 PROCEDURE — 37000008 HC ANESTHESIA 1ST 15 MINUTES: Performed by: INTERNAL MEDICINE

## 2022-12-14 PROCEDURE — 88305 TISSUE EXAM BY PATHOLOGIST: CPT | Mod: 59 | Performed by: PATHOLOGY

## 2022-12-14 PROCEDURE — D9220A PRA ANESTHESIA: Mod: ANES,,, | Performed by: ANESTHESIOLOGY

## 2022-12-14 PROCEDURE — D9220A PRA ANESTHESIA: Mod: CRNA,,, | Performed by: NURSE ANESTHETIST, CERTIFIED REGISTERED

## 2022-12-14 PROCEDURE — G0121 COLON CA SCRN NOT HI RSK IND: HCPCS | Mod: ,,, | Performed by: INTERNAL MEDICINE

## 2022-12-14 PROCEDURE — D9220A PRA ANESTHESIA: ICD-10-PCS | Mod: ANES,,, | Performed by: ANESTHESIOLOGY

## 2022-12-14 PROCEDURE — 88342 IMHCHEM/IMCYTCHM 1ST ANTB: CPT | Performed by: PATHOLOGY

## 2022-12-14 PROCEDURE — 43239 EGD BIOPSY SINGLE/MULTIPLE: CPT | Performed by: INTERNAL MEDICINE

## 2022-12-14 PROCEDURE — D9220A PRA ANESTHESIA: ICD-10-PCS | Mod: CRNA,,, | Performed by: NURSE ANESTHETIST, CERTIFIED REGISTERED

## 2022-12-14 PROCEDURE — G0121 COLON CA SCRN NOT HI RSK IND: HCPCS | Performed by: INTERNAL MEDICINE

## 2022-12-14 RX ORDER — DEXTROMETHORPHAN/PSEUDOEPHED 2.5-7.5/.8
DROPS ORAL
Status: COMPLETED | OUTPATIENT
Start: 2022-12-14 | End: 2022-12-14

## 2022-12-14 RX ORDER — LIDOCAINE HYDROCHLORIDE 20 MG/ML
INJECTION INTRAVENOUS
Status: DISCONTINUED | OUTPATIENT
Start: 2022-12-14 | End: 2022-12-14

## 2022-12-14 RX ORDER — SODIUM CHLORIDE 9 MG/ML
INJECTION, SOLUTION INTRAVENOUS CONTINUOUS
Status: DISCONTINUED | OUTPATIENT
Start: 2022-12-14 | End: 2022-12-14 | Stop reason: HOSPADM

## 2022-12-14 RX ORDER — SODIUM CHLORIDE 0.9 % (FLUSH) 0.9 %
3 SYRINGE (ML) INJECTION
Status: DISCONTINUED | OUTPATIENT
Start: 2022-12-14 | End: 2022-12-14 | Stop reason: HOSPADM

## 2022-12-14 RX ORDER — ONDANSETRON 2 MG/ML
4 INJECTION INTRAMUSCULAR; INTRAVENOUS ONCE AS NEEDED
Status: DISCONTINUED | OUTPATIENT
Start: 2022-12-14 | End: 2022-12-14 | Stop reason: HOSPADM

## 2022-12-14 RX ORDER — PROPOFOL 10 MG/ML
VIAL (ML) INTRAVENOUS CONTINUOUS PRN
Status: DISCONTINUED | OUTPATIENT
Start: 2022-12-14 | End: 2022-12-14

## 2022-12-14 RX ORDER — PROPOFOL 10 MG/ML
VIAL (ML) INTRAVENOUS
Status: DISCONTINUED | OUTPATIENT
Start: 2022-12-14 | End: 2022-12-14

## 2022-12-14 RX ORDER — SODIUM CHLORIDE 0.9 % (FLUSH) 0.9 %
10 SYRINGE (ML) INJECTION
Status: DISCONTINUED | OUTPATIENT
Start: 2022-12-14 | End: 2022-12-14 | Stop reason: HOSPADM

## 2022-12-14 RX ADMIN — TOPICAL ANESTHETIC 1 EACH: 200 SPRAY DENTAL; PERIODONTAL at 02:12

## 2022-12-14 RX ADMIN — PROPOFOL 150 MCG/KG/MIN: 10 INJECTION, EMULSION INTRAVENOUS at 02:12

## 2022-12-14 RX ADMIN — GLYCOPYRROLATE 0.2 MG: 0.2 INJECTION, SOLUTION INTRAMUSCULAR; INTRAVITREAL at 02:12

## 2022-12-14 RX ADMIN — LIDOCAINE HYDROCHLORIDE 100 MG: 20 INJECTION, SOLUTION INTRAVENOUS at 02:12

## 2022-12-14 RX ADMIN — SODIUM CHLORIDE: 0.9 INJECTION, SOLUTION INTRAVENOUS at 01:12

## 2022-12-14 RX ADMIN — PROPOFOL 100 MG: 10 INJECTION, EMULSION INTRAVENOUS at 02:12

## 2022-12-14 NOTE — H&P
Short Stay Endoscopy History and Physical    PCP - Tahir Mosquera MD    Procedure - Colonoscopy   + EGD  ASA - per anesthesia  Mallampati - per anesthesia  History of Anesthesia problems - no  Family history Anesthesia problems - no   Plan of anesthesia - General    HPI:  This is a 52 y.o. female here for evaluation of : asymptomatic screening exam  Grandfather with CRC    Egd for reflux      ROS:  Constitutional: No fevers, chills, No weight loss  CV: No chest pain  Pulm: No cough, No shortness of breath  GI: see HPI  Derm: No rash    Medical History:  has a past medical history of Anxiety, Depression, GERD (gastroesophageal reflux disease), Hypertension, Migraine headache, Sleep difficulties, and Typical atrial flutter (4/20/2022).    Surgical History:  has a past surgical history that includes Endometrial ablation; Tonsillectomy; Breast surgery; Eye surgery; Foot surgery (Right, 10/2018); Total Reduction Mammoplasty (Bilateral); Implantation of permanent sacral nerve stimulator (N/A, 2/15/2022); Endoscopic gastrocnemius recession (Right, 8/19/2022); and Endoscopic plantar fasciotomy (Right, 8/19/2022).    Family History: family history includes Atrial fibrillation in her mother; Breast cancer in her maternal grandmother; Cancer in her paternal grandfather; Colon cancer in her paternal aunt, paternal grandmother, and paternal uncle; Dementia in her mother; Diabetes in her father; Migraines in her sister; Prostate cancer in her father; Stomach cancer in her sister; Thyroid disease in her mother.. Otherwise no colon cancer, inflammatory bowel disease, or GI malignancies.    Social History:  reports that she has never smoked. She has never been exposed to tobacco smoke. She has never used smokeless tobacco. She reports that she does not drink alcohol and does not use drugs.    Review of patient's allergies indicates:  No Known Allergies    Medications:   Facility-Administered Medications Prior to Admission    Medication Dose Route Frequency Provider Last Rate Last Admin    sodium chloride 0.9% flush 10 mL  10 mL Intravenous PRN Zelalem Solorzano DPM        sodium chloride 0.9% flush 10 mL  10 mL Intravenous PRN Zelalem Solorzano DPM         Medications Prior to Admission   Medication Sig Dispense Refill Last Dose    clotrimazole-betamethasone 1-0.05% (LOTRISONE) cream Apply topically 2 (two) times daily. 15 g 1 Past Month    diltiaZEM (CARDIZEM CD) 240 MG 24 hr capsule Take 1 capsule (240 mg total) by mouth once daily. 90 capsule 1 Past Month    EScitalopram oxalate (LEXAPRO) 20 MG tablet Take 1 tablet (20 mg total) by mouth once daily. 90 tablet 1 12/14/2022    galcanezumab-gnlm (EMGALITY PEN) 120 mg/mL PnIj Inject 120 mg into the skin every 28 days. 1 mL 11 Past Week    losartan (COZAAR) 50 MG tablet Take 1 tablet (50 mg total) by mouth once daily. 90 tablet 3 12/14/2022    multivit-minerals/folic acid (ADULT MULTIVITAMIN GUMMIES ORAL) Take 1 tablet by mouth Daily. Continue to hold until after Surgery.   12/13/2022    multivitamin (THERAGRAN) per tablet Take 1 tablet by mouth.   12/13/2022    omeprazole (PRILOSEC) 40 MG capsule Take 1 capsule (40 mg total) by mouth once daily. 30 capsule 11 Past Week    phenylephrine (TEODORA-SYNEPHRINE) 10 mg/mL injection    Past Week    promethazine (PHENERGAN) 25 MG tablet Take 1 tablet (25 mg total) by mouth every 6 (six) hours as needed for Nausea. 30 tablet 0 Past Month    sumatriptan (IMITREX) 100 MG tablet Take 1 tablet (100 mg total) by mouth every 2 (two) hours as needed for Migraine. 9 tablet 11 Past Week    traZODone (DESYREL) 150 MG tablet Take half or whole tablet by mouth before bed as needed for insomnia 90 tablet 3 Past Week    walker (ULTRA-LIGHT ROLLATOR) Misc Rolator for stability and balance postoperatively as needed while standing and walking. 1 each 0 12/14/2022    blood-glucose meter (TRUE METRIX GLUCOSE METER) kit Check BS as needed 1 each 0 Unknown     cephALEXin (KEFLEX) 500 MG capsule Take 1 capsule (500 mg total) by mouth 4 (four) times daily. (Patient not taking: Reported on 11/14/2022) 28 capsule 0 Unknown    diazePAM (VALIUM) 5 MG tablet Take 1 tablet (5 mg total) by mouth daily as needed for Anxiety. 15 tablet 2 More than a month    Vf-S-aapwf-B12-L.acid,plan-inu 65 mg iron- 50 mg-1 mg DFE Cap Take by mouth.   Unknown    ferrous sulfate (FEOSOL) 325 mg (65 mg iron) Tab tablet Take 325 mg by mouth.   Unknown    flu vacc [id number] 6mos up,PF, (FLUARIX QUAD [id number], PF,) 60 mcg (15 mcg x 4)/0.5 mL Syrg Inject 0.5ml by Rph 0.5 mL 0 Unknown    fluconazole (DIFLUCAN) 150 MG Tab Take one tablet today and one in a week 2 tablet 0 Unknown    HYDROcodone-acetaminophen (NORCO) 5-325 mg per tablet Take 1 tablet by mouth every 4 (four) hours as needed for Pain. 30 tablet 0 Unknown    lancets (TRUEPLUS LANCETS) 33 gauge Misc Inject 1 lancet into the skin 3 (three) times daily. 200 each 0 Unknown    meloxicam (MOBIC) 15 MG tablet    Unknown    metronidazole 0.75% (METROCREAM) 0.75 % Crea Apply topically 2 (two) times daily. 45 g 3 Unknown    mirabegron (MYRBETRIQ) 50 mg Tb24 Take 1 tablet (50 mg total) by mouth once daily. 30 tablet 11     nystatin (MYCOSTATIN) powder Apply to affected area 3 times daily 60 g 3 Unknown    sumatriptan (IMITREX STATDOSE) 6 mg/0.5 mL kit INJECT 0.5 ML(6 MG TOTAL) UNDER THE SKIN EVERY 2 HOURS AS NEEDED FOR MIGRAINE (Patient not taking: Reported on 11/14/2022) 3 mL 5 Unknown    SUMAtriptan (IMITREX) 20 mg/actuation nasal spray 1 spray (20 mg total) by Nasal route as needed for Migraine (max 40mg/24hr). 6 each 3     topiramate (TOPAMAX) 50 MG tablet TAKE 1 TABLET(50 MG) BY MOUTH THREE TIMES DAILY 270 tablet 0 Unknown    traMADoL (ULTRAM) 50 mg tablet Take 1 tablet (50 mg total) by mouth every 6 (six) hours. 5 tablet 0 More than a month         Physical Exam:    Vital Signs:   Vitals:    12/14/22 1340   BP: 138/74   Pulse: 83   Resp: 17    Temp: 97.5 °F (36.4 °C)       General Appearance: Well appearing in no acute distress  Eyes:    No scleral icterus  ENT: Neck supple, Lips, mucosa, and tongue normal; teeth and gums normal  Abdomen: Soft, non tender, non distended with positive bowel sounds. No hepatosplenomegaly, ascites, or mass.  Extremities: 2+ pulses, no clubbing, cyanosis or edema  Skin: No rash      Labs:  Lab Results   Component Value Date    WBC 7.03 08/11/2022    HGB 11.8 (L) 08/11/2022    HCT 38.4 08/11/2022     08/11/2022    CHOL 205 (H) 02/24/2022    TRIG 103 02/24/2022    HDL 61 02/24/2022    ALT 16 08/11/2022    AST 17 08/11/2022     08/11/2022    K 4.2 08/11/2022     08/11/2022    CREATININE 0.8 08/11/2022    BUN 17 08/11/2022    CO2 27 08/11/2022    TSH 2.637 04/20/2022    HGBA1C 5.6 02/24/2022       I have explained the risks and benefits of endoscopy procedures to the patient including but not limited to bleeding, perforation, infection, and death.  The patient was asked if they understand and allowed to ask any further questions to their satisfaction.    Wei Walsh MD

## 2022-12-14 NOTE — ANESTHESIA POSTPROCEDURE EVALUATION
Anesthesia Post Evaluation    Patient: Starla Fisher    Procedure(s) Performed: Procedure(s) (LRB):  COLONOSCOPY sutab (N/A)  EGD (ESOPHAGOGASTRODUODENOSCOPY) (N/A)    Final Anesthesia Type: general      Patient location during evaluation: GI PACU  Patient participation: Yes- Able to Participate  Level of consciousness: awake and alert  Post-procedure vital signs: reviewed and stable  Pain management: adequate  Airway patency: patent    PONV status at discharge: No PONV  Anesthetic complications: no      Cardiovascular status: blood pressure returned to baseline, hemodynamically stable and stable  Respiratory status: unassisted, room air and spontaneous ventilation  Hydration status: euvolemic  Follow-up not needed.          Vitals Value Taken Time   BP  12/14/22 1443   Temp  12/14/22 1443   Pulse  12/14/22 1443   Resp  12/14/22 1443   SpO2  12/14/22 1443       See nursing notes for vitals   No case tracking events are documented in the log.      Pain/Anca Score: No data recorded

## 2022-12-14 NOTE — PROVATION PATIENT INSTRUCTIONS
Discharge Summary/Instructions after an Endoscopic Procedure  Patient Name: Starla Fisher  Patient MRN: [id number]  Patient YOB: 1970 Wednesday, December 14, 2022  Wei Walsh MD  Dear patient,  As a result of recent federal legislation (The Federal Cures Act), you may   receive lab or pathology results from your procedure in your [facility]   account before your physician is able to contact you. Your physician or   their representative will relay the results to you with their   recommendations at their soonest availability.  Thank you,  Your health is very important to us during the Covid Crisis. Following your   procedure today, you will receive a daily text for 2 weeks asking about   signs or symptoms of Covid 19.  Please respond to this text when you   receive it so we can follow up and keep you as safe as possible.   RESTRICTIONS:  During your procedure today, you received medications for sedation.  These   medications may affect your judgment, balance and coordination.  Therefore,   for 24 hours, you have the following restrictions:   - DO NOT drive a car, operate machinery, make legal/financial decisions,   sign important papers or drink alcohol.    ACTIVITY:  Today: no heavy lifting, straining or running due to procedural   sedation/anesthesia.  The following day: return to full activity including work.  DIET:  Eat and drink normally unless instructed otherwise.     TREATMENT FOR COMMON SIDE EFFECTS:  - Mild abdominal pain, nausea, belching, bloating or excessive gas:  rest,   eat lightly and use a heating pad.  - Sore Throat: treat with throat lozenges and/or gargle with warm salt   water.  - Because air was used during the procedure, expelling large amounts of air   from your rectum or belching is normal.  - If a bowel prep was taken, you may not have a bowel movement for 1-3 days.    This is normal.  SYMPTOMS TO WATCH FOR AND REPORT TO YOUR PHYSICIAN:  1. Abdominal pain or bloating, other  than gas cramps.  2. Chest pain.  3. Back pain.  4. Signs of infection such as: chills or fever occurring within 24 hours   after the procedure.  5. Rectal bleeding, which would show as bright red, maroon, or black stools.   (A tablespoon of blood from the rectum is not serious, especially if   hemorrhoids are present.)  6. Vomiting.  7. Weakness or dizziness.  GO DIRECTLY TO THE NEAREST EMERGENCY ROOM IF YOU HAVE ANY OF THE FOLLOWING:      Difficulty breathing              Chills and/or fever over 101 F   Persistent vomiting and/or vomiting blood   Severe abdominal pain   Severe chest pain   Black, tarry stools   Bleeding- more than one tablespoon   Any other symptom or condition that you feel may need urgent attention  Your doctor recommends these additional instructions:  If any biopsies were taken, your doctors clinic will contact you in 1 to 2   weeks with any results.  - Discharge patient to home.   - Patient has a contact number available for emergencies.  The signs and   symptoms of potential delayed complications were discussed with the   patient.  Return to normal activities tomorrow.  Written discharge   instructions were provided to the patient.   - Resume previous diet.   - Continue present medications.   - Await pathology results.   - Perform a colonoscopy today.  For questions, problems or results please call your physician - Wei Walsh MD.  EMERGENCY PHONE NUMBER: [phone],  LAB RESULTS: [phone]  IF A COMPLICATION OR EMERGENCY SITUATION ARISES AND YOU ARE UNABLE TO REACH   YOUR PHYSICIAN - GO DIRECTLY TO THE EMERGENCY ROOM.  Wei Walsh MD  12/14/2022 2:19:09 PM  This report has been verified and signed electronically.  Dear patient,  As a result of recent federal legislation (The Federal Cures Act), you may   receive lab or pathology results from your procedure in your [facility]   account before your physician is able to contact you. Your physician or   their representative  will relay the results to you with their   recommendations at their soonest availability.  Thank you,  PROVATION

## 2022-12-14 NOTE — TRANSFER OF CARE
"Anesthesia Transfer of Care Note    Patient: Starla Fisher    Procedure(s) Performed: Procedure(s) (LRB):  COLONOSCOPY sutab (N/A)  EGD (ESOPHAGOGASTRODUODENOSCOPY) (N/A)    Patient location: GI    Anesthesia Type: general    Transport from OR: Transported from OR on room air with adequate spontaneous ventilation    Post pain: adequate analgesia    Post assessment: no apparent anesthetic complications and tolerated procedure well    Post vital signs: stable    Level of consciousness: awake    Nausea/Vomiting: no nausea/vomiting    Complications: none    Transfer of care protocol was followed      Last vitals:   Visit Vitals  /74 (BP Location: Left arm, Patient Position: Lying)   Pulse 83   Temp 36.4 °C (97.5 °F) (Temporal)   Resp 17   Ht 5' 4" (1.626 m)   Wt 136.1 kg (300 lb)   LMP  (LMP Unknown)   SpO2 98%   Breastfeeding No   BMI 51.49 kg/m²     "

## 2022-12-14 NOTE — ANESTHESIA PREPROCEDURE EVALUATION
12/14/2022  Starla Fisher is a 52 y.o., female.      Pre-op Assessment    I have reviewed the Patient Summary Reports.     I have reviewed the Nursing Notes.    I have reviewed the Medications.     Review of Systems  Anesthesia Hx:  Denies Family Hx of Anesthesia complications.   Denies Personal Hx of Anesthesia complications.     Patient Active Problem List   Diagnosis    Essential hypertension    Morbid obesity with BMI of 50.0-59.9, adult    Migraine with aura and without status migrainosus, not intractable    Intractable chronic migraine without aura and without status migrainosus    Major depressive disorder, recurrent, moderate    Insomnia disorder, with non-sleep disorder mental comorbidity    Generalized anxiety disorder with panic attacks    Right ankle pain    Impaired gait and mobility    Muscle weakness (generalized)    CARL (obstructive sleep apnea)    Typical atrial flutter    Chronic heel pain, right    Achilles tendinosis of right lower extremity    Equinus contracture of right ankle    Plantar fasciitis of right foot    Acute postoperative pain of right foot       Past Medical History:   Diagnosis Date    Anxiety     Depression     GERD (gastroesophageal reflux disease)     Hypertension     Migraine headache     Sleep difficulties     Typical atrial flutter 4/20/2022       ECHO: No results found for this or any previous visit.      There is no height or weight on file to calculate BMI.    Tobacco Use: Low Risk     Smoking Tobacco Use: Never    Smokeless Tobacco Use: Never    Passive Exposure: Never       Social History     Substance and Sexual Activity   Drug Use No        Alcohol Use: Not on file         Airway:  No value filed.    Physical Exam    Airway:  Mouth Opening: Normal  TM Distance: Normal          Anesthesia Plan  Type of Anesthesia, risks & benefits  discussed:    Anesthesia Type: Gen Natural Airway  Intra-op Monitoring Plan: Standard ASA Monitors  Post Op Pain Control Plan: multimodal analgesia  Induction:  IV  Informed Consent: Informed consent signed with the Patient and all parties understand the risks and agree with anesthesia plan.  All questions answered.   ASA Score: 3  Day of Surgery Review of History & Physical: H&P Update referred to the surgeon/provider.    Ready For Surgery From Anesthesia Perspective.     .

## 2022-12-14 NOTE — PROVATION PATIENT INSTRUCTIONS
Discharge Summary/Instructions after an Endoscopic Procedure  Patient Name: Starla Fisher  Patient MRN: [id number]  Patient YOB: 1970 Wednesday, December 14, 2022  Wei Walsh MD  Dear patient,  As a result of recent federal legislation (The Federal Cures Act), you may   receive lab or pathology results from your procedure in your [facility]   account before your physician is able to contact you. Your physician or   their representative will relay the results to you with their   recommendations at their soonest availability.  Thank you,  Your health is very important to us during the Covid Crisis. Following your   procedure today, you will receive a daily text for 2 weeks asking about   signs or symptoms of Covid 19.  Please respond to this text when you   receive it so we can follow up and keep you as safe as possible.   RESTRICTIONS:  During your procedure today, you received medications for sedation.  These   medications may affect your judgment, balance and coordination.  Therefore,   for 24 hours, you have the following restrictions:   - DO NOT drive a car, operate machinery, make legal/financial decisions,   sign important papers or drink alcohol.    ACTIVITY:  Today: no heavy lifting, straining or running due to procedural   sedation/anesthesia.  The following day: return to full activity including work.  DIET:  Eat and drink normally unless instructed otherwise.     TREATMENT FOR COMMON SIDE EFFECTS:  - Mild abdominal pain, nausea, belching, bloating or excessive gas:  rest,   eat lightly and use a heating pad.  - Sore Throat: treat with throat lozenges and/or gargle with warm salt   water.  - Because air was used during the procedure, expelling large amounts of air   from your rectum or belching is normal.  - If a bowel prep was taken, you may not have a bowel movement for 1-3 days.    This is normal.  SYMPTOMS TO WATCH FOR AND REPORT TO YOUR PHYSICIAN:  1. Abdominal pain or bloating, other  than gas cramps.  2. Chest pain.  3. Back pain.  4. Signs of infection such as: chills or fever occurring within 24 hours   after the procedure.  5. Rectal bleeding, which would show as bright red, maroon, or black stools.   (A tablespoon of blood from the rectum is not serious, especially if   hemorrhoids are present.)  6. Vomiting.  7. Weakness or dizziness.  GO DIRECTLY TO THE NEAREST EMERGENCY ROOM IF YOU HAVE ANY OF THE FOLLOWING:      Difficulty breathing              Chills and/or fever over 101 F   Persistent vomiting and/or vomiting blood   Severe abdominal pain   Severe chest pain   Black, tarry stools   Bleeding- more than one tablespoon   Any other symptom or condition that you feel may need urgent attention  Your doctor recommends these additional instructions:  If any biopsies were taken, your doctors clinic will contact you in 1 to 2   weeks with any results.  - Discharge patient to home.   - Patient has a contact number available for emergencies.  The signs and   symptoms of potential delayed complications were discussed with the   patient.  Return to normal activities tomorrow.  Written discharge   instructions were provided to the patient.   - Resume previous diet.   - Continue present medications.   - Repeat colonoscopy in 10 years for screening purposes.  For questions, problems or results please call your physician - Wei Walsh MD.  EMERGENCY PHONE NUMBER: [phone],  LAB RESULTS: [phone]  IF A COMPLICATION OR EMERGENCY SITUATION ARISES AND YOU ARE UNABLE TO REACH   YOUR PHYSICIAN - GO DIRECTLY TO THE EMERGENCY ROOM.  Wei Walsh MD  12/14/2022 2:33:36 PM  This report has been verified and signed electronically.  Dear patient,  As a result of recent federal legislation (The Federal Cures Act), you may   receive lab or pathology results from your procedure in your [facility]   account before your physician is able to contact you. Your physician or   their representative  will relay the results to you with their   recommendations at their soonest availability.  Thank you,  PROVATION

## 2022-12-19 PROBLEM — M79.671 ACUTE POSTOPERATIVE PAIN OF RIGHT FOOT: Status: RESOLVED | Noted: 2022-09-15 | Resolved: 2022-12-19

## 2022-12-19 PROBLEM — G89.18 ACUTE POSTOPERATIVE PAIN OF RIGHT FOOT: Status: RESOLVED | Noted: 2022-09-15 | Resolved: 2022-12-19

## 2022-12-23 LAB
FINAL PATHOLOGIC DIAGNOSIS: NORMAL
GROSS: NORMAL
Lab: NORMAL

## 2022-12-28 DIAGNOSIS — K21.9 GASTROESOPHAGEAL REFLUX DISEASE, UNSPECIFIED WHETHER ESOPHAGITIS PRESENT: ICD-10-CM

## 2022-12-28 RX ORDER — PANTOPRAZOLE SODIUM 40 MG/1
TABLET, DELAYED RELEASE ORAL
Qty: 60 TABLET | Refills: 0 | OUTPATIENT
Start: 2022-12-28

## 2023-01-09 NOTE — TELEPHONE ENCOUNTER
Called pt and told her dr. Mosquera refused her rx refill. Pt verbalized understanding but did not know why it would be refilled when she takes it.

## 2023-01-10 ENCOUNTER — TELEPHONE (OUTPATIENT)
Dept: FAMILY MEDICINE | Facility: CLINIC | Age: 53
End: 2023-01-10
Payer: MEDICARE

## 2023-01-10 NOTE — TELEPHONE ENCOUNTER
Protonix was stopped by GI on 10/19/2022 and she was started on omeprazole 40 mg(prilosec), she should take prilosec

## 2023-01-10 NOTE — TELEPHONE ENCOUNTER
----- Message from Grace Rojas MA sent at 1/9/2023  7:08 PM CST -----    ----- Message -----  From: Geoffrey Bailey  Sent: 1/9/2023  10:57 AM CST  To: Demetri Haro Staff    .Type:  RX Refill Request    Who Called: Pt  Refill or New Rx:Refill  RX Name and Strength:pantoprazole (PROTONIX) 40 MG tablet   How is the patient currently taking it? (ex. 1XDay):Once a day  Is this a 30 day or 90 day RX:90  Preferred Pharmacy with phone number:  [facility]   Phone: [phone]  Fax:  [phone]  Would the patient rather a call back or a response via [facility]? call  Best Call Back Number:  Additional Information:   Pt wants to know why the prescription was denied.  Pt is out of medication.

## 2023-01-10 NOTE — TELEPHONE ENCOUNTER
Can you tell me why the pantoprazole was not refilled? It says refill not appropriate and that it has been discontinued, but I can't see any notes as to why. The patient states that she should still be taking it. Please advise.

## 2023-01-17 ENCOUNTER — PATIENT MESSAGE (OUTPATIENT)
Dept: FAMILY MEDICINE | Facility: CLINIC | Age: 53
End: 2023-01-17
Payer: MEDICARE

## 2023-01-18 DIAGNOSIS — Z12.11 SCREENING FOR COLON CANCER: ICD-10-CM

## 2023-01-18 RX ORDER — OMEPRAZOLE 40 MG/1
40 CAPSULE, DELAYED RELEASE ORAL DAILY
Qty: 30 CAPSULE | Refills: 11 | Status: SHIPPED | OUTPATIENT
Start: 2023-01-18 | End: 2024-01-08 | Stop reason: SDUPTHER

## 2023-01-19 ENCOUNTER — TELEPHONE (OUTPATIENT)
Dept: BARIATRICS | Facility: CLINIC | Age: 53
End: 2023-01-19
Payer: MEDICARE

## 2023-01-19 NOTE — TELEPHONE ENCOUNTER
----- Message from Jennie Mendez sent at 1/19/2023  3:10 PM CST -----  Name Of Caller:Starla            Provider Name:establish care            Does patient feel the need to be seen today?NO            Relationship to the Pt?:Pt            Contact Preference?:[phone]            What is the nature of the call?: Pt would like to schedule an appt for weight loss.

## 2023-01-20 ENCOUNTER — TELEPHONE (OUTPATIENT)
Dept: BARIATRICS | Facility: CLINIC | Age: 53
End: 2023-01-20
Payer: MEDICARE

## 2023-01-20 ENCOUNTER — TELEPHONE (OUTPATIENT)
Dept: NEUROLOGY | Facility: CLINIC | Age: 53
End: 2023-01-20
Payer: MEDICARE

## 2023-01-23 ENCOUNTER — TELEPHONE (OUTPATIENT)
Dept: BARIATRICS | Facility: CLINIC | Age: 53
End: 2023-01-23
Payer: MEDICARE

## 2023-01-23 DIAGNOSIS — Z12.11 SCREENING FOR COLON CANCER: ICD-10-CM

## 2023-01-23 RX ORDER — OMEPRAZOLE 40 MG/1
40 CAPSULE, DELAYED RELEASE ORAL DAILY
Qty: 30 CAPSULE | Refills: 11 | OUTPATIENT
Start: 2023-01-23 | End: 2024-01-23

## 2023-01-24 ENCOUNTER — PROCEDURE VISIT (OUTPATIENT)
Dept: NEUROLOGY | Facility: CLINIC | Age: 53
End: 2023-01-24
Payer: MEDICARE

## 2023-01-24 VITALS
HEIGHT: 64 IN | HEART RATE: 76 BPM | DIASTOLIC BLOOD PRESSURE: 76 MMHG | SYSTOLIC BLOOD PRESSURE: 148 MMHG | WEIGHT: 293 LBS | BODY MASS INDEX: 50.02 KG/M2

## 2023-01-24 DIAGNOSIS — G43.719 INTRACTABLE CHRONIC MIGRAINE WITHOUT AURA AND WITHOUT STATUS MIGRAINOSUS: Primary | ICD-10-CM

## 2023-01-24 PROCEDURE — 64615 PR CHEMODENERVATION OF MUSCLE FOR CHRONIC MIGRAINE: ICD-10-PCS | Mod: HCNC,S$GLB,, | Performed by: PSYCHIATRY & NEUROLOGY

## 2023-01-24 PROCEDURE — 64615 CHEMODENERV MUSC MIGRAINE: CPT | Mod: HCNC,S$GLB,, | Performed by: PSYCHIATRY & NEUROLOGY

## 2023-01-24 RX ORDER — SUMATRIPTAN 20 MG/1
1 SPRAY NASAL
Qty: 6 EACH | Refills: 3 | Status: SHIPPED | OUTPATIENT
Start: 2023-01-24 | End: 2023-04-24

## 2023-01-24 RX ORDER — CEFUROXIME AXETIL 250 MG/1
TABLET ORAL
Qty: 3 ML | Refills: 5 | Status: SHIPPED | OUTPATIENT
Start: 2023-01-24 | End: 2023-02-06 | Stop reason: ALTCHOICE

## 2023-01-24 NOTE — PROCEDURES
"NEUROLOGY PROCEDURE NOTE - BOTOX FOR CHRONIC MIGRAINE    ID:   Starla Fisher is a 52 y.o. person who presents for medically necessary Botox injections for chronic migraine.    DIAGNOSIS:   1. Intractable chronic migraine without aura and without status migrainosus  SUMAtriptan (IMITREX) 20 mg/actuation nasal spray    sumatriptan (IMITREX STATDOSE) 6 mg/0.5 mL kit    onabotulinumtoxina injection 200 Units           BOTOX APPROVAL:   Botox injections have been approved for the patient today: Payor: [employer] MANAGED MEDICARE / Plan: [employer] (SPECIAL NEEDS PLAN) / Product Type: Medicare Advantage /        INTERVAL HISTORY:   Patient presents today for Botox injection for chronic migraine.  They endorse that their headaches have been stable since previous encounter.  All questions and concerns were answered to the patient's satisfaction prior to the procedure.   The patient continues to enjoy a very strong response to Botox for migraine.    Percent improvement:    Total days of headache per month:  1  Headache days per month severe:  1  Headache days per month with migraine:  1  Duration of headache pain:  <1 hr as it responded well to abortives  Average pain score:  6/10    OBJECTIVE:   BP (!) 148/76   Pulse 76   Ht 5' 4" (1.626 m)   Wt 136.1 kg (300 lb)   LMP  (LMP Unknown)   BMI 51.49 kg/m²      General: Pleasant, well-appearing in no acute distress.    Neurological Exam:    Mental status: Speech clear, fluent. Normal recent and remote memory.    Cranial nerves: EOMI, face symmetric, hearing intact to voice.   Motor: Moves all four extremities spontaneously.     PRE-PROCEDURE VERIFICATION:   Allergies and relevant documentation was verified. Standard hand hygiene was observed.      PROCEDURE CODES:    - Botulinum Toxin A - Botox 200U    79804 - Chemodenervation of muscles for chronic migraine    PROCEDURE:   The risk and benefits of the procedure were explained. Starla agreed to undergo the procedure. " Starla and DAVID signed the written consent form. One vial of BOTOX 200U was opened. Each vial of Botox was mixed in 4cc of sterile, preservative-free saline. Starla received 155U of BOTOX in 31 injections. 45U were unavoidably wasted.      The following muscles were injected:    Amount Muscle   5U   Proceros    5U    Supercilii (left)    5U    Supercilii (right)    10U   Epicranius - Occipitofrontalis Tonny Frontalis (right)    10U   Epicranius - Occipitofrontalis Tonny Frontalis (left)    20U   Temporalis (right)    20U   Temporalis (left)    15U   Tonny Occipitalis (right)    15U   Tonny Occipitalis (left)    10U   Semispinalis capitis (left)    10U   Semispinalis capitis (right)    15U   Trapezius (right)    15U   Trapezius (left)    45 units were unavoidably wasted. The patient tolerated the procedure well with no immediate side effects.      LIMITATIONS OF BOTOX: Safety and effectiveness have not been established for the prophylaxis of episodic migraine (14 headache days or fewer per month) in 7 placebo-controlled studies.   ADVERSE REACTIONS: The most frequently reported adverse reactions following injection of BOTOX® for chronic migraine include neck pain (9%), headache (5%), eyelid ptosis (4%), migraine (4%), muscular weakness (4%), musculoskeletal stiffness (4%), bronchitis (3%), injection-site pain (3%), musculoskeletal pain (3%), myalgia (3%), facial paresis (2%), hypertension (2%), and muscle spasms (2%). Post Marketing Experience:There have been spontaneous reports of death, sometimes associated with dysphagia, pneumonia, and/or other significant debility or anaphylaxis, after treatment with botulinum toxin. There have also been reports of adverse events involving the cardiovascular system, including arrhythmia and myocardial infarction, some with fatal outcomes. Some of these patients had risk factors including cardiovascular disease. The exact relationship of these events to the  botulinum toxin injection has not been established. Distant Spread of Toxin Effect: Post-marketing reports indicate that the effects of BOTOX® and all botulinum toxin products may spread from the area of injection to produce symptoms consistent with botulinum toxin effects. These may include asthenia, generalized muscle weakness, diplopia, ptosis, dysphagia, dysphonia, dysarthria, urinary incontinence, and breathing difficulties. These symptoms have been reported hours to weeks after injection. Swallowing and breathing difficulties can be life threatening, and there have been reports of death. The risk of symptoms is probably greatest in children treated for spasticity, but symptoms can also occur in adults treated for spasticity and other conditions, particularly in those patients who have underlying conditions that would predispose them to these symptoms. In unapproved uses, including spasticity in children, and in approved indications, cases of spread of effect have been reported at doses comparable to those used to treat cervical dystonia and at lower doses.    CONTRAINDICATIONS: BOTOX® is contraindicated in the presence of infection at the proposed injection site(s) and in individuals with known hypersensitivity to any botulinum toxin preparation or to any of the components in the formulation. Hypersensitivity Reactions: Serious and/or immediate hypersensitivity reactions have been reported. These reactions include anaphylaxis, serum sickness, urticaria, soft-tissue edema, and dyspnea. If such a reaction occurs, further injection of BOTOX® should be discontinued and appropriate medical therapy immediately instituted. One fatal case of anaphylaxis has been reported in which lidocaine was used as the diluent, and consequently the causal agent cannot be reliably determined.    Note: If the patient is covered by commercial insurance and has a diagnosis of chronic migraine, the Botox company may be able to assist  with out-of-pocket co-payment up to $700 per treatment. In order to get assistance, please call [employer] at [phone] or go to  [email or web address].      ASSESSMENT/PLAN:  Starla presents today for medically necessary Botox injections for chronic migraine. Botox injections have reduced their migraine frequency/intensity by far greater than 50%. We proceeded with Botox 155 units today.     Diagnosis:   1. Intractable chronic migraine without aura and without status migrainosus  SUMAtriptan (IMITREX) 20 mg/actuation nasal spray    sumatriptan (IMITREX STATDOSE) 6 mg/0.5 mL kit    onabotulinumtoxina injection 200 Units        - refills provided for acute medications as above.    Starla should follow-up for the next cycle of Botox injections in 3 months or earlier for a clinical follow-up visit if needed.

## 2023-01-26 ENCOUNTER — TELEPHONE (OUTPATIENT)
Dept: BARIATRICS | Facility: CLINIC | Age: 53
End: 2023-01-26
Payer: MEDICARE

## 2023-01-26 NOTE — TELEPHONE ENCOUNTER
Notified patient of the date & time of financial phone call appt.  Pt aware appt is a telephone call.    Dashboard updated  Appt. 03/17/2023

## 2023-02-02 ENCOUNTER — TELEPHONE (OUTPATIENT)
Dept: PODIATRY | Facility: CLINIC | Age: 53
End: 2023-02-02
Payer: MEDICARE

## 2023-02-02 NOTE — TELEPHONE ENCOUNTER
Spoke with Ms Fisher to let her know that I will forward her message to Dr Solorzano in regard to continuing PT but will also inquire if he would like for her to follow up as well to see him in clinic if needed. Ms Fisher did not report the need to be seen, but verbalized understanding. No other needs voiced at this time. Encouraged to call if further assistance is needed.

## 2023-02-02 NOTE — TELEPHONE ENCOUNTER
----- Message from Peng Barlow sent at 2/2/2023 10:06 AM CST -----  Contact: Pt  .Type:  Patient Requesting Referral    Who Called:Pt  Does the patient already have the specialty appointment scheduled?:no  Referral to What Specialty:Physical Therapy  Reason for Referral:Needed more therapy  Does the patient want the referral with a specific physician?:Yes  [facility]  Patient Requesting a Response?:yes  Would the patient rather a call back or a response via [facility]? call  Best Call Back Number:[phone]  Additional Information:

## 2023-02-03 ENCOUNTER — OFFICE VISIT (OUTPATIENT)
Dept: FAMILY MEDICINE | Facility: CLINIC | Age: 53
End: 2023-02-03
Payer: MEDICARE

## 2023-02-03 VITALS
OXYGEN SATURATION: 95 % | SYSTOLIC BLOOD PRESSURE: 128 MMHG | HEIGHT: 64 IN | BODY MASS INDEX: 50.02 KG/M2 | HEART RATE: 92 BPM | WEIGHT: 293 LBS | DIASTOLIC BLOOD PRESSURE: 86 MMHG

## 2023-02-03 DIAGNOSIS — R79.9 ABNORMAL BLOOD CHEMISTRY: ICD-10-CM

## 2023-02-03 DIAGNOSIS — Z00.00 ANNUAL PHYSICAL EXAM: Primary | ICD-10-CM

## 2023-02-03 DIAGNOSIS — G47.33 OSA (OBSTRUCTIVE SLEEP APNEA): ICD-10-CM

## 2023-02-03 DIAGNOSIS — E66.01 MORBID OBESITY WITH BMI OF 50.0-59.9, ADULT: ICD-10-CM

## 2023-02-03 DIAGNOSIS — G43.109 MIGRAINE WITH AURA AND WITHOUT STATUS MIGRAINOSUS, NOT INTRACTABLE: ICD-10-CM

## 2023-02-03 DIAGNOSIS — F41.0 GENERALIZED ANXIETY DISORDER WITH PANIC ATTACKS: ICD-10-CM

## 2023-02-03 DIAGNOSIS — I10 ESSENTIAL HYPERTENSION: ICD-10-CM

## 2023-02-03 DIAGNOSIS — R42 VERTIGO: ICD-10-CM

## 2023-02-03 DIAGNOSIS — F41.1 GENERALIZED ANXIETY DISORDER WITH PANIC ATTACKS: ICD-10-CM

## 2023-02-03 DIAGNOSIS — M25.561 RIGHT KNEE PAIN, UNSPECIFIED CHRONICITY: ICD-10-CM

## 2023-02-03 PROCEDURE — 1159F PR MEDICATION LIST DOCUMENTED IN MEDICAL RECORD: ICD-10-PCS | Mod: HCNC,CPTII,S$GLB, | Performed by: FAMILY MEDICINE

## 2023-02-03 PROCEDURE — 99999 PR PBB SHADOW E&M-EST. PATIENT-LVL V: CPT | Mod: PBBFAC,HCNC,, | Performed by: FAMILY MEDICINE

## 2023-02-03 PROCEDURE — 3074F SYST BP LT 130 MM HG: CPT | Mod: HCNC,CPTII,S$GLB, | Performed by: FAMILY MEDICINE

## 2023-02-03 PROCEDURE — 3008F PR BODY MASS INDEX (BMI) DOCUMENTED: ICD-10-PCS | Mod: HCNC,CPTII,S$GLB, | Performed by: FAMILY MEDICINE

## 2023-02-03 PROCEDURE — 3044F HG A1C LEVEL LT 7.0%: CPT | Mod: HCNC,CPTII,S$GLB, | Performed by: FAMILY MEDICINE

## 2023-02-03 PROCEDURE — 1159F MED LIST DOCD IN RCRD: CPT | Mod: HCNC,CPTII,S$GLB, | Performed by: FAMILY MEDICINE

## 2023-02-03 PROCEDURE — 99396 PREV VISIT EST AGE 40-64: CPT | Mod: HCNC,S$GLB,, | Performed by: FAMILY MEDICINE

## 2023-02-03 PROCEDURE — 99999 PR PBB SHADOW E&M-EST. PATIENT-LVL V: ICD-10-PCS | Mod: PBBFAC,HCNC,, | Performed by: FAMILY MEDICINE

## 2023-02-03 PROCEDURE — 3079F DIAST BP 80-89 MM HG: CPT | Mod: HCNC,CPTII,S$GLB, | Performed by: FAMILY MEDICINE

## 2023-02-03 PROCEDURE — 3008F BODY MASS INDEX DOCD: CPT | Mod: HCNC,CPTII,S$GLB, | Performed by: FAMILY MEDICINE

## 2023-02-03 PROCEDURE — 3044F PR MOST RECENT HEMOGLOBIN A1C LEVEL <7.0%: ICD-10-PCS | Mod: HCNC,CPTII,S$GLB, | Performed by: FAMILY MEDICINE

## 2023-02-03 PROCEDURE — 3074F PR MOST RECENT SYSTOLIC BLOOD PRESSURE < 130 MM HG: ICD-10-PCS | Mod: HCNC,CPTII,S$GLB, | Performed by: FAMILY MEDICINE

## 2023-02-03 PROCEDURE — 99396 PR PREVENTIVE VISIT,EST,40-64: ICD-10-PCS | Mod: HCNC,S$GLB,, | Performed by: FAMILY MEDICINE

## 2023-02-03 PROCEDURE — 4010F ACE/ARB THERAPY RXD/TAKEN: CPT | Mod: HCNC,CPTII,S$GLB, | Performed by: FAMILY MEDICINE

## 2023-02-03 PROCEDURE — 4010F PR ACE/ARB THEARPY RXD/TAKEN: ICD-10-PCS | Mod: HCNC,CPTII,S$GLB, | Performed by: FAMILY MEDICINE

## 2023-02-03 PROCEDURE — 3079F PR MOST RECENT DIASTOLIC BLOOD PRESSURE 80-89 MM HG: ICD-10-PCS | Mod: HCNC,CPTII,S$GLB, | Performed by: FAMILY MEDICINE

## 2023-02-03 RX ORDER — ROPIVACAINE HYDROCHLORIDE 5 MG/ML
INJECTION, SOLUTION EPIDURAL; INFILTRATION; PERINEURAL
COMMUNITY
Start: 2022-08-19

## 2023-02-03 RX ORDER — FENTANYL CITRATE 50 UG/ML
INJECTION, SOLUTION INTRAMUSCULAR; INTRAVENOUS
COMMUNITY
Start: 2022-08-19 | End: 2024-01-08

## 2023-02-03 RX ORDER — PROPOFOL 10 MG/ML
INJECTION, EMULSION INTRAVENOUS
COMMUNITY
Start: 2022-08-19

## 2023-02-03 RX ORDER — ONABOTULINUMTOXINA 100 [USP'U]/1
INJECTION, POWDER, LYOPHILIZED, FOR SOLUTION INTRADERMAL; INTRAMUSCULAR
COMMUNITY
Start: 2022-10-21 | End: 2023-05-05

## 2023-02-06 ENCOUNTER — HOSPITAL ENCOUNTER (OUTPATIENT)
Dept: RADIOLOGY | Facility: HOSPITAL | Age: 53
Discharge: HOME | End: 2023-02-06
Attending: FAMILY MEDICINE
Payer: MEDICARE

## 2023-02-06 ENCOUNTER — PATIENT MESSAGE (OUTPATIENT)
Dept: FAMILY MEDICINE | Facility: CLINIC | Age: 53
End: 2023-02-06
Payer: MEDICARE

## 2023-02-06 ENCOUNTER — TELEPHONE (OUTPATIENT)
Dept: PODIATRY | Facility: CLINIC | Age: 53
End: 2023-02-06
Payer: MEDICARE

## 2023-02-06 ENCOUNTER — TELEPHONE (OUTPATIENT)
Dept: FAMILY MEDICINE | Facility: CLINIC | Age: 53
End: 2023-02-06
Payer: MEDICARE

## 2023-02-06 DIAGNOSIS — M25.561 RIGHT KNEE PAIN, UNSPECIFIED CHRONICITY: ICD-10-CM

## 2023-02-06 PROCEDURE — 73562 X-RAY EXAM OF KNEE 3: CPT | Mod: TC,HCNC,FY,RT

## 2023-02-06 PROCEDURE — 73562 XR KNEE 3 VIEW RIGHT: ICD-10-PCS | Mod: 26,HCNC,RT, | Performed by: RADIOLOGY

## 2023-02-06 PROCEDURE — 73562 X-RAY EXAM OF KNEE 3: CPT | Mod: 26,HCNC,RT, | Performed by: RADIOLOGY

## 2023-02-06 NOTE — TELEPHONE ENCOUNTER
"----- Message from Camryn Storm sent at 2/6/2023 11:26 AM CST -----  Type:  Test Results    Who Called:  pt  Name of Test: pt said "xrays of knee, labs (bloodwork)"  Date of Test: 2/6/23  Ordering Provider: Dr. Mosquera  Where the test was performed:   Would the patient rather a call back or a response via [facility]? call  Best Call Back Number:  [phone]  Additional Information:         "

## 2023-02-06 NOTE — TELEPHONE ENCOUNTER
Spoke with Ms Fisher to let her know that per Dr. Solorzano she has had sufficient PT from his stand point, and that he is recommending that she have Dr. Mosquera writer her a PT referral due to the pain being associated with her knee. Ms Fisher verbalized understanding with no other needs voiced at this time. Encoruaged her to call if further assistance is needed

## 2023-02-06 NOTE — PROGRESS NOTES
(Portions of this note were dictated using voice recognition software and may contain dictation related errors in spelling/grammar/syntax not found on text review)    CC:   Chief Complaint   Patient presents with    Annual Exam       HPI: 52 y.o. female presented for annual examination. She has medical history significant for anxiety, depression, GERD, hypertension, migraine headaches, sleep difficulties, atrial flutter. She reports taking losartan 50 mg for HTN, bp has been stable. She started having Botox injections for migraine headaches with neurology, reports symptoms are controlled, takes Imitrex as needed. She follows up with cardiology for atrial flutter (Dr Ingram),most recent visit 11/2022, takes Cardizem 240 mg. She is due for annual labs. she takes lexapro 20 mg MDD, followed by psychiatry. She has plantar fasciotomy in august and had post op PT, reports pain is controlled, followed by podiatry  (DR Solorzano). She has concerns about having vertigo, it is intermittent problem, reports room spinning sensation, takes meclizine as needed. She has right knee pain ongoing for several months, more bothersome with movements and activities. She denies cough, shortness of breath, chest pain, nausea, vomiting, abdominal pain, changes in bowel habits, urine problems including burning and dysuria. She does not smoke, has no toxic habits.     Past Medical History:   Diagnosis Date    Anxiety     Depression     GERD (gastroesophageal reflux disease)     Hypertension     Migraine headache     Sleep difficulties     Typical atrial flutter 4/20/2022       Past Surgical History:   Procedure Laterality Date    BREAST SURGERY      COLONOSCOPY N/A 12/14/2022    Procedure: COLONOSCOPY sutab;  Surgeon: Wei Walsh MD;  Location: [facility];  Service: Endoscopy;  Laterality: N/A;    ENDOMETRIAL ABLATION      ENDOSCOPIC GASTROCNEMIUS RECESSION Right 8/19/2022    Procedure: RECESSION, GASTROCNEMIUS, ENDOSCOPIC;  Surgeon:  Zelalem Solorzano DPM;  Location: [facility] OR;  Service: Podiatry;  Laterality: Right;  Arthrex centerline  Stella/Arthrex/CHRISTOPHE/REJI,RN 8/18@1333    ENDOSCOPIC PLANTAR FASCIOTOMY Right 8/19/2022    Procedure: FASCIOTOMY, PLANTAR, ENDOSCOPIC;  Surgeon: Zelalem Solorzano DPM;  Location: [facility] OR;  Service: Podiatry;  Laterality: Right;  Arthrex set    ESOPHAGOGASTRODUODENOSCOPY N/A 12/14/2022    Procedure: EGD (ESOPHAGOGASTRODUODENOSCOPY);  Surgeon: Wei Walsh MD;  Location: [facility];  Service: Endoscopy;  Laterality: N/A;    EYE SURGERY      FOOT SURGERY Right 10/2018    bunion    IMPLANTATION OF PERMANENT SACRAL NERVE STIMULATOR N/A 2/15/2022    Procedure: INSERTION, NEUROSTIMULATOR, PERMANENT, SACRAL;  Surgeon: David Brown MD;  Location: [address];  Service: Urology;  Laterality: N/A;    TONSILLECTOMY      TOTAL REDUCTION MAMMOPLASTY Bilateral        Family History   Problem Relation Age of Onset    Atrial fibrillation Mother     Thyroid disease Mother     Dementia Mother     Diabetes Father     Prostate cancer Father     Stomach cancer Sister     Migraines Sister     Colon cancer Paternal Aunt     Colon cancer Paternal Uncle     Breast cancer Maternal Grandmother     Colon cancer Paternal Grandmother     Cancer Paternal Grandfather        Social History     Tobacco Use    Smoking status: Never     Passive exposure: Never    Smokeless tobacco: Never   Substance Use Topics    Alcohol use: No    Drug use: No       Lab Results   Component Value Date    WBC 7.97 02/06/2023    HGB 12.2 02/06/2023    HCT 39.3 02/06/2023    MCV 84 02/06/2023     02/06/2023    CHOL 214 (H) 02/06/2023    TRIG 77 02/06/2023    HDL 62 02/06/2023    ALT 12 02/06/2023    AST 15 02/06/2023    BILITOT 0.6 02/06/2023    ALKPHOS 98 02/06/2023     02/06/2023    K 4.0 02/06/2023     02/06/2023    CREATININE 0.8 02/06/2023    ESTGFRAFRICA >60 04/20/2022    EGFRNONAA >60 04/20/2022    CALCIUM 9.3 02/06/2023     ALBUMIN 3.6 02/06/2023    BUN 13 02/06/2023    CO2 23 02/06/2023    TSH 2.637 04/20/2022    HGBA1C 5.6 02/06/2023    LDLCALC 136.6 02/06/2023     (H) 02/06/2023             Vital signs reviewed  PE:   APPEARANCE: Well nourished, well developed, in no acute distress.    HEAD: Normocephalic, atraumatic.  EYES: EOMI.  Conjunctivae noninjected.   NECK: Supple with no cervical lymphadenopathy.    CHEST: Good inspiratory effort. Lungs clear to auscultation with no wheezes or crackles.  CARDIOVASCULAR: Normal S1, S2. No rubs, murmurs, or gallops.  ABDOMEN: Bowel sounds normal. Not distended. Soft. No tenderness or masses. No organomegaly.  EXTREMITIES: No edema, cyanosis, or clubbing.    Review of Systems   Constitutional:  Negative for chills, fatigue and fever.   HENT: Negative.     Respiratory:  Negative for cough, shortness of breath and wheezing.    Cardiovascular:  Negative for chest pain, palpitations and leg swelling.   Gastrointestinal: Negative.    Genitourinary: Negative.    Musculoskeletal:  Positive for arthralgias.   Neurological: Negative.    Psychiatric/Behavioral: Negative.     All other systems reviewed and are negative.    IMPRESSION  1. Annual physical exam    2. Migraine with aura and without status migrainosus, not intractable    3. Generalized anxiety disorder with panic attacks    4. Essential hypertension    5. Morbid obesity with BMI of 50.0-59.9, adult    6. CARL (obstructive sleep apnea)    7. Screening for diabetes mellitus    8. Abnormal blood chemistry    9. Vertigo    10. Right knee pain, unspecified chronicity            PLAN      1. Annual physical exam  - CBC Auto Differential; Future  - Comprehensive Metabolic Panel; Future  - Lipid Panel; Future      2. Migraine with aura and without status migrainosus, not intractable    Followed by neurology  On Botox      3. Generalized anxiety disorder with panic attacks    Followed by psychiatry      4. Essential  hypertension  Stable    Continue losartan 50 mg    - CBC Auto Differential; Future  - Comprehensive Metabolic Panel; Future      5. Morbid obesity with BMI of 50.0-59.9, adult    BMI 53    Counseling provided on healthy lifestyle, encouraged to do moderate intensity regular exercise 30 minutes every day 5 days a week, to include vegetables and fruits and cut back on saturated fats and carbohydrates.       - Lipid Panel; Future      6. CARL (obstructive sleep apnea)    Not complaint with CPAP      8. Abnormal blood chemistry    - Hemoglobin A1C; Future      9. Vertigo    - Ambulatory referral/consult to ENT; Future    To take Antivert as needed      10. Right knee pain, unspecified chronicity    - X-Ray Knee 3 View Right; Future       Tylenol arthritis prn  Topical analgesic prn      SCREENINGS      Immunizations:   UTD with Covid, tetanus       Age/demographic appropriate health maintenance:    UTD with mammogram, colonoscopy and pap smear    Health Maintenance Due   Topic Date Due    HIV Screening  Never done    COVID-19 Vaccine (3 - Booster for Moderna series) 06/23/2021    Shingles Vaccine (2 of 2) 01/26/2022           Tahir Mosquera   2/6/2023

## 2023-02-06 NOTE — TELEPHONE ENCOUNTER
----- Message from Zelalem Solorzano DPM sent at 2/3/2023  4:01 PM CST -----  Contact: Pt  I feel that she had a sufficient amount of PT at this point. She can address with her PCP since she just saw them. To me it appeared as though she was having right knee pain.    Max.  ----- Message -----  From: Luisa Lea RN  Sent: 2/2/2023  12:42 PM CST  To: KHANG Mart[address]nez Dr:    Do you feel okay with sending another referral for PT and do you need me to schedule her for an appt to reassess?    Thank you    Isabel  ----- Message -----  From: Peng Barlow  Sent: 2/2/2023  10:11 AM CST  To: Rashad Masterson Staff    .Type:  Patient Requesting Referral    Who Called:Pt  Does the patient already have the specialty appointment scheduled?:no  Referral to What Specialty:Physical Therapy  Reason for Referral:Needed more therapy  Does the patient want the referral with a specific physician?:Yes  [facility]  Patient Requesting a Response?:yes  Would the patient rather a call back or a response via [facility]? call  Best Call Back Number:[phone]  Additional Information:

## 2023-02-07 ENCOUNTER — TELEPHONE (OUTPATIENT)
Dept: FAMILY MEDICINE | Facility: CLINIC | Age: 53
End: 2023-02-07
Payer: MEDICARE

## 2023-02-07 NOTE — TELEPHONE ENCOUNTER
----- Message from Danyelle Torres sent at 2/7/2023  9:16 AM CST -----  Type:  Test Results    Who Called: pt  Name of Test (Lab/Mammo/Etc): xray / lab  Date of Test: 02/06  Ordering Provider: alvarado  Where the test was performed: [facility]  Would the patient rather a call back or a response via [facility]? Call   Best Call Back Number:  [phone]  Additional Information:

## 2023-02-07 NOTE — TELEPHONE ENCOUNTER
Returned patient's call and read to her Dr Mosquera's portal message. I told her that someone will call her to schedule her appointment with Ortho. She verbalized understanding.

## 2023-02-09 ENCOUNTER — OFFICE VISIT (OUTPATIENT)
Dept: ORTHOPEDICS | Facility: CLINIC | Age: 53
End: 2023-02-09
Payer: MEDICARE

## 2023-02-09 VITALS
HEART RATE: 80 BPM | HEIGHT: 64 IN | BODY MASS INDEX: 50.02 KG/M2 | WEIGHT: 293 LBS | SYSTOLIC BLOOD PRESSURE: 147 MMHG | DIASTOLIC BLOOD PRESSURE: 82 MMHG

## 2023-02-09 DIAGNOSIS — M25.561 RIGHT KNEE PAIN, UNSPECIFIED CHRONICITY: ICD-10-CM

## 2023-02-09 DIAGNOSIS — M62.81 QUADRICEPS WEAKNESS: Primary | ICD-10-CM

## 2023-02-09 DIAGNOSIS — Z00.00 ENCOUNTER FOR MEDICARE ANNUAL WELLNESS EXAM: ICD-10-CM

## 2023-02-09 PROCEDURE — 1159F MED LIST DOCD IN RCRD: CPT | Mod: HCNC,CPTII,S$GLB, | Performed by: ORTHOPAEDIC SURGERY

## 2023-02-09 PROCEDURE — 3044F PR MOST RECENT HEMOGLOBIN A1C LEVEL <7.0%: ICD-10-PCS | Mod: HCNC,CPTII,S$GLB, | Performed by: ORTHOPAEDIC SURGERY

## 2023-02-09 PROCEDURE — 1159F PR MEDICATION LIST DOCUMENTED IN MEDICAL RECORD: ICD-10-PCS | Mod: HCNC,CPTII,S$GLB, | Performed by: ORTHOPAEDIC SURGERY

## 2023-02-09 PROCEDURE — 99999 PR PBB SHADOW E&M-EST. PATIENT-LVL V: ICD-10-PCS | Mod: PBBFAC,HCNC,, | Performed by: ORTHOPAEDIC SURGERY

## 2023-02-09 PROCEDURE — 3044F HG A1C LEVEL LT 7.0%: CPT | Mod: HCNC,CPTII,S$GLB, | Performed by: ORTHOPAEDIC SURGERY

## 2023-02-09 PROCEDURE — 20610 DRAIN/INJ JOINT/BURSA W/O US: CPT | Mod: HCNC,RT,S$GLB, | Performed by: ORTHOPAEDIC SURGERY

## 2023-02-09 PROCEDURE — 20610 LARGE JOINT ASPIRATION/INJECTION: R KNEE: ICD-10-PCS | Mod: HCNC,RT,S$GLB, | Performed by: ORTHOPAEDIC SURGERY

## 2023-02-09 PROCEDURE — 3008F BODY MASS INDEX DOCD: CPT | Mod: HCNC,CPTII,S$GLB, | Performed by: ORTHOPAEDIC SURGERY

## 2023-02-09 PROCEDURE — 3079F PR MOST RECENT DIASTOLIC BLOOD PRESSURE 80-89 MM HG: ICD-10-PCS | Mod: HCNC,CPTII,S$GLB, | Performed by: ORTHOPAEDIC SURGERY

## 2023-02-09 PROCEDURE — 3079F DIAST BP 80-89 MM HG: CPT | Mod: HCNC,CPTII,S$GLB, | Performed by: ORTHOPAEDIC SURGERY

## 2023-02-09 PROCEDURE — 4010F PR ACE/ARB THEARPY RXD/TAKEN: ICD-10-PCS | Mod: HCNC,CPTII,S$GLB, | Performed by: ORTHOPAEDIC SURGERY

## 2023-02-09 PROCEDURE — 3008F PR BODY MASS INDEX (BMI) DOCUMENTED: ICD-10-PCS | Mod: HCNC,CPTII,S$GLB, | Performed by: ORTHOPAEDIC SURGERY

## 2023-02-09 PROCEDURE — 3077F SYST BP >= 140 MM HG: CPT | Mod: HCNC,CPTII,S$GLB, | Performed by: ORTHOPAEDIC SURGERY

## 2023-02-09 PROCEDURE — 99999 PR PBB SHADOW E&M-EST. PATIENT-LVL V: CPT | Mod: PBBFAC,HCNC,, | Performed by: ORTHOPAEDIC SURGERY

## 2023-02-09 PROCEDURE — 3077F PR MOST RECENT SYSTOLIC BLOOD PRESSURE >= 140 MM HG: ICD-10-PCS | Mod: HCNC,CPTII,S$GLB, | Performed by: ORTHOPAEDIC SURGERY

## 2023-02-09 PROCEDURE — 99204 OFFICE O/P NEW MOD 45 MIN: CPT | Mod: 25,HCNC,S$GLB, | Performed by: ORTHOPAEDIC SURGERY

## 2023-02-09 PROCEDURE — 99204 PR OFFICE/OUTPT VISIT, NEW, LEVL IV, 45-59 MIN: ICD-10-PCS | Mod: 25,HCNC,S$GLB, | Performed by: ORTHOPAEDIC SURGERY

## 2023-02-09 PROCEDURE — 4010F ACE/ARB THERAPY RXD/TAKEN: CPT | Mod: HCNC,CPTII,S$GLB, | Performed by: ORTHOPAEDIC SURGERY

## 2023-02-09 RX ORDER — KETOROLAC TROMETHAMINE 30 MG/ML
30 INJECTION, SOLUTION INTRAMUSCULAR; INTRAVENOUS
Status: DISCONTINUED | OUTPATIENT
Start: 2023-02-09 | End: 2023-02-09 | Stop reason: HOSPADM

## 2023-02-09 RX ORDER — BUPIVACAINE HYDROCHLORIDE 5 MG/ML
5 INJECTION, SOLUTION PERINEURAL
Status: DISCONTINUED | OUTPATIENT
Start: 2023-02-09 | End: 2023-02-09 | Stop reason: HOSPADM

## 2023-02-09 RX ORDER — LIDOCAINE HYDROCHLORIDE 10 MG/ML
4 INJECTION INFILTRATION; PERINEURAL
Status: DISCONTINUED | OUTPATIENT
Start: 2023-02-09 | End: 2023-02-09 | Stop reason: HOSPADM

## 2023-02-09 RX ADMIN — LIDOCAINE HYDROCHLORIDE 4 ML: 10 INJECTION INFILTRATION; PERINEURAL at 09:02

## 2023-02-09 RX ADMIN — BUPIVACAINE HYDROCHLORIDE 5 ML: 5 INJECTION, SOLUTION PERINEURAL at 09:02

## 2023-02-09 RX ADMIN — KETOROLAC TROMETHAMINE 30 MG: 30 INJECTION, SOLUTION INTRAMUSCULAR; INTRAVENOUS at 09:02

## 2023-02-09 NOTE — PROCEDURES
Large Joint Aspiration/Injection: R knee    Date/Time: 2/9/2023 9:15 AM  Performed by: Toni May MD  Authorized by: Toni May MD     Consent Done?:  Yes (Verbal)  Indications:  Arthritis  Site marked: the procedure site was marked    Timeout: prior to procedure the correct patient, procedure, and site was verified    Prep: patient was prepped and draped in usual sterile fashion      Local anesthesia used?: Yes    Local anesthetic:  Topical anesthetic    Details:  Needle Size:  22 G  Ultrasonic Guidance for needle placement?: No    Approach:  Anterolateral  Location:  Knee  Site:  R knee  Medications:  30 mg ketorolac 30 mg/mL (1 mL); 5 mL BUPivacaine 0.5 % (5 mg/mL); 4 mL LIDOcaine HCL 10 mg/ml (1%) 10 mg/mL (1 %)  Patient tolerance:  Patient tolerated the procedure well with no immediate complications

## 2023-02-09 NOTE — PROGRESS NOTES
Subjective:      Patient ID: Starla Fisher is a 52 y.o. female.    Chief Complaint: Pain of the Right Knee      Patient ID: Starla Fisher is a 52 y.o. female.    Chief Complaint: Pain of the Right Knee      KNEE PAIN: Complains of pain to the rightknee.   PAIN LOCATED: anterior and medial  ONSET: 6 months ago.  QUALITY:  Patient states the pain is worsening  HISTORY: Previous knee injury/surgery: no  Hx:   ASSOCIATED SYMPTOM AND TRIGGERS:Standing/Weightbearing, walking, trouble w stairs, stiffness, swelling, limping, stiffness w sitting , Knee gives way, and Knee Feels Unsteady  USES ASSISTIVE DEVICE: cane  RELIEVED BY:rest, heat, ice, medication: medication not used  PATIENT DENIES: bruising, redness, deformity            Social History     Occupational History    Not on file   Tobacco Use    Smoking status: Never     Passive exposure: Never    Smokeless tobacco: Never   Substance and Sexual Activity    Alcohol use: No    Drug use: No    Sexual activity: Not Currently     Partners: Male      Review of Systems   Constitutional: Negative for diaphoresis.   HENT:  Negative for ear discharge, nosebleeds and stridor.    Eyes:  Negative for photophobia.   Cardiovascular:  Negative for syncope.   Respiratory:  Negative for hemoptysis, shortness of breath and wheezing.    Neurological:  Negative for tremors.   Psychiatric/Behavioral: Negative.         Objective:    General    Constitutional: She is oriented to person, place, and time. She appears well-developed.   HENT:   Head: Normocephalic and atraumatic.   Right Ear: External ear normal.   Left Ear: External ear normal.   Eyes: EOM are normal.   Cardiovascular:  Intact distal pulses.            Neurological: She is alert and oriented to person, place, and time.   Psychiatric: She has a normal mood and affect. Her behavior is normal. Judgment and thought content normal.     General Musculoskeletal Exam   Gait: antalgic and abnormal       Right Knee Exam      Inspection   Swelling: present  Effusion: present    Tenderness   The patient is tender to palpation of the medial joint line.    Crepitus   The patient has crepitus of the patella.    Range of Motion   Extension:  5   Flexion:  90 abnormal     Tests   Meniscus   Jamin:  Medial - positive   Ligament Examination   MCL - Valgus: normal (0 to 2mm)  LCL - Varus: normal    Other   Sensation: normal    Left Knee Exam     Range of Motion   Extension:  0   Flexion:  120     Muscle Strength   Right Lower Extremity   Quadriceps:  4/5   Hamstrin/5        Assessment:       1. Quadriceps weakness    2. Right knee pain, unspecified chronicity          Plan:       we injected the right knee w 1cc of ketorolac, marcaine and lidocaine under sterile conditions with the patient's informed consent for mild/moderate knee oa . we will try a course of PT before ordering an MRI. Patient should f/u with me after approx 1 mo of PT to consider an MRI at that point for possible meniscal pathology

## 2023-02-10 ENCOUNTER — IMMUNIZATION (OUTPATIENT)
Dept: PHARMACY | Facility: CLINIC | Age: 53
End: 2023-02-10
Payer: MEDICARE

## 2023-02-10 DIAGNOSIS — Z23 NEED FOR VACCINATION: Primary | ICD-10-CM

## 2023-03-01 ENCOUNTER — CLINICAL SUPPORT (OUTPATIENT)
Dept: REHABILITATION | Facility: HOSPITAL | Age: 53
End: 2023-03-01
Attending: ORTHOPAEDIC SURGERY
Payer: MEDICARE

## 2023-03-01 DIAGNOSIS — M62.81 QUADRICEPS WEAKNESS: ICD-10-CM

## 2023-03-01 DIAGNOSIS — Z74.09 DECREASED FUNCTIONAL MOBILITY AND ENDURANCE: ICD-10-CM

## 2023-03-01 DIAGNOSIS — M25.561 RIGHT KNEE PAIN, UNSPECIFIED CHRONICITY: ICD-10-CM

## 2023-03-01 PROCEDURE — 97110 THERAPEUTIC EXERCISES: CPT | Mod: HCNC,PN | Performed by: PHYSICAL THERAPIST

## 2023-03-01 PROCEDURE — 97162 PT EVAL MOD COMPLEX 30 MIN: CPT | Mod: HCNC,PN | Performed by: PHYSICAL THERAPIST

## 2023-03-01 NOTE — PLAN OF CARE
[facility]  Physical Therapy Initial Evaluation    Date: 3/1/2023   Name: Starla Fisher  Clinic Number: [id number]    Therapy Diagnosis:   Encounter Diagnoses   Name Primary?    Right knee pain, unspecified chronicity     Quadriceps weakness     Decreased functional mobility and endurance      Physician: Toni May MD    Physician Orders: PT Eval and Treat   Medical Diagnosis from Referral:   M25.561 (ICD-10-CM) - Right knee pain, unspecified chronicity   M62.81 (ICD-10-CM) - Quadriceps weakness     Evaluation Date: 3/1/2023  Authorization Period Expiration: 12/31/23  Plan of Care Expiration: 4/7/23  Progress Note Due: 4/4/23  Visit # / Visits authorized: 1/ 20   FOTO: 1/ 5   PTA Visit: 0/5    Precautions: Standard    Time In: 2:00 pm  Time Out: 2:57 pm  Total Appointment Time (timed & untimed codes): 54 minutes (MCE, TE)      SUBJECTIVE   Date of onset: about 6 months    History of current condition - Starla reports: her primary care doctor ordered x-rays and she was told she has knee osteoarthritis. She had an injection by Dr. May on 2/9/23 and worked for about a couple days only. She has constant right knee pain that is worse with weight bearing. She feels like she's limping more. She was referred to [facility] for PT and plan is to order an MRI if not having improvements.    Falls: none    Imaging, bone scan films: No evidence for acute fracture line or dislocation of the right knee.  No focal bony erosion or evidence for significant joint effusion.  Subcentimeter calcification ventral proximal tibial soft tissues.  There is degenerative change of the patellofemoral compartment with joint space loss and slight irregularity articular surface of the patella.  Further evaluation as warranted clinically.    Prior Therapy: status post Bilateral gastroc recession  Social History: Lives at home with her adult sister.    Occupation: not working.  Prior Level of Function: no cane prior  to foot surgery  Current Level of Function: ambulates with SPC    Pain:  Current 6/10, worst 8/10, best 2/10   Location: right knee    Description: Aching and stabbing  Aggravating Factors: Standing and Walking, laying down  Sleep: hard to fall asleep  Easing Factors: ice, cool cloth    Patients goals: avoid surgery, move better     Medical History:   Past Medical History:   Diagnosis Date    Anxiety     Depression     GERD (gastroesophageal reflux disease)     Hypertension     Migraine headache     Sleep difficulties     Typical atrial flutter 4/20/2022       Surgical History:   Starla Fisher  has a past surgical history that includes Endometrial ablation; Tonsillectomy; Breast surgery; Eye surgery; Foot surgery (Right, 10/2018); Total Reduction Mammoplasty (Bilateral); Implantation of permanent sacral nerve stimulator (N/A, 2/15/2022); Endoscopic gastrocnemius recession (Right, 8/19/2022); Endoscopic plantar fasciotomy (Right, 8/19/2022); Colonoscopy (N/A, 12/14/2022); and Esophagogastroduodenoscopy (N/A, 12/14/2022).    Medications:   Starla has a current medication list which includes the following prescription(s): blood-glucose meter, botox, clotrimazole-betamethasone 1-0.05%, diazepam, diltiazem, escitalopram oxalate, pa-v-yytur-b12-l.acid,plan-inu, fentanyl, ferrous sulfate, fluconazole, hydrocodone-acetaminophen, losartan, meloxicam, metronidazole 0.75%, mirabegron, multivit-minerals/folic acid, multivitamin, nystatin, omeprazole, phenylephrine, promethazine, propofol, ropivacaine 0.5% (pf), sumatriptan, sumatriptan, tramadol, trazodone, trueplus lancets, and ultra-light rollator, and the following Facility-Administered Medications: sodium chloride 0.9%, lidocaine (pf) 10 mg/ml (1%), sodium chloride 0.9%, and sodium chloride 0.9%.    Allergies:   Review of patient's allergies indicates:  No Known Allergies       OBJECTIVE     Palpation: right distal/medial hamstring and proximal/medial gastroc; pes  "anserine    Posture: high BMI    Gross Movement Analysis:  - Gait:  Bilateral trendelenberg with waddling gait. Initially cane in right upper extremity, but switched to left after cues with proper sequence  - Squats: NT  - Sit-to-Stand: upper extremity use on cane and thigh      Range of Motion:   Knee AROM: 4-0-120 degree   Active straight leg raise: 45 degree     Lower Extremity Strength                 LE           Right           Left   Hip flexion: 6.5 kg 6.5 kg   Hip abduction 3/5 3/5   Hip extension 5/5 5/5   Hip ER 5/5 5/5   Knee flexion 12.5 kg 9.6 kg   Knee extension 14.2 kg 19.6 kg   Ankle dorsiflexion: 5/5 5/5   Ankle plantarflexion: 5/5 5/5     Special Tests:   Right Left   Valgus Stress Test (-) (-)   Varus Stress test (-) (-)   Lachman's test (-) (-)   Posterior drawer (-) (-)   Anterior drawer (-) (-)   Jamin's Test (-) (-)   Thessaly's Test (-) (-)   Sierra's compression test  (-) (-)   Patellar Grind Test (-) (-)     Joint Mobility: good  Patellar mobility  Sensation: intact light touch sensation to BLE throughout L2-S2 dermatomal pattern  Flexibility:    Hamstring 90/90: right: 40 deg          30 second sit-to-stand test (withU/E support): 7x cane use  30" sit to stand Cutoff Scores:15           CMS Impairment/Limitation/Restriction for FOTO Knee Survey      NOT PERFORMED; PERFORM NEXT VISIT         TREATMENT     Total Treatment time (time-based codes) separate from Evaluation: 10 minutes     Starla received therapeutic exercises to develop strength, endurance, ROM, flexibility, posture, and core stabilization for 10 minutes including:  Home exercise program:  Strap gastroc stretch: 2x20" right   Straight leg raise: 2x10 right   Standing hip abduction: 2x10 right   Seated hamstring stretch: 2x20" right     Next:  [place]  Hook lying marches  Long arc quad  Seated hamstring curls    Starla received the following manual therapy techniques: Joint mobilizations, Myofacial release, and Soft tissue " Mobilization were applied to the: knee for 0 minutes, including:  Not today  Consider dry needling vs cupping vs STM      PATIENT EDUCATION AND HOME EXERCISES     Education provided:   - anatomy  - expectations of rehab if TKA is eventually needed  - importance of home exercise program compliance  - attendance policy    Written Home Exercises Provided: yes. Exercises were reviewed and Starla was able to demonstrate them prior to the end of the session.  Starla demonstrated fair  understanding of the education provided. See EMR under Patient Instructions for exercises provided during therapy sessions.    ASSESSMENT     Starla is a 52 y.o. female referred to outpatient Physical Therapy with a medical diagnosis of knee osteoarthritis. Pt presents with chronic right knee pain for > 6 months with hx of Bilateral gastroc recessions. She is tender to palpation in her semitendinosis/membranosis and medial gastroc, as well as her pes anserine. Pain is constant, but worse with any weight bearing activities. Range of motion is WNL, but right hip and quad weakness present. Discussed benefits of dry needling and patient is interested in the future. She only reports 1-2 days of relief after CSI about 1 month ago. She currently ambulates with a cane and is limited in all functional tasks, such as community walking and ADL performance. She is appropriate for skilled PT to help her reach her goals.    Pt prognosis is Fair.   Pt will benefit from skilled outpatient Physical Therapy to address the deficits stated above and in the chart below, provide pt/family education, and to maximize pt's level of independence.     Plan of care discussed with patient: Yes  Pt's spiritual, cultural and educational needs considered and patient is agreeable to the plan of care and goals as stated below:     Anticipated Barriers for therapy: co-morbidities, surgical hx    Medical Necessity is demonstrated by the following  History  Co-morbidities and  personal factors that may impact the plan of care Co-morbidities:   BMI over 30, Congestive Heart Failure or Heart Disease, Depression, Headaches,  High Blood Pressure; Bilateral gastroc recessions    Personal Factors:   lifestyle     high   Examination  Body Structures and Functions, activity limitations and participation restrictions that may impact the plan of care Body Regions:   lower extremities    Body Systems:    gross symmetry  strength  balance  gait  transfers  transitions  motor control    Participation Restrictions:   Walking, ADLs    Activity limitations:   Learning and applying knowledge  no deficits    General Tasks and Commands  no deficits    Communication  no deficits    Mobility  lifting and carrying objects  walking    Self care  washing oneself (bathing, drying, washing hands)  caring for body parts (brushing teeth, shaving, grooming)  dressing  looking after one's health    Domestic Life  shopping  cooking  doing house work (cleaning house, washing dishes, laundry)    Interactions/Relationships  no deficits    Life Areas  no deficits    Community and Social Life  community life  recreation and leisure         high     Clinical Presentation evolving clinical presentation with changing clinical characteristics moderate   Decision Making/ Complexity Score: moderate   Goals:  Short Term Goals: 4 weeks   1.  Patient will demonstrate an active straight leg raise > 55 degree for improved transfers.  2.  Patient will demonstrate right quad strength via MicroFET of > 20 kg to improve transfers, gait and ADL performance.  3.  Patient will report worst right knee pain < 6/10 for improved ADL performance.    Long Term Goals: 8 weeks   1.  Patient will demonstrate ability to perform > 10 sit <> stand transfers w/ UE use for improved ability to stand from low surfaces.  2.  Patient will demonstrate right quad strength via MicroFET > 25 kg for improved gait and functional mobility.  3.  Patient will perform  TUG in < 13 seconds for improved selena and safety with ambulation.  4.  Patient will demonstrate to PT comprehensive understanding of each HEP component without verbal cueing.   5.  Patient will improve FOTO to < TBD to improve ADL performance.    PLAN   Plan of care Certification: 3/1/2023 to 4/7/23.    Outpatient Physical Therapy 2 times weekly for 8 weeks to include the following interventions: Electrical Stimulation TENS, IFC, NMES, Gait Training, Manual Therapy, Moist Heat/ Ice, Neuromuscular Re-ed, Patient Education, Self Care, Therapeutic Activities, Therapeutic Exercise, and dry needling.     Adis Butt, PT      I CERTIFY THE NEED FOR THESE SERVICES FURNISHED UNDER THIS PLAN OF TREATMENT AND WHILE UNDER MY CARE   Physician's comments:     Physician's Signature: ___________________________________________________

## 2023-03-02 ENCOUNTER — CLINICAL SUPPORT (OUTPATIENT)
Dept: REHABILITATION | Facility: HOSPITAL | Age: 53
End: 2023-03-02
Payer: MEDICARE

## 2023-03-02 DIAGNOSIS — Z74.09 DECREASED FUNCTIONAL MOBILITY AND ENDURANCE: Primary | ICD-10-CM

## 2023-03-02 PROCEDURE — 97530 THERAPEUTIC ACTIVITIES: CPT | Mod: HCNC,PN,CQ

## 2023-03-02 NOTE — PROGRESS NOTES
"                            Physical Therapy Daily Treatment Note     Name: Starla Fisher  Clinic Number: [id number]    Therapy Diagnosis:   Encounter Diagnosis   Name Primary?    Decreased functional mobility and endurance Yes     Physician: Toni May MD    Visit Date: 3/2/2023    Evaluation Date: 3/1/2023  Authorization Period Expiration: 12/31/23  Plan of Care Expiration: 4/7/23  Progress Note Due: 4/4/23  Visit # / Visits authorized: 2/ 20   FOTO: 2/ 5   PTA Visit: 1/5     Precautions: Standard     Time In: 10:04 am  Time Out: 11:00am  Total Appointment Time (timed & untimed codes): 30 minutes.     Subjective     Pt reports: No new complaints since Eval. .  She was compliant with home exercise program.  Response to previous treatment: 1st after   Functional change: 1st after     Pain: 6/10  Location: right knee      Objective     Starla received therapeutic exercises to develop strength, endurance, ROM, flexibility, posture, and core stabilization for 7 minutes 1:1 and 20 minutes  supervised including:      Strap gastroc stretch: 2x20" right   Straight leg raise: 2x10 right   Standing hip abduction: 2x10 right   Seated hamstring stretch: 2x20" right   Clamshells 2x10   Hook lying marches 2x10   Long arc quad 2x10   Seated hamstring curls Green 2x10   Bridges 2x10     Starla participated in dynamic functional therapeutic activities to improve functional performance for 23  minutes, including:    Bridges 2x10  Sit <> Stand 3x10  Side stepping at // bar 4 laps (VC for neutral hips)  Backwards walking in // bar 4 laps (heel toe pattern)       Home Exercises Provided and Patient Education Provided     Education provided:   - anatomy  - expectations of rehab if TKA is eventually needed  - importance of home exercise program compliance  - attendance policy     Written Home Exercises Provided: yes. Exercises were reviewed and Starla was able to demonstrate them prior to the end of the session.  Starla demonstrated fair  " "understanding of the education provided. See EMR under Patient Instructions for exercises provided during therapy sessions.      Assessment     Patient requires VC/ TC for appropriate performance of activities. Patient very challenged with side stepping and backwards walking. Continuous hip ER in side stepping that eventually improved with much VC and visual demo. Backwards walking patient struggled with heel toe sequencing when backwards walking likely from confusion of intended action, patient benefited from PTA standing right in front of pt and being told "walk backwards" and patient demo excellent sequencing. PTA walked towards patient for first few steps then patient able to carry over proper sequencing for most of the motion requiring two more VC for proper VC/   Starla is a 52 y.o. female referred to outpatient Physical Therapy with a medical diagnosis of knee osteoarthritis. Pt presents with chronic right knee pain for > 6 months with hx of Bilateral gastroc recessions.    Starla Is progressing well towards her goals.   Pt prognosis is Fair.     Pt will continue to benefit from skilled outpatient physical therapy to address the deficits listed in the problem list box on initial evaluation, provide pt/family education and to maximize pt's level of independence in the home and community environment.     Pt's spiritual, cultural and educational needs considered and pt agreeable to plan of care and goals.    Anticipated barriers to physical therapy: comorbidity, surgical tx.     Goals: Goals:  Short Term Goals: 4 weeks   1.  Patient will demonstrate an active straight leg raise > 55 degree for improved transfers.  2.  Patient will demonstrate right quad strength via MicroFET of > 20 kg to improve transfers, gait and ADL performance.  3.  Patient will report worst right knee pain < 6/10 for improved ADL performance.     Long Term Goals: 8 weeks   1.  Patient will demonstrate ability to perform > 10 sit <> stand " transfers w/ UE use for improved ability to stand from low surfaces.  2.  Patient will demonstrate right quad strength via MicroFET > 25 kg for improved gait and functional mobility.  3.  Patient will perform TUG in < 13 seconds for improved selena and safety with ambulation.  4.  Patient will demonstrate to PT comprehensive understanding of each HEP component without verbal cueing.   5.  Patient will improve FOTO to < TBD to improve ADL performance.    Plan     Plan of care Certification: 3/1/2023 to 4/7/23.    Romaine Chan PTA,

## 2023-03-07 ENCOUNTER — CLINICAL SUPPORT (OUTPATIENT)
Dept: REHABILITATION | Facility: HOSPITAL | Age: 53
End: 2023-03-07
Payer: MEDICARE

## 2023-03-07 DIAGNOSIS — Z74.09 DECREASED FUNCTIONAL MOBILITY AND ENDURANCE: Primary | ICD-10-CM

## 2023-03-07 PROCEDURE — 97110 THERAPEUTIC EXERCISES: CPT | Mod: HCNC,PN

## 2023-03-07 PROCEDURE — 97530 THERAPEUTIC ACTIVITIES: CPT | Mod: HCNC,PN

## 2023-03-07 NOTE — PROGRESS NOTES
"                            Physical Therapy Daily Treatment Note     Name: Starla Fisher  Clinic Number: [id number]    Therapy Diagnosis:   Encounter Diagnosis   Name Primary?    Decreased functional mobility and endurance Yes     Physician: Toni May MD    Visit Date: 3/7/2023    Evaluation Date: 3/1/2023  Authorization Period Expiration: 12/31/23  Plan of Care Expiration: 4/7/23  Progress Note Due: 4/4/23  Visit # / Visits authorized: 3/20   FOTO: 3/5   PTA Visit: 0/5     Precautions: Standard     Time In: 1:00 pm  Time Out: 2:00 pm  Total Appointment Time (timed & untimed codes): 30 minutes (1 TA, 1 TE)    Subjective     Pt reports: having some increased pain today and had to do a lot of squatting and standing recently  She was compliant with home exercise program.  Response to previous treatment: doing well   Functional change: no changes at this time    Pain: 6/10  Location: right knee      Objective     Starla received therapeutic exercises to develop strength, endurance, ROM, flexibility, posture, and core stabilization for 45 minutes including:    Quad sets: 20x5'' holds, right, towel under knee  Strap gastroc/hamstring stretch: 2x20" right   Straight leg raise: 3x10 right   Supine clamshells 2x10, red theraband   Hook lying marches 2x15, 1#  Bridges 2x10 double leg    Long arc quad 3x10 right - unable due to pain  Seated hamstring curls: Green 3x10 Bilateral   Seated marching: 2x15 Bilateral   Standing hip abduction: 3x10 right   Heel raises: 3x10 double leg   Seated hamstring stretch: 2x20" right - not today     Starla participated in dynamic functional therapeutic activities to improve functional performance for 10 minutes, including:    Sit <> Stand 2x10  Side stepping at // bar 4 laps (VC for neutral hips)  Backwards walking in // bar 4 laps (heel toe pattern)     Home Exercises Provided and Patient Education Provided:  Education provided:   - anatomy  - expectations of rehab if TKA is eventually " needed  - importance of home exercise program compliance  - attendance policy     Written Home Exercises Provided: yes. Exercises were reviewed and Starla was able to demonstrate them prior to the end of the session.  Starla demonstrated fair  understanding of the education provided. See EMR under Patient Instructions for exercises provided during therapy sessions.    Assessment     Starla is a 52 y.o. female referred to outpatient Physical Therapy with a medical diagnosis of knee osteoarthritis. Pt presents with chronic right knee pain for > 6 months with hx of Bilateral gastroc recessions. Decreased standing activities today due to higher right knee pain than normal; can resume next visit. Weak hip flexors and difficulty with straight leg raise present. Pain most present with weight bearing activities and open chain activities.     Starla Is progressing well towards her goals.   Pt prognosis is Fair.     Pt will continue to benefit from skilled outpatient physical therapy to address the deficits listed in the problem list box on initial evaluation, provide pt/family education and to maximize pt's level of independence in the home and community environment.     Pt's spiritual, cultural and educational needs considered and pt agreeable to plan of care and goals.    Anticipated barriers to physical therapy: comorbidity, surgical tx.     Goals: Goals:  Short Term Goals: 4 weeks   1.  Patient will demonstrate an active straight leg raise > 55 degree for improved transfers.  2.  Patient will demonstrate right quad strength via MicroFET of > 20 kg to improve transfers, gait and ADL performance.  3.  Patient will report worst right knee pain < 6/10 for improved ADL performance.     Long Term Goals: 8 weeks   1.  Patient will demonstrate ability to perform > 10 sit <> stand transfers w/ UE use for improved ability to stand from low surfaces.  2.  Patient will demonstrate right quad strength via MicroFET > 25 kg for improved  gait and functional mobility.  3.  Patient will perform TUG in < 13 seconds for improved selena and safety with ambulation.  4.  Patient will demonstrate to PT comprehensive understanding of each HEP component without verbal cueing.   5.  Patient will improve FOTO to < TBD to improve ADL performance.    Plan     Plan of care Certification: 3/1/2023 to 4/7/23.    Mariano Reaves, PT,

## 2023-03-13 ENCOUNTER — CLINICAL SUPPORT (OUTPATIENT)
Dept: REHABILITATION | Facility: HOSPITAL | Age: 53
End: 2023-03-13
Payer: MEDICARE

## 2023-03-13 DIAGNOSIS — Z74.09 DECREASED FUNCTIONAL MOBILITY AND ENDURANCE: Primary | ICD-10-CM

## 2023-03-13 PROCEDURE — 97110 THERAPEUTIC EXERCISES: CPT | Mod: HCNC,PN,CQ

## 2023-03-13 NOTE — PROGRESS NOTES
"                            Physical Therapy Daily Treatment Note     Name: Starla Fisher  Clinic Number: [id number]    Therapy Diagnosis:   Encounter Diagnosis   Name Primary?    Decreased functional mobility and endurance Yes     Physician: Toni May MD    Visit Date: 3/13/2023    Evaluation Date: 3/1/2023  Authorization Period Expiration: 12/31/23  Plan of Care Expiration: 4/7/23  Progress Note Due: 4/4/23  Visit # / Visits authorized: 4/20   FOTO: 4/5   PTA Visit: 1/5     Precautions: Standard     Time In: 12:04pm  Time Out: 12:54pm  Total Appointment Time (timed & untimed codes):50 minutes (1 TA, 2TE)    Subjective     Pt reports:Increased pain this day from physical demands of attending parades including more time walking than usual, negotiating steps, and sitting prolonged time causing stiffness. R knee 7/10 and L knee 7/10 pain   She was compliant with home exercise program.  Response to previous treatment: doing well   Functional change: no changes at this time    Pain: 6/10  Location: right knee      Objective     Starla received therapeutic exercises to develop strength, endurance, ROM, flexibility, posture, and core stabilization for 37 minutes including:    Nustep x8 minutes level 2     Straight leg raise: 3x10 BLE   Hook lying marches 2x15,   Bridges 3x10 double leg    Side Lying clamshells 3x10     Standing hip abduction: 3x10 BLE  Heel raises: 3x10 double leg     Long arc quad 3x10 right - unable due to pain    Previous:   Quad sets: 20x5'' holds, right, towel under knee  Seated hamstring curls: Green 3x10 Bilateral   Seated marching: 2x15 Bilateral   Seated hamstring stretch: 2x20" right - not today   Strap gastroc/hamstring stretch: 2x20" right     Starla participated in dynamic functional therapeutic activities to improve functional performance for 13 minutes, including:    Sit <> Stand 3x10  Side stepping at // bar 4 laps (VC for neutral hips)  Backwards walking in // bar 4 laps (heel toe " pattern)     Home Exercises Provided and Patient Education Provided:  Education provided:   - anatomy  - expectations of rehab if TKA is eventually needed  - importance of home exercise program compliance  - attendance policy     Written Home Exercises Provided: yes. Exercises were reviewed and Starla was able to demonstrate them prior to the end of the session.  Starla demonstrated fair  understanding of the education provided. See EMR under Patient Instructions for exercises provided during therapy sessions.    Assessment     Starla is a 52 y.o. female referred to outpatient Physical Therapy with a medical diagnosis of knee osteoarthritis. Pt presents with chronic right knee pain for > 6 months with hx of Bilateral gastroc recessions. Patient able to resume standing activities and seated LAQ this visit with out c/o knee pain only reporting feeling the mm working. Patient requires repeated VC for form of activities due to degraded form and frequent compensation for most mat mat/sitting therex.     Starla Is progressing well towards her goals.   Pt prognosis is Fair.     Pt will continue to benefit from skilled outpatient physical therapy to address the deficits listed in the problem list box on initial evaluation, provide pt/family education and to maximize pt's level of independence in the home and community environment.     Pt's spiritual, cultural and educational needs considered and pt agreeable to plan of care and goals.    Anticipated barriers to physical therapy: comorbidity, surgical tx.     Goals: Goals:  Short Term Goals: 4 weeks   1.  Patient will demonstrate an active straight leg raise > 55 degree for improved transfers.  2.  Patient will demonstrate right quad strength via MicroFET of > 20 kg to improve transfers, gait and ADL performance.  3.  Patient will report worst right knee pain < 6/10 for improved ADL performance.     Long Term Goals: 8 weeks   1.  Patient will demonstrate ability to perform > 10  sit <> stand transfers w/ UE use for improved ability to stand from low surfaces.  2.  Patient will demonstrate right quad strength via MicroFET > 25 kg for improved gait and functional mobility.  3.  Patient will perform TUG in < 13 seconds for improved selena and safety with ambulation.  4.  Patient will demonstrate to PT comprehensive understanding of each HEP component without verbal cueing.   5.  Patient will improve FOTO to < TBD to improve ADL performance.    Plan     Plan of care Certification: 3/1/2023 to 4/7/23.    Romaine Chan PTA,

## 2023-03-15 ENCOUNTER — CLINICAL SUPPORT (OUTPATIENT)
Dept: REHABILITATION | Facility: HOSPITAL | Age: 53
End: 2023-03-15
Payer: MEDICARE

## 2023-03-15 DIAGNOSIS — Z74.09 DECREASED FUNCTIONAL MOBILITY AND ENDURANCE: Primary | ICD-10-CM

## 2023-03-15 PROCEDURE — 97530 THERAPEUTIC ACTIVITIES: CPT | Mod: HCNC,PN,CQ

## 2023-03-15 PROCEDURE — 97110 THERAPEUTIC EXERCISES: CPT | Mod: HCNC,PN,CQ

## 2023-03-15 NOTE — PROGRESS NOTES
"                            Physical Therapy Daily Treatment Note     Name: Starla Fisher  Clinic Number: [id number]    Therapy Diagnosis:   Encounter Diagnosis   Name Primary?    Decreased functional mobility and endurance Yes     Physician: Toni May MD    Visit Date: 3/15/2023    Evaluation Date: 3/1/2023  Authorization Period Expiration: 12/31/23  Plan of Care Expiration: 4/7/23  Progress Note Due: 4/4/23  Visit # / Visits authorized: 4/20   FOTO: 4/5   PTA Visit: 1/5     Precautions: Standard     Time In: 3:04pm  Time Out: 3:57pm  Total Appointment Time (timed & untimed codes):53 minutes (1 TA, 2TE)    Subjective     Pt reports: Patient reports 6/10 pain today. Patient states yesterday was a better day but thinks standing for long periods of time or "doing too much around the house" causes increased knee pain the next day.     She was compliant with home exercise program.  Response to previous treatment: doing well   Functional change: no changes at this time    Pain: 6/10  Location: right knee      Objective     Starla received therapeutic exercises to develop strength, endurance, ROM, flexibility, posture, and core stabilization for 38 minutes including:    Straight leg raise: 3x10 BLE   Hook lying marches 2x15,   Bridges 3x10 double leg    Side Lying clamshells 3x10   Side Lying Hip ABD 3x10     Standing hip abduction: 3x10 BLE  Heel raises: 3x10 double leg     Long arc quad 3x10 right - unable due to pain    Previous:   Nustep x8 minutes level 2   Quad sets: 20x5'' holds, right, towel under knee  Seated hamstring curls: Green 3x10 Bilateral   Seated marching: 2x15 Bilateral   Seated hamstring stretch: 2x20" right - not today   Strap gastroc/hamstring stretch: 2x20" right     Starla participated in dynamic functional therapeutic activities to improve functional performance for 15 minutes, including:    Sit <> Stand 3x10  Side stepping at // bar 4 laps (VC for neutral hips)  Backwards walking in // bar 4 " "laps (heel toe pattern)   Heel Raise x30    Tandem Stance 2x1'     Home Exercises Provided and Patient Education Provided:  Education provided:   - anatomy  - expectations of rehab if TKA is eventually needed  - importance of home exercise program compliance  - attendance policy     Written Home Exercises Provided: yes. Exercises were reviewed and Starla was able to demonstrate them prior to the end of the session.  Starla demonstrated fair  understanding of the education provided. See EMR under Patient Instructions for exercises provided during therapy sessions.    Assessment     Starla is a 52 y.o. female referred to outpatient Physical Therapy with a medical diagnosis of knee osteoarthritis. Pt presents with chronic right knee pain for > 6 months with hx of Bilateral gastroc recessions. Patient continues to require close monitoring due to compensating a "cheating" during activities possibly volitionally. Initiated tandem balance for standing stability and mm endurance, initiated standing HR for standing dynamic balance and knee stability.     Starla Is progressing well towards her goals.   Pt prognosis is Fair.     Pt will continue to benefit from skilled outpatient physical therapy to address the deficits listed in the problem list box on initial evaluation, provide pt/family education and to maximize pt's level of independence in the home and community environment.     Pt's spiritual, cultural and educational needs considered and pt agreeable to plan of care and goals.    Anticipated barriers to physical therapy: comorbidity, surgical tx.     Goals: Goals:  Short Term Goals: 4 weeks   1.  Patient will demonstrate an active straight leg raise > 55 degree for improved transfers.  2.  Patient will demonstrate right quad strength via MicroFET of > 20 kg to improve transfers, gait and ADL performance.  3.  Patient will report worst right knee pain < 6/10 for improved ADL performance.     Long Term Goals: 8 weeks   1.  " Patient will demonstrate ability to perform > 10 sit <> stand transfers w/ UE use for improved ability to stand from low surfaces.  2.  Patient will demonstrate right quad strength via MicroFET > 25 kg for improved gait and functional mobility.  3.  Patient will perform TUG in < 13 seconds for improved selena and safety with ambulation.  4.  Patient will demonstrate to PT comprehensive understanding of each HEP component without verbal cueing.   5.  Patient will improve FOTO to < TBD to improve ADL performance.    Plan     Plan of care Certification: 3/1/2023 to 4/7/23.    Rmoaine Chan PTA,

## 2023-03-16 ENCOUNTER — TELEPHONE (OUTPATIENT)
Dept: DERMATOLOGY | Facility: CLINIC | Age: 53
End: 2023-03-16
Payer: MEDICARE

## 2023-03-20 ENCOUNTER — CLINICAL SUPPORT (OUTPATIENT)
Dept: REHABILITATION | Facility: HOSPITAL | Age: 53
End: 2023-03-20
Payer: MEDICARE

## 2023-03-20 DIAGNOSIS — Z74.09 DECREASED FUNCTIONAL MOBILITY AND ENDURANCE: Primary | ICD-10-CM

## 2023-03-20 PROCEDURE — 97530 THERAPEUTIC ACTIVITIES: CPT | Mod: HCNC,PN,CQ

## 2023-03-20 NOTE — PROGRESS NOTES
"                            Physical Therapy Daily Treatment Note     Name: Starla Fisher  Clinic Number: [id number]    Therapy Diagnosis:   Encounter Diagnosis   Name Primary?    Decreased functional mobility and endurance Yes     Physician: Toni May MD    Visit Date: 3/20/2023    Evaluation Date: 3/1/2023  Authorization Period Expiration: 12/31/23  Plan of Care Expiration: 4/7/23  Progress Note Due: 4/4/23  Visit # / Visits authorized: 6/20   FOTO: 5/5   PTA Visit: 2/5     Precautions: Standard     Time In:12:07pm  Time Out: 1:03 pm  Total Appointment Time (timed & untimed codes):13 minutes (1 TA)    Subjective     Pt reports: Patient reports 5/10 pain in knee this day stating it feels a little better.     She was compliant with home exercise program.  Response to previous treatment: doing well   Functional change: no changes at this time    Pain: 6/10  Location: right knee      Objective     Starla received therapeutic exercises to develop strength, endurance, ROM, flexibility, posture, and core stabilization for 40 minutes  supervised including:    Straight leg raise: 3x10 BLE 1#  Hook lying marches 2x15,   Bridges 3x10 double leg    Side Lying clamshells 3x10   Side Lying Hip ABD 3x10  1#     Standing hip abduction: 3x10 BLE  Heel raises: 3x10 double leg     Long arc quad 3x10 right 1#     Previous:   Nustep x8 minutes level 2   Quad sets: 20x5'' holds, right, towel under knee  Seated hamstring curls: Green 3x10 Bilateral   Seated marching: 2x15 Bilateral   Seated hamstring stretch: 2x20" right - not today   Strap gastroc/hamstring stretch: 2x20" right     Starla participated in dynamic functional therapeutic activities to improve functional performance for 13 minutes, including:    Sit <> Stand 3x10  Backwards walking in // bar 4 laps (heel toe pattern)   Heel Raise x30  Tandem Stance 2x1'     Previous:  Side stepping at // bar 4 laps (VC for neutral hips)    Home Exercises Provided and Patient Education " "Provided:  Education provided:   - anatomy  - expectations of rehab if TKA is eventually needed  - importance of home exercise program compliance  - attendance policy     Written Home Exercises Provided: yes. Exercises were reviewed and Starla was able to demonstrate them prior to the end of the session.  Starla demonstrated fair  understanding of the education provided. See EMR under Patient Instructions for exercises provided during therapy sessions.    Assessment     Starla is a 52 y.o. female referred to outpatient Physical Therapy with a medical diagnosis of knee osteoarthritis. Pt presents with chronic right knee pain for > 6 months with hx of Bilateral gastroc recessions. Patient continues to require close monitoring due to compensating and possibly "cheating" benefiting from frequent VC and form correction. progressed resistance of SLR flexion, ABD, and LAQ with out c/o pain.     Starla Is progressing well towards her goals.   Pt prognosis is Fair.     Pt will continue to benefit from skilled outpatient physical therapy to address the deficits listed in the problem list box on initial evaluation, provide pt/family education and to maximize pt's level of independence in the home and community environment.     Pt's spiritual, cultural and educational needs considered and pt agreeable to plan of care and goals.    Anticipated barriers to physical therapy: comorbidity, surgical tx.     Goals: Goals:  Short Term Goals: 4 weeks   1.  Patient will demonstrate an active straight leg raise > 55 degree for improved transfers.  2.  Patient will demonstrate right quad strength via MicroFET of > 20 kg to improve transfers, gait and ADL performance.  3.  Patient will report worst right knee pain < 6/10 for improved ADL performance.     Long Term Goals: 8 weeks   1.  Patient will demonstrate ability to perform > 10 sit <> stand transfers w/ UE use for improved ability to stand from low surfaces.  2.  Patient will demonstrate " right quad strength via MicroFET > 25 kg for improved gait and functional mobility.  3.  Patient will perform TUG in < 13 seconds for improved selena and safety with ambulation.  4.  Patient will demonstrate to PT comprehensive understanding of each HEP component without verbal cueing.   5.  Patient will improve FOTO to < TBD to improve ADL performance.    Plan     Plan of care Certification: 3/1/2023 to 4/7/23.    Romaine Chan PTA,

## 2023-03-23 ENCOUNTER — OFFICE VISIT (OUTPATIENT)
Dept: ORTHOPEDICS | Facility: CLINIC | Age: 53
End: 2023-03-23
Payer: MEDICARE

## 2023-03-23 VITALS
SYSTOLIC BLOOD PRESSURE: 144 MMHG | DIASTOLIC BLOOD PRESSURE: 92 MMHG | WEIGHT: 293 LBS | HEART RATE: 86 BPM | HEIGHT: 64 IN | BODY MASS INDEX: 50.02 KG/M2

## 2023-03-23 DIAGNOSIS — M25.561 ACUTE PAIN OF RIGHT KNEE: Primary | ICD-10-CM

## 2023-03-23 PROCEDURE — 99213 PR OFFICE/OUTPT VISIT, EST, LEVL III, 20-29 MIN: ICD-10-PCS | Mod: HCNC,S$GLB,, | Performed by: ORTHOPAEDIC SURGERY

## 2023-03-23 PROCEDURE — 3080F DIAST BP >= 90 MM HG: CPT | Mod: HCNC,CPTII,S$GLB, | Performed by: ORTHOPAEDIC SURGERY

## 2023-03-23 PROCEDURE — 3008F BODY MASS INDEX DOCD: CPT | Mod: HCNC,CPTII,S$GLB, | Performed by: ORTHOPAEDIC SURGERY

## 2023-03-23 PROCEDURE — 3077F SYST BP >= 140 MM HG: CPT | Mod: HCNC,CPTII,S$GLB, | Performed by: ORTHOPAEDIC SURGERY

## 2023-03-23 PROCEDURE — 3008F PR BODY MASS INDEX (BMI) DOCUMENTED: ICD-10-PCS | Mod: HCNC,CPTII,S$GLB, | Performed by: ORTHOPAEDIC SURGERY

## 2023-03-23 PROCEDURE — 3077F PR MOST RECENT SYSTOLIC BLOOD PRESSURE >= 140 MM HG: ICD-10-PCS | Mod: HCNC,CPTII,S$GLB, | Performed by: ORTHOPAEDIC SURGERY

## 2023-03-23 PROCEDURE — 4010F PR ACE/ARB THEARPY RXD/TAKEN: ICD-10-PCS | Mod: HCNC,CPTII,S$GLB, | Performed by: ORTHOPAEDIC SURGERY

## 2023-03-23 PROCEDURE — 99999 PR PBB SHADOW E&M-EST. PATIENT-LVL V: CPT | Mod: PBBFAC,HCNC,, | Performed by: ORTHOPAEDIC SURGERY

## 2023-03-23 PROCEDURE — 1159F PR MEDICATION LIST DOCUMENTED IN MEDICAL RECORD: ICD-10-PCS | Mod: HCNC,CPTII,S$GLB, | Performed by: ORTHOPAEDIC SURGERY

## 2023-03-23 PROCEDURE — 99213 OFFICE O/P EST LOW 20 MIN: CPT | Mod: HCNC,S$GLB,, | Performed by: ORTHOPAEDIC SURGERY

## 2023-03-23 PROCEDURE — 3080F PR MOST RECENT DIASTOLIC BLOOD PRESSURE >= 90 MM HG: ICD-10-PCS | Mod: HCNC,CPTII,S$GLB, | Performed by: ORTHOPAEDIC SURGERY

## 2023-03-23 PROCEDURE — 3044F PR MOST RECENT HEMOGLOBIN A1C LEVEL <7.0%: ICD-10-PCS | Mod: HCNC,CPTII,S$GLB, | Performed by: ORTHOPAEDIC SURGERY

## 2023-03-23 PROCEDURE — 99999 PR PBB SHADOW E&M-EST. PATIENT-LVL V: ICD-10-PCS | Mod: PBBFAC,HCNC,, | Performed by: ORTHOPAEDIC SURGERY

## 2023-03-23 PROCEDURE — 4010F ACE/ARB THERAPY RXD/TAKEN: CPT | Mod: HCNC,CPTII,S$GLB, | Performed by: ORTHOPAEDIC SURGERY

## 2023-03-23 PROCEDURE — 3044F HG A1C LEVEL LT 7.0%: CPT | Mod: HCNC,CPTII,S$GLB, | Performed by: ORTHOPAEDIC SURGERY

## 2023-03-23 PROCEDURE — 1159F MED LIST DOCD IN RCRD: CPT | Mod: HCNC,CPTII,S$GLB, | Performed by: ORTHOPAEDIC SURGERY

## 2023-03-23 NOTE — PROGRESS NOTES
Subjective:      Patient ID: Starla Fisher is a 52 y.o. female.    Chief Complaint: Pain of the Right Knee    Min relief w PT and injections      Social History     Occupational History    Not on file   Tobacco Use    Smoking status: Never     Passive exposure: Never    Smokeless tobacco: Never   Substance and Sexual Activity    Alcohol use: No    Drug use: No    Sexual activity: Not Currently     Partners: Male      ROS      Objective:    Ortho/SPM Exam       Assessment:       1. Acute pain of right knee          Plan:       I had a lengthy discussion with the patient regarding various sources of knee pain.  The patient is receiving minimal relief with NSAIDs, injections, and have failed physical therapy/home exercise. At this point, given the acuity of the symptoms, mechanical in nature, physical examination and degenerative findings on radiographs, I think an MRI is warranted to evaluate for meniscus tear. We will try to help arrange this. I will see the patient back once the MRI is complete.

## 2023-03-28 ENCOUNTER — DOCUMENTATION ONLY (OUTPATIENT)
Dept: REHABILITATION | Facility: HOSPITAL | Age: 53
End: 2023-03-28

## 2023-03-28 NOTE — PROGRESS NOTES
PT met face to face with Romaine Chan PTA to discuss pt's treatment plan and progress towards established goals. Pt will be seen by a physical therapist minimally every 6th visit or every 30 days.    Romaine Chan PTA

## 2023-03-29 ENCOUNTER — TELEPHONE (OUTPATIENT)
Dept: REHABILITATION | Facility: HOSPITAL | Age: 53
End: 2023-03-29

## 2023-03-29 NOTE — TELEPHONE ENCOUNTER
Attempted to call and leave voicemail regarding no showed appointments on 3/28/23 and 3/29/23 but mailbox was full. Will cancel remaining visits and try to contact again after scheduled MRI on 4/6/23.

## 2023-04-20 ENCOUNTER — NURSE TRIAGE (OUTPATIENT)
Dept: ADMINISTRATIVE | Facility: CLINIC | Age: 53
End: 2023-04-20
Payer: MEDICARE

## 2023-04-20 DIAGNOSIS — F41.0 GENERALIZED ANXIETY DISORDER WITH PANIC ATTACKS: ICD-10-CM

## 2023-04-20 DIAGNOSIS — F41.1 GENERALIZED ANXIETY DISORDER WITH PANIC ATTACKS: ICD-10-CM

## 2023-04-20 DIAGNOSIS — F33.1 MAJOR DEPRESSIVE DISORDER, RECURRENT, MODERATE: ICD-10-CM

## 2023-04-20 RX ORDER — ESCITALOPRAM OXALATE 20 MG/1
20 TABLET ORAL DAILY
Qty: 90 TABLET | Refills: 1 | Status: SHIPPED | OUTPATIENT
Start: 2023-04-20 | End: 2023-11-15 | Stop reason: SDUPTHER

## 2023-04-20 NOTE — TELEPHONE ENCOUNTER
Pt's sister, Riya, states they are leaving to go out of town tomorrow, and they just realized the patient is will run out of her Lexapro in the morning. The refill was sent to the [facility] today. Riya was unable to get the prescription before they closed. After reviewing the pharmacy's hours, she states she will be able to get it in the morning before leaving. Instructed to call back if she has further questions or concerns.     Reason for Disposition   Caller with prescription and triager answers question    Additional Information   Negative: [1] Prescription refill request for ESSENTIAL medicine (i.e., likelihood of harm to patient if not taken) AND [2] triager unable to refill per department policy   Negative: [1] Prescription not at pharmacy AND [2] was prescribed by PCP recently  (Exception: triager has access to EMR and prescription is recorded there. Go to Home Care and confirm for pharmacy.)   Negative: [1] Pharmacy calling with prescription questions AND [2] triager unable to answer question   Negative: Prescription request for new medicine (not a refill)   Negative: Caller requesting a CONTROLLED substance prescription refill (e.g., narcotics, ADHD medicines)   Negative: [1] Prescription refill request for NON-ESSENTIAL medicine (i.e., no harm to patient if med not taken) AND [2] triager unable to refill per department policy   Negative: [1] Caller has NON-URGENT medicine question about med that PCP prescribed AND [2] triager unable to answer question   Negative: [1] Prescription prescribed recently is not at pharmacy AND [2] triager has access to patient's EMR AND [3] prescription is recorded in the EMR   Negative: [1] Caller requesting a prescription renewal (no refills left), no triage required, AND [2] triager able to renew prescription per department policy   Negative: Patient has refills remaining on their prescription    Protocols used: Medication Refill and Renewal Call-A-

## 2023-04-27 ENCOUNTER — TELEPHONE (OUTPATIENT)
Dept: CARDIOLOGY | Facility: CLINIC | Age: 53
End: 2023-04-27
Payer: MEDICARE

## 2023-04-27 ENCOUNTER — OFFICE VISIT (OUTPATIENT)
Dept: CARDIOLOGY | Facility: CLINIC | Age: 53
End: 2023-04-27
Payer: MEDICARE

## 2023-04-27 VITALS
BODY MASS INDEX: 55.96 KG/M2 | WEIGHT: 293 LBS | DIASTOLIC BLOOD PRESSURE: 83 MMHG | HEART RATE: 83 BPM | OXYGEN SATURATION: 95 % | SYSTOLIC BLOOD PRESSURE: 146 MMHG

## 2023-04-27 DIAGNOSIS — I48.3 TYPICAL ATRIAL FLUTTER: ICD-10-CM

## 2023-04-27 DIAGNOSIS — Z86.79 S/P ABLATION OF ATRIAL FLUTTER: Primary | ICD-10-CM

## 2023-04-27 DIAGNOSIS — E66.01 MORBID OBESITY: ICD-10-CM

## 2023-04-27 DIAGNOSIS — Z98.890 S/P ABLATION OF ATRIAL FLUTTER: Primary | ICD-10-CM

## 2023-04-27 DIAGNOSIS — I10 ESSENTIAL HYPERTENSION: ICD-10-CM

## 2023-04-27 PROCEDURE — 99214 PR OFFICE/OUTPT VISIT, EST, LEVL IV, 30-39 MIN: ICD-10-PCS | Mod: HCNC,S$GLB,, | Performed by: INTERNAL MEDICINE

## 2023-04-27 PROCEDURE — 3008F BODY MASS INDEX DOCD: CPT | Mod: HCNC,CPTII,S$GLB, | Performed by: INTERNAL MEDICINE

## 2023-04-27 PROCEDURE — 3044F HG A1C LEVEL LT 7.0%: CPT | Mod: HCNC,CPTII,S$GLB, | Performed by: INTERNAL MEDICINE

## 2023-04-27 PROCEDURE — 99999 PR PBB SHADOW E&M-EST. PATIENT-LVL V: ICD-10-PCS | Mod: PBBFAC,HCNC,, | Performed by: INTERNAL MEDICINE

## 2023-04-27 PROCEDURE — 4010F PR ACE/ARB THEARPY RXD/TAKEN: ICD-10-PCS | Mod: HCNC,CPTII,S$GLB, | Performed by: INTERNAL MEDICINE

## 2023-04-27 PROCEDURE — 99999 PR PBB SHADOW E&M-EST. PATIENT-LVL V: CPT | Mod: PBBFAC,HCNC,, | Performed by: INTERNAL MEDICINE

## 2023-04-27 PROCEDURE — 1160F RVW MEDS BY RX/DR IN RCRD: CPT | Mod: HCNC,CPTII,S$GLB, | Performed by: INTERNAL MEDICINE

## 2023-04-27 PROCEDURE — 3044F PR MOST RECENT HEMOGLOBIN A1C LEVEL <7.0%: ICD-10-PCS | Mod: HCNC,CPTII,S$GLB, | Performed by: INTERNAL MEDICINE

## 2023-04-27 PROCEDURE — 3008F PR BODY MASS INDEX (BMI) DOCUMENTED: ICD-10-PCS | Mod: HCNC,CPTII,S$GLB, | Performed by: INTERNAL MEDICINE

## 2023-04-27 PROCEDURE — 3077F PR MOST RECENT SYSTOLIC BLOOD PRESSURE >= 140 MM HG: ICD-10-PCS | Mod: HCNC,CPTII,S$GLB, | Performed by: INTERNAL MEDICINE

## 2023-04-27 PROCEDURE — 3077F SYST BP >= 140 MM HG: CPT | Mod: HCNC,CPTII,S$GLB, | Performed by: INTERNAL MEDICINE

## 2023-04-27 PROCEDURE — 3079F PR MOST RECENT DIASTOLIC BLOOD PRESSURE 80-89 MM HG: ICD-10-PCS | Mod: HCNC,CPTII,S$GLB, | Performed by: INTERNAL MEDICINE

## 2023-04-27 PROCEDURE — 1159F MED LIST DOCD IN RCRD: CPT | Mod: HCNC,CPTII,S$GLB, | Performed by: INTERNAL MEDICINE

## 2023-04-27 PROCEDURE — 99214 OFFICE O/P EST MOD 30 MIN: CPT | Mod: HCNC,S$GLB,, | Performed by: INTERNAL MEDICINE

## 2023-04-27 PROCEDURE — 4010F ACE/ARB THERAPY RXD/TAKEN: CPT | Mod: HCNC,CPTII,S$GLB, | Performed by: INTERNAL MEDICINE

## 2023-04-27 PROCEDURE — 1160F PR REVIEW ALL MEDS BY PRESCRIBER/CLIN PHARMACIST DOCUMENTED: ICD-10-PCS | Mod: HCNC,CPTII,S$GLB, | Performed by: INTERNAL MEDICINE

## 2023-04-27 PROCEDURE — 3079F DIAST BP 80-89 MM HG: CPT | Mod: HCNC,CPTII,S$GLB, | Performed by: INTERNAL MEDICINE

## 2023-04-27 PROCEDURE — 1159F PR MEDICATION LIST DOCUMENTED IN MEDICAL RECORD: ICD-10-PCS | Mod: HCNC,CPTII,S$GLB, | Performed by: INTERNAL MEDICINE

## 2023-04-27 NOTE — TELEPHONE ENCOUNTER
----- Message from Brandy Elias sent at 4/26/2023  5:09 PM CDT -----  Contact: pt  Type:  Sooner Apoointment Request    Caller is requesting a sooner appointment.  Caller declined first available appointment listed below.  Caller will not accept being placed on the waitlist and is requesting a message be sent to doctor.  Name of Caller:pt  When is the first available appointment?05/03  Symptoms:shortness of breath   Would the patient rather a call back or a response via [facility]? Call   Best Call Back Number:[phone]  Additional Information:

## 2023-04-27 NOTE — TELEPHONE ENCOUNTER
Pt sent a message asking to be seen in the clinic because she was having SOB and she had a scheduled appt on 5-3-23 but ti was to far out for the pt so I called her back to let her know that I had an appt open today so pt verbalized that today's appt works for her .

## 2023-04-27 NOTE — PROGRESS NOTES
[facility] [address]     SUBJECTIVE:     History of Present Illness:  Patient is a 52 y.o. female presents with followup after atrial flutter cardioversion. Has noted shortness of breath for the past few months   - all the time with any minimal activity; walking to the mail box; also notes shortness of breath at rest; also has PND. Exhausted during the day since she does not sleep well at night     Primary Diagnosis:  1. Morbid obesity  2. Hypertension  3. Atrial flutter with rapid ventricular rate - s/p ablation  6/29/22  ROS  Since last visit 11/22:   A. Has not monitored her heart rate.no palpitations; no irregularities  - not checked her heart rate    B. No chest pains  C. No syncope  D. Feels tired only   E. Blood pressures normal at home but  variable    F. activity: reduced    Past Hospitalization    Review of patient's allergies indicates:  No Known Allergies    Past Medical History:   Diagnosis Date    Anxiety     Depression     GERD (gastroesophageal reflux disease)     Hypertension     Migraine headache     Sleep difficulties     Typical atrial flutter 4/20/2022       Past Surgical History:   Procedure Laterality Date    BREAST SURGERY      COLONOSCOPY N/A 12/14/2022    Procedure: COLONOSCOPY sutab;  Surgeon: Wei Walsh MD;  Location: [facility];  Service: Endoscopy;  Laterality: N/A;    ENDOMETRIAL ABLATION      ENDOSCOPIC GASTROCNEMIUS RECESSION Right 8/19/2022    Procedure: RECESSION, GASTROCNEMIUS, ENDOSCOPIC;  Surgeon: Zelalem Solorzano DPM;  Location: [facility] OR;  Service: Podiatry;  Laterality: Right;  Arthrex centerline  Stella/Arthrex/CONFIRMED/REJI,RN 8/18@1333    ENDOSCOPIC PLANTAR FASCIOTOMY Right 8/19/2022    Procedure: FASCIOTOMY, PLANTAR, ENDOSCOPIC;  Surgeon: Zelalem Solorzano DPM;  Location: [facility] OR;  Service: Podiatry;  Laterality: Right;  Arthrex set    ESOPHAGOGASTRODUODENOSCOPY N/A 12/14/2022    Procedure: EGD (ESOPHAGOGASTRODUODENOSCOPY);  Surgeon: Wei Walsh MD;  Location:  [facility] ENDO;  Service: Endoscopy;  Laterality: N/A;    EYE SURGERY      FOOT SURGERY Right 10/2018    bunion    IMPLANTATION OF PERMANENT SACRAL NERVE STIMULATOR N/A 2/15/2022    Procedure: INSERTION, NEUROSTIMULATOR, PERMANENT, SACRAL;  Surgeon: David Brown MD;  Location: [facility] OR [address] [place] [address];  Service: Urology;  Laterality: N/A;    TONSILLECTOMY      TOTAL REDUCTION MAMMOPLASTY Bilateral        Family History   Problem Relation Age of Onset    Atrial fibrillation Mother     Thyroid disease Mother     Dementia Mother     Diabetes Father     Prostate cancer Father     Stomach cancer Sister     Migraines Sister     Colon cancer Paternal Aunt     Colon cancer Paternal Uncle     Breast cancer Maternal Grandmother     Colon cancer Paternal Grandmother     Cancer Paternal Grandfather        Social History     Tobacco Use    Smoking status: Never     Passive exposure: Never    Smokeless tobacco: Never   Substance Use Topics    Alcohol use: No    Drug use: No        Home meds:  Current Outpatient Medications on File Prior to Visit   Medication Sig Dispense Refill    BOTOX 100 unit SolR       clotrimazole-betamethasone 1-0.05% (LOTRISONE) cream Apply topically 2 (two) times daily. 15 g 1    diazePAM (VALIUM) 5 MG tablet Take 1 tablet (5 mg total) by mouth daily as needed for Anxiety. 15 tablet 2    diltiaZEM (CARDIZEM CD) 240 MG 24 hr capsule Take 1 capsule (240 mg total) by mouth once daily. 90 capsule 1    EScitalopram oxalate (LEXAPRO) 20 MG tablet Take 1 tablet (20 mg total) by mouth once daily. 90 tablet 1    At-M-emgtz-B12-L.acid,plan-inu 65 mg iron- 50 mg-1 mg DFE Cap Take by mouth.      fentaNYL (SUBLIMAZE) 50 mcg/mL injection       ferrous sulfate (FEOSOL) 325 mg (65 mg iron) Tab tablet Take 325 mg by mouth.      fluconazole (DIFLUCAN) 150 MG Tab Take one tablet today and one in a week 2 tablet 0    HYDROcodone-acetaminophen (NORCO) 5-325 mg per tablet Take 1 tablet by mouth every 4 (four) hours as needed for  Pain. 30 tablet 0    losartan (COZAAR) 50 MG tablet Take 1 tablet (50 mg total) by mouth once daily. 90 tablet 3    meloxicam (MOBIC) 15 MG tablet       metronidazole 0.75% (METROCREAM) 0.75 % Crea Apply topically 2 (two) times daily. 45 g 3    mirabegron (MYRBETRIQ) 50 mg Tb24 Take 1 tablet (50 mg total) by mouth once daily. 30 tablet 11    multivit-minerals/folic acid (ADULT MULTIVITAMIN GUMMIES ORAL) Take 1 tablet by mouth Daily. Continue to hold until after Surgery.      multivitamin (THERAGRAN) per tablet Take 1 tablet by mouth.      phenylephrine (TEODORA-SYNEPHRINE) 10 mg/mL injection       promethazine (PHENERGAN) 25 MG tablet TAKE 1 TABLET(25 MG) BY MOUTH EVERY 6 HOURS AS NEEDED FOR NAUSEA 30 tablet 0    propofoL (DIPRIVAN) 10 mg/mL infusion       ROPIvacaine 0.5%, PF, (NAROPIN) 5 mg/mL (0.5 %) injection       sumatriptan (IMITREX) 100 MG tablet Take 1 tablet (100 mg total) by mouth every 2 (two) hours as needed for Migraine. 9 tablet 11    SUMAtriptan (IMITREX) 20 mg/actuation nasal spray USE ONE SPRAY IN THE NOSTRIL AS NEEDED FOR MIGRAINE(MAX 40MG/24 HOURS) 6 each 3    traZODone (DESYREL) 150 MG tablet Take half or whole tablet by mouth before bed as needed for insomnia 90 tablet 3    blood-glucose meter (TRUE METRIX GLUCOSE METER) kit Check BS as needed 1 each 0    lancets (TRUEPLUS LANCETS) 33 gauge Misc TEST BLOOD SUGAR THREE TIMES DAILY 300 each 1    nystatin (MYCOSTATIN) powder Apply to affected area 3 times daily 60 g 3    omeprazole (PRILOSEC) 40 MG capsule Take 1 capsule (40 mg total) by mouth once daily. 30 capsule 11    traMADoL (ULTRAM) 50 mg tablet Take 1 tablet (50 mg total) by mouth every 6 (six) hours. (Patient not taking: Reported on 4/27/2023) 5 tablet 0    walker (ULTRA-LIGHT ROLLATOR) Misc Rolator for stability and balance postoperatively as needed while standing and walking. 1 each 0     Current Facility-Administered Medications on File Prior to Visit   Medication Dose Route Frequency  Provider Last Rate Last Admin    0.9%  NaCl infusion   Intravenous Continuous Sathish Briseno MD   Stopped at 02/15/22 0948    LIDOcaine (PF) 10 mg/ml (1%) injection 10 mg  1 mL Intradermal Once Sathish Briseno MD        sodium chloride 0.9% flush 10 mL  10 mL Intravenous PRN Zelalem Solorzano DPM        sodium chloride 0.9% flush 10 mL  10 mL Intravenous PRN Zelalem Solorzano DPM           Cardiac meds:   ASA 81 mg : not taking this   Diltiazem 240 mg daily  Losartan 50 mg daily           OBJECTIVE:     Vital Signs (Most Recent)  Vitals:    23 1442   BP: (!) 146/83   Pulse: 83   SpO2: 95%   Weight: (!) 147.9 kg (326 lb)       Weight up from 315-326     Physical Exam:  Neck: normal carotids, no bruits; normal JVP  Lungs :clear  Heart: fast heart rate , normal S1,S2, no murmurs, no gallops  Abd: no masses; no bruits;   Exts: normal DP and PT pulses bilaterally, no edema noted           LABS    CMP  Recent Labs   Lab Result Units 23  0859   Potassium mmol/L 4.0         LIPID  Recent Labs   Lab Result Units 23  0859   HDL mg/dL 62   Cholesterol mg/dL 214*   Triglycerides mg/dL 77   LDL Cholesterol mg/dL 136.6   HDL/Cholesterol Ratio % 29.0   Total Cholesterol/HDL Ratio  3.5           Old Results:      Diagnostic Results:    EK/22: atrial flutter to sinus after cardioversion   1b. EK22: atrial flutter with better ventricular rate and variable AV block  1c. EK22: NSR:    1d. EK/22: NSR     2. Echo: 22: normal EF       ASSESSMENT/PLAN:     1. Recurrence of atrial flutter now in sinus post ablation and remaining in sinus   2. No symptoms of heart failure or angina  3. Hypertension: unknown control at home   4. Shortness of breath: most likely from obesity and/or sleep apnea.   Plan: 1.start ASA 81 mg daily  2. Continue all medications   3. Weight reduction - consider bariatric procedure  4. Sleep study needed   5. Return 6 months   Alexis Ingram MD

## 2023-05-03 ENCOUNTER — DOCUMENTATION ONLY (OUTPATIENT)
Dept: REHABILITATION | Facility: HOSPITAL | Age: 53
End: 2023-05-03
Payer: MEDICARE

## 2023-05-03 PROBLEM — Z74.09 DECREASED FUNCTIONAL MOBILITY AND ENDURANCE: Status: RESOLVED | Noted: 2023-03-01 | Resolved: 2023-05-03

## 2023-05-03 NOTE — PROGRESS NOTES
PATIENT DISCHARGE:  Patient had been placed on hold until her MRI. She no showed her MRI appointment and will be discharged from PT at this time. She has no future PT appointments scheduled.

## 2023-05-05 ENCOUNTER — PROCEDURE VISIT (OUTPATIENT)
Dept: NEUROLOGY | Facility: CLINIC | Age: 53
End: 2023-05-05
Payer: MEDICARE

## 2023-05-05 VITALS
HEART RATE: 88 BPM | HEIGHT: 64 IN | SYSTOLIC BLOOD PRESSURE: 142 MMHG | BODY MASS INDEX: 50.02 KG/M2 | DIASTOLIC BLOOD PRESSURE: 76 MMHG | WEIGHT: 293 LBS

## 2023-05-05 DIAGNOSIS — G43.719 INTRACTABLE CHRONIC MIGRAINE WITHOUT AURA AND WITHOUT STATUS MIGRAINOSUS: Primary | ICD-10-CM

## 2023-05-05 PROCEDURE — 64615 CHEMODENERV MUSC MIGRAINE: CPT | Mod: HCNC,S$GLB,, | Performed by: PSYCHIATRY & NEUROLOGY

## 2023-05-05 PROCEDURE — 64615 PR CHEMODENERVATION OF MUSCLE FOR CHRONIC MIGRAINE: ICD-10-PCS | Mod: HCNC,S$GLB,, | Performed by: PSYCHIATRY & NEUROLOGY

## 2023-05-05 RX ORDER — SUMATRIPTAN SUCCINATE 100 MG/1
100 TABLET ORAL
Qty: 9 TABLET | Refills: 11 | Status: SHIPPED | OUTPATIENT
Start: 2023-05-05 | End: 2023-07-27

## 2023-05-05 NOTE — PROCEDURES
"NEUROLOGY PROCEDURE NOTE - BOTOX FOR CHRONIC MIGRAINE    ID:   Starla Fisher is a 53 y.o. person who presents for medically necessary Botox injections for chronic migraine.    DIAGNOSIS:   1. Intractable chronic migraine without aura and without status migrainosus  sumatriptan (IMITREX) 100 MG tablet    onabotulinumtoxina injection 200 Units           BOTOX APPROVAL:   Botox injections have been approved for the patient today: Payor: [employer] MEDICARE / [facility] / Product Type: Medicare Advantage /        INTERVAL HISTORY:   Patient presents today for Botox injection for chronic migraine.  They endorse that their headaches have been stable since previous encounter.  All questions and concerns were answered to the patient's satisfaction prior to the procedure.     Patient is happy to report she continues enjoying very few overall headaches.  Far greater than 50% reduction in total and severe headache days since initiating therapy.  Reports no problems with previous injection cycle.    Percent improvement:  >50%  Total days of headache per month:  2  Headache days per month severe:  1  Headache days per month with migraine:  1  Duration of headache pain:  <1 hr as it responded well to abortives  Average pain score:  6/10    OBJECTIVE:   BP (!) 142/76 (BP Location: Left arm, Patient Position: Sitting)   Pulse 88   Ht 5' 4" (1.626 m)   Wt (!) 147.9 kg (326 lb 1 oz)   LMP  (LMP Unknown)   BMI 55.97 kg/m²      General: Pleasant, well-appearing in no acute distress.    Neurological Exam:    Mental status: Speech clear, fluent. Normal recent and remote memory.    Cranial nerves: EOMI, face symmetric, hearing intact to voice.   Motor: Moves all four extremities spontaneously.     PRE-PROCEDURE VERIFICATION:   Allergies and relevant documentation was verified. Standard hand hygiene was observed.      PROCEDURE CODES:    - Botulinum Toxin A - Botox 200U    23020 - " Chemodenervation of muscles for chronic migraine    PROCEDURE:   The risk and benefits of the procedure were explained. Starla agreed to undergo the procedure. Starla and DAVID signed the written consent form. One vial of BOTOX 200U was opened. Each vial of Botox was mixed in 4cc of sterile, preservative-free saline. Starla received 155U of BOTOX in 31 injections. 45U were unavoidably wasted.      The following muscles were injected:    Amount Muscle   5U   Proceros    5U    Supercilii (left)    5U    Supercilii (right)    10U   Epicranius - Occipitofrontalis Tonny Frontalis (right)    10U   Epicranius - Occipitofrontalis Tonny Frontalis (left)    20U   Temporalis (right)    20U   Temporalis (left)    15U   Tonny Occipitalis (right)    15U   Tonny Occipitalis (left)    10U   Semispinalis capitis (left)    10U   Semispinalis capitis (right)    15U   Trapezius (right)    15U   Trapezius (left)    45 units were unavoidably wasted. The patient tolerated the procedure well with no immediate side effects.      LIMITATIONS OF BOTOX: Safety and effectiveness have not been established for the prophylaxis of episodic migraine (14 headache days or fewer per month) in 7 placebo-controlled studies.   ADVERSE REACTIONS: The most frequently reported adverse reactions following injection of BOTOX® for chronic migraine include neck pain (9%), headache (5%), eyelid ptosis (4%), migraine (4%), muscular weakness (4%), musculoskeletal stiffness (4%), bronchitis (3%), injection-site pain (3%), musculoskeletal pain (3%), myalgia (3%), facial paresis (2%), hypertension (2%), and muscle spasms (2%). Post Marketing Experience:There have been spontaneous reports of death, sometimes associated with dysphagia, pneumonia, and/or other significant debility or anaphylaxis, after treatment with botulinum toxin. There have also been reports of adverse events involving the cardiovascular system, including arrhythmia and myocardial  infarction, some with fatal outcomes. Some of these patients had risk factors including cardiovascular disease. The exact relationship of these events to the botulinum toxin injection has not been established. Distant Spread of Toxin Effect: Post-marketing reports indicate that the effects of BOTOX® and all botulinum toxin products may spread from the area of injection to produce symptoms consistent with botulinum toxin effects. These may include asthenia, generalized muscle weakness, diplopia, ptosis, dysphagia, dysphonia, dysarthria, urinary incontinence, and breathing difficulties. These symptoms have been reported hours to weeks after injection. Swallowing and breathing difficulties can be life threatening, and there have been reports of death. The risk of symptoms is probably greatest in children treated for spasticity, but symptoms can also occur in adults treated for spasticity and other conditions, particularly in those patients who have underlying conditions that would predispose them to these symptoms. In unapproved uses, including spasticity in children, and in approved indications, cases of spread of effect have been reported at doses comparable to those used to treat cervical dystonia and at lower doses.    CONTRAINDICATIONS: BOTOX® is contraindicated in the presence of infection at the proposed injection site(s) and in individuals with known hypersensitivity to any botulinum toxin preparation or to any of the components in the formulation. Hypersensitivity Reactions: Serious and/or immediate hypersensitivity reactions have been reported. These reactions include anaphylaxis, serum sickness, urticaria, soft-tissue edema, and dyspnea. If such a reaction occurs, further injection of BOTOX® should be discontinued and appropriate medical therapy immediately instituted. One fatal case of anaphylaxis has been reported in which lidocaine was used as the diluent, and consequently the causal agent cannot be  reliably determined.    Note: If the patient is covered by commercial insurance and has a diagnosis of chronic migraine, the Botox company may be able to assist with out-of-pocket co-payment up to $700 per treatment. In order to get assistance, please call [employer] at [phone] or go to  [email or web address].      ASSESSMENT/PLAN:  Starla presents today for medically necessary Botox injections for chronic migraine. Botox injections have reduced their migraine frequency/intensity by far greater than 50%. We proceeded with Botox 155 units today.     Diagnosis:   1. Intractable chronic migraine without aura and without status migrainosus  sumatriptan (IMITREX) 100 MG tablet    onabotulinumtoxina injection 200 Units        - refills provided for acute medications as above.    Starla should follow-up for the next cycle of Botox injections in 3 months or earlier for a clinical follow-up visit if needed.

## 2023-05-13 ENCOUNTER — HOSPITAL ENCOUNTER (OUTPATIENT)
Dept: RADIOLOGY | Facility: HOSPITAL | Age: 53
Discharge: HOME | End: 2023-05-13
Attending: ORTHOPAEDIC SURGERY
Payer: MEDICARE

## 2023-05-13 PROCEDURE — 73721 MRI JNT OF LWR EXTRE W/O DYE: CPT | Mod: TC,RT

## 2023-05-13 PROCEDURE — 73721 MRI JNT OF LWR EXTRE W/O DYE: CPT | Mod: 26,RT,, | Performed by: RADIOLOGY

## 2023-05-13 PROCEDURE — 73721 MRI KNEE WITHOUT CONTRAST RIGHT: ICD-10-PCS | Mod: 26,RT,, | Performed by: RADIOLOGY

## 2023-05-15 ENCOUNTER — TELEPHONE (OUTPATIENT)
Dept: ORTHOPEDICS | Facility: CLINIC | Age: 53
End: 2023-05-15
Payer: MEDICARE

## 2023-05-15 NOTE — TELEPHONE ENCOUNTER
Spoke with patient. Wants MRI results.  Will make an audio appointment for patient.  Informed her she will see it come across her [employer] rogelio.  Verbalized understanding.

## 2023-05-15 NOTE — TELEPHONE ENCOUNTER
----- Message from Kenn Allan sent at 5/15/2023  2:46 PM CDT -----  Type:  Test Results    Who Called: pt  Name of Test (Lab/Mammo/Etc): Mri  Date of Test: 05/13/2023  Ordering Provider: MICHELLE EDDY  Where the test was performed: [facility]   Would the patient rather a call back or a response via [facility]? call  Best Call Back Number: [phone]  Additional Information:

## 2023-05-16 ENCOUNTER — OFFICE VISIT (OUTPATIENT)
Dept: ORTHOPEDICS | Facility: CLINIC | Age: 53
End: 2023-05-16
Payer: MEDICARE

## 2023-05-16 ENCOUNTER — PATIENT MESSAGE (OUTPATIENT)
Dept: ORTHOPEDICS | Facility: CLINIC | Age: 53
End: 2023-05-16

## 2023-05-16 DIAGNOSIS — M17.11 PRIMARY OSTEOARTHRITIS OF RIGHT KNEE: Primary | ICD-10-CM

## 2023-05-16 PROCEDURE — 4010F ACE/ARB THERAPY RXD/TAKEN: CPT | Mod: CPTII,95,, | Performed by: ORTHOPAEDIC SURGERY

## 2023-05-16 PROCEDURE — 3044F HG A1C LEVEL LT 7.0%: CPT | Mod: CPTII,95,, | Performed by: ORTHOPAEDIC SURGERY

## 2023-05-16 PROCEDURE — 4010F PR ACE/ARB THEARPY RXD/TAKEN: ICD-10-PCS | Mod: CPTII,95,, | Performed by: ORTHOPAEDIC SURGERY

## 2023-05-16 PROCEDURE — 99441 PR PHYSICIAN TELEPHONE EVALUATION 5-10 MIN: ICD-10-PCS | Mod: 95,,, | Performed by: ORTHOPAEDIC SURGERY

## 2023-05-16 PROCEDURE — 99441 PR PHYSICIAN TELEPHONE EVALUATION 5-10 MIN: CPT | Mod: 95,,, | Performed by: ORTHOPAEDIC SURGERY

## 2023-05-16 PROCEDURE — 3044F PR MOST RECENT HEMOGLOBIN A1C LEVEL <7.0%: ICD-10-PCS | Mod: CPTII,95,, | Performed by: ORTHOPAEDIC SURGERY

## 2023-05-16 NOTE — PROGRESS NOTES
Established Patient - Audio Only Telehealth Visit     The patient location is: home  The chief complaint leading to consultation is: r knee pain  Visit type: Virtual visit with audio only (telephone)  Total time spent with patient: 10 min       The reason for the audio only service rather than synchronous audio and video virtual visit was related to technical difficulties or patient preference/necessity.     Each patient to whom I provide medical services by telemedicine is:  (1) informed of the relationship between the physician and patient and the respective role of any other health care provider with respect to management of the patient; and (2) notified that they may decline to receive medical services by telemedicine and may withdraw from such care at any time. Patient verbally consented to receive this service via voice-only telephone call.       HPI: mri review     Assessment and plan:  mri did not show any significant intra-articular pathology requiring any surgical intervention at this time.  She is had 1 week of relief with her Toradol injection and minimal improvement with physical therapy.  She would like to try HA injections.                        This service was not originating from a related E/M service provided within the previous 7 days nor will  to an E/M service or procedure within the next 24 hours or my soonest available appointment.  Prevailing standard of care was able to be met in this audio-only visit.

## 2023-05-17 PROBLEM — M17.11 PRIMARY OSTEOARTHRITIS OF RIGHT KNEE: Status: ACTIVE | Noted: 2023-05-17

## 2023-05-19 ENCOUNTER — TELEPHONE (OUTPATIENT)
Dept: BARIATRICS | Facility: CLINIC | Age: 53
End: 2023-05-19
Payer: MEDICARE

## 2023-05-19 NOTE — TELEPHONE ENCOUNTER
Notified patient of the date & time of financial phone call appt.  Pt aware appt is a telephone call.    Dashboard updated  Appt. 05.22.2023  Mail box is full.

## 2023-05-23 ENCOUNTER — TELEPHONE (OUTPATIENT)
Dept: BARIATRICS | Facility: CLINIC | Age: 53
End: 2023-05-23
Payer: MEDICARE

## 2023-05-23 ENCOUNTER — OFFICE VISIT (OUTPATIENT)
Dept: ORTHOPEDICS | Facility: CLINIC | Age: 53
End: 2023-05-23
Payer: MEDICARE

## 2023-05-23 ENCOUNTER — RESEARCH ENCOUNTER (OUTPATIENT)
Dept: RESEARCH | Facility: HOSPITAL | Age: 53
End: 2023-05-23
Payer: MEDICARE

## 2023-05-23 VITALS
HEART RATE: 76 BPM | HEIGHT: 64 IN | SYSTOLIC BLOOD PRESSURE: 153 MMHG | BODY MASS INDEX: 50.02 KG/M2 | WEIGHT: 293 LBS | DIASTOLIC BLOOD PRESSURE: 90 MMHG

## 2023-05-23 DIAGNOSIS — M17.11 PRIMARY OSTEOARTHRITIS OF RIGHT KNEE: Primary | ICD-10-CM

## 2023-05-23 PROCEDURE — 99999 PR PBB SHADOW E&M-EST. PATIENT-LVL V: ICD-10-PCS | Mod: PBBFAC,,, | Performed by: ORTHOPAEDIC SURGERY

## 2023-05-23 PROCEDURE — 1159F MED LIST DOCD IN RCRD: CPT | Mod: CPTII,S$GLB,, | Performed by: ORTHOPAEDIC SURGERY

## 2023-05-23 PROCEDURE — 3080F DIAST BP >= 90 MM HG: CPT | Mod: CPTII,S$GLB,, | Performed by: ORTHOPAEDIC SURGERY

## 2023-05-23 PROCEDURE — 3077F SYST BP >= 140 MM HG: CPT | Mod: CPTII,S$GLB,, | Performed by: ORTHOPAEDIC SURGERY

## 2023-05-23 PROCEDURE — 3008F BODY MASS INDEX DOCD: CPT | Mod: CPTII,S$GLB,, | Performed by: ORTHOPAEDIC SURGERY

## 2023-05-23 PROCEDURE — 4010F ACE/ARB THERAPY RXD/TAKEN: CPT | Mod: CPTII,S$GLB,, | Performed by: ORTHOPAEDIC SURGERY

## 2023-05-23 PROCEDURE — 4010F PR ACE/ARB THEARPY RXD/TAKEN: ICD-10-PCS | Mod: CPTII,S$GLB,, | Performed by: ORTHOPAEDIC SURGERY

## 2023-05-23 PROCEDURE — 3077F PR MOST RECENT SYSTOLIC BLOOD PRESSURE >= 140 MM HG: ICD-10-PCS | Mod: CPTII,S$GLB,, | Performed by: ORTHOPAEDIC SURGERY

## 2023-05-23 PROCEDURE — 99499 UNLISTED E&M SERVICE: CPT | Mod: S$GLB,,, | Performed by: ORTHOPAEDIC SURGERY

## 2023-05-23 PROCEDURE — 99999 PR PBB SHADOW E&M-EST. PATIENT-LVL V: CPT | Mod: PBBFAC,,, | Performed by: ORTHOPAEDIC SURGERY

## 2023-05-23 PROCEDURE — 99499 NO LOS: ICD-10-PCS | Mod: S$GLB,,, | Performed by: ORTHOPAEDIC SURGERY

## 2023-05-23 PROCEDURE — 3044F PR MOST RECENT HEMOGLOBIN A1C LEVEL <7.0%: ICD-10-PCS | Mod: CPTII,S$GLB,, | Performed by: ORTHOPAEDIC SURGERY

## 2023-05-23 PROCEDURE — 1159F PR MEDICATION LIST DOCUMENTED IN MEDICAL RECORD: ICD-10-PCS | Mod: CPTII,S$GLB,, | Performed by: ORTHOPAEDIC SURGERY

## 2023-05-23 PROCEDURE — 3080F PR MOST RECENT DIASTOLIC BLOOD PRESSURE >= 90 MM HG: ICD-10-PCS | Mod: CPTII,S$GLB,, | Performed by: ORTHOPAEDIC SURGERY

## 2023-05-23 PROCEDURE — 3044F HG A1C LEVEL LT 7.0%: CPT | Mod: CPTII,S$GLB,, | Performed by: ORTHOPAEDIC SURGERY

## 2023-05-23 PROCEDURE — 3008F PR BODY MASS INDEX (BMI) DOCUMENTED: ICD-10-PCS | Mod: CPTII,S$GLB,, | Performed by: ORTHOPAEDIC SURGERY

## 2023-05-23 NOTE — TELEPHONE ENCOUNTER
----- Message from Nae Garcia sent at 5/23/2023  4:16 PM CDT -----  Regarding: Appointment  Contact: [phone]  Calling to schedule initial appointment with bariatrics for weight loss surgery. Please schedule appointment for a Monday or Tuesday any time.

## 2023-05-23 NOTE — PROGRESS NOTES
Subjective:      Patient ID: Starla Fisher is a 53 y.o. female.    Chief Complaint: Pain of the Right Knee    Mri shows mild./mod knee oa  No men tear  C/o cont pain      Social History     Occupational History    Not on file   Tobacco Use    Smoking status: Never     Passive exposure: Never    Smokeless tobacco: Never   Substance and Sexual Activity    Alcohol use: No    Drug use: No    Sexual activity: Not Currently     Partners: Male      ROS      Objective:    Ortho/SPM Exam       Assessment:       1. Primary osteoarthritis of right knee          Plan:       Min relief w toradol injections  Will try durolane injection

## 2023-05-23 NOTE — PROGRESS NOTES
Protocol: innovations in Genicular Outcomes Registry (Jacob)  Protocol#: Jacob  IRB#: 2021.156  Version Date: 10-Tunde-2023  PI: Toni May MD  Patient Initials: JONATHANBElda     Study Recruitment Note:     Research coordinator met with patient, in private clinic room, to discuss above mentioned protocol. Patient is alert and oriented x 3. Mood and affect appropriate to the situation. Patient states that she is not participating in any other research studies. Patient states understanding about the diagnosis of osteoarthritis of the knee and of the procedure to being done on that knee.  Purpose of study reviewed with the patient.  Patient states understanding of this information.   Length of study and number of participants reviewed with patient; patient states understanding of the length of the study.   Study procedure discussed in detail with patient. Patient states understanding of this information.  Potential benefits of study along with costs and/or payment reimbursement per insurance discussed with patient; Patient states understanding that insurance will cover cost of all standard of care medications and procedures. Patient aware of $20 payment for qualifying visits with completed questionnaires.  Alternative treatment methods discussed with patient; Patient states understanding of this information.   Study related questions/compensation for injury, and whom to contact regarding rights as a research subject all reviewed with patient; Patient verbalizes understanding of this information.   Voluntary participation and withdrawal from research stressed to patient; patient states understanding that she may withdraw consent at any time without compromise to care.   Confidentiality and HIPAA discussed with patient. Patient verbalizes understanding of this information.          Patient states her interest in study. Will consider participation at next appointment once Durolane injection is approved by insurance. Paper copy of  consent form and study brochure were given to patient for review. Patient instructed to call if she has any questions or concerns.

## 2023-05-27 ENCOUNTER — OFFICE VISIT (OUTPATIENT)
Dept: URGENT CARE | Facility: CLINIC | Age: 53
End: 2023-05-27
Payer: MEDICARE

## 2023-05-27 VITALS
TEMPERATURE: 98 F | HEART RATE: 84 BPM | RESPIRATION RATE: 18 BRPM | HEIGHT: 64 IN | WEIGHT: 293 LBS | SYSTOLIC BLOOD PRESSURE: 144 MMHG | OXYGEN SATURATION: 95 % | DIASTOLIC BLOOD PRESSURE: 81 MMHG | BODY MASS INDEX: 50.02 KG/M2

## 2023-05-27 RX ORDER — IBUPROFEN 200 MG
800 TABLET ORAL
COMMUNITY
Start: 2023-05-27 | End: 2023-05-27

## 2023-05-27 NOTE — PROGRESS NOTES
"Subjective:      Patient ID: Starla Fisher is a 53 y.o. female.    Vitals:  height is 5' 4" (1.626 m) and weight is 147.9 kg (326 lb) (abnormal).     Chief Complaint: Headache    Patient presents to the clinic with possibole migraines and nausea x yesterday. Patient stated she thinks its possible it is from her sleep apnea cause she doesn't have her machine.     Headache   This is a recurrent problem. The current episode started yesterday. The problem occurs constantly. The problem has been waxing and waning. The pain is located in the Bilateral region. The quality of the pain is described as pulsating, throbbing and band-like. Associated symptoms include insomnia, nausea and vomiting. Treatments tried: imtrex , phenergan. The treatment provided no relief.     Gastrointestinal:  Positive for nausea and vomiting.   Neurological:  Positive for headaches.   Psychiatric/Behavioral:  The patient has insomnia.     Objective:     Physical Exam    Assessment:     No diagnosis found.    Plan:       There are no diagnoses linked to this encounter.                "

## 2023-05-28 ENCOUNTER — OFFICE VISIT (OUTPATIENT)
Dept: URGENT CARE | Facility: CLINIC | Age: 53
End: 2023-05-28
Payer: MEDICARE

## 2023-05-28 VITALS
SYSTOLIC BLOOD PRESSURE: 159 MMHG | RESPIRATION RATE: 18 BRPM | OXYGEN SATURATION: 96 % | TEMPERATURE: 98 F | WEIGHT: 293 LBS | DIASTOLIC BLOOD PRESSURE: 93 MMHG | BODY MASS INDEX: 50.02 KG/M2 | HEIGHT: 64 IN | HEART RATE: 69 BPM

## 2023-05-28 DIAGNOSIS — R11.0 NAUSEA: ICD-10-CM

## 2023-05-28 DIAGNOSIS — G43.809 OTHER MIGRAINE WITHOUT STATUS MIGRAINOSUS, NOT INTRACTABLE: Primary | ICD-10-CM

## 2023-05-28 PROCEDURE — 96372 PR INJECTION,THERAP/PROPH/DIAG2ST, IM OR SUBCUT: ICD-10-PCS | Mod: S$GLB,,,

## 2023-05-28 PROCEDURE — 96372 THER/PROPH/DIAG INJ SC/IM: CPT | Mod: S$GLB,,,

## 2023-05-28 PROCEDURE — 99213 PR OFFICE/OUTPT VISIT, EST, LEVL III, 20-29 MIN: ICD-10-PCS | Mod: 25,S$GLB,,

## 2023-05-28 PROCEDURE — 99213 OFFICE O/P EST LOW 20 MIN: CPT | Mod: 25,S$GLB,,

## 2023-05-28 RX ORDER — PROMETHAZINE HYDROCHLORIDE 25 MG/1
25 TABLET ORAL EVERY 6 HOURS PRN
Qty: 12 TABLET | Refills: 0 | Status: SHIPPED | OUTPATIENT
Start: 2023-05-28 | End: 2023-11-07 | Stop reason: SDUPTHER

## 2023-05-28 RX ORDER — KETOROLAC TROMETHAMINE 30 MG/ML
30 INJECTION, SOLUTION INTRAMUSCULAR; INTRAVENOUS
Status: COMPLETED | OUTPATIENT
Start: 2023-05-28 | End: 2023-05-28

## 2023-05-28 RX ADMIN — KETOROLAC TROMETHAMINE 30 MG: 30 INJECTION, SOLUTION INTRAMUSCULAR; INTRAVENOUS at 04:05

## 2023-05-28 NOTE — PROGRESS NOTES
"Subjective:      Patient ID: Starla Fisher is a 53 y.o. female.    Vitals:  height is 5' 4" (1.626 m) and weight is 147.9 kg (326 lb) (abnormal). Her temperature is 97.6 °F (36.4 °C). Her blood pressure is 159/93 (abnormal) and her pulse is 69. Her respiration is 18 and oxygen saturation is 96%.     Chief Complaint: Headache    53 yr female pt presents to the clinic with headache. Pt states that she has had headache since yesterday and she cant get rid of it. Treatments at home include ibuprofen yesterday and took nothing today. Pt reports hx of migraines since she was 11 years old and takes immitrex and gets botox injections for it. Pt reports that this headache feels the same as all the other migraines she has had in past. Pt reports diarrhea and vomiting which is normal for her with migraines. Pt reports that toradol has been the only thing that works for her migraines in the past.    Headache   This is a new problem. The current episode started yesterday. The problem has been gradually worsening. The pain does not radiate. The pain quality is similar to prior headaches. The quality of the pain is described as aching, dull and throbbing. The pain is at a severity of 10/10. Associated symptoms include blurred vision and nausea. Pertinent negatives include no dizziness or fever. The symptoms are aggravated by bright light. She has tried nothing for the symptoms. The treatment provided no relief.     Constitution: Negative for chills and fever.   Cardiovascular:  Negative for chest pain.   Eyes:  Positive for blurred vision.   Gastrointestinal:  Positive for nausea and diarrhea.   Neurological:  Positive for headaches. Negative for dizziness.    Objective:     Physical Exam   Constitutional: She is oriented to person, place, and time. She appears well-developed.  Non-toxic appearance. She does not appear ill. No distress.   HENT:   Head: Normocephalic and atraumatic.   Ears:   Right Ear: Hearing, tympanic " membrane, external ear and ear canal normal.   Left Ear: Hearing, tympanic membrane, external ear and ear canal normal.   Nose: Nose normal. No mucosal edema, rhinorrhea or nasal deformity. No epistaxis. Right sinus exhibits no maxillary sinus tenderness and no frontal sinus tenderness. Left sinus exhibits no maxillary sinus tenderness and no frontal sinus tenderness.   Mouth/Throat: Uvula is midline, oropharynx is clear and moist and mucous membranes are normal. No trismus in the jaw. Normal dentition. No uvula swelling. No posterior oropharyngeal erythema.   Eyes: Conjunctivae, EOM and lids are normal. Pupils are equal, round, and reactive to light.   Neck: Trachea normal and phonation normal. Neck supple. No neck rigidity present.   Cardiovascular: Normal rate, regular rhythm, normal heart sounds and normal pulses.   Pulmonary/Chest: Effort normal and breath sounds normal. No respiratory distress.   Abdominal: Normal appearance and bowel sounds are normal. She exhibits no distension. Soft. There is no abdominal tenderness.   Musculoskeletal: Normal range of motion.         General: No deformity. Normal range of motion.   Neurological: no focal deficit. She is alert and oriented to person, place, and time. No cranial nerve deficit. She exhibits normal muscle tone. She has a normal Finger-Nose-Finger Test. Coordination: Romberg sign negative. Coordination normal.      Comments: CN 2-12 intact. No focal deficit.  strength 5/5   Skin: Skin is warm, dry, intact, not diaphoretic and not pale.   Psychiatric: Her speech is normal and behavior is normal. Judgment and thought content normal.   Nursing note and vitals reviewed.    Assessment:     1. Other migraine without status migrainosus, not intractable        Plan:       Other migraine without status migrainosus, not intractable  -     ketorolac injection 30 mg    Pt in no acute distress. Discussed treatment with Toradol and requesting phenergan for nausea.  Discussed OTC medications to help with relief. Pt reports that Fiorcet does not work for her migraines. Discussed the importance of further evaluation if symptoms worsen. Patient stated verbal understanding.    Patient Instructions   PLEASE READ YOUR DISCHARGE INSTRUCTIONS ENTIRELY AS IT CONTAINS IMPORTANT INFORMATION.  Take Tylenol/Motrin as needed for headache.     DO NOT TAKE ANY MORE NAPROXEN OR IBUPROFEN FOR 24 HOURS AS YOU RECEIVED A LARGE DOSE OF IT VIA INJECTION    Use the phenergan as needed for nausea- it dissolves under your tongue.     Please go to the emergency room if you experience chest pain, shortness of breath, funny heart beats, headache, blurred vision, weakness in one arm or leg, slurred speech, numbness, inability to walk or talk, confusion.     Try to avoid common triggers of headaches (stress, menstruation, visual stimuli, weather changes, nitrates, fasting, wine, lack of sleep, smoking, odors, chocolate)       Please return or see your primary care doctor if you develop new or worsening symptoms.       Please arrange follow up with your primary medical clinic as soon as possible. You must understand that you've received an Urgent Care treatment only and that you may be released before all of your medical problems are known or treated. You, the patient, will arrange for follow up as instructed. If your symptoms worsen or fail to improve you should go to the Emergency Room.  WE CANNOT RULE OUT ALL POSSIBLE CAUSES OF YOUR SYMPTOMS IN THE URGENT CARE SETTING PLEASE GO TO THE ER IF YOU FEELS YOUR CONDITION IS WORSENING OR YOU WOULD LIKE EMERGENT EVALUATION.

## 2023-05-28 NOTE — PATIENT INSTRUCTIONS
PLEASE READ YOUR DISCHARGE INSTRUCTIONS ENTIRELY AS IT CONTAINS IMPORTANT INFORMATION.  Take Tylenol/Motrin as needed for headache.     DO NOT TAKE ANY MORE NAPROXEN OR IBUPROFEN FOR 24 HOURS AS YOU RECEIVED A LARGE DOSE OF IT VIA INJECTION    Use the phenergan as needed for nausea- it dissolves under your tongue.     Please go to the emergency room if you experience chest pain, shortness of breath, funny heart beats, headache, blurred vision, weakness in one arm or leg, slurred speech, numbness, inability to walk or talk, confusion.     Try to avoid common triggers of headaches (stress, menstruation, visual stimuli, weather changes, nitrates, fasting, wine, lack of sleep, smoking, odors, chocolate)       Please return or see your primary care doctor if you develop new or worsening symptoms.       Please arrange follow up with your primary medical clinic as soon as possible. You must understand that you've received an Urgent Care treatment only and that you may be released before all of your medical problems are known or treated. You, the patient, will arrange for follow up as instructed. If your symptoms worsen or fail to improve you should go to the Emergency Room.  WE CANNOT RULE OUT ALL POSSIBLE CAUSES OF YOUR SYMPTOMS IN THE URGENT CARE SETTING PLEASE GO TO THE ER IF YOU FEELS YOUR CONDITION IS WORSENING OR YOU WOULD LIKE EMERGENT EVALUATION.

## 2023-05-31 ENCOUNTER — RESEARCH ENCOUNTER (OUTPATIENT)
Dept: RESEARCH | Facility: HOSPITAL | Age: 53
End: 2023-05-31
Payer: MEDICARE

## 2023-05-31 ENCOUNTER — TELEPHONE (OUTPATIENT)
Dept: ORTHOPEDICS | Facility: CLINIC | Age: 53
End: 2023-05-31
Payer: MEDICARE

## 2023-05-31 ENCOUNTER — OFFICE VISIT (OUTPATIENT)
Dept: ORTHOPEDICS | Facility: CLINIC | Age: 53
End: 2023-05-31
Payer: MEDICARE

## 2023-05-31 VITALS
BODY MASS INDEX: 50.02 KG/M2 | SYSTOLIC BLOOD PRESSURE: 171 MMHG | DIASTOLIC BLOOD PRESSURE: 105 MMHG | HEIGHT: 64 IN | WEIGHT: 293 LBS | HEART RATE: 85 BPM

## 2023-05-31 DIAGNOSIS — M17.11 PRIMARY OSTEOARTHRITIS OF RIGHT KNEE: Primary | ICD-10-CM

## 2023-05-31 PROCEDURE — 99999 PR PBB SHADOW E&M-EST. PATIENT-LVL IV: ICD-10-PCS | Mod: PBBFAC,,, | Performed by: PHYSICIAN ASSISTANT

## 2023-05-31 PROCEDURE — 20610 DRAIN/INJ JOINT/BURSA W/O US: CPT | Mod: RT,S$GLB,, | Performed by: PHYSICIAN ASSISTANT

## 2023-05-31 PROCEDURE — 99499 UNLISTED E&M SERVICE: CPT | Mod: S$GLB,,, | Performed by: PHYSICIAN ASSISTANT

## 2023-05-31 PROCEDURE — 99999 PR PBB SHADOW E&M-EST. PATIENT-LVL IV: CPT | Mod: PBBFAC,,, | Performed by: PHYSICIAN ASSISTANT

## 2023-05-31 PROCEDURE — 20610 LARGE JOINT ASPIRATION/INJECTION: R KNEE: ICD-10-PCS | Mod: RT,S$GLB,, | Performed by: PHYSICIAN ASSISTANT

## 2023-05-31 PROCEDURE — 99499 NO LOS: ICD-10-PCS | Mod: S$GLB,,, | Performed by: PHYSICIAN ASSISTANT

## 2023-05-31 NOTE — TELEPHONE ENCOUNTER
Informed patient to come an hour early  for her appointment today to be consented to study today.  Verbalized understanding.

## 2023-05-31 NOTE — PROGRESS NOTES
Subjective:      Patient ID: Starla Fisher is a 53 y.o. female.    Chief Complaint: Pain and Injections of the Right Knee      Patient is here for First Durolane injection(s) for right knee osteoarthritis. Patient tolerated last injection well. No new complaints today.         Review of Systems   Constitutional: Negative for chills and fever.   Cardiovascular:  Negative for chest pain.   Respiratory:  Negative for cough.    Hematologic/Lymphatic: Does not bruise/bleed easily.   Skin:  Negative for poor wound healing and rash.   Musculoskeletal:  Positive for joint pain, myalgias and stiffness.   Gastrointestinal:  Negative for abdominal pain.   Genitourinary:  Negative for bladder incontinence.   Neurological:  Negative for dizziness, loss of balance and weakness.   Psychiatric/Behavioral:  Negative for altered mental status.      Review of patient's allergies indicates:  No Known Allergies     Current Outpatient Medications   Medication Sig Dispense Refill    blood-glucose meter (TRUE METRIX GLUCOSE METER) kit Check BS as needed 1 each 0    clotrimazole-betamethasone 1-0.05% (LOTRISONE) cream Apply topically 2 (two) times daily. 15 g 1    diazePAM (VALIUM) 5 MG tablet Take 1 tablet (5 mg total) by mouth daily as needed for Anxiety. 15 tablet 2    diltiaZEM (CARDIZEM CD) 240 MG 24 hr capsule Take 1 capsule (240 mg total) by mouth once daily. 90 capsule 1    EScitalopram oxalate (LEXAPRO) 20 MG tablet Take 1 tablet (20 mg total) by mouth once daily. 90 tablet 1    Zj-P-husmn-B12-L.acid,plan-inu 65 mg iron- 50 mg-1 mg DFE Cap Take by mouth.      fentaNYL (SUBLIMAZE) 50 mcg/mL injection       ferrous sulfate (FEOSOL) 325 mg (65 mg iron) Tab tablet Take 325 mg by mouth.      fluconazole (DIFLUCAN) 150 MG Tab Take one tablet today and one in a week 2 tablet 0    HYDROcodone-acetaminophen (NORCO) 5-325 mg per tablet Take 1 tablet by mouth every 4 (four) hours as needed for Pain. 30 tablet 0    lancets (TRUEPLUS  LANCETS) 33 gauge Misc TEST BLOOD SUGAR THREE TIMES DAILY 300 each 1    losartan (COZAAR) 50 MG tablet Take 1 tablet (50 mg total) by mouth once daily. 90 tablet 3    meloxicam (MOBIC) 15 MG tablet       multivit-minerals/folic acid (ADULT MULTIVITAMIN GUMMIES ORAL) Take 1 tablet by mouth Daily. Continue to hold until after Surgery.      multivitamin (THERAGRAN) per tablet Take 1 tablet by mouth.      nystatin (MYCOSTATIN) powder Apply to affected area 3 times daily 60 g 3    omeprazole (PRILOSEC) 40 MG capsule Take 1 capsule (40 mg total) by mouth once daily. 30 capsule 11    phenylephrine (TEODORA-SYNEPHRINE) 10 mg/mL injection       promethazine (PHENERGAN) 25 MG tablet TAKE 1 TABLET(25 MG) BY MOUTH EVERY 6 HOURS AS NEEDED FOR NAUSEA 30 tablet 0    promethazine (PHENERGAN) 25 MG tablet Take 1 tablet (25 mg total) by mouth every 6 (six) hours as needed for Nausea. 12 tablet 0    propofoL (DIPRIVAN) 10 mg/mL infusion       ROPIvacaine 0.5%, PF, (NAROPIN) 5 mg/mL (0.5 %) injection       sumatriptan (IMITREX) 100 MG tablet Take 1 tablet (100 mg total) by mouth every 2 (two) hours as needed for Migraine. Max 200mg/24hrs. Do not use on same day as nasal spray. 9 tablet 11    SUMAtriptan (IMITREX) 20 mg/actuation nasal spray USE ONE SPRAY IN THE NOSTRIL AS NEEDED FOR MIGRAINE(MAX 40MG/24 HOURS) 6 each 3    traMADoL (ULTRAM) 50 mg tablet Take 1 tablet (50 mg total) by mouth every 6 (six) hours. 5 tablet 0    traZODone (DESYREL) 150 MG tablet Take half or whole tablet by mouth before bed as needed for insomnia 90 tablet 3    walker (ULTRA-LIGHT ROLLATOR) Misc Rolator for stability and balance postoperatively as needed while standing and walking. 1 each 0    metronidazole 0.75% (METROCREAM) 0.75 % Crea Apply topically 2 (two) times daily. 45 g 3    mirabegron (MYRBETRIQ) 50 mg Tb24 Take 1 tablet (50 mg total) by mouth once daily. 30 tablet 11     Current Facility-Administered Medications   Medication Dose Route Frequency  "Provider Last Rate Last Admin    sodium chloride 0.9% flush 10 mL  10 mL Intravenous PRN Zelalem Soolrzano DPM        sodium chloride 0.9% flush 10 mL  10 mL Intravenous PRN Zelalem Solorzano DPM         Facility-Administered Medications Ordered in Other Visits   Medication Dose Route Frequency Provider Last Rate Last Admin    0.9%  NaCl infusion   Intravenous Continuous Sathish Briseno MD   Stopped at 02/15/22 0948    LIDOcaine (PF) 10 mg/ml (1%) injection 10 mg  1 mL Intradermal Once Sathish Briseno MD            The patient's relevant past medical, surgical, and social history was reviewed in Epic.       Objective:      VITAL SIGNS: BP (!) 171/105   Pulse 85   Ht 5' 4" (1.626 m)   Wt (!) 147.7 kg (325 lb 9.9 oz)   LMP  (LMP Unknown)   BMI 55.89 kg/m²     Ortho/SPM Exam         Assessment:       1. Primary osteoarthritis of right knee          Plan:         Starla was seen today for pain and injections.    Diagnoses and all orders for this visit:    Primary osteoarthritis of right knee  -     Large Joint Aspiration/Injection: R knee    I injected the right knee with one dose of Durolane today.  We will see the patient back in 6 mos or as needed.      Diagnoses and plan discussed with the patient, as well as the expected course and duration of his symptoms.  All questions and concerns were addressed prior to the end of the visit.   Instructed patient to call office if they have any future questions/concerns or to schedule apt. Patient will return to see me if symptoms worsen or fail to improve    Note dictated with voice recognition software, please excuse any grammatical errors.        Ludmila Matos PA-C   05/31/2023    "

## 2023-05-31 NOTE — PROGRESS NOTES
Protocol: innovations in [employer] Outcomes Registry (Jacob)  Protocol#: Jacob  IRB#: 2021.156  Version Date: 10-Tunde-2023  PI: Toni May MD  Patient Initials: LANCE     Study Recruitment Note:    Screening # [id number]     Study invite has been sent to patient's email. Due to technical issues and possible other upcoming surgery (Baritatic surgery), patient will wait to consent to study. She proceeded with Durolane injection to right knee. KL score is 2.

## 2023-05-31 NOTE — PROCEDURES
Large Joint Aspiration/Injection: R knee    Date/Time: 5/31/2023 3:30 PM  Performed by: Ludmila Matos PA-C  Authorized by: Ludmila Matos PA-C     Consent Done?:  Yes (Verbal)  Indications:  Arthritis and pain  Site marked: the procedure site was marked    Timeout: prior to procedure the correct patient, procedure, and site was verified    Prep: patient was prepped and draped in usual sterile fashion    Local anesthetic:  Topical anesthetic    Details:  Needle Size:  21 G  Approach:  Anterolateral  Location:  Knee  Site:  R knee  Medications:  60 mg hyaluronate sodium, stabilized (DUROLANE) 60 mg/3 mL  Patient tolerance:  Patient tolerated the procedure well with no immediate complications

## 2023-06-06 ENCOUNTER — TELEPHONE (OUTPATIENT)
Dept: BARIATRICS | Facility: CLINIC | Age: 53
End: 2023-06-06
Payer: MEDICARE

## 2023-06-06 NOTE — TELEPHONE ENCOUNTER
Called pt to schedule consult appt. Unable to find appropriate date and time for pt. Will talk to practitioner tomorrow regarding time slots.

## 2023-06-07 ENCOUNTER — TELEPHONE (OUTPATIENT)
Dept: FAMILY MEDICINE | Facility: CLINIC | Age: 53
End: 2023-06-07
Payer: MEDICARE

## 2023-06-07 ENCOUNTER — OFFICE VISIT (OUTPATIENT)
Dept: FAMILY MEDICINE | Facility: CLINIC | Age: 53
End: 2023-06-07
Payer: MEDICARE

## 2023-06-07 ENCOUNTER — TELEPHONE (OUTPATIENT)
Dept: BARIATRICS | Facility: CLINIC | Age: 53
End: 2023-06-07
Payer: MEDICARE

## 2023-06-07 VITALS
SYSTOLIC BLOOD PRESSURE: 138 MMHG | HEART RATE: 77 BPM | OXYGEN SATURATION: 95 % | DIASTOLIC BLOOD PRESSURE: 76 MMHG | HEIGHT: 64 IN | BODY MASS INDEX: 50.02 KG/M2 | WEIGHT: 293 LBS

## 2023-06-07 DIAGNOSIS — J02.9 SORE THROAT: ICD-10-CM

## 2023-06-07 DIAGNOSIS — J01.00 ACUTE MAXILLARY SINUSITIS, RECURRENCE NOT SPECIFIED: Primary | ICD-10-CM

## 2023-06-07 LAB
CTP QC/QA: YES
S PYO RRNA THROAT QL PROBE: NEGATIVE

## 2023-06-07 PROCEDURE — 99214 PR OFFICE/OUTPT VISIT, EST, LEVL IV, 30-39 MIN: ICD-10-PCS | Mod: S$GLB,,, | Performed by: FAMILY MEDICINE

## 2023-06-07 PROCEDURE — 3075F PR MOST RECENT SYSTOLIC BLOOD PRESS GE 130-139MM HG: ICD-10-PCS | Mod: CPTII,S$GLB,, | Performed by: FAMILY MEDICINE

## 2023-06-07 PROCEDURE — 87880 STREP A ASSAY W/OPTIC: CPT | Mod: QW,S$GLB,, | Performed by: FAMILY MEDICINE

## 2023-06-07 PROCEDURE — 3044F PR MOST RECENT HEMOGLOBIN A1C LEVEL <7.0%: ICD-10-PCS | Mod: CPTII,S$GLB,, | Performed by: FAMILY MEDICINE

## 2023-06-07 PROCEDURE — 1159F PR MEDICATION LIST DOCUMENTED IN MEDICAL RECORD: ICD-10-PCS | Mod: CPTII,S$GLB,, | Performed by: FAMILY MEDICINE

## 2023-06-07 PROCEDURE — 3075F SYST BP GE 130 - 139MM HG: CPT | Mod: CPTII,S$GLB,, | Performed by: FAMILY MEDICINE

## 2023-06-07 PROCEDURE — 3078F PR MOST RECENT DIASTOLIC BLOOD PRESSURE < 80 MM HG: ICD-10-PCS | Mod: CPTII,S$GLB,, | Performed by: FAMILY MEDICINE

## 2023-06-07 PROCEDURE — 99214 OFFICE O/P EST MOD 30 MIN: CPT | Mod: S$GLB,,, | Performed by: FAMILY MEDICINE

## 2023-06-07 PROCEDURE — 4010F ACE/ARB THERAPY RXD/TAKEN: CPT | Mod: CPTII,S$GLB,, | Performed by: FAMILY MEDICINE

## 2023-06-07 PROCEDURE — 1159F MED LIST DOCD IN RCRD: CPT | Mod: CPTII,S$GLB,, | Performed by: FAMILY MEDICINE

## 2023-06-07 PROCEDURE — 3008F PR BODY MASS INDEX (BMI) DOCUMENTED: ICD-10-PCS | Mod: CPTII,S$GLB,, | Performed by: FAMILY MEDICINE

## 2023-06-07 PROCEDURE — 87880 POCT RAPID STREP A: ICD-10-PCS | Mod: QW,S$GLB,, | Performed by: FAMILY MEDICINE

## 2023-06-07 PROCEDURE — 3044F HG A1C LEVEL LT 7.0%: CPT | Mod: CPTII,S$GLB,, | Performed by: FAMILY MEDICINE

## 2023-06-07 PROCEDURE — 4010F PR ACE/ARB THEARPY RXD/TAKEN: ICD-10-PCS | Mod: CPTII,S$GLB,, | Performed by: FAMILY MEDICINE

## 2023-06-07 PROCEDURE — 99999 PR PBB SHADOW E&M-EST. PATIENT-LVL V: CPT | Mod: PBBFAC,,, | Performed by: FAMILY MEDICINE

## 2023-06-07 PROCEDURE — 99999 PR PBB SHADOW E&M-EST. PATIENT-LVL V: ICD-10-PCS | Mod: PBBFAC,,, | Performed by: FAMILY MEDICINE

## 2023-06-07 PROCEDURE — 3078F DIAST BP <80 MM HG: CPT | Mod: CPTII,S$GLB,, | Performed by: FAMILY MEDICINE

## 2023-06-07 PROCEDURE — 3008F BODY MASS INDEX DOCD: CPT | Mod: CPTII,S$GLB,, | Performed by: FAMILY MEDICINE

## 2023-06-07 RX ORDER — AMOXICILLIN AND CLAVULANATE POTASSIUM 875; 125 MG/1; MG/1
1 TABLET, FILM COATED ORAL EVERY 12 HOURS
Qty: 14 TABLET | Refills: 0 | Status: SHIPPED | OUTPATIENT
Start: 2023-06-07 | End: 2023-06-14

## 2023-06-07 RX ORDER — PROMETHAZINE HYDROCHLORIDE AND DEXTROMETHORPHAN HYDROBROMIDE 6.25; 15 MG/5ML; MG/5ML
5 SYRUP ORAL EVERY 4 HOURS PRN
Qty: 118 ML | Refills: 0 | Status: SHIPPED | OUTPATIENT
Start: 2023-06-07 | End: 2023-06-17

## 2023-06-07 NOTE — TELEPHONE ENCOUNTER
----- Message from Camryn Storm sent at 6/7/2023 11:18 AM CDT -----  Pt Requesting Call Back    Who called: pt  Who called for pt:  Best call back #: [phone]  Add notes: pt said the pharmacist said they did not get her rx yet

## 2023-06-07 NOTE — PROGRESS NOTES
(Portions of this note were dictated using voice recognition software and may contain dictation related errors in spelling/grammar/syntax not found on text review)    CC:   Chief Complaint   Patient presents with    Sinusitis    Sore Throat     Pt stated she went to urgent and the doctor diagnose pt with sinus infection, she stated the medication is not helping her at all          HPI: 53 y.o. female patient of mine presented with sore throat and sinus symptoms.  She has medical history significant for anxiety, depression, gastroesophageal reflux disease, hypertension, migraine headaches.  Patient reports symptoms started 1 week ago, she reports having sinus pressure and congestion along with runny nose, ear fullness, scratchy throat and cough, she was seen in urgent care on 6/1, was prescribed Z-Denis and prednisone along with Flonase nasal spray and Tessalon Perles, was tested negative for COVID.  She completed the treatment and symptoms did not improve.  She reports having worsening cough which is productive with yellow colored sputum, cough is worse at night and she can not sleep due to constant coughing, reports having continue sinus pressure and congestion and feeling pressure with frontal headache, denies fever, chills, body aches.  She has no other symptoms or concerns.    Past Medical History:   Diagnosis Date    Anxiety     Depression     GERD (gastroesophageal reflux disease)     Hypertension     Migraine headache     Sleep difficulties     Typical atrial flutter 4/20/2022       Past Surgical History:   Procedure Laterality Date    BREAST SURGERY      COLONOSCOPY N/A 12/14/2022    Procedure: COLONOSCOPY sutab;  Surgeon: Wei Walsh MD;  Location: [facility] ENDO;  Service: Endoscopy;  Laterality: N/A;    ENDOMETRIAL ABLATION      ENDOSCOPIC GASTROCNEMIUS RECESSION Right 8/19/2022    Procedure: RECESSION, GASTROCNEMIUS, ENDOSCOPIC;  Surgeon: Zelalem Solorzano DPM;  Location: [facility] OR;  Service: Podiatry;   Laterality: Right;  Arthrex centerline  Stella/Arthrex/CONFIRMED/REJI,RN 8/18@1333    ENDOSCOPIC PLANTAR FASCIOTOMY Right 8/19/2022    Procedure: FASCIOTOMY, PLANTAR, ENDOSCOPIC;  Surgeon: Zelalem Solorzano DPM;  Location: [facility] OR;  Service: Podiatry;  Laterality: Right;  Arthrex set    ESOPHAGOGASTRODUODENOSCOPY N/A 12/14/2022    Procedure: EGD (ESOPHAGOGASTRODUODENOSCOPY);  Surgeon: Wei Walsh MD;  Location: [facility] ENDO;  Service: Endoscopy;  Laterality: N/A;    EYE SURGERY      FOOT SURGERY Right 10/2018    bunion    IMPLANTATION OF PERMANENT SACRAL NERVE STIMULATOR N/A 2/15/2022    Procedure: INSERTION, NEUROSTIMULATOR, PERMANENT, SACRAL;  Surgeon: David Brown MD;  Location: [facility] OR [address] [place] [address];  Service: Urology;  Laterality: N/A;    TONSILLECTOMY      TOTAL REDUCTION MAMMOPLASTY Bilateral        Family History   Problem Relation Age of Onset    Atrial fibrillation Mother     Thyroid disease Mother     Dementia Mother     Diabetes Father     Prostate cancer Father     Stomach cancer Sister     Migraines Sister     Colon cancer Paternal Aunt     Colon cancer Paternal Uncle     Breast cancer Maternal Grandmother     Colon cancer Paternal Grandmother     Cancer Paternal Grandfather        Social History     Tobacco Use    Smoking status: Never     Passive exposure: Never    Smokeless tobacco: Never   Substance Use Topics    Alcohol use: No    Drug use: No       Lab Results   Component Value Date    WBC 7.97 02/06/2023    HGB 12.2 02/06/2023    HCT 39.3 02/06/2023    MCV 84 02/06/2023     02/06/2023    CHOL 214 (H) 02/06/2023    TRIG 77 02/06/2023    HDL 62 02/06/2023    ALT 12 02/06/2023    AST 15 02/06/2023    BILITOT 0.6 02/06/2023    ALKPHOS 98 02/06/2023     02/06/2023    K 4.0 02/06/2023     02/06/2023    CREATININE 0.8 02/06/2023    ESTGFRAFRICA >60 04/20/2022    EGFRNONAA >60 04/20/2022    CALCIUM 9.3 02/06/2023    ALBUMIN 3.6 02/06/2023    BUN 13 02/06/2023    CO2 23 02/06/2023     TSH 2.637 04/20/2022    HGBA1C 5.6 02/06/2023    LDLCALC 136.6 02/06/2023     (H) 02/06/2023             Vital signs reviewed  PE:   APPEARANCE: Well nourished, well developed, in no acute distress.    HEAD: Normocephalic, atraumatic.  EYES: EOMI.  Conjunctivae noninjected.  NOSE: Mucosa pink. Airway clear.  MOUTH & THROAT: No tonsillar enlargement. No pharyngeal erythema or exudate.  Tenderness to palpation over maxillary and frontal sinuses  NECK: Supple with no cervical lymphadenopathy.    CHEST: Lungs clear to auscultation with no wheezes or crackles.  CARDIOVASCULAR: Normal S1, S2. No rubs, murmurs, or gallops.  ABDOMEN: Bowel sounds normal. Not distended. Soft. No tenderness or masses. No organomegaly.  EXTREMITIES: No edema, cyanosis, or clubbing.    Review of Systems   Constitutional:  Positive for fatigue. Negative for chills and fever.   HENT:  Positive for nasal congestion, postnasal drip, rhinorrhea, sinus pressure/congestion, sore throat and voice change. Negative for ear discharge and ear pain.    Respiratory:  Positive for cough. Negative for shortness of breath and wheezing.    Cardiovascular:  Negative for chest pain, palpitations and leg swelling.   Gastrointestinal: Negative.    Genitourinary: Negative.    Neurological: Negative.    Psychiatric/Behavioral: Negative.     All other systems reviewed and are negative.    IMPRESSION  1. Acute maxillary sinusitis, recurrence not specified    2. Sore throat            PLAN      1. Sore throat    - POCT Rapid Strep A      2. Acute maxillary sinusitis, recurrence not specified    COVID test negative   Strep test negative      Augmentin sent for 7 days    - promethazine-dextromethorphan (PROMETHAZINE-DM) 6.25-15 mg/5 mL Syrp; Take 5 mLs by mouth every 4 (four) hours as needed.  Dispense: 118 mL; Refill: 0       Steam inhalation/hot shower bath recommended   Mouthwash gargles 2-3 times daily, salt water gargles nightly   Tylenol/ibuprofen as  needed      Return ED/urgent care precautions discussed      Age/demographic appropriate health maintenance:    Health Maintenance Due   Topic Date Due    HIV Screening  Never done    Shingles Vaccine (2 of 2) 01/26/2022    Mammogram  05/19/2023           Tahir Mosquera   6/7/2023

## 2023-06-07 NOTE — TELEPHONE ENCOUNTER
Called pt to inform of upcoming appts per pt's request. Appt dates and times approved per pt. All questions and concerns addressed.

## 2023-06-12 DIAGNOSIS — I10 ESSENTIAL HYPERTENSION: ICD-10-CM

## 2023-06-12 RX ORDER — LOSARTAN POTASSIUM 50 MG/1
50 TABLET ORAL DAILY
Qty: 90 TABLET | Refills: 3 | Status: CANCELLED | OUTPATIENT
Start: 2023-06-12 | End: 2024-06-11

## 2023-06-13 ENCOUNTER — TELEPHONE (OUTPATIENT)
Dept: BARIATRICS | Facility: CLINIC | Age: 53
End: 2023-06-13
Payer: MEDICARE

## 2023-06-13 NOTE — TELEPHONE ENCOUNTER
----- Message from Andrei Gamez sent at 6/13/2023  7:50 AM CDT -----  Contact: Pt  .Type:  Sooner Apoointment Request    Caller is requesting a sooner appointment.  Caller declined first available appointment listed below.  Caller will not accept being placed on the waitlist and is requesting a message be sent to doctor.  Name of Caller:pt  When is the first available appointment?  Symptoms: consult  Would the patient rather a call back or a response via [facility]?  Call back  Best Call Back Number:[phone]  Additional Information:  Pt. Needs to reschedule her appt. For today.  She needs a Mon/Tuesday  appt. she does not have a ride for today.

## 2023-06-15 DIAGNOSIS — I10 ESSENTIAL HYPERTENSION: ICD-10-CM

## 2023-06-15 NOTE — TELEPHONE ENCOUNTER
----- Message from Brandy Elias sent at 6/15/2023  8:47 AM CDT -----  Contact: Clayton  Type:  RX Refill Request    Who Called: Clayton with [facility]  Refill or New Rx:refill  RX Name and Strength:losartan (COZAAR) 50 MG tablet  How is the patient currently taking it? (ex. 1XDay):1  Is this a 30 day or 90 day RX:90  Preferred Pharmacy with phone number:[facility] - [facility] [address]   Phone:  [phone]  Fax:  [phone]    Local or Mail Order:mail  Ordering Provider:Nataly  Would the patient rather a call back or a response via [facility]? Call   Best Call Back Number:[phone]  Additional Information:

## 2023-06-19 DIAGNOSIS — I10 ESSENTIAL HYPERTENSION: ICD-10-CM

## 2023-06-20 RX ORDER — LOSARTAN POTASSIUM 50 MG/1
50 TABLET ORAL DAILY
Qty: 90 TABLET | Refills: 3 | Status: SHIPPED | OUTPATIENT
Start: 2023-06-20 | End: 2023-10-13 | Stop reason: SDUPTHER

## 2023-06-20 RX ORDER — LOSARTAN POTASSIUM 50 MG/1
50 TABLET ORAL DAILY
Qty: 90 TABLET | Refills: 3 | Status: SHIPPED | OUTPATIENT
Start: 2023-06-20 | End: 2024-06-19

## 2023-06-20 NOTE — PROGRESS NOTES
[facility] Cardiology 701     SUBJECTIVE:     History of Present Illness:  Patient is a 53 y.o. female presents with followup after atrial flutter cardioversion.   Primary Diagnosis:  1. Morbid obesity  2. Hypertension  3. Atrial flutter with rapid ventricular rate - s/p ablation  6/29/22  ROS  Since last visit 4/23: feels well   A. Has not monitored her heart rate.no palpitations; no irregularities  - not checked her heart rate    B. No chest pains  C. No syncope  D.lost weight recently  E. Blood pressures at home around 160 with a small cuff;   F. activity: reduced  due to shortness of breath; feels tired at times - this is an old problem . Walking outside at times   Past Hospitalization    Review of patient's allergies indicates:  No Known Allergies    Past Medical History:   Diagnosis Date    Anxiety     Depression     GERD (gastroesophageal reflux disease)     Hypertension     Migraine headache     Sleep difficulties     Typical atrial flutter 4/20/2022       Past Surgical History:   Procedure Laterality Date    BREAST SURGERY      COLONOSCOPY N/A 12/14/2022    Procedure: COLONOSCOPY sutab;  Surgeon: Wei Walsh MD;  Location: [facility];  Service: Endoscopy;  Laterality: N/A;    ENDOMETRIAL ABLATION      ENDOSCOPIC GASTROCNEMIUS RECESSION Right 8/19/2022    Procedure: RECESSION, GASTROCNEMIUS, ENDOSCOPIC;  Surgeon: Zelalem Solorzano DPM;  Location: [facility] OR;  Service: Podiatry;  Laterality: Right;  Arthrex [facility]  Stella/Adams/CHRISTOPHE/REJI,RN 8/18@0733    ENDOSCOPIC PLANTAR FASCIOTOMY Right 8/19/2022    Procedure: FASCIOTOMY, PLANTAR, ENDOSCOPIC;  Surgeon: Zelalem Solorzano DPM;  Location: [facility] OR;  Service: Podiatry;  Laterality: Right;  Arthrex set    ESOPHAGOGASTRODUODENOSCOPY N/A 12/14/2022    Procedure: EGD (ESOPHAGOGASTRODUODENOSCOPY);  Surgeon: Wei Walsh MD;  Location: [facility] ENDO;  Service: Endoscopy;  Laterality: N/A;    EYE SURGERY      FOOT SURGERY Right 10/2018    concepción     IMPLANTATION OF PERMANENT SACRAL NERVE STIMULATOR N/A 2/15/2022    Procedure: INSERTION, NEUROSTIMULATOR, PERMANENT, SACRAL;  Surgeon: David Brown MD;  Location: [facility] OR [address] [place] [address];  Service: Urology;  Laterality: N/A;    TONSILLECTOMY      TOTAL REDUCTION MAMMOPLASTY Bilateral        Family History   Problem Relation Age of Onset    Atrial fibrillation Mother     Thyroid disease Mother     Dementia Mother     Diabetes Father     Prostate cancer Father     Stomach cancer Sister     Migraines Sister     Colon cancer Paternal Aunt     Colon cancer Paternal Uncle     Breast cancer Maternal Grandmother     Colon cancer Paternal Grandmother     Cancer Paternal Grandfather        Social History     Tobacco Use    Smoking status: Never     Passive exposure: Never    Smokeless tobacco: Never   Substance Use Topics    Alcohol use: No    Drug use: No        Home meds:  Current Outpatient Medications on File Prior to Visit   Medication Sig Dispense Refill    blood-glucose meter (TRUE METRIX GLUCOSE METER) kit Check BS as needed 1 each 0    BOTOX 200 unit SolR       clotrimazole-betamethasone 1-0.05% (LOTRISONE) cream Apply topically 2 (two) times daily. 15 g 1    diazePAM (VALIUM) 5 MG tablet Take 1 tablet (5 mg total) by mouth daily as needed for Anxiety. 15 tablet 2    diltiaZEM (CARDIZEM CD) 240 MG 24 hr capsule Take 1 capsule (240 mg total) by mouth once daily. 90 capsule 1    EScitalopram oxalate (LEXAPRO) 20 MG tablet Take 1 tablet (20 mg total) by mouth once daily. 90 tablet 1    Ow-G-ksfwn-B12-L.acid,plan-inu 65 mg iron- 50 mg-1 mg DFE Cap Take by mouth.      fentaNYL (SUBLIMAZE) 50 mcg/mL injection       ferrous sulfate (FEOSOL) 325 mg (65 mg iron) Tab tablet Take 325 mg by mouth.      fluconazole (DIFLUCAN) 150 MG Tab Take one tablet today and one in a week 2 tablet 0    HYDROcodone-acetaminophen (NORCO) 5-325 mg per tablet Take 1 tablet by mouth every 4 (four) hours as needed for Pain. 30 tablet 0    ketorolac  (TORADOL) 30 mg/mL (1 mL) injection       lancets (TRUEPLUS LANCETS) 33 gauge Misc TEST BLOOD SUGAR THREE TIMES DAILY 300 each 1    losartan (COZAAR) 50 MG tablet Take 1 tablet (50 mg total) by mouth once daily. 90 tablet 3    losartan (COZAAR) 50 MG tablet Take 1 tablet (50 mg total) by mouth once daily. 90 tablet 3    meloxicam (MOBIC) 15 MG tablet       multivit-minerals/folic acid (ADULT MULTIVITAMIN GUMMIES ORAL) Take 1 tablet by mouth Daily. Continue to hold until after Surgery.      multivitamin (THERAGRAN) per tablet Take 1 tablet by mouth.      nystatin (MYCOSTATIN) powder Apply to affected area 3 times daily 60 g 3    omeprazole (PRILOSEC) 40 MG capsule Take 1 capsule (40 mg total) by mouth once daily. 30 capsule 11    phenylephrine (TEODORA-SYNEPHRINE) 10 mg/mL injection       promethazine (PHENERGAN) 25 MG tablet Take 1 tablet (25 mg total) by mouth every 6 (six) hours as needed for Nausea. 12 tablet 0    propofoL (DIPRIVAN) 10 mg/mL infusion       ROPIvacaine 0.5%, PF, (NAROPIN) 5 mg/mL (0.5 %) injection       sumatriptan (IMITREX) 100 MG tablet Take 1 tablet (100 mg total) by mouth every 2 (two) hours as needed for Migraine. Max 200mg/24hrs. Do not use on same day as nasal spray. 9 tablet 11    SUMAtriptan (IMITREX) 20 mg/actuation nasal spray USE ONE SPRAY IN THE NOSTRIL AS NEEDED FOR MIGRAINE(MAX 40MG/24 HOURS) 6 each 3    traMADoL (ULTRAM) 50 mg tablet Take 1 tablet (50 mg total) by mouth every 6 (six) hours. 5 tablet 0    traZODone (DESYREL) 150 MG tablet Take half or whole tablet by mouth before bed as needed for insomnia 90 tablet 3    walker (ULTRA-LIGHT ROLLATOR) Misc Rolator for stability and balance postoperatively as needed while standing and walking. 1 each 0    metronidazole 0.75% (METROCREAM) 0.75 % Crea Apply topically 2 (two) times daily. 45 g 3    [DISCONTINUED] mirabegron (MYRBETRIQ) 50 mg Tb24 Take 1 tablet (50 mg total) by mouth once daily. 30 tablet 11     Current Facility-Administered  "Medications on File Prior to Visit   Medication Dose Route Frequency Provider Last Rate Last Admin    0.9%  NaCl infusion   Intravenous Continuous Sathish Briseno MD   Stopped at 02/15/22 0948    LIDOcaine (PF) 10 mg/ml (1%) injection 10 mg  1 mL Intradermal Once Sathish Briseno MD        sodium chloride 0.9% flush 10 mL  10 mL Intravenous PRN Zelalem Solorzano DPM        sodium chloride 0.9% flush 10 mL  10 mL Intravenous PRN Zelalem Solorzano DPM           Cardiac meds:   ASA 81 mg   Diltiazem 240 mg daily  Losartan 50 mg daily           OBJECTIVE:     Vital Signs (Most Recent)  Vitals:    23 1114   BP: 131/82   Pulse: 92   SpO2: 95%   Weight: 126.9 kg (279 lb 14 oz)   Height: 5' 4" (1.626 m)         Weight up from 315-326 - 279     Physical Exam:  Neck: normal carotids, no bruits; normal JVP  Lungs :clear  Heart: fast heart rate , normal S1,S2, no murmurs, no gallops  Abd: no masses; no bruits;   Exts: normal DP and PT pulses bilaterally, no edema noted           LABS  : A1c normal; lipids OK ; CBC normal; CMP normal     Diagnostic Results:    1.EK/22: atrial flutter to sinus after cardioversion   1b. EK22: atrial flutter with better ventricular rate and variable AV block  1c. EK22: NSR:    1d. EK/22: NSR     2. Echo: 22: normal EF       ASSESSMENT/PLAN:     1. Recurrence of atrial flutter now in sinus post ablation and remaining in sinus   2. No symptoms of heart failure or angina  3. Hypertension: normal here but using wrong size of cuff    4. Shortness of breath: most likely from obesity and/or sleep apnea.   Plan: 1.start ASA 81 mg daily  2. Continue all medications   3. Weight reduction - she is losing weight with diet   4. Sleep study needed and pending   5. Return 6 months   Alexis Ingram MD            "

## 2023-06-21 ENCOUNTER — OFFICE VISIT (OUTPATIENT)
Dept: CARDIOLOGY | Facility: CLINIC | Age: 53
End: 2023-06-21
Payer: MEDICARE

## 2023-06-21 ENCOUNTER — TELEPHONE (OUTPATIENT)
Dept: CARDIOLOGY | Facility: CLINIC | Age: 53
End: 2023-06-21
Payer: MEDICARE

## 2023-06-21 VITALS
WEIGHT: 279.88 LBS | SYSTOLIC BLOOD PRESSURE: 131 MMHG | HEART RATE: 92 BPM | HEIGHT: 64 IN | BODY MASS INDEX: 47.78 KG/M2 | OXYGEN SATURATION: 95 % | DIASTOLIC BLOOD PRESSURE: 82 MMHG

## 2023-06-21 DIAGNOSIS — E66.01 MORBID OBESITY: ICD-10-CM

## 2023-06-21 DIAGNOSIS — Z86.79 S/P ABLATION OF ATRIAL FLUTTER: Primary | ICD-10-CM

## 2023-06-21 DIAGNOSIS — I10 ESSENTIAL HYPERTENSION: ICD-10-CM

## 2023-06-21 DIAGNOSIS — Z98.890 S/P ABLATION OF ATRIAL FLUTTER: Primary | ICD-10-CM

## 2023-06-21 PROCEDURE — 3044F PR MOST RECENT HEMOGLOBIN A1C LEVEL <7.0%: ICD-10-PCS | Mod: CPTII,S$GLB,, | Performed by: INTERNAL MEDICINE

## 2023-06-21 PROCEDURE — 3044F HG A1C LEVEL LT 7.0%: CPT | Mod: CPTII,S$GLB,, | Performed by: INTERNAL MEDICINE

## 2023-06-21 PROCEDURE — 1159F PR MEDICATION LIST DOCUMENTED IN MEDICAL RECORD: ICD-10-PCS | Mod: CPTII,S$GLB,, | Performed by: INTERNAL MEDICINE

## 2023-06-21 PROCEDURE — 1160F PR REVIEW ALL MEDS BY PRESCRIBER/CLIN PHARMACIST DOCUMENTED: ICD-10-PCS | Mod: CPTII,S$GLB,, | Performed by: INTERNAL MEDICINE

## 2023-06-21 PROCEDURE — 99214 OFFICE O/P EST MOD 30 MIN: CPT | Mod: S$GLB,,, | Performed by: INTERNAL MEDICINE

## 2023-06-21 PROCEDURE — 99999 PR PBB SHADOW E&M-EST. PATIENT-LVL IV: ICD-10-PCS | Mod: PBBFAC,,, | Performed by: INTERNAL MEDICINE

## 2023-06-21 PROCEDURE — 3079F PR MOST RECENT DIASTOLIC BLOOD PRESSURE 80-89 MM HG: ICD-10-PCS | Mod: CPTII,S$GLB,, | Performed by: INTERNAL MEDICINE

## 2023-06-21 PROCEDURE — 99999 PR PBB SHADOW E&M-EST. PATIENT-LVL IV: CPT | Mod: PBBFAC,,, | Performed by: INTERNAL MEDICINE

## 2023-06-21 PROCEDURE — 3075F PR MOST RECENT SYSTOLIC BLOOD PRESS GE 130-139MM HG: ICD-10-PCS | Mod: CPTII,S$GLB,, | Performed by: INTERNAL MEDICINE

## 2023-06-21 PROCEDURE — 99214 PR OFFICE/OUTPT VISIT, EST, LEVL IV, 30-39 MIN: ICD-10-PCS | Mod: S$GLB,,, | Performed by: INTERNAL MEDICINE

## 2023-06-21 PROCEDURE — 1160F RVW MEDS BY RX/DR IN RCRD: CPT | Mod: CPTII,S$GLB,, | Performed by: INTERNAL MEDICINE

## 2023-06-21 PROCEDURE — 3079F DIAST BP 80-89 MM HG: CPT | Mod: CPTII,S$GLB,, | Performed by: INTERNAL MEDICINE

## 2023-06-21 PROCEDURE — 4010F ACE/ARB THERAPY RXD/TAKEN: CPT | Mod: CPTII,S$GLB,, | Performed by: INTERNAL MEDICINE

## 2023-06-21 PROCEDURE — 4010F PR ACE/ARB THEARPY RXD/TAKEN: ICD-10-PCS | Mod: CPTII,S$GLB,, | Performed by: INTERNAL MEDICINE

## 2023-06-21 PROCEDURE — 3075F SYST BP GE 130 - 139MM HG: CPT | Mod: CPTII,S$GLB,, | Performed by: INTERNAL MEDICINE

## 2023-06-21 PROCEDURE — 1159F MED LIST DOCD IN RCRD: CPT | Mod: CPTII,S$GLB,, | Performed by: INTERNAL MEDICINE

## 2023-06-21 PROCEDURE — 3008F PR BODY MASS INDEX (BMI) DOCUMENTED: ICD-10-PCS | Mod: CPTII,S$GLB,, | Performed by: INTERNAL MEDICINE

## 2023-06-21 PROCEDURE — 3008F BODY MASS INDEX DOCD: CPT | Mod: CPTII,S$GLB,, | Performed by: INTERNAL MEDICINE

## 2023-06-21 RX ORDER — ONABOTULINUMTOXINA 200 [USP'U]/1
INJECTION, POWDER, LYOPHILIZED, FOR SOLUTION INTRADERMAL; INTRAMUSCULAR
COMMUNITY
Start: 2023-01-24

## 2023-06-21 RX ORDER — KETOROLAC TROMETHAMINE 30 MG/ML
INJECTION, SOLUTION INTRAMUSCULAR; INTRAVENOUS
COMMUNITY
Start: 2023-02-09 | End: 2024-02-02

## 2023-06-21 NOTE — TELEPHONE ENCOUNTER
----- Message from Andrei Gamez sent at 6/21/2023  3:30 PM CDT -----  Contact: Pt  .Type:  Needs Medical Advice    Who Called: pt    Would the patient rather a call back or a response via [facility]?  Call back  Best Call Back Number: [phone]  Additional Information:  Pt. Is requesting a call back from the nurse regarding how much weight she gain from this visit and her last visit.

## 2023-06-27 ENCOUNTER — CLINICAL SUPPORT (OUTPATIENT)
Dept: BARIATRICS | Facility: CLINIC | Age: 53
End: 2023-06-27
Payer: MEDICARE

## 2023-06-27 ENCOUNTER — OFFICE VISIT (OUTPATIENT)
Dept: BARIATRICS | Facility: CLINIC | Age: 53
End: 2023-06-27
Payer: MEDICARE

## 2023-06-27 VITALS
SYSTOLIC BLOOD PRESSURE: 144 MMHG | HEART RATE: 85 BPM | DIASTOLIC BLOOD PRESSURE: 71 MMHG | WEIGHT: 293 LBS | HEIGHT: 64 IN | BODY MASS INDEX: 50.02 KG/M2 | OXYGEN SATURATION: 95 %

## 2023-06-27 DIAGNOSIS — Z86.39 HISTORY OF MORBID OBESITY: ICD-10-CM

## 2023-06-27 DIAGNOSIS — E66.01 MORBID OBESITY WITH BMI OF 50.0-59.9, ADULT: ICD-10-CM

## 2023-06-27 DIAGNOSIS — G47.33 OSA (OBSTRUCTIVE SLEEP APNEA): ICD-10-CM

## 2023-06-27 DIAGNOSIS — F84.0 AUTISM: ICD-10-CM

## 2023-06-27 DIAGNOSIS — I10 ESSENTIAL HYPERTENSION: ICD-10-CM

## 2023-06-27 DIAGNOSIS — I48.3 TYPICAL ATRIAL FLUTTER: ICD-10-CM

## 2023-06-27 DIAGNOSIS — I10 ESSENTIAL HYPERTENSION: Primary | ICD-10-CM

## 2023-06-27 DIAGNOSIS — K21.9 GASTROESOPHAGEAL REFLUX DISEASE, UNSPECIFIED WHETHER ESOPHAGITIS PRESENT: ICD-10-CM

## 2023-06-27 DIAGNOSIS — F33.1 MAJOR DEPRESSIVE DISORDER, RECURRENT, MODERATE: Primary | ICD-10-CM

## 2023-06-27 DIAGNOSIS — Z71.3 DIETARY COUNSELING AND SURVEILLANCE: ICD-10-CM

## 2023-06-27 DIAGNOSIS — M17.11 PRIMARY OSTEOARTHRITIS OF RIGHT KNEE: ICD-10-CM

## 2023-06-27 DIAGNOSIS — I11.9 HYPERTENSIVE HEART DISEASE WITHOUT HEART FAILURE: ICD-10-CM

## 2023-06-27 PROCEDURE — 1159F PR MEDICATION LIST DOCUMENTED IN MEDICAL RECORD: ICD-10-PCS | Mod: CPTII,S$GLB,, | Performed by: NURSE PRACTITIONER

## 2023-06-27 PROCEDURE — 3044F PR MOST RECENT HEMOGLOBIN A1C LEVEL <7.0%: ICD-10-PCS | Mod: CPTII,S$GLB,, | Performed by: NURSE PRACTITIONER

## 2023-06-27 PROCEDURE — 3044F HG A1C LEVEL LT 7.0%: CPT | Mod: CPTII,S$GLB,, | Performed by: NURSE PRACTITIONER

## 2023-06-27 PROCEDURE — 3008F PR BODY MASS INDEX (BMI) DOCUMENTED: ICD-10-PCS | Mod: CPTII,S$GLB,, | Performed by: NURSE PRACTITIONER

## 2023-06-27 PROCEDURE — 99215 OFFICE O/P EST HI 40 MIN: CPT | Mod: S$GLB,,, | Performed by: NURSE PRACTITIONER

## 2023-06-27 PROCEDURE — 99999 PR PBB SHADOW E&M-EST. PATIENT-LVL V: CPT | Mod: PBBFAC,,, | Performed by: NURSE PRACTITIONER

## 2023-06-27 PROCEDURE — 4010F PR ACE/ARB THEARPY RXD/TAKEN: ICD-10-PCS | Mod: CPTII,S$GLB,, | Performed by: NURSE PRACTITIONER

## 2023-06-27 PROCEDURE — 99499 UNLISTED E&M SERVICE: CPT | Mod: S$GLB,,, | Performed by: DIETITIAN, REGISTERED

## 2023-06-27 PROCEDURE — 99499 NO LOS: ICD-10-PCS | Mod: S$GLB,,, | Performed by: DIETITIAN, REGISTERED

## 2023-06-27 PROCEDURE — 1160F PR REVIEW ALL MEDS BY PRESCRIBER/CLIN PHARMACIST DOCUMENTED: ICD-10-PCS | Mod: CPTII,S$GLB,, | Performed by: NURSE PRACTITIONER

## 2023-06-27 PROCEDURE — 99215 PR OFFICE/OUTPT VISIT, EST, LEVL V, 40-54 MIN: ICD-10-PCS | Mod: S$GLB,,, | Performed by: NURSE PRACTITIONER

## 2023-06-27 PROCEDURE — 99999 PR PBB SHADOW E&M-EST. PATIENT-LVL I: CPT | Mod: PBBFAC,,, | Performed by: DIETITIAN, REGISTERED

## 2023-06-27 PROCEDURE — 1160F RVW MEDS BY RX/DR IN RCRD: CPT | Mod: CPTII,S$GLB,, | Performed by: NURSE PRACTITIONER

## 2023-06-27 PROCEDURE — 4010F ACE/ARB THERAPY RXD/TAKEN: CPT | Mod: CPTII,S$GLB,, | Performed by: NURSE PRACTITIONER

## 2023-06-27 PROCEDURE — 99999 PR PBB SHADOW E&M-EST. PATIENT-LVL V: ICD-10-PCS | Mod: PBBFAC,,, | Performed by: NURSE PRACTITIONER

## 2023-06-27 PROCEDURE — 1159F MED LIST DOCD IN RCRD: CPT | Mod: CPTII,S$GLB,, | Performed by: NURSE PRACTITIONER

## 2023-06-27 PROCEDURE — 3008F BODY MASS INDEX DOCD: CPT | Mod: CPTII,S$GLB,, | Performed by: NURSE PRACTITIONER

## 2023-06-27 PROCEDURE — 99999 PR PBB SHADOW E&M-EST. PATIENT-LVL I: ICD-10-PCS | Mod: PBBFAC,,, | Performed by: DIETITIAN, REGISTERED

## 2023-06-27 NOTE — PROGRESS NOTES
NUTRITIONAL CONSULT    Referring Physician: Grace Manjarrez M.D.   Reason for MNT Referral: Initial assessment for laparoscopic Harvinder-en-Y work-up    PAST MEDICAL HISTORY:   53 y.o. female  Weight history includes She has struggled with excess weight all her life and especially the past 4 years.  Her highest adult weight is her current weight, and her lowest adult weight was 200 lbs at age 31s.  The patient has tried calorie counting and phentermine (Adipex-P).  The patient was most successful with adipex with a weight loss of 50 lbs. The patient's goal is 4 paints sizes down.     She admits that she had a bad habit of emotional boredom eating/secret eating most of her adult life while living with her parents in [place]. After they passed away, she move to [place] to live with her sister. Pt has autism and her sister is her main support person. She states she was mostly eating junk food and sweets during the day, and then eating restaurant food her sister brought home from work (seafood etouffee over rice, poboys, creamy pastas, fried foods). For the past 3 weeks they have been making big dietary and lifestyle changes for their health. They are both on board with this and the pt's sister is very supportive of the pt's weight loss journey. The pt's sister has been cooking healthy meals at home and leaving them for the pt to eat while she is at work. She no longer brings sweets and sodas in the house. She no longer brings high calorie restaurant meals home from work. They are unsure of how much weight change she has had over the past 3 weeks but feel confident they are moving in the right direction.  Pt's sister states the pt sometimes drives herself to [employer] for a frozen coffee or to the store to buy junk foods/sweets. Pt plans to start keeping a food journal for accountability of what she eats while her sister is at work. Pt does not use the oven or stove at home for safety reasons. She does use the microwave and  has been learning to use the air fryer.    Past Medical History:   Diagnosis Date    Anxiety     Depression     GERD (gastroesophageal reflux disease)     Hypertension     Migraine headache     Sleep difficulties     Typical atrial flutter 4/20/2022       CLINICAL DATA:  53 y.o.-year-old White female.  Height: 5 ft. 4 in.  Weight: 322 lbs  IBW: 138 lbs  BMI: 55.3  The patient's goal weight (50% EBW): 230 lbs    Goal for Bariatric Surgery: to improve health, to improve quality of life, to lose weight, and to prevent future medical conditions    DAILY NUTRITIONAL NEEDS: pre-op nutritional bariatric guidelines to promote weight loss  [id number] Calories    Grams Protein    NUTRITION & HEALTH HISTORY:    Current diet recall: the past 3 weeks    B: Premier shake OR 1 bowl Keto cereal with unsweetened almond milk  L: Rot chicken, low carb tortilla, shredded ch  D: Rot chicken/air fried salmon/pork chop/grilled shrimps with fresh veggie such as asparagus or veggie steam bag OR restaurant take out salad with grilled chicken, nalini, judy, Ranch OR restaurant take out grilled fish and steamed veggies OR boiled seafood    Past diet recall:    B: none  Snacking on sweets (isabel cakes, ice cream, cookies) and junk foods and sodas  D: restaurant meals brought home from her sister (lien, sushanta dashawn, shrimp etouffee) with soda    Current Diet:  Meal pattern: 3 meals  Protein supplements: Premier shakes  Snacking: fruits with whipped cream, Quest chips, cookies, bars  Vegetables: Likes a variety. Eats daily.  Fruits: Likes a variety. Eats 2-3 times per week.  Beverages: water and sugar-free beverages. No sugary drinks x 3 weeks.  Dining out: Reduced lately. Mostly restaurants and take-out. No fast foods x 3 weeks.  Cooking at home: Daily. Weekly. Mostly baked, grilled, and smothered meat, fish, starchy CHO, and vegetables.    Exercise:  Past exercise: None    Current exercise: none. Has [employer] membership.  Restrictions to  exercise: SOB, knee pain d/t arthritis. Ambulates with a cane for balance.    Vitamins / Minerals / Herbs:   MV gummies  MV tablet    Labs:   None available at time of visit    Food Allergies:   None known    Social:  Disabled.  Lives with her sister, Riya.  Grocery shopping and food prep done by the pt's sister and occ by the pt using microwave and air fryer.  Patient believes the household will be supportive after surgery.  Alcohol: None.  Smoking: None.    ASSESSMENT:  Patient reports attempts at weight loss, only to regain lost weight.  Patient demonstrated knowledge of healthy eating behaviors and exercise patterns; admits to not eating healthy and not exercising at this point.  Patient states willingness to change lifestyle and make behavior modifications.        Barriers to Education: none    Stage of change: action    NUTRITION DIAGNOSIS:    Morbid Obesity related to Excessive carbohydrate intake, Excessive calorie intake, and Physical inactivity as evidence by BMI.    BARIATRIC DIET DISCUSSION/PLAN:  Discussed diet after surgery and related to patient's food record.  Reviewed nutrition guidelines for before and after surgery.  Answered all questions.  Continue to review Bariatric Nutrition Guidebook at home and call with any questions.  Work on Bariatric Nutrition Checklist.  Work on gradually cutting back on starchy CHO in the diet.  5-6 meals per day.  Start including protein supplements in the diet plan daily.  Increase exercise.    RECOMMENDATIONS:  Pt is potential candidate for surgery - Gastric Bypass.    Follow up in one month.  Needs additional visits with RD for MSD.    Patient and her sister verbalized understanding.      Communicated nutrition plan with bariatric team.    SESSION TIME:  90 minutes

## 2023-06-27 NOTE — PROGRESS NOTES
BARIATRIC NEW PATIENT EVALUATION    CHIEF COMPLAINT:   Morbid obesity, body mass index is 55.32 kg/m². and inability to lose weight.    HPI:  Starla Fisher is a 53 y.o. morbidly obese female. Her current body mass index is 55.32 kg/m². She has multiple associated comorbidities including A. Flutter, essential hypertension, CARL, GERD on daily medications, depression, anxiety, and osteoarthritis.  She has struggled with excess weight since  4 years.  Her highest adult weight was 322 lbs at age 53 , and her lowest adult weight was 200 lbs at age 31s.  The patient has tried calorie counting and phentermine (Adipex-P).  The patient was most successful with adipex with a weight loss of 50 lbs.  Her current exercise includes none 0 times a week. She denies any history of eating disorder such as anorexia, bulimia, or taking laxatives for weight loss, and denies any addiction including illicit substances, alcohol, or gambling.  Patient states she has a good  support system.  She lives with sister.  She is disabled.  She  endorses a 20+ year history of GERD on PPI daily c/o having to sleep a certain to help with reflux at night and intermittent vomiting with reflux. The patient's goal is 4 paints sizes down.     ESS: Score of 13, reviewed 06/27/2023.  Does need Sleep Study.Planned with sleep medicine appt for sleep study in August.     Pt has Autism per sister she has trouble with finances and some decision making. Lives with sister after parents passed away. Sister states she has trouble with binging and eating a lot with boredom.       EKG 8/11/22 NSR  CXR 8/11/22  Mediastinal structures are midline.  Stable cardiomediastinal silhouette.     Mild prominence of the pulmonary vascular markings and bilateral interstitial attenuation.  Findings may represent vascular crowding in the setting of slightly low lung volumes, however cannot exclude mild pulmonary edema.  No focal consolidation.  No pleural effusion or  pneumothorax.    EGD 12/2022: Impression: - Normal esophagus.                          - Gastritis. Biopsied.                          - A few gastric polyps. Biopsied. suspected benign                          fundic glands                          - Normal examined duodenum.     Pre op weight-322  IBW-138     PAST MEDICAL HISTORY:  Past Medical History:   Diagnosis Date    Anxiety     Depression     GERD (gastroesophageal reflux disease)     Hypertension     Migraine headache     Sleep difficulties     Typical atrial flutter 4/20/2022       PAST SURGICAL HISTORY:  Past Surgical History:   Procedure Laterality Date    BREAST SURGERY      COLONOSCOPY N/A 12/14/2022    Procedure: COLONOSCOPY sutab;  Surgeon: Wei Walsh MD;  Location: [facility];  Service: Endoscopy;  Laterality: N/A;    ENDOMETRIAL ABLATION      ENDOSCOPIC GASTROCNEMIUS RECESSION Right 8/19/2022    Procedure: RECESSION, GASTROCNEMIUS, ENDOSCOPIC;  Surgeon: Zelalem Solorzano DPM;  Location: [facility];  Service: Podiatry;  Laterality: Right;  Arthrex centerline  Stella/Arthrex/CONFIRMED/WD,RN 8/18@1333    ENDOSCOPIC PLANTAR FASCIOTOMY Right 8/19/2022    Procedure: FASCIOTOMY, PLANTAR, ENDOSCOPIC;  Surgeon: Zelalem Solorzano DPM;  Location: [facility];  Service: Podiatry;  Laterality: Right;  Arthrex set    ESOPHAGOGASTRODUODENOSCOPY N/A 12/14/2022    Procedure: EGD (ESOPHAGOGASTRODUODENOSCOPY);  Surgeon: Wei Walsh MD;  Location: [facility];  Service: Endoscopy;  Laterality: N/A;    EYE SURGERY      FOOT SURGERY Right 10/2018    bunion    IMPLANTATION OF PERMANENT SACRAL NERVE STIMULATOR N/A 2/15/2022    Procedure: INSERTION, NEUROSTIMULATOR, PERMANENT, SACRAL;  Surgeon: David Brown MD;  Location: [address];  Service: Urology;  Laterality: N/A;    TONSILLECTOMY      TOTAL REDUCTION MAMMOPLASTY Bilateral        FAMILY HISTORY:  Family History   Problem Relation Age of Onset    Atrial fibrillation Mother     Thyroid disease  Mother     Dementia Mother     Diabetes Father     Prostate cancer Father     Stomach cancer Sister     Migraines Sister     Colon cancer Paternal Aunt     Colon cancer Paternal Uncle     Breast cancer Maternal Grandmother     Colon cancer Paternal Grandmother     Cancer Paternal Grandfather         SOCIAL HISTORY:  Social History     Socioeconomic History    Marital status: Single   Tobacco Use    Smoking status: Never     Passive exposure: Never    Smokeless tobacco: Never   Substance and Sexual Activity    Alcohol use: No    Drug use: No    Sexual activity: Not Currently     Partners: Male       MEDICATIONS:  Medications have been reviewed.    ALLERGIES:  Allergies have been reviewed.    Review of Systems   Constitutional:  Negative for chills and fever.   HENT:  Negative for ear pain, nosebleeds and sore throat.    Eyes:  Negative for blurred vision and double vision.   Respiratory:  Positive for shortness of breath. Negative for cough.    Cardiovascular:  Negative for chest pain, palpitations, orthopnea, claudication and leg swelling.   Gastrointestinal:  Negative for abdominal pain, constipation, diarrhea, heartburn, nausea and vomiting.   Genitourinary:  Negative for dysuria and urgency.   Musculoskeletal:  Negative for back pain and joint pain.   Skin:  Negative for rash.   Neurological:  Negative for dizziness, tingling, focal weakness and headaches.   Endo/Heme/Allergies:  Does not bruise/bleed easily.   Psychiatric/Behavioral:  Negative for depression and suicidal ideas.      Vitals:    06/27/23 1240   Weight: (!) 146.2 kg (322 lb 5 oz)   PainSc: 0-No pain       Physical Exam  Vitals and nursing note reviewed.   Constitutional:       Appearance: She is well-developed. She is morbidly obese.   HENT:      Head: Normocephalic.      Nose: Nose normal.      Mouth/Throat:      Mouth: Mucous membranes are moist.   Eyes:      Extraocular Movements: Extraocular movements intact.   Cardiovascular:      Rate and  Rhythm: Normal rate and regular rhythm.      Heart sounds: Normal heart sounds.   Pulmonary:      Effort: Pulmonary effort is normal.      Breath sounds: Normal breath sounds.   Abdominal:      General: Bowel sounds are normal.      Palpations: Abdomen is soft.   Musculoskeletal:         General: Normal range of motion.      Cervical back: Normal range of motion.   Skin:     General: Skin is warm and dry.      Capillary Refill: Capillary refill takes less than 2 seconds.   Neurological:      Mental Status: She is alert and oriented to person, place, and time.   Psychiatric:         Mood and Affect: Mood normal.        DIAGNOSIS:  1. Morbid obesity, body mass index is 55.32 kg/m². and inability to lose weight.  2. Co-morbidities: A. Flutter, essential hypertension, CARL, GERD on daily medications, depression, anxiety, and osteoarthritis.     PLAN:  The patient is a potential candidate for Bariatric Surgery. The patient is interested in laparoscopic vik-en-y gastric bypass with Dr. Manjarrez. The surgery and post-op care was discussed in detail with the patient. All questions were answered.    The patient understands that bariatric surgery is a tool to aid in weight loss and that they need to be committed to the diet and exercise post-operatively for successful weight loss. Discussed with patient that bariatric surgery is not the easy way out and that it will take plenty of dedication on the patient's part to be successful. Also discussed the possibility of weight regain if the patient strays from the diet guidelines or exercise requirements. Patient verbalized understanding and wishes to proceed with the work-up.    Estimated Goal weight is 230 lbs.    ORDERS:  1. Sleep Study, Stress Test and  Barium UGI  2. Psychological Consult, Bariatric Dietician Consult, and Cardiac Clearance  3. Bariatric Labs: Per orders.    PCP: Tahir Mosquera MD  RTC: As scheduled.    This includes 60 mins face to face time and non-face to face  time preparing to see the patient (eg, review of tests), obtaining and/or reviewing separately obtained history, documenting clinical information in the electronic or other health record, independently interpreting results and communicating results to the patient/family/caregiver, or care coordinator.

## 2023-06-27 NOTE — PATIENT INSTRUCTIONS
Prior to surgery you will need to complete:  - Dietitian consult and follow up appointments as needed  - Cardiac clearance  - Labs  - Psychological evaluation, Please call psychiatry [phone] to schedule  - Stress test  -UGI    In preparation for bariatric surgery, please complete the following:   Discuss your current medications with your primary care provider, remember your medications will need to be crushed, chewable, or in liquid form for the first 2-4 weeks after a gastric bypass or sleeve.  For a gastric band, your medications will need to be crushed indefinitely.    If you take a blood thinner such as: Coumadin (warfarin), Pradaxa (dabigatran), or Plavix (clopidogrel), you will need to speak with your prescribing provider on how or if this medication can be stopped before surgery.   If you take a medication for depression or anxiety, remember to discuss this with the psychologist or psychiatrist that you see.   If you take medication for arthritis on a daily basis that is considered a non-steroidal anti-inflammatory (NSAID), please discuss with your prescribing physician an alternative medication.  After having gastric bypass or gastric sleeve, this group of medications is not appropriate to take due to increased risk of bleeding stomach ulcers.      DEFINITIONS  Appointments: Pre-scheduled meetings or consultations with any physician, advanced practice provider, dietitian, or psychologist, and labs, imaging studies, sleep studies, etc.   Late cancellation: Cancelling an appointment 24-48 hours prior to scheduled time.  No-Show appointment:  is when   You do NOT arrive to your appointment at the time its scheduled.  You call to cancel or cancel via [facility] less than 24 hours in advance of your scheduled appointment  You show up 15 minutes AFTER your scheduled appointment time without any notification of being late.     POLICY  You are allowed up to 3 cancellations for appointments.   After 3  cancellations your case will be placed on hold for 2 months and after that time you can resume the program.   You are allowed only 1 no-show for an appointment.   You will be re-scheduled one time and if there is a 2nd no-show at any point, your case will be placed on hold for 3 months.  After 3 months you can resume the program.     Upon resuming the program after being placed on hold for either above mentioned reasons, if you have a late cancel or no show for any appointment, the bariatric team will review if youre an appropriate candidate for surgery at the monthly meeting.

## 2023-06-27 NOTE — PROGRESS NOTES
BARIATRIC NEW PATIENT EVALUATION    CHIEF COMPLAINT:   Morbid obesity, body mass index is unknown because there is no height or weight on file. and inability to {maintainorlose:41504}.    HPI:  Starla Fisher is a 53 y.o. morbidly obese female. Her current body mass index is unknown because there is no height or weight on file. She has multiple associated comorbidities including {Bariatric Comorbitities:23724}.  She has struggled with excess weight since ***.  Her highest adult weight was ***lbs at age ***, and her lowest adult weight was ***lbs at age ***.  The patient has tried {Bariatric Weight Loss Attempts:57539}.  The patient was most successful with *** with a weight loss of ***lbs.  Her current exercise includes {Bariatric Exercises:04417} {NUMBERS 0-7:95529} times a week. She {denies endorses:89579} history of eating disorder such as anorexia, bulimia, or taking laxatives for weight loss, and {denies endorses:99497} addiction including illicit substances, alcohol, or gambling.  Patient states she has a {DESC; POOR/FAIR/GOOD/EXCELLENT:04628}  support system.  She lives with ***.  She is {Retired Disabled Currently Employed:79653}.  She  {GERD deny endorse:53188}.  The patient's goal is ***.    ESS: Score of ***, reviewed 06/27/2023.  Does *** need Sleep Study.    PAST MEDICAL HISTORY:  Past Medical History:   Diagnosis Date    Anxiety     Depression     GERD (gastroesophageal reflux disease)     Hypertension     Migraine headache     Sleep difficulties     Typical atrial flutter 4/20/2022       PAST SURGICAL HISTORY:  Past Surgical History:   Procedure Laterality Date    BREAST SURGERY      COLONOSCOPY N/A 12/14/2022    Procedure: COLONOSCOPY sutab;  Surgeon: Wei Walsh MD;  Location: [facility];  Service: Endoscopy;  Laterality: N/A;    ENDOMETRIAL ABLATION      ENDOSCOPIC GASTROCNEMIUS RECESSION Right 8/19/2022    Procedure: RECESSION, GASTROCNEMIUS, ENDOSCOPIC;  Surgeon: Zelalem Solorzano DPM;   Location: [facility] OR;  Service: Podiatry;  Laterality: Right;  Arthrex centerline  Stella/Arthrex/CONFIRMED/REJI,RN 8/18@1333    ENDOSCOPIC PLANTAR FASCIOTOMY Right 8/19/2022    Procedure: FASCIOTOMY, PLANTAR, ENDOSCOPIC;  Surgeon: Zelalem Solorzano DPM;  Location: [facility] OR;  Service: Podiatry;  Laterality: Right;  Arthrex set    ESOPHAGOGASTRODUODENOSCOPY N/A 12/14/2022    Procedure: EGD (ESOPHAGOGASTRODUODENOSCOPY);  Surgeon: Wei Walsh MD;  Location: [facility] ENDO;  Service: Endoscopy;  Laterality: N/A;    EYE SURGERY      FOOT SURGERY Right 10/2018    bunion    IMPLANTATION OF PERMANENT SACRAL NERVE STIMULATOR N/A 2/15/2022    Procedure: INSERTION, NEUROSTIMULATOR, PERMANENT, SACRAL;  Surgeon: David Brown MD;  Location: [address];  Service: Urology;  Laterality: N/A;    TONSILLECTOMY      TOTAL REDUCTION MAMMOPLASTY Bilateral        FAMILY HISTORY:  Family History   Problem Relation Age of Onset    Atrial fibrillation Mother     Thyroid disease Mother     Dementia Mother     Diabetes Father     Prostate cancer Father     Stomach cancer Sister     Migraines Sister     Colon cancer Paternal Aunt     Colon cancer Paternal Uncle     Breast cancer Maternal Grandmother     Colon cancer Paternal Grandmother     Cancer Paternal Grandfather         SOCIAL HISTORY:  Social History     Socioeconomic History    Marital status: Single   Tobacco Use    Smoking status: Never     Passive exposure: Never    Smokeless tobacco: Never   Substance and Sexual Activity    Alcohol use: No    Drug use: No    Sexual activity: Not Currently     Partners: Male       MEDICATIONS:  Medications have been reviewed.    ALLERGIES:  Allergies have been reviewed.    ROS    There were no vitals filed for this visit.    Physical Exam     DIAGNOSIS:  1. Morbid obesity, body mass index is unknown because there is no height or weight on file. and inability to {maintainorlose:05429}.  2. Co-morbidities: {Bariatric  Comorbitities:78470}    PLAN:  The patient is {Good, Not a Good, Potential:46618} candidate for Bariatric Surgery. The patient is interested in {BariatricSurgeries:53879} with ***. The surgery and post-op care was discussed in detail with the patient. All questions were answered.    The patient understands that bariatric surgery is a tool to aid in weight loss and that they need to be committed to the diet and exercise post-operatively for successful weight loss. Discussed with patient that bariatric surgery is not the easy way out and that it will take plenty of dedication on the patient's part to be successful. Also discussed the possibility of weight regain if the patient strays from the diet guidelines or exercise requirements. Patient verbalized understanding and wishes to proceed with the work-up.    Estimated Goal weight is *** lbs.    ORDERS:  1. {Orders:69696}  2. {Consults:97394}  3. Bariatric Labs: Per orders.    PCP: Tahir Mosquera MD  RTC: As scheduled.

## 2023-06-27 NOTE — PATIENT INSTRUCTIONS
1200- 1500 calorie Sample meal plan  80-120g protein per day.   Protein drinks and bars: 0-4 grams sugar  Drink lots of water throughout the day and exercise!  MENU # 1  Breakfast: 2 eggs, 1 turkey sausage maritza, 1 apple  Snack: Atkins bar  Lunch: 2 roll-ups (sliced turkey, low-fat sliced cheese, mustard), 12 baby carrots dipped in 1 Tbsp natural peanut butter  Mid-Day Snack: ¼ cup unsalted almonds, ½ cup fruit  Dinner: 1 chicken thigh simmered in tomato sauce + 2 Tbsp mozzarella cheese, ½ cup black beans, 1/2 cup steamed carrots  Evening Snack: 1/2 cup grapes with 4 cubes low-fat cheddar cheese   ___________________________________________________  MENU # 2  Breakfast: 200 calorie protein drink  Mid-morning snack : ¼ cup unsalted nuts, medium banana  Lunch: 3oz tuna or chicken salad made with 2 tbsp light jett, over salad with tomatoes and cucumbers.   Snack: low-fat cheese stick  Dinner: 3oz hamburger maritza, slice of low-fat cheese, 1 cup boiled yellow squash and zucchini.   Snack: 6oz light yogurt  _______________________________________________________  Menu #3  Breakfast: 6oz plain Greek yogurt + fruit (½ banana, ½ cup fruit - pineapple, blueberries, strawberries, peach), may add Splenda to cece.  Lunch: Grilled  chicken breast w/ slice low-fat pepper manoj cheese, 1/2 cup grilled/baked asparagus and small salad with Salad Spritzer.    Mid-Day snack: 200 calorie protein drink   Dinner: 4oz Grilled fish, ½ cup white beans, ½ cup cooked spinach  Evening Snack: fudgsicle no-sugar-added    Menu # 4  Breakfast: 1 scoop vanilla protein powder + 4oz skim milk + 4oz coffee   Snack: Pure protein bar  Lunch: 2 Lettuce tacos: 3oz seasoned ground turkey wrapped in a Ramakrishna lettuce leaves with 1 Tbsp shredded cheese and dollop low-fat sour cream  Snack: ½ cup cottage cheese, ½ cup pineapple chunks  Dinner: Shrimp omelet: 2 eggs, ½ cup shrimp, green onions, and shredded  cheese  ______________________________________________________  Menu #5  Breakfast: 1 cup low-fat cottage cheese, ½ cup peaches (no sugar added)  Snack: 1 apple, 4 cubes cheese  Lunch: 3oz baked pork chop, 1 cup okra and tomato stew  Snack: 1/2 cup black beans + salsa + dollop light sour cream  Dinner: Caprese chicken salad: 3 oz chicken breast, 1oz fresh mozzarella, sliced tomato, 1 Tbsp olive oil, basil  Snack: sugar-free popsicle    Menu #6  Breakfast: ½ cup part-skim ricotta cheese, 2 Tbsp sugar-free strawberry preserves, sprinkle of slivered almonds  Snack: 1 orange  Lunch: 3 oz canned chicken, 1oz shredded cheddar cheese, ½  cup black beans, 2 Tbsp salsa  Snack: 200 calorie Protein drink  Dinner: 4 oz grilled salmon steak, over mixed green salad with 2 tbsp light dressing      Meal Ideas for Regular Bariatric Diet  *Recipes and products available at [email or web address].com      Breakfast: (15-20g protein)    - Egg white omelet: 2 egg whites or ½ cup Egg Beaters. (Optional proteins: cheese, shrimp, black beans, chicken, sliced turkey) (Optional veggies: tomatoes, salsa, spinach, mushrooms, onions, green peppers, or small slice avocado)     - Egg and sausage: 1 egg or ¼ cup Egg Beaters (any variety), with 1 maritza or 2 links of Turkey sausage or Veggie breakfast sausage (Localytics or Krillion)    - Crust-less breakfast quiche: To make a glass pie dish, mix 4oz part skim Ricotta, 1 cup skim milk, and 2 eggs as your base. Add protein: shredded cheese, sliced lean ham or turkey, turkey myers/sausage. Add veggies: tomato, onion, green onion, mushroom, green pepper, spinach, etc.    - Yogurt parfait: Mix 1 - 6oz container Dannon Light N Fit vanilla yogurt, with ¼ cup crushed unsalted nuts    - Cottage cheese and fruit: ½ cup part-skim cottage cheese or ricotta cheese topped with fresh fruit or sugar free preserves     - Lenore Smith's Vanilla Egg custard* (add 2 Tbsp instant coffee granules to make Cappuccino  Custard*)    - Hi-Protein café latte (skim milk, decaf coffee, 1 scoop protein powder). Optional to add Sugar free syrup or extract flavoring.    - Breakfast Lox: spread fat free cream cheese on slices of smoked salmon. Serve over scrambled or egg over easy (sauteed with nonstick cookspray) OR on a cucumber slice    - Eggwhich: Scramble or cook 1 large egg over easy using nonstick cookspray. Place between 2 slices of Greek myers and low fat cheese.     Lunch: (20-30g protein)    - ½ cup Black bean soup (Homemade or Progresso), with ¼ cup shredded low-fat cheese. Top with chopped tomato or fresh salsa.     - Lean deli turkey breast and low-fat sliced cheese, mustard or light jett to moisten, rolled up together, or wrapped in a Ramakrishna lettuce leaf    - Chicken salad made from dinner leftovers, moisten with low-fat salad dressing or light jett. Also try leftover salmon, shrimp, tuna or boiled eggs. Serve ½ cup over dark green salad    - Fat-free canned refried beans, topped with ¼ cup shredded low-fat cheese. Top with chopped tomato or fresh salsa.     - Greek salad: Top mixed greens with 1-2oz grilled chicken, tomatoes, red onions, 2-3 kalamata olives, and sprinkle lightly with feta cheese. Spritz with Balsamic vinegar to taste.     - Crust-less lunch quiche: To make a glass pie dish, mix 4oz part skim Ricotta, 1 cup skim milk, and 2 eggs as your base. Add protein: shredded cheese, sliced lean ham or turkey, shrimp, chicken. Add veggies: tomato, onion, green onion, mushroom, green pepper, spinach, artichoke, broccoli, etc.    - Pizza bake: spread a  yogi felipe mushroom with tomato sauce, low-fat shredded mozzarella and turkey pepperoni or [place] myers. Add any veggies. Roast for 10-15 minutes, until cheese melted.     - Cucumber crab bites: Spread ¼ cup crab dip (lump crabmeat + light cream cheese and green onions) over sliced cucumber.     - Chicken with light spinach and artichoke dip*: Puree in food  processor: 6oz cooked and drained spinach, 2 cloves garlic, 1 can cannelloni beans, ½ cup chopped green onions, 1 can drained artichoke hearts (not marinated in oil), lemon juice and basil. Mix in 2oz chopped up chicken.    Supper: (20-30g protein)    - Serve grilled fish over dark green salad tossed with low-fat dressing, served with grilled asparagus coleman     - Rotisserie chicken salad: served with sliced strawberries, walnuts, fat-free feta cheese crumbles and 1 tbsp Simonss Own Light Raspberry [place] Vinaigrette    - Shrimp cocktail: Dip cold boiled shrimp in homemade low-sugar cocktail sauce (1/2 cup Umair One Carb ketchup, 2 tbsp horseradish, 1/4 tsp hot sauce, 1 tsp Worcestershire sauce, 1 tbsp freshly-squeezed lemon juice). Serve with dark green salad, walnuts, and crumbled blue cheese drizzled with olive oil and Balsamic vinegar    - Tuna Melt: Spread tuna salad onto 2 thick slices of tomato. Top with low-fat cheese and broil until cheese is melted. May also be made with chicken salad of shrimp salad. Pinos Altos with different types of cheeses.    - Chicken or beef fajitas (no tortilla, rice, beans, chips). Top meat and veggies w/ fresh salsa, fat free sour cream.     - Homemade low-fat Chili using extra lean ground beef or ground turkey. Top with shredded cheese and salsa as desired. May add dollop fat-free sour cream if desired    - Chicken parmesan: Top chicken breast w/ low sugar marinara sauce, mozzarella cheese and bake until chicken reaches 165*.  Serve w/ spaghetti SQUASH or Indonesian cut green beans    - Dinner Omelet with shrimp or chicken and onion, green peppers and chives.    - No noodle lasagna: Use sliced zucchini or eggplant in place of noodles.  Layer with part skim ricotta cheese and low sugar meat sauce (use very lean ground beef or ground turkey).    - Mexican chicken bake: Bake chunks of chicken breast or thigh with taco seasoning, Pace brand enchilada sauce, green onions and low-fat  cheese. Serve with ¼ cup black beans or fat free refried beans topped with chopped tomatoes or salsa.    - Krissy frozen meatballs, simmered in Classico Marinara sauce. Different flavors of salsa or spaghetti sauce create different dishes! Sprinkle with parmesan cheese. Serve with grilled or steamed veggies, or a dark green salad.    - Simmer boneless skinless chicken thigh chunks in Classico Marinara sauce or roasted salsa until tender with chopped onion, bell pepper, garlic, mushrooms, spinach, etc.     - Hamburger or veggie burger, without the bun, dressed the way you like. Served with grilled or steamed veggies.    - Eggplant parmesan: Bake slices of eggplant at 350 degrees for 15 minutes. Layer tomato sauce, sliced eggplant and low-fat mozzarella cheese in a baking dish and cover with foil. Bake 30-40 more minutes or until bubbly. Uncover and bake at 400 degrees for about 15 more minutes, or until top is slightly crisp.    - Fish tacos: grilled/baked white fish, wrapped in Ramakrishna lettuce leaf, topped with salsa, shredded low-fat cheese, and light coleslaw.    - Chicken savanah: Sprinkle chicken w/ 1 tsp of hidden valley ranch dip mix. Then grill chicken and top with black beans, salsa and 1 tsp fat free sour cream.     - Cauliflower pizza crust: Use cauliflower as crust (see recipe on madeline, no flour!). Top w/ low fat cheese, turkey pepperoni and veggies and bake again    - chicken or turkey crust pizza: use ground chicken or turkey instead of cauliflower, spread in [place] and bake at 350 for about 20-30 minutes(may want to add garlic, black pepper, oregano and other herbs to ground meat mixture).  Remove and top w/ low fat cheese, turkey pepperoni and veggies and bake again for another 10 minutes or until cheese is browned.     Snacks: (100-200 calories; >5g protein)    - 1 low-fat cheese stick with 8 cherry tomatoes or 1 serving fresh fruit  - 4 thin slices fat-free turkey breast and 1 slice low-fat  cheese  - 4 thin slices fat-free honey ham with wedge of melon  - 6-8 edamame pods (equivalent to about 1/4 cup edamame without pods).   - 1/4 cup unsalted nuts with ½ cup fruit  - 6-oz container Dannon Light n Fit vanilla yogurt, topped with 1oz unsalted nuts         - apple, celery or baby carrots spread with 2 Tbsp PB2  - apple slices with 1 oz slice low-fat cheese  - Apple slices dipped in 2 Tbsp of PB2  - celery, cucumber, bell pepper or baby carrots dipped in ¼ cup hummus bean spread or light spinach and artichoke dip (*recipe in lunch section)  - celery, cucumber, baby carrots dipped in high protein greek yogurt (Mix 16 oz plain greek yogurt + 1 packet of hidden valley ranch dip mix)  - Omar Links Beef Steak - 14g protein! (similar to beef jerky)  - 2 wedges Laughing Cow - Light Herb & Garlic Cheese with sliced cucumber or green bell pepper  - 1/2 cup low-fat cottage cheese with ¼ cup fruit or ¼ cup salsa  - RTD Protein drinks: Atkins, Low Carb Slim Fast, EAS light, Muscle Milk Light, etc.  - Homemade Protein drinks: GNC Soy95, Isopure, Nectar, UNJURY, Whey Gourmet, etc. Mix 1 scoop powder with 8oz skim/1% milk or light soymilk.  - Protein bars: Atkins, EAS, Pure Protein, Think Thin, Detour, etc. Must have 0-4 grams sugar - Read the label.    Takeout Options: No more than twice/week  Deli - Salads (no pasta or rice), meats, cheeses. Roasted chicken. Lox (salmon)    Mexican - Platters which don't include tortillas, chips, or rice. Go easy on the beans. Example: Fajitas without the tortillas. Ask the  not to bring chips to the table if they are too tempting.    Greek - Meat or fish and vegetable, but no bread or rice. Including hummus, baba ganoush, etc, is OK. Most sit-down Greek restaurants can provide you with cucumber slices for dipping instead of constantino bread.    Fast Food (Avoid as much as possible) - Salads (no croutons and limit salad dressing to 2 tbsp), grilled chicken sandwich without the bun  and ask for no jett. Margarets low fat chili or Taco Bell pintos and cheese.    BBQ - The meats are fine if you ask for sauces on the side, but most of the traditional side dishes are loaded with carbs. Aaron slaw, baked beans and BBQ sauce are typically made with sugar.    Chinese - Nothing deep-fried, no rice or noodles. Many Chinese sauces have starch and sugar in them, so you'll have to use your judgement. If you find that these sauces trigger cravings, or cause Dumping, you can ask for the sauce to be made without sugar or just use soy sauce.

## 2023-07-05 ENCOUNTER — HOSPITAL ENCOUNTER (OUTPATIENT)
Dept: RADIOLOGY | Facility: HOSPITAL | Age: 53
Discharge: HOME | End: 2023-07-05
Attending: NURSE PRACTITIONER
Payer: MEDICARE

## 2023-07-05 DIAGNOSIS — Z86.39 HISTORY OF MORBID OBESITY: ICD-10-CM

## 2023-07-05 DIAGNOSIS — K21.9 GASTROESOPHAGEAL REFLUX DISEASE, UNSPECIFIED WHETHER ESOPHAGITIS PRESENT: ICD-10-CM

## 2023-07-05 PROCEDURE — 25500020 PHARM REV CODE 255: Performed by: NURSE PRACTITIONER

## 2023-07-05 PROCEDURE — 74240 X-RAY XM UPR GI TRC 1CNTRST: CPT | Mod: TC,FY

## 2023-07-05 PROCEDURE — 74240 FL UPPER GI: ICD-10-PCS | Mod: 26,,, | Performed by: INTERNAL MEDICINE

## 2023-07-05 PROCEDURE — A9698 NON-RAD CONTRAST MATERIALNOC: HCPCS | Performed by: NURSE PRACTITIONER

## 2023-07-05 PROCEDURE — 74240 X-RAY XM UPR GI TRC 1CNTRST: CPT | Mod: 26,,, | Performed by: INTERNAL MEDICINE

## 2023-07-05 RX ADMIN — BARIUM SULFATE 355 ML: 0.6 SUSPENSION ORAL at 10:07

## 2023-07-07 ENCOUNTER — OFFICE VISIT (OUTPATIENT)
Dept: UROLOGY | Facility: CLINIC | Age: 53
End: 2023-07-07
Payer: MEDICARE

## 2023-07-07 VITALS
WEIGHT: 293 LBS | SYSTOLIC BLOOD PRESSURE: 154 MMHG | DIASTOLIC BLOOD PRESSURE: 83 MMHG | HEIGHT: 64 IN | BODY MASS INDEX: 50.02 KG/M2 | HEART RATE: 80 BPM

## 2023-07-07 DIAGNOSIS — N32.81 OAB (OVERACTIVE BLADDER): Primary | ICD-10-CM

## 2023-07-07 PROCEDURE — 3077F PR MOST RECENT SYSTOLIC BLOOD PRESSURE >= 140 MM HG: ICD-10-PCS | Mod: CPTII,S$GLB,, | Performed by: UROLOGY

## 2023-07-07 PROCEDURE — 99999 PR PBB SHADOW E&M-EST. PATIENT-LVL IV: CPT | Mod: PBBFAC,,, | Performed by: UROLOGY

## 2023-07-07 PROCEDURE — 3044F PR MOST RECENT HEMOGLOBIN A1C LEVEL <7.0%: ICD-10-PCS | Mod: CPTII,S$GLB,, | Performed by: UROLOGY

## 2023-07-07 PROCEDURE — 3079F DIAST BP 80-89 MM HG: CPT | Mod: CPTII,S$GLB,, | Performed by: UROLOGY

## 2023-07-07 PROCEDURE — 99214 PR OFFICE/OUTPT VISIT, EST, LEVL IV, 30-39 MIN: ICD-10-PCS | Mod: S$GLB,,, | Performed by: UROLOGY

## 2023-07-07 PROCEDURE — 99999 PR PBB SHADOW E&M-EST. PATIENT-LVL IV: ICD-10-PCS | Mod: PBBFAC,,, | Performed by: UROLOGY

## 2023-07-07 PROCEDURE — 1159F MED LIST DOCD IN RCRD: CPT | Mod: CPTII,S$GLB,, | Performed by: UROLOGY

## 2023-07-07 PROCEDURE — 4010F PR ACE/ARB THEARPY RXD/TAKEN: ICD-10-PCS | Mod: CPTII,S$GLB,, | Performed by: UROLOGY

## 2023-07-07 PROCEDURE — 3008F PR BODY MASS INDEX (BMI) DOCUMENTED: ICD-10-PCS | Mod: CPTII,S$GLB,, | Performed by: UROLOGY

## 2023-07-07 PROCEDURE — 3044F HG A1C LEVEL LT 7.0%: CPT | Mod: CPTII,S$GLB,, | Performed by: UROLOGY

## 2023-07-07 PROCEDURE — 3079F PR MOST RECENT DIASTOLIC BLOOD PRESSURE 80-89 MM HG: ICD-10-PCS | Mod: CPTII,S$GLB,, | Performed by: UROLOGY

## 2023-07-07 PROCEDURE — 3008F BODY MASS INDEX DOCD: CPT | Mod: CPTII,S$GLB,, | Performed by: UROLOGY

## 2023-07-07 PROCEDURE — 1159F PR MEDICATION LIST DOCUMENTED IN MEDICAL RECORD: ICD-10-PCS | Mod: CPTII,S$GLB,, | Performed by: UROLOGY

## 2023-07-07 PROCEDURE — 99214 OFFICE O/P EST MOD 30 MIN: CPT | Mod: S$GLB,,, | Performed by: UROLOGY

## 2023-07-07 PROCEDURE — 4010F ACE/ARB THERAPY RXD/TAKEN: CPT | Mod: CPTII,S$GLB,, | Performed by: UROLOGY

## 2023-07-07 PROCEDURE — 3077F SYST BP >= 140 MM HG: CPT | Mod: CPTII,S$GLB,, | Performed by: UROLOGY

## 2023-07-07 NOTE — PROGRESS NOTES
[facility]        Overactive Bladder Return Note     7/7/2023     Patient Name: Starla Fisher  Referred by:No ref. provider found                    Patient returns for postoperative visit after successful implant of SNM 18 months ago. She had been doing very well but noticed return of symptoms. She is not quite back to baseline, but close. She was initially reporting 6x UUI episodes per day.      A review of 10+ systems was conducted with pertinent positive and negative findings documented in HPI with all other systems reviewed and negative.     Past medical, family, surgical and social history reviewed as documented in chart with pertinent positive medical, family, surgical and social history detailed in HPI.     Const: no acute distress, conversant and alert  Eyes: anicteric, extraocular muscles intact  ENMT: normocephalic, Nl oral membranes  Cardio: no cyanosis, nl cap refill  Pulm: no tachypnea; no resp distress  Abd: soft, no tenderness  Musc: no laceration, no tenderness  Neuro: alert; oriented x 3  Skin: warm, dry; no petichiae  Psych: no anxiety; normal speech      Assessment/Plan:     Overactive Bladder with Urgency Incontinence (estab,improved): No longer seeing the same benefit. Have switched to program 4. Will trial and extreme cycling setting (also set for Program 1) to see if this can help.

## 2023-07-12 ENCOUNTER — TELEPHONE (OUTPATIENT)
Dept: BARIATRICS | Facility: CLINIC | Age: 53
End: 2023-07-12
Payer: MEDICARE

## 2023-07-12 NOTE — TELEPHONE ENCOUNTER
----- Message from Marisol Simon sent at 7/12/2023  9:57 AM CDT -----  Regarding: Test Results  Contact: [phone]  Type:  Test Results    Who Called:  pt   Name of Test (Lab/Mammo/Etc):  labs and xray   Date of Test:  07/05  Ordering Provider:    Where the test was performed:  [facility]   Would the patient rather a call back or a response via [facility]?  Call   Best Call Back Number:  [phone]  Additional Information:

## 2023-07-12 NOTE — TELEPHONE ENCOUNTER
Called and spoke with the pt.  She requested Salima Mercedes's response to her upper GI and labs.  Read JAMA Mercedes's notes to her.  Pt will start OTC iron or multivitamin with iron.  Small hiatal hernia can be discussed with Dr. Manjarrez when scheduled for the preop.

## 2023-07-13 ENCOUNTER — HOSPITAL ENCOUNTER (OUTPATIENT)
Dept: CARDIOLOGY | Facility: HOSPITAL | Age: 53
Discharge: HOME | End: 2023-07-13
Attending: NURSE PRACTITIONER
Payer: MEDICARE

## 2023-07-13 DIAGNOSIS — I11.9 HYPERTENSIVE HEART DISEASE WITHOUT HEART FAILURE: ICD-10-CM

## 2023-07-13 DIAGNOSIS — E66.01 MORBID OBESITY WITH BMI OF 50.0-59.9, ADULT: ICD-10-CM

## 2023-07-13 DIAGNOSIS — F84.0 AUTISM: ICD-10-CM

## 2023-07-13 DIAGNOSIS — F33.1 MAJOR DEPRESSIVE DISORDER, RECURRENT, MODERATE: ICD-10-CM

## 2023-07-13 DIAGNOSIS — Z86.39 HISTORY OF MORBID OBESITY: ICD-10-CM

## 2023-07-13 DIAGNOSIS — I10 ESSENTIAL HYPERTENSION: ICD-10-CM

## 2023-07-13 DIAGNOSIS — M17.11 PRIMARY OSTEOARTHRITIS OF RIGHT KNEE: ICD-10-CM

## 2023-07-13 DIAGNOSIS — G47.33 OSA (OBSTRUCTIVE SLEEP APNEA): ICD-10-CM

## 2023-07-13 DIAGNOSIS — I48.3 TYPICAL ATRIAL FLUTTER: ICD-10-CM

## 2023-07-13 LAB
CV STRESS BASE HR: 79 BPM
DIASTOLIC BLOOD PRESSURE: 75 MMHG
EJECTION FRACTION: 65 %
OHS CV CPX 1 MINUTE RECOVERY HEART RATE: 113 BPM
OHS CV CPX 85 PERCENT MAX PREDICTED HEART RATE MALE: 136
OHS CV CPX MAX PREDICTED HEART RATE: 160
OHS CV CPX PATIENT IS FEMALE: 1
OHS CV CPX PATIENT IS MALE: 0
OHS CV CPX PEAK DIASTOLIC BLOOD PRESSURE: 64 MMHG
OHS CV CPX PEAK HEAR RATE: 139 BPM
OHS CV CPX PEAK RATE PRESSURE PRODUCT: NORMAL
OHS CV CPX PEAK SYSTOLIC BLOOD PRESSURE: 151 MMHG
OHS CV CPX PERCENT MAX PREDICTED HEART RATE ACHIEVED: 87
OHS CV CPX RATE PRESSURE PRODUCT PRESENTING: NORMAL
STRESS ECHO POST EXERCISE DUR MIN: 13 MINUTES
STRESS ECHO POST EXERCISE DUR SEC: 0 SECONDS
SYSTOLIC BLOOD PRESSURE: 146 MMHG

## 2023-07-13 PROCEDURE — 93351 STRESS TTE COMPLETE: CPT | Mod: HCNC

## 2023-07-13 PROCEDURE — 25000003 PHARM REV CODE 250: Mod: HCNC | Performed by: NURSE PRACTITIONER

## 2023-07-13 PROCEDURE — 93351 STRESS TTE COMPLETE: CPT | Mod: 26,HCNC,, | Performed by: INTERNAL MEDICINE

## 2023-07-13 PROCEDURE — 63600175 PHARM REV CODE 636 W HCPCS: Mod: HCNC | Performed by: NURSE PRACTITIONER

## 2023-07-13 PROCEDURE — 93351 STRESS ECHO (CUPID ONLY): ICD-10-PCS | Mod: 26,HCNC,, | Performed by: INTERNAL MEDICINE

## 2023-07-13 RX ORDER — DOBUTAMINE HYDROCHLORIDE 400 MG/100ML
0-20 INJECTION, SOLUTION INTRAVENOUS ONCE
Status: COMPLETED | OUTPATIENT
Start: 2023-07-13 | End: 2023-07-13

## 2023-07-13 RX ADMIN — DOBUTAMINE 10 MCG/KG/MIN: 12.5 INJECTION, SOLUTION, CONCENTRATE INTRAVENOUS at 03:07

## 2023-07-13 NOTE — NURSING
1500: Pt on table, ready for test. Pt states NKDA and denies hx of glaucoma.    1520: Connected to monitor EKG and NIBP; /75; HR 78.    1535: at bedside to explain procedure and obtain consent.    1540: Test started per protocol, see EKG sheet for VS.    1540: Dobutamine started at 10mcg to increase HR, target .    1543: Dobutamine increased to 20mcg, pt tolerating well. Pt given hand ball to squeeze and instructed to do leg exercises.     1546: Dobutamine increased to 30mcg.    1549: Dobutamine increased to 40mcg.    1554: Target HR obtained. Pt tolerated test well. Denied any c/o chest pain and/or SOB.     1555: Testing phase completed, dobutamine off, NS up at rapid rate for recovery phase.    1605: Recovery phase completed. Pt states feels normal, no distress, NS d/c'd, approx  200cc infused. Iv d/c'd, pt released to cardio.

## 2023-07-25 ENCOUNTER — CLINICAL SUPPORT (OUTPATIENT)
Dept: BARIATRICS | Facility: CLINIC | Age: 53
End: 2023-07-25
Payer: MEDICARE

## 2023-07-25 VITALS — BODY MASS INDEX: 55.1 KG/M2 | WEIGHT: 293 LBS

## 2023-07-25 DIAGNOSIS — Z71.3 DIETARY COUNSELING: ICD-10-CM

## 2023-07-25 DIAGNOSIS — G47.33 OSA (OBSTRUCTIVE SLEEP APNEA): ICD-10-CM

## 2023-07-25 DIAGNOSIS — E66.01 MORBID OBESITY WITH BMI OF 50.0-59.9, ADULT: ICD-10-CM

## 2023-07-25 DIAGNOSIS — I10 ESSENTIAL HYPERTENSION: Primary | ICD-10-CM

## 2023-07-25 PROCEDURE — 97803 MED NUTRITION INDIV SUBSEQ: CPT | Mod: HCNC,GZ,S$GLB, | Performed by: DIETITIAN, REGISTERED

## 2023-07-25 PROCEDURE — 99499 UNLISTED E&M SERVICE: CPT | Mod: HCNC,S$GLB,, | Performed by: DIETITIAN, REGISTERED

## 2023-07-25 PROCEDURE — 99499 NO LOS: ICD-10-PCS | Mod: HCNC,S$GLB,, | Performed by: DIETITIAN, REGISTERED

## 2023-07-25 PROCEDURE — 97803 PR MED NUTR THER, SUBSQ, INDIV, EA 15 MIN: ICD-10-PCS | Mod: HCNC,GZ,S$GLB, | Performed by: DIETITIAN, REGISTERED

## 2023-07-25 NOTE — PROGRESS NOTES
NUTRITIONAL Note     Referring Physician: Grace Manjarrez M.D.   Reason for MNT Referral: Follow up assessment for laparoscopic Harvinder-en-Y work-up    53 y.o. female presents with 2 lbs weight loss over the past month by making dietary and lifestyle changes in preparation for bariatric surgery. Pt has been keeping a food journal for accountability of what she eats while her sister is at work. The greatest change has been with decreased snacking in between meals.           Past Medical History:   Diagnosis Date    Anxiety      Depression      GERD (gastroesophageal reflux disease)      Hypertension      Migraine headache      Sleep difficulties      Typical atrial flutter 4/20/2022         CLINICAL DATA:  53 y.o.-year-old White female.  Height: 5 ft. 4 in.  Weight: 322 lbs  IBW: 138 lbs  BMI: 55.1     DAILY NUTRITIONAL NEEDS: pre-op nutritional bariatric guidelines to promote weight loss  1999-2787 Calories    Grams Protein     NUTRITION & HEALTH HISTORY:     Current diet recall:      B: Premier shake OR apple with pb  L: Subway 6 in meatball or roast beef with veggies OR [place] (side salad, baked chicken, broccoli with cheese, carrot souffle)  - Quest cookie/chips  D: salmon with veggie steam bag OR restaurant take out (boiled crabs OR entree salad with chicken and dressing)     Current Diet:  Meal pattern: 2-3 meals + 1-2 snacks + 0-1 protein supplements  Protein supplements: Premier shakes  Snacking: fruits with whipped cream, Quest chips and cookies, Atkins bars  Vegetables: Likes a variety. Eats daily.  Fruits: Likes a variety. Eats 2-3 times per week.  Beverages: water and sugar-free beverages.   Dining out: Reduced lately. Mostly restaurants and take-out.   Cooking at home: Daily. Weekly. Mostly baked, grilled, and smothered meat, fish, starchy CHO, and vegetables.     Exercise:  Parking further and walking. Seated chair exercises with dumbbells.  Has [employer] membership.  Restrictions to exercise:  SOB, knee pain d/t arthritis. Ambulates with a cane for balance.     Vitamins / Minerals / Herbs:   MV gummies  MV tablet     Labs:   Reviewed     Food Allergies:   None known     Social:  Disabled.  Lives with her sister, Riya.  Grocery shopping and food prep done by the pt's sister and occ by the pt using microwave and air fryer.  Patient believes her sister will be supportive after surgery.  Alcohol: None.  Smoking: None.     ASSESSMENT:  Patient demonstrated knowledge of healthy eating behaviors and exercise patterns.  Patient demonstrates willingness to change lifestyle and make behavior modifications AEB weight loss, dietary changes, protein supplements, exercise.           Barriers to Education: none     Stage of change: action     NUTRITION DIAGNOSIS:    Morbid Obesity related to Excessive carbohydrate intake, Excessive calorie intake, and Physical inactivity as evidence by BMI.     BARIATRIC DIET DISCUSSION/PLAN:  Discussed diet after surgery and related to patient's food record.  Reviewed nutrition guidelines for before and after surgery.  Answered all questions.  Continue to review Bariatric Nutrition Guidebook at home and call with any questions.  Work on Bariatric Nutrition Checklist.  Continue cutting back on starchy CHO in the diet.  5-6 meals per day.  Maintain/Increase exercise.     RECOMMENDATIONS:  Pt is a potential candidate for surgery - Gastric Bypass.     Follow up in one month.  Needs additional visits with RD for MSD.     Patient and her sister verbalized understanding.        Communicated nutrition plan with bariatric team.     SESSION TIME:  30 minutes

## 2023-07-26 DIAGNOSIS — G43.719 INTRACTABLE CHRONIC MIGRAINE WITHOUT AURA AND WITHOUT STATUS MIGRAINOSUS: ICD-10-CM

## 2023-07-27 RX ORDER — SUMATRIPTAN SUCCINATE 100 MG/1
TABLET ORAL
Qty: 9 TABLET | Refills: 11 | Status: SHIPPED | OUTPATIENT
Start: 2023-07-27 | End: 2023-11-14

## 2023-08-02 ENCOUNTER — PATIENT MESSAGE (OUTPATIENT)
Dept: BARIATRICS | Facility: CLINIC | Age: 53
End: 2023-08-02
Payer: MEDICARE

## 2023-08-03 ENCOUNTER — OFFICE VISIT (OUTPATIENT)
Dept: SLEEP MEDICINE | Facility: CLINIC | Age: 53
End: 2023-08-03
Payer: MEDICARE

## 2023-08-03 DIAGNOSIS — G47.33 OSA (OBSTRUCTIVE SLEEP APNEA): Primary | ICD-10-CM

## 2023-08-03 PROCEDURE — 3044F PR MOST RECENT HEMOGLOBIN A1C LEVEL <7.0%: ICD-10-PCS | Mod: HCNC,CPTII,S$GLB, | Performed by: NURSE PRACTITIONER

## 2023-08-03 PROCEDURE — 4010F PR ACE/ARB THEARPY RXD/TAKEN: ICD-10-PCS | Mod: HCNC,CPTII,S$GLB, | Performed by: NURSE PRACTITIONER

## 2023-08-03 PROCEDURE — 4010F ACE/ARB THERAPY RXD/TAKEN: CPT | Mod: HCNC,CPTII,S$GLB, | Performed by: NURSE PRACTITIONER

## 2023-08-03 PROCEDURE — 99213 OFFICE O/P EST LOW 20 MIN: CPT | Mod: S$GLB,,, | Performed by: NURSE PRACTITIONER

## 2023-08-03 PROCEDURE — 3044F HG A1C LEVEL LT 7.0%: CPT | Mod: HCNC,CPTII,S$GLB, | Performed by: NURSE PRACTITIONER

## 2023-08-03 PROCEDURE — 99213 PR OFFICE/OUTPT VISIT, EST, LEVL III, 20-29 MIN: ICD-10-PCS | Mod: S$GLB,,, | Performed by: NURSE PRACTITIONER

## 2023-08-03 NOTE — PROGRESS NOTES
Cc:CARL      Peristent frequent disrupted sleep and sleepiness. Not feeling rested. DME called her several times last year but no response. Ready to begin PAP. Headaches well controlled with botox adm.     SH:single, sister and her live [employer]    HST 4/2022 AHI 12*(RDI 21(I/low sat 73%    LMP  (LMP Unknown)       IMPRESSION: CARL, mild (mod by RDI), ready for setup      PLAN   apap setup 6-14cm rtc 1-2 mos AFTER setup adherence  See neuro as advised

## 2023-08-07 RX ORDER — DILTIAZEM HYDROCHLORIDE 240 MG/1
240 CAPSULE, COATED, EXTENDED RELEASE ORAL DAILY
Qty: 90 CAPSULE | Refills: 1 | Status: CANCELLED | OUTPATIENT
Start: 2023-08-07 | End: 2024-08-06

## 2023-08-08 ENCOUNTER — TELEPHONE (OUTPATIENT)
Dept: SLEEP MEDICINE | Facility: CLINIC | Age: 53
End: 2023-08-08
Payer: MEDICARE

## 2023-08-08 RX ORDER — DILTIAZEM HYDROCHLORIDE 240 MG/1
240 CAPSULE, COATED, EXTENDED RELEASE ORAL DAILY
Qty: 90 CAPSULE | Refills: 1 | Status: SHIPPED | OUTPATIENT
Start: 2023-08-08 | End: 2023-10-13 | Stop reason: SDUPTHER

## 2023-08-08 NOTE — TELEPHONE ENCOUNTER
Staff reached out to the pt to inform her that it will take anywhere between 2-8 weeks before she will hear from HME for her CPAP machine but her mailbox was full and staff could not leave a message

## 2023-08-08 NOTE — TELEPHONE ENCOUNTER
----- Message from Brandy Elias sent at 8/7/2023  4:33 PM CDT -----  Contact: pt  Type: Call back     Who Called:pt    Does the patient know what this is regarding?:CPAP machine   Would the patient rather a call back or a response via [employer]? Call   Best Call Back Number:[phone]  Additional Information:     Pt stated she has not heard anything regarding her CAP machine and was told to call office if she didn't

## 2023-08-11 ENCOUNTER — PROCEDURE VISIT (OUTPATIENT)
Dept: NEUROLOGY | Facility: CLINIC | Age: 53
End: 2023-08-11
Payer: MEDICARE

## 2023-08-11 VITALS
HEART RATE: 84 BPM | DIASTOLIC BLOOD PRESSURE: 90 MMHG | WEIGHT: 293 LBS | BODY MASS INDEX: 50.02 KG/M2 | HEIGHT: 64 IN | SYSTOLIC BLOOD PRESSURE: 154 MMHG

## 2023-08-11 DIAGNOSIS — G43.719 INTRACTABLE CHRONIC MIGRAINE WITHOUT AURA AND WITHOUT STATUS MIGRAINOSUS: Primary | ICD-10-CM

## 2023-08-11 PROCEDURE — 64615 CHEMODENERV MUSC MIGRAINE: CPT | Mod: HCNC,S$GLB,, | Performed by: PSYCHIATRY & NEUROLOGY

## 2023-08-11 PROCEDURE — 64615 PR CHEMODENERVATION OF MUSCLE FOR CHRONIC MIGRAINE: ICD-10-PCS | Mod: HCNC,S$GLB,, | Performed by: PSYCHIATRY & NEUROLOGY

## 2023-08-11 RX ORDER — INSULIN PUMP SYRINGE, 3 ML
EACH MISCELLANEOUS
Qty: 2 EACH | Refills: 2 | Status: SHIPPED | OUTPATIENT
Start: 2023-08-11

## 2023-08-11 RX ORDER — SUMATRIPTAN 20 MG/1
SPRAY NASAL
Qty: 12 EACH | Refills: 11 | Status: SHIPPED | OUTPATIENT
Start: 2023-08-11 | End: 2023-11-14 | Stop reason: SDUPTHER

## 2023-08-11 NOTE — PROCEDURES
"NEUROLOGY PROCEDURE NOTE - BOTOX FOR CHRONIC MIGRAINE    ID:   Starla Fisher is a 53 y.o. person who presents for medically necessary Botox injections for chronic migraine.    DIAGNOSIS:   1. Intractable chronic migraine without aura and without status migrainosus  onabotulinumtoxina injection 200 Units    SUMAtriptan (IMITREX) 20 mg/actuation nasal spray           BOTOX APPROVAL:   Botox injections have been approved for the patient today: Payor: [employer] MANAGED MEDICARE / Plan: [employer] PPO SPECIAL NEEDS / Product Type: Medicare Advantage /        INTERVAL HISTORY:   Patient presents today for Botox injection for chronic migraine.  They endorse that their headaches have been stable since previous encounter.  All questions and concerns were answered to the patient's satisfaction prior to the procedure.     Patient is happy to report she continues enjoying very few overall headaches.  Far greater than 50% reduction in total and severe headache days since initiating therapy.  Reports no problems with previous injection cycle.    Percent improvement:  >50%  Total days of headache per month:  2  Headache days per month severe:  1-2  Headache days per month with migraine:  1  Duration of headache pain:  <1 hr   Average pain score:  4-6/10    OBJECTIVE:   BP (!) 154/90 (BP Location: Left arm, Patient Position: Sitting)   Pulse 84   Ht 5' 4" (1.626 m)   Wt (!) 142.3 kg (313 lb 11.4 oz)   LMP  (LMP Unknown)   BMI 53.85 kg/m²      General: Pleasant, well-appearing in no acute distress.    Neurological Exam:    Mental status: Speech clear, fluent. Normal recent and remote memory.    Cranial nerves: EOMI, face symmetric, hearing intact to voice.   Motor: Moves all four extremities spontaneously.     PRE-PROCEDURE VERIFICATION:   Allergies and relevant documentation was verified. Standard hand hygiene was observed.      PROCEDURE CODES:    - Botulinum Toxin A - Botox 200U    99981 - Chemodenervation of " muscles for chronic migraine    PROCEDURE:   The risk and benefits of the procedure were explained. Starla agreed to undergo the procedure. Starla and DAVID signed the written consent form. One vial of BOTOX 200U was opened. Each vial of Botox was mixed in 4cc of sterile, preservative-free saline. Starla received 155U of BOTOX in 31 injections. 45U were unavoidably wasted.      The following muscles were injected:    Amount Muscle   5U   Proceros    5U    Supercilii (left)    5U    Supercilii (right)    10U   Epicranius - Occipitofrontalis Tonny Frontalis (right)    10U   Epicranius - Occipitofrontalis Tonny Frontalis (left)    20U   Temporalis (right)    20U   Temporalis (left)    15U   Tonny Occipitalis (right)    15U   Tonny Occipitalis (left)    10U   Semispinalis capitis (left)    10U   Semispinalis capitis (right)    15U   Trapezius (right)    15U   Trapezius (left)    45 units were unavoidably wasted. The patient tolerated the procedure well with no immediate side effects.      LIMITATIONS OF BOTOX: Safety and effectiveness have not been established for the prophylaxis of episodic migraine (14 headache days or fewer per month) in 7 placebo-controlled studies.   ADVERSE REACTIONS: The most frequently reported adverse reactions following injection of BOTOX® for chronic migraine include neck pain (9%), headache (5%), eyelid ptosis (4%), migraine (4%), muscular weakness (4%), musculoskeletal stiffness (4%), bronchitis (3%), injection-site pain (3%), musculoskeletal pain (3%), myalgia (3%), facial paresis (2%), hypertension (2%), and muscle spasms (2%). Post Marketing Experience:There have been spontaneous reports of death, sometimes associated with dysphagia, pneumonia, and/or other significant debility or anaphylaxis, after treatment with botulinum toxin. There have also been reports of adverse events involving the cardiovascular system, including arrhythmia and myocardial infarction, some with  fatal outcomes. Some of these patients had risk factors including cardiovascular disease. The exact relationship of these events to the botulinum toxin injection has not been established. Distant Spread of Toxin Effect: Post-marketing reports indicate that the effects of BOTOX® and all botulinum toxin products may spread from the area of injection to produce symptoms consistent with botulinum toxin effects. These may include asthenia, generalized muscle weakness, diplopia, ptosis, dysphagia, dysphonia, dysarthria, urinary incontinence, and breathing difficulties. These symptoms have been reported hours to weeks after injection. Swallowing and breathing difficulties can be life threatening, and there have been reports of death. The risk of symptoms is probably greatest in children treated for spasticity, but symptoms can also occur in adults treated for spasticity and other conditions, particularly in those patients who have underlying conditions that would predispose them to these symptoms. In unapproved uses, including spasticity in children, and in approved indications, cases of spread of effect have been reported at doses comparable to those used to treat cervical dystonia and at lower doses.    CONTRAINDICATIONS: BOTOX® is contraindicated in the presence of infection at the proposed injection site(s) and in individuals with known hypersensitivity to any botulinum toxin preparation or to any of the components in the formulation. Hypersensitivity Reactions: Serious and/or immediate hypersensitivity reactions have been reported. These reactions include anaphylaxis, serum sickness, urticaria, soft-tissue edema, and dyspnea. If such a reaction occurs, further injection of BOTOX® should be discontinued and appropriate medical therapy immediately instituted. One fatal case of anaphylaxis has been reported in which lidocaine was used as the diluent, and consequently the causal agent cannot be reliably determined.     Note: If the patient is covered by commercial insurance and has a diagnosis of chronic migraine, the Botox company may be able to assist with out-of-pocket co-payment up to $700 per treatment. In order to get assistance, please call [employer] at [phone] or go to  [email or web address].      ASSESSMENT/PLAN:  Starla presents today for medically necessary Botox injections for chronic migraine. Botox injections have reduced their migraine frequency/intensity by far greater than 50%. We proceeded with Botox 155 units today.     Diagnosis:   1. Intractable chronic migraine without aura and without status migrainosus  onabotulinumtoxina injection 200 Units    SUMAtriptan (IMITREX) 20 mg/actuation nasal spray        - refills provided for acute medications as above.    Starla should follow-up for the next cycle of Botox injections in 3 months or earlier for a clinical follow-up visit if needed.

## 2023-08-15 ENCOUNTER — OFFICE VISIT (OUTPATIENT)
Dept: UROLOGY | Facility: CLINIC | Age: 53
End: 2023-08-15
Payer: MEDICARE

## 2023-08-15 VITALS
SYSTOLIC BLOOD PRESSURE: 129 MMHG | BODY MASS INDEX: 50.02 KG/M2 | DIASTOLIC BLOOD PRESSURE: 80 MMHG | WEIGHT: 293 LBS | HEART RATE: 69 BPM | HEIGHT: 64 IN

## 2023-08-15 DIAGNOSIS — N32.81 OAB (OVERACTIVE BLADDER): Primary | ICD-10-CM

## 2023-08-15 PROCEDURE — 1159F MED LIST DOCD IN RCRD: CPT | Mod: CPTII,S$GLB,, | Performed by: UROLOGY

## 2023-08-15 PROCEDURE — 3074F PR MOST RECENT SYSTOLIC BLOOD PRESSURE < 130 MM HG: ICD-10-PCS | Mod: CPTII,S$GLB,, | Performed by: UROLOGY

## 2023-08-15 PROCEDURE — 99214 OFFICE O/P EST MOD 30 MIN: CPT | Mod: S$GLB,,, | Performed by: UROLOGY

## 2023-08-15 PROCEDURE — 3044F HG A1C LEVEL LT 7.0%: CPT | Mod: CPTII,S$GLB,, | Performed by: UROLOGY

## 2023-08-15 PROCEDURE — 3074F SYST BP LT 130 MM HG: CPT | Mod: CPTII,S$GLB,, | Performed by: UROLOGY

## 2023-08-15 PROCEDURE — 99214 PR OFFICE/OUTPT VISIT, EST, LEVL IV, 30-39 MIN: ICD-10-PCS | Mod: S$GLB,,, | Performed by: UROLOGY

## 2023-08-15 PROCEDURE — 3008F BODY MASS INDEX DOCD: CPT | Mod: CPTII,S$GLB,, | Performed by: UROLOGY

## 2023-08-15 PROCEDURE — 3079F DIAST BP 80-89 MM HG: CPT | Mod: CPTII,S$GLB,, | Performed by: UROLOGY

## 2023-08-15 PROCEDURE — 99999 PR PBB SHADOW E&M-EST. PATIENT-LVL IV: CPT | Mod: PBBFAC,,, | Performed by: UROLOGY

## 2023-08-15 PROCEDURE — 3008F PR BODY MASS INDEX (BMI) DOCUMENTED: ICD-10-PCS | Mod: CPTII,S$GLB,, | Performed by: UROLOGY

## 2023-08-15 PROCEDURE — 99999 PR PBB SHADOW E&M-EST. PATIENT-LVL IV: ICD-10-PCS | Mod: PBBFAC,,, | Performed by: UROLOGY

## 2023-08-15 PROCEDURE — 3079F PR MOST RECENT DIASTOLIC BLOOD PRESSURE 80-89 MM HG: ICD-10-PCS | Mod: CPTII,S$GLB,, | Performed by: UROLOGY

## 2023-08-15 PROCEDURE — 4010F ACE/ARB THERAPY RXD/TAKEN: CPT | Mod: CPTII,S$GLB,, | Performed by: UROLOGY

## 2023-08-15 PROCEDURE — 4010F PR ACE/ARB THEARPY RXD/TAKEN: ICD-10-PCS | Mod: CPTII,S$GLB,, | Performed by: UROLOGY

## 2023-08-15 PROCEDURE — 1159F PR MEDICATION LIST DOCUMENTED IN MEDICAL RECORD: ICD-10-PCS | Mod: CPTII,S$GLB,, | Performed by: UROLOGY

## 2023-08-15 PROCEDURE — 3044F PR MOST RECENT HEMOGLOBIN A1C LEVEL <7.0%: ICD-10-PCS | Mod: CPTII,S$GLB,, | Performed by: UROLOGY

## 2023-08-15 RX ORDER — OXYBUTYNIN CHLORIDE 10 MG/1
10 TABLET, EXTENDED RELEASE ORAL DAILY
Qty: 30 TABLET | Refills: 11 | Status: SHIPPED | OUTPATIENT
Start: 2023-08-15 | End: 2024-08-14

## 2023-08-15 NOTE — PROGRESS NOTES
[facility]        Overactive Bladder Return Note     8/15/2023     Patient Name: Starla Fisher  Referred by:No ref. provider found                    Patient returns for postoperative visit after successful implant of SNM 20 months ago. She had been doing very well but noticed return of symptoms. She is not quite back to baseline, but close. She was initially reporting 6x UUI episodes per day.  Last visit, we changed to Program 4 and tried on extreme cycling setting. She reports better initially but now with 3-4x UUI per day.      A review of 10+ systems was conducted with pertinent positive and negative findings documented in HPI with all other systems reviewed and negative.     Past medical, family, surgical and social history reviewed as documented in chart with pertinent positive medical, family, surgical and social history detailed in HPI.     Const: no acute distress, conversant and alert  Eyes: anicteric, extraocular muscles intact  ENMT: normocephalic, Nl oral membranes  Cardio: no cyanosis, nl cap refill  Pulm: no tachypnea; no resp distress  Abd: soft, no tenderness  Musc: no laceration, no tenderness  Neuro: alert; oriented x 3  Skin: warm, dry; no petichiae  Psych: no anxiety; normal speech      Assessment/Plan:     Overactive Bladder with Urgency Incontinence (estab,improved): No longer seeing the same benefit. Have switched to program 1. Will trial and extreme cycling setting to see if this can help. But will also add OAB medication. She has a step edit for all OAB medications except Ditropan XL (10 mg written today).

## 2023-08-29 ENCOUNTER — TELEPHONE (OUTPATIENT)
Dept: BARIATRICS | Facility: CLINIC | Age: 53
End: 2023-08-29
Payer: MEDICARE

## 2023-08-29 NOTE — TELEPHONE ENCOUNTER
----- Message from Sofia Gallegos sent at 8/29/2023  7:11 AM CDT -----  Pt would like to be called back regarding  reschedule appt    Pt can be reached at  [phone]

## 2023-09-05 ENCOUNTER — TELEPHONE (OUTPATIENT)
Dept: BARIATRICS | Facility: CLINIC | Age: 53
End: 2023-09-05
Payer: MEDICARE

## 2023-09-05 NOTE — TELEPHONE ENCOUNTER
"----- Message from Julisa Viramontes sent at 9/5/2023  8:36 AM CDT -----  Pt would like a call back in regards to rescheduling the following;      Appt Date: 9/5       Type of appt: EP-msd 3/4       Physician: Lara       Reason for rescheduling?  PT over slept       Caller: Self       Contact Preference: .Telephone Information:  [employer]          [phone]           Additional Information: Pt would like call back to kristy          "Thank you for all that you do for our patients"    "

## 2023-09-05 NOTE — TELEPHONE ENCOUNTER
Returned portal message regarding missed pt. Assisted pt w/rescheduling appt. Date and time approved per pt.

## 2023-09-06 ENCOUNTER — PATIENT MESSAGE (OUTPATIENT)
Dept: BARIATRICS | Facility: CLINIC | Age: 53
End: 2023-09-06
Payer: MEDICARE

## 2023-09-12 ENCOUNTER — PATIENT MESSAGE (OUTPATIENT)
Dept: BARIATRICS | Facility: CLINIC | Age: 53
End: 2023-09-12
Payer: MEDICARE

## 2023-09-26 ENCOUNTER — CLINICAL SUPPORT (OUTPATIENT)
Dept: BARIATRICS | Facility: CLINIC | Age: 53
End: 2023-09-26
Payer: MEDICARE

## 2023-09-26 VITALS — WEIGHT: 293 LBS | BODY MASS INDEX: 54.68 KG/M2

## 2023-09-26 DIAGNOSIS — G47.33 OSA (OBSTRUCTIVE SLEEP APNEA): ICD-10-CM

## 2023-09-26 DIAGNOSIS — E66.01 MORBID OBESITY WITH BMI OF 50.0-59.9, ADULT: ICD-10-CM

## 2023-09-26 DIAGNOSIS — I10 ESSENTIAL HYPERTENSION: Primary | ICD-10-CM

## 2023-09-26 DIAGNOSIS — Z71.3 DIETARY COUNSELING: ICD-10-CM

## 2023-09-26 PROCEDURE — 97803 MED NUTRITION INDIV SUBSEQ: CPT | Mod: HCNC,GZ,S$GLB, | Performed by: DIETITIAN, REGISTERED

## 2023-09-26 PROCEDURE — 99499 NO LOS: ICD-10-PCS | Mod: HCNC,S$GLB,, | Performed by: DIETITIAN, REGISTERED

## 2023-09-26 PROCEDURE — 99999 PR PBB SHADOW E&M-EST. PATIENT-LVL II: CPT | Mod: PBBFAC,HCNC,, | Performed by: DIETITIAN, REGISTERED

## 2023-09-26 PROCEDURE — 99999 PR PBB SHADOW E&M-EST. PATIENT-LVL II: ICD-10-PCS | Mod: PBBFAC,HCNC,, | Performed by: DIETITIAN, REGISTERED

## 2023-09-26 PROCEDURE — 97803 PR MED NUTR THER, SUBSQ, INDIV, EA 15 MIN: ICD-10-PCS | Mod: HCNC,GZ,S$GLB, | Performed by: DIETITIAN, REGISTERED

## 2023-09-26 PROCEDURE — 99499 UNLISTED E&M SERVICE: CPT | Mod: HCNC,S$GLB,, | Performed by: DIETITIAN, REGISTERED

## 2023-09-26 NOTE — PROGRESS NOTES
NUTRITIONAL Note     Referring Physician: Grace Manjarrez M.D.   Reason for MNT Referral: Follow up assessment for laparoscopic Harvinder-en-Y work-up     53 y.o. female presents with 4 lbs weight loss over the past 2 months by making dietary and lifestyle changes in preparation for bariatric surgery.  States she has been consistent with decreased snacking in between meals.  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 Admits the food choices of these meals could be better certain days. Pt is limited with cooking at home to microwave and possibly air fryer so she is reliant on her sister to meal prep or bring healthy meals home.             Past Medical History:   Diagnosis Date    Anxiety      Depression      GERD (gastroesophageal reflux disease)      Hypertension      Migraine headache      Sleep difficulties      Typical atrial flutter 4/20/2022         CLINICAL DATA:  53 y.o.-year-old White female.  Height: 5 ft. 4 in.  Weight: 318 lbs  IBW: 138 lbs  BMI: 54.6     DAILY NUTRITIONAL NEEDS: pre-op nutritional bariatric guidelines to promote weight loss  5798-9464 Calories    Grams Protein     NUTRITION & HEALTH HISTORY:     Current diet recall:      B: Premier shake OR apple with pb (OR restaurant pancakes with butter and syrup and whipped cream - rare treat)  L: Subway 6 in meatball or roast beef with veggies OR sandwich made on keto bread at home  - watermelon  D:  restaurant take out (fried lemon pepper chicken wings)     Current Diet:  Meal pattern: 2-3 meals + 1-2 snacks + 0-1 protein supplements  Protein supplements: Premier shakes  Snacking: fruits with whipped cream, Quest chips and cookies, Atkins bars  Vegetables: Likes a variety. Eats daily.  Fruits: Likes a variety. Eats 2-3 times per week.  Beverages: water and sugar-free beverages.   Dining out: Reduced lately. Mostly restaurants and take-out.    Cooking at home: Daily. Weekly. Mostly baked, grilled, and smothered meat, fish, starchy CHO, and vegetables.     Exercise:  Parking further and walking. Seated chair exercises with dumbbells.  Has [employer] membership.  Restrictions to exercise: SOB, knee pain d/t arthritis. Ambulates with a cane for balance.     Vitamins / Minerals / Herbs:   MV gummies  MV tablet     Labs:   Reviewed     Food Allergies:   None known     Social:  Disabled.  Lives with her sister, Riya.  Grocery shopping and food prep done by the pt's sister and occ by the pt using microwave and air fryer.  Patient believes her sister will be supportive after surgery.  Alcohol: None.  Smoking: None.     ASSESSMENT:  Patient demonstrated knowledge of healthy eating behaviors and exercise patterns.  Patient, with the help of her sister, demonstrates willingness to change lifestyle and make behavior modifications AEB weight loss, dietary changes, protein supplements, exercise.           Barriers to Education: none     Stage of change: action     NUTRITION DIAGNOSIS:    Morbid Obesity related to Excessive carbohydrate intake, Excessive calorie intake, and Physical inactivity as evidence by BMI.     BARIATRIC DIET DISCUSSION/PLAN:  Discussed diet after surgery and related to patient's food record.  Reviewed nutrition guidelines for before and after surgery.  Answered all questions.  Continue to review Bariatric Nutrition Guidebook at home and call with any questions.  Work on Bariatric Nutrition Checklist.  Continue cutting back on starchy CHO in the diet.  5-6 meals per day.  Maintain/Increase exercise.     RECOMMENDATIONS:  Pt is a potential candidate for surgery - Gastric Bypass.     Follow up in one month.  Needs additional visits with RD for MSD.     Patient and her sister verbalized understanding.        Communicated nutrition plan with bariatric team.     SESSION TIME:  30 minutes

## 2023-09-26 NOTE — PATIENT INSTRUCTIONS
Prior to surgery you will need to complete:  - Labs (completed)  - Stress test (completed)  - UGI    Still needs:  - Cardiac clearance - please contact your cardiologist  - Psychological evaluation, Please call psychiatry [phone] to schedule  - Dietitian (4 visits total - final one in October)       1200- 1500 calorie Sample meal plan  80-120g protein per day.   Protein drinks and bars: 0-4 grams sugar  Drink lots of water throughout the day and exercise!      MENU # 1  Breakfast: 2 eggs, 1 turkey sausage maritza, 1 apple  Snack: Atkins bar  Lunch: 2 roll-ups (sliced turkey, low-fat sliced cheese, mustard), 12 baby carrots dipped in 1 Tbsp natural peanut butter  Mid-Day Snack: ¼ cup unsalted almonds, ½ cup fruit  Dinner: 1 chicken thigh simmered in tomato sauce + 2 Tbsp mozzarella cheese, ½ cup black beans, 1/2 cup steamed carrots  Evening Snack: 1/2 cup grapes with 4 cubes low-fat cheddar cheese   ___________________________________________________  MENU # 2  Breakfast: 200 calorie protein drink  Mid-morning snack : ¼ cup unsalted nuts, medium banana  Lunch: 3oz tuna or chicken salad made with 2 tbsp light jett, over salad with tomatoes and cucumbers.   Snack: low-fat cheese stick  Dinner: 3oz hamburger maritza, slice of low-fat cheese, 1 cup boiled yellow squash and zucchini.   Snack: 6oz light yogurt  _______________________________________________________  Menu #3  Breakfast: 6oz plain Greek yogurt + fruit (½ banana, ½ cup fruit - pineapple, blueberries, strawberries, peach), may add Splenda to cece.  Lunch: Grilled  chicken breast w/ slice low-fat pepper manoj cheese, 1/2 cup grilled/baked asparagus and small salad with Salad Spritzer.    Mid-Day snack: 200 calorie protein drink   Dinner: 4oz Grilled fish, ½ cup white beans, ½ cup cooked spinach  Evening Snack: fudgsicle no-sugar-added    Menu # 4  Breakfast: 1 scoop vanilla protein powder + 4oz skim milk + 4oz coffee   Snack: Pure protein bar  Lunch: 2  Lettuce tacos: 3oz seasoned ground turkey wrapped in a Ramakrishna lettuce leaves with 1 Tbsp shredded cheese and dollop low-fat sour cream  Snack: ½ cup cottage cheese, ½ cup pineapple chunks  Dinner: Shrimp omelet: 2 eggs, ½ cup shrimp, green onions, and shredded cheese  ______________________________________________________  Menu #5  Breakfast: 1 cup low-fat cottage cheese, ½ cup peaches (no sugar added)  Snack: 1 apple, 4 cubes cheese  Lunch: 3oz baked pork chop, 1 cup okra and tomato stew  Snack: 1/2 cup black beans + salsa + dollop light sour cream  Dinner: Caprese chicken salad: 3 oz chicken breast, 1oz fresh mozzarella, sliced tomato, 1 Tbsp olive oil, basil  Snack: sugar-free popsicle    Menu #6  Breakfast: ½ cup part-skim ricotta cheese, 2 Tbsp sugar-free strawberry preserves, sprinkle of slivered almonds  Snack: 1 orange  Lunch: 3 oz canned chicken, 1oz shredded cheddar cheese, ½  cup black beans, 2 Tbsp salsa  Snack: 200 calorie Protein drink  Dinner: 4 oz grilled salmon steak, over mixed green salad with 2 tbsp light dressing

## 2023-10-04 ENCOUNTER — PATIENT MESSAGE (OUTPATIENT)
Dept: BARIATRICS | Facility: CLINIC | Age: 53
End: 2023-10-04
Payer: MEDICARE

## 2023-10-10 ENCOUNTER — PATIENT MESSAGE (OUTPATIENT)
Dept: BARIATRICS | Facility: CLINIC | Age: 53
End: 2023-10-10
Payer: MEDICARE

## 2023-10-10 NOTE — PROGRESS NOTES
[facility] 701     SUBJECTIVE:     History of Present Illness:  Patient is a 53 y.o. female presents with followup after atrial flutter cardioversion. Here for preop clearance for bariatric procedure   Primary Diagnosis:  1. Morbid obesity  2. Hypertension  3. Atrial flutter with rapid ventricular rate - s/p ablation  6/29/22  4. CARL with CPAP  ROS  Since last visit 6/23: feels well   A. Pulse normal at home without tachycardia   B. No chest pains  C. No syncope  D.lost weight recently  E. Blood pressures at home 140's   F. activity: reduced  due to shortness of breath; feels tired at times - this is an old problem . Improved with weight loss recently. Walking outside at times None at rest; no PND or orthopnea.   Past Hospitalization    Review of patient's allergies indicates:  No Known Allergies    Past Medical History:   Diagnosis Date    Anxiety     Depression     GERD (gastroesophageal reflux disease)     Hypertension     Migraine headache     Sleep difficulties     Typical atrial flutter 4/20/2022       Past Surgical History:   Procedure Laterality Date    BREAST SURGERY      COLONOSCOPY N/A 12/14/2022    Procedure: COLONOSCOPY sutab;  Surgeon: Wei Walsh MD;  Location: [facility];  Service: Endoscopy;  Laterality: N/A;    ENDOMETRIAL ABLATION      ENDOSCOPIC GASTROCNEMIUS RECESSION Right 8/19/2022    Procedure: RECESSION, GASTROCNEMIUS, ENDOSCOPIC;  Surgeon: Zelalem Solorzano DPM;  Location: [facility] OR;  Service: Podiatry;  Laterality: Right;  Arthrex centerline  Stella/Arthradha/CHRISTOPHE/REJI,RN 8/18@1333    ENDOSCOPIC PLANTAR FASCIOTOMY Right 8/19/2022    Procedure: FASCIOTOMY, PLANTAR, ENDOSCOPIC;  Surgeon: Zelalem Solorzano DPM;  Location: [facility] OR;  Service: Podiatry;  Laterality: Right;  Arthrex set    ESOPHAGOGASTRODUODENOSCOPY N/A 12/14/2022    Procedure: EGD (ESOPHAGOGASTRODUODENOSCOPY);  Surgeon: Wei Walsh MD;  Location: [facility] ENDO;  Service: Endoscopy;  Laterality: N/A;    EYE SURGERY       FOOT SURGERY Right 10/2018    bunion    IMPLANTATION OF PERMANENT SACRAL NERVE STIMULATOR N/A 2/15/2022    Procedure: INSERTION, NEUROSTIMULATOR, PERMANENT, SACRAL;  Surgeon: David Brown MD;  Location: [facility] OR [address] [place] [address];  Service: Urology;  Laterality: N/A;    TONSILLECTOMY      TOTAL REDUCTION MAMMOPLASTY Bilateral        Family History   Problem Relation Age of Onset    Atrial fibrillation Mother     Thyroid disease Mother     Dementia Mother     Diabetes Father     Prostate cancer Father     Stomach cancer Sister     Migraines Sister     Colon cancer Paternal Aunt     Colon cancer Paternal Uncle     Breast cancer Maternal Grandmother     Colon cancer Paternal Grandmother     Cancer Paternal Grandfather        Social History     Tobacco Use    Smoking status: Never     Passive exposure: Never    Smokeless tobacco: Never   Substance Use Topics    Alcohol use: No    Drug use: No        Home meds:  Current Outpatient Medications on File Prior to Visit   Medication Sig Dispense Refill    blood-glucose meter (TRUE METRIX AIR GLUCOSE METER) kit use as directed 2 each 2    BOTOX 200 unit SolR       clotrimazole-betamethasone 1-0.05% (LOTRISONE) cream Apply topically 2 (two) times daily. 15 g 1    diazePAM (VALIUM) 5 MG tablet Take 1 tablet (5 mg total) by mouth daily as needed for Anxiety. 15 tablet 2    diltiaZEM (CARDIZEM CD) 240 MG 24 hr capsule Take 1 capsule (240 mg total) by mouth once daily. 90 capsule 1    EScitalopram oxalate (LEXAPRO) 20 MG tablet Take 1 tablet (20 mg total) by mouth once daily. 90 tablet 1    Ft-D-xqgqs-B12-L.acid,plan-inu 65 mg iron- 50 mg-1 mg DFE Cap Take by mouth.      fentaNYL (SUBLIMAZE) 50 mcg/mL injection       ferrous sulfate (FEOSOL) 325 mg (65 mg iron) Tab tablet Take 325 mg by mouth.      fluconazole (DIFLUCAN) 150 MG Tab Take one tablet today and one in a week 2 tablet 0    HYDROcodone-acetaminophen (NORCO) 5-325 mg per tablet Take 1 tablet by mouth every 4 (four) hours  as needed for Pain. 30 tablet 0    ketorolac (TORADOL) 30 mg/mL (1 mL) injection       lancets (TRUEPLUS LANCETS) 33 gauge Misc TEST BLOOD SUGAR THREE TIMES DAILY 300 each 1    losartan (COZAAR) 50 MG tablet Take 1 tablet (50 mg total) by mouth once daily. 90 tablet 3    losartan (COZAAR) 50 MG tablet Take 1 tablet (50 mg total) by mouth once daily. 90 tablet 3    meloxicam (MOBIC) 15 MG tablet       metronidazole 0.75% (METROCREAM) 0.75 % Crea Apply topically 2 (two) times daily. 45 g 3    multivit-minerals/folic acid (ADULT MULTIVITAMIN GUMMIES ORAL) Take 1 tablet by mouth Daily. Continue to hold until after Surgery.      multivitamin (THERAGRAN) per tablet Take 1 tablet by mouth.      nystatin (MYCOSTATIN) powder Apply to affected area 3 times daily 60 g 3    omeprazole (PRILOSEC) 40 MG capsule Take 1 capsule (40 mg total) by mouth once daily. 30 capsule 11    oxybutynin (DITROPAN-XL) 10 MG 24 hr tablet Take 1 tablet (10 mg total) by mouth once daily. 30 tablet 11    phenylephrine (TEODORA-SYNEPHRINE) 10 mg/mL injection       promethazine (PHENERGAN) 25 MG tablet Take 1 tablet (25 mg total) by mouth every 6 (six) hours as needed for Nausea. 12 tablet 0    propofoL (DIPRIVAN) 10 mg/mL infusion       ROPIvacaine 0.5%, PF, (NAROPIN) 5 mg/mL (0.5 %) injection       sumatriptan (IMITREX) 100 MG tablet TAKE 1 TABLET BY MOUTH EVERY 2 HOURS AS NEEDED FOR MIGRAINE 9 tablet 11    SUMAtriptan (IMITREX) 20 mg/actuation nasal spray USE ONE SPRAY IN THE NOSTRIL AS NEEDED FOR MIGRAINE(MAX 40MG/24 HOURS) 12 each 11    traMADoL (ULTRAM) 50 mg tablet Take 1 tablet (50 mg total) by mouth every 6 (six) hours. 5 tablet 0    traZODone (DESYREL) 150 MG tablet Take half or whole tablet by mouth before bed as needed for insomnia 90 tablet 3    walker (ULTRA-LIGHT ROLLATOR) Misc Rolator for stability and balance postoperatively as needed while standing and walking. 1 each 0     Current Facility-Administered Medications on File Prior to Visit  "  Medication Dose Route Frequency Provider Last Rate Last Admin    0.9%  NaCl infusion   Intravenous Continuous Sathish Briseno MD   Stopped at 02/15/22 0948    LIDOcaine (PF) 10 mg/ml (1%) injection 10 mg  1 mL Intradermal Once Sathish Briseno MD        sodium chloride 0.9% flush 10 mL  10 mL Intravenous PRN Zelalem Solorzano, KHANG        sodium chloride 0.9% flush 10 mL  10 mL Intravenous PRN Zelalem Solorzano, KHANG           Cardiac meds:   ASA 81 mg   Diltiazem 240 mg daily  Losartan 50 mg daily           OBJECTIVE:     Vital Signs (Most Recent)  Vitals:    10/11/23 1515   BP: (!) 148/85   Pulse: 80   SpO2: (!) 94%   Weight: (!) 144.6 kg (318 lb 10.8 oz)   Height: 5' 4" (1.626 m)           Weight up from 315326  279 318    Physical Exam:  Neck: normal carotids, no bruits; normal JVP  Lungs :clear  Heart: fast heart rate , normal S1,S2, no murmurs, no gallops  Abd: no masses; no bruits;   Exts: normal DP and PT pulses bilaterally, no edema noted           LABS  : A1c normal; lipids OK ; CBC normal; CMP normal     : ;A1c 5.6; LFT's normal     Diagnostic Results:    1.EK/22: atrial flutter to sinus after cardioversion   1b. EK22: atrial flutter with better ventricular rate and variable AV block  1c. EK22: NSR:    1d. EK/22: NSR     2. Echo: 22: normal EF   3. Stress echo: : normal EF; negative for ischemia     ASSESSMENT/PLAN:     1. Recurrence of atrial flutter now in sinus post ablation and remaining in sinus   2. No symptoms of heart failure or angina - recent negative stress test with normal EF   3. Hypertension: controlled per patient   4. Shortness of breath: most likely from obesity and/or sleep apnea.   Plan: 1.low risk for surgery  2. Return 6 months   Alexis Ingram MD            "

## 2023-10-11 ENCOUNTER — OFFICE VISIT (OUTPATIENT)
Dept: CARDIOLOGY | Facility: CLINIC | Age: 53
End: 2023-10-11
Payer: MEDICARE

## 2023-10-11 VITALS
DIASTOLIC BLOOD PRESSURE: 85 MMHG | WEIGHT: 293 LBS | HEART RATE: 80 BPM | OXYGEN SATURATION: 94 % | BODY MASS INDEX: 50.02 KG/M2 | SYSTOLIC BLOOD PRESSURE: 148 MMHG | HEIGHT: 64 IN

## 2023-10-11 DIAGNOSIS — I48.3 TYPICAL ATRIAL FLUTTER: ICD-10-CM

## 2023-10-11 DIAGNOSIS — Z86.39 HISTORY OF MORBID OBESITY: ICD-10-CM

## 2023-10-11 DIAGNOSIS — I10 ESSENTIAL HYPERTENSION: Primary | ICD-10-CM

## 2023-10-11 PROCEDURE — 3077F SYST BP >= 140 MM HG: CPT | Mod: HCNC,CPTII,S$GLB, | Performed by: INTERNAL MEDICINE

## 2023-10-11 PROCEDURE — 3077F PR MOST RECENT SYSTOLIC BLOOD PRESSURE >= 140 MM HG: ICD-10-PCS | Mod: HCNC,CPTII,S$GLB, | Performed by: INTERNAL MEDICINE

## 2023-10-11 PROCEDURE — 1160F RVW MEDS BY RX/DR IN RCRD: CPT | Mod: HCNC,CPTII,S$GLB, | Performed by: INTERNAL MEDICINE

## 2023-10-11 PROCEDURE — 99999 PR PBB SHADOW E&M-EST. PATIENT-LVL III: ICD-10-PCS | Mod: PBBFAC,HCNC,, | Performed by: INTERNAL MEDICINE

## 2023-10-11 PROCEDURE — 3079F DIAST BP 80-89 MM HG: CPT | Mod: HCNC,CPTII,S$GLB, | Performed by: INTERNAL MEDICINE

## 2023-10-11 PROCEDURE — 3008F BODY MASS INDEX DOCD: CPT | Mod: HCNC,CPTII,S$GLB, | Performed by: INTERNAL MEDICINE

## 2023-10-11 PROCEDURE — 3044F PR MOST RECENT HEMOGLOBIN A1C LEVEL <7.0%: ICD-10-PCS | Mod: HCNC,CPTII,S$GLB, | Performed by: INTERNAL MEDICINE

## 2023-10-11 PROCEDURE — 3079F PR MOST RECENT DIASTOLIC BLOOD PRESSURE 80-89 MM HG: ICD-10-PCS | Mod: HCNC,CPTII,S$GLB, | Performed by: INTERNAL MEDICINE

## 2023-10-11 PROCEDURE — 1159F MED LIST DOCD IN RCRD: CPT | Mod: HCNC,CPTII,S$GLB, | Performed by: INTERNAL MEDICINE

## 2023-10-11 PROCEDURE — 1159F PR MEDICATION LIST DOCUMENTED IN MEDICAL RECORD: ICD-10-PCS | Mod: HCNC,CPTII,S$GLB, | Performed by: INTERNAL MEDICINE

## 2023-10-11 PROCEDURE — 3044F HG A1C LEVEL LT 7.0%: CPT | Mod: HCNC,CPTII,S$GLB, | Performed by: INTERNAL MEDICINE

## 2023-10-11 PROCEDURE — 4010F ACE/ARB THERAPY RXD/TAKEN: CPT | Mod: HCNC,CPTII,S$GLB, | Performed by: INTERNAL MEDICINE

## 2023-10-11 PROCEDURE — 1160F PR REVIEW ALL MEDS BY PRESCRIBER/CLIN PHARMACIST DOCUMENTED: ICD-10-PCS | Mod: HCNC,CPTII,S$GLB, | Performed by: INTERNAL MEDICINE

## 2023-10-11 PROCEDURE — 99214 OFFICE O/P EST MOD 30 MIN: CPT | Mod: HCNC,S$GLB,, | Performed by: INTERNAL MEDICINE

## 2023-10-11 PROCEDURE — 3008F PR BODY MASS INDEX (BMI) DOCUMENTED: ICD-10-PCS | Mod: HCNC,CPTII,S$GLB, | Performed by: INTERNAL MEDICINE

## 2023-10-11 PROCEDURE — 4010F PR ACE/ARB THEARPY RXD/TAKEN: ICD-10-PCS | Mod: HCNC,CPTII,S$GLB, | Performed by: INTERNAL MEDICINE

## 2023-10-11 PROCEDURE — 99214 PR OFFICE/OUTPT VISIT, EST, LEVL IV, 30-39 MIN: ICD-10-PCS | Mod: HCNC,S$GLB,, | Performed by: INTERNAL MEDICINE

## 2023-10-11 PROCEDURE — 99999 PR PBB SHADOW E&M-EST. PATIENT-LVL III: CPT | Mod: PBBFAC,HCNC,, | Performed by: INTERNAL MEDICINE

## 2023-10-12 ENCOUNTER — TELEPHONE (OUTPATIENT)
Dept: OBSTETRICS AND GYNECOLOGY | Facility: CLINIC | Age: 53
End: 2023-10-12
Payer: MEDICARE

## 2023-10-12 DIAGNOSIS — F33.1 MAJOR DEPRESSIVE DISORDER, RECURRENT, MODERATE: ICD-10-CM

## 2023-10-12 DIAGNOSIS — F41.0 GENERALIZED ANXIETY DISORDER WITH PANIC ATTACKS: ICD-10-CM

## 2023-10-12 DIAGNOSIS — I10 ESSENTIAL HYPERTENSION: ICD-10-CM

## 2023-10-12 DIAGNOSIS — F41.1 GENERALIZED ANXIETY DISORDER WITH PANIC ATTACKS: ICD-10-CM

## 2023-10-12 RX ORDER — DILTIAZEM HYDROCHLORIDE 240 MG/1
240 CAPSULE, COATED, EXTENDED RELEASE ORAL DAILY
Qty: 90 CAPSULE | Refills: 1 | Status: CANCELLED | OUTPATIENT
Start: 2023-10-12 | End: 2024-10-11

## 2023-10-12 RX ORDER — ESCITALOPRAM OXALATE 20 MG/1
20 TABLET ORAL DAILY
Qty: 90 TABLET | Refills: 1 | Status: CANCELLED | OUTPATIENT
Start: 2023-10-12

## 2023-10-12 RX ORDER — LOSARTAN POTASSIUM 50 MG/1
50 TABLET ORAL DAILY
Qty: 90 TABLET | Refills: 3 | Status: CANCELLED | OUTPATIENT
Start: 2023-10-12 | End: 2024-10-11

## 2023-10-12 NOTE — TELEPHONE ENCOUNTER
----- Message from Andrei Gamez sent at 10/12/2023  8:44 AM CDT -----  Contact: Pt  .Type:  Needs Medical Advice    Who Called: pt    Would the patient rather a call back or a response via [facility]?  Call back  Best Call Back Number: [phone]  Additional Information:  Pt. Is calling regarding an earlier appt. Phone call

## 2023-10-13 ENCOUNTER — OFFICE VISIT (OUTPATIENT)
Dept: OBSTETRICS AND GYNECOLOGY | Facility: CLINIC | Age: 53
End: 2023-10-13
Payer: MEDICARE

## 2023-10-13 ENCOUNTER — TELEPHONE (OUTPATIENT)
Dept: CARDIOLOGY | Facility: CLINIC | Age: 53
End: 2023-10-13
Payer: MEDICARE

## 2023-10-13 VITALS — BODY MASS INDEX: 54.53 KG/M2 | WEIGHT: 293 LBS | DIASTOLIC BLOOD PRESSURE: 86 MMHG | SYSTOLIC BLOOD PRESSURE: 134 MMHG

## 2023-10-13 DIAGNOSIS — N61.0 CELLULITIS OF RIGHT BREAST: ICD-10-CM

## 2023-10-13 DIAGNOSIS — Z01.419 WELL WOMAN EXAM WITH ROUTINE GYNECOLOGICAL EXAM: Primary | ICD-10-CM

## 2023-10-13 DIAGNOSIS — L29.2 VULVAR ITCHING: ICD-10-CM

## 2023-10-13 DIAGNOSIS — Z12.31 ENCOUNTER FOR SCREENING MAMMOGRAM FOR MALIGNANT NEOPLASM OF BREAST: ICD-10-CM

## 2023-10-13 DIAGNOSIS — I10 ESSENTIAL HYPERTENSION: ICD-10-CM

## 2023-10-13 PROCEDURE — 4010F ACE/ARB THERAPY RXD/TAKEN: CPT | Mod: HCNC,CPTII,S$GLB, | Performed by: STUDENT IN AN ORGANIZED HEALTH CARE EDUCATION/TRAINING PROGRAM

## 2023-10-13 PROCEDURE — 3079F DIAST BP 80-89 MM HG: CPT | Mod: HCNC,CPTII,S$GLB, | Performed by: STUDENT IN AN ORGANIZED HEALTH CARE EDUCATION/TRAINING PROGRAM

## 2023-10-13 PROCEDURE — 99999 PR PBB SHADOW E&M-EST. PATIENT-LVL III: CPT | Mod: PBBFAC,HCNC,, | Performed by: STUDENT IN AN ORGANIZED HEALTH CARE EDUCATION/TRAINING PROGRAM

## 2023-10-13 PROCEDURE — 3075F SYST BP GE 130 - 139MM HG: CPT | Mod: HCNC,CPTII,S$GLB, | Performed by: STUDENT IN AN ORGANIZED HEALTH CARE EDUCATION/TRAINING PROGRAM

## 2023-10-13 PROCEDURE — 3008F PR BODY MASS INDEX (BMI) DOCUMENTED: ICD-10-PCS | Mod: HCNC,CPTII,S$GLB, | Performed by: STUDENT IN AN ORGANIZED HEALTH CARE EDUCATION/TRAINING PROGRAM

## 2023-10-13 PROCEDURE — 3008F BODY MASS INDEX DOCD: CPT | Mod: HCNC,CPTII,S$GLB, | Performed by: STUDENT IN AN ORGANIZED HEALTH CARE EDUCATION/TRAINING PROGRAM

## 2023-10-13 PROCEDURE — 3044F PR MOST RECENT HEMOGLOBIN A1C LEVEL <7.0%: ICD-10-PCS | Mod: HCNC,CPTII,S$GLB, | Performed by: STUDENT IN AN ORGANIZED HEALTH CARE EDUCATION/TRAINING PROGRAM

## 2023-10-13 PROCEDURE — 87624 HPV HI-RISK TYP POOLED RSLT: CPT | Mod: HCNC | Performed by: STUDENT IN AN ORGANIZED HEALTH CARE EDUCATION/TRAINING PROGRAM

## 2023-10-13 PROCEDURE — 1159F PR MEDICATION LIST DOCUMENTED IN MEDICAL RECORD: ICD-10-PCS | Mod: HCNC,CPTII,S$GLB, | Performed by: STUDENT IN AN ORGANIZED HEALTH CARE EDUCATION/TRAINING PROGRAM

## 2023-10-13 PROCEDURE — 3079F PR MOST RECENT DIASTOLIC BLOOD PRESSURE 80-89 MM HG: ICD-10-PCS | Mod: HCNC,CPTII,S$GLB, | Performed by: STUDENT IN AN ORGANIZED HEALTH CARE EDUCATION/TRAINING PROGRAM

## 2023-10-13 PROCEDURE — 1159F MED LIST DOCD IN RCRD: CPT | Mod: HCNC,CPTII,S$GLB, | Performed by: STUDENT IN AN ORGANIZED HEALTH CARE EDUCATION/TRAINING PROGRAM

## 2023-10-13 PROCEDURE — 4010F PR ACE/ARB THEARPY RXD/TAKEN: ICD-10-PCS | Mod: HCNC,CPTII,S$GLB, | Performed by: STUDENT IN AN ORGANIZED HEALTH CARE EDUCATION/TRAINING PROGRAM

## 2023-10-13 PROCEDURE — G0101 PR CA SCREEN;PELVIC/BREAST EXAM: ICD-10-PCS | Mod: HCNC,GZ,S$GLB, | Performed by: STUDENT IN AN ORGANIZED HEALTH CARE EDUCATION/TRAINING PROGRAM

## 2023-10-13 PROCEDURE — 3075F PR MOST RECENT SYSTOLIC BLOOD PRESS GE 130-139MM HG: ICD-10-PCS | Mod: HCNC,CPTII,S$GLB, | Performed by: STUDENT IN AN ORGANIZED HEALTH CARE EDUCATION/TRAINING PROGRAM

## 2023-10-13 PROCEDURE — 88175 CYTOPATH C/V AUTO FLUID REDO: CPT | Mod: HCNC | Performed by: STUDENT IN AN ORGANIZED HEALTH CARE EDUCATION/TRAINING PROGRAM

## 2023-10-13 PROCEDURE — 3044F HG A1C LEVEL LT 7.0%: CPT | Mod: HCNC,CPTII,S$GLB, | Performed by: STUDENT IN AN ORGANIZED HEALTH CARE EDUCATION/TRAINING PROGRAM

## 2023-10-13 PROCEDURE — 99999 PR PBB SHADOW E&M-EST. PATIENT-LVL III: ICD-10-PCS | Mod: PBBFAC,HCNC,, | Performed by: STUDENT IN AN ORGANIZED HEALTH CARE EDUCATION/TRAINING PROGRAM

## 2023-10-13 PROCEDURE — G0101 CA SCREEN;PELVIC/BREAST EXAM: HCPCS | Mod: HCNC,GZ,S$GLB, | Performed by: STUDENT IN AN ORGANIZED HEALTH CARE EDUCATION/TRAINING PROGRAM

## 2023-10-13 RX ORDER — TRIAMCINOLONE ACETONIDE 0.25 MG/G
OINTMENT TOPICAL DAILY PRN
Qty: 15 G | Refills: 0 | Status: SHIPPED | OUTPATIENT
Start: 2023-10-13

## 2023-10-13 RX ORDER — CEPHALEXIN 500 MG/1
500 CAPSULE ORAL 4 TIMES DAILY
Qty: 40 CAPSULE | Refills: 0 | Status: SHIPPED | OUTPATIENT
Start: 2023-10-13 | End: 2023-10-23

## 2023-10-13 NOTE — PROGRESS NOTES
"CC: Well woman exam    HPI:  Starla Fisher is a 53 y.o. female  presents for a well woman exam.  She reports right breast lesions for "a while" that have been bleeding and draining pus. No fevers, local redness and it is tender around the areas, no masses in the breast. No masses underneath. She also reports left vulvar itching, occasional vaginal discharge.       Patient history:   Past Medical History:   Diagnosis Date    Anxiety     Depression     GERD (gastroesophageal reflux disease)     Hypertension     Migraine headache     Sleep difficulties     Typical atrial flutter 2022     Past Surgical History:   Procedure Laterality Date    BREAST SURGERY      COLONOSCOPY N/A 2022    Procedure: COLONOSCOPY sutab;  Surgeon: Wei Walsh MD;  Location: [facility];  Service: Endoscopy;  Laterality: N/A;    ENDOMETRIAL ABLATION      ENDOSCOPIC GASTROCNEMIUS RECESSION Right 2022    Procedure: RECESSION, GASTROCNEMIUS, ENDOSCOPIC;  Surgeon: Zelalem Solorzano DPM;  Location: [facility] OR;  Service: Podiatry;  Laterality: Right;  Arthrex centerline  Stella/Arthrex/CHRISTOPHE/REJI,RN @1333    ENDOSCOPIC PLANTAR FASCIOTOMY Right 2022    Procedure: FASCIOTOMY, PLANTAR, ENDOSCOPIC;  Surgeon: Zelalem Solorzano DPM;  Location: [facility] OR;  Service: Podiatry;  Laterality: Right;  Arthrex set    ESOPHAGOGASTRODUODENOSCOPY N/A 2022    Procedure: EGD (ESOPHAGOGASTRODUODENOSCOPY);  Surgeon: Wei Walsh MD;  Location: [facility];  Service: Endoscopy;  Laterality: N/A;    EYE SURGERY      FOOT SURGERY Right 10/2018    bunion    IMPLANTATION OF PERMANENT SACRAL NERVE STIMULATOR N/A 2/15/2022    Procedure: INSERTION, NEUROSTIMULATOR, PERMANENT, SACRAL;  Surgeon: David Brown MD;  Location: [facility] OR [address] [place] [address];  Service: Urology;  Laterality: N/A;    TONSILLECTOMY      TOTAL REDUCTION MAMMOPLASTY Bilateral      OB History    Para Term  AB Living   0 0 0 0 0 0   SAB IAB Ectopic " Multiple Live Births   0 0 0 0 0       GYN  Menopausal: Yes  History of abnormal paps: DENIES  Abnormal or postmenopausal bleeding: DENIES  History of abnormal mammograms:DENIES   Family history of breast or ovarian cancer:  yes  Any breast masses, pain, skin changes, or nipple discharge:  see HPI  Possible recent STD exposure: denies  Contraception: None    Pap: 6/3/2022, Done today  Mammogram: BIRADS1, T-C=8.18%      Family History   Problem Relation Age of Onset    Atrial fibrillation Mother     Thyroid disease Mother     Dementia Mother     Diabetes Father     Prostate cancer Father     Stomach cancer Sister     Migraines Sister     Colon cancer Paternal Aunt     Colon cancer Paternal Uncle     Breast cancer Maternal Grandmother     Colon cancer Paternal Grandmother     Cancer Paternal Grandfather      Social History     Tobacco Use    Smoking status: Never     Passive exposure: Never    Smokeless tobacco: Never   Substance Use Topics    Alcohol use: No    Drug use: No     Allergies: Patient has no known allergies.    Current Outpatient Medications:     blood-glucose meter (TRUE METRIX AIR GLUCOSE METER) kit, use as directed, Disp: 2 each, Rfl: 2    BOTOX 200 unit SolR, , Disp: , Rfl:     clotrimazole-betamethasone 1-0.05% (LOTRISONE) cream, Apply topically 2 (two) times daily., Disp: 15 g, Rfl: 1    diazePAM (VALIUM) 5 MG tablet, Take 1 tablet (5 mg total) by mouth daily as needed for Anxiety., Disp: 15 tablet, Rfl: 2    diltiaZEM (CARDIZEM CD) 240 MG 24 hr capsule, Take 1 capsule (240 mg total) by mouth once daily., Disp: 90 capsule, Rfl: 1    EScitalopram oxalate (LEXAPRO) 20 MG tablet, Take 1 tablet (20 mg total) by mouth once daily., Disp: 90 tablet, Rfl: 1    Jw-Q-yrwrl-B12-L.acid,plan-inu 65 mg iron- 50 mg-1 mg DFE Cap, Take by mouth., Disp: , Rfl:     fentaNYL (SUBLIMAZE) 50 mcg/mL injection, , Disp: , Rfl:     ferrous sulfate (FEOSOL) 325 mg (65 mg iron) Tab tablet, Take 325 mg by mouth., Disp: , Rfl:      fluconazole (DIFLUCAN) 150 MG Tab, Take one tablet today and one in a week, Disp: 2 tablet, Rfl: 0    HYDROcodone-acetaminophen (NORCO) 5-325 mg per tablet, Take 1 tablet by mouth every 4 (four) hours as needed for Pain., Disp: 30 tablet, Rfl: 0    ketorolac (TORADOL) 30 mg/mL (1 mL) injection, , Disp: , Rfl:     lancets (TRUEPLUS LANCETS) 33 gauge Misc, TEST BLOOD SUGAR THREE TIMES DAILY, Disp: 300 each, Rfl: 1    losartan (COZAAR) 50 MG tablet, Take 1 tablet (50 mg total) by mouth once daily., Disp: 90 tablet, Rfl: 3    losartan (COZAAR) 50 MG tablet, Take 1 tablet (50 mg total) by mouth once daily., Disp: 90 tablet, Rfl: 3    meloxicam (MOBIC) 15 MG tablet, , Disp: , Rfl:     multivit-minerals/folic acid (ADULT MULTIVITAMIN GUMMIES ORAL), Take 1 tablet by mouth Daily. Continue to hold until after Surgery., Disp: , Rfl:     multivitamin (THERAGRAN) per tablet, Take 1 tablet by mouth., Disp: , Rfl:     nystatin (MYCOSTATIN) powder, Apply to affected area 3 times daily, Disp: 60 g, Rfl: 3    omeprazole (PRILOSEC) 40 MG capsule, Take 1 capsule (40 mg total) by mouth once daily., Disp: 30 capsule, Rfl: 11    oxybutynin (DITROPAN-XL) 10 MG 24 hr tablet, Take 1 tablet (10 mg total) by mouth once daily., Disp: 30 tablet, Rfl: 11    phenylephrine (TEODORA-SYNEPHRINE) 10 mg/mL injection, , Disp: , Rfl:     promethazine (PHENERGAN) 25 MG tablet, Take 1 tablet (25 mg total) by mouth every 6 (six) hours as needed for Nausea., Disp: 12 tablet, Rfl: 0    propofoL (DIPRIVAN) 10 mg/mL infusion, , Disp: , Rfl:     ROPIvacaine 0.5%, PF, (NAROPIN) 5 mg/mL (0.5 %) injection, , Disp: , Rfl:     sumatriptan (IMITREX) 100 MG tablet, TAKE 1 TABLET BY MOUTH EVERY 2 HOURS AS NEEDED FOR MIGRAINE, Disp: 9 tablet, Rfl: 11    SUMAtriptan (IMITREX) 20 mg/actuation nasal spray, USE ONE SPRAY IN THE NOSTRIL AS NEEDED FOR MIGRAINE(MAX 40MG/24 HOURS), Disp: 12 each, Rfl: 11    traMADoL (ULTRAM) 50 mg tablet, Take 1 tablet (50 mg total) by mouth  every 6 (six) hours., Disp: 5 tablet, Rfl: 0    traZODone (DESYREL) 150 MG tablet, Take half or whole tablet by mouth before bed as needed for insomnia, Disp: 90 tablet, Rfl: 3    walker (ULTRA-LIGHT ROLLATOR) Misc, Rolator for stability and balance postoperatively as needed while standing and walking., Disp: 1 each, Rfl: 0    cephALEXin (KEFLEX) 500 MG capsule, Take 1 capsule (500 mg total) by mouth 4 (four) times daily. for 10 days, Disp: 40 capsule, Rfl: 0    metronidazole 0.75% (METROCREAM) 0.75 % Crea, Apply topically 2 (two) times daily., Disp: 45 g, Rfl: 3    triamcinolone acetonide 0.025% (KENALOG) 0.025 % Oint, Apply topically daily as needed (vulvar itching)., Disp: 15 g, Rfl: 0    Current Facility-Administered Medications:     sodium chloride 0.9% flush 10 mL, 10 mL, Intravenous, PRN, Zelalem Solorzano., DPM    sodium chloride 0.9% flush 10 mL, 10 mL, Intravenous, PRN, Zelalem Solorzano, DPM    Facility-Administered Medications Ordered in Other Visits:     0.9%  NaCl infusion, , Intravenous, Continuous, Sathish Briseno MD, Stopped at 02/15/22 0948    LIDOcaine (PF) 10 mg/ml (1%) injection 10 mg, 1 mL, Intradermal, Once, Sathish Briseno MD       ROS:  GENERAL: Denies weight gain or weight loss. Feeling well overall.   SKIN: Denies rash or lesions.   HEAD: Denies head injury or headache.   NODES: Denies enlarged lymph nodes.   CHEST: Denies chest pain or shortness of breath.   CARDIOVASCULAR: Denies palpitations or left sided chest pain.   ABDOMEN: No abdominal pain, constipation, diarrhea, nausea, vomiting or rectal bleeding.   URINARY: No frequency, dysuria, hematuria, or burning on urination.  REPRODUCTIVE: See HPI.   BREASTS: see HPI  HEMATOLOGIC: No easy bruisability or excessive bleeding.  MUSCULOSKELETAL: Denies joint pain or swelling.   NEUROLOGIC: Denies syncope or weakness.   PSYCHIATRIC: Denies depression, anxiety or mood swings.    Objective:   /86   Wt (!) 144.1 kg (317 lb 10.9  oz)   LMP  (LMP Unknown)   BMI 54.53 kg/m²       Physical Exam:  APPEARANCE: Well nourished, well developed, in no acute distress.  AFFECT: WNL, alert and oriented x 3  SKIN: No acne or hirsutism  NECK: Neck symmetric without masses or thyromegaly  CHEST: Good respiratory effect  ABDOMEN: Soft.  No tenderness or masses.  No hepatosplenomegaly.  No hernias.  BREASTS: Symmetrical, no skin changes or visible lesions left. Right breast with three 5mm circular skin lesions (open sore), small amount of serosanguinous discharge, tender to palpation, no masses underneath, mild erythema.  No palpable masses, nipple discharge bilaterally.  PELVIC: Normal external genitalia without lesions.  Normal hair distribution.  Adequate perineal body, normal urethral meatus.  Vagina atrophic without lesions or discharge.  Cervix pink, without lesions, discharge or tenderness.  No significant cystocele or rectocele.  Bimanual exam limited - shows uterus to be normal size, regular, mobile and nontender.  Adnexa without masses or tenderness.   EXTREMITIES: No edema.    ASSESSMENT AND PLAN  1. Well woman exam with routine gynecological exam  Liquid-Based Pap Smear, Screening    HPV High Risk Genotypes, PCR    Mammo Digital Screening Bilat w/ Ryan      2. Cellulitis of right breast  cephALEXin (KEFLEX) 500 MG capsule      3. Vulvar itching  triamcinolone acetonide 0.025% (KENALOG) 0.025 % Oint      4. Encounter for screening mammogram for malignant neoplasm of breast  Mammo Digital Screening Bilat w/ Ryan          Annual exam  Breast and pelvic exam: WNL, limited   Patient counseled on ASCCP guidelines for cervical cytology screening  Cervical screening: completed today   Patient counseled on current recommendations for breast cancer screening  Mammogram screening: ordered  STD testing: not requested today   Osteoporosis screening at 65  Tobacco cessation counseling n/a    2. Right breast lesions, suspect mild cellulitis   - dsicussed  findings with patient today  - keflex abx send to pharmacy   - discussed topical barrier ointment, keeping area clean and dry inbetween, use of gauze in bra to help with drainage as skin is healing     3. Vulvar itching  - no lesions on exam today, no erythema   - discussed PRN topical steroid for itching when it worsens for patient, rx sent to pharmacy         She was counseled to follow up with her PCP for other routine health maintenance      Follow up in about 1 year (around 10/13/2024), or if symptoms worsen or fail to improve.      Stephanie Heaney, MD OBGYN [facility]

## 2023-10-13 NOTE — TELEPHONE ENCOUNTER
----- Message from Margaret Workman sent at 10/13/2023 11:38 AM CDT -----  Regarding: Refills  Type:  RX Refill Request    1.  Who Called: PT   Refill or New Rx: Refills   RX Name and Strength: Azelastine Nasal Spray 137mcg  How is the patient currently taking it? (ex. 1XDay):  Is this a 30 day or 90 day RX: 90   Preferred Pharmacy with phone number: [facility] - [facility] [address] [facility] Phone:  [phone] Fax:  [phone]    2. diltiaZEM (CARDIZEM CD) 240 MG 24 hr capsule 90 capsule     3. EScitalopram oxalate (LEXAPRO) 20 MG tablet 90 tablet     4. losartan (COZAAR) 50 MG tablet 90 tablet     5. oxybutynin (DITROPAN-XL) 10 MG 24 hr tablet 30 tablet     6. sumatriptan (IMITREX) 100 MG tablet 9 tablet     7. (TRUE METRIX  Test Stripes     8. BD single use Alcohol Swab

## 2023-10-14 RX ORDER — DILTIAZEM HYDROCHLORIDE 240 MG/1
240 CAPSULE, COATED, EXTENDED RELEASE ORAL DAILY
Qty: 90 CAPSULE | Refills: 1 | Status: SHIPPED | OUTPATIENT
Start: 2023-10-14 | End: 2023-10-16 | Stop reason: SDUPTHER

## 2023-10-14 RX ORDER — LOSARTAN POTASSIUM 50 MG/1
50 TABLET ORAL DAILY
Qty: 90 TABLET | Refills: 3 | Status: SHIPPED | OUTPATIENT
Start: 2023-10-14 | End: 2024-10-13

## 2023-10-17 LAB
FINAL PATHOLOGIC DIAGNOSIS: NORMAL
Lab: NORMAL

## 2023-10-17 RX ORDER — DILTIAZEM HYDROCHLORIDE 240 MG/1
240 CAPSULE, COATED, EXTENDED RELEASE ORAL DAILY
Qty: 90 CAPSULE | Refills: 1 | Status: ON HOLD | OUTPATIENT
Start: 2023-10-17 | End: 2024-01-27

## 2023-10-18 ENCOUNTER — TELEPHONE (OUTPATIENT)
Dept: ORTHOPEDICS | Facility: CLINIC | Age: 53
End: 2023-10-18
Payer: MEDICARE

## 2023-10-18 DIAGNOSIS — M17.11 PRIMARY OSTEOARTHRITIS OF RIGHT KNEE: Primary | ICD-10-CM

## 2023-10-18 NOTE — TELEPHONE ENCOUNTER
----- Message from Ludmila Matos PA-C sent at 10/18/2023  3:04 PM CDT -----  Contact: pt  Done   ----- Message -----  From: Deya Maher RN  Sent: 10/18/2023   3:00 PM CDT  To: Ludmila Matos PA-C    Yes, you can order Durolane.    ----- Message -----  From: Ludmila Matos PA-C  Sent: 10/18/2023   1:56 PM CDT  To: Deya Maher RN    Ill put in order for durolane if that is what she wants again   ----- Message -----  From: Deya Maher RN  Sent: 10/18/2023   1:47 PM CDT  To: Ludmila Matos PA-C    Is ready to get more injections. Do you want to order something for her?  She had durolane last time.    ----- Message -----  From: Bryan Quinn  Sent: 10/18/2023   1:37 PM CDT  To: Carlo Keys Staff    Type: Requesting to speak with nurse        Who Called: PT  Regarding: ready to schedule knee injection   Would the patient rather a call back or a response via [facility]? Call back  Best Call Back Number: [phone]  Additional Information:

## 2023-10-23 DIAGNOSIS — G43.719 INTRACTABLE CHRONIC MIGRAINE WITHOUT AURA AND WITHOUT STATUS MIGRAINOSUS: ICD-10-CM

## 2023-10-23 RX ORDER — CEFUROXIME AXETIL 250 MG/1
TABLET ORAL
Qty: 3 ML | Refills: 5 | Status: SHIPPED | OUTPATIENT
Start: 2023-10-23 | End: 2023-11-14

## 2023-10-24 ENCOUNTER — CLINICAL SUPPORT (OUTPATIENT)
Dept: BARIATRICS | Facility: CLINIC | Age: 53
End: 2023-10-24
Payer: MEDICARE

## 2023-10-24 VITALS — WEIGHT: 293 LBS | BODY MASS INDEX: 54.38 KG/M2

## 2023-10-24 DIAGNOSIS — E66.01 MORBID OBESITY WITH BMI OF 50.0-59.9, ADULT: ICD-10-CM

## 2023-10-24 DIAGNOSIS — G47.33 OSA (OBSTRUCTIVE SLEEP APNEA): ICD-10-CM

## 2023-10-24 DIAGNOSIS — Z71.3 DIETARY COUNSELING: ICD-10-CM

## 2023-10-24 DIAGNOSIS — I10 ESSENTIAL HYPERTENSION: Primary | ICD-10-CM

## 2023-10-24 PROCEDURE — 99499 NO LOS: ICD-10-PCS | Mod: HCNC,S$GLB,, | Performed by: DIETITIAN, REGISTERED

## 2023-10-24 PROCEDURE — 97803 PR MED NUTR THER, SUBSQ, INDIV, EA 15 MIN: ICD-10-PCS | Mod: HCNC,GZ,S$GLB, | Performed by: DIETITIAN, REGISTERED

## 2023-10-24 PROCEDURE — 99499 UNLISTED E&M SERVICE: CPT | Mod: HCNC,S$GLB,, | Performed by: DIETITIAN, REGISTERED

## 2023-10-24 PROCEDURE — 97803 MED NUTRITION INDIV SUBSEQ: CPT | Mod: HCNC,GZ,S$GLB, | Performed by: DIETITIAN, REGISTERED

## 2023-10-24 PROCEDURE — 99999 PR PBB SHADOW E&M-EST. PATIENT-LVL I: CPT | Mod: PBBFAC,HCNC,, | Performed by: DIETITIAN, REGISTERED

## 2023-10-24 PROCEDURE — 99999 PR PBB SHADOW E&M-EST. PATIENT-LVL I: ICD-10-PCS | Mod: PBBFAC,HCNC,, | Performed by: DIETITIAN, REGISTERED

## 2023-10-24 NOTE — PROGRESS NOTES
NUTRITIONAL Note     Referring Physician: Grace Manjarrez M.D.   Reason for MNT Referral: Follow up assessment for laparoscopic Harvinder-en-Y work-up     53 y.o. female presents with 2 lbs weight loss over the past month by making dietary and lifestyle changes in preparation for bariatric surgery.  States she has been consistent with decreased snacking in between meals. Has been crafting jewelry to keep busy and deter boredom snacking. Pt's sister has been meal prepping for the week which helps a lot because the pt is limited with cooking at home to microwave and possibly air fryer.             Past Medical History:   Diagnosis Date    Anxiety      Depression      GERD (gastroesophageal reflux disease)      Hypertension      Migraine headache      Sleep difficulties      Typical atrial flutter 4/20/2022      CLINICAL DATA:  53 y.o.-year-old White female.  Height: 5 ft. 4 in.  Weight: 316 lbs  IBW: 138 lbs  BMI: 54.3     DAILY NUTRITIONAL NEEDS: pre-op nutritional bariatric guidelines to promote weight loss  2988-2125 Calories    Grams Protein     NUTRITION & HEALTH HISTORY:     Current diet recall:      B: Premier shake OR apple with pb  L: dinner leftovers (salmon with 5-6 green beans wrapped in 1 slice myers)  - watermelon  D: pulled pork, coleslaw OR restaurant salad with grilled chicken and light dressing     Current Diet:  Meal pattern: 2-3 meals + 1-2 snacks + 0-1 protein supplements  Protein supplements: Premier shakes  Snacking: fruits with whipped cream, Quest chips and cookies, Atkins bars  Vegetables: Likes a variety. Eats daily.  Fruits: Likes a variety. Eats 2-3 times per week.  Beverages: water and sugar-free beverages. Occ SF vanilla iced coffee.  Dining out: Reduced lately. Mostly restaurants and take-out.   Cooking at home: Daily. Weekly. Mostly baked, grilled, and smothered meat, fish, starchy CHO, and vegetables.     Exercise:  Parking further and walking. Seated chair exercises with  dumbbells.  Has [employer] membership.  Restrictions to exercise: SOB, knee pain d/t arthritis. Ambulates with a cane for balance.     Vitamins / Minerals / Herbs:   MV gummies  MV tablet     Labs:   Reviewed     Food Allergies:   None known     Social:  Disabled.  Lives with her sister, Riya.  Grocery shopping and food prep done by the pt's sister and occ by the pt using microwave and air fryer.  Patient believes her sister will be supportive after surgery.  Alcohol: None.  Smoking: None.     ASSESSMENT:  Patient demonstrated knowledge of healthy eating behaviors and exercise patterns.  Patient, with the help of her sister, demonstrates willingness to change lifestyle and make behavior modifications AEB weight loss, dietary changes, protein supplements, exercise.           Barriers to Education: none     Stage of change: action     NUTRITION DIAGNOSIS:    Morbid Obesity related to Excessive carbohydrate intake, Excessive calorie intake, and Physical inactivity as evidence by BMI.     BARIATRIC DIET DISCUSSION/PLAN:  Discussed diet after surgery and related to patient's food record.  Reviewed nutrition guidelines for before and after surgery.  Answered all questions.  Continue to review Bariatric Nutrition Guidebook at home and call with any questions.  Work on Bariatric Nutrition Checklist.  Continue cutting back on starchy CHO in the diet.  5-6 meals per day.  Maintain/Increase exercise.  Provided/reviewed bariatric Holiday handout on AVS     RECOMMENDATIONS:  Pt is a good candidate for surgery - Gastric Bypass.     Patient and her sister verbalized understanding.      Communicated nutrition plan with bariatric team.    Will f/u before/after surgery as needed.    Reviewed DB.     SESSION TIME:  30 minutes

## 2023-10-24 NOTE — PATIENT INSTRUCTIONS
My [facility] Support: [phone]      Helpful tips to survive the holidays:  Dont save yourself for the meal: Make sure you continue to eat high protein small meals every 3-4 hours to ensure to do not become over-hungry. Avoid temptation by not showing up to a holiday party or gathering hungry.   Plan ahead. Bring a dish to a party if you know there may not be an appropriate option.   Choose sugar-free drinks: Stick to water or other sugar-free beverages and make sure you are getting 6-8 cups of fluid each day (but not with meals!). Avoid alcohol, carbonated beverages, and high-fat/high-sugar beverages like hot chocolate and eggnog. Try sugar-free hot cocoa made with skim milk or water, or sugar-free spiced tea to add some holiday flair to your beverage (see sugar-free mulled cider recipe below)  Take your time: Eat mindfully. Dont graze on food throughout the day. Sit down to enjoy your small meals. Chew slowly and thoroughly. Cut your food into small pieces before eating.  Listen to your body: Stop eating as soon as you feel full. Do not feel pressured to try certain (or all) foods or to eat all of the food on your plate. Listen to your hunger cues.   REMEMBER: Make your holidays about spending time with family and friends instead of focusing gatherings around food.  Keep up your exercise routine: Make sure you continue to get regular exercise throughout the holiday season. Encourage friends and family to be active by taking a walk together after a meal, to look at holiday lights, or to window-shop.    Good Holiday Meal Options:  Roasted Turkey, NO skin. Use low sodium broth instead of gravy.   Stuffed Bell Peppers made WITHOUT bread crumbs or Rice. Try using parmesan cheese instead  Gumbo, NO rice. Try picking out mostly the meat/seafood and vegetables with little broth.   Green Bean Casserole made with 98% fat free cream of mushroom soup and crushed almonds/pecans instead of fried onions  Side salad w/ low  fat dressing. Try a different kind of salad maybe use Kale or spinach.   Roasted non-starchy vegetables like brussel sprouts, broccoli, green beans, zucchini, butternut squash, cauliflower  Cauliflower Mash (steam or roast cauliflower, puree w/ low fat cheese, dash of fat free milk and 2-3 sprays of I cant believe its not butter spray. Add garlic powder and black pepper to season). Use Low sodium broth instead of gravy.   Try Loaded Cauliflower Mash (Make cauliflower like above cauliflower mash. Top with diced turkey myers, ¼ cup low fat cheddar cheese and bake @ 350* F for 5-10 minutes, until cheese is melted. Top with minced chives, black pepper and garlic to taste).   Homemade cranberry sauce using Splenda or another alternative sweetener. Boil fresh cranberries and add splenda to taste. Boil until cranberries break open and then simmer until it reaches the consistency you want (less time for more watery sauce and simmer for longer to create a thicker sauce).   Deviled eggs: make using low fat jett, mustard, DILL relish (not sweet relish).   Vegetable tray w/ Greek yogurt Ranch Dip. Mix 1 packet of hidden valley ranch dip mix w/ 16 oz low fat plain greek yogurt.     Good Holiday Dessert Options:  High protein Pumpkin Cheesecake (see recipe below)  Pumpkin Whip (see recipe below)  Quest Apple Pie or Cinnamon Roll flavored protein bar (warm in microwave for 10-15 seconds)  Eggnog Protein shake (see recipe below)  Fresh fruit w/ low fat cheese  Sugar-free Jello Parfaits. Layer Red and Green sugar-free jello in cups and top w/ 2 tbsp Sugar-free cool-whip    Pumpkin Cheesecake    8 ounces fat free cream cheese, softened   2 scoops of vanilla protein powder (<4 g sugar per serving)   ¼ tsp Fine salt   2 eggs, at room temperature   1/3 cup fat free sour cream  1/3 cup fat free half and half  1 15 -ounce can pure pumpkin puree   1 tablespoon pumpkin pie spice, plus more for dusting   Unsalted nuts, crushed  *Add  splenda to taste    Directions     1. Preheat the oven to 300 degrees F. Line 18 muffin cups with paper liners. Sprinkle 1 tsp crushed unsalted nuts at the bottom of each of muffin cup liner.     2. In a large bowl, beat the cream cheese, vanilla protein powder and 1/4 teaspoon fine salt on medium-high speed until smooth and creamy, 2 to 3 minutes. Scrape down the sides, reduce speed to low and beat in the eggs, 1 at a time, until combined. Beat in 1/3 cup fat free sour cream and fat free half and half. Stir in the pumpkin puree and pumpkin pie spice until smooth. Divide evenly among cookie-lined paper cups, filling almost all the way to the top.     3. Bake until the filling is just set, 40 to 45 minutes. A sharp knife inserted into the center will come out moist, but clean. Cool completely in tins on a wire rack. Refrigerate until cold, 4 hours, or overnight. Top with a dusting of pumpkin pie spice.    Recipe altered from the following recipe: http://www.Dist[employer]/recipes/food-network-negar/mini-pumpkin-cheesecakes-recipe.print.html?oc=linkback    Pumpkin Whip    Box of sugar-free vanilla pudding  Can of pumpkin puree  Pumpkin Pie spice (sprinkle to taste)  ½ cup of sugar-free Cool Whip    Directions:  Make sugar-free pudding according to package directions using fat free or 1% milk. Stir in pumpkin and cool whip. Add pumpkin pie spice to taste.     Egg Nog Protein shake    8 oz skim or 1% milk  1 scoop vanilla protein powder  1 tbsp sugar-free vanilla pudding mix  ½ tsp butter flavor extract  ½ tsp rum extract  ½ tsp cinnamon     Shake together or blend with ice and serve.     Sugar-Free Mulled Cider    3 oz diet cran-apple juice  6 oz water  1 packet sugar-free apple cider mix  ½ tsp apple pie spice  ½ tsp butter flavor extract  1 tbsp Sugar-free Syrup    Mix together. Warm if needed and serve w/ orange wedge and cinnamon stick.

## 2023-10-30 ENCOUNTER — TELEPHONE (OUTPATIENT)
Dept: PSYCHIATRY | Facility: CLINIC | Age: 53
End: 2023-10-30
Payer: MEDICARE

## 2023-10-30 NOTE — TELEPHONE ENCOUNTER
Called Ms. Fisher to let her know that I will be covering her psychological evaluation on 11/01 since Dr. Barrett has to be out. She expressed her understanding and agreement.

## 2023-11-01 ENCOUNTER — DOCUMENTATION ONLY (OUTPATIENT)
Dept: PSYCHIATRY | Facility: CLINIC | Age: 53
End: 2023-11-01
Payer: MEDICARE

## 2023-11-01 ENCOUNTER — TELEPHONE (OUTPATIENT)
Dept: ORTHOPEDICS | Facility: CLINIC | Age: 53
End: 2023-11-01
Payer: MEDICARE

## 2023-11-01 ENCOUNTER — OFFICE VISIT (OUTPATIENT)
Dept: PSYCHIATRY | Facility: CLINIC | Age: 53
End: 2023-11-01
Payer: MEDICARE

## 2023-11-01 DIAGNOSIS — I11.9 HYPERTENSIVE HEART DISEASE WITHOUT HEART FAILURE: ICD-10-CM

## 2023-11-01 DIAGNOSIS — I48.3 TYPICAL ATRIAL FLUTTER: ICD-10-CM

## 2023-11-01 DIAGNOSIS — G47.33 OSA (OBSTRUCTIVE SLEEP APNEA): ICD-10-CM

## 2023-11-01 DIAGNOSIS — E66.01 MORBID OBESITY WITH BMI OF 50.0-59.9, ADULT: ICD-10-CM

## 2023-11-01 DIAGNOSIS — Z01.818 PREOPERATIVE EVALUATION TO RULE OUT SURGICAL CONTRAINDICATION: Primary | ICD-10-CM

## 2023-11-01 DIAGNOSIS — K21.9 GASTROESOPHAGEAL REFLUX DISEASE, UNSPECIFIED WHETHER ESOPHAGITIS PRESENT: ICD-10-CM

## 2023-11-01 DIAGNOSIS — I10 ESSENTIAL HYPERTENSION: ICD-10-CM

## 2023-11-01 DIAGNOSIS — F32.5 MAJOR DEPRESSIVE DISORDER WITH SINGLE EPISODE, IN FULL REMISSION: ICD-10-CM

## 2023-11-01 DIAGNOSIS — M17.11 PRIMARY OSTEOARTHRITIS OF RIGHT KNEE: ICD-10-CM

## 2023-11-01 PROBLEM — F41.0 GENERALIZED ANXIETY DISORDER WITH PANIC ATTACKS: Status: RESOLVED | Noted: 2022-02-23 | Resolved: 2023-11-01

## 2023-11-01 PROBLEM — F41.1 GENERALIZED ANXIETY DISORDER WITH PANIC ATTACKS: Status: RESOLVED | Noted: 2022-02-23 | Resolved: 2023-11-01

## 2023-11-01 PROCEDURE — 96146 PSYCL/NRPSYC TST AUTO RESULT: CPT | Mod: HCNC,59,S$GLB, | Performed by: PSYCHOLOGIST

## 2023-11-01 PROCEDURE — 4010F ACE/ARB THERAPY RXD/TAKEN: CPT | Mod: HCNC,CPTII,S$GLB, | Performed by: PSYCHOLOGIST

## 2023-11-01 PROCEDURE — 3044F HG A1C LEVEL LT 7.0%: CPT | Mod: HCNC,CPTII,S$GLB, | Performed by: PSYCHOLOGIST

## 2023-11-01 PROCEDURE — 4010F PR ACE/ARB THEARPY RXD/TAKEN: ICD-10-PCS | Mod: HCNC,CPTII,S$GLB, | Performed by: PSYCHOLOGIST

## 2023-11-01 PROCEDURE — 96130 PSYCL TST EVAL PHYS/QHP 1ST: CPT | Mod: HCNC,S$GLB,, | Performed by: PSYCHOLOGIST

## 2023-11-01 PROCEDURE — 99999 PR PBB SHADOW E&M-EST. PATIENT-LVL I: CPT | Mod: PBBFAC,HCNC,, | Performed by: PSYCHOLOGIST

## 2023-11-01 PROCEDURE — 90791 PR PSYCHIATRIC DIAGNOSTIC EVALUATION: ICD-10-PCS | Mod: HCNC,S$GLB,, | Performed by: PSYCHOLOGIST

## 2023-11-01 PROCEDURE — 99999 PR PBB SHADOW E&M-EST. PATIENT-LVL I: ICD-10-PCS | Mod: PBBFAC,HCNC,, | Performed by: PSYCHOLOGIST

## 2023-11-01 PROCEDURE — 96130 PR PSYCHOLOGIC TEST EVAL SVCS, 1ST HR: ICD-10-PCS | Mod: HCNC,S$GLB,, | Performed by: PSYCHOLOGIST

## 2023-11-01 PROCEDURE — 3044F PR MOST RECENT HEMOGLOBIN A1C LEVEL <7.0%: ICD-10-PCS | Mod: HCNC,CPTII,S$GLB, | Performed by: PSYCHOLOGIST

## 2023-11-01 PROCEDURE — 96146 PR PSYCH/NEUROPSYCH TEST ADMIN, ELEC PLATFORM, AUTO RESULT ONLY: ICD-10-PCS | Mod: HCNC,59,S$GLB, | Performed by: PSYCHOLOGIST

## 2023-11-01 PROCEDURE — 90791 PSYCH DIAGNOSTIC EVALUATION: CPT | Mod: HCNC,S$GLB,, | Performed by: PSYCHOLOGIST

## 2023-11-01 RX ORDER — LANCETS 33 GAUGE
EACH MISCELLANEOUS
Qty: 300 EACH | Refills: 10 | Status: SHIPPED | OUTPATIENT
Start: 2023-11-01

## 2023-11-01 NOTE — PROGRESS NOTES
Psychiatry Initial Visit (PhD/LCSW)   Presurgical Psychological Evaluation  Psychological Intake  Diagnostic Interview - CPT 12453     Date: 11/01/2023  Site: [facility]   Referral source: Grace Manjarrez MD  Clinical status of patient: Outpatient   Met with: Patient & Sister (who is POA)    Ms. Starla Fisher, a 53-year-old female, presented for initial evaluation visit. Before this evaluation was initiated, the purposes and process of the assessment and the limits of confidentiality were discussed with the patient who expressed understanding of these issues and orally consented to proceed with the evaluation.     Chief complaint/reason for encounter: Routine psychological evaluation prior to bariatric surgery.     Type of surgery sought: RNY Bypass    History of present illness: Ms. Starla Fisher is a 53-year-old female who is pursuing bariatric surgery to improve her health and quality of life. She has a Body Mass Index of 55.32 as documented by the referring provider. Ms. 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 Her sister noted that has also been a genetic struggle in her family with obesity. She gained 80 lb. in the past four years since her father's death. Factors that have contributed to her weight gain over the years include: Sweets, Cakes, Candy, Carbohydrates, and Snacks. She endorsed past late-night eating but I've been working on that. She is cutting off food by 9 pm. She denied grazing behaviors. She endorsed a history of emotional eating with loss of her parents as her primary trigger. She also notes that stress causes her to engage in emotionally eat. She is working on developing the following skills to cope with her emotions more effectively: Making jewelry, Watching television, and Playing with cats. She has tried many weight loss methods on her own  (i.e., calorie counting, Adipex-P) with limited success, and believes that her biggest weight loss challenge is eating in smaller portions. Her motivation for seeking surgery now is because I couldn't get into the lagunas [at [facility]] and I was embarrassed I couldn't fit. She also has been unable to go to festivals, concerts, and other activities. Her postsurgical goals include being more mobile in order to attend activities. She also wants to reduce medications.    Ms. Fisher has met with bariatric nutritionist Luisa Bermudez RD, and reports that she has made the following lifestyle changes since beginning the bariatric program: Drinking protein shakes, Eating bananas, Eating fruit, Avoiding carbohydrates, and Reducing emotional eating (with 2 lb. weight loss). She was provided with nutritional clearance on 10/24/2023. She chose the gastric bypass because she wants to reduce acid reflux. She described the procedure as cutting half of your stomach out. She identified risks including not being able to do it if the liver is in the way doing 2 weeks of fasting. She understood that she needs to focus on protein intake after surgery, which includes protein shakes, salmon, fish. She noted that she will need to stay away from sweets, snacks, and big portions after surgery. She hopes to lose 100 lb. and expects it will take up to one year (she initially said two months, but was redirected to give herself more flexible expectations). She plans to take two weeks to recover after surgery. Her sister will be available to help her during recovery. Her social network is supportive of her pursuing bariatric surgery.    Regarding mental health history, Ms. Fisher acknowledged a history of Major Depressive Disorder following the death of her parents around four years ago. Following her loss, she gained around 80 lb. due to depression and emotional eating. She was prescribed psychotropic medication with benefit,  and has been working on improving health and diet since starting the bariatric program. She also has a history of Autism and Intellectual Disability for which she receives social security disability benefits. She denied any history of suicidality or non-suicidal self-injury. Currently, Ms. Fisher reports occasional periods of sadness due to grief and physical limitations caused by weight gain and pain. It does not appear her symptoms are at a severity or frequency warranting clinical depression. She has no major psychosocial stressors at this time. She reports good social support, adaptive coping, and engagement in recreation to help her manage her symptoms.    Relevant Medical Comorbidities: Typical atrial flutter; Essential hypertension; Primary osteoarthritis of right knee; CARL (obstructive sleep apnea); Morbid obesity with BMI of 50.0-59.9, adult; History of morbid obesity; Hypertensive heart disease without heart failure; Gastroesophageal reflux disease, unspecified whether esophagitis present    Pain: 9/10 (arthritis)    Current Health Behaviors:  Compliant with medical regimens and appointments: Yes  Prescription medication misuse: No  Exercise: No - walking around house and [facility]  Adequate sleep: Yes  Adequate cognitive functioning: Yes with some limitations due to Autism and intellectual disability    Current and Past Substance Use/Abuse:  Alcohol: Denied current use; denied history of abuse or dependency.   Drugs: Denied current use; denied history of abuse or dependency.  Tobacco: None.   Caffeine: She drinks two cups of coffee each morning and two cups of caffeine of night.    Past Psychiatric History:  Previous diagnosis: MDD; Autism/Intellectual disability  Inpatient treatment: Denied.  Prior substance abuse treatment: Denied.  Outpatient treatment: Denied.  Suicide attempt: Denied.  Non-suicidal self-injury: Denied.    Family History:  Psychiatric illness: Sister - ADHD  Substance abuse:  Denied.  Suicide: Denied.    Trauma History: Denied.    Psychosocial History and Current Social Situation:    Ms. Fisher was born and raised in [place], [place] by her mother and father along with younger sister. She described her childhood as great. She denied childhood trauma, abuse, and neglect. She did pretty good in school and earned a certificate (high school diploma equivalent). She was enrolled in special education throughout school due to special needs. She is currently on disability for Autism. Her sister noted she has intellectual disability as well. She denied  service. Finances are stable. She is not . She has no children. She currently lives with her sister. Her household is supportive and stable. Congregational is important to her.  Legal history: She denied history of arrests and convictions. She denied current involvement in litigation.  Access to guns: She does not have access. They have their father's old guns but they are safe.    Current Psychiatric Treatment:  Medications: Valium, trazodone by Yasmani Aden NP (saw for several sessions in 2022)  Psychotherapy: Denied.    Current Psychiatric Symptoms:  Eating disorders:  Bulimia: She denied recurrent episodes of binge eating and inappropriate compensatory behaviors.   Binge-eating episodes: She denied eating excessive amounts of food within a discrete time period with a lack of control during eating.   Depression: She started experiencing depressive symptoms after the loss of her parents four years ago. She estimated her depression lasted around two months. She endorsed a single episode of depressed mood and depression-related anhedonia, lack of motivation, lethargy, difficulty concentrating (sometimes), and increased appetite (80 lb. weight gain). She denied hopelessness, hopelessness, and feelings of worthlessness. She has some depressive symptoms related to the loss of her parents, being unable to do as many activities,  and being less active.  Jud/Hypomania: Denied. She denied periods of elevated mood or abnormally increased energy or goal-directed activity.  Anxiety:   Generalized Anxiety: Denied. She denied experiencing excessive, exaggerated anxiety that was unmanageable.   Panic Disorder: Denied.  OCD: Denied.  Insomnia: Denied.  Thought dysfunction: Denied delusions, hallucinations.  Non-suicidal or Suicidal thoughts/behaviors: Denied.  Personality functioning: She does not display any personality characteristics which would be an impediment to pursuing bariatric surgery.    Current Stress Management:  Current psychosocial stressors: Weight  Report of coping skills: Making jewelry, Watching television, Playing with cats  Support system: Sister  Strengths and liabilities: Strength: Patient accepts guidance/feedback, Strength: Patient is expressive/articulate., Strength: Patient is motivated for change., Strength: Patient has positive support network., Strength: Patient has reasonable judgment., Strength: Patient is stable.       Mental Status Exam:   General appearance: appears stated age, neatly dressed, well groomed  Speech: normal rate and tone  Level of cooperation: cooperative  Thought processes: logical, goal-directed  Mood: mostly euthymic  Thought content: no illusions, no visual hallucinations, no auditory hallucinations, no delusions, no active or passive homicidal thoughts, no active or passive suicidal ideation, no obsessions, no compulsions, no violence  Affect: appropriate  Orientation: oriented to person, place, and date  Memory:  Recent memory: 1 of 3 objects after brief delay. 2/2 with categorical cues  Remote memory - intact  Attention span and concentration: spelled HOUSE forward only  Fund of general knowledge: fair  Abstract reasoning:   Similarities: abstract.   Proverbs: abstract.  Judgment and insight: fair  Language: intact    Diagnostic Impression:    ICD-10-CM ICD-9-CM   1. Preoperative evaluation  to rule out surgical contraindication  Z01.818 V72.83   2. Morbid obesity with BMI of 50.0-59.9, adult  E66.01 278.01    Z68.43 V85.43   3. Essential hypertension  I10 401.9   4. Typical atrial flutter  I48.3 427.32   5. CARL (obstructive sleep apnea)  G47.33 327.23   6. Primary osteoarthritis of right knee  M17.11 715.16   7. Hypertensive heart disease without heart failure  I11.9 402.90   8. Gastroesophageal reflux disease, unspecified whether esophagitis present  K21.9 530.81   9. Major depressive disorder with single episode, in full remission  F32.5 296.26        Summary/Conclusions: Ms. Starla Fisher is a 53-year-old female referred for presurgical psychological evaluation prior to bariatric surgery to improve health and quality of life. There are no absolute psychological contraindications for proceeding with bariatric surgery. Overall, Ms. Fisher is at LOW risk for adverse postsurgical outcomes based on the following considerations:  There are no indications of disabling psychopathology, substance use/abuse, cognitive problems, or disabilities that would prevent understanding and competence with medical treatment. Although Ms. Fisher has a history of Autism and Intellectual Disability, she demonstrated appropriate understanding and competence regarding bariatric surgery. There are no reports or major psychosocial stressors that would interfere with her engagement in treatment. There is no evidence of suicidality.  She exhibits medium social stability and good social support. She has adequate coping strategies to deal with stress and the demands of surgery and recovery.  She has fair knowledge about bariatric surgery, appropriate expectations for surgery and recovery, adequate understanding of possible risks of this treatment option, and a high willingness to sustain effort for lifestyle changes and health adaptations required. She reports adequate compliance with prior medical  regimens.    Recommendations:  - There are no recommendations for psychological treatment at this time. The patient is aware of resources available should her needs change in the future.      Please see Psychological Testing report available in Notes tab of Chart Review in Epic for results of psychological testing.        Length of Session: 50 minutes              Sharmaine Sin, PhD  Clinical Psychologist

## 2023-11-01 NOTE — TELEPHONE ENCOUNTER
Attempted to call patient to reschedule appointment today, due to Ludmila Matos being ill.  Voice mail was full. Will try again.

## 2023-11-01 NOTE — PROGRESS NOTES
Based on this evaluation, Ms. Fisher may be considered a suitable candidate to proceed with bariatric surgery from a psychological standpoint. There are no absolute psychological contraindications to bariatric surgery. Test results revealed eating concerns, preoccupation with poor health, and compulsive behaviors that should not contribute to significant risks with bariatric surgery. Ms. Fisher and her sister were open to feedback and recommendations. In the clinical interview, she reported a history of Autism, Intellectual Disability, and MDD. She is prescribed psychotropic medication with benefit, and is able to function independently for most of her daily activities and medical appointments/regimens. She also has good support from her sister. There are no significant psychiatric problems that would contraindicate bariatric surgery. There are no recommendations for psychological intervention at this time.

## 2023-11-01 NOTE — PSYCH TESTING
PRESURGICAL PSYCHOLOGICAL EVALUATION  REPORT OF PSYCHOLOGICAL TESTING    [facility]  DEPARTMENT OF PSYCHIATRY  [address] [facility], [address] [address]  [phone]    NAME: Starla Fisher  MRN: [id number]  : 1970     REFERRED BY: Grace Manjarrez MD  REASON FOR ENCOUNTER: Routine psychological evaluation prior to bariatric surgery.   TYPE OF SURGERY SOUGHT: RNY Bypass    EVALUATED BY:  Sharmaine Sin, Ph.D., Clinical Psychologist  JEFERSON Mayen Psychometrician    DATES OF EVALUATION: 10/24/2023, 2023    EVALUATION PROCEDURES AND TIMES:  Conducted by Psychologist (1 hour):  Integration of patient data, interpretation of standardized test results and clinical data, clinical decision-making, treatment planning and report, and interactive feedback to the patient  CPT Codes:  62252 - 1 hour  Conducted by Technician (30 minutes):  Psychological or neuropsychological test administration, with single automated instrument via electronic platform, with automated result only:  [place] Multiphasic Personality Inventory - 3 (MMPI-3)  CPT Codes:  10965    EVALUATION FINDINGS:  Before this evaluation was initiated, the purposes and process of the assessment and the limits of confidentiality were discussed with the patient who expressed understanding of these issues and orally consented to proceed with the evaluation.    Ms. Starla Fisher is a 53-year-old female who is pursuing bariatric surgery to improve her health and quality of life. She has struggled with weight issues for many years despite multiple weight loss attempts, and is interested in bariatric surgery due to significant medical comorbidities and current physical limitations. At her initial consultation with Salima Mercedes NP, on 2023, her BMI was 55.32. She completed a nutritional consultation with bariatric nutritionist Luisa Bermudez RD, on 2023, and reports that she has made many changes to her diet since  beginning the program. She has a good understanding regarding the risks and benefits of the procedure and appears motivated for change. She was fully cooperative and engaged in the assessment process.     Regarding mental health history, Ms. Fisher reports a history of Autism, Intellectual Disability, and Major Depressive Disorder. She receives social security disability for Autism and Intellectual Disability, and functions independently for a majority of her daily activities. She lives with her sister and receives good support from her. Her history of Major Depressive Disorder started after the death of her parents around four years ago, which sukhi on an episode of depression for around two months. She was treated with psychotropic medication, but started engaging in maladaptive coping (emotional eating) afterwards. Currently, Ms. Fisher reports occasional sadness related to grief and physical limitations, but it does not appear her symptoms meet criteria for a major depressive episode at this time. She has no major psychosocial stressors causing significant distress or impairment, and has multiple protective factors and adaptive coping mechanisms to help her manage (including good social support, adequate coping and recreation, and spiritual helder). She denied problems with substance abuse, suicidal or homicidal ideation, psychotic symptoms, and serious cognitive impairment.    The medical record also revealed the following relevant medical comorbidities: Typical atrial flutter; Essential hypertension; Primary osteoarthritis of right knee; CARL (obstructive sleep apnea); Morbid obesity with BMI of 50.0-59.9, adult; History of morbid obesity; Hypertensive heart disease without heart failure; Gastroesophageal reflux disease, unspecified whether esophagitis present.    TEST DATA:  All tests were administered according to standardized procedures and were selected based on the reason for referral. Effort on all  tests was satisfactory to produce interpretable results.    MMPI-3. The MMPI-3 provides an assessment of personality and psychopathology with specific evaluation of psychosocial risk factors associated with outcomes of bariatric surgery. Ms. Fisher produced an interpretable MMPI-3 profile. Of note, validity scale results suggest Ms. Fisher tended to portray herself in an overly positive and well-adjusted presentation, which may indicate an underestimation of problems assessed by this test. This profile should be interpreted with these issues in mind (specifically potential underreporting), in which these results do not rule out symptoms or the possibility that certain treatment approaches could prove helpful for optimizing the candidate's outcomes.   TEST RESULTS. Ms. Fisher reports emotional issues. Although there are no indications of disordered thinking or behavioral issues, these problems cannot be ruled-out due to indications of under-reporting described earlier.  Emotional. She reports engaging in compulsive behavior such as repeated checking, rigidity, and perfectionism.  Somatic. She reports experiencing poor health and feeling weak or tired. She is likely preoccupied with poor health and complains of sleep disturbance, fatigue, or low energy. She also excessive eating and difficulty controlling her eating.  GROUP COMPARISONS. Compared to other bariatric surgery candidates, Ms. Fisher reports greater malaise, eating concerns, compulsivity, and self-importance. Items that are bolded are at a clinically significant level and included in Test Results above.    FEEDBACK. Ms. Fisher was provided with test results, and offered the opportunity to respond to feedback and clarify results if needed. She acknowledged the test results as being largely consistent with her overall functioning and well-being. She and her sister denied current emotional and binge eating, but she acknowledged emotional eating  in the past (especially after the loss of her parents four years ago). Her preoccupation with health is better managed with social support and engagement in activities, and should not contribute to any problems with bariatric surgery. She acknowledged being routine and rigid about certain aspects of her life (which her sister explained may be related to Autism symptoms), but it does not apply to her health or treatment regimens. For instance, in the interview she initially hoped to lose her goal weight in 2 months but was easily redirected to be more flexible (up to a year). It does not appear any rigidity will cause significant problems for her with bariatric surgery.    DIAGNOSTIC IMPRESSIONS:    ICD-10-CM ICD-9-CM   1. Preoperative evaluation to rule out surgical contraindication  Z01.818 V72.83   2. Morbid obesity with BMI of 50.0-59.9, adult  E66.01 278.01    Z68.43 V85.43   3. Essential hypertension  I10 401.9   4. Typical atrial flutter  I48.3 427.32   5. CARL (obstructive sleep apnea)  G47.33 327.23   6. Primary osteoarthritis of right knee  M17.11 715.16   7. Hypertensive heart disease without heart failure  I11.9 402.90   8. Gastroesophageal reflux disease, unspecified whether esophagitis present  K21.9 530.81   9. Major depressive disorder with single episode, in full remission  F32.5 296.26        SUMMARY AND RECOMMENDATIONS:  Ms. Fisher has a long history of weight problems and is pursuing bariatric surgery at this time in an effort to improve her health and quality of life. Test results revealed eating concerns, preoccupation with poor health, and compulsive behaviors that should not contribute to significant risks with bariatric surgery. Ms. Fisher and her sister were open to feedback and recommendations, and provided evidence and reassurance that these areas should not be considered contraindications.      In the clinical interview, Ms. Fisher and her sister reported a history of Autism,  Intellectual Disability, and Major Depressive Disorder. Her symptoms related to depression started after the death of her parents four years ago, and she has adjusted to her loss with occasional appropriate grief. She is prescribed psychotropic medication with benefit to help her manage symptoms. She is able to function independently for most of her daily activities and medical appointments/regiments, but is able to get assistance from her sister when needed. She reports no significant psychiatric distress, major psychosocial stressors, or other problems that would interfere with bariatric surgery.    Based on this evaluation, Ms. Fisher may be considered a suitable candidate to proceed with bariatric surgery from a psychological standpoint. There are no absolute psychological contraindications to bariatric surgery. She has reasonable expectations for surgery and has already begun making appropriate lifestyle changes in anticipation for surgery. She has verbalized appropriate awareness and commitment to the necessary behavioral changes associated with bariatric surgery and appears willing to comply with long-term lifestyle changes. There are no recommendations for psychological intervention at this time.              Sharmaine Sin, PhD  Clinical Psychologist

## 2023-11-01 NOTE — LETTER
November 1, 2023        Bariatric Department             [facility] [address]thfl  [address]  [facility] [address]  Phone: [phone]   Patient: Starla Fisher   MR Number: [id number]   YOB: 1970   Date of Visit: 11/1/2023       Dear Dr. Manjarrez:    Thank you for referring Starla Fisher to me for evaluation. Below are the relevant portions of my assessment and plan of care.    Based on this evaluation, Ms. Fisher may be considered a suitable candidate to proceed with bariatric surgery from a psychological standpoint. There are no absolute psychological contraindications to bariatric surgery. Test results revealed eating concerns, preoccupation with poor health, and compulsive behaviors that should not contribute to significant risks with bariatric surgery. Ms. Fisher and her sister were open to feedback and recommendations. In the clinical interview, she reported a history of Autism, Intellectual Disability, and MDD. She is prescribed psychotropic medication with benefit, and is able to function independently for most of her daily activities and medical appointments/regimens. She also has good support from her sister. There are no significant psychiatric problems that would contraindicate bariatric surgery. There are no recommendations for psychological intervention at this time.       If you have questions, please do not hesitate to call me.    Sincerely,      Sharmaine Sin, PhD  Clinical Psychologist

## 2023-11-01 NOTE — TELEPHONE ENCOUNTER
Spoke with sister and patient.  Appointment today rescheduled due to a conflicting appointment today.

## 2023-11-03 ENCOUNTER — TELEPHONE (OUTPATIENT)
Dept: ORTHOPEDICS | Facility: CLINIC | Age: 53
End: 2023-11-03
Payer: MEDICARE

## 2023-11-03 NOTE — TELEPHONE ENCOUNTER
----- Message from Aurora Hubbard sent at 11/3/2023 11:13 AM CDT -----  Type:  Appointment Reschedule      Name of Caller:pt  When is the first available appointment?n/a  Symptoms:joint injection  Best Call Back Number:[phone]  Additional Information: pt states she overslept would like to reschedule injection would like to still come in today if possible

## 2023-11-03 NOTE — TELEPHONE ENCOUNTER
----- Message from Shaylee Fox sent at 11/3/2023 11:25 AM CDT -----  Regarding: Rechedule Injection  Contact: pt @[phone]  Pt called in to schedule an appt; no available appts in Epic. Pt is asking for a call back soon to schedule. Thanks.         Reason appt not schedule: no availability          Patient's DX: joint injection         Patient requesting call back or [facility] [phone]    Additional Info- pt overslept for appt

## 2023-11-06 ENCOUNTER — TELEPHONE (OUTPATIENT)
Dept: ORTHOPEDICS | Facility: CLINIC | Age: 53
End: 2023-11-06
Payer: MEDICARE

## 2023-11-06 NOTE — TELEPHONE ENCOUNTER
----- Message from Rody Krueger sent at 11/6/2023 11:44 AM CST -----  Type:  Patient Returning Call    Who Called:pt  Who Left Message for Patient:justin love  Does the patient know what this is regarding?:reschedule   Would the patient rather a call back or a response via [facility]? call  Best Call Back Number:[phone]  Additional Information:

## 2023-11-06 NOTE — TELEPHONE ENCOUNTER
----- Message from Linda Saxena sent at 11/6/2023 11:17 AM CST -----  Type:  Reschedule    Who Called:pt  Does the patient know what this is regarding?:injections  Would the patient rather a call back or a response via [facility]? call  Best Call Back Number:[phone]  Additional Information: Pt stated she needs to reschedule Injections.

## 2023-11-07 DIAGNOSIS — R11.0 NAUSEA: ICD-10-CM

## 2023-11-07 RX ORDER — PROMETHAZINE HYDROCHLORIDE 25 MG/1
25 TABLET ORAL EVERY 6 HOURS PRN
Qty: 12 TABLET | Refills: 0 | Status: SHIPPED | OUTPATIENT
Start: 2023-11-07 | End: 2024-02-06

## 2023-11-07 NOTE — TELEPHONE ENCOUNTER
----- Message from Margaret Workman sent at 11/7/2023  9:50 AM CST -----  Regarding: Refills  Contact: [phone]  Type:  RX Refill Request    Who Called: PT   Refill or New Rx: Refills   RX Name and Strength: promethazine (PHENERGAN) 25 MG tablet 30 tablet   How is the patient currently taking it? (ex. 1XDay): 1 x a day    Is this a 30 day or 90 day RX: 30  Preferred Pharmacy with phone number: [id number] - [place], [place] - [address] [facility] [address] [facility] [place]   Phone:  [phone]  Fax:  [phone]

## 2023-11-08 ENCOUNTER — TELEPHONE (OUTPATIENT)
Dept: BARIATRICS | Facility: CLINIC | Age: 53
End: 2023-11-08
Payer: MEDICARE

## 2023-11-08 NOTE — TELEPHONE ENCOUNTER
Contacted the patient. Patient was informed that her name has been added to the list of patient who are ready to scheduled and she should be called in 2-3 weeks.

## 2023-11-08 NOTE — TELEPHONE ENCOUNTER
----- Message from Nicole Rodriguez sent at 11/8/2023  1:24 PM CST -----  Regarding: Calling back after completing pre-requisites about surgery dates  Contact: @[phone]  Regarding:Calling back after completing pre-requisites about surgery dates    Contact: Starla    Type: Call back to advise    Who Called: Starla    Would the patient rather a call back or a response via [facility]? Phone call    Best Call Back Number: @[phone]

## 2023-11-10 DIAGNOSIS — F41.0 GENERALIZED ANXIETY DISORDER WITH PANIC ATTACKS: ICD-10-CM

## 2023-11-10 DIAGNOSIS — F33.1 MAJOR DEPRESSIVE DISORDER, RECURRENT, MODERATE: ICD-10-CM

## 2023-11-10 DIAGNOSIS — F41.1 GENERALIZED ANXIETY DISORDER WITH PANIC ATTACKS: ICD-10-CM

## 2023-11-10 RX ORDER — ESCITALOPRAM OXALATE 20 MG/1
20 TABLET ORAL DAILY
Qty: 90 TABLET | Refills: 1 | OUTPATIENT
Start: 2023-11-10

## 2023-11-13 ENCOUNTER — PATIENT MESSAGE (OUTPATIENT)
Dept: BARIATRICS | Facility: CLINIC | Age: 53
End: 2023-11-13
Payer: MEDICARE

## 2023-11-14 ENCOUNTER — PROCEDURE VISIT (OUTPATIENT)
Dept: NEUROLOGY | Facility: CLINIC | Age: 53
End: 2023-11-14
Payer: MEDICARE

## 2023-11-14 ENCOUNTER — TELEPHONE (OUTPATIENT)
Dept: NEUROLOGY | Facility: CLINIC | Age: 53
End: 2023-11-14
Payer: MEDICARE

## 2023-11-14 ENCOUNTER — PATIENT MESSAGE (OUTPATIENT)
Dept: NEUROLOGY | Facility: CLINIC | Age: 53
End: 2023-11-14

## 2023-11-14 ENCOUNTER — PATIENT MESSAGE (OUTPATIENT)
Dept: BARIATRICS | Facility: CLINIC | Age: 53
End: 2023-11-14
Payer: MEDICARE

## 2023-11-14 ENCOUNTER — RESEARCH ENCOUNTER (OUTPATIENT)
Dept: RESEARCH | Facility: HOSPITAL | Age: 53
End: 2023-11-14
Payer: MEDICARE

## 2023-11-14 ENCOUNTER — OFFICE VISIT (OUTPATIENT)
Dept: ORTHOPEDICS | Facility: CLINIC | Age: 53
End: 2023-11-14
Payer: MEDICARE

## 2023-11-14 VITALS
SYSTOLIC BLOOD PRESSURE: 144 MMHG | HEIGHT: 64 IN | WEIGHT: 293 LBS | DIASTOLIC BLOOD PRESSURE: 91 MMHG | HEART RATE: 66 BPM | BODY MASS INDEX: 50.02 KG/M2

## 2023-11-14 VITALS
BODY MASS INDEX: 50.02 KG/M2 | HEART RATE: 75 BPM | HEIGHT: 64 IN | DIASTOLIC BLOOD PRESSURE: 84 MMHG | SYSTOLIC BLOOD PRESSURE: 150 MMHG | WEIGHT: 293 LBS

## 2023-11-14 DIAGNOSIS — G43.719 INTRACTABLE CHRONIC MIGRAINE WITHOUT AURA AND WITHOUT STATUS MIGRAINOSUS: ICD-10-CM

## 2023-11-14 DIAGNOSIS — G43.719 INTRACTABLE CHRONIC MIGRAINE WITHOUT AURA AND WITHOUT STATUS MIGRAINOSUS: Primary | ICD-10-CM

## 2023-11-14 DIAGNOSIS — M25.561 ACUTE PAIN OF RIGHT KNEE: ICD-10-CM

## 2023-11-14 DIAGNOSIS — M17.11 PRIMARY OSTEOARTHRITIS OF RIGHT KNEE: Primary | ICD-10-CM

## 2023-11-14 PROCEDURE — 99999 PR PBB SHADOW E&M-EST. PATIENT-LVL IV: ICD-10-PCS | Mod: PBBFAC,HCNC,, | Performed by: PHYSICIAN ASSISTANT

## 2023-11-14 PROCEDURE — 99499 NO LOS: ICD-10-PCS | Mod: HCNC,S$GLB,, | Performed by: PHYSICIAN ASSISTANT

## 2023-11-14 PROCEDURE — 64615 PR CHEMODENERVATION OF MUSCLE FOR CHRONIC MIGRAINE: ICD-10-PCS | Mod: HCNC,S$GLB,, | Performed by: PSYCHIATRY & NEUROLOGY

## 2023-11-14 PROCEDURE — 99999 PR PBB SHADOW E&M-EST. PATIENT-LVL IV: CPT | Mod: PBBFAC,HCNC,, | Performed by: PHYSICIAN ASSISTANT

## 2023-11-14 PROCEDURE — 99499 UNLISTED E&M SERVICE: CPT | Mod: HCNC,S$GLB,, | Performed by: PHYSICIAN ASSISTANT

## 2023-11-14 PROCEDURE — 20610 DRAIN/INJ JOINT/BURSA W/O US: CPT | Mod: HCNC,RT,S$GLB, | Performed by: PHYSICIAN ASSISTANT

## 2023-11-14 PROCEDURE — 64615 CHEMODENERV MUSC MIGRAINE: CPT | Mod: HCNC,S$GLB,, | Performed by: PSYCHIATRY & NEUROLOGY

## 2023-11-14 PROCEDURE — 20610 LARGE JOINT ASPIRATION/INJECTION: R KNEE: ICD-10-PCS | Mod: HCNC,RT,S$GLB, | Performed by: PHYSICIAN ASSISTANT

## 2023-11-14 RX ORDER — SUMATRIPTAN SUCCINATE 100 MG/1
TABLET ORAL
Qty: 9 TABLET | Refills: 11 | Status: SHIPPED | OUTPATIENT
Start: 2023-11-14

## 2023-11-14 RX ORDER — CEFUROXIME AXETIL 250 MG/1
TABLET ORAL
Qty: 3 ML | Refills: 5 | Status: SHIPPED | OUTPATIENT
Start: 2023-11-14 | End: 2024-02-06

## 2023-11-14 RX ORDER — SUMATRIPTAN 20 MG/1
SPRAY NASAL
Qty: 12 EACH | Refills: 11 | Status: SHIPPED | OUTPATIENT
Start: 2023-11-14

## 2023-11-14 NOTE — TELEPHONE ENCOUNTER
----- Message from Vickey Amaya MA sent at 11/14/2023 10:22 AM CST -----  Regarding: Botox Appointment  Good morning, this patient needs to be scheduled for her Botox in 90 days and would like to have it done at [place]. Please give the patient a call to get scheduled.    Thanks,  Vickey

## 2023-11-14 NOTE — PROCEDURES
"NEUROLOGY PROCEDURE NOTE - BOTOX FOR CHRONIC MIGRAINE    ID:   Starla Fisher is a 53 y.o. person who presents for medically necessary Botox injections for chronic migraine.    DIAGNOSIS:   1. Intractable chronic migraine without aura and without status migrainosus  onabotulinumtoxina injection 200 Units           BOTOX APPROVAL:   Botox injections have been approved for the patient today: Payor: [employer] MEDICARE / Plan: [employer] [facility] / Product Type: Medicare Advantage /        INTERVAL HISTORY:   Patient presents today for Botox injection for chronic migraine.  They endorse that their headaches have been stable since previous encounter.  All questions and concerns were answered to the patient's satisfaction prior to the procedure.     Far greater than 50% reduction in total and severe headache days since initiating therapy.      Percent improvement:  >50%  Total days of headache per month:  2  Headache days per month severe:  2  Headache days per month with migraine:  1  Duration of headache pain:  <4 hr   Average pain score:  4-8/10    OBJECTIVE:   BP (!) 144/91 (BP Location: Left arm, Patient Position: Sitting)   Pulse 66   Ht 5' 4" (1.626 m)   Wt (!) 143.7 kg (316 lb 12.8 oz)   LMP  (LMP Unknown)   BMI 54.38 kg/m²      General: Pleasant, well-appearing in no acute distress.    Neurological Exam:    Mental status: Speech clear, fluent. Normal recent and remote memory.    Cranial nerves: EOMI, face symmetric, hearing intact to voice.   Motor: Moves all four extremities spontaneously.     PRE-PROCEDURE VERIFICATION:   Allergies and relevant documentation was verified. Standard hand hygiene was observed.      PROCEDURE CODES:    - Botulinum Toxin A - Botox 200U    22982 - Chemodenervation of muscles for chronic migraine    PROCEDURE:   The risk and benefits of the procedure were explained. Starla agreed to undergo the procedure. Starla and DAVID signed the written consent form. Two " vials BOTOX 100U were opened. Each vial of Botox was mixed in 2cc of sterile, preservative-free saline. Starla received 155U of BOTOX in 31 injections. 45U were unavoidably wasted.      The following muscles were injected:    Amount Muscle   5U   Proceros    5U    Supercilii (left)    5U    Supercilii (right)    10U   Epicranius - Occipitofrontalis Tonny Frontalis (right)    10U   Epicranius - Occipitofrontalis Tonny Frontalis (left)    20U   Temporalis (right)    20U   Temporalis (left)    15U   Tonny Occipitalis (right)    15U   Tonny Occipitalis (left)    10U   Semispinalis capitis (left)    10U   Semispinalis capitis (right)    15U   Trapezius (right)    15U   Trapezius (left)    45 units were unavoidably wasted. The patient tolerated the procedure well with no immediate side effects.      LIMITATIONS OF BOTOX: Safety and effectiveness have not been established for the prophylaxis of episodic migraine (14 headache days or fewer per month) in 7 placebo-controlled studies.   ADVERSE REACTIONS: The most frequently reported adverse reactions following injection of BOTOX® for chronic migraine include neck pain (9%), headache (5%), eyelid ptosis (4%), migraine (4%), muscular weakness (4%), musculoskeletal stiffness (4%), bronchitis (3%), injection-site pain (3%), musculoskeletal pain (3%), myalgia (3%), facial paresis (2%), hypertension (2%), and muscle spasms (2%). Post Marketing Experience:There have been spontaneous reports of death, sometimes associated with dysphagia, pneumonia, and/or other significant debility or anaphylaxis, after treatment with botulinum toxin. There have also been reports of adverse events involving the cardiovascular system, including arrhythmia and myocardial infarction, some with fatal outcomes. Some of these patients had risk factors including cardiovascular disease. The exact relationship of these events to the botulinum toxin injection has not been established.  Distant Spread of Toxin Effect: Post-marketing reports indicate that the effects of BOTOX® and all botulinum toxin products may spread from the area of injection to produce symptoms consistent with botulinum toxin effects. These may include asthenia, generalized muscle weakness, diplopia, ptosis, dysphagia, dysphonia, dysarthria, urinary incontinence, and breathing difficulties. These symptoms have been reported hours to weeks after injection. Swallowing and breathing difficulties can be life threatening, and there have been reports of death. The risk of symptoms is probably greatest in children treated for spasticity, but symptoms can also occur in adults treated for spasticity and other conditions, particularly in those patients who have underlying conditions that would predispose them to these symptoms. In unapproved uses, including spasticity in children, and in approved indications, cases of spread of effect have been reported at doses comparable to those used to treat cervical dystonia and at lower doses.    CONTRAINDICATIONS: BOTOX® is contraindicated in the presence of infection at the proposed injection site(s) and in individuals with known hypersensitivity to any botulinum toxin preparation or to any of the components in the formulation. Hypersensitivity Reactions: Serious and/or immediate hypersensitivity reactions have been reported. These reactions include anaphylaxis, serum sickness, urticaria, soft-tissue edema, and dyspnea. If such a reaction occurs, further injection of BOTOX® should be discontinued and appropriate medical therapy immediately instituted. One fatal case of anaphylaxis has been reported in which lidocaine was used as the diluent, and consequently the causal agent cannot be reliably determined.    Note: If the patient is covered by commercial insurance and has a diagnosis of chronic migraine, the Botox company may be able to assist with out-of-pocket co-payment up to $700 per  treatment. In order to get assistance, please call [employer] at [phone] or go to  [email or web address].      ASSESSMENT/PLAN:  Starla presents today for medically necessary Botox injections for chronic migraine. Botox injections have reduced their migraine frequency/intensity by far greater than 50%. We proceeded with Botox 155 units today.     Diagnosis:   1. Intractable chronic migraine without aura and without status migrainosus  onabotulinumtoxina injection 200 Units        Starla should follow-up for the next cycle of Botox injections in 3 months or earlier for a clinical follow-up visit if needed.

## 2023-11-14 NOTE — PROGRESS NOTES
Subjective:      Patient ID: Starla Fisher is a 53 y.o. female.    Chief Complaint: Pain and Injections of the Right Knee      Patient is here for First Durolane injection(s) for right knee osteoarthritis. Patient tolerated last injection well. No new complaints today.           Review of Systems   Constitutional: Negative for chills and fever.   Cardiovascular:  Negative for chest pain.   Respiratory:  Negative for cough.    Hematologic/Lymphatic: Does not bruise/bleed easily.   Skin:  Negative for poor wound healing and rash.   Musculoskeletal:  Positive for joint pain, myalgias and stiffness.   Gastrointestinal:  Negative for abdominal pain.   Genitourinary:  Negative for bladder incontinence.   Neurological:  Negative for dizziness, loss of balance and weakness.   Psychiatric/Behavioral:  Negative for altered mental status.        Review of patient's allergies indicates:  No Known Allergies     Current Outpatient Medications   Medication Sig Dispense Refill    blood-glucose meter (TRUE METRIX AIR GLUCOSE METER) kit use as directed 2 each 2    BOTOX 200 unit SolR       clotrimazole-betamethasone 1-0.05% (LOTRISONE) cream Apply topically 2 (two) times daily. 15 g 1    diazePAM (VALIUM) 5 MG tablet Take 1 tablet (5 mg total) by mouth daily as needed for Anxiety. 15 tablet 2    diltiaZEM (CARDIZEM CD) 240 MG 24 hr capsule Take 1 capsule (240 mg total) by mouth once daily. 90 capsule 1    EScitalopram oxalate (LEXAPRO) 20 MG tablet Take 1 tablet (20 mg total) by mouth once daily. 90 tablet 1    Iy-F-tsevd-B12-L.acid,plan-inu 65 mg iron- 50 mg-1 mg DFE Cap Take by mouth.      fentaNYL (SUBLIMAZE) 50 mcg/mL injection       ferrous sulfate (FEOSOL) 325 mg (65 mg iron) Tab tablet Take 325 mg by mouth.      fluconazole (DIFLUCAN) 150 MG Tab Take one tablet today and one in a week 2 tablet 0    HYDROcodone-acetaminophen (NORCO) 5-325 mg per tablet Take 1 tablet by mouth every 4 (four) hours as needed for Pain. 30  tablet 0    ketorolac (TORADOL) 30 mg/mL (1 mL) injection       losartan (COZAAR) 50 MG tablet Take 1 tablet (50 mg total) by mouth once daily. 90 tablet 3    losartan (COZAAR) 50 MG tablet Take 1 tablet (50 mg total) by mouth once daily. 90 tablet 3    meloxicam (MOBIC) 15 MG tablet       multivit-minerals/folic acid (ADULT MULTIVITAMIN GUMMIES ORAL) Take 1 tablet by mouth Daily. Continue to hold until after Surgery.      multivitamin (THERAGRAN) per tablet Take 1 tablet by mouth.      nystatin (MYCOSTATIN) powder Apply to affected area 3 times daily 60 g 3    omeprazole (PRILOSEC) 40 MG capsule Take 1 capsule (40 mg total) by mouth once daily. 30 capsule 11    oxybutynin (DITROPAN-XL) 10 MG 24 hr tablet Take 1 tablet (10 mg total) by mouth once daily. 30 tablet 11    phenylephrine (TEODORA-SYNEPHRINE) 10 mg/mL injection       promethazine (PHENERGAN) 25 MG tablet Take 1 tablet (25 mg total) by mouth every 6 (six) hours as needed for Nausea. 12 tablet 0    propofoL (DIPRIVAN) 10 mg/mL infusion       ROPIvacaine 0.5%, PF, (NAROPIN) 5 mg/mL (0.5 %) injection       sumatriptan (IMITREX STATDOSE) 6 mg/0.5 mL kit INJECT 0.5 ML(6 MG TOTAL) UNDER THE SKIN EVERY 2 HOURS AS NEEDED FOR MIGRAINE. Max 12mg/24hrs 3 mL 5    sumatriptan (IMITREX) 100 MG tablet TAKE 1 TABLET BY MOUTH EVERY 2 HOURS AS NEEDED FOR MIGRAINE 9 tablet 11    SUMAtriptan (IMITREX) 20 mg/actuation nasal spray USE ONE SPRAY IN THE NOSTRIL AS NEEDED FOR MIGRAINE(MAX 40MG/24 HOURS) 12 each 11    traMADoL (ULTRAM) 50 mg tablet Take 1 tablet (50 mg total) by mouth every 6 (six) hours. 5 tablet 0    traZODone (DESYREL) 150 MG tablet Take half or whole tablet by mouth before bed as needed for insomnia 90 tablet 3    triamcinolone acetonide 0.025% (KENALOG) 0.025 % Oint Apply topically daily as needed (vulvar itching). 15 g 0    TRUEPLUS LANCETS 33 gauge Misc TEST BLOOD SUGAR THREE TIMES DAILY 300 each 10    walker (ULTRA-LIGHT ROLLATOR) Misc Rolator for stability and  "balance postoperatively as needed while standing and walking. 1 each 0    metronidazole 0.75% (METROCREAM) 0.75 % Crea Apply topically 2 (two) times daily. 45 g 3     Current Facility-Administered Medications   Medication Dose Route Frequency Provider Last Rate Last Admin    sodium chloride 0.9% flush 10 mL  10 mL Intravenous PRN Zelalem Solorzano, DPM        sodium chloride 0.9% flush 10 mL  10 mL Intravenous PRN Zelalem Solorzano, DPM         Facility-Administered Medications Ordered in Other Visits   Medication Dose Route Frequency Provider Last Rate Last Admin    0.9%  NaCl infusion   Intravenous Continuous Sathish Briseno MD   Stopped at 02/15/22 0948    LIDOcaine (PF) 10 mg/ml (1%) injection 10 mg  1 mL Intradermal Once Sathish Briseno MD            The patient's relevant past medical, surgical, and social history was reviewed in Epic.       Objective:      VITAL SIGNS: BP (!) 150/84   Pulse 75   Ht 5' 4" (1.626 m)   Wt (!) 144.6 kg (318 lb 12.6 oz)   LMP  (LMP Unknown)   BMI 54.72 kg/m²     Ortho/SPM Exam         Assessment:       1. Primary osteoarthritis of right knee    2. Acute pain of right knee          Plan:         Starla was seen today for pain and injections.    Diagnoses and all orders for this visit:    Primary osteoarthritis of right knee  -     Large Joint Aspiration/Injection: R knee    Acute pain of right knee  -     Large Joint Aspiration/Injection: R knee    I injected the right knee with one dose of Durolane today.  We will see the patient back in 6 mos or as needed.      Diagnoses and plan discussed with the patient, as well as the expected course and duration of his symptoms.  All questions and concerns were addressed prior to the end of the visit.   Instructed patient to call office if they have any future questions/concerns or to schedule apt. Patient will return to see me if symptoms worsen or fail to improve    Note dictated with voice recognition software, please excuse " any grammatical errors.        Ludmila Matos PA-C   11/14/2023

## 2023-11-14 NOTE — PROCEDURES
Large Joint Aspiration/Injection: R knee    Date/Time: 11/14/2023 2:30 PM    Performed by: Ludmila Matos PA-C  Authorized by: Ludmila Matos PA-C    Consent Done?:  Yes (Verbal)  Indications:  Arthritis and pain  Site marked: the procedure site was marked    Timeout: prior to procedure the correct patient, procedure, and site was verified    Prep: patient was prepped and draped in usual sterile fashion    Local anesthetic:  Topical anesthetic    Details:  Needle Size:  21 G  Approach:  Anterolateral  Location:  Knee  Site:  R knee  Medications:  60 mg hyaluronate sodium, stabilized (DUROLANE) 60 mg/3 mL  Patient tolerance:  Patient tolerated the procedure well with no immediate complications

## 2023-11-14 NOTE — PROGRESS NOTES
Protocol: innovations in [facility] Outcomes Registry (Jacob)  Protocol#: Jacob  IRB#: 2021.156  Version Date: 10-Tunde-2023  PI: Toni May MD  Patient Initials: D.B.     Study Recruitment Note:     Research coordinator met with patient to discuss consenting to study. Patient still having issues with email and patient showed interest in study. Her sister was on the phone with her and stated that she could help her with consenting the study but would like to learn more about it since she is the designated representative for her sister in deciding medical treatments due to patient's autism issues. Patient agreed and stated that she will sign up for study at next OA treatment appointment with her sister present so she can feel comfortable.    I encouraged patient and sister to look over the study material I had given before and instructed them to call if they had any questions or concerns.    Patient went ahead and received Durolane injection to right knee by [facility].

## 2023-11-15 DIAGNOSIS — F33.1 MAJOR DEPRESSIVE DISORDER, RECURRENT, MODERATE: ICD-10-CM

## 2023-11-15 DIAGNOSIS — F41.1 GENERALIZED ANXIETY DISORDER WITH PANIC ATTACKS: ICD-10-CM

## 2023-11-15 DIAGNOSIS — I10 ESSENTIAL HYPERTENSION: ICD-10-CM

## 2023-11-15 DIAGNOSIS — F41.0 GENERALIZED ANXIETY DISORDER WITH PANIC ATTACKS: ICD-10-CM

## 2023-11-15 RX ORDER — ESCITALOPRAM OXALATE 20 MG/1
20 TABLET ORAL DAILY
Qty: 30 TABLET | Refills: 0 | Status: SHIPPED | OUTPATIENT
Start: 2023-11-15 | End: 2023-11-29 | Stop reason: SDUPTHER

## 2023-11-15 RX ORDER — LOSARTAN POTASSIUM 50 MG/1
50 TABLET ORAL DAILY
Qty: 90 TABLET | Refills: 3 | Status: CANCELLED | OUTPATIENT
Start: 2023-11-15 | End: 2024-11-14

## 2023-11-17 ENCOUNTER — TELEPHONE (OUTPATIENT)
Dept: BARIATRICS | Facility: CLINIC | Age: 53
End: 2023-11-17
Payer: MEDICARE

## 2023-11-17 DIAGNOSIS — D81.819 BIOTIN-DEPENDENT CARBOXYLASE DEFICIENCY, UNSPECIFIED: ICD-10-CM

## 2023-11-17 DIAGNOSIS — Z01.818 PREOP TESTING: ICD-10-CM

## 2023-11-17 DIAGNOSIS — K21.9 GASTROESOPHAGEAL REFLUX DISEASE, UNSPECIFIED WHETHER ESOPHAGITIS PRESENT: ICD-10-CM

## 2023-11-17 DIAGNOSIS — E66.01 MORBID OBESITY WITH BMI OF 50.0-59.9, ADULT: Primary | ICD-10-CM

## 2023-11-17 DIAGNOSIS — R79.9 ABNORMAL FINDING OF BLOOD CHEMISTRY, UNSPECIFIED: ICD-10-CM

## 2023-11-17 DIAGNOSIS — G47.33 OSA (OBSTRUCTIVE SLEEP APNEA): ICD-10-CM

## 2023-11-17 NOTE — TELEPHONE ENCOUNTER
"Spoke with patient and confirmed the surgical procedure of LRNY with Dr Grossman on 12/29/2023.  Scheduled preop appts/surgery date/2 week and 8 week post op appts. All dates and times agreed upon. Pt aware that if they are required to have PCP clearance, it must be within 6 months of surgery, unless their medical history has changed, it should be dated, signed and in chart for preop appointment. All medications have been reviewed regarding the necessity to be crushed or broken into pieces smaller that the tip of a pencil eraser for 2 weeks following gastric sleeve surgery and 4 weeks following gastric bypass surgery. Pt instructed to stop taking all NSAIDS 1 week before surgery and for life after surgery. Pt aware that protein liquid diet start date is 12/15/2023. Patient is doing well with their diet. Patient was instructed about the progression of the diet phases. The patient's current weight is 318, height is 5'4", and BMI is 54.72. Refer to medical letter of necessity from Surgeon. Discussed the importance of increased physical activity and dieting lifestyle changes to improve weight loss and meet goals. Screened patient for history of UTI per protocol. Discussed with patient to avoid antibiotics and elective procedures involving sedation/anesthesia within 30 days of surgery unless cleared by the bariatric department. Patient instructed to call the bariatric clinic post op for any s/s of UTI. Patient's mailing address confirmed and informed to expect a manilla envelop containing bariatric surgery pre op booklet, appointment reminders, protein and fluid log sheets, and liquid diet and vitamin information sheets. Pt aware that all appts can be seen in my [facility] patient portal at this time. Confirmed email address and informed patient that they will be enrolled in the Patient Reported Outcomes program to track their progress and successes. The first email will be sent 2-3 weeks before surgery and then every year on " your surgery anniversary date. Office phone and fax number given to patient for any future questions/concerns. Discussed the pre-surgery complex carbohydrate beverage to purchase in [facility] pharmacy to drink 30 minutes before the surgery arrival time. Reviewed the policy of scheduling a covid test 72 hours prior to surgery if necessary.

## 2023-11-20 DIAGNOSIS — Z01.818 PREOP TESTING: ICD-10-CM

## 2023-11-20 DIAGNOSIS — Z79.899 LONG-TERM USE OF HIGH-RISK MEDICATION: ICD-10-CM

## 2023-11-20 DIAGNOSIS — E66.01 MORBID OBESITY: Primary | ICD-10-CM

## 2023-11-20 DIAGNOSIS — Z79.899 POLYPHARMACY: ICD-10-CM

## 2023-11-22 ENCOUNTER — TELEPHONE (OUTPATIENT)
Dept: BARIATRICS | Facility: CLINIC | Age: 53
End: 2023-11-22
Payer: MEDICARE

## 2023-11-22 NOTE — TELEPHONE ENCOUNTER
Called and spoke with the pt.  I discussed her surgery date and that the nurses are provided the surgery times the working day before her surgery.  She will be called on the working day before her surgery, provided times and reminders.

## 2023-11-22 NOTE — TELEPHONE ENCOUNTER
----- Message from Andrei Gamez sent at 11/22/2023 12:59 PM CST -----  Contact: Pt  .Type:  Needs Medical Advice    Who Called: pt    Would the patient rather a call back or a response via [facility]?  Call back  Best Call Back Number: [phone]  Additional Information: pt. Is calling regarding her surgery date/time

## 2023-11-29 ENCOUNTER — OFFICE VISIT (OUTPATIENT)
Dept: PSYCHIATRY | Facility: CLINIC | Age: 53
End: 2023-11-29
Payer: MEDICARE

## 2023-11-29 DIAGNOSIS — F33.1 MAJOR DEPRESSIVE DISORDER, RECURRENT, MODERATE: ICD-10-CM

## 2023-11-29 DIAGNOSIS — F41.1 GENERALIZED ANXIETY DISORDER WITH PANIC ATTACKS: Primary | ICD-10-CM

## 2023-11-29 DIAGNOSIS — G47.00 INSOMNIA DISORDER, WITH NON-SLEEP DISORDER MENTAL COMORBIDITY: ICD-10-CM

## 2023-11-29 DIAGNOSIS — F41.0 GENERALIZED ANXIETY DISORDER WITH PANIC ATTACKS: Primary | ICD-10-CM

## 2023-11-29 PROCEDURE — 1159F PR MEDICATION LIST DOCUMENTED IN MEDICAL RECORD: ICD-10-PCS | Mod: HCNC,CPTII,S$GLB, | Performed by: NURSE PRACTITIONER

## 2023-11-29 PROCEDURE — 1160F RVW MEDS BY RX/DR IN RCRD: CPT | Mod: HCNC,CPTII,S$GLB, | Performed by: NURSE PRACTITIONER

## 2023-11-29 PROCEDURE — 1159F MED LIST DOCD IN RCRD: CPT | Mod: HCNC,CPTII,S$GLB, | Performed by: NURSE PRACTITIONER

## 2023-11-29 PROCEDURE — 99214 PR OFFICE/OUTPT VISIT, EST, LEVL IV, 30-39 MIN: ICD-10-PCS | Mod: HCNC,S$GLB,, | Performed by: NURSE PRACTITIONER

## 2023-11-29 PROCEDURE — 3044F HG A1C LEVEL LT 7.0%: CPT | Mod: HCNC,CPTII,S$GLB, | Performed by: NURSE PRACTITIONER

## 2023-11-29 PROCEDURE — 99214 OFFICE O/P EST MOD 30 MIN: CPT | Mod: HCNC,S$GLB,, | Performed by: NURSE PRACTITIONER

## 2023-11-29 PROCEDURE — 99999 PR PBB SHADOW E&M-EST. PATIENT-LVL III: ICD-10-PCS | Mod: PBBFAC,HCNC,, | Performed by: NURSE PRACTITIONER

## 2023-11-29 PROCEDURE — 3044F PR MOST RECENT HEMOGLOBIN A1C LEVEL <7.0%: ICD-10-PCS | Mod: HCNC,CPTII,S$GLB, | Performed by: NURSE PRACTITIONER

## 2023-11-29 PROCEDURE — 1160F PR REVIEW ALL MEDS BY PRESCRIBER/CLIN PHARMACIST DOCUMENTED: ICD-10-PCS | Mod: HCNC,CPTII,S$GLB, | Performed by: NURSE PRACTITIONER

## 2023-11-29 PROCEDURE — 99999 PR PBB SHADOW E&M-EST. PATIENT-LVL III: CPT | Mod: PBBFAC,HCNC,, | Performed by: NURSE PRACTITIONER

## 2023-11-29 PROCEDURE — 4010F PR ACE/ARB THEARPY RXD/TAKEN: ICD-10-PCS | Mod: HCNC,CPTII,S$GLB, | Performed by: NURSE PRACTITIONER

## 2023-11-29 PROCEDURE — 4010F ACE/ARB THERAPY RXD/TAKEN: CPT | Mod: HCNC,CPTII,S$GLB, | Performed by: NURSE PRACTITIONER

## 2023-11-29 RX ORDER — DIAZEPAM 5 MG/1
5 TABLET ORAL DAILY PRN
Qty: 15 TABLET | Refills: 5 | Status: SHIPPED | OUTPATIENT
Start: 2023-11-29

## 2023-11-29 RX ORDER — ESCITALOPRAM OXALATE 20 MG/1
20 TABLET ORAL DAILY
Qty: 90 TABLET | Refills: 3 | Status: SHIPPED | OUTPATIENT
Start: 2023-11-29

## 2023-11-29 RX ORDER — TRAZODONE HYDROCHLORIDE 150 MG/1
TABLET ORAL
Qty: 90 TABLET | Refills: 3 | Status: SHIPPED | OUTPATIENT
Start: 2023-11-29

## 2023-11-29 RX ORDER — BUPROPION HYDROCHLORIDE 150 MG/1
150 TABLET ORAL DAILY
Qty: 90 TABLET | Refills: 3 | Status: SHIPPED | OUTPATIENT
Start: 2023-11-29

## 2023-11-29 NOTE — PATIENT INSTRUCTIONS
Lexapro 20 mg daily  Trazodone 150 mg before bed as needed for insomnia  Start Wellbutrin  mg daily  Valium 5 mg as needed for severe anxiety or panic attacks. Don't take more than one tablet in a day and be careful to not drive. May increase risk for fall.

## 2023-11-29 NOTE — PROGRESS NOTES
Outpatient Psychiatry Follow-Up Visit (MD/NP)    11/29/2023    Clinical Status of Patient:  Outpatient (Ambulatory)    Chief Complaint:  Starla Fisher is a 53 y.o. female who presents today for follow-up of depression and anxiety.  Met with patient, sister, and cousin.      Last visit was: 6/06/22. Chart and  reviewed.    Interval History and Content of Current Session:  Current Psychiatric Medications/changes  Increase to Lexapro 20 mg daily  Take Trazodone 150 mg before bed as needed for insomnia  Valium 5 mg as needed for severe anxiety or panic attacks. Don't take more than one tablet in a day and be careful to not drive. May increase risk for fall.  Will follow-up in 1 month to determine need dosage increase or med change      Affect appears bright with euthymic mood. Thought processes are linear, clear, and organized. Denies SI/HI/AVH. Discussed coping skills for anxiety and mood.Will start Wellbutrin for mood. Anxiety managed with Lexapro and Valium as needed. Sleep improved with Trazodone.       Psychotherapy:  Target symptoms: anxiety   Why chosen therapy is appropriate versus another modality: relevant to diagnosis  Outcome monitoring methods: self-report  Therapeutic intervention type: insight oriented psychotherapy  Topics discussed/themes: building skills sets for symptom management, symptom recognition  The patient's response to the intervention is accepting. The patient's progress toward treatment goals is good.   Duration of intervention: 19 minutes.    Review of Systems   PSYCHIATRIC: Pertinant items are noted in the narrative.  CONSTITUTIONAL: No weight gain or loss.   MUSCULOSKELETAL: No pain or stiffness of the joints.  NEUROLOGIC: No weakness, sensory changes, seizures, confusion, memory loss, tremor or other abnormal movements.  ENDOCRINE: No polydipsia or polyuria.  INTEGUMENTARY: No rashes or lacerations.  EYES: No exophthalmos, jaundice or blindness.  ENT: No dizziness, tinnitus or  "hearing loss.  RESPIRATORY: No shortness of breath.  CARDIOVASCULAR: No tachycardia or chest pain.  GASTROINTESTINAL: No nausea, vomiting, pain, constipation or diarrhea.  GENITOURINARY: No frequency, dysuria or sexual dysfunction.  HEMATOLOGIC/LYMPHATIC: No excessive bleeding, prolonged or excessive bleeding after dental extraction/injury.  ALLERGIC/IMMUNOLOGIC: No allergic response to materials, foods or animals at this time.    Past Medical, Family and Social History: The patient's past medical, family and social history have been reviewed and updated as appropriate within the electronic medical record - see encounter notes.    Compliance: yes    Side effects: see above    Risk Parameters:  Patient reports no suicidal ideation  Patient reports no homicidal ideation  Patient reports no self-injurious behavior  Patient reports no violent behavior    Exam (detailed: at least 9 elements; comprehensive: all 15 elements)   Constitutional  Vitals:  Most recent vital signs, dated greater than 90 days prior to this appointment, were reviewed.   There were no vitals filed for this visit.  Ht: 5' 4" (1.626 m)  Wt: 144.6 kg (318 lb 12.6 oz)  BMI: 54.72 kg/m²  BP: 150/84>1 day  Pulse: 75>1 day     General:  unremarkable     Musculoskeletal  Muscle Strength/Tone:  no tremor, no tic   Gait & Station:  in wheelchair     Psychiatric  Speech:  no latency; no press   Mood & Affect:  euthymic  congruent and appropriate   Thought Process:  concrete   Associations:  intact   Thought Content:  normal, no suicidality, no homicidality, delusions, or paranoia   Insight:  limited awareness of illness   Judgement: limited   Orientation:  grossly intact   Memory: intact for content of interview   Language: grossly intact   Attention Span & Concentration:  able to focus   Fund of Knowledge:  impaired     Assessment and Diagnosis   Status/Progress: Based on the examination today, the patient's problem(s) is/are improved and adequately but not " ideally controlled.  New problems have not been presented today.   Co-morbidities and Lack of compliance are complicating management of the primary condition.  There are no active rule-out diagnoses for this patient at this time.     General Impression:       ICD-10-CM ICD-9-CM   1. Insomnia disorder, with non-sleep disorder mental comorbidity  G47.00 780.52   2. Major depressive disorder, recurrent, moderate  F33.1 296.32   3. Generalized anxiety disorder with panic attacks  F41.1 300.02    F41.0 300.01         Intervention/Counseling/Treatment Plan   Medication Management: The risks and benefits of medication were discussed with the patient.  Lexapro 20 mg daily  Trazodone 150 mg before bed as needed for insomnia  Start Wellbutrin  mg daily  Valium 5 mg as needed for severe anxiety or panic attacks. Don't take more than one tablet in a day and be careful to not drive. May increase risk for fall.  Will follow-up in 1 month to determine need dosage increase or med change    Return to Clinic: 3 months    Risks, benefits, side effects and alternative treatments discussed with patient. Patient agrees with the current plan as documented.  Encouraged Patient to keep future appointments.  Take medications as prescribed and abstain from substance abuse.  Pt to present to ED for thoughts to harm herself or others

## 2023-11-30 ENCOUNTER — PATIENT MESSAGE (OUTPATIENT)
Dept: ADMINISTRATIVE | Facility: HOSPITAL | Age: 53
End: 2023-11-30
Payer: MEDICARE

## 2023-11-30 ENCOUNTER — PATIENT OUTREACH (OUTPATIENT)
Dept: ADMINISTRATIVE | Facility: HOSPITAL | Age: 53
End: 2023-11-30
Payer: MEDICARE

## 2023-12-11 ENCOUNTER — TELEPHONE (OUTPATIENT)
Dept: BARIATRICS | Facility: CLINIC | Age: 53
End: 2023-12-11
Payer: MEDICARE

## 2023-12-11 DIAGNOSIS — Z01.818 PREOPERATIVE TESTING: Primary | ICD-10-CM

## 2023-12-11 NOTE — TELEPHONE ENCOUNTER
----- Message from Ángel Morales sent at 12/11/2023 12:31 PM CST -----  Contact: [phone]  Starla Fisher calling regarding Appointment Access  (message) Pt asking for a call back she states she needs to reschedule her procedure schedule for 12/29 asking to speak with the nurse

## 2023-12-11 NOTE — TELEPHONE ENCOUNTER
Called and spoke with the pt.  She also placed her sister on the line as her advocate.  They both stated that due to the pt's sister's work schedule, she would not be able to adequately care for the pt at the original surgery date time.  Discussed and the pt and her sister rescheduled for:  DOS- 1/26/24  New Liquid diet start date- 1/12/24    The pt is aware that all of her related appointments will be rescheduled and she requested a new packet be mailed out to her.      All related necessary staff notified.

## 2023-12-12 ENCOUNTER — PATIENT MESSAGE (OUTPATIENT)
Dept: BARIATRICS | Facility: CLINIC | Age: 53
End: 2023-12-12
Payer: MEDICARE

## 2023-12-13 ENCOUNTER — PATIENT MESSAGE (OUTPATIENT)
Dept: BARIATRICS | Facility: CLINIC | Age: 53
End: 2023-12-13
Payer: MEDICARE

## 2023-12-28 ENCOUNTER — TELEPHONE (OUTPATIENT)
Dept: UROLOGY | Facility: CLINIC | Age: 53
End: 2023-12-28
Payer: MEDICARE

## 2023-12-28 NOTE — TELEPHONE ENCOUNTER
Spoke to pt in regards to pt wanting a follow up appt and a device check. First available appt is 3/12/24 at 10:40 am. Pt accepted this appt time and date and asked that the appt be placed on the wait list. Appt was added to the wait list.

## 2023-12-28 NOTE — TELEPHONE ENCOUNTER
----- Message from Carolyn Doran sent at 12/28/2023  1:50 PM CST -----  Regarding: Nurse  Contact: pt  Type: Requesting to speak with nurse    Who Called: PT  Regarding: Follow up   Would the patient rather a call back or a response via [facility]? Call back  Best Call Back Number:  [phone]  Additional Information: pt would like a call back, Than You

## 2024-01-03 ENCOUNTER — TELEPHONE (OUTPATIENT)
Dept: ORTHOPEDICS | Facility: CLINIC | Age: 54
End: 2024-01-03
Payer: MEDICARE

## 2024-01-03 NOTE — TELEPHONE ENCOUNTER
Spoke with patient. Having some pain still and starting to have pain in the other knee. Would like more injections before her surgery at end of month. Will forward to Trey to review.

## 2024-01-03 NOTE — TELEPHONE ENCOUNTER
----- Message from Rody Krueger sent at 1/3/2024  3:36 PM CST -----  Type:  Needs Medical Advice/injection     Who Called: pt    Would the patient rather a call back or a response via [facility]? Call   Best Call Back Number: [phone]  Additional Information: pt requesting to schedule injection patient is free tomorrow

## 2024-01-05 ENCOUNTER — HOSPITAL ENCOUNTER (OUTPATIENT)
Dept: RADIOLOGY | Facility: HOSPITAL | Age: 54
Discharge: HOME | End: 2024-01-05
Attending: STUDENT IN AN ORGANIZED HEALTH CARE EDUCATION/TRAINING PROGRAM
Payer: MEDICARE

## 2024-01-05 DIAGNOSIS — Z01.419 WELL WOMAN EXAM WITH ROUTINE GYNECOLOGICAL EXAM: ICD-10-CM

## 2024-01-05 DIAGNOSIS — Z12.31 ENCOUNTER FOR SCREENING MAMMOGRAM FOR MALIGNANT NEOPLASM OF BREAST: ICD-10-CM

## 2024-01-05 PROCEDURE — 77067 SCR MAMMO BI INCL CAD: CPT | Mod: 26,HCNC,, | Performed by: RADIOLOGY

## 2024-01-05 PROCEDURE — 77063 BREAST TOMOSYNTHESIS BI: CPT | Mod: 26,HCNC,, | Performed by: RADIOLOGY

## 2024-01-05 PROCEDURE — 77067 SCR MAMMO BI INCL CAD: CPT | Mod: TC,HCNC

## 2024-01-08 ENCOUNTER — TELEPHONE (OUTPATIENT)
Dept: BARIATRICS | Facility: CLINIC | Age: 54
End: 2024-01-08
Payer: MEDICARE

## 2024-01-08 ENCOUNTER — HOSPITAL ENCOUNTER (OUTPATIENT)
Dept: CARDIOLOGY | Facility: CLINIC | Age: 54
Discharge: HOME | End: 2024-01-08
Payer: MEDICARE

## 2024-01-08 ENCOUNTER — TELEPHONE (OUTPATIENT)
Dept: ORTHOPEDICS | Facility: CLINIC | Age: 54
End: 2024-01-08
Payer: MEDICARE

## 2024-01-08 ENCOUNTER — OFFICE VISIT (OUTPATIENT)
Dept: BARIATRICS | Facility: CLINIC | Age: 54
End: 2024-01-08
Payer: MEDICARE

## 2024-01-08 ENCOUNTER — LAB VISIT (OUTPATIENT)
Dept: LAB | Facility: HOSPITAL | Age: 54
End: 2024-01-08
Attending: SURGERY
Payer: MEDICARE

## 2024-01-08 VITALS
SYSTOLIC BLOOD PRESSURE: 164 MMHG | OXYGEN SATURATION: 94 % | HEIGHT: 64 IN | RESPIRATION RATE: 16 BRPM | WEIGHT: 293 LBS | DIASTOLIC BLOOD PRESSURE: 88 MMHG | HEART RATE: 74 BPM | BODY MASS INDEX: 50.02 KG/M2

## 2024-01-08 DIAGNOSIS — R79.9 ABNORMAL FINDING OF BLOOD CHEMISTRY, UNSPECIFIED: ICD-10-CM

## 2024-01-08 DIAGNOSIS — Z01.818 PREOPERATIVE TESTING: ICD-10-CM

## 2024-01-08 DIAGNOSIS — Z01.818 PREOP TESTING: ICD-10-CM

## 2024-01-08 DIAGNOSIS — G47.33 OSA (OBSTRUCTIVE SLEEP APNEA): ICD-10-CM

## 2024-01-08 DIAGNOSIS — E66.01 MORBID OBESITY WITH BMI OF 50.0-59.9, ADULT: Primary | ICD-10-CM

## 2024-01-08 DIAGNOSIS — Z98.84 S/P GASTRIC BYPASS: ICD-10-CM

## 2024-01-08 DIAGNOSIS — K21.9 GASTROESOPHAGEAL REFLUX DISEASE, UNSPECIFIED WHETHER ESOPHAGITIS PRESENT: ICD-10-CM

## 2024-01-08 DIAGNOSIS — K21.9 HIATAL HERNIA WITH GERD WITHOUT ESOPHAGITIS: ICD-10-CM

## 2024-01-08 DIAGNOSIS — E66.01 MORBID OBESITY WITH BMI OF 50.0-59.9, ADULT: ICD-10-CM

## 2024-01-08 DIAGNOSIS — K44.9 HIATAL HERNIA WITH GERD WITHOUT ESOPHAGITIS: ICD-10-CM

## 2024-01-08 DIAGNOSIS — I10 ESSENTIAL HYPERTENSION: ICD-10-CM

## 2024-01-08 LAB
25(OH)D3+25(OH)D2 SERPL-MCNC: 27 NG/ML (ref 30–96)
ALBUMIN SERPL BCP-MCNC: 3.4 G/DL (ref 3.5–5.2)
ALP SERPL-CCNC: 91 U/L (ref 55–135)
ALT SERPL W/O P-5'-P-CCNC: 15 U/L (ref 10–44)
ANION GAP SERPL CALC-SCNC: 7 MMOL/L (ref 8–16)
AST SERPL-CCNC: 14 U/L (ref 10–40)
BASOPHILS # BLD AUTO: 0.04 K/UL (ref 0–0.2)
BASOPHILS NFR BLD: 0.6 % (ref 0–1.9)
BILIRUB SERPL-MCNC: 0.6 MG/DL (ref 0.1–1)
BUN SERPL-MCNC: 18 MG/DL (ref 6–20)
CALCIUM SERPL-MCNC: 10 MG/DL (ref 8.7–10.5)
CHLORIDE SERPL-SCNC: 104 MMOL/L (ref 95–110)
CO2 SERPL-SCNC: 29 MMOL/L (ref 23–29)
CREAT SERPL-MCNC: 0.7 MG/DL (ref 0.5–1.4)
DIFFERENTIAL METHOD BLD: ABNORMAL
EOSINOPHIL # BLD AUTO: 0.3 K/UL (ref 0–0.5)
EOSINOPHIL NFR BLD: 4.1 % (ref 0–8)
ERYTHROCYTE [DISTWIDTH] IN BLOOD BY AUTOMATED COUNT: 14.7 % (ref 11.5–14.5)
EST. GFR  (NO RACE VARIABLE): >60 ML/MIN/1.73 M^2
ESTIMATED AVG GLUCOSE: 114 MG/DL (ref 68–131)
GLUCOSE SERPL-MCNC: 103 MG/DL (ref 70–110)
HBA1C MFR BLD: 5.6 % (ref 4–5.6)
HCT VFR BLD AUTO: 39.2 % (ref 37–48.5)
HGB BLD-MCNC: 12.4 G/DL (ref 12–16)
IMM GRANULOCYTES # BLD AUTO: 0.02 K/UL (ref 0–0.04)
IMM GRANULOCYTES NFR BLD AUTO: 0.3 % (ref 0–0.5)
LYMPHOCYTES # BLD AUTO: 2 K/UL (ref 1–4.8)
LYMPHOCYTES NFR BLD: 29.1 % (ref 18–48)
MCH RBC QN AUTO: 27 PG (ref 27–31)
MCHC RBC AUTO-ENTMCNC: 31.6 G/DL (ref 32–36)
MCV RBC AUTO: 85 FL (ref 82–98)
MONOCYTES # BLD AUTO: 0.5 K/UL (ref 0.3–1)
MONOCYTES NFR BLD: 7.3 % (ref 4–15)
NEUTROPHILS # BLD AUTO: 4 K/UL (ref 1.8–7.7)
NEUTROPHILS NFR BLD: 58.6 % (ref 38–73)
NRBC BLD-RTO: 0 /100 WBC
PLATELET # BLD AUTO: 242 K/UL (ref 150–450)
PMV BLD AUTO: 10.5 FL (ref 9.2–12.9)
POTASSIUM SERPL-SCNC: 4.4 MMOL/L (ref 3.5–5.1)
PROT SERPL-MCNC: 7.8 G/DL (ref 6–8.4)
RBC # BLD AUTO: 4.6 M/UL (ref 4–5.4)
SODIUM SERPL-SCNC: 140 MMOL/L (ref 136–145)
WBC # BLD AUTO: 6.84 K/UL (ref 3.9–12.7)

## 2024-01-08 PROCEDURE — 83036 HEMOGLOBIN GLYCOSYLATED A1C: CPT | Mod: HCNC | Performed by: SURGERY

## 2024-01-08 PROCEDURE — 80053 COMPREHEN METABOLIC PANEL: CPT | Mod: HCNC | Performed by: SURGERY

## 2024-01-08 PROCEDURE — 3008F BODY MASS INDEX DOCD: CPT | Mod: HCNC,CPTII,S$GLB, | Performed by: SURGERY

## 2024-01-08 PROCEDURE — 3077F SYST BP >= 140 MM HG: CPT | Mod: HCNC,CPTII,S$GLB, | Performed by: SURGERY

## 2024-01-08 PROCEDURE — 36415 COLL VENOUS BLD VENIPUNCTURE: CPT | Mod: HCNC | Performed by: SURGERY

## 2024-01-08 PROCEDURE — 82306 VITAMIN D 25 HYDROXY: CPT | Mod: HCNC | Performed by: SURGERY

## 2024-01-08 PROCEDURE — 93010 ELECTROCARDIOGRAM REPORT: CPT | Mod: HCNC,S$GLB,, | Performed by: INTERNAL MEDICINE

## 2024-01-08 PROCEDURE — 1159F MED LIST DOCD IN RCRD: CPT | Mod: HCNC,CPTII,S$GLB, | Performed by: SURGERY

## 2024-01-08 PROCEDURE — 99215 OFFICE O/P EST HI 40 MIN: CPT | Mod: HCNC,S$GLB,, | Performed by: SURGERY

## 2024-01-08 PROCEDURE — 99999 PR PBB SHADOW E&M-EST. PATIENT-LVL V: CPT | Mod: PBBFAC,HCNC,, | Performed by: SURGERY

## 2024-01-08 PROCEDURE — 93005 ELECTROCARDIOGRAM TRACING: CPT | Mod: HCNC,S$GLB,, | Performed by: NURSE PRACTITIONER

## 2024-01-08 PROCEDURE — 85025 COMPLETE CBC W/AUTO DIFF WBC: CPT | Mod: HCNC | Performed by: SURGERY

## 2024-01-08 PROCEDURE — 3079F DIAST BP 80-89 MM HG: CPT | Mod: HCNC,CPTII,S$GLB, | Performed by: SURGERY

## 2024-01-08 RX ORDER — PROCHLORPERAZINE EDISYLATE 5 MG/ML
5 INJECTION INTRAMUSCULAR; INTRAVENOUS EVERY 6 HOURS PRN
Status: CANCELLED | OUTPATIENT
Start: 2024-01-08

## 2024-01-08 RX ORDER — PANTOPRAZOLE SODIUM 40 MG/10ML
40 INJECTION, POWDER, LYOPHILIZED, FOR SOLUTION INTRAVENOUS 2 TIMES DAILY
Status: CANCELLED | OUTPATIENT
Start: 2024-01-08

## 2024-01-08 RX ORDER — OXYCODONE HCL 5 MG/5 ML
5 SOLUTION, ORAL ORAL EVERY 6 HOURS PRN
Qty: 75 ML | Refills: 0 | Status: SHIPPED | OUTPATIENT
Start: 2024-01-08

## 2024-01-08 RX ORDER — HYDROMORPHONE HYDROCHLORIDE 1 MG/ML
0.5 INJECTION, SOLUTION INTRAMUSCULAR; INTRAVENOUS; SUBCUTANEOUS
Status: CANCELLED | OUTPATIENT
Start: 2024-01-08

## 2024-01-08 RX ORDER — URSODIOL 500 MG/1
500 TABLET, FILM COATED ORAL DAILY
Qty: 30 TABLET | Refills: 5 | Status: SHIPPED | OUTPATIENT
Start: 2024-01-08 | End: 2024-07-06

## 2024-01-08 RX ORDER — SODIUM CHLORIDE 9 MG/ML
INJECTION, SOLUTION INTRAVENOUS CONTINUOUS
Status: CANCELLED | OUTPATIENT
Start: 2024-01-08

## 2024-01-08 RX ORDER — ONDANSETRON 8 MG/1
8 TABLET, ORALLY DISINTEGRATING ORAL EVERY 6 HOURS PRN
Qty: 30 TABLET | Refills: 0 | Status: SHIPPED | OUTPATIENT
Start: 2024-01-08

## 2024-01-08 RX ORDER — HEPARIN SODIUM 5000 [USP'U]/ML
5000 INJECTION, SOLUTION INTRAVENOUS; SUBCUTANEOUS ONCE
Status: CANCELLED | OUTPATIENT
Start: 2024-01-08 | End: 2024-01-08

## 2024-01-08 RX ORDER — DEXTROMETHORPHAN/PSEUDOEPHED 2.5-7.5/.8
40 DROPS ORAL 4 TIMES DAILY PRN
Status: CANCELLED | OUTPATIENT
Start: 2024-01-08

## 2024-01-08 RX ORDER — SODIUM CHLORIDE, SODIUM LACTATE, POTASSIUM CHLORIDE, CALCIUM CHLORIDE 600; 310; 30; 20 MG/100ML; MG/100ML; MG/100ML; MG/100ML
INJECTION, SOLUTION INTRAVENOUS CONTINUOUS
Status: CANCELLED | OUTPATIENT
Start: 2024-01-08

## 2024-01-08 RX ORDER — ENOXAPARIN SODIUM 100 MG/ML
60 INJECTION SUBCUTANEOUS EVERY 12 HOURS
Status: CANCELLED | OUTPATIENT
Start: 2024-01-08

## 2024-01-08 RX ORDER — OMEPRAZOLE 40 MG/1
40 CAPSULE, DELAYED RELEASE ORAL DAILY
Qty: 90 CAPSULE | Refills: 0 | Status: SHIPPED | OUTPATIENT
Start: 2024-01-08 | End: 2024-04-26

## 2024-01-08 RX ORDER — FAMOTIDINE 10 MG/ML
20 INJECTION INTRAVENOUS ONCE
Status: CANCELLED | OUTPATIENT
Start: 2024-01-08 | End: 2024-01-08

## 2024-01-08 RX ORDER — GABAPENTIN 250 MG/5ML
300 SOLUTION ORAL 3 TIMES DAILY
Status: CANCELLED | OUTPATIENT
Start: 2024-01-08

## 2024-01-08 RX ORDER — LIDOCAINE HYDROCHLORIDE 10 MG/ML
1 INJECTION, SOLUTION EPIDURAL; INFILTRATION; INTRACAUDAL; PERINEURAL ONCE
Status: CANCELLED | OUTPATIENT
Start: 2024-01-08 | End: 2024-01-08

## 2024-01-08 RX ORDER — METRONIDAZOLE 500 MG/100ML
500 INJECTION, SOLUTION INTRAVENOUS
Status: CANCELLED | OUTPATIENT
Start: 2024-01-08

## 2024-01-08 RX ORDER — ONDANSETRON 2 MG/ML
8 INJECTION INTRAMUSCULAR; INTRAVENOUS EVERY 6 HOURS PRN
Status: CANCELLED | OUTPATIENT
Start: 2024-01-08

## 2024-01-08 RX ORDER — MUPIROCIN 20 MG/G
OINTMENT TOPICAL
Status: CANCELLED | OUTPATIENT
Start: 2024-01-08

## 2024-01-08 RX ORDER — POLYETHYLENE GLYCOL 3350 17 G/17G
17 POWDER, FOR SOLUTION ORAL DAILY
Qty: 510 G | Refills: 0 | Status: SHIPPED | OUTPATIENT
Start: 2024-01-08 | End: 2024-02-26

## 2024-01-08 RX ORDER — ACETAMINOPHEN 650 MG/20.3ML
500 LIQUID ORAL EVERY 8 HOURS
Status: CANCELLED | OUTPATIENT
Start: 2024-01-08 | End: 2024-01-09

## 2024-01-08 RX ORDER — HYDROCODONE BITARTRATE AND ACETAMINOPHEN 7.5; 325 MG/15ML; MG/15ML
15 SOLUTION ORAL EVERY 4 HOURS PRN
Status: CANCELLED | OUTPATIENT
Start: 2024-01-09

## 2024-01-08 NOTE — PROGRESS NOTES
Subjective:  The patient is a 53 y.o. obese female who presents for pre op for Laparoscopic Harvinder-en-Y Gastric Bypass.      She has multiple associated comorbidities including A. Flutter (not on anticoagulation), essential hypertension, CARL, GERD on daily medications, depression, anxiety, and osteoarthritis.      Her highest adult weight was 322 lbs at age 53 , and her lowest adult weight was 200 lbs at age 31s. Much of the weight came on after loss of parents; she lived with them given her high functioning autism and now lives with sister.   All workup has been reviewed in clinic today and there is nothing on the review that would prevent us from proceeding with surgery.    All questions were answered in clinic today prior to leaving.    Reports she had been on mobic previously but no longer takes NSAIDs. Has the occasional knee aspiration and injection for OA. Ambulates with cane.     Seen today with sister present.     Body mass index is 55.02 kg/m².       Patient Active Problem List    Diagnosis Date Noted    Major depressive disorder, recurrent, moderate 11/29/2023    Autism 06/27/2023    Primary osteoarthritis of right knee 05/17/2023    Acute pain of right knee 02/09/2023    Chronic heel pain, right 08/19/2022    Achilles tendinosis of right lower extremity 08/19/2022    Equinus contracture of right ankle 08/19/2022    Plantar fasciitis of right foot 08/19/2022    Typical atrial flutter 04/20/2022    CARL (obstructive sleep apnea)     Right ankle pain 04/08/2022    Impaired gait and mobility 04/08/2022    Quadriceps weakness 04/08/2022    Major depressive disorder in full remission 02/23/2022    Insomnia disorder, with non-sleep disorder mental comorbidity 02/23/2022    Generalized anxiety disorder with panic attacks 02/23/2022    Essential hypertension 04/22/2021    Morbid obesity with BMI of 50.0-59.9, adult 04/22/2021    Migraine with aura and without status migrainosus, not intractable 04/22/2021     Intractable chronic migraine without aura and without status migrainosus 04/22/2021     Past Medical History:   Diagnosis Date    Anxiety     Depression     Generalized anxiety disorder with panic attacks 02/23/2022    GERD (gastroesophageal reflux disease)     Hypertension     Migraine headache     Sleep difficulties     Typical atrial flutter 04/20/2022      Past Surgical History:   Procedure Laterality Date    BREAST SURGERY      COLONOSCOPY N/A 12/14/2022    Procedure: COLONOSCOPY sutab;  Surgeon: Wei Walsh MD;  Location: [facility] ENDO;  Service: Endoscopy;  Laterality: N/A;    ENDOMETRIAL ABLATION      ENDOSCOPIC GASTROCNEMIUS RECESSION Right 8/19/2022    Procedure: RECESSION, GASTROCNEMIUS, ENDOSCOPIC;  Surgeon: Zelalem Solorzano DPM;  Location: [facility] OR;  Service: Podiatry;  Laterality: Right;  Arthrex centerline  Stella/Arthrex/CHRISTOPHE/REJI,RN 8/18@1333    ENDOSCOPIC PLANTAR FASCIOTOMY Right 8/19/2022    Procedure: FASCIOTOMY, PLANTAR, ENDOSCOPIC;  Surgeon: Zelalem Solorzano DPM;  Location: [facility] OR;  Service: Podiatry;  Laterality: Right;  Arthrex set    ESOPHAGOGASTRODUODENOSCOPY N/A 12/14/2022    Procedure: EGD (ESOPHAGOGASTRODUODENOSCOPY);  Surgeon: Wei Walsh MD;  Location: [facility];  Service: Endoscopy;  Laterality: N/A;    EYE SURGERY      FOOT SURGERY Right 10/2018    bunion    IMPLANTATION OF PERMANENT SACRAL NERVE STIMULATOR N/A 2/15/2022    Procedure: INSERTION, NEUROSTIMULATOR, PERMANENT, SACRAL;  Surgeon: David Brown MD;  Location: [address];  Service: Urology;  Laterality: N/A;    TONSILLECTOMY      TOTAL REDUCTION MAMMOPLASTY Bilateral       (Not in a hospital admission)    Review of patient's allergies indicates:  No Known Allergies   Social History     Tobacco Use    Smoking status: Never     Passive exposure: Never    Smokeless tobacco: Never   Substance Use Topics    Alcohol use: No      Family History   Problem Relation Age of Onset    Atrial fibrillation  "Mother     Thyroid disease Mother     Dementia Mother     Diabetes Father     Prostate cancer Father     Stomach cancer Sister     Migraines Sister     Colon cancer Paternal Aunt     Colon cancer Paternal Uncle     Breast cancer Maternal Grandmother     Colon cancer Paternal Grandmother     Cancer Paternal Grandfather         Review of Systems  Constitutional: negative for anorexia, chills and fatigue  Eyes: negative for icterus, irritation and redness  Respiratory: negative for cough and dyspnea on exertion  Cardiovascular: negative for chest pain and chest pressure/discomfort  Gastrointestinal: negative for abdominal pain, change in bowel habits, constipation and diarrhea  Musculoskeletal:negative for arthralgias and back pain  Neurological: negative for coordination problems and dizziness  Behavioral/Psych: negative for anxiety and bad mood    Objective:  Vital signs in last 24 hours:  Vitals:    01/08/24 1019   BP: (!) 164/88   BP Location: Right arm   Patient Position: Sitting   BP Method: Large (Manual)   Pulse: 74   Resp: 16   SpO2: (!) 94%   Weight: (!) 145.4 kg (320 lb 8.8 oz)   Height: 5' 4" (1.626 m)        Weight History Current Weight Total Weight Loss   6/27/2023   1:14  lbs -322 lbs   6/27/2023   3:02  lbs -322 lbs       General appearance: alert, appears stated age and cooperative  Head: Normocephalic, without obvious abnormality, atraumatic  Eyes: negative findings: lids and lashes normal and conjunctivae and sclerae normal  Lungs: non labored breathing  Heart: regular rate and rhythm  Abdomen: soft, non-tender  Extremities: extremities normal, atraumatic, no cyanosis or edema  Skin: Skin color, texture, turgor normal. No rashes or lesions  Neurologic: Grossly normal    Data Review:  Psych: Cleared  Nutrition: Cleared  CXR: WNL  EKG: WNL  Labs: No abnormalities that would prevent proceeding with surgery.  Cardiac Clearance: 10/11/23 "Plan: low risk for surgery"   UGI: small hiatal " hernia        Assessment/Plan:  Starla was seen today for pre-op exam.    Diagnoses and all orders for this visit:    Morbid obesity with BMI of 50.0-59.9, adult    Hiatal hernia with GERD without esophagitis  -     oxyCODONE (ROXICODONE) 5 mg/5 mL Soln; Take 5 mLs (5 mg total) by mouth every 6 (six) hours as needed.  -     polyethylene glycol (GLYCOLAX) 17 gram/dose powder; Take 17 g by mouth once daily.  -     ondansetron (ZOFRAN-ODT) 8 MG TbDL; Take 1 tablet (8 mg total) by mouth every 6 (six) hours as needed (nausea after surgery).  -     ursodioL (ACTIGALL) 500 MG tablet; Take 1 tablet (500 mg total) by mouth once daily.  -     omeprazole (PRILOSEC) 40 MG capsule; Take 1 capsule (40 mg total) by mouth once daily.      Morbid obesity with failure of conservative therapy.     have personally discussed, in appropriate language, the potential complications of a laparoscopic vik-en-y gastric bypass with Starla MICHELE Fisher. She is well aware of the serious complications of the surgery that include but are not limited to: bleeding (including possible need for transfusions), deep venous thrombosis, pulmonary embolism, pulmonary complications such as pneumonia, cardiac events, temporary or permanent stroke, damage to the spleen, bowel or other organs during surgery and the risk of death. Starla is also aware of specific complications which apply in particular to bariatric procedures and can include but are not limited to: severe post-operative nausea, dysphagia, the leakage of gastric/ intestinal contents at the staple line and the development of an intra-abdominal abscess requiring drainage, need for additional surgery, severe intra-abdominal infection leading to sepsis and even death, wound complications such as hernias and wound infections, strictures, small bowel obstruction, disfiguring scars and failure to lose weight. Further, once again, it was reinforced that her bariatric procedure is a weight loss tool but  ultimate success will rest on lifelong dietary and lifestyle changes. Inadequate weight loss and weight regain are absolutely possible and therefore she will need to treat obesity as the chronic disease it is and continue to optimize her efforts.    We discussed that our goal is to ameliorate her medical problems and not to obtain a specific body mass index. She understands the risks and benefits and wishes to proceed with the procedure. She has signed a consent form.     Will evaluate for hiatal hernia intraoperatively and fix if present.       Essential hypertension  Has been evaluated and cleared by cardiology at low risk.   Continue anti-HTN meds as instructed.   PCP follow up post-op.    Osteoarthritis, right knee  Discussed need to avoid NSAIDs life-long after bariatric surgery owing to risks of gastritis and marginal ulcers. Intermittent intraarticular administration is ok but oral and IV therapy needs to be avoided.    CARL (obstructive sleep apnea)  Continue PAP therapy as prescribed perioperatively and after reassessment after significant weight loss.      I spent 50 minutes on this patient encounter with > 50% spent face to face with the patient involving coordination of care and counseling, including discussion of the patient's condition, delineating the plan of management above, discussing the rationale / recommendations for treatment and additional time was spent on review of her chart and documentation. All time accounted for was spent on the day of service.      Judy Grossman MD FACS  Minimally Invasive General & Bariatric Surgery

## 2024-01-08 NOTE — TELEPHONE ENCOUNTER
Dr. Grossman called regarding moving pt up in clinic d/t inclement weather, approval received. Called pt to inquire about arriving earlier to day for preop d/t inclement weather this afternoon. Pt stated she could arrive at 9:30am, Dr. Grossman notified. EKG and per op labs moved to an earlier time.

## 2024-01-08 NOTE — H&P (VIEW-ONLY)
Subjective:  The patient is a 53 y.o. obese female who presents for pre op for Laparoscopic Harvinder-en-Y Gastric Bypass.      She has multiple associated comorbidities including A. Flutter (not on anticoagulation), essential hypertension, CARL, GERD on daily medications, depression, anxiety, and osteoarthritis.      Her highest adult weight was 322 lbs at age 53 , and her lowest adult weight was 200 lbs at age 31s. Much of the weight came on after loss of parents; she lived with them given her high functioning autism and now lives with sister.   All workup has been reviewed in clinic today and there is nothing on the review that would prevent us from proceeding with surgery.    All questions were answered in clinic today prior to leaving.    Reports she had been on mobic previously but no longer takes NSAIDs. Has the occasional knee aspiration and injection for OA. Ambulates with cane.     Seen today with sister present.     Body mass index is 55.02 kg/m².       Patient Active Problem List    Diagnosis Date Noted    Major depressive disorder, recurrent, moderate 11/29/2023    Autism 06/27/2023    Primary osteoarthritis of right knee 05/17/2023    Acute pain of right knee 02/09/2023    Chronic heel pain, right 08/19/2022    Achilles tendinosis of right lower extremity 08/19/2022    Equinus contracture of right ankle 08/19/2022    Plantar fasciitis of right foot 08/19/2022    Typical atrial flutter 04/20/2022    CARL (obstructive sleep apnea)     Right ankle pain 04/08/2022    Impaired gait and mobility 04/08/2022    Quadriceps weakness 04/08/2022    Major depressive disorder in full remission 02/23/2022    Insomnia disorder, with non-sleep disorder mental comorbidity 02/23/2022    Generalized anxiety disorder with panic attacks 02/23/2022    Essential hypertension 04/22/2021    Morbid obesity with BMI of 50.0-59.9, adult 04/22/2021    Migraine with aura and without status migrainosus, not intractable 04/22/2021     Intractable chronic migraine without aura and without status migrainosus 04/22/2021     Past Medical History:   Diagnosis Date    Anxiety     Depression     Generalized anxiety disorder with panic attacks 02/23/2022    GERD (gastroesophageal reflux disease)     Hypertension     Migraine headache     Sleep difficulties     Typical atrial flutter 04/20/2022      Past Surgical History:   Procedure Laterality Date    BREAST SURGERY      COLONOSCOPY N/A 12/14/2022    Procedure: COLONOSCOPY sutab;  Surgeon: Wei Walsh MD;  Location: [facility] ENDO;  Service: Endoscopy;  Laterality: N/A;    ENDOMETRIAL ABLATION      ENDOSCOPIC GASTROCNEMIUS RECESSION Right 8/19/2022    Procedure: RECESSION, GASTROCNEMIUS, ENDOSCOPIC;  Surgeon: Zelalem Solorzano DPM;  Location: [facility] OR;  Service: Podiatry;  Laterality: Right;  Arthrex centerline  Stella/Arthrex/CHRISTOPHE/REJI,RN 8/18@1333    ENDOSCOPIC PLANTAR FASCIOTOMY Right 8/19/2022    Procedure: FASCIOTOMY, PLANTAR, ENDOSCOPIC;  Surgeon: Zelalem Solorzano DPM;  Location: [facility] OR;  Service: Podiatry;  Laterality: Right;  Arthrex set    ESOPHAGOGASTRODUODENOSCOPY N/A 12/14/2022    Procedure: EGD (ESOPHAGOGASTRODUODENOSCOPY);  Surgeon: Wei Walsh MD;  Location: [facility];  Service: Endoscopy;  Laterality: N/A;    EYE SURGERY      FOOT SURGERY Right 10/2018    bunion    IMPLANTATION OF PERMANENT SACRAL NERVE STIMULATOR N/A 2/15/2022    Procedure: INSERTION, NEUROSTIMULATOR, PERMANENT, SACRAL;  Surgeon: David Brown MD;  Location: [address];  Service: Urology;  Laterality: N/A;    TONSILLECTOMY      TOTAL REDUCTION MAMMOPLASTY Bilateral       (Not in a hospital admission)    Review of patient's allergies indicates:  No Known Allergies   Social History     Tobacco Use    Smoking status: Never     Passive exposure: Never    Smokeless tobacco: Never   Substance Use Topics    Alcohol use: No      Family History   Problem Relation Age of Onset    Atrial fibrillation  "Mother     Thyroid disease Mother     Dementia Mother     Diabetes Father     Prostate cancer Father     Stomach cancer Sister     Migraines Sister     Colon cancer Paternal Aunt     Colon cancer Paternal Uncle     Breast cancer Maternal Grandmother     Colon cancer Paternal Grandmother     Cancer Paternal Grandfather         Review of Systems  Constitutional: negative for anorexia, chills and fatigue  Eyes: negative for icterus, irritation and redness  Respiratory: negative for cough and dyspnea on exertion  Cardiovascular: negative for chest pain and chest pressure/discomfort  Gastrointestinal: negative for abdominal pain, change in bowel habits, constipation and diarrhea  Musculoskeletal:negative for arthralgias and back pain  Neurological: negative for coordination problems and dizziness  Behavioral/Psych: negative for anxiety and bad mood    Objective:  Vital signs in last 24 hours:  Vitals:    01/08/24 1019   BP: (!) 164/88   BP Location: Right arm   Patient Position: Sitting   BP Method: Large (Manual)   Pulse: 74   Resp: 16   SpO2: (!) 94%   Weight: (!) 145.4 kg (320 lb 8.8 oz)   Height: 5' 4" (1.626 m)        Weight History Current Weight Total Weight Loss   6/27/2023   1:14  lbs -322 lbs   6/27/2023   3:02  lbs -322 lbs       General appearance: alert, appears stated age and cooperative  Head: Normocephalic, without obvious abnormality, atraumatic  Eyes: negative findings: lids and lashes normal and conjunctivae and sclerae normal  Lungs: non labored breathing  Heart: regular rate and rhythm  Abdomen: soft, non-tender  Extremities: extremities normal, atraumatic, no cyanosis or edema  Skin: Skin color, texture, turgor normal. No rashes or lesions  Neurologic: Grossly normal    Data Review:  Psych: Cleared  Nutrition: Cleared  CXR: WNL  EKG: WNL  Labs: No abnormalities that would prevent proceeding with surgery.  Cardiac Clearance: 10/11/23 "Plan: low risk for surgery"   UGI: small hiatal " hernia        Assessment/Plan:  Starla was seen today for pre-op exam.    Diagnoses and all orders for this visit:    Morbid obesity with BMI of 50.0-59.9, adult    Hiatal hernia with GERD without esophagitis  -     oxyCODONE (ROXICODONE) 5 mg/5 mL Soln; Take 5 mLs (5 mg total) by mouth every 6 (six) hours as needed.  -     polyethylene glycol (GLYCOLAX) 17 gram/dose powder; Take 17 g by mouth once daily.  -     ondansetron (ZOFRAN-ODT) 8 MG TbDL; Take 1 tablet (8 mg total) by mouth every 6 (six) hours as needed (nausea after surgery).  -     ursodioL (ACTIGALL) 500 MG tablet; Take 1 tablet (500 mg total) by mouth once daily.  -     omeprazole (PRILOSEC) 40 MG capsule; Take 1 capsule (40 mg total) by mouth once daily.      Morbid obesity with failure of conservative therapy.     have personally discussed, in appropriate language, the potential complications of a laparoscopic vik-en-y gastric bypass with Starla MICHELE Fisher. She is well aware of the serious complications of the surgery that include but are not limited to: bleeding (including possible need for transfusions), deep venous thrombosis, pulmonary embolism, pulmonary complications such as pneumonia, cardiac events, temporary or permanent stroke, damage to the spleen, bowel or other organs during surgery and the risk of death. Starla is also aware of specific complications which apply in particular to bariatric procedures and can include but are not limited to: severe post-operative nausea, dysphagia, the leakage of gastric/ intestinal contents at the staple line and the development of an intra-abdominal abscess requiring drainage, need for additional surgery, severe intra-abdominal infection leading to sepsis and even death, wound complications such as hernias and wound infections, strictures, small bowel obstruction, disfiguring scars and failure to lose weight. Further, once again, it was reinforced that her bariatric procedure is a weight loss tool but  ultimate success will rest on lifelong dietary and lifestyle changes. Inadequate weight loss and weight regain are absolutely possible and therefore she will need to treat obesity as the chronic disease it is and continue to optimize her efforts.    We discussed that our goal is to ameliorate her medical problems and not to obtain a specific body mass index. She understands the risks and benefits and wishes to proceed with the procedure. She has signed a consent form.     Will evaluate for hiatal hernia intraoperatively and fix if present.       Essential hypertension  Has been evaluated and cleared by cardiology at low risk.   Continue anti-HTN meds as instructed.   PCP follow up post-op.    Osteoarthritis, right knee  Discussed need to avoid NSAIDs life-long after bariatric surgery owing to risks of gastritis and marginal ulcers. Intermittent intraarticular administration is ok but oral and IV therapy needs to be avoided.    CARL (obstructive sleep apnea)  Continue PAP therapy as prescribed perioperatively and after reassessment after significant weight loss.      I spent 50 minutes on this patient encounter with > 50% spent face to face with the patient involving coordination of care and counseling, including discussion of the patient's condition, delineating the plan of management above, discussing the rationale / recommendations for treatment and additional time was spent on review of her chart and documentation. All time accounted for was spent on the day of service.      Judy Grossman MD FACS  Minimally Invasive General & Bariatric Surgery

## 2024-01-09 ENCOUNTER — PATIENT MESSAGE (OUTPATIENT)
Dept: BARIATRICS | Facility: CLINIC | Age: 54
End: 2024-01-09
Payer: MEDICARE

## 2024-01-10 ENCOUNTER — TELEPHONE (OUTPATIENT)
Dept: BARIATRICS | Facility: CLINIC | Age: 54
End: 2024-01-10
Payer: MEDICARE

## 2024-01-10 NOTE — TELEPHONE ENCOUNTER
----- Message from Carmen Valentin RN sent at 1/9/2024 11:36 AM CST -----  Regarding: FW: Eating prior to surgery  Contact: pt @ [phone]  Please advise.   ----- Message -----  From: Shaylee Fox  Sent: 1/9/2024   9:58 AM CST  To: [facility] Staff  Subject: Eating prior to surgery                          Pt   is calling asking to speak with someone in the office to discuss pt's upcoming procedure. Pt would like to know if she can have tomato soup before surgery . Please call. Thank you.

## 2024-01-12 ENCOUNTER — CLINICAL SUPPORT (OUTPATIENT)
Dept: BARIATRICS | Facility: CLINIC | Age: 54
End: 2024-01-12
Payer: MEDICARE

## 2024-01-12 DIAGNOSIS — E66.01 MORBID OBESITY WITH BMI OF 50.0-59.9, ADULT: ICD-10-CM

## 2024-01-12 DIAGNOSIS — G47.33 OSA (OBSTRUCTIVE SLEEP APNEA): ICD-10-CM

## 2024-01-12 DIAGNOSIS — Z71.3 DIETARY COUNSELING AND SURVEILLANCE: ICD-10-CM

## 2024-01-12 DIAGNOSIS — I10 ESSENTIAL HYPERTENSION: Primary | ICD-10-CM

## 2024-01-12 PROCEDURE — 99499 UNLISTED E&M SERVICE: CPT | Mod: HCNC,95,, | Performed by: DIETITIAN, REGISTERED

## 2024-01-12 NOTE — PROGRESS NOTES
Established Patient - Audio Only Telehealth Visit     The patient location is: home ([place])  The chief complaint leading to consultation is: pre op   Visit type: Virtual visit with audio only (telephone)  Total time spent with patient: 15 min       The reason for the audio only service rather than synchronous audio and video virtual visit was related to technical difficulties or patient preference/necessity.     Each patient to whom I provide medical services by telemedicine is:  (1) informed of the relationship between the physician and patient and the respective role of any other health care provider with respect to management of the patient; and (2) notified that they may decline to receive medical services by telemedicine and may withdraw from such care at any time. Patient verbally consented to receive this service via voice-only telephone call.       NUTRITION NOTE    Bariatric Surgeon: Judy Grossman M.D.  Reason for MNT Referral: Pre-op liquid diet and nutrition instructions  Bypass  Date of Surgery 1/26/24    Pre-op liquid diet  Pt using Premier and Elevate shakes (19g sugar and 10g prot) for preop liquid diet. Discussed that the Elevate shakes are too high in sugar. Stick with Premier shakes 4/day.    Discussion:  -  gms of protein per day  - 600-800 calories per day   - Less than 4gm sugar per shake  - SF, Decaf, non-carbonated Fluids  - No Fruits, juices, yogurt or pudding on liquid diet  - No vitamins for 1 week prior to surgery  - No herbal supplements including green tea and fish oils for 2 weeks prior to surgery    Remind pt per nursing and medical team to inform our department if taking antibiotics within the 30 days post bariatric surgery or it any other surgeries/procedures are scheduled within 30 days after bariatric surgery.    2 week post-op instructions  Pt will use protein powders/clears for first three days after surgery.  If using protein powder, reminded pt of mixing with water for days  1 and 2, by day 3 may try using skim, 1% milk or unsweetened almond/soy milk.  By Day 4, may use RTD protein shakes as tolerated    Pt has the following vitamins and minerals to start taking once discharged from hospital    Multivitamin with 18 mg iron take one tablet or chewable twice a day   B-1 50 mg thiamin taken once daily  Calcium citrate 500 mg with vitamin D three times per day  Vitamin B-12 500 mcg  Sublingually daily    Reviewed dosage and timing of vitamin/mineral guidelines.    Reviewed nutritional guidelines for protein and fluid requirements for week 1 and week 2 post-surgery.  Handout provided to log protein and fluid daily.  1 ounce medicine cups provided for patient to measure fluid intake after surgery.  Reviewed common nutritional concerns and prevention tips after bariatric surgery.  Reminded not to lift anything greater than 10 lbs for 6 week post-surgery  Pt verbalized understanding of information provided with appropriate questions and comments.         SESSION TIME: 15 minutes                          This service was not originating from a related E/M service provided within the previous 7 days nor will  to an E/M service or procedure within the next 24 hours or my soonest available appointment.  Prevailing standard of care was able to be met in this audio-only visit.

## 2024-01-18 ENCOUNTER — TELEPHONE (OUTPATIENT)
Dept: NEUROLOGY | Facility: CLINIC | Age: 54
End: 2024-01-18
Payer: MEDICARE

## 2024-01-18 NOTE — TELEPHONE ENCOUNTER
----- Message from Ludivina Viramontes PA-C sent at 1/18/2024  2:15 PM CST -----  Regarding: RE: questions  Yeah, I can see her.   ----- Message -----  From: Goyo Barnett MA  Sent: 1/18/2024   2:13 PM CST  To: Ludivina Viramontes PA-C  Subject: FW: questions                                    Appropriate to see former patient of Dr. Poe?    Last got botox with him on 11/2023.  ----- Message -----  From: Vickey Amaya MA  Sent: 1/18/2024   2:10 PM CST  To: Goyo Barnett MA  Subject: FW: questions                                    Hello, I spoke to the patient about scheduling her at the [place] location with a PA and she is good with seeing a PA for her Botox in February.  If you can please call to get her scheduled. She said she will be having her stomach bypass surgery soon. Here is her MRN: [id number].    Vickey Huston  ----- Message -----  From: Antoinette Darby  Sent: 1/18/2024   1:20 PM CST  To: Deepika San Staff  Subject: questions                                        Type:  Botox    Who Called: pt  Would the patient rather a call back or a response via [facility]? Call  Best Call Back Number: [phone]  Additional Information: pt would like to know when is her next botox shot due

## 2024-01-18 NOTE — TELEPHONE ENCOUNTER
Spoke with patient and was informed that patient is having surgery on 1/26/24.    Will schedule patient as NP with J[facility] on 2/06/24 at 8:30am.

## 2024-01-25 ENCOUNTER — TELEPHONE (OUTPATIENT)
Dept: BARIATRICS | Facility: CLINIC | Age: 54
End: 2024-01-25
Payer: MEDICARE

## 2024-01-25 ENCOUNTER — ANESTHESIA EVENT (OUTPATIENT)
Dept: SURGERY | Facility: HOSPITAL | Age: 54
DRG: 620 | End: 2024-01-25
Payer: MEDICARE

## 2024-01-25 NOTE — PRE-PROCEDURE INSTRUCTIONS
PreOp Instructions given:   - Verbal medication information (what to hold and what to take)   - NPO guidelines//Bariatric Sx Dept  - Arrival place directions given; time to be given the day before procedure by the   Surgeon's Office   - Bathing with antibacterial soap   - Don't wear any jewelry or bring any valuables AM of surgery   - No makeup or moisturizer to face   - No perfume/cologne, powder, lotions or aftershave   Pt. verbalized understanding.   Pt  reports h/o PONV  Patient does not know arrival time.  Explained that this information comes from the surgeon's office and if they haven't heard from them by 2 or 3 pm to call the office.  Patient stated an understanding.

## 2024-01-25 NOTE — TELEPHONE ENCOUNTER
Notified patient of arrival time to the [facility] at 0500 with expected surgery start time 0700 on 1-. Instructed patient regarding pre-op instructions including no protein shakes or sugar free clear liquids after midnight but can have a rare sip of water for comfort, showering and preop medications to hold/take per anesthesia/preop. Reminded pt to drink  8 oz water at 0430. Instructed patient on the s/s of dehydration and for patient to call at the first sign of dehydration. Informed patient that someone from bariatrics will call them 1 week post op to review diet/fluid intake and to ensure adequate hydration. Reminded patient not to take antibiotics for 30 days following surgery or schedule elective procedures involving anesthesia/sedation for 30 days following surgery unless checking with the bariatric clinic first. Pt verbalized understanding. Pt given office phone number for any additional questions/concerns.

## 2024-01-25 NOTE — ANESTHESIA PREPROCEDURE EVALUATION
[facility]  Anesthesia Pre-Operative Evaluation         Patient Name: Starla Fisher  YOB: 1970  MRN: [id number]    SUBJECTIVE:     Pre-operative evaluation for Procedure(s) (LRB):  XI ROBOTIC GASTROENTEROSTOMY, MIAN-EN-Y w/intraop egd (N/A)  XI ROBOTIC REPAIR, HERNIA, HIATAL (N/A)     01/25/2024    Starla Fisher is a 53 y.o. female w/ a significant PMHx of ROSIBEL w panic attacks,depression, GERD, intractable chronic migraine, typical atrial flutter s/p ablation 2022, HTN, CARL, morbid obesity, high functioning autism, and PONV.    she has a current medication list which includes the following long-term medication(s): diltiazem, losartan, blood-glucose meter, bupropion, diazepam, escitalopram oxalate, losartan, metronidazole 0.75%, nystatin, omeprazole, oxybutynin, sumatriptan, sumatriptan, sumatriptan, trazodone, triamcinolone acetonide 0.025%, trueplus lancets, and ursodiol.     Patient now presents for the above procedure(s).    ASTRID 4/20/22:  Normal left ventricular diastolic function.  The estimated ejection fraction is 55%.  A 100 J synchronized cardioversion was successfully performed with restoration of normal sinus rhythm.  Stress Echo 7/13/23:  During stress, the following significant arrhythmias were observed: rare PVCs.  The patient reached the end of the protocol.  Normal systolic function.  The estimated ejection fraction is 65%.  The stress echo portion of this study is negative for myocardial ischemia.  Sensitivity is impaired due to the patient's failure to achieve target heart rate.  The ECG portion of this study is suspicious for myocardial ischemia.     LDA: None documented.     Drips: None documented.    Prev airway:  Intubated:  Postinduction    Mask Ventilation:  Moderately difficult with oral airway    Attempts:  1    Attempted By:  Student    Method of Intubation:  Video laryngoscopy    Blade:  Orozco 3    Laryngeal View Grade: Grade I - full view of cords       Difficult Airway Encountered?: No      Complications:  None    Airway Device:  Oral endotracheal tube    Airway Device Size:  7.0    Style/Cuff Inflation:  Cuffed (inflated to minimal occlusive pressure)    Inflation Amount (mL):  5    Tube secured:  22    Secured at:  The lips    Placement Verified By:  Capnometry and Revisualization with laryngoscopy    Complicating Factors:  None    Findings Post-Intubation:  BS equal bilateral and atraumatic/condition of teeth unchanged    Medications:     Current Outpatient Medications   Medication Instructions    blood-glucose meter (TRUE METRIX AIR GLUCOSE METER) kit use as directed    BOTOX 200 unit SolR No dose, route, or frequency recorded.    buPROPion (WELLBUTRIN XL) 150 mg, Oral, Daily    clotrimazole-betamethasone 1-0.05% (LOTRISONE) cream Topical (Top), 2 times daily    diazePAM (VALIUM) 5 mg, Oral, Daily PRN    diltiaZEM (CARDIZEM CD) 240 mg, Oral, Daily    EScitalopram oxalate (LEXAPRO) 20 mg, Oral, Daily    Be-I-qzfxw-B12-L.acid,plan-inu 65 mg iron- 50 mg-1 mg DFE Cap Oral    ferrous sulfate (FEOSOL) 325 mg, Oral    fluconazole (DIFLUCAN) 150 MG Tab Take one tablet today and one in a week    HYDROcodone-acetaminophen (NORCO) 5-325 mg per tablet 1 tablet, Oral, Every 4 hours PRN    ketorolac (TORADOL) 30 mg/mL (1 mL) injection No dose, route, or frequency recorded.    losartan (COZAAR) 50 mg, Oral, Daily    losartan (COZAAR) 50 mg, Oral, Daily    metronidazole 0.75% (METROCREAM) 0.75 % Crea Topical (Top), 2 times daily    multivit-minerals/folic acid (ADULT MULTIVITAMIN GUMMIES ORAL) 1 tablet, Oral, Daily, Continue to hold until after Surgery.    multivitamin (THERAGRAN) per tablet 1 tablet, Oral    nirmatrelvir-ritonavir (PAXLOVID) 300 mg (150 mg x 2)-100 mg copackaged tablets (EUA) Take 3 tablets by mouth 2 (two) times daily for 5 days. Each dose contains 2 nirmatrelvir (pink tablets) and 1 ritonavir (white tablet). Take all 3 tablets together    nystatin  (MYCOSTATIN) powder Apply to affected area 3 times daily    omeprazole (PRILOSEC) 40 mg, Oral, Daily    ondansetron (ZOFRAN-ODT) 8 mg, Oral, Every 6 hours PRN    oxybutynin (DITROPAN-XL) 10 mg, Oral, Daily    oxyCODONE (ROXICODONE) 5 mg, Oral, Every 6 hours PRN    phenylephrine (TEODORA-SYNEPHRINE) 10 mg/mL injection No dose, route, or frequency recorded.    polyethylene glycol (GLYCOLAX) 17 gram/dose powder Mix 1 capful (17 g) in liquid and drink by mouth once daily.    promethazine (PHENERGAN) 25 mg, Oral, Every 6 hours PRN    propofoL (DIPRIVAN) 10 mg/mL infusion No dose, route, or frequency recorded.    ROPIvacaine 0.5%, PF, (NAROPIN) 5 mg/mL (0.5 %) injection No dose, route, or frequency recorded.    sumatriptan (IMITREX STATDOSE) 6 mg/0.5 mL kit INJECT 0.5 ML(6 MG TOTAL) UNDER THE SKIN EVERY 2 HOURS AS NEEDED FOR MIGRAINE. Max 12mg/24hrs    sumatriptan (IMITREX) 100 MG tablet TAKE 1 TABLET BY MOUTH EVERY 2 HOURS AS NEEDED FOR MIGRAINE. Max 200mg/24hrs.    SUMAtriptan (IMITREX) 20 mg/actuation nasal spray USE ONE SPRAY IN THE NOSTRIL AS NEEDED FOR MIGRAINE(MAX 40MG/24 HOURS)    traMADoL (ULTRAM) 50 mg, Oral, Every 6 hours    traZODone (DESYREL) 150 MG tablet Take half or whole tablet by mouth before bed as needed for insomnia    triamcinolone acetonide 0.025% (KENALOG) 0.025 % Oint Topical (Top), Daily PRN    TRUEPLUS LANCETS 33 gauge Misc TEST BLOOD SUGAR THREE TIMES DAILY    ursodioL (ACTIGALL) 500 mg, Oral, Daily    walker (ULTRA-LIGHT ROLLATOR) Misc Rolator for stability and balance postoperatively as needed while standing and walking.        Inpatient Medications:  Antibiotics (From admission, onward)      None          VTE Risk Mitigation (From admission, onward)      None              History:     Past Medical History:   Diagnosis Date    Anxiety     Depression     Generalized anxiety disorder with panic attacks 02/23/2022    GERD (gastroesophageal reflux disease)     Hypertension     Migraine headache      Sleep difficulties     Typical atrial flutter 04/20/2022     Surgical History:    has a past surgical history that includes Endometrial ablation; Tonsillectomy; Breast surgery; Eye surgery; Foot surgery (Right, 10/2018); Total Reduction Mammoplasty (Bilateral); Implantation of permanent sacral nerve stimulator (N/A, 2/15/2022); Endoscopic gastrocnemius recession (Right, 8/19/2022); Endoscopic plantar fasciotomy (Right, 8/19/2022); Colonoscopy (N/A, 12/14/2022); and Esophagogastroduodenoscopy (N/A, 12/14/2022).   Social History:   Tobacco Use: Low Risk  (1/8/2024)    Patient History     Smoking Tobacco Use: Never     Smokeless Tobacco Use: Never     Passive Exposure: Never     Alcohol Use: Not on file      reports that she has never smoked. She has never been exposed to tobacco smoke. She has never used smokeless tobacco. She reports that she does not drink alcohol and does not use drugs.   reports that she is not currently sexually active and has had partner(s) who are male.    OBJECTIVE:   Vital Signs Range:  Wt Readings from Last 1 Encounters:   01/08/24 (!) 145.4 kg (320 lb 8.8 oz)      BMI Readings from Last 1 Encounters:   01/08/24 55.02 kg/m²     BP Readings from Last 3 Encounters:   01/08/24 (!) 164/88   11/14/23 (!) 150/84   11/14/23 (!) 144/91     Pulse Readings from Last 3 Encounters:   01/08/24 74   11/14/23 75   11/14/23 66       Significant Labs:      Component Value Date/Time    WBC 6.84 01/08/2024 0938    HGB 12.4 01/08/2024 0938    HCT 39.2 01/08/2024 0938     01/08/2024 0938     01/08/2024 0938    K 4.4 01/08/2024 0938     01/08/2024 0938    CO2 29 01/08/2024 0938     01/08/2024 0938    BUN 18 01/08/2024 0938    CREATININE 0.7 01/08/2024 0938    MG 1.8 07/05/2023 0833    PHOS 3.2 07/05/2023 0833    CALCIUM 10.0 01/08/2024 0938    ALBUMIN 3.4 (L) 01/08/2024 0938    PROT 7.8 01/08/2024 0938    ALKPHOS 91 01/08/2024 0938    BILITOT 0.6 01/08/2024 0938    AST 14 01/08/2024 0938     ALT 15 01/08/2024 0938    INR 1.0 06/08/2022 0708    HGBA1C 5.6 01/08/2024 0938      Please see Results Review for additional labs.     Diagnostic Studies: No relevant studies.    EKG:   Results for orders placed or performed during the hospital encounter of 01/08/24   ECG 12 lead    Collection Time: 01/08/24 12:57 PM    Narrative    Test Reason : Z01.818,    Vent. Rate : 083 BPM     Atrial Rate : 083 BPM     P-R Int : 158 ms          QRS Dur : 078 ms      QT Int : 364 ms       P-R-T Axes : 040 -04 013 degrees     QTc Int : 427 ms    Normal sinus rhythm  Abnormal R wave progression  Nonspecific T wave abnormality  Abnormal ECG  When compared with ECG of 11-AUG-2022 12:38,  Nonspecific T wave abnormality now evident in Lateral leads  Suspect improper precordial lead placement  Clinical Correlation Required  Confirmed by Wayne Kearney MD ([id number]) on 1/8/2024 5:33:06 PM    Referred By: KINGSTON VIRGEN           Confirmed By:Wayne Kearney MD     ECHO:  See subjective, if available.  ASSESSMENT/PLAN:           Pre-op Assessment    I have reviewed the Patient Summary Reports.     I have reviewed the Nursing Notes. I have reviewed the NPO Status.   I have reviewed the Medications.     Review of Systems  Anesthesia Hx:  No problems with previous Anesthesia             Denies Family Hx of Anesthesia complications.   Personal Hx of Anesthesia complications, Post-Operative Nausea/Vomiting, with every anesthetic, treatment not known                    Social:  Non-Smoker, No Alcohol Use       Hematology/Oncology:       -- Denies Anemia:                                  EENT/Dental:  EENT/Dental Normal           Cardiovascular:     Hypertension   Denies MI.      Denies Dysrhythmias.    Denies CHF.                                 Pulmonary:    Denies COPD.  Denies Asthma.    Sleep Apnea                Renal/:   Denies Chronic Renal Disease.                Hepatic/GI:     GERD Denies Liver Disease.             Musculoskeletal:  Arthritis               Neurological:    Denies CVA.   Headaches Denies Seizures.                                Endocrine:  Denies Diabetes.           Psych:  Psychiatric History                     Anesthesia Plan  Type of Anesthesia, risks & benefits discussed:    Anesthesia Type: Gen ETT  Intra-op Monitoring Plan: Standard ASA Monitors  Post Op Pain Control Plan: multimodal analgesia and IV/PO Opioids PRN  Induction:  IV  Airway Plan: Direct and Video, Post-Induction with ramp  Informed Consent: Informed consent signed with the Patient and all parties understand the risks and agree with anesthesia plan.  All questions answered.   ASA Score: 3  Day of Surgery Review of History & Physical: H&P Update referred to the surgeon/provider.    Ready For Surgery From Anesthesia Perspective.     .

## 2024-01-26 ENCOUNTER — ANESTHESIA (OUTPATIENT)
Dept: SURGERY | Facility: HOSPITAL | Age: 54
DRG: 620 | End: 2024-01-26
Payer: MEDICARE

## 2024-01-26 ENCOUNTER — HOSPITAL ENCOUNTER (INPATIENT)
Facility: HOSPITAL | Age: 54
LOS: 1 days | Discharge: HOME | DRG: 620 | End: 2024-01-27
Attending: SURGERY | Admitting: SURGERY
Payer: MEDICARE

## 2024-01-26 DIAGNOSIS — K44.9 HIATAL HERNIA WITH GERD WITHOUT ESOPHAGITIS: ICD-10-CM

## 2024-01-26 DIAGNOSIS — K21.9 HIATAL HERNIA WITH GERD WITHOUT ESOPHAGITIS: ICD-10-CM

## 2024-01-26 DIAGNOSIS — Z98.84 S/P GASTRIC BYPASS: ICD-10-CM

## 2024-01-26 DIAGNOSIS — I48.3 TYPICAL ATRIAL FLUTTER: Primary | ICD-10-CM

## 2024-01-26 DIAGNOSIS — E66.01 MORBID OBESITY WITH BMI OF 50.0-59.9, ADULT: ICD-10-CM

## 2024-01-26 PROBLEM — E66.9 OBESITY: Status: ACTIVE | Noted: 2024-01-26

## 2024-01-26 PROCEDURE — 3E0T3BZ INTRODUCTION OF ANESTHETIC AGENT INTO PERIPHERAL NERVES AND PLEXI, PERCUTANEOUS APPROACH: ICD-10-PCS | Performed by: SURGERY

## 2024-01-26 PROCEDURE — 37000008 HC ANESTHESIA 1ST 15 MINUTES: Mod: HCNC | Performed by: SURGERY

## 2024-01-26 PROCEDURE — C9113 INJ PANTOPRAZOLE SODIUM, VIA: HCPCS | Mod: HCNC | Performed by: SURGERY

## 2024-01-26 PROCEDURE — 8E0W4CZ ROBOTIC ASSISTED PROCEDURE OF TRUNK REGION, PERCUTANEOUS ENDOSCOPIC APPROACH: ICD-10-PCS | Performed by: SURGERY

## 2024-01-26 PROCEDURE — 63600175 PHARM REV CODE 636 W HCPCS: Mod: JZ,JG,HCNC | Performed by: SURGERY

## 2024-01-26 PROCEDURE — 63600175 PHARM REV CODE 636 W HCPCS: Mod: HCNC

## 2024-01-26 PROCEDURE — 63600175 PHARM REV CODE 636 W HCPCS: Mod: HCNC | Performed by: SURGERY

## 2024-01-26 PROCEDURE — C1781 MESH (IMPLANTABLE): HCPCS | Mod: HCNC | Performed by: SURGERY

## 2024-01-26 PROCEDURE — 94761 N-INVAS EAR/PLS OXIMETRY MLT: CPT | Mod: HCNC

## 2024-01-26 PROCEDURE — C1729 CATH, DRAINAGE: HCPCS | Mod: HCNC | Performed by: SURGERY

## 2024-01-26 PROCEDURE — 37000009 HC ANESTHESIA EA ADD 15 MINS: Mod: HCNC | Performed by: SURGERY

## 2024-01-26 PROCEDURE — 0DJ08ZZ INSPECTION OF UPPER INTESTINAL TRACT, VIA NATURAL OR ARTIFICIAL OPENING ENDOSCOPIC: ICD-10-PCS | Performed by: SURGERY

## 2024-01-26 PROCEDURE — 27201423 OPTIME MED/SURG SUP & DEVICES STERILE SUPPLY: Mod: HCNC | Performed by: SURGERY

## 2024-01-26 PROCEDURE — 99900035 HC TECH TIME PER 15 MIN (STAT): Mod: HCNC

## 2024-01-26 PROCEDURE — 43644 LAP GASTRIC BYPASS/ROUX-EN-Y: CPT | Mod: HCNC,,, | Performed by: SURGERY

## 2024-01-26 PROCEDURE — 36000712 HC OR TIME LEV V 1ST 15 MIN: Mod: HCNC | Performed by: SURGERY

## 2024-01-26 PROCEDURE — 25000003 PHARM REV CODE 250: Mod: HCNC | Performed by: SURGERY

## 2024-01-26 PROCEDURE — 25000003 PHARM REV CODE 250: Mod: HCNC

## 2024-01-26 PROCEDURE — 0D164ZA BYPASS STOMACH TO JEJUNUM, PERCUTANEOUS ENDOSCOPIC APPROACH: ICD-10-PCS | Performed by: SURGERY

## 2024-01-26 PROCEDURE — 11000001 HC ACUTE MED/SURG PRIVATE ROOM: Mod: HCNC

## 2024-01-26 PROCEDURE — D9220A PRA ANESTHESIA: Mod: HCNC,,, | Performed by: INTERNAL MEDICINE

## 2024-01-26 PROCEDURE — 71000015 HC POSTOP RECOV 1ST HR: Mod: HCNC | Performed by: SURGERY

## 2024-01-26 PROCEDURE — 4A1BXSH MONITORING OF GASTROINTESTINAL VASCULAR PERFUSION USING INDOCYANINE GREEN DYE, EXTERNAL APPROACH: ICD-10-PCS | Performed by: SURGERY

## 2024-01-26 PROCEDURE — 36000713 HC OR TIME LEV V EA ADD 15 MIN: Mod: HCNC | Performed by: SURGERY

## 2024-01-26 PROCEDURE — 71000033 HC RECOVERY, INTIAL HOUR: Mod: HCNC | Performed by: SURGERY

## 2024-01-26 PROCEDURE — 27000221 HC OXYGEN, UP TO 24 HOURS: Mod: HCNC

## 2024-01-26 PROCEDURE — 71000016 HC POSTOP RECOV ADDL HR: Mod: HCNC | Performed by: SURGERY

## 2024-01-26 DEVICE — SEAMGUARD ESCHELON 60 MM ART: Type: IMPLANTABLE DEVICE | Site: ABDOMEN | Status: FUNCTIONAL

## 2024-01-26 RX ORDER — ONDANSETRON HYDROCHLORIDE 2 MG/ML
INJECTION, SOLUTION INTRAVENOUS
Status: DISCONTINUED | OUTPATIENT
Start: 2024-01-26 | End: 2024-01-26

## 2024-01-26 RX ORDER — SCOLOPAMINE TRANSDERMAL SYSTEM 1 MG/1
1 PATCH, EXTENDED RELEASE TRANSDERMAL
Status: DISCONTINUED | OUTPATIENT
Start: 2024-01-26 | End: 2024-01-27 | Stop reason: HOSPADM

## 2024-01-26 RX ORDER — FENTANYL CITRATE 50 UG/ML
INJECTION, SOLUTION INTRAMUSCULAR; INTRAVENOUS
Status: DISCONTINUED | OUTPATIENT
Start: 2024-01-26 | End: 2024-01-26

## 2024-01-26 RX ORDER — ENOXAPARIN SODIUM 100 MG/ML
60 INJECTION SUBCUTANEOUS EVERY 12 HOURS
Status: DISCONTINUED | OUTPATIENT
Start: 2024-01-26 | End: 2024-01-27 | Stop reason: HOSPADM

## 2024-01-26 RX ORDER — LIDOCAINE HYDROCHLORIDE AND EPINEPHRINE 10; 10 MG/ML; UG/ML
INJECTION, SOLUTION INFILTRATION; PERINEURAL
Status: DISCONTINUED | OUTPATIENT
Start: 2024-01-26 | End: 2024-01-26 | Stop reason: HOSPADM

## 2024-01-26 RX ORDER — ROCURONIUM BROMIDE 10 MG/ML
INJECTION, SOLUTION INTRAVENOUS
Status: DISCONTINUED | OUTPATIENT
Start: 2024-01-26 | End: 2024-01-26

## 2024-01-26 RX ORDER — HYDROMORPHONE HYDROCHLORIDE 1 MG/ML
INJECTION, SOLUTION INTRAMUSCULAR; INTRAVENOUS; SUBCUTANEOUS
Status: DISCONTINUED | OUTPATIENT
Start: 2024-01-26 | End: 2024-01-26

## 2024-01-26 RX ORDER — DEXMEDETOMIDINE HYDROCHLORIDE 100 UG/ML
INJECTION, SOLUTION INTRAVENOUS
Status: DISCONTINUED | OUTPATIENT
Start: 2024-01-26 | End: 2024-01-26

## 2024-01-26 RX ORDER — DEXTROMETHORPHAN/PSEUDOEPHED 2.5-7.5/.8
40 DROPS ORAL 4 TIMES DAILY PRN
Status: DISCONTINUED | OUTPATIENT
Start: 2024-01-26 | End: 2024-01-27 | Stop reason: HOSPADM

## 2024-01-26 RX ORDER — SODIUM CHLORIDE, SODIUM LACTATE, POTASSIUM CHLORIDE, CALCIUM CHLORIDE 600; 310; 30; 20 MG/100ML; MG/100ML; MG/100ML; MG/100ML
INJECTION, SOLUTION INTRAVENOUS CONTINUOUS
Status: DISCONTINUED | OUTPATIENT
Start: 2024-01-26 | End: 2024-01-27 | Stop reason: HOSPADM

## 2024-01-26 RX ORDER — CEFAZOLIN SODIUM 1 G/3ML
3 INJECTION, POWDER, FOR SOLUTION INTRAMUSCULAR; INTRAVENOUS
Status: COMPLETED | OUTPATIENT
Start: 2024-01-26 | End: 2024-01-26

## 2024-01-26 RX ORDER — PROPOFOL 10 MG/ML
VIAL (ML) INTRAVENOUS
Status: DISCONTINUED | OUTPATIENT
Start: 2024-01-26 | End: 2024-01-26

## 2024-01-26 RX ORDER — FENTANYL CITRATE 50 UG/ML
25 INJECTION, SOLUTION INTRAMUSCULAR; INTRAVENOUS EVERY 5 MIN PRN
Status: DISCONTINUED | OUTPATIENT
Start: 2024-01-26 | End: 2024-01-26 | Stop reason: HOSPADM

## 2024-01-26 RX ORDER — ONDANSETRON HYDROCHLORIDE 2 MG/ML
8 INJECTION, SOLUTION INTRAVENOUS EVERY 6 HOURS PRN
Status: DISCONTINUED | OUTPATIENT
Start: 2024-01-26 | End: 2024-01-27 | Stop reason: HOSPADM

## 2024-01-26 RX ORDER — MUPIROCIN 20 MG/G
OINTMENT TOPICAL
Status: DISCONTINUED | OUTPATIENT
Start: 2024-01-26 | End: 2024-01-26 | Stop reason: HOSPADM

## 2024-01-26 RX ORDER — PANTOPRAZOLE SODIUM 40 MG/10ML
40 INJECTION, POWDER, LYOPHILIZED, FOR SOLUTION INTRAVENOUS 2 TIMES DAILY
Status: DISCONTINUED | OUTPATIENT
Start: 2024-01-26 | End: 2024-01-27 | Stop reason: HOSPADM

## 2024-01-26 RX ORDER — SUCCINYLCHOLINE CHLORIDE 20 MG/ML
INJECTION INTRAMUSCULAR; INTRAVENOUS
Status: DISCONTINUED | OUTPATIENT
Start: 2024-01-26 | End: 2024-01-26

## 2024-01-26 RX ORDER — SODIUM CITRATE AND CITRIC ACID MONOHYDRATE 334; 500 MG/5ML; MG/5ML
30 SOLUTION ORAL ONCE
Status: COMPLETED | OUTPATIENT
Start: 2024-01-26 | End: 2024-01-26

## 2024-01-26 RX ORDER — PROCHLORPERAZINE EDISYLATE 5 MG/ML
5 INJECTION INTRAMUSCULAR; INTRAVENOUS EVERY 6 HOURS PRN
Status: DISCONTINUED | OUTPATIENT
Start: 2024-01-26 | End: 2024-01-27 | Stop reason: HOSPADM

## 2024-01-26 RX ORDER — HALOPERIDOL 5 MG/ML
0.5 INJECTION INTRAMUSCULAR EVERY 10 MIN PRN
Status: DISCONTINUED | OUTPATIENT
Start: 2024-01-26 | End: 2024-01-26 | Stop reason: HOSPADM

## 2024-01-26 RX ORDER — ACETAMINOPHEN 650 MG/20.3ML
500 LIQUID ORAL EVERY 8 HOURS
Status: COMPLETED | OUTPATIENT
Start: 2024-01-26 | End: 2024-01-27

## 2024-01-26 RX ORDER — HYDROMORPHONE HYDROCHLORIDE 1 MG/ML
0.5 INJECTION, SOLUTION INTRAMUSCULAR; INTRAVENOUS; SUBCUTANEOUS
Status: DISCONTINUED | OUTPATIENT
Start: 2024-01-26 | End: 2024-01-27 | Stop reason: HOSPADM

## 2024-01-26 RX ORDER — BUPIVACAINE HYDROCHLORIDE 2.5 MG/ML
INJECTION, SOLUTION EPIDURAL; INFILTRATION; INTRACAUDAL
Status: DISCONTINUED | OUTPATIENT
Start: 2024-01-26 | End: 2024-01-26 | Stop reason: HOSPADM

## 2024-01-26 RX ORDER — SODIUM CHLORIDE 9 MG/ML
INJECTION, SOLUTION INTRAVENOUS CONTINUOUS
Status: DISCONTINUED | OUTPATIENT
Start: 2024-01-26 | End: 2024-01-27 | Stop reason: HOSPADM

## 2024-01-26 RX ORDER — HYDROCODONE BITARTRATE AND ACETAMINOPHEN 7.5; 325 MG/15ML; MG/15ML
15 SOLUTION ORAL EVERY 4 HOURS PRN
Status: DISCONTINUED | OUTPATIENT
Start: 2024-01-26 | End: 2024-01-27 | Stop reason: HOSPADM

## 2024-01-26 RX ORDER — FAMOTIDINE 10 MG/ML
20 INJECTION INTRAVENOUS ONCE
Status: COMPLETED | OUTPATIENT
Start: 2024-01-26 | End: 2024-01-26

## 2024-01-26 RX ORDER — DEXAMETHASONE SODIUM PHOSPHATE 4 MG/ML
INJECTION, SOLUTION INTRA-ARTICULAR; INTRALESIONAL; INTRAMUSCULAR; INTRAVENOUS; SOFT TISSUE
Status: DISCONTINUED | OUTPATIENT
Start: 2024-01-26 | End: 2024-01-26

## 2024-01-26 RX ORDER — METRONIDAZOLE 500 MG/100ML
500 INJECTION, SOLUTION INTRAVENOUS
Status: COMPLETED | OUTPATIENT
Start: 2024-01-26 | End: 2024-01-26

## 2024-01-26 RX ORDER — VASOPRESSIN 20 [USP'U]/ML
INJECTION, SOLUTION INTRAMUSCULAR; SUBCUTANEOUS
Status: DISCONTINUED | OUTPATIENT
Start: 2024-01-26 | End: 2024-01-26

## 2024-01-26 RX ORDER — SODIUM CHLORIDE 0.9 % (FLUSH) 0.9 %
10 SYRINGE (ML) INJECTION
Status: DISCONTINUED | OUTPATIENT
Start: 2024-01-26 | End: 2024-01-26 | Stop reason: HOSPADM

## 2024-01-26 RX ORDER — GABAPENTIN 250 MG/5ML
300 SOLUTION ORAL 3 TIMES DAILY
Status: DISCONTINUED | OUTPATIENT
Start: 2024-01-26 | End: 2024-01-27 | Stop reason: HOSPADM

## 2024-01-26 RX ORDER — LIDOCAINE HYDROCHLORIDE 10 MG/ML
1 INJECTION, SOLUTION EPIDURAL; INFILTRATION; INTRACAUDAL; PERINEURAL ONCE
Status: DISCONTINUED | OUTPATIENT
Start: 2024-01-26 | End: 2024-01-26 | Stop reason: HOSPADM

## 2024-01-26 RX ORDER — ACETAMINOPHEN 500 MG
1000 TABLET ORAL
Status: COMPLETED | OUTPATIENT
Start: 2024-01-26 | End: 2024-01-26

## 2024-01-26 RX ORDER — INDOCYANINE GREEN AND WATER 25 MG
KIT INJECTION
Status: DISCONTINUED | OUTPATIENT
Start: 2024-01-26 | End: 2024-01-26

## 2024-01-26 RX ORDER — MIDAZOLAM HYDROCHLORIDE 1 MG/ML
INJECTION INTRAMUSCULAR; INTRAVENOUS
Status: DISCONTINUED | OUTPATIENT
Start: 2024-01-26 | End: 2024-01-26

## 2024-01-26 RX ORDER — LIDOCAINE HYDROCHLORIDE 20 MG/ML
INJECTION, SOLUTION EPIDURAL; INFILTRATION; INTRACAUDAL; PERINEURAL
Status: DISCONTINUED | OUTPATIENT
Start: 2024-01-26 | End: 2024-01-26

## 2024-01-26 RX ORDER — HEPARIN SODIUM 5000 [USP'U]/ML
5000 INJECTION, SOLUTION INTRAVENOUS; SUBCUTANEOUS ONCE
Status: COMPLETED | OUTPATIENT
Start: 2024-01-26 | End: 2024-01-26

## 2024-01-26 RX ORDER — PHENYLEPHRINE HYDROCHLORIDE 10 MG/ML
INJECTION INTRAVENOUS
Status: DISCONTINUED | OUTPATIENT
Start: 2024-01-26 | End: 2024-01-26

## 2024-01-26 RX ADMIN — PANTOPRAZOLE SODIUM 40 MG: 40 INJECTION, POWDER, FOR SOLUTION INTRAVENOUS at 08:01

## 2024-01-26 RX ADMIN — SODIUM CITRATE AND CITRIC ACID MONOHYDRATE 30 ML: 500; 334 SOLUTION ORAL at 06:01

## 2024-01-26 RX ADMIN — DEXMEDETOMIDINE 4 MCG: 200 INJECTION, SOLUTION INTRAVENOUS at 12:01

## 2024-01-26 RX ADMIN — SUCCINYLCHOLINE CHLORIDE 200 MG: 20 INJECTION, SOLUTION INTRAMUSCULAR; INTRAVENOUS at 07:01

## 2024-01-26 RX ADMIN — PROPOFOL 150 MG: 10 INJECTION, EMULSION INTRAVENOUS at 07:01

## 2024-01-26 RX ADMIN — MIDAZOLAM HYDROCHLORIDE 2 MG: 1 INJECTION INTRAMUSCULAR; INTRAVENOUS at 07:01

## 2024-01-26 RX ADMIN — FENTANYL CITRATE 100 MCG: 50 INJECTION, SOLUTION INTRAMUSCULAR; INTRAVENOUS at 07:01

## 2024-01-26 RX ADMIN — MUPIROCIN: 20 OINTMENT TOPICAL at 06:01

## 2024-01-26 RX ADMIN — CEFAZOLIN 3 G: 330 INJECTION, POWDER, FOR SOLUTION INTRAMUSCULAR; INTRAVENOUS at 07:01

## 2024-01-26 RX ADMIN — PHENYLEPHRINE HYDROCHLORIDE 100 MCG: 10 INJECTION INTRAVENOUS at 07:01

## 2024-01-26 RX ADMIN — CEFAZOLIN 3 G: 330 INJECTION, POWDER, FOR SOLUTION INTRAMUSCULAR; INTRAVENOUS at 11:01

## 2024-01-26 RX ADMIN — SODIUM CHLORIDE, POTASSIUM CHLORIDE, SODIUM LACTATE AND CALCIUM CHLORIDE: 600; 310; 30; 20 INJECTION, SOLUTION INTRAVENOUS at 01:01

## 2024-01-26 RX ADMIN — SODIUM CHLORIDE: 0.9 INJECTION, SOLUTION INTRAVENOUS at 07:01

## 2024-01-26 RX ADMIN — HYDROMORPHONE HYDROCHLORIDE 0.1 MG: 1 INJECTION, SOLUTION INTRAMUSCULAR; INTRAVENOUS; SUBCUTANEOUS at 10:01

## 2024-01-26 RX ADMIN — VASOPRESSIN 2 UNITS: 20 INJECTION INTRAVENOUS at 08:01

## 2024-01-26 RX ADMIN — SCOPALAMINE 1 PATCH: 1 PATCH, EXTENDED RELEASE TRANSDERMAL at 06:01

## 2024-01-26 RX ADMIN — ENOXAPARIN SODIUM 60 MG: 60 INJECTION SUBCUTANEOUS at 09:01

## 2024-01-26 RX ADMIN — ROCURONIUM BROMIDE 20 MG: 10 INJECTION, SOLUTION INTRAVENOUS at 09:01

## 2024-01-26 RX ADMIN — GABAPENTIN 300 MG: 250 SOLUTION ORAL at 09:01

## 2024-01-26 RX ADMIN — ROCURONIUM BROMIDE 10 MG: 10 INJECTION, SOLUTION INTRAVENOUS at 12:01

## 2024-01-26 RX ADMIN — INDOCYANINE GREEN AND WATER 5 MG: KIT at 11:01

## 2024-01-26 RX ADMIN — ROCURONIUM BROMIDE 30 MG: 10 INJECTION, SOLUTION INTRAVENOUS at 10:01

## 2024-01-26 RX ADMIN — FAMOTIDINE 20 MG: 10 INJECTION, SOLUTION INTRAVENOUS at 06:01

## 2024-01-26 RX ADMIN — ACETAMINOPHEN 499.51 MG: 650 SOLUTION ORAL at 09:01

## 2024-01-26 RX ADMIN — SUGAMMADEX 200 MG: 100 INJECTION, SOLUTION INTRAVENOUS at 12:01

## 2024-01-26 RX ADMIN — SODIUM CHLORIDE, SODIUM GLUCONATE, SODIUM ACETATE, POTASSIUM CHLORIDE, MAGNESIUM CHLORIDE, SODIUM PHOSPHATE, DIBASIC, AND POTASSIUM PHOSPHATE: .53; .5; .37; .037; .03; .012; .00082 INJECTION, SOLUTION INTRAVENOUS at 07:01

## 2024-01-26 RX ADMIN — ROCURONIUM BROMIDE 20 MG: 10 INJECTION, SOLUTION INTRAVENOUS at 08:01

## 2024-01-26 RX ADMIN — HYDROCODONE BITARTRATE AND ACETAMINOPHEN 15 ML: 7.5; 325 SOLUTION ORAL at 04:01

## 2024-01-26 RX ADMIN — HYDROMORPHONE HYDROCHLORIDE 0.1 MG: 1 INJECTION, SOLUTION INTRAMUSCULAR; INTRAVENOUS; SUBCUTANEOUS at 09:01

## 2024-01-26 RX ADMIN — PHENYLEPHRINE HYDROCHLORIDE 100 MCG: 10 INJECTION INTRAVENOUS at 08:01

## 2024-01-26 RX ADMIN — ACETAMINOPHEN 499.51 MG: 650 SOLUTION ORAL at 01:01

## 2024-01-26 RX ADMIN — VASOPRESSIN 1 UNITS: 20 INJECTION INTRAVENOUS at 07:01

## 2024-01-26 RX ADMIN — METRONIDAZOLE 500 MG: 500 INJECTION, SOLUTION INTRAVENOUS at 09:01

## 2024-01-26 RX ADMIN — LIDOCAINE HYDROCHLORIDE 100 MG: 20 INJECTION, SOLUTION EPIDURAL; INFILTRATION; INTRACAUDAL; PERINEURAL at 07:01

## 2024-01-26 RX ADMIN — VASOPRESSIN 2 UNITS: 20 INJECTION INTRAVENOUS at 07:01

## 2024-01-26 RX ADMIN — VASOPRESSIN 0.04 UNITS/MIN: 20 INJECTION INTRAVENOUS at 08:01

## 2024-01-26 RX ADMIN — DEXAMETHASONE SODIUM PHOSPHATE 4 MG: 4 INJECTION, SOLUTION INTRAMUSCULAR; INTRAVENOUS at 07:01

## 2024-01-26 RX ADMIN — ROCURONIUM BROMIDE 20 MG: 10 INJECTION, SOLUTION INTRAVENOUS at 11:01

## 2024-01-26 RX ADMIN — ROCURONIUM BROMIDE 60 MG: 10 INJECTION, SOLUTION INTRAVENOUS at 07:01

## 2024-01-26 RX ADMIN — HEPARIN SODIUM 5000 UNITS: 5000 INJECTION INTRAVENOUS; SUBCUTANEOUS at 06:01

## 2024-01-26 RX ADMIN — ACETAMINOPHEN 1000 MG: 500 TABLET ORAL at 06:01

## 2024-01-26 RX ADMIN — GABAPENTIN 300 MG: 250 SOLUTION ORAL at 04:01

## 2024-01-26 RX ADMIN — ONDANSETRON 4 MG: 2 INJECTION INTRAMUSCULAR; INTRAVENOUS at 12:01

## 2024-01-26 RX ADMIN — PANTOPRAZOLE SODIUM 40 MG: 40 INJECTION, POWDER, FOR SOLUTION INTRAVENOUS at 01:01

## 2024-01-26 NOTE — ANESTHESIA PROCEDURE NOTES
Intubation    Date/Time: 1/26/2024 7:29 AM    Performed by: Tez Cardenas DO  Authorized by: Linda Hernandez MD    Intubation:     Induction:  Rapid sequence induction    Intubated:  Postinduction    Mask Ventilation:  Not attempted    Attempts:  1    Attempted By:  Resident anesthesiologist    Method of Intubation:  Video laryngoscopy    Blade:  Orozco 3    Laryngeal View Grade: Grade I - full view of cords      Difficult Airway Encountered?: No      Complications:  None    Airway Device:  Oral endotracheal tube    Airway Device Size:  7.0    Style/Cuff Inflation:  Cuffed (inflated to minimal occlusive pressure)    Tube secured:  22    Secured at:  The lips    Placement Verified By:  Capnometry and Revisualization with laryngoscopy    Complicating Factors:  None, small mouth, obesity, short neck, oropharyngeal edema or fat, poor neck/head extension and anterior larynx    Findings Post-Intubation:  BS equal bilateral and atraumatic/condition of teeth unchanged       Let Pancho know that his lab work shows normal kidney and liver function.  No anemia.  His thyroid function is elevated however.  This could be affecting his bowel pattern.  I would still complete work-up that we discussed on recent office visit but I want him to touch base with his PCP regarding elevated thyroid levels.  Thanks BG

## 2024-01-26 NOTE — BRIEF OP NOTE
[facility] - Surgery ([facility])  Brief Operative Note    SUMMARY     Surgery Date: 1/26/2024     Surgeon(s) and Role:     * Judy Grossman MD - Primary    Assisting Surgeon: None    Pre-op Diagnosis:  Morbid obesity with BMI of 50.0-59.9, adult [E66.01, Z68.43]  CARL (obstructive sleep apnea) [G47.33]  Gastroesophageal reflux disease, unspecified whether esophagitis present [K21.9]    Post-op Diagnosis:  Post-Op Diagnosis Codes:     * Morbid obesity with BMI of 50.0-59.9, adult [E66.01, Z68.43]     * CARL (obstructive sleep apnea) [G47.33]     * Gastroesophageal reflux disease, unspecified whether esophagitis present [K21.9]    Procedure(s) (LRB):  XI ROBOTIC GASTROENTEROSTOMY, VIK-EN-Y w/intraop egd (N/A)    Anesthesia: General    Implants:  Implant Name Type Inv. Item Serial No.  Lot No. LRB No. Used Action   SEAMGUARD ESCHELON 60 MM ART - [id number]  SEAMGUARD ESCHELON 60 MM ART  [employer]. [employer] [id number] N/A 2 Implanted       Operative Findings: Difficult visualization of the hiatus. No obvious hiatal defect was evident from anterior visualization but formal posterior evaluation or repair was not attempted at present habitus.  We were able to proceed with an antecolic antegastric vik-en-y gastric bypass with 100cm BPL and 100cm vik limbs.  Small amount of ICG extravasation focally from the left lateral aspect of the GJ that was oversewn with vicryl.    Estimated Blood Loss: 20ml         Specimens:   Specimen (24h ago, onward)      None            [id number]

## 2024-01-26 NOTE — OP NOTE
Preoperative Diagnosis:  Morbid obesity with BMI of 50.0-59.9, adult [E66.01, Z68.43]  CARL (obstructive sleep apnea) [G47.33]  Gastroesophageal reflux disease, unspecified whether esophagitis present [K21.9]    Post Operative Diagnosis:  As above    Procedure Performed:  1. Robotic assisted laparoscopic vik-en-y gastric bypass  2. Intraoperative upper endoscopy  3. Laparoscopic bilateral transversus abdominus plane block for post-operative pain    Date of Procedure: 1/26/2024    Surgeon(s) and Role:     * Judy Grossman MD - Primary     * Salima Lucas MD - Resident    Type of Anesthesia Used: General anesthesia    Description of Findings:   Significant upper abdominal adiposity and hepatomegaly made visualization of the hiatus very challenging. An obvious or sizeable hiatal hernia defect was not apparent despite the suggestion of on on upper GI and I did not feel further aggressive exploration or repair was practical at this current body habitus.  Antecolic antegastric bypass with 100cm BPL and 100cm Vik limb with linear stapled GJ and JJ anastomoses.  Intraoperative leak test with ICG suggestive of a small, focal leak at the left lateral aspect of the gastroenterostomy closure which was oversewn with vicryl figure of eight.   JJ and Littlejohn defects closed.    Estimated Blood Loss: 20ml    Specimen(s) Removed:  * No specimens in log *    Complications: None    Drains: none    Fluids: 2500ml    Urine Output: 720ml.    Indications: Starla Fisher is an 53 y.o. female who is having surgery for morbid obesity and weight related complications.    Procedure Details: The patient was seen in the Pre-Operative Holding Area. The risks, benefits, complications, treatment options, non-operative alternatives, expected recovery and outcomes were discussed with the patient and her sister. The possibilities of reaction to medication, pulmonary aspiration, injury to surrounding structures, bleeding, recurrent infection, the  need for additional procedures, failure to diagnose a condition, and creating a complication requiring transfusion or operation were discussed with the patient. The patient concurred with the proposed plan, giving informed consent. The site of surgery was properly noted/marked if necessary per policy. The patient was actively warmed in pre-operative area. Pre-operative antibiotics and venous thrombosis prophylaxis were ordered and administered.     The patient was taken to the operating room and placed in the supine position on the operating table. Bilateral sequential compression devices were applied. An orogastric calibration tube was inserted. The patient was positioned on the table on a foot board with ankles and knees secured. The arms were out and the patient was on a bariatric padded table. Her abdomen was prepped and draped in standard surgical fashion During the 3 minute dry time, a surgical time out was done confirming the correct patient, operation and that all the anticipated supplies and instruments were available in the room.    The site of the first trocar was anesthetized with local anesthesia. A Veress needle was inserted in the left subcostal region and a saline drop test was negative. The abdomen was insufflated to 15 mmHg.  The anesthesia service alerted with some concerns regarding hypotension and therefore pneumoperitoneum was allowed to evacuate and the procedure paused until normal blood pressure was achieved.  Pneumoperitoneum was then cautiously reestablished without further concerns. A 5 mm laparoscopic 8 mm robotic visually optic trocar was placed under direct vision in the left mid abdomen approximately 20 cm inferior to the xiphoid. A laparoscopic survey of the abdomen was conducted. There were no signifiicant adhesions and there was no injury from the Veress needle stick. The remainder of the trocars were placed under direct visualization including a 12 mm robotic trocar in the right  mid abdomen and 2 additional 8 mm trocars at the umbilicus and left lateral abdomen.  A 5 mm trocar was placed in the right upper quadrant through which a flexible liver retractor was introduced.  The lie of this retractor once positioned below the enlarged left lobe of the liver was such that it was close to the right robotic trocar and therefore the decision was made to reposition this 5 mm trocar higher in the right upper quadrant. As each trocar site was identified it was injected with local anesthesia at the skin, fascia and peritoneum.  Additionally, using 10 ml of local anesthesia per side, a bilateral transversus abdominis plane block was performed under direct laparoscopic visualization for postoperative pain control.  The patient was positioned in reverse Trendelenburg and the robot deployed and docked.  The visualization of the upper abdomen and in particular hiatus was more challenging than expected given significant adiposity.  Even with the liver retractor appropriately positioned, visualization of the anterior hiatus was not possible, however with further retraction using robotic graspers in conjunction with the liver retractor, we were able to visualize the anterior crural commissure and while there was a large gastroesophageal fat pad, they did not clearly appear to be an anterior divot or concern for a significant hiatal hernia.  Owing to the habitus and difficulty with visualization, while I did feel proceeding with a gastric bypass was in the patient's interest, I did not feel further evaluation and subsequent repair of a possibly small hiatal hernia was feasible or safe.  At this time to aid in visualization of the GE junction and left huan, I used an 18 in 2-0 V lock suture to suspend the left lobe of the liver in a liver Hammock fashion, simultaneously removing the flexible liver retractor in the process.  This did give us better liver retraction and visualization.    For the first step, the  patient's omentum was lifted into the upper abdomen. The mesocolon adjacent to his ligament of Treitz was identified. Small bowel was run 100 cm from the ligament of Treitz and tagged proximally and distally using 2-0 vicryl sutures with differing suture tail lengths for orientation and the proximal suture additionally tacked to the left upper quadrant abdominal wall so we would not have to payam this loop later on. This did reach the mid-body of the stomach without undue tension with the patient in reverse Trendelenburg and therefore I did feel a bypass was feasible. The orogastric 46F Visi-G calibration tube was withdrawn into the esophagus. The omentum was split up to the edge of the level of the transverse colon and greater curvature with the Vessel sealer. A perigastric window was made along the lesser curve approximately 5cm distal to the GE junction and the lesser sac was inspected. The gastric pouch was created using a series of 60mm blue staple loads. The first staple line was more horizontal in orientation, inserted about 45mm and without a staple line reinforcement. The remaining vertically-oriented staples were placed with Seamguard reinforcement along, but staying wide of, the calibration tube. The angle of His was cleaned anteriorly and posteriorly with the vessel sealer and a complete gastric transection was assured.  Again pouch creation was challenging given limited visualization and working space near the left huan and GE junction.    Small bowel was then brought up in up in an ante-colic, ante-gastric fashion through the split omentum, in an Omega loop orientation keeping the mesentery flat and untwisted and with the proximal bowel to the patient's left. Enterotomies were created in the stomach and the Harvinder limb. A white staple load was inserted intraluminally to approximately 2.5 cm and fired. The calibration tube was directed down and across the gastrojejunal anastomosis. The enterotomy defect  was closed in 2 layers with a running absorbable 3-0 V-loc suture over the tube. A mesenteric window was then made in the small bowel just to the left of the gastrojejunostomy and the biliopancreatic limb transected from the harvinder limb, using the calibration tube to limit the size of the candycane limb without impinging on the anastomosis. This was done using a white 60mm staple load. The Harvinder limb was measured 100 cm distally. The Harvinder limb and the biliopancreatic limb were then sutured together using a 2-0 vicryl suture. The bowel limbs were opened above the stay sutures with electrosurgical energy. A final 60mm white staple load was inserted into the lumen, held upward in an anti-mesenteric fashion and fired. The enterotomy was then re-approximated with a running 3-0 V-loc absorbable suture. The toe of the  intraluminal staple line was reinforced with a 2-0 ethibond. The posterior aspect of the anastomosis was reinforced with a stitch as well. The mesenteric defect was completely closed using a running nonabsorbable 2-0 V-loc suture. Littlejohn's space between the Harvinder limb mesentery and the transverse colon mesentery and split omentum was closed with a running 2-0 ethibond suture.    The Harvinder limb was occluded with a grasper.  A total of 100 mL of diluted ICG in sterile water was placed into the gastric pouch and across the gastrojejunal anastomosis via the visi G while using firefly imaging.  There was a suggestion of a very small focal area of ICG extravasation along the left lateral aspect of the common enterotomy closure that was oversewn with a figure-of-eight Vicryl suture, while additionally tacking a small bit of adjacent omentum over this site.  The rest of the staple line appeared well sealed with no concerns for leak. The calibration tube was removed and an upper endoscopy was then performed. The GE junction was noted to lie at approximately 40cm. The anastomosis was patent and there was no bleeding. The  scope easily traversed the anastomosis. The gastrojejunal anastomosis was circumferentially covered with Vistaseal fibrin sealant on the anterior and posterior surface. The cut end of the Harvinder limb was covered as well as the vertical staple line.  The liver Hammock was cut and the suture removed.  The robot was undocked and the patient returned to a level position.  The 12 mm port was removed and the fascia closed with a suture Passer and 0 Vicryl suture. The remainder of the trocars were removed and pneumoperitoneum allowed to escape. The skin was closed with 4-0 monocryl sutures, following which dermabond was applied. I was present for all of the procedure. The patient was extubated and taken to the recovery room in stable condition. Intraoperative findings were communicated to the patient's family. I do feel given the very difficult visualization leading to increased work during pouch creation, liver retraction and then repair of the lateral aspect of the pouch, a modifier-22 is warranted in this case.    Disposition: PACU - hemodynamically stable.      Judy Grossman MD FACS  Minimally Invasive General & Bariatric Surgery

## 2024-01-26 NOTE — TRANSFER OF CARE
"Anesthesia Transfer of Care Note    Patient: Starla Fisher    Procedure(s) Performed: Procedure(s) (LRB):  XI ROBOTIC GASTROENTEROSTOMY, MIAN-EN-Y w/intraop egd (N/A)    Patient location: PACU    Anesthesia Type: general    Transport from OR: Transported from OR on 6-10 L/min O2 by face mask with adequate spontaneous ventilation    Post pain: adequate analgesia    Post assessment: no apparent anesthetic complications and tolerated procedure well    Post vital signs: stable    Level of consciousness: awake, alert and oriented    Nausea/Vomiting: no nausea/vomiting    Complications: none    Transfer of care protocol was followed      Last vitals: Visit Vitals  BP (!) 92/46   Pulse 66   Temp 36.6 °C (97.8 °F) (Temporal)   Resp 20   Ht 5' 4" (1.626 m)   Wt (!) 140.5 kg (309 lb 11.9 oz)   LMP  (LMP Unknown)   SpO2 100%   Breastfeeding No   BMI 53.17 kg/m²     "

## 2024-01-27 VITALS
TEMPERATURE: 98 F | DIASTOLIC BLOOD PRESSURE: 73 MMHG | RESPIRATION RATE: 18 BRPM | HEIGHT: 64 IN | SYSTOLIC BLOOD PRESSURE: 161 MMHG | HEART RATE: 76 BPM | BODY MASS INDEX: 50.02 KG/M2 | WEIGHT: 293 LBS | OXYGEN SATURATION: 100 %

## 2024-01-27 LAB
ANION GAP SERPL CALC-SCNC: 7 MMOL/L (ref 8–16)
BASOPHILS # BLD AUTO: 0.01 K/UL (ref 0–0.2)
BASOPHILS NFR BLD: 0.1 % (ref 0–1.9)
BUN SERPL-MCNC: 13 MG/DL (ref 6–20)
CALCIUM SERPL-MCNC: 9 MG/DL (ref 8.7–10.5)
CHLORIDE SERPL-SCNC: 107 MMOL/L (ref 95–110)
CO2 SERPL-SCNC: 26 MMOL/L (ref 23–29)
CREAT SERPL-MCNC: 0.8 MG/DL (ref 0.5–1.4)
DIFFERENTIAL METHOD BLD: ABNORMAL
EOSINOPHIL # BLD AUTO: 0 K/UL (ref 0–0.5)
EOSINOPHIL NFR BLD: 0 % (ref 0–8)
ERYTHROCYTE [DISTWIDTH] IN BLOOD BY AUTOMATED COUNT: 14.7 % (ref 11.5–14.5)
EST. GFR  (NO RACE VARIABLE): >60 ML/MIN/1.73 M^2
GLUCOSE SERPL-MCNC: 99 MG/DL (ref 70–110)
HCT VFR BLD AUTO: 35.9 % (ref 37–48.5)
HGB BLD-MCNC: 11.1 G/DL (ref 12–16)
IMM GRANULOCYTES # BLD AUTO: 0.03 K/UL (ref 0–0.04)
IMM GRANULOCYTES NFR BLD AUTO: 0.3 % (ref 0–0.5)
LYMPHOCYTES # BLD AUTO: 1 K/UL (ref 1–4.8)
LYMPHOCYTES NFR BLD: 9.7 % (ref 18–48)
MAGNESIUM SERPL-MCNC: 2.1 MG/DL (ref 1.6–2.6)
MCH RBC QN AUTO: 26.3 PG (ref 27–31)
MCHC RBC AUTO-ENTMCNC: 30.9 G/DL (ref 32–36)
MCV RBC AUTO: 85 FL (ref 82–98)
MONOCYTES # BLD AUTO: 0.8 K/UL (ref 0.3–1)
MONOCYTES NFR BLD: 7.2 % (ref 4–15)
NEUTROPHILS # BLD AUTO: 8.7 K/UL (ref 1.8–7.7)
NEUTROPHILS NFR BLD: 82.7 % (ref 38–73)
NRBC BLD-RTO: 0 /100 WBC
PHOSPHATE SERPL-MCNC: 2.8 MG/DL (ref 2.7–4.5)
PLATELET # BLD AUTO: 254 K/UL (ref 150–450)
PMV BLD AUTO: 11.2 FL (ref 9.2–12.9)
POTASSIUM SERPL-SCNC: 4.6 MMOL/L (ref 3.5–5.1)
RBC # BLD AUTO: 4.22 M/UL (ref 4–5.4)
SODIUM SERPL-SCNC: 140 MMOL/L (ref 136–145)
WBC # BLD AUTO: 10.52 K/UL (ref 3.9–12.7)

## 2024-01-27 PROCEDURE — C9113 INJ PANTOPRAZOLE SODIUM, VIA: HCPCS | Mod: HCNC | Performed by: SURGERY

## 2024-01-27 PROCEDURE — 83735 ASSAY OF MAGNESIUM: CPT | Mod: HCNC | Performed by: SURGERY

## 2024-01-27 PROCEDURE — 84100 ASSAY OF PHOSPHORUS: CPT | Mod: HCNC | Performed by: SURGERY

## 2024-01-27 PROCEDURE — 63600175 PHARM REV CODE 636 W HCPCS: Mod: HCNC | Performed by: SURGERY

## 2024-01-27 PROCEDURE — 25000003 PHARM REV CODE 250: Mod: HCNC

## 2024-01-27 PROCEDURE — 80048 BASIC METABOLIC PNL TOTAL CA: CPT | Mod: HCNC | Performed by: SURGERY

## 2024-01-27 PROCEDURE — 85025 COMPLETE CBC W/AUTO DIFF WBC: CPT | Mod: HCNC | Performed by: SURGERY

## 2024-01-27 PROCEDURE — 25000003 PHARM REV CODE 250: Mod: HCNC | Performed by: SURGERY

## 2024-01-27 PROCEDURE — 36415 COLL VENOUS BLD VENIPUNCTURE: CPT | Mod: HCNC | Performed by: SURGERY

## 2024-01-27 RX ORDER — DILTIAZEM HYDROCHLORIDE 30 MG/1
90 TABLET, FILM COATED ORAL EVERY 6 HOURS
Status: DISCONTINUED | OUTPATIENT
Start: 2024-01-27 | End: 2024-01-27

## 2024-01-27 RX ORDER — DILTIAZEM HYDROCHLORIDE 60 MG/1
60 TABLET, FILM COATED ORAL 4 TIMES DAILY
Qty: 56 TABLET | Refills: 0 | Status: SHIPPED | OUTPATIENT
Start: 2024-01-27 | End: 2024-02-10

## 2024-01-27 RX ORDER — DILTIAZEM HYDROCHLORIDE 120 MG/1
240 CAPSULE, COATED, EXTENDED RELEASE ORAL DAILY
Status: DISCONTINUED | OUTPATIENT
Start: 2024-01-27 | End: 2024-01-27

## 2024-01-27 RX ORDER — BUPROPION HYDROCHLORIDE 150 MG/1
150 TABLET ORAL DAILY
Status: DISCONTINUED | OUTPATIENT
Start: 2024-01-27 | End: 2024-01-27 | Stop reason: HOSPADM

## 2024-01-27 RX ORDER — DILTIAZEM HYDROCHLORIDE 30 MG/1
60 TABLET, FILM COATED ORAL EVERY 6 HOURS
Status: DISCONTINUED | OUTPATIENT
Start: 2024-01-27 | End: 2024-01-27 | Stop reason: HOSPADM

## 2024-01-27 RX ORDER — DILTIAZEM HYDROCHLORIDE 60 MG/1
90 TABLET, FILM COATED ORAL 4 TIMES DAILY
Qty: 84 TABLET | Refills: 0 | Status: SHIPPED | OUTPATIENT
Start: 2024-01-27 | End: 2024-01-27

## 2024-01-27 RX ORDER — DILTIAZEM HYDROCHLORIDE 240 MG/1
240 CAPSULE, COATED, EXTENDED RELEASE ORAL DAILY
Qty: 90 CAPSULE | Refills: 1 | Status: SHIPPED | OUTPATIENT
Start: 2024-02-10 | End: 2025-02-09

## 2024-01-27 RX ORDER — ESCITALOPRAM OXALATE 20 MG/1
20 TABLET ORAL DAILY
Status: DISCONTINUED | OUTPATIENT
Start: 2024-01-27 | End: 2024-01-27 | Stop reason: HOSPADM

## 2024-01-27 RX ADMIN — DILTIAZEM HYDROCHLORIDE 60 MG: 30 TABLET, FILM COATED ORAL at 11:01

## 2024-01-27 RX ADMIN — PANTOPRAZOLE SODIUM 40 MG: 40 INJECTION, POWDER, FOR SOLUTION INTRAVENOUS at 08:01

## 2024-01-27 RX ADMIN — ESCITALOPRAM OXALATE 20 MG: 20 TABLET ORAL at 08:01

## 2024-01-27 RX ADMIN — BUPROPION HYDROCHLORIDE 150 MG: 150 TABLET, FILM COATED, EXTENDED RELEASE ORAL at 08:01

## 2024-01-27 RX ADMIN — ENOXAPARIN SODIUM 60 MG: 60 INJECTION SUBCUTANEOUS at 08:01

## 2024-01-27 RX ADMIN — ACETAMINOPHEN 499.51 MG: 650 SOLUTION ORAL at 06:01

## 2024-01-27 NOTE — NURSING
Nurses Note -- 4 Eyes      1/26/2024   6:47 PM      Skin assessed during: Admit      [x] No Altered Skin Integrity Present    []Prevention Measures Documented      [] Yes- Altered Skin Integrity Present or Discovered   [] LDA Added if Not in Epic (Describe Wound)   [] New Altered Skin Integrity was Present on Admit and Documented in LDA   [] Wound Image Taken    Wound Care Consulted? Yes    Attending Nurse:  Caroline Wade RN/Staff Member:   Steph

## 2024-01-27 NOTE — HOSPITAL COURSE
Pt underwent above procedure and progressed as expected post-operatively. She is stable and ready for discharge on POD#1. She is tolerating a diet without nausea or vomiting, ambulating and her pain is controlled. She will follow-up in clinic.

## 2024-01-27 NOTE — PROGRESS NOTES
[facility]  General Surgery  Progress Note    Subjective:     History of Present Illness:  No notes on file    Post-Op Info:  Procedure(s) (LRB):  XI ROBOTIC GASTROENTEROSTOMY, MIAN-EN-Y w/intraop egd (N/A)   1 Day Post-Op     Interval History: NAEO. Doing well with water protocol. Ambulating. No nausea or vomiting.     Medications:  Continuous Infusions:   sodium chloride 0.9%      lactated ringers 125 mL/hr at 01/26/24 1318     Scheduled Meds:   buPROPion  150 mg Oral Daily    diltiaZEM  240 mg Oral Daily    enoxparin  60 mg Subcutaneous Q12H    EScitalopram oxalate  20 mg Oral Daily    gabapentin  300 mg Oral TID    pantoprazole  40 mg Intravenous BID    scopolamine  1 patch Transdermal Once Pre-Op     PRN Meds:hydrocodone-apap 7.5-325 MG/15 ML, HYDROmorphone, ondansetron, prochlorperazine, simethicone     Review of patient's allergies indicates:  No Known Allergies  Objective:     Vital Signs (Most Recent):  Temp: 98 °F (36.7 °C) (01/27/24 0738)  Pulse: 69 (01/27/24 0738)  Resp: 16 (01/27/24 0738)  BP: (!) 119/56 (01/27/24 0738)  SpO2: 97 % (01/27/24 0738) Vital Signs (24h Range):  Temp:  [97.4 °F (36.3 °C)-98.2 °F (36.8 °C)] 98 °F (36.7 °C)  Pulse:  [63-74] 69  Resp:  [11-20] 16  SpO2:  [94 %-100 %] 97 %  BP: ()/(43-61) 119/56     Weight: (!) 140.5 kg (309 lb 11.9 oz)  Body mass index is 53.17 kg/m².    Intake/Output - Last 3 Shifts         01/25 0700 01/26 0659 01/26 0700 01/27 0659 01/27 0700 01/28 0659    I.V. (mL/kg)  1500 (10.7)     IV Piggyback  1100     Total Intake(mL/kg)  2600 (18.5)     Urine (mL/kg/hr)  1820 (0.5)     Total Output  1820     Net  +780                     Physical Exam  Vitals reviewed.   Constitutional:       General: She is not in acute distress.  HENT:      Head: Normocephalic and atraumatic.      Nose: Nose normal.   Eyes:      General:         Right eye: No discharge.         Left eye: No discharge.   Cardiovascular:      Rate and Rhythm: Normal rate and regular  rhythm.   Pulmonary:      Effort: Pulmonary effort is normal. No respiratory distress.      Breath sounds: No stridor.   Abdominal:      Comments: Soft, aTTP, ND    Incisions clear   Musculoskeletal:         General: Normal range of motion.      Cervical back: Normal range of motion.   Skin:     General: Skin is warm and dry.      Capillary Refill: Capillary refill takes 2 to 3 seconds.   Neurological:      General: No focal deficit present.      Mental Status: She is alert and oriented to person, place, and time.   Psychiatric:         Mood and Affect: Mood normal.         Behavior: Behavior normal.          Significant Labs:  I have reviewed all pertinent lab results within the past 24 hours.    Significant Diagnostics:  I have reviewed all pertinent imaging results/findings within the past 24 hours.  Assessment/Plan:     * Obesity  53F s/p XI RNY gastric bypass.     Water protocol then clears  Pain and antiemetics  Ambulate/IS  DVT ppx    Dispo: Likely home later         Arnold Walters MD  General Surgery  [facility]

## 2024-01-27 NOTE — ASSESSMENT & PLAN NOTE
53F s/p XI RNY gastric bypass.     Water protocol then clears  Pain and antiemetics  Ambulate/IS  DVT ppx    Dispo: Likely home later

## 2024-01-27 NOTE — SUBJECTIVE & OBJECTIVE
Interval History: NAEO. Doing well with water protocol. Ambulating. No nausea or vomiting.     Medications:  Continuous Infusions:   sodium chloride 0.9%      lactated ringers 125 mL/hr at 01/26/24 1318     Scheduled Meds:   buPROPion  150 mg Oral Daily    diltiaZEM  240 mg Oral Daily    enoxparin  60 mg Subcutaneous Q12H    EScitalopram oxalate  20 mg Oral Daily    gabapentin  300 mg Oral TID    pantoprazole  40 mg Intravenous BID    scopolamine  1 patch Transdermal Once Pre-Op     PRN Meds:hydrocodone-apap 7.5-325 MG/15 ML, HYDROmorphone, ondansetron, prochlorperazine, simethicone     Review of patient's allergies indicates:  No Known Allergies  Objective:     Vital Signs (Most Recent):  Temp: 98 °F (36.7 °C) (01/27/24 0738)  Pulse: 69 (01/27/24 0738)  Resp: 16 (01/27/24 0738)  BP: (!) 119/56 (01/27/24 0738)  SpO2: 97 % (01/27/24 0738) Vital Signs (24h Range):  Temp:  [97.4 °F (36.3 °C)-98.2 °F (36.8 °C)] 98 °F (36.7 °C)  Pulse:  [63-74] 69  Resp:  [11-20] 16  SpO2:  [94 %-100 %] 97 %  BP: ()/(43-61) 119/56     Weight: (!) 140.5 kg (309 lb 11.9 oz)  Body mass index is 53.17 kg/m².    Intake/Output - Last 3 Shifts         01/25 0700  01/26 0659 01/26 0700 01/27 0659 01/27 0700 01/28 0659    I.V. (mL/kg)  1500 (10.7)     IV Piggyback  1100     Total Intake(mL/kg)  2600 (18.5)     Urine (mL/kg/hr)  1820 (0.5)     Total Output  1820     Net  +780                     Physical Exam  Vitals reviewed.   Constitutional:       General: She is not in acute distress.  HENT:      Head: Normocephalic and atraumatic.      Nose: Nose normal.   Eyes:      General:         Right eye: No discharge.         Left eye: No discharge.   Cardiovascular:      Rate and Rhythm: Normal rate and regular rhythm.   Pulmonary:      Effort: Pulmonary effort is normal. No respiratory distress.      Breath sounds: No stridor.   Abdominal:      Comments: Soft, aTTP, ND    Incisions clear   Musculoskeletal:         General: Normal range of  motion.      Cervical back: Normal range of motion.   Skin:     General: Skin is warm and dry.      Capillary Refill: Capillary refill takes 2 to 3 seconds.   Neurological:      General: No focal deficit present.      Mental Status: She is alert and oriented to person, place, and time.   Psychiatric:         Mood and Affect: Mood normal.         Behavior: Behavior normal.          Significant Labs:  I have reviewed all pertinent lab results within the past 24 hours.    Significant Diagnostics:  I have reviewed all pertinent imaging results/findings within the past 24 hours.

## 2024-01-27 NOTE — NURSING
Patient arrived to room via bed and transport. Sister at the bedside. Vitals taken, questions answered. 4eyes completed.

## 2024-01-28 NOTE — PLAN OF CARE
[facility]  Discharge Final Note    Primary Care Provider: Tahir Mosquera MD    Expected Discharge Date: 1/27/2024    Final Discharge Note (most recent)       Final Note - 01/27/24 2332          Final Note    Assessment Type Final Discharge Note (P)      Anticipated Discharge Disposition Home or Self Care (P)      Hospital Resources/Appts/Education Provided Provided patient/caregiver with written discharge plan information;Provided education on problems/symptoms using teachback;Appointments scheduled and added to AVS (P)         Post-Acute Status    Post-Acute Authorization Other (P)      Other Status No Post-Acute Service Needs (P)      Discharge Delays None known at this time (P)                      Important Message from Medicare             Contact Info       Judy Grossman MD   Specialty: Surgery, Bariatrics    [address]  [facility] [address]   Phone: [phone]       Next Steps: Follow up in 2 week(s)    Instructions: After surgery follow-up          Pt.discharged home with Self-Care. After review of pt's medical record, no discharge needs identified.     Batool Cisse LMSW

## 2024-01-29 ENCOUNTER — PATIENT OUTREACH (OUTPATIENT)
Dept: ADMINISTRATIVE | Facility: CLINIC | Age: 54
End: 2024-01-29
Payer: MEDICARE

## 2024-01-29 ENCOUNTER — TELEPHONE (OUTPATIENT)
Dept: UROLOGY | Facility: CLINIC | Age: 54
End: 2024-01-29
Payer: MEDICARE

## 2024-01-29 NOTE — TELEPHONE ENCOUNTER
Called pt in regards to message in portal about device help. States that she can not wait until 3/12/24 for her previously scheduled appt. I offered the pt an appt on 2/7/24 at 8:40 am. Pt accepted this time and date.

## 2024-01-29 NOTE — DISCHARGE SUMMARY
[facility] - [facility]  General Surgery  Discharge Summary      Patient Name: Starla Fisher  MRN: [id number]  Admission Date: 1/26/2024  Hospital Length of Stay: 1 days  Discharge Date and Time: 1/27/2024  2:41 PM  Attending Physician: Jeanne att. providers found   Discharging Provider: Judy Grossman MD  Primary Care Provider: Tahir Mosquera MD     HPI: The patient is a 53 y.o. obese female who presents for pre op for Laparoscopic Vik-en-Y Gastric Bypass.       She has multiple associated comorbidities including A. Flutter (not on anticoagulation), essential hypertension, CARL, GERD on daily medications, depression, anxiety, and osteoarthritis.       Her highest adult weight was 322 lbs at age 53 , and her lowest adult weight was 200 lbs at age 31s. Much of the weight came on after loss of parents; she lived with them given her high functioning autism and now lives with sister.   All workup has been reviewed in clinic today and there is nothing on the review that would prevent us from proceeding with surgery.    All questions were answered in clinic today prior to leaving.     Reports she had been on mobic previously but no longer takes NSAIDs. Has the occasional knee aspiration and injection for OA. Ambulates with cane.     Procedure(s) (LRB):  XI ROBOTIC GASTROENTEROSTOMY, VIK-EN-Y w/intraop egd (N/A)     Hospital Course: Starla underwent a robotic vik-en-y gastric bypass on 1/26/2024. Her procedure was uncomplicated and she tolerated it well. Post-operatively, she remained stable during her hospital stay, was ambulatory and pain and nausea were well controlled. She tolerated advancement from water protocol to bariatric liquids appropriately and on 1/27/2024 was deemed stable for discharge home.    Consults: Nutrition    Significant Diagnostic Studies:   Lab Results   Component Value Date    WBC 10.52 01/27/2024    HGB 11.1 (L) 01/27/2024    HCT 35.9 (L) 01/27/2024    MCV 85 01/27/2024     01/27/2024        CMP  Sodium   Date Value Ref Range Status   01/27/2024 140 136 - 145 mmol/L Final     Potassium   Date Value Ref Range Status   01/27/2024 4.6 3.5 - 5.1 mmol/L Final     Chloride   Date Value Ref Range Status   01/27/2024 107 95 - 110 mmol/L Final     CO2   Date Value Ref Range Status   01/27/2024 26 23 - 29 mmol/L Final     Glucose   Date Value Ref Range Status   01/27/2024 99 70 - 110 mg/dL Final     BUN   Date Value Ref Range Status   01/27/2024 13 6 - 20 mg/dL Final     Creatinine   Date Value Ref Range Status   01/27/2024 0.8 0.5 - 1.4 mg/dL Final     Calcium   Date Value Ref Range Status   01/27/2024 9.0 8.7 - 10.5 mg/dL Final     Total Protein   Date Value Ref Range Status   01/08/2024 7.8 6.0 - 8.4 g/dL Final     Albumin   Date Value Ref Range Status   01/08/2024 3.4 (L) 3.5 - 5.2 g/dL Final     Total Bilirubin   Date Value Ref Range Status   01/08/2024 0.6 0.1 - 1.0 mg/dL Final     Comment:     For infants and newborns, interpretation of results should be based  on gestational age, weight and in agreement with clinical  observations.    Premature Infant recommended reference ranges:  Up to 24 hours.............<8.0 mg/dL  Up to 48 hours............<12.0 mg/dL  3-5 days..................<15.0 mg/dL  6-29 days.................<15.0 mg/dL       Alkaline Phosphatase   Date Value Ref Range Status   01/08/2024 91 55 - 135 U/L Final     AST   Date Value Ref Range Status   01/08/2024 14 10 - 40 U/L Final     ALT   Date Value Ref Range Status   01/08/2024 15 10 - 44 U/L Final     Anion Gap   Date Value Ref Range Status   01/27/2024 7 (L) 8 - 16 mmol/L Final     eGFR   Date Value Ref Range Status   01/27/2024 >60.0 >60 mL/min/1.73 m^2 Final         Pending Diagnostic Studies:       None          Final Active Diagnoses:    Diagnosis Date Noted POA    PRINCIPAL PROBLEM:  Obesity [E66.9] 01/26/2024 Yes      Problems Resolved During this Admission:      Discharged Condition: good    Disposition: Home or Self  Care    Follow Up:   Follow-up Information       Judy Grossman MD Follow up in 2 week(s).    Specialties: Surgery, Bariatrics  Why: After surgery follow-up  Contact information:  [facility]  [facility] [address] [phone]                           Patient Instructions:      Lifting restrictions   Order Comments: No lifting greater than 10 pounds for 6 weeks from day of surgery.  No pushing/pulling such as vacuuming or raking.  No straining, avoid constipation and take stool softeners as described and laxatives as needed.  No driving while on narcotics and until you can react quickly without pain.     No dressing needed   Order Comments: WOUND CARE  You have skin glue over your incision(s)  This will slowly flake away in about 10 days  It is okay to shower starting the 24-48hrs after surgery  Can pat your incision dry  Please do not scrub hard over your incisions  Please do not pick the glue off or it will reopen the wound     Notify your health care provider if you experience any of the following:  temperature >100.4     Notify your health care provider if you experience any of the following:  persistent nausea and vomiting or diarrhea     Notify your health care provider if you experience any of the following:  redness, tenderness, or signs of infection (pain, swelling, redness, odor or green/yellow discharge around incision site)     Notify your health care provider if you experience any of the following:  difficulty breathing or increased cough     Medications:  Reconciled Home Medications:      Medication List        CHANGE how you take these medications      * diltiaZEM 60 MG tablet  Commonly known as: CARDIZEM  Take 1 tablet (60 mg total) by mouth 4 (four) times daily. Please crush prior to administration for 14 days  What changed: You were already taking a medication with the same name, and this prescription was added. Make sure you understand how and when to take each.     * diltiaZEM 240 MG 24  hr capsule  Commonly known as: CARDIZEM CD  Take 1 capsule (240 mg total) by mouth once daily.  Start taking on: February 10, 2024  What changed: These instructions start on February 10, 2024. If you are unsure what to do until then, ask your doctor or other care provider.           * This list has 2 medication(s) that are the same as other medications prescribed for you. Read the directions carefully, and ask your doctor or other care provider to review them with you.                CONTINUE taking these medications      ADULT MULTIVITAMIN GUMMIES ORAL  Take 1 tablet by mouth Daily. Continue to hold until after Surgery.     blood-glucose meter kit  Commonly known as: TRUE METRIX AIR GLUCOSE METER  use as directed     BOTOX 200 unit Solr  Generic drug: onabotulinumtoxina     buPROPion 150 MG TB24 tablet  Commonly known as: WELLBUTRIN XL  Take 1 tablet (150 mg total) by mouth once daily.     clotrimazole-betamethasone 1-0.05% cream  Commonly known as: LOTRISONE  Apply topically 2 (two) times daily.     diazePAM 5 MG tablet  Commonly known as: VALIUM  Take 1 tablet (5 mg total) by mouth daily as needed for Anxiety.     EScitalopram oxalate 20 MG tablet  Commonly known as: LEXAPRO  Take 1 tablet (20 mg total) by mouth once daily.     Gx-X-mdbhv-B12-L.acid,plan-inu 65 mg iron- 50 mg-1 mg DFE Cap  Take by mouth.     ferrous sulfate 325 mg (65 mg iron) Tab tablet  Commonly known as: FEOSOL  Take 325 mg by mouth.     fluconazole 150 MG Tab  Commonly known as: DIFLUCAN  Take one tablet today and one in a week     GAVILAX 17 gram/dose powder  Generic drug: polyethylene glycol  Mix 1 capful (17 g) in liquid and drink by mouth once daily.     HYDROcodone-acetaminophen 5-325 mg per tablet  Commonly known as: NORCO  Take 1 tablet by mouth every 4 (four) hours as needed for Pain.     ketorolac 30 mg/mL (1 mL) injection  Commonly known as: TORADOL     * losartan 50 MG tablet  Commonly known as: COZAAR  Take 1 tablet (50 mg total)  by mouth once daily.     * losartan 50 MG tablet  Commonly known as: COZAAR  Take 1 tablet (50 mg total) by mouth once daily.     metronidazole 0.75% 0.75 % Crea  Commonly known as: METROCREAM  Apply topically 2 (two) times daily.     multivitamin per tablet  Commonly known as: THERAGRAN  Take 1 tablet by mouth.     nystatin powder  Commonly known as: MYCOSTATIN  Apply to affected area 3 times daily     omeprazole 40 MG capsule  Commonly known as: PRILOSEC  Take 1 capsule (40 mg total) by mouth once daily.     ondansetron 8 MG Tbdl  Commonly known as: ZOFRAN-ODT  Take 1 tablet (8 mg total) by mouth every 6 (six) hours as needed (nausea after surgery).     oxybutynin 10 MG 24 hr tablet  Commonly known as: DITROPAN-XL  Take 1 tablet (10 mg total) by mouth once daily.     oxyCODONE 5 mg/5 mL Soln  Commonly known as: ROXICODONE  Take 5 mLs (5 mg total) by mouth every 6 (six) hours as needed.     PAXLOVID 300 mg (150 mg x 2)-100 mg copackaged tablets (EUA)  Generic drug: nirmatrelvir-ritonavir  Take 3 tablets by mouth 2 (two) times daily for 5 days. Each dose contains 2 nirmatrelvir (pink tablets) and 1 ritonavir (white tablet). Take all 3 tablets together     PHENYLephrine 10 mg/mL injection     promethazine 25 MG tablet  Commonly known as: PHENERGAN  Take 1 tablet (25 mg total) by mouth every 6 (six) hours as needed for Nausea.     propofoL 10 mg/mL infusion  Commonly known as: DIPRIVAN     ROPIvacaine 0.5% (PF) 5 mg/mL (0.5 %) injection  Commonly known as: NAROPIN     SUMAtriptan 20 mg/actuation nasal spray  Commonly known as: IMITREX  USE ONE SPRAY IN THE NOSTRIL AS NEEDED FOR MIGRAINE(MAX 40MG/24 HOURS)     * sumatriptan 6 mg/0.5 mL kit  Commonly known as: IMITREX STATDOSE  INJECT 0.5 ML(6 MG TOTAL) UNDER THE SKIN EVERY 2 HOURS AS NEEDED FOR MIGRAINE. Max 12mg/24hrs     * sumatriptan 100 MG tablet  Commonly known as: IMITREX  TAKE 1 TABLET BY MOUTH EVERY 2 HOURS AS NEEDED FOR MIGRAINE. Max 200mg/24hrs.     traMADoL  50 mg tablet  Commonly known as: ULTRAM  Take 1 tablet (50 mg total) by mouth every 6 (six) hours.     traZODone 150 MG tablet  Commonly known as: DESYREL  Take half or whole tablet by mouth before bed as needed for insomnia     triamcinolone acetonide 0.025% 0.025 % Oint  Commonly known as: KENALOG  Apply topically daily as needed (vulvar itching).     TRUEPLUS LANCETS 33 gauge Misc  Generic drug: lancets  TEST BLOOD SUGAR THREE TIMES DAILY     ULTRA-LIGHT ROLLATOR Misc  Generic drug: walker  Rolator for stability and balance postoperatively as needed while standing and walking.     ursodioL 500 MG tablet  Commonly known as: ACTIGALL  Take 1 tablet (500 mg total) by mouth once daily.           * This list has 4 medication(s) that are the same as other medications prescribed for you. Read the directions carefully, and ask your doctor or other care provider to review them with you.                  Judy Grossman MD  General Surgery  [facility] - Surgery

## 2024-01-29 NOTE — TELEPHONE ENCOUNTER
----- Message from Sameera Pineda sent at 1/29/2024  8:22 AM CST -----  Type:  Needs Medical Advice    Who Called:  pt's caregiver & sister BRANDON   Symptoms (please be specific):    How long has patient had these symptoms:    Pharmacy name and phone #:    Would the patient rather a call back or a response via [facility]?   Best Call Back Number:  [phone]  Additional Information: wants to speak to the office about her device and she is leaking urine. Needs assitance on how to adjust

## 2024-01-31 NOTE — ANESTHESIA POSTPROCEDURE EVALUATION
Anesthesia Post Evaluation    Patient: Starla Fisher    Procedure(s) Performed: Procedure(s) (LRB):  XI ROBOTIC GASTROENTEROSTOMY, MIAN-EN-Y w/intraop egd (N/A)    Final Anesthesia Type: general      Patient location during evaluation: PACU  Patient participation: Yes- Able to Participate  Level of consciousness: awake and alert  Post-procedure vital signs: reviewed and stable  Pain management: adequate  Airway patency: patent  CARL mitigation strategies: Extubation while patient is awake and Verification of full reversal of neuromuscular block  PONV status at discharge: No PONV  Anesthetic complications: no      Cardiovascular status: blood pressure returned to baseline  Respiratory status: unassisted  Hydration status: euvolemic  Follow-up not needed.              Vitals Value Taken Time   /73 01/27/24 1147   Temp 36.8 °C (98.3 °F) 01/27/24 1147   Pulse 76 01/27/24 1147   Resp 18 01/27/24 1147   SpO2 100 % 01/27/24 1147         Event Time   Out of Recovery 14:00:00         Pain/Anca Score: No data recorded

## 2024-02-02 ENCOUNTER — OFFICE VISIT (OUTPATIENT)
Dept: FAMILY MEDICINE | Facility: CLINIC | Age: 54
End: 2024-02-02
Payer: MEDICARE

## 2024-02-02 ENCOUNTER — CLINICAL SUPPORT (OUTPATIENT)
Dept: BARIATRICS | Facility: CLINIC | Age: 54
End: 2024-02-02
Payer: MEDICARE

## 2024-02-02 VITALS
SYSTOLIC BLOOD PRESSURE: 122 MMHG | HEART RATE: 79 BPM | HEIGHT: 64 IN | OXYGEN SATURATION: 95 % | BODY MASS INDEX: 50.02 KG/M2 | WEIGHT: 293 LBS | DIASTOLIC BLOOD PRESSURE: 80 MMHG

## 2024-02-02 DIAGNOSIS — Z71.3 DIETARY COUNSELING AND SURVEILLANCE: ICD-10-CM

## 2024-02-02 DIAGNOSIS — I10 ESSENTIAL HYPERTENSION: ICD-10-CM

## 2024-02-02 DIAGNOSIS — F33.1 MAJOR DEPRESSIVE DISORDER, RECURRENT, MODERATE: ICD-10-CM

## 2024-02-02 DIAGNOSIS — Z98.84 S/P BARIATRIC SURGERY: ICD-10-CM

## 2024-02-02 DIAGNOSIS — E66.01 MORBID OBESITY WITH BMI OF 50.0-59.9, ADULT: ICD-10-CM

## 2024-02-02 DIAGNOSIS — Z98.84 S/P GASTRIC BYPASS: ICD-10-CM

## 2024-02-02 DIAGNOSIS — F41.0 GENERALIZED ANXIETY DISORDER WITH PANIC ATTACKS: ICD-10-CM

## 2024-02-02 DIAGNOSIS — I10 ESSENTIAL HYPERTENSION: Primary | ICD-10-CM

## 2024-02-02 DIAGNOSIS — G47.33 OSA (OBSTRUCTIVE SLEEP APNEA): ICD-10-CM

## 2024-02-02 DIAGNOSIS — F41.1 GENERALIZED ANXIETY DISORDER WITH PANIC ATTACKS: ICD-10-CM

## 2024-02-02 DIAGNOSIS — Z09 HOSPITAL DISCHARGE FOLLOW-UP: Primary | ICD-10-CM

## 2024-02-02 DIAGNOSIS — G43.109 MIGRAINE WITH AURA AND WITHOUT STATUS MIGRAINOSUS, NOT INTRACTABLE: ICD-10-CM

## 2024-02-02 PROCEDURE — 99999 PR PBB SHADOW E&M-EST. PATIENT-LVL V: CPT | Mod: PBBFAC,HCNC,, | Performed by: FAMILY MEDICINE

## 2024-02-02 PROCEDURE — 99499 UNLISTED E&M SERVICE: CPT | Mod: HCNC,95,, | Performed by: DIETITIAN, REGISTERED

## 2024-02-02 PROCEDURE — 3079F DIAST BP 80-89 MM HG: CPT | Mod: HCNC,CPTII,S$GLB, | Performed by: FAMILY MEDICINE

## 2024-02-02 PROCEDURE — 99214 OFFICE O/P EST MOD 30 MIN: CPT | Mod: HCNC,S$GLB,, | Performed by: FAMILY MEDICINE

## 2024-02-02 PROCEDURE — 1159F MED LIST DOCD IN RCRD: CPT | Mod: HCNC,CPTII,S$GLB, | Performed by: FAMILY MEDICINE

## 2024-02-02 PROCEDURE — 1111F DSCHRG MED/CURRENT MED MERGE: CPT | Mod: HCNC,CPTII,S$GLB, | Performed by: FAMILY MEDICINE

## 2024-02-02 PROCEDURE — 3074F SYST BP LT 130 MM HG: CPT | Mod: HCNC,CPTII,S$GLB, | Performed by: FAMILY MEDICINE

## 2024-02-02 PROCEDURE — 3008F BODY MASS INDEX DOCD: CPT | Mod: HCNC,CPTII,S$GLB, | Performed by: FAMILY MEDICINE

## 2024-02-02 PROCEDURE — 3044F HG A1C LEVEL LT 7.0%: CPT | Mod: HCNC,CPTII,S$GLB, | Performed by: FAMILY MEDICINE

## 2024-02-02 RX ORDER — AMOXICILLIN AND CLAVULANATE POTASSIUM 875; 125 MG/1; MG/1
TABLET, FILM COATED ORAL
COMMUNITY
Start: 2023-06-07

## 2024-02-02 NOTE — PROGRESS NOTES
Established Patient - Audio Only Telehealth Visit     The patient location is: home ([place])  The chief complaint leading to consultation is: s/p bariatric sx  Visit type: Virtual visit with audio only (telephone)  Total time spent with patient: 30 min       The reason for the audio only service rather than synchronous audio and video virtual visit was related to technical difficulties or patient preference/necessity.     Each patient to whom I provide medical services by telemedicine is:  (1) informed of the relationship between the physician and patient and the respective role of any other health care provider with respect to management of the patient; and (2) notified that they may decline to receive medical services by telemedicine and may withdraw from such care at any time. Patient verbally consented to receive this service via voice-only telephone call.       NUTRITION NOTE    Referring Physician: Judy Grossman M.D.   Reason for MNT Referral: Follow-up 1 Week s/p Gastric Bypass    CURRENT DIET:  Bariatric Liquid Diet    Dehydration assessment:  Urine output/color: normal  Chest pain:n  Persistent increased heart rate:n  Fatigue:n  N/V: n  Dizzy/weak: n  BM: y    Protein and fluid intake assessment: (food diary)    Fluid intake: 64-100floz water + bone broth  Protein supplements: 3-4/day Premier shakes   Protein intake yesterday:     Vitamins  Patch MD (mv, iron, b-complex, b12)  No Calcium yet  -Is patient tolerating them well? y    Medications  Omeprazole: no but plans to start  Hycet: n  How are you tolerating pain at this time? (rate on a scale from 1 to 10; >7 notify PA/MD)  How is the support system at home? good  Exercise reminder (light exercise at this time, no lifting above 10 lbs)     Questions for nurse/MA/PA: no    BARIATRIC DIET DISCUSSION:  Reinforced post-op nutrition guidelines.  Continue to work on fluid and protein intake.    PLAN/RECOMMONDATIONS:  Continue bariatric high protein liquid  diet.  Maintain protein intake.  Maintain fluid intake.  Begin or continue light exercise.  Begin or continue appropriate vitamins & minerals.     Confirmed date and time for 2 week po labs and clinic visit     SESSION TIME: 30 minutes                          This service was not originating from a related E/M service provided within the previous 7 days nor will  to an E/M service or procedure within the next 24 hours or my soonest available appointment.  Prevailing standard of care was able to be met in this audio-only visit.

## 2024-02-02 NOTE — PROGRESS NOTES
(Portions of this note were dictated using voice recognition software and may contain dictation related errors in spelling/grammar/syntax not found on text review)    CC:   Chief Complaint   Patient presents with    Hospital Follow Up       HPI: 53 y.o. female presented for hospital discharge follow-up visit.  She has medical history significant for major depression, generalized anxiety disorder, GERD, essential hypertension, migraine headaches, osteoarthritis.      Patient under went robotic gastroenterostomy with Harvinder-en-Y gastric bypass surgery on 01/26 by Dr Clarence Grossman.  Patient reported that she has been doing fine, denies having any nausea, vomiting, abdominal pain, she was constipated initially but with stool softener she has regular and normal bowel movements.  She had visit virtually with dietitian this morning, will be following up with dietitian and Bariatric surgery Clinic next week along with follow-up labs.      Patient lost almost 10-15 lb after surgery, she is happy about the results.  She has been taking losartan 50 mg and Cardizem for hypertension, blood pressure has been stable.  She has not been taking any NSAIDs for osteoarthritis.    She denies having any other symptoms or concerns.    Past Medical History:   Diagnosis Date    Anxiety     Depression     Generalized anxiety disorder with panic attacks 02/23/2022    GERD (gastroesophageal reflux disease)     Hypertension     Migraine headache     Sleep difficulties     Typical atrial flutter 04/20/2022       Past Surgical History:   Procedure Laterality Date    BREAST SURGERY      COLONOSCOPY N/A 12/14/2022    Procedure: COLONOSCOPY sutab;  Surgeon: Wei Walsh MD;  Location: [facility] ENDO;  Service: Endoscopy;  Laterality: N/A;    ENDOMETRIAL ABLATION      ENDOSCOPIC GASTROCNEMIUS RECESSION Right 8/19/2022    Procedure: RECESSION, GASTROCNEMIUS, ENDOSCOPIC;  Surgeon: Zelalem Solorzano DPM;  Location: [facility] OR;  Service: Podiatry;   Laterality: Right;  Arthrex centerline  Stella/Arthrex/CONFIRMED/WD,RN 8/18@1333    ENDOSCOPIC PLANTAR FASCIOTOMY Right 8/19/2022    Procedure: FASCIOTOMY, PLANTAR, ENDOSCOPIC;  Surgeon: Zelalem Solorzano DPM;  Location: [facility] OR;  Service: Podiatry;  Laterality: Right;  Arthrex set    ESOPHAGOGASTRODUODENOSCOPY N/A 12/14/2022    Procedure: EGD (ESOPHAGOGASTRODUODENOSCOPY);  Surgeon: Wei Walsh MD;  Location: [facility] ENDO;  Service: Endoscopy;  Laterality: N/A;    EYE SURGERY      FOOT SURGERY Right 10/2018    bunion    IMPLANTATION OF PERMANENT SACRAL NERVE STIMULATOR N/A 2/15/2022    Procedure: INSERTION, NEUROSTIMULATOR, PERMANENT, SACRAL;  Surgeon: David Brown MD;  Location: [facility] OR [address] [place] [address];  Service: Urology;  Laterality: N/A;    TONSILLECTOMY      TOTAL REDUCTION MAMMOPLASTY Bilateral     XI ROBOTIC GASTROENTEROSTOMY, MIAN-EN-Y N/A 1/26/2024    Procedure: XI ROBOTIC GASTROENTEROSTOMY, MIAN-EN-Y w/intraop egd;  Surgeon: Judy Grossman MD;  Location: [facility] OR [address] [place] [address];  Service: General;  Laterality: N/A;       Family History   Problem Relation Age of Onset    Atrial fibrillation Mother     Thyroid disease Mother     Dementia Mother     Diabetes Father     Prostate cancer Father     Stomach cancer Sister     Migraines Sister     Colon cancer Paternal Aunt     Colon cancer Paternal Uncle     Breast cancer Maternal Grandmother     Colon cancer Paternal Grandmother     Cancer Paternal Grandfather        Social History     Tobacco Use    Smoking status: Never     Passive exposure: Never    Smokeless tobacco: Never   Substance Use Topics    Alcohol use: No    Drug use: No       Lab Results   Component Value Date    WBC 10.52 01/27/2024    HGB 11.1 (L) 01/27/2024    HCT 35.9 (L) 01/27/2024    MCV 85 01/27/2024     01/27/2024    CHOL 209 (H) 07/05/2023    TRIG 61 07/05/2023    HDL 60 07/05/2023    ALT 15 01/08/2024    AST 14 01/08/2024    BILITOT 0.6 01/08/2024    ALKPHOS 91 01/08/2024      01/27/2024    K 4.6 01/27/2024     01/27/2024    CREATININE 0.8 01/27/2024    ESTGFRAFRICA >60 04/20/2022    EGFRNONAA >60 04/20/2022    CALCIUM 9.0 01/27/2024    ALBUMIN 3.4 (L) 01/08/2024    BUN 13 01/27/2024    CO2 26 01/27/2024    TSH 2.044 07/05/2023    INR 1.0 06/08/2022    HGBA1C 5.6 01/08/2024    LDLCALC 136.8 07/05/2023    GLU 99 01/27/2024    [id number] 27 (L) 01/08/2024             Vital signs reviewed  PE:   APPEARANCE: Well nourished, well developed, in no acute distress.    HEAD: Normocephalic, atraumatic.  EYES: EOMI.  Conjunctivae noninjected.  NOSE: Mucosa pink. Airway clear.  MOUTH & THROAT: No tonsillar enlargement. No pharyngeal erythema or exudate.   NECK: Supple with no cervical lymphadenopathy.    CHEST: Good inspiratory effort. Lungs clear to auscultation with no wheezes or crackles.  CARDIOVASCULAR: Normal S1, S2. No rubs, murmurs, or gallops.  ABDOMEN: Bowel sounds normal. Not distended. Soft. No tenderness or masses. No organomegaly.  Laparoscopic ports intact, no tenderness, healing okay  EXTREMITIES: No edema, cyanosis, or clubbing.    Review of Systems   Constitutional:  Negative for chills, fatigue and fever.   HENT: Negative.     Respiratory:  Negative for cough, shortness of breath and wheezing.    Cardiovascular:  Negative for chest pain, palpitations and leg swelling.   Gastrointestinal: Negative.    Genitourinary: Negative.    Neurological: Negative.    Psychiatric/Behavioral: Negative.     All other systems reviewed and are negative.      IMPRESSION  1. Hospital discharge follow-up    2. Major depressive disorder, recurrent, moderate    3. Essential hypertension    4. Morbid obesity with BMI of 50.0-59.9, adult    5. S/P gastric bypass    6. Generalized anxiety disorder with panic attacks    7. Migraine with aura and without status migrainosus, not intractable            PLAN      1. Major depressive disorder, recurrent, moderate    Stable    Continue Lexapro and  Wellbutrin   Followed by psychiatry      2. Hospital discharge follow-up        3. Essential hypertension    Stable   Continue losartan and Cardizem     Advised to monitor blood pressure at home, discussed with more weight loss, there is possibility of dropping blood pressure, will adjust the doses/take off when needed      4. Morbid obesity with BMI of 50.0-59.9, adult      5. S/P gastric bypass    Advised to follow up with all upcoming appointment with dietitian and bariatric surgeon    Repeat labs by bariatric surgery next week    Dietary recommendation as by dietitian, currently she is on high-protein liquid diet and doing okay      6. Generalized anxiety disorder with panic attacks    Stable   Continue current medications        7. Migraine with aura and without status migrainosus, not intractable    Stable   Continue current medications         SCREENINGS        Age/demographic appropriate health maintenance:    Health Maintenance Due   Topic Date Due    HIV Screening  Never done    Shingles Vaccine (2 of 2) 01/26/2022    COVID-19 Vaccine (4 - 2023-24 season) 09/01/2023           Spent adequate time in obtaining history and explaining differentials     35 minutes spent during this visit of which greater than 50% devoted to face-face counseling and coordination of care regarding diagnosis and management plan     Transitional Care Note    Family and/or Caretaker present at visit?  No.  Diagnostic tests reviewed/disposition: I have reviewed all completed as well as pending diagnostic tests at the time of discharge.  Disease/illness education: yes  Home health/community services discussion/referrals: Patient does not have home health established from hospital visit.  They do not need home health.  If needed, we will set up home health for the patient.   Establishment or re-establishment of referral orders for community resources: No other necessary community resources.   Discussion with other health care  providers: No discussion with other health care providers necessary.          Tahir Mosquera   2/2/2024

## 2024-02-06 ENCOUNTER — PATIENT MESSAGE (OUTPATIENT)
Dept: NEUROLOGY | Facility: CLINIC | Age: 54
End: 2024-02-06

## 2024-02-06 ENCOUNTER — OFFICE VISIT (OUTPATIENT)
Dept: NEUROLOGY | Facility: CLINIC | Age: 54
End: 2024-02-06
Payer: MEDICARE

## 2024-02-06 VITALS
DIASTOLIC BLOOD PRESSURE: 76 MMHG | WEIGHT: 293 LBS | HEART RATE: 79 BPM | BODY MASS INDEX: 50.02 KG/M2 | SYSTOLIC BLOOD PRESSURE: 125 MMHG | HEIGHT: 64 IN

## 2024-02-06 DIAGNOSIS — G44.209 TENSION HEADACHE: ICD-10-CM

## 2024-02-06 DIAGNOSIS — Z98.84 S/P GASTRIC BYPASS: ICD-10-CM

## 2024-02-06 DIAGNOSIS — I48.3 TYPICAL ATRIAL FLUTTER: ICD-10-CM

## 2024-02-06 DIAGNOSIS — E66.01 MORBID OBESITY WITH BMI OF 50.0-59.9, ADULT: ICD-10-CM

## 2024-02-06 DIAGNOSIS — G47.33 OSA (OBSTRUCTIVE SLEEP APNEA): ICD-10-CM

## 2024-02-06 DIAGNOSIS — G43.709 CHRONIC MIGRAINE WITHOUT AURA WITHOUT STATUS MIGRAINOSUS, NOT INTRACTABLE: Primary | ICD-10-CM

## 2024-02-06 DIAGNOSIS — I10 ESSENTIAL HYPERTENSION: ICD-10-CM

## 2024-02-06 DIAGNOSIS — R11.0 NAUSEA: ICD-10-CM

## 2024-02-06 PROCEDURE — 1160F RVW MEDS BY RX/DR IN RCRD: CPT | Mod: HCNC,CPTII,S$GLB, | Performed by: PHYSICIAN ASSISTANT

## 2024-02-06 PROCEDURE — 99999 PR PBB SHADOW E&M-EST. PATIENT-LVL IV: CPT | Mod: PBBFAC,HCNC,, | Performed by: PHYSICIAN ASSISTANT

## 2024-02-06 PROCEDURE — 3008F BODY MASS INDEX DOCD: CPT | Mod: HCNC,CPTII,S$GLB, | Performed by: PHYSICIAN ASSISTANT

## 2024-02-06 PROCEDURE — 99215 OFFICE O/P EST HI 40 MIN: CPT | Mod: HCNC,S$GLB,, | Performed by: PHYSICIAN ASSISTANT

## 2024-02-06 PROCEDURE — 3078F DIAST BP <80 MM HG: CPT | Mod: HCNC,CPTII,S$GLB, | Performed by: PHYSICIAN ASSISTANT

## 2024-02-06 PROCEDURE — 3044F HG A1C LEVEL LT 7.0%: CPT | Mod: HCNC,CPTII,S$GLB, | Performed by: PHYSICIAN ASSISTANT

## 2024-02-06 PROCEDURE — G2211 COMPLEX E/M VISIT ADD ON: HCPCS | Mod: HCNC,S$GLB,, | Performed by: PHYSICIAN ASSISTANT

## 2024-02-06 PROCEDURE — 1159F MED LIST DOCD IN RCRD: CPT | Mod: HCNC,CPTII,S$GLB, | Performed by: PHYSICIAN ASSISTANT

## 2024-02-06 PROCEDURE — 1111F DSCHRG MED/CURRENT MED MERGE: CPT | Mod: HCNC,CPTII,S$GLB, | Performed by: PHYSICIAN ASSISTANT

## 2024-02-06 PROCEDURE — 3074F SYST BP LT 130 MM HG: CPT | Mod: HCNC,CPTII,S$GLB, | Performed by: PHYSICIAN ASSISTANT

## 2024-02-06 RX ORDER — UBROGEPANT 50 MG/1
TABLET ORAL
Qty: 16 TABLET | Refills: 5 | Status: SHIPPED | OUTPATIENT
Start: 2024-02-06

## 2024-02-06 RX ORDER — PROMETHAZINE HYDROCHLORIDE 25 MG/1
25 TABLET ORAL EVERY 6 HOURS PRN
Qty: 12 TABLET | Refills: 5 | Status: SHIPPED | OUTPATIENT
Start: 2024-02-06

## 2024-02-06 RX ORDER — CEFUROXIME AXETIL 250 MG/1
TABLET ORAL
Qty: 3 ML | Refills: 5 | Status: SHIPPED | OUTPATIENT
Start: 2024-02-06

## 2024-02-06 NOTE — PROGRESS NOTES
New Patient     SUBJECTIVE:  Patient ID: Starla Fisher   MRN: [id number]  Referred By: No ref. provider found  Chief Complaint: Headache      History of Present Illness:   53 y.o. female with migraine, obesity s/p gastric bypass, depression, autism, osteoarthritis, atrial flutter, KRISTEN, insomnia, anxiety, HTN, GERD, who presents to clinic alone for evaluation of headaches.   PMHx negative for TBI, Meningitis, Aneurysms, Kidney Stones, asthma, GI bleed, osteoporosis, CAD/MI, CVA/TIA, DM, cancer  Family Hx + for Migraines in sister    Pt has a h/o ha's since 12yo that worsened in the last handful of yrs previously seen by colleague Dr. Poe, here to transition care. She has tried and failed multiple medication options in the past, listed below. She is currently taking phenergan prn for nausea, sumatriptan inj/ns/tab <10 days a month prn. She also receives botox on 7/11/22, 10/21/22, 1/24/23, 5/5/23, 8/11/23, 11/14/23. Is due for next round this week. Prior to receiving botox, pt's ha's were severe lasting >15/30 days per month. However, since starting botox her ha's have improved >50% w/ sustained reduction now occurring 4/30 days per month. She is happy w/ current regimen. Of note, pt is diagnosed w/ kristen, was able to start using cpap last year which also helped her ha's. Also had recent gastric bypass surgery for wt loss a few wks ago.   HA's are further described as follows:   Location/Radiation - unilateral/bilateral temporal or frontalis radiating to occipitalis region  Quality - pulsatig/throbbing, band like/squeezing  Triggers - stress, chocolate, hunger  Prodrome/Aura - no  Associated symptoms with the headache: photo/phonophobia, n/v, worsened w/ exertion, difficulty concentrating    Treatments Tried   Lexapro  Wellbutrin  Tpx  Emgality  Losartan  Diltiazem  Aimovig  Elavil  Botox - helps  Maxalt   Imitrex tab  Imitrex NS  Imitrex inj    Social History  Alcohol - denies  Smoke - denies  Recreational Drug Use-  denies  Occupation - not currently working    Current Medications:    Current Outpatient Medications:     amoxicillin-clavulanate 875-125mg (AUGMENTIN) 875-125 mg per tablet, , Disp: , Rfl:     blood-glucose meter (TRUE METRIX AIR GLUCOSE METER) kit, use as directed, Disp: 2 each, Rfl: 2    BOTOX 200 unit SolR, , Disp: , Rfl:     buPROPion (WELLBUTRIN XL) 150 MG TB24 tablet, Take 1 tablet (150 mg total) by mouth once daily., Disp: 90 tablet, Rfl: 3    clotrimazole-betamethasone 1-0.05% (LOTRISONE) cream, Apply topically 2 (two) times daily., Disp: 15 g, Rfl: 1    diazePAM (VALIUM) 5 MG tablet, Take 1 tablet (5 mg total) by mouth daily as needed for Anxiety., Disp: 15 tablet, Rfl: 5    [START ON 2/10/2024] diltiaZEM (CARDIZEM CD) 240 MG 24 hr capsule, Take 1 capsule (240 mg total) by mouth once daily., Disp: 90 capsule, Rfl: 1    diltiaZEM (CARDIZEM) 60 MG tablet, Take 1 tablet (60 mg total) by mouth 4 (four) times daily. Please crush prior to administration for 14 days, Disp: 56 tablet, Rfl: 0    EScitalopram oxalate (LEXAPRO) 20 MG tablet, Take 1 tablet (20 mg total) by mouth once daily., Disp: 90 tablet, Rfl: 3    Eo-M-lcjhe-B12-L.acid,plan-inu 65 mg iron- 50 mg-1 mg DFE Cap, Take by mouth., Disp: , Rfl:     ferrous sulfate (FEOSOL) 325 mg (65 mg iron) Tab tablet, Take 325 mg by mouth., Disp: , Rfl:     HYDROcodone-acetaminophen (NORCO) 5-325 mg per tablet, Take 1 tablet by mouth every 4 (four) hours as needed for Pain., Disp: 30 tablet, Rfl: 0    losartan (COZAAR) 50 MG tablet, Take 1 tablet (50 mg total) by mouth once daily., Disp: 90 tablet, Rfl: 3    losartan (COZAAR) 50 MG tablet, Take 1 tablet (50 mg total) by mouth once daily., Disp: 90 tablet, Rfl: 3    multivit-minerals/folic acid (ADULT MULTIVITAMIN GUMMIES ORAL), Take 1 tablet by mouth Daily. Continue to hold until after Surgery., Disp: , Rfl:     multivitamin (THERAGRAN) per tablet, Take 1 tablet by mouth., Disp: , Rfl:     omeprazole (PRILOSEC) 40 MG  capsule, Take 1 capsule (40 mg total) by mouth once daily., Disp: 90 capsule, Rfl: 0    ondansetron (ZOFRAN-ODT) 8 MG TbDL, Take 1 tablet (8 mg total) by mouth every 6 (six) hours as needed (nausea after surgery)., Disp: 30 tablet, Rfl: 0    oxybutynin (DITROPAN-XL) 10 MG 24 hr tablet, Take 1 tablet (10 mg total) by mouth once daily., Disp: 30 tablet, Rfl: 11    oxyCODONE (ROXICODONE) 5 mg/5 mL Soln, Take 5 mLs (5 mg total) by mouth every 6 (six) hours as needed., Disp: 75 mL, Rfl: 0    phenylephrine (TEODORA-SYNEPHRINE) 10 mg/mL injection, , Disp: , Rfl:     polyethylene glycol (GLYCOLAX) 17 gram/dose powder, Mix 1 capful (17 g) in liquid and drink by mouth once daily., Disp: 510 g, Rfl: 0    propofoL (DIPRIVAN) 10 mg/mL infusion, , Disp: , Rfl:     ROPIvacaine 0.5%, PF, (NAROPIN) 5 mg/mL (0.5 %) injection, , Disp: , Rfl:     sumatriptan (IMITREX) 100 MG tablet, TAKE 1 TABLET BY MOUTH EVERY 2 HOURS AS NEEDED FOR MIGRAINE. Max 200mg/24hrs., Disp: 9 tablet, Rfl: 11    SUMAtriptan (IMITREX) 20 mg/actuation nasal spray, USE ONE SPRAY IN THE NOSTRIL AS NEEDED FOR MIGRAINE(MAX 40MG/24 HOURS), Disp: 12 each, Rfl: 11    traMADoL (ULTRAM) 50 mg tablet, Take 1 tablet (50 mg total) by mouth every 6 (six) hours., Disp: 5 tablet, Rfl: 0    traZODone (DESYREL) 150 MG tablet, Take half or whole tablet by mouth before bed as needed for insomnia, Disp: 90 tablet, Rfl: 3    triamcinolone acetonide 0.025% (KENALOG) 0.025 % Oint, Apply topically daily as needed (vulvar itching)., Disp: 15 g, Rfl: 0    TRUEPLUS LANCETS 33 gauge Misc, TEST BLOOD SUGAR THREE TIMES DAILY, Disp: 300 each, Rfl: 10    ursodioL (ACTIGALL) 500 MG tablet, Take 1 tablet (500 mg total) by mouth once daily., Disp: 30 tablet, Rfl: 5    walker (ULTRA-LIGHT ROLLATOR) Misc, Rolator for stability and balance postoperatively as needed while standing and walking., Disp: 1 each, Rfl: 0    metronidazole 0.75% (METROCREAM) 0.75 % Crea, Apply topically 2 (two) times daily.,  "Disp: 45 g, Rfl: 3    promethazine (PHENERGAN) 25 MG tablet, Take 1 tablet (25 mg total) by mouth every 6 (six) hours as needed for Nausea., Disp: 12 tablet, Rfl: 5    sumatriptan (IMITREX STATDOSE) 6 mg/0.5 mL kit, INJECT 0.5 ML(6 MG TOTAL) UNDER THE SKIN EVERY 2 HOURS AS NEEDED FOR MIGRAINE. Max 12mg/24hrs, Disp: 3 mL, Rfl: 5    ubrogepant (UBRELVY) 50 mg tablet, Take 1 tablet by mouth at the onset of a headache. May repeat based on response and tolerability after more than 2 hours if needed. Do not take more than 200mg in a 24 hour span., Disp: 16 tablet, Rfl: 5    Current Facility-Administered Medications:     sodium chloride 0.9% flush 10 mL, 10 mL, Intravenous, PRN, Zelalem Solorzano, DPM    sodium chloride 0.9% flush 10 mL, 10 mL, Intravenous, PRN, Zelalem Solorzano, DPM    Facility-Administered Medications Ordered in Other Visits:     0.9%  NaCl infusion, , Intravenous, Continuous, Sathish Briseno MD, Stopped at 02/15/22 0948    LIDOcaine (PF) 10 mg/ml (1%) injection 10 mg, 1 mL, Intradermal, Once, Sathish Briseno MD    Review of Systems - as per HPI, otherwise a balanced 10 systems review is negative.    OBJECTIVE:  Vitals:  /76 (BP Location: Left forearm, Patient Position: Sitting, BP Method: Medium (Automatic))   Pulse 79   Ht 5' 4" (1.626 m)   Wt (!) 137 kg (302 lb)   LMP  (LMP Unknown)   BMI 51.84 kg/m²     Physical Exam   Constitutional: she appears well-developed and well-nourished. she is well groomed. NAD   HENT:    Head: Normocephalic and atraumatic  Eyes: Conjunctivae and EOM are normal  Musculoskeletal: Normal range of motion. No joint stiffness.   Skin: Skin is warm and dry.  Psychiatric: Mood and affect are normal    Neuro: Patient is alert and oriented to person, place, and time. Language is intact and fluent. Speech is clear and fluent. Recent and remote memory are intact.  Normal attention and concentration.  Facial movement is symmetric. Moves all 4 extremities against " gravity. Gait assisted by walking cane.  Cranial Nerves II through XII without focal deficit.     Review of Data:   Notes from neuro reviewed   Labs:  Admission on 01/26/2024, Discharged on 01/27/2024   Component Date Value Ref Range Status    WBC 01/27/2024 10.52  3.90 - 12.70 K/uL Final    RBC 01/27/2024 4.22  4.00 - 5.40 M/uL Final    Hemoglobin 01/27/2024 11.1 (L)  12.0 - 16.0 g/dL Final    Hematocrit 01/27/2024 35.9 (L)  37.0 - 48.5 % Final    MCV 01/27/2024 85  82 - 98 fL Final    MCH 01/27/2024 26.3 (L)  27.0 - 31.0 pg Final    MCHC 01/27/2024 30.9 (L)  32.0 - 36.0 g/dL Final    RDW 01/27/2024 14.7 (H)  11.5 - 14.5 % Final    Platelets 01/27/2024 254  150 - 450 K/uL Final    MPV 01/27/2024 11.2  9.2 - 12.9 fL Final    Immature Granulocytes 01/27/2024 0.3  0.0 - 0.5 % Final    Gran # (ANC) 01/27/2024 8.7 (H)  1.8 - 7.7 K/uL Final    Immature Grans (Abs) 01/27/2024 0.03  0.00 - 0.04 K/uL Final    Lymph # 01/27/2024 1.0  1.0 - 4.8 K/uL Final    Mono # 01/27/2024 0.8  0.3 - 1.0 K/uL Final    Eos # 01/27/2024 0.0  0.0 - 0.5 K/uL Final    Baso # 01/27/2024 0.01  0.00 - 0.20 K/uL Final    nRBC 01/27/2024 0  0 /100 WBC Final    Gran % 01/27/2024 82.7 (H)  38.0 - 73.0 % Final    Lymph % 01/27/2024 9.7 (L)  18.0 - 48.0 % Final    Mono % 01/27/2024 7.2  4.0 - 15.0 % Final    Eosinophil % 01/27/2024 0.0  0.0 - 8.0 % Final    Basophil % 01/27/2024 0.1  0.0 - 1.9 % Final    Differential Method 01/27/2024 Automated   Final    Sodium 01/27/2024 140  136 - 145 mmol/L Final    Potassium 01/27/2024 4.6  3.5 - 5.1 mmol/L Final    Chloride 01/27/2024 107  95 - 110 mmol/L Final    CO2 01/27/2024 26  23 - 29 mmol/L Final    Glucose 01/27/2024 99  70 - 110 mg/dL Final    BUN 01/27/2024 13  6 - 20 mg/dL Final    Creatinine 01/27/2024 0.8  0.5 - 1.4 mg/dL Final    Calcium 01/27/2024 9.0  8.7 - 10.5 mg/dL Final    Anion Gap 01/27/2024 7 (L)  8 - 16 mmol/L Final    eGFR 01/27/2024 >60.0  >60 mL/min/1.73 m^2 Final    Magnesium 01/27/2024  2.1  1.6 - 2.6 mg/dL Final    Phosphorus 01/27/2024 2.8  2.7 - 4.5 mg/dL Final   Lab Visit on 01/08/2024   Component Date Value Ref Range Status    WBC 01/08/2024 6.84  3.90 - 12.70 K/uL Final    RBC 01/08/2024 4.60  4.00 - 5.40 M/uL Final    Hemoglobin 01/08/2024 12.4  12.0 - 16.0 g/dL Final    Hematocrit 01/08/2024 39.2  37.0 - 48.5 % Final    MCV 01/08/2024 85  82 - 98 fL Final    MCH 01/08/2024 27.0  27.0 - 31.0 pg Final    MCHC 01/08/2024 31.6 (L)  32.0 - 36.0 g/dL Final    RDW 01/08/2024 14.7 (H)  11.5 - 14.5 % Final    Platelets 01/08/2024 242  150 - 450 K/uL Final    MPV 01/08/2024 10.5  9.2 - 12.9 fL Final    Immature Granulocytes 01/08/2024 0.3  0.0 - 0.5 % Final    Gran # (ANC) 01/08/2024 4.0  1.8 - 7.7 K/uL Final    Immature Grans (Abs) 01/08/2024 0.02  0.00 - 0.04 K/uL Final    Lymph # 01/08/2024 2.0  1.0 - 4.8 K/uL Final    Mono # 01/08/2024 0.5  0.3 - 1.0 K/uL Final    Eos # 01/08/2024 0.3  0.0 - 0.5 K/uL Final    Baso # 01/08/2024 0.04  0.00 - 0.20 K/uL Final    nRBC 01/08/2024 0  0 /100 WBC Final    Gran % 01/08/2024 58.6  38.0 - 73.0 % Final    Lymph % 01/08/2024 29.1  18.0 - 48.0 % Final    Mono % 01/08/2024 7.3  4.0 - 15.0 % Final    Eosinophil % 01/08/2024 4.1  0.0 - 8.0 % Final    Basophil % 01/08/2024 0.6  0.0 - 1.9 % Final    Differential Method 01/08/2024 Automated   Final    Sodium 01/08/2024 140  136 - 145 mmol/L Final    Potassium 01/08/2024 4.4  3.5 - 5.1 mmol/L Final    Chloride 01/08/2024 104  95 - 110 mmol/L Final    CO2 01/08/2024 29  23 - 29 mmol/L Final    Glucose 01/08/2024 103  70 - 110 mg/dL Final    BUN 01/08/2024 18  6 - 20 mg/dL Final    Creatinine 01/08/2024 0.7  0.5 - 1.4 mg/dL Final    Calcium 01/08/2024 10.0  8.7 - 10.5 mg/dL Final    Total Protein 01/08/2024 7.8  6.0 - 8.4 g/dL Final    Albumin 01/08/2024 3.4 (L)  3.5 - 5.2 g/dL Final    Total Bilirubin 01/08/2024 0.6  0.1 - 1.0 mg/dL Final    Alkaline Phosphatase 01/08/2024 91  55 - 135 U/L Final    AST 01/08/2024 14   10 - 40 U/L Final    ALT 01/08/2024 15  10 - 44 U/L Final    eGFR 01/08/2024 >60.0  >60 mL/min/1.73 m^2 Final    Anion Gap 01/08/2024 7 (L)  8 - 16 mmol/L Final    Hemoglobin A1C 01/08/2024 5.6  4.0 - 5.6 % Final    Estimated Avg Glucose 01/08/2024 114  68 - 131 mg/dL Final    Vit D, 25-Hydroxy 01/08/2024 27 (L)  30 - 96 ng/mL Final     Imaging:  No results found for this or any previous visit.  Note: I have independently reviewed any/all imaging/labs/tests and agree with the report (s) as documented.  Any discrepancies will be as noted/demarcated by free text.  AMPARO LARRY 2/6/2024    ASSESSMENT:  1. Chronic migraine without aura without status migrainosus, not intractable    2. Nausea    3. CARL (obstructive sleep apnea)    4. Typical atrial flutter    5. Tension headache    6. Essential hypertension    7. Morbid obesity with BMI of 50.0-59.9, adult    8. S/P gastric bypass      BOTOX  The patient has chronic migraines (G43.719) and suffers from headaches more than 15 days a month lasting more than 4 hours a day with no relief of symptoms despite trying multiple medications (listed above). Botox treatment was approved for chronic migraines in October 2010. The patient will be an ideal candidate for Botox. We are planning for 3 treatments 3 months apart and aiming for at least 50% improvement in the symptoms. If we see no improvement after 3 treatments, we will discontinue the injections.  Injection sites planned:   muscle bilaterally ( a total of 10 units divided into 2 sites)   Procerus muscle (5 units)   Frontalis muscle bilaterally (a total of 20 units divided into 4 sites)   Temporalis muscle bilaterally (a total of 40 units divided into 8 sites)   Occipitalis muscle bilaterally (a total of 30 units divided into 6 sites)   Cervical paraspinal muscles (a total of 20 units divided into 4 sites)   Trapezius muscle bilaterally (a total of 30 units divided into 6 sites)   Total Botox to be used: 155 Units    Unavoidable waste: 45 Units   Prior to initiation of botox the patient was experiencing >15 days of headache lasting 4 or more hours and has had sustained reduction.       PLAN:  - Discussed symptoms appear to be consistent with migraines, discussed treatment options and patient agreed with the following plan:  - ppx - continue botox  - abortive - try ubrelvy vs nurtec based on insurance approval, may continue imitrex inj 2nd line as she is known to tolerate   - nausea - continue phenergan  - obesity s/p gastric bypass - avoid po nsaids, mgmt per bariatric med and dietician   - atrial flutter on eliquis - avoid nsaids, caution w/ triptans, mgmt per cardiology  - kristen - continue compliant cpap use, mgmt per sleep med  - htn - stable,mgmt per cardiology  - risks, benefits, and potential side effects of botox, ubrelvy/nurtec, imitrex, phenergan discussed   - alternative treatment options offered   - importance of healthy diet, regular exercise and sleep hygiene in the treatment of headaches    - Start tracking headaches via Migraine [employer] rogelio on phone   - RTC in 3 wks to continue botox     Orders Placed This Encounter    Prior authorization Order    promethazine (PHENERGAN) 25 MG tablet    ubrogepant (UBRELVY) 50 mg tablet    sumatriptan (IMITREX STATDOSE) 6 mg/0.5 mL kit       I have discussed realistic goals of care with patient at length as well as medication options, and need for lifestyle adjustment. I have explained that treatment will take time. We have agreed that the goal will be to reduce frequency/intensity/quantity of HA, not to be completely HA free. I have explained my non narcotic policy regarding headache treatment.    Patient agreeable to work on lifestyle adjustments.    Discussed potential for teratogenicity with treatment, patient understands if her family planning status should change she will contact office immediately and we will safely adjust medications as needed.     Questions and concerns  were sought and answered to the patient's stated verbal satisfaction.  The patient verbalizes understanding and agreement with the above stated treatment plan.     Visit today is associated with current or anticipated ongoing medical care related to this patients single serious condition/complex condition of chronic migraines. Plan for patient to return to the office in approximately 3 weeks to continue ongoing care with patient.    I spent 40 minutes on the day of this encounter preparing for, treating and managing this patient presenting with headaches.        CC: Tahir Mosquera MD Sarena Patel, PA-C  [facility]  [phone]    Dr. Grady was available during today's encounter.

## 2024-02-07 ENCOUNTER — OFFICE VISIT (OUTPATIENT)
Dept: UROLOGY | Facility: CLINIC | Age: 54
End: 2024-02-07
Payer: MEDICARE

## 2024-02-07 VITALS
WEIGHT: 293 LBS | BODY MASS INDEX: 50.02 KG/M2 | SYSTOLIC BLOOD PRESSURE: 130 MMHG | DIASTOLIC BLOOD PRESSURE: 83 MMHG | HEIGHT: 64 IN | HEART RATE: 123 BPM

## 2024-02-07 DIAGNOSIS — N32.81 OAB (OVERACTIVE BLADDER): Primary | ICD-10-CM

## 2024-02-07 PROCEDURE — 3079F DIAST BP 80-89 MM HG: CPT | Mod: CPTII,S$GLB,, | Performed by: UROLOGY

## 2024-02-07 PROCEDURE — 99214 OFFICE O/P EST MOD 30 MIN: CPT | Mod: S$GLB,,, | Performed by: UROLOGY

## 2024-02-07 PROCEDURE — 99999 PR PBB SHADOW E&M-EST. PATIENT-LVL IV: CPT | Mod: PBBFAC,,, | Performed by: UROLOGY

## 2024-02-07 PROCEDURE — 3044F HG A1C LEVEL LT 7.0%: CPT | Mod: CPTII,S$GLB,, | Performed by: UROLOGY

## 2024-02-07 PROCEDURE — 1159F MED LIST DOCD IN RCRD: CPT | Mod: CPTII,S$GLB,, | Performed by: UROLOGY

## 2024-02-07 PROCEDURE — 1111F DSCHRG MED/CURRENT MED MERGE: CPT | Mod: CPTII,S$GLB,, | Performed by: UROLOGY

## 2024-02-07 PROCEDURE — 3008F BODY MASS INDEX DOCD: CPT | Mod: CPTII,S$GLB,, | Performed by: UROLOGY

## 2024-02-07 PROCEDURE — 3075F SYST BP GE 130 - 139MM HG: CPT | Mod: CPTII,S$GLB,, | Performed by: UROLOGY

## 2024-02-07 RX ORDER — VIBEGRON 75 MG/1
75 TABLET, FILM COATED ORAL DAILY
Qty: 30 TABLET | Refills: 11 | Status: SHIPPED | OUTPATIENT
Start: 2024-02-07

## 2024-02-07 NOTE — PROGRESS NOTES
[facility]        Overactive Bladder Return Note     2/7/2024     Patient Name: Starla Fisher  Referred by:No ref. provider found                    Patient returns for postoperative visit after successful implant of SNM 3 years ago. She had been doing very well but noticed return of symptoms. She is not quite back to baseline, but close. She was initially reporting 6x UUI episodes per day.  Last visit, we changed to Program 4 and tried on extreme cycling setting. She reports better initially but now with 3-4x UUI per day. Ditropan was added; she noticed improvement in UUI but also has noted difficulty with voiding. PVR today is 3 mL.     Of note, she recently had bariatric surgery.      A review of 10+ systems was conducted with pertinent positive and negative findings documented in HPI with all other systems reviewed and negative.     Past medical, family, surgical and social history reviewed as documented in chart with pertinent positive medical, family, surgical and social history detailed in HPI.     Const: no acute distress, conversant and alert  Eyes: anicteric, extraocular muscles intact  ENMT: normocephalic, Nl oral membranes  Cardio: no cyanosis, nl cap refill  Pulm: no tachypnea; no resp distress  Abd: soft, no tenderness  Musc: no laceration, no tenderness  Neuro: alert; oriented x 3  Skin: warm, dry; no petichiae  Psych: no anxiety; normal speech      Assessment/Plan:     Overactive Bladder with Urgency Incontinence (estab,improved): No longer seeing the same benefit having tried several programs. Added ditropan last visit and helped but also made voiding more difficult. Will try switching to a beta-3 agonist, with adverse effects (voiding difficulty) with oxybutynin. She may also see some benefit from upcoming weight loss.

## 2024-02-08 ENCOUNTER — LAB VISIT (OUTPATIENT)
Dept: LAB | Facility: HOSPITAL | Age: 54
End: 2024-02-08
Payer: MEDICARE

## 2024-02-08 ENCOUNTER — OFFICE VISIT (OUTPATIENT)
Dept: BARIATRICS | Facility: CLINIC | Age: 54
End: 2024-02-08
Payer: MEDICARE

## 2024-02-08 ENCOUNTER — CLINICAL SUPPORT (OUTPATIENT)
Dept: BARIATRICS | Facility: CLINIC | Age: 54
End: 2024-02-08
Payer: MEDICARE

## 2024-02-08 VITALS
TEMPERATURE: 98 F | BODY MASS INDEX: 51.43 KG/M2 | HEART RATE: 86 BPM | WEIGHT: 293 LBS | SYSTOLIC BLOOD PRESSURE: 122 MMHG | DIASTOLIC BLOOD PRESSURE: 68 MMHG | OXYGEN SATURATION: 98 %

## 2024-02-08 DIAGNOSIS — R79.9 ABNORMAL FINDING OF BLOOD CHEMISTRY, UNSPECIFIED: ICD-10-CM

## 2024-02-08 DIAGNOSIS — G47.33 OSA (OBSTRUCTIVE SLEEP APNEA): ICD-10-CM

## 2024-02-08 DIAGNOSIS — Z98.84 S/P GASTRIC BYPASS: Primary | ICD-10-CM

## 2024-02-08 DIAGNOSIS — E66.01 MORBID OBESITY WITH BMI OF 50.0-59.9, ADULT: ICD-10-CM

## 2024-02-08 DIAGNOSIS — I10 ESSENTIAL HYPERTENSION: Primary | ICD-10-CM

## 2024-02-08 DIAGNOSIS — D81.819 BIOTIN-DEPENDENT CARBOXYLASE DEFICIENCY, UNSPECIFIED: ICD-10-CM

## 2024-02-08 DIAGNOSIS — Z98.84 S/P BARIATRIC SURGERY: ICD-10-CM

## 2024-02-08 DIAGNOSIS — Z71.3 DIETARY COUNSELING AND SURVEILLANCE: ICD-10-CM

## 2024-02-08 DIAGNOSIS — K21.9 GASTROESOPHAGEAL REFLUX DISEASE, UNSPECIFIED WHETHER ESOPHAGITIS PRESENT: ICD-10-CM

## 2024-02-08 LAB
ALBUMIN SERPL BCP-MCNC: 3.5 G/DL (ref 3.5–5.2)
ALP SERPL-CCNC: 99 U/L (ref 55–135)
ALT SERPL W/O P-5'-P-CCNC: 43 U/L (ref 10–44)
ANION GAP SERPL CALC-SCNC: 10 MMOL/L (ref 8–16)
AST SERPL-CCNC: 18 U/L (ref 10–40)
BASOPHILS # BLD AUTO: 0.03 K/UL (ref 0–0.2)
BASOPHILS NFR BLD: 0.4 % (ref 0–1.9)
BILIRUB SERPL-MCNC: 0.5 MG/DL (ref 0.1–1)
BUN SERPL-MCNC: 13 MG/DL (ref 6–20)
CALCIUM SERPL-MCNC: 9.9 MG/DL (ref 8.7–10.5)
CHLORIDE SERPL-SCNC: 104 MMOL/L (ref 95–110)
CO2 SERPL-SCNC: 26 MMOL/L (ref 23–29)
CREAT SERPL-MCNC: 0.7 MG/DL (ref 0.5–1.4)
DIFFERENTIAL METHOD BLD: ABNORMAL
EOSINOPHIL # BLD AUTO: 0.4 K/UL (ref 0–0.5)
EOSINOPHIL NFR BLD: 5.2 % (ref 0–8)
ERYTHROCYTE [DISTWIDTH] IN BLOOD BY AUTOMATED COUNT: 14.2 % (ref 11.5–14.5)
EST. GFR  (NO RACE VARIABLE): >60 ML/MIN/1.73 M^2
GLUCOSE SERPL-MCNC: 107 MG/DL (ref 70–110)
HCT VFR BLD AUTO: 40.5 % (ref 37–48.5)
HGB BLD-MCNC: 12.9 G/DL (ref 12–16)
IMM GRANULOCYTES # BLD AUTO: 0.03 K/UL (ref 0–0.04)
IMM GRANULOCYTES NFR BLD AUTO: 0.4 % (ref 0–0.5)
LYMPHOCYTES # BLD AUTO: 2 K/UL (ref 1–4.8)
LYMPHOCYTES NFR BLD: 28.3 % (ref 18–48)
MCH RBC QN AUTO: 26.5 PG (ref 27–31)
MCHC RBC AUTO-ENTMCNC: 31.9 G/DL (ref 32–36)
MCV RBC AUTO: 83 FL (ref 82–98)
MONOCYTES # BLD AUTO: 0.5 K/UL (ref 0.3–1)
MONOCYTES NFR BLD: 6.9 % (ref 4–15)
NEUTROPHILS # BLD AUTO: 4.1 K/UL (ref 1.8–7.7)
NEUTROPHILS NFR BLD: 58.8 % (ref 38–73)
NRBC BLD-RTO: 0 /100 WBC
PLATELET # BLD AUTO: 333 K/UL (ref 150–450)
PMV BLD AUTO: 10.6 FL (ref 9.2–12.9)
POTASSIUM SERPL-SCNC: 4 MMOL/L (ref 3.5–5.1)
PROT SERPL-MCNC: 7.8 G/DL (ref 6–8.4)
RBC # BLD AUTO: 4.86 M/UL (ref 4–5.4)
SODIUM SERPL-SCNC: 140 MMOL/L (ref 136–145)
VIT B12 SERPL-MCNC: 1363 PG/ML (ref 210–950)
WBC # BLD AUTO: 6.99 K/UL (ref 3.9–12.7)

## 2024-02-08 PROCEDURE — 3078F DIAST BP <80 MM HG: CPT | Mod: HCNC,CPTII,S$GLB, | Performed by: NURSE PRACTITIONER

## 2024-02-08 PROCEDURE — 82607 VITAMIN B-12: CPT | Mod: HCNC | Performed by: NURSE PRACTITIONER

## 2024-02-08 PROCEDURE — 3074F SYST BP LT 130 MM HG: CPT | Mod: HCNC,CPTII,S$GLB, | Performed by: NURSE PRACTITIONER

## 2024-02-08 PROCEDURE — 80053 COMPREHEN METABOLIC PANEL: CPT | Mod: HCNC | Performed by: NURSE PRACTITIONER

## 2024-02-08 PROCEDURE — 84425 ASSAY OF VITAMIN B-1: CPT | Mod: HCNC | Performed by: NURSE PRACTITIONER

## 2024-02-08 PROCEDURE — 99499 UNLISTED E&M SERVICE: CPT | Mod: HCNC,S$GLB,, | Performed by: DIETITIAN, REGISTERED

## 2024-02-08 PROCEDURE — 85025 COMPLETE CBC W/AUTO DIFF WBC: CPT | Mod: HCNC | Performed by: NURSE PRACTITIONER

## 2024-02-08 PROCEDURE — 3044F HG A1C LEVEL LT 7.0%: CPT | Mod: HCNC,CPTII,S$GLB, | Performed by: NURSE PRACTITIONER

## 2024-02-08 PROCEDURE — 1160F RVW MEDS BY RX/DR IN RCRD: CPT | Mod: HCNC,CPTII,S$GLB, | Performed by: NURSE PRACTITIONER

## 2024-02-08 PROCEDURE — 1159F MED LIST DOCD IN RCRD: CPT | Mod: HCNC,CPTII,S$GLB, | Performed by: NURSE PRACTITIONER

## 2024-02-08 PROCEDURE — 99024 POSTOP FOLLOW-UP VISIT: CPT | Mod: HCNC,S$GLB,, | Performed by: NURSE PRACTITIONER

## 2024-02-08 PROCEDURE — 99999 PR PBB SHADOW E&M-EST. PATIENT-LVL V: CPT | Mod: PBBFAC,HCNC,, | Performed by: NURSE PRACTITIONER

## 2024-02-08 NOTE — PROGRESS NOTES
NUTRITION NOTE    Referring Physician: Dr. Grossman  Reason for MNT Referral: Follow-up 2 Weeks s/p Gastric Bypass    Denies nausea, vomiting, constipation, and diarrhea.  Reports doing well.    Past Medical History:   Diagnosis Date    Anxiety     Depression     Generalized anxiety disorder with panic attacks 02/23/2022    GERD (gastroesophageal reflux disease)     Hypertension     Migraine headache     Sleep difficulties     Typical atrial flutter 04/20/2022     CLINICAL DATA:  53 y.o. female. 5 ft. 4 in.    Current Weight: 299 lbs  BMI: 51.4  Total Weight Loss: 21 lbs  Excess Weight Loss: 12%    LABS:  Reviewed.    CURRENT DIET:  Bariatric Liquid Diet    Diet Recall: 90 grams of protein/day; 48-64 oz of fluids/day    Diet Includes:   Protein Supplements: Premier/Fairlife shakes 4/day    EXERCISE:  Adequate light exercise.    Restrictions to Exercise: None.    VITAMINS/MINERALS:  Patch MD (mv, iron, b-complex, b12)  No Calcium yet    ASSESSMENT:  Doing well overall.  Adequate protein intake.  Adequate fluid intake.    BARIATRIC DIET DISCUSSION:  Instructed and provided written materials on bariatric pureed and soft diet plans.  Reinforced post-op nutrition guidelines.    PLAN/RECOMMONDATIONS:  Advance to bariatric pureed diet.  May advance to bariatric soft diet in 2 weeks as johan.  Maintain protein intake.  Maintain fluid intake.  Continue light exercise.  Continue appropriate vitamins & minerals.  Adjust vitamins & minerals by: start Calcium supplements    Return to clinic in 6 weeks.    SESSION TIME: 15 minutes

## 2024-02-08 NOTE — PROGRESS NOTES
BARIATRIC POST-OPERATIVE VISIT:    HPI:  Starla Fisher is a 53 y.o. year old female presents for 2 week post op visit following LRNY.  she is doing well and tolerating the diet without difficulty.  she has no complaints.    Denies: nausea, vomiting, abdominal pain, changes in bowel movement pattern, fever, chills, dysphagia, chest pain, and shortness of breath.      Review of Systems   Constitutional:  Negative for activity change and fatigue.   Respiratory:  Negative for cough and shortness of breath.    Cardiovascular:  Negative for chest pain, palpitations and leg swelling.   Gastrointestinal:  Negative for abdominal pain, nausea and vomiting.   Endocrine: Negative for polydipsia, polyphagia and polyuria.   Genitourinary:  Negative for dysuria.   Musculoskeletal:  Negative for gait problem.   Skin:  Negative for rash.   Allergic/Immunologic: Negative for immunocompromised state.   Neurological:  Negative for dizziness, syncope and weakness.   Hematological:  Does not bruise/bleed easily.   Psychiatric/Behavioral:  Negative for behavioral problems.        EXERCISE & VITAMINS:  See Bariatric Assessment    MEDICATIONS/ALLERGIES:  Have been reviewed.    DIET: Liquid Bariatric Diet.  3-4 protein shakes daily, ~90+ grams protein.  40-50 fl oz SF clear beverage.  See Dietician note from today for a more detailed assessment.      Physical Exam  Vitals and nursing note reviewed.   Constitutional:       Appearance: She is well-developed. She is morbidly obese.   HENT:      Head: Normocephalic.      Nose: Nose normal.      Mouth/Throat:      Mouth: Mucous membranes are moist.   Eyes:      Extraocular Movements: Extraocular movements intact.   Cardiovascular:      Rate and Rhythm: Normal rate and regular rhythm.      Heart sounds: Normal heart sounds.   Pulmonary:      Effort: Pulmonary effort is normal.      Breath sounds: Normal breath sounds.   Abdominal:      General: Bowel sounds are normal.      Palpations: Abdomen  is soft.   Musculoskeletal:         General: Normal range of motion.      Cervical back: Normal range of motion.   Skin:     General: Skin is warm and dry.      Capillary Refill: Capillary refill takes less than 2 seconds.   Neurological:      Mental Status: She is alert and oriented to person, place, and time.   Psychiatric:         Mood and Affect: Mood normal.         ASSESSMENT:  - Morbid obesity s/p laparoscopic Harvinder-en-Y on 1/26/24.  - Co-morbidities: depression, hypertension, and obstructive sleep apnea  -  Weight loss, 21#'s and 12% EWL  -  Exercise routine walking in house   - Good Diet  - Good Vitamin regimen patches     PLAN:  - Ursodiol 500 mg daily for 6 months  - Anti-Acid medication,  daily for 3 months  - No lifting more than 10 lbs for 6 weeks  - Miralax daily for constipation  - Emphasized the importance of regular exercise and adherence to bariatric diet to achieve maximum weight loss.  - Encouraged patient to start regular exercise.  - Follow-up with dietician to advance diet.  - Continue daily vitamins and medications.  - RTC in 6 weeks or sooner if needed.  - Call the office for any issues.  - Check labs today.

## 2024-02-08 NOTE — PATIENT INSTRUCTIONS
High Protein Pureed Diet    2 weeks after gastric bypass and sleeve you may be ready to add pureed food to your diet.  All food should be the consistency of baby food, or thinner.  Follow pureed diet for the next 2 weeks.    Protein - It is very important to pay attention to protein intake during this time.      Inadequate protein intake can cause:  Delayed Wound Healing  Hair Loss  Muscle Breakdown    Meal Plan - Eat 3-4 meals per day (2-4 tbsp each), with protein supplements in between to meet protein needs.  Meeting protein needs daily will help increase healing, decrease muscle loss, and increase weight loss.  Your goal is  grams of protein a day.    Protein First - Always eat the foods with the highest protein first.  Foods high in protein include milk, yogurt, cheese, egg whites, and blenderized meat, seafood, and beans.    Fluids - Keep track in your journal of how much you are drinking; you should try to drink at least 64oz of fluids every day.      Foods allowed: Portion size Protein (g)   Sugar-free clear liquids As desired 0   Skim or 1% milk ½ cup 4   Sugar free pudding, light yogurt, custard (use skim or 1% milk in preparation) 3 oz 2.5   Strained baby food meats, or home-made pureed lean meats and shrimp 1 oz 7   Beans (red, white, black, lima, yi, fat free refried, hummus) and lentils ¼ cup 4   Low-fat/fat free cheese.(cottage cheese, mozzarella string cheese, ricotta cheese, Laughing Cow, Baby Bell, cheddar, etc) ¼ cup 7-8   Scrambled eggs or Egg Beaters 1 or ¼ cup 6   Edamame or Tofu, mashed ¼ cup 5   Unflavored protein powder (add to 1 scoop to  98% fat free soups or SF pudding) 3 Tbsp 9   *PB2: peanut powder (45 calories) 2 Tbsp 5     *PB2 powdered peanut butter: 45 calories vs. 190 calories in 2 tbsp of regular peanut butter. Purchase online at [employer], or  at various [employer], [employer], [employer], [employer] and [employer].        Bariatric Liquid/Pureed Sample Menu    3-4 small meals  plus 2-3 protein drinks per day.    8am 1 egg or ¼ cup Egg Beaters   9am 1 cup water, or decaf coffee or tea   10am Protein drink, 30g protein   11am 2 tbsp low-fat cottage cheese, and 1 tbsp pureed peaches   12pm 1 cup water, or sugar-free lemonade    1pm 2 tbsp pureed chicken, and 1 tbsp pureed carrots    2pm 1 cup water, or sugar-free lemonade   3pm Protein drink, 30g protein   5pm 1 cup water    6pm 1 cup hi-protein creamy chicken soup 14g protein (see Recipe below)   7pm 1 cup water, or sugar-free fruit punch    8pm 1 cup water     This sample menu provides approx. 80g protein and 64oz fluids.  Liquid protein supplements should contain 20-30g protein and less than 4 grams of sugar each.    Sip fluids continuously in between meals.  Drink at least ¼ cup every 15 minutes.  For fluids: ¼ cup = 2 oz = 4 tbsp       RECIPE IDEAS for Bariatric Pureed Diet:    Hi-Protein Creamy Chicken Soup: (10g protein per 1 cup serving)  Empty 1 can of 98% fat free cream of chicken soup into saucepan. Then  blend 1 scoop of unflavored protein powder with 1 can of skim milk until smooth.  Add protein milk to saucepan and heat to warm. (Note: Do NOT boil. Protein powder may clump if heated too hot).     Hi-Protein Pudding: (14g protein per ½ cup serving)  Add 2 scoops protein powder to 2 cups cold skim milk and mix well.  Stir in dry Jell-O Sugar-Free Instant Pudding mix.  Chill and Enjoy!    Tuna Mousse (12g protein per ¼ cup serving) Page 135 in book Eating Well After Weight Loss Surgery.  In a  or , combine all ingredients and pulse until smooth.  2 6-ounce cans tuna packed in water, drained  2 tbsp low-fat mayonnaise  2 tbsp fat-free sour cream  2 tbsp fat-free cream cheese, softened  ½ cup shallots, finely chopped  1 tbsp lemon juice  ¼ tsp ground pepper  ½ tsp celery seed    Chocolate Peanut Butter Mousse  (28g protein total)  6oz plain Greek yogurt  4 tbsp chocolate PB2

## 2024-02-13 LAB — VIT B1 BLD-MCNC: 63 UG/L (ref 38–122)

## 2024-02-14 ENCOUNTER — PATIENT MESSAGE (OUTPATIENT)
Dept: BARIATRICS | Facility: CLINIC | Age: 54
End: 2024-02-14
Payer: MEDICARE

## 2024-02-20 ENCOUNTER — PATIENT MESSAGE (OUTPATIENT)
Dept: BARIATRICS | Facility: CLINIC | Age: 54
End: 2024-02-20
Payer: MEDICARE

## 2024-02-27 ENCOUNTER — TELEPHONE (OUTPATIENT)
Dept: BARIATRICS | Facility: CLINIC | Age: 54
End: 2024-02-27
Payer: MEDICARE

## 2024-02-27 NOTE — TELEPHONE ENCOUNTER
Bariatric nutrition  Checking in on advancement to bariatric soft diet    Pt states she is doing well and has had no problems advancing her diet.    Diet recall:  - Premier shake  - Legendary Pop Tart  - Premier shake  - ham and cheese sandwich on Keto bread (could only eat 1/2) OR air fried 1-2oz salmon and few bites sweet potato  - sf jello/pudding    No candy, sweets or junk foods. No sodas. Doesn't have those cravings anymore.   Drinks a lot of water/CL.  Occ has a few bites of Halo Top ice cream for a treat.  Walking a little around stores, could be doing more.    F/u in one month in clinic.

## 2024-02-28 ENCOUNTER — PROCEDURE VISIT (OUTPATIENT)
Dept: NEUROLOGY | Facility: CLINIC | Age: 54
End: 2024-02-28
Payer: MEDICARE

## 2024-02-28 VITALS
BODY MASS INDEX: 49.88 KG/M2 | DIASTOLIC BLOOD PRESSURE: 89 MMHG | WEIGHT: 290.56 LBS | SYSTOLIC BLOOD PRESSURE: 142 MMHG | HEART RATE: 84 BPM

## 2024-02-28 DIAGNOSIS — G43.709 CHRONIC MIGRAINE WITHOUT AURA WITHOUT STATUS MIGRAINOSUS, NOT INTRACTABLE: Primary | ICD-10-CM

## 2024-02-28 PROCEDURE — 64615 CHEMODENERV MUSC MIGRAINE: CPT | Mod: HCNC,S$GLB,, | Performed by: PHYSICIAN ASSISTANT

## 2024-02-28 NOTE — PROCEDURES
PROCEDURE NOTE:  BOTOX was performed as an indicated therapy for intractable chronic migraine headaches given that the patient failed more than 2 headache medications     A time out was conducted just before the start of the procedure to verify the correct patient and procedure, procedure location, and all relevant critical information.      Botulinum Toxin Injection Procedure      PROCEDURE PERFORMED: Botulinum toxin injection (11118)  CLINICAL INDICATION: Chronic Migraines  After risks and benefits were explained including bleeding, infection, worsening of pain, damage to the areas being injected, weakness of muscles, loss of muscle control, dysphagia if injecting the head or neck, facial droop if injecting the facial area, painful injection, allergic or other reaction to the medications being injected, and the failure of the procedure to help the problem, a signed consent was obtained.   The patient was placed in a comfortable area and the sites to be treated were identified.The area to be treated was prepped three times with alcohol and the alcohol allowed to dry. Next, a 30 gauge needle was used to inject the medication in the area to be treated.      Total Botox used: 155 Units   Unavoidable waste: 45 Units      Injection sites:    muscle bilaterally ( a total of 10 units divided into 2 sites)   Procerus muscle (5 units)   Frontalis muscle bilaterally (a total of 20 units divided into 4 sites)   Temporalis muscle bilaterally (a total of 40 units divided into 8 sites)   Occipitalis muscle bilaterally (a total of 30 units divided into 6 sites)   Cervical paraspinal muscles (a total of 20 units divided into 4 sites)   Trapezius muscle bilaterally (a total of 30 units divided into 6 sites)   Complications: none   RTC for the next Botox injection: 12 weeks     Problem List Items Addressed This Visit    None  Visit Diagnoses       Chronic migraine without aura without status migrainosus, not intractable     -  Primary    Relevant Medications    onabotulinumtoxina injection 200 Units (Completed) (Start on 2/28/2024  4:30 PM)              Ludivina Viramontes PA-C  [facility]   [phone]

## 2024-02-29 ENCOUNTER — PATIENT MESSAGE (OUTPATIENT)
Dept: BARIATRICS | Facility: CLINIC | Age: 54
End: 2024-02-29
Payer: MEDICARE

## 2024-03-06 ENCOUNTER — PATIENT MESSAGE (OUTPATIENT)
Dept: BARIATRICS | Facility: CLINIC | Age: 54
End: 2024-03-06
Payer: MEDICARE

## 2024-03-22 ENCOUNTER — LAB VISIT (OUTPATIENT)
Dept: LAB | Facility: HOSPITAL | Age: 54
End: 2024-03-22
Payer: MEDICARE

## 2024-03-22 ENCOUNTER — CLINICAL SUPPORT (OUTPATIENT)
Dept: BARIATRICS | Facility: CLINIC | Age: 54
End: 2024-03-22
Payer: MEDICARE

## 2024-03-22 ENCOUNTER — OFFICE VISIT (OUTPATIENT)
Dept: BARIATRICS | Facility: CLINIC | Age: 54
End: 2024-03-22
Payer: MEDICARE

## 2024-03-22 VITALS
TEMPERATURE: 98 F | SYSTOLIC BLOOD PRESSURE: 124 MMHG | OXYGEN SATURATION: 95 % | BODY MASS INDEX: 47.81 KG/M2 | WEIGHT: 278.56 LBS | DIASTOLIC BLOOD PRESSURE: 60 MMHG | HEART RATE: 67 BPM

## 2024-03-22 DIAGNOSIS — Z98.84 S/P GASTRIC BYPASS: Primary | ICD-10-CM

## 2024-03-22 DIAGNOSIS — I10 ESSENTIAL HYPERTENSION: Primary | ICD-10-CM

## 2024-03-22 DIAGNOSIS — G47.33 OSA (OBSTRUCTIVE SLEEP APNEA): ICD-10-CM

## 2024-03-22 DIAGNOSIS — E66.01 MORBID OBESITY WITH BMI OF 50.0-59.9, ADULT: ICD-10-CM

## 2024-03-22 DIAGNOSIS — Z98.84 S/P BARIATRIC SURGERY: ICD-10-CM

## 2024-03-22 DIAGNOSIS — R79.9 ABNORMAL FINDING OF BLOOD CHEMISTRY, UNSPECIFIED: ICD-10-CM

## 2024-03-22 DIAGNOSIS — D81.819 BIOTIN-DEPENDENT CARBOXYLASE DEFICIENCY, UNSPECIFIED: ICD-10-CM

## 2024-03-22 DIAGNOSIS — E66.01 MORBID OBESITY WITH BMI OF 45.0-49.9, ADULT: ICD-10-CM

## 2024-03-22 DIAGNOSIS — K21.9 GASTROESOPHAGEAL REFLUX DISEASE, UNSPECIFIED WHETHER ESOPHAGITIS PRESENT: ICD-10-CM

## 2024-03-22 DIAGNOSIS — Z71.3 DIETARY COUNSELING AND SURVEILLANCE: ICD-10-CM

## 2024-03-22 LAB
25(OH)D3+25(OH)D2 SERPL-MCNC: 24 NG/ML (ref 30–96)
ALBUMIN SERPL BCP-MCNC: 3.6 G/DL (ref 3.5–5.2)
ALP SERPL-CCNC: 94 U/L (ref 55–135)
ALT SERPL W/O P-5'-P-CCNC: 23 U/L (ref 10–44)
ANION GAP SERPL CALC-SCNC: 4 MMOL/L (ref 8–16)
AST SERPL-CCNC: 17 U/L (ref 10–40)
BASOPHILS # BLD AUTO: 0.03 K/UL (ref 0–0.2)
BASOPHILS NFR BLD: 0.5 % (ref 0–1.9)
BILIRUB SERPL-MCNC: 0.5 MG/DL (ref 0.1–1)
BUN SERPL-MCNC: 13 MG/DL (ref 6–20)
CALCIUM SERPL-MCNC: 9.4 MG/DL (ref 8.7–10.5)
CHLORIDE SERPL-SCNC: 109 MMOL/L (ref 95–110)
CHOLEST SERPL-MCNC: 182 MG/DL (ref 120–199)
CHOLEST/HDLC SERPL: 3.4 {RATIO} (ref 2–5)
CO2 SERPL-SCNC: 27 MMOL/L (ref 23–29)
CREAT SERPL-MCNC: 0.7 MG/DL (ref 0.5–1.4)
DIFFERENTIAL METHOD BLD: ABNORMAL
EOSINOPHIL # BLD AUTO: 0.2 K/UL (ref 0–0.5)
EOSINOPHIL NFR BLD: 4.1 % (ref 0–8)
ERYTHROCYTE [DISTWIDTH] IN BLOOD BY AUTOMATED COUNT: 15.1 % (ref 11.5–14.5)
EST. GFR  (NO RACE VARIABLE): >60 ML/MIN/1.73 M^2
GLUCOSE SERPL-MCNC: 98 MG/DL (ref 70–110)
HCT VFR BLD AUTO: 41.1 % (ref 37–48.5)
HDLC SERPL-MCNC: 53 MG/DL (ref 40–75)
HDLC SERPL: 29.1 % (ref 20–50)
HGB BLD-MCNC: 12.6 G/DL (ref 12–16)
IMM GRANULOCYTES # BLD AUTO: 0.01 K/UL (ref 0–0.04)
IMM GRANULOCYTES NFR BLD AUTO: 0.2 % (ref 0–0.5)
IRON SERPL-MCNC: 50 UG/DL (ref 30–160)
LDLC SERPL CALC-MCNC: 113.4 MG/DL (ref 63–159)
LYMPHOCYTES # BLD AUTO: 1.8 K/UL (ref 1–4.8)
LYMPHOCYTES NFR BLD: 30.4 % (ref 18–48)
MCH RBC QN AUTO: 26.4 PG (ref 27–31)
MCHC RBC AUTO-ENTMCNC: 30.7 G/DL (ref 32–36)
MCV RBC AUTO: 86 FL (ref 82–98)
MONOCYTES # BLD AUTO: 0.4 K/UL (ref 0.3–1)
MONOCYTES NFR BLD: 6.9 % (ref 4–15)
NEUTROPHILS # BLD AUTO: 3.4 K/UL (ref 1.8–7.7)
NEUTROPHILS NFR BLD: 57.9 % (ref 38–73)
NONHDLC SERPL-MCNC: 129 MG/DL
NRBC BLD-RTO: 0 /100 WBC
PLATELET # BLD AUTO: 237 K/UL (ref 150–450)
PMV BLD AUTO: 10.6 FL (ref 9.2–12.9)
POTASSIUM SERPL-SCNC: 3.9 MMOL/L (ref 3.5–5.1)
PROT SERPL-MCNC: 6.9 G/DL (ref 6–8.4)
RBC # BLD AUTO: 4.77 M/UL (ref 4–5.4)
SATURATED IRON: 14 % (ref 20–50)
SODIUM SERPL-SCNC: 140 MMOL/L (ref 136–145)
TOTAL IRON BINDING CAPACITY: 360 UG/DL (ref 250–450)
TRANSFERRIN SERPL-MCNC: 243 MG/DL (ref 200–375)
TRIGL SERPL-MCNC: 78 MG/DL (ref 30–150)
VIT B12 SERPL-MCNC: 543 PG/ML (ref 210–950)
WBC # BLD AUTO: 5.92 K/UL (ref 3.9–12.7)

## 2024-03-22 PROCEDURE — 3074F SYST BP LT 130 MM HG: CPT | Mod: HCNC,CPTII,S$GLB, | Performed by: NURSE PRACTITIONER

## 2024-03-22 PROCEDURE — 4010F ACE/ARB THERAPY RXD/TAKEN: CPT | Mod: HCNC,CPTII,S$GLB, | Performed by: NURSE PRACTITIONER

## 2024-03-22 PROCEDURE — 1160F RVW MEDS BY RX/DR IN RCRD: CPT | Mod: HCNC,CPTII,S$GLB, | Performed by: NURSE PRACTITIONER

## 2024-03-22 PROCEDURE — 82607 VITAMIN B-12: CPT | Mod: HCNC | Performed by: NURSE PRACTITIONER

## 2024-03-22 PROCEDURE — 80053 COMPREHEN METABOLIC PANEL: CPT | Mod: HCNC | Performed by: NURSE PRACTITIONER

## 2024-03-22 PROCEDURE — 99499 UNLISTED E&M SERVICE: CPT | Mod: HCNC,S$GLB,, | Performed by: DIETITIAN, REGISTERED

## 2024-03-22 PROCEDURE — 83540 ASSAY OF IRON: CPT | Mod: HCNC | Performed by: NURSE PRACTITIONER

## 2024-03-22 PROCEDURE — 99024 POSTOP FOLLOW-UP VISIT: CPT | Mod: HCNC,S$GLB,, | Performed by: NURSE PRACTITIONER

## 2024-03-22 PROCEDURE — 99999 PR PBB SHADOW E&M-EST. PATIENT-LVL V: CPT | Mod: PBBFAC,HCNC,, | Performed by: NURSE PRACTITIONER

## 2024-03-22 PROCEDURE — 85025 COMPLETE CBC W/AUTO DIFF WBC: CPT | Mod: HCNC | Performed by: NURSE PRACTITIONER

## 2024-03-22 PROCEDURE — 84425 ASSAY OF VITAMIN B-1: CPT | Mod: HCNC | Performed by: NURSE PRACTITIONER

## 2024-03-22 PROCEDURE — 1159F MED LIST DOCD IN RCRD: CPT | Mod: HCNC,CPTII,S$GLB, | Performed by: NURSE PRACTITIONER

## 2024-03-22 PROCEDURE — 36415 COLL VENOUS BLD VENIPUNCTURE: CPT | Mod: HCNC | Performed by: NURSE PRACTITIONER

## 2024-03-22 PROCEDURE — 3044F HG A1C LEVEL LT 7.0%: CPT | Mod: HCNC,CPTII,S$GLB, | Performed by: NURSE PRACTITIONER

## 2024-03-22 PROCEDURE — 80061 LIPID PANEL: CPT | Mod: HCNC | Performed by: NURSE PRACTITIONER

## 2024-03-22 PROCEDURE — 3078F DIAST BP <80 MM HG: CPT | Mod: HCNC,CPTII,S$GLB, | Performed by: NURSE PRACTITIONER

## 2024-03-22 PROCEDURE — 82306 VITAMIN D 25 HYDROXY: CPT | Mod: HCNC | Performed by: NURSE PRACTITIONER

## 2024-03-22 RX ORDER — ERGOCALCIFEROL 1.25 MG/1
50000 CAPSULE ORAL
Qty: 12 CAPSULE | Refills: 0 | Status: SHIPPED | OUTPATIENT
Start: 2024-03-22 | End: 2024-06-08

## 2024-03-22 NOTE — PATIENT INSTRUCTIONS
Meal Ideas for Regular Bariatric Diet  *Recipes and products available at [email or web address]      Breakfast: (15-20g protein)    - Egg white omelet: 2 egg whites or ½ cup Egg Beaters. (Optional proteins: cheese, shrimp, black beans, chicken, sliced turkey) (Optional veggies: tomatoes, salsa, spinach, mushrooms, onions, green peppers, or small slice avocado)     - Egg and sausage: 1 egg or ¼ cup Egg Beaters (any variety), with 1 maritza or 2 links of Turkey sausage or Veggie breakfast sausage ([employer] or Cool Lumens)    - Crust-less breakfast quiche: To make a glass pie dish, mix 4oz part skim Ricotta, 1 cup skim milk, and 2 eggs as your base. Add protein: shredded cheese, sliced lean ham or turkey, turkey myers/sausage. Add veggies: tomato, onion, green onion, mushroom, green pepper, spinach, etc.    - Yogurt parfait: Mix 1 - 6oz container Dannon Light N Fit vanilla yogurt, with ¼ cup crushed unsalted nuts    - Cottage cheese and fruit: ½ cup part-skim cottage cheese or ricotta cheese topped with fresh fruit or sugar free preserves     - Lenore Smith's Vanilla Egg custard* (add 2 Tbsp instant coffee granules to make Cappuccino Custard*)    - Hi-Protein café latte (skim milk, decaf coffee, 1 scoop protein powder). Optional to add Sugar free syrup or extract flavoring.    - Breakfast Lox: spread fat free cream cheese on slices of smoked salmon. Serve over scrambled or egg over easy (sauteed with nonstick cookspray) OR on a cucumber slice    - Eggwhich: Scramble or cook 1 large egg over easy using nonstick cookspray. Place between 2 slices of Dominican myers and low fat cheese.     Lunch: (20-30g protein)    - ½ cup Black bean soup (Homemade or Progresso), with ¼ cup shredded low-fat cheese. Top with chopped tomato or fresh salsa.     - Lean deli turkey breast and low-fat sliced cheese, mustard or light jett to moisten, rolled up together, or wrapped in a Ramakrishna lettuce leaf    - Chicken salad made from dinner  leftovers, moisten with low-fat salad dressing or light jett. Also try leftover salmon, shrimp, tuna or boiled eggs. Serve ½ cup over dark green salad    - Fat-free canned refried beans, topped with ¼ cup shredded low-fat cheese. Top with chopped tomato or fresh salsa.     - Greek salad: Top mixed greens with 1-2oz grilled chicken, tomatoes, red onions, 2-3 kalamata olives, and sprinkle lightly with feta cheese. Spritz with Balsamic vinegar to taste.     - Crust-less lunch quiche: To make a glass pie dish, mix 4oz part skim Ricotta, 1 cup skim milk, and 2 eggs as your base. Add protein: shredded cheese, sliced lean ham or turkey, shrimp, chicken. Add veggies: tomato, onion, green onion, mushroom, green pepper, spinach, artichoke, broccoli, etc.    - Pizza bake: spread a  yogi felipe mushroom with tomato sauce, low-fat shredded mozzarella and turkey pepperoni or [place] myers. Add any veggies. Roast for 10-15 minutes, until cheese melted.     - Cucumber crab bites: Spread ¼ cup crab dip (lump crabmeat + light cream cheese and green onions) over sliced cucumber.     - Chicken with light spinach and artichoke dip*: Puree in : 6oz cooked and drained spinach, 2 cloves garlic, 1 can cannelloni beans, ½ cup chopped green onions, 1 can drained artichoke hearts (not marinated in oil), lemon juice and basil. Mix in 2oz chopped up chicken.    Supper: (20-30g protein)    - Serve grilled fish over dark green salad tossed with low-fat dressing, served with grilled asparagus coleman     - Rotisserie chicken salad: served with sliced strawberries, walnuts, fat-free feta cheese crumbles and 1 tbsp Simonss Own Light Raspberry [place] Vinaigrette    - Shrimp cocktail: Dip cold boiled shrimp in homemade low-sugar cocktail sauce (1/2 cup Umair One Carb ketchup, 2 tbsp horseradish, 1/4 tsp hot sauce, 1 tsp Worcestershire sauce, 1 tbsp freshly-squeezed lemon juice). Serve with dark green salad, walnuts, and crumbled blue  cheese drizzled with olive oil and Balsamic vinegar    - Tuna Melt: Spread tuna salad onto 2 thick slices of tomato. Top with low-fat cheese and broil until cheese is melted. May also be made with chicken salad of shrimp salad. [place] with different types of cheeses.    - Chicken or beef fajitas (no tortilla, rice, beans, chips). Top meat and veggies w/ fresh salsa, fat free sour cream.     - Homemade low-fat Chili using extra lean ground beef or ground turkey. Top with shredded cheese and salsa as desired. May add dollop fat-free sour cream if desired    - Chicken parmesan: Top chicken breast w/ low sugar marinara sauce, mozzarella cheese and bake until chicken reaches 165*.  Serve w/ spaghetti SQUASH or Mosotho cut green beans    - Dinner Omelet with shrimp or chicken and onion, green peppers and chives.    - No noodle lasagna: Use sliced zucchini or eggplant in place of noodles.  Layer with part skim ricotta cheese and low sugar meat sauce (use very lean ground beef or ground turkey).    - Mexican chicken bake: Bake chunks of chicken breast or thigh with taco seasoning, Pace brand enchilada sauce, green onions and low-fat cheese. Serve with ¼ cup black beans or fat free refried beans topped with chopped tomatoes or salsa.    - Krissy frozen meatballs, simmered in Classico Marinara sauce. Different flavors of salsa or spaghetti sauce create different dishes! Sprinkle with parmesan cheese. Serve with grilled or steamed veggies, or a dark green salad.    - Simmer boneless skinless chicken thigh chunks in Classico Marinara sauce or roasted salsa until tender with chopped onion, bell pepper, garlic, mushrooms, spinach, etc.     - Hamburger or veggie burger, without the bun, dressed the way you like. Served with grilled or steamed veggies.    - Eggplant parmesan: Bake slices of eggplant at 350 degrees for 15 minutes. Layer tomato sauce, sliced eggplant and low-fat mozzarella cheese in a baking dish and cover with  foil. Bake 30-40 more minutes or until bubbly. Uncover and bake at 400 degrees for about 15 more minutes, or until top is slightly crisp.    - Fish tacos: grilled/baked white fish, wrapped in Ramakrishna lettuce leaf, topped with salsa, shredded low-fat cheese, and light coleslaw.    - Chicken savanah: Sprinkle chicken w/ 1 tsp of hidden valley ranch dip mix. Then grill chicken and top with black beans, salsa and 1 tsp fat free sour cream.     - Cauliflower pizza crust: Use cauliflower as crust (see recipe on pinterest, no flour!). Top w/ low fat cheese, turkey pepperoni and veggies and bake again    - chicken or turkey crust pizza: use ground chicken or turkey instead of cauliflower, spread in [place] and bake at 350 for about 20-30 minutes(may want to add garlic, black pepper, oregano and other herbs to ground meat mixture).  Remove and top w/ low fat cheese, turkey pepperoni and veggies and bake again for another 10 minutes or until cheese is browned.     Snacks: (100-200 calories; >5g protein)    - 1 low-fat cheese stick with 8 cherry tomatoes or 1 serving fresh fruit  - 4 thin slices fat-free turkey breast and 1 slice low-fat cheese  - 4 thin slices fat-free honey ham with wedge of melon  - 6-8 edamame pods (equivalent to about 1/4 cup edamame without pods).   - 1/4 cup unsalted nuts with ½ cup fruit  - 6-oz container Dannon Light n Fit vanilla yogurt, topped with 1oz unsalted nuts         - apple, celery or baby carrots spread with 2 Tbsp PB2  - apple slices with 1 oz slice low-fat cheese  - Apple slices dipped in 2 Tbsp of PB2  - celery, cucumber, bell pepper or baby carrots dipped in ¼ cup hummus bean spread or light spinach and artichoke dip (*recipe in lunch section)  - celery, cucumber, baby carrots dipped in high protein greek yogurt (Mix 16 oz plain greek yogurt + 1 packet of hidden valley ranch dip mix)  - Omar Links Beef Steak - 14g protein! (similar to beef jerky)  - 2 wedges Laughing Cow - Light Herb  & Garlic Cheese with sliced cucumber or green bell pepper  - 1/2 cup low-fat cottage cheese with ¼ cup fruit or ¼ cup salsa  - RTD Protein drinks: Atkins, Low Carb Slim Fast, EAS light, Muscle Milk Light, etc.  - Homemade Protein drinks: GNC Soy95, Isopure, Nectar, UNJURY, Whey Gourmet, etc. Mix 1 scoop powder with 8oz skim/1% milk or light soymilk.  - Protein bars: Atkins, EAS, Pure Protein, Think Thin, Detour, etc. Must have 0-4 grams sugar - Read the label.    Takeout Options: No more than twice/week  Deli - Salads (no pasta or rice), meats, cheeses. Roasted chicken. Lox (salmon)    Mexican - Platters which don't include tortillas, chips, or rice. Go easy on the beans. Example: Fajitas without the tortillas. Ask the  not to bring chips to the table if they are too tempting.    Greek - Meat or fish and vegetable, but no bread or rice. Including hummus, baba ganoush, etc, is OK. Most sit-down Greek restaurants can provide you with cucumber slices for dipping instead of constantino bread.    Fast Food (Avoid as much as possible) - Salads (no croutons and limit salad dressing to 2 tbsp), grilled chicken sandwich without the bun and ask for no jett. Margarets low fat chili or Taco Bell pintos and cheese.    BBQ - The meats are fine if you ask for sauces on the side, but most of the traditional side dishes are loaded with carbs. Aaron slaw, baked beans and BBQ sauce are typically made with sugar.    Chinese - Nothing deep-fried, no rice or noodles. Many Chinese sauces have starch and sugar in them, so you'll have to use your judgement. If you find that these sauces trigger cravings, or cause Dumping, you can ask for the sauce to be made without sugar or just use soy sauce.    How to taper off of Omeprazole:  - Take 1 Tablet every other day for 2 weeks.  If you do not experience any heartburn, indigestion, nausea symptoms, the following week, take 1 tablet every 3 days.  Again if you remain without the above symptoms, you  may completely discontinue the medication.  If symptoms return at any point, please restart the medication.

## 2024-03-22 NOTE — PROGRESS NOTES
NUTRITION NOTE    Referring Physician: Dr. Grossman  Reason for MNT Referral: Follow-up 8 weeks s/p Gastric Bypass    Denies nausea, vomiting, constipation, and diarrhea.  Reports doing well. Vomited from eating more than half of a sandwich. States she is still learning what and how much her stomach allows. Reminded her about limiting/avoiding bread to maximize protein intake and weight loss. Suggested meat and cheese roll up instead.    Past Medical History:   Diagnosis Date    Anxiety     Depression     Generalized anxiety disorder with panic attacks 02/23/2022    GERD (gastroesophageal reflux disease)     Hypertension     Migraine headache     Sleep difficulties     Typical atrial flutter 04/20/2022     CLINICAL DATA:  53 y.o. female. 5 ft. 4 in.    Current Weight: 278 lbs  BMI: 47.8  Total Weight Loss: 42 lbs  Excess Weight Loss: 23%    LABS:  Reviewed.    CURRENT DIET:  Bariatric Diet.  Diet Recall:  grams of protein/day; 48 oz of fluids/day    B: prot shake OR scrambled egg with cheese OR protein pancake with sf syrup  L: 1/2 ham and cheese sandwich on keto bread OR dinner leftovers (2oz salmon and asparagus)  Sn: blueberries or watermelon  D: meatball and sauce with parm cheese OR red beans no rice  - sf pudding cup or no sugar added applesauce    Diet includes:  Meal Pattern: 3 meal(s) + 1-2 snack(s) + 1 protein supplement(s)  Adequate protein supplement intake. Premier shakes and Protein 2.0  Adequate dairy intake.  Adequate vegetable intake. No raw vegetables and lettuce yet.  Adequate fruit intake.    EXERCISE:  None.    Restrictions to Exercise: None. Rain.    VITAMINS / MINERALS:  Multivitamins: FC  Patch MD  Calcium Citrate + Vitamin D    ASSESSMENT:  Doing well overall.  Weight loss on track.  Adequate calorie intake.  Adequate protein intake.  Adequate fluid intake.  Following diet appropriately.  Not exercising.  Adequate vitamins & minerals.    BARIATRIC DIET DISCUSSION:  Instructed and  provided written materials on bariatric diet plan.  Reinforced post-op nutrition guidelines.    PLAN / RECOMMENDATIONS:  May begin to incorporate raw vegetables, lettuce, unsalted nuts, and protein bars as tolerated.  Continue excellent diet plan.  Maintain protein intake.  Maintain fluid intake.  Begin exercise. Recommended getting a treadmill or stationary bike to use at home while watching a show.  Continue appropriate vitamins & minerals.    Return to clinic in 4 months.    SESSION TIME: 30 minutes

## 2024-03-22 NOTE — PROGRESS NOTES
BARIATRIC POST-OPERATIVE VISIT:    HPI:  Starla Fisher is a 53 y.o. year old female presents for 8 week post op visit following LRNY.  she is doing well and tolerating the diet without difficulty.  she has no complaints.    Denies: nausea, vomiting, abdominal pain, changes in bowel movement pattern, fever, chills, dysphagia, chest pain, and shortness of breath.    States joint pain and CARL has improved       Review of Systems   Constitutional:  Negative for activity change and fatigue.   Respiratory:  Negative for cough and shortness of breath.    Cardiovascular:  Negative for chest pain, palpitations and leg swelling.   Gastrointestinal:  Negative for abdominal pain, nausea and vomiting.   Endocrine: Negative for polydipsia, polyphagia and polyuria.   Genitourinary:  Negative for dysuria.   Musculoskeletal:  Negative for gait problem.   Skin:  Negative for rash.   Allergic/Immunologic: Negative for immunocompromised state.   Neurological:  Negative for dizziness, syncope and weakness.   Hematological:  Does not bruise/bleed easily.   Psychiatric/Behavioral:  Negative for behavioral problems.        EXERCISE & VITAMINS:  See Bariatric Assessment    MEDICATIONS/ALLERGIES:  Have been reviewed.    DIET: Soft/Regular Bariatric Diet.  1-2 protein shakes daily, ~90+ grams protein.  50 fl oz SF clear beverage.    See Dietician note from today for a more detailed assessment.      Physical Exam  Vitals and nursing note reviewed.   Constitutional:       Appearance: She is well-developed. She is morbidly obese.   HENT:      Head: Normocephalic.      Nose: Nose normal.      Mouth/Throat:      Mouth: Mucous membranes are moist.   Eyes:      Extraocular Movements: Extraocular movements intact.   Cardiovascular:      Rate and Rhythm: Normal rate and regular rhythm.      Heart sounds: Normal heart sounds.   Pulmonary:      Effort: Pulmonary effort is normal.      Breath sounds: Normal breath sounds.   Abdominal:      General:  Bowel sounds are normal.      Palpations: Abdomen is soft.   Musculoskeletal:         General: Normal range of motion.      Cervical back: Normal range of motion.   Skin:     General: Skin is warm and dry.      Capillary Refill: Capillary refill takes less than 2 seconds.   Neurological:      Mental Status: She is alert and oriented to person, place, and time.   Psychiatric:         Mood and Affect: Mood normal.         ASSESSMENT:  - Morbid obesity s/p laparoscopic Harvinder-en-Y on 1/26/24.  - Co-morbidities: depression, hypertension, and obstructive sleep apnea  -  Weight loss, 42#'s and 23% EWL  -  Exercise routine walking in house   - Good Diet  - Good Vitamin regimen patches     PLAN:  - Ursodiol 500 mg daily for 6 months  - Anti-Acid medication,  daily for 3 months, slow taper  - Miralax daily for constipation  - Emphasized the importance of regular exercise and adherence to bariatric diet to achieve maximum weight loss.  - Encouraged patient to start regular exercise.  - Follow-up with dietician to advance diet.  - Continue daily vitamins and medications.  - RTC in 4 months or sooner if needed.  - Call the office for any issues.  - Check labs today.

## 2024-03-27 LAB — VIT B1 BLD-MCNC: 46 UG/L (ref 38–122)

## 2024-04-03 ENCOUNTER — PATIENT MESSAGE (OUTPATIENT)
Dept: BARIATRICS | Facility: CLINIC | Age: 54
End: 2024-04-03
Payer: MEDICARE

## 2024-04-09 ENCOUNTER — PATIENT MESSAGE (OUTPATIENT)
Dept: BARIATRICS | Facility: CLINIC | Age: 54
End: 2024-04-09
Payer: MEDICARE

## 2024-04-12 ENCOUNTER — TELEPHONE (OUTPATIENT)
Dept: BARIATRICS | Facility: CLINIC | Age: 54
End: 2024-04-12
Payer: MEDICARE

## 2024-04-12 NOTE — TELEPHONE ENCOUNTER
Phoned patient to discuss co pay for vitamins or get clarification on what she is asking for.  Unable to leave a message because voice mail is full.      ----- Message from Jerman Cruz PA-C sent at 4/12/2024  9:31 AM CDT -----  Regarding: RE: Co-Pay for Medication  Contact: Pt @ [phone]  The only vitamin I see dennis put in is vitamin d.     Otherwise I'm not sure exactly what she is talking about.     BM   ----- Message -----  From: Frances Marshall RN  Sent: 4/11/2024   3:45 PM CDT  To: Dennis Mercedes NP  Subject: FW: Co-Pay for Medication                        Do you know what she is talking about?    Frances  ----- Message -----  From: Shaylee Fox  Sent: 4/11/2024   3:42 PM CDT  To: Daren Borrego Staff  Subject: Co-Pay for Medication                            Pt is calling to speak with provider about the co-pay that she needed to pay in order receive the vitamins. Please c/b to advise.. Thanks

## 2024-04-15 ENCOUNTER — TELEPHONE (OUTPATIENT)
Dept: BARIATRICS | Facility: CLINIC | Age: 54
End: 2024-04-15
Payer: MEDICARE

## 2024-04-17 ENCOUNTER — TELEPHONE (OUTPATIENT)
Dept: BARIATRICS | Facility: CLINIC | Age: 54
End: 2024-04-17
Payer: MEDICARE

## 2024-04-17 NOTE — TELEPHONE ENCOUNTER
Called pt to discuss the following BSTOP registry questions listed below. No answer, LVM w/call back name and number.   Questions:  Total Opioids Used (Outpatient)?  Unused Opioids Returned to Pharmacy?  30-Day Postop Opioid Use- Was the patient still taking opioids 30 days after surgery?

## 2024-04-23 ENCOUNTER — TELEPHONE (OUTPATIENT)
Dept: BARIATRICS | Facility: CLINIC | Age: 54
End: 2024-04-23
Payer: MEDICARE

## 2024-04-23 NOTE — TELEPHONE ENCOUNTER
Called patient to discuss patient experience within the clinic, to inform patient that we are [facility] program which assesses and monitors opioid use after surgery and disposal of unused opioids, and to verify her usage and check what she has done with unused medications (if any was unused after surgery)- No answer.  Left direct call back number.  Awaiting return call

## 2024-04-30 ENCOUNTER — OFFICE VISIT (OUTPATIENT)
Dept: CARDIOLOGY | Facility: CLINIC | Age: 54
End: 2024-04-30
Payer: MEDICARE

## 2024-04-30 VITALS
SYSTOLIC BLOOD PRESSURE: 130 MMHG | HEIGHT: 64 IN | BODY MASS INDEX: 45.05 KG/M2 | OXYGEN SATURATION: 96 % | HEART RATE: 76 BPM | DIASTOLIC BLOOD PRESSURE: 82 MMHG | WEIGHT: 263.88 LBS

## 2024-04-30 DIAGNOSIS — G47.33 OSA (OBSTRUCTIVE SLEEP APNEA): ICD-10-CM

## 2024-04-30 DIAGNOSIS — Z86.39 HISTORY OF MORBID OBESITY: ICD-10-CM

## 2024-04-30 DIAGNOSIS — I10 ESSENTIAL HYPERTENSION: Primary | ICD-10-CM

## 2024-04-30 PROCEDURE — 3075F SYST BP GE 130 - 139MM HG: CPT | Mod: HCNC,CPTII,S$GLB, | Performed by: INTERNAL MEDICINE

## 2024-04-30 PROCEDURE — 3008F BODY MASS INDEX DOCD: CPT | Mod: HCNC,CPTII,S$GLB, | Performed by: INTERNAL MEDICINE

## 2024-04-30 PROCEDURE — 3079F DIAST BP 80-89 MM HG: CPT | Mod: HCNC,CPTII,S$GLB, | Performed by: INTERNAL MEDICINE

## 2024-04-30 PROCEDURE — 99213 OFFICE O/P EST LOW 20 MIN: CPT | Mod: HCNC,S$GLB,, | Performed by: INTERNAL MEDICINE

## 2024-04-30 PROCEDURE — 1159F MED LIST DOCD IN RCRD: CPT | Mod: HCNC,CPTII,S$GLB, | Performed by: INTERNAL MEDICINE

## 2024-04-30 PROCEDURE — 99999 PR PBB SHADOW E&M-EST. PATIENT-LVL III: CPT | Mod: PBBFAC,HCNC,, | Performed by: INTERNAL MEDICINE

## 2024-04-30 PROCEDURE — 3044F HG A1C LEVEL LT 7.0%: CPT | Mod: HCNC,CPTII,S$GLB, | Performed by: INTERNAL MEDICINE

## 2024-04-30 PROCEDURE — 1160F RVW MEDS BY RX/DR IN RCRD: CPT | Mod: HCNC,CPTII,S$GLB, | Performed by: INTERNAL MEDICINE

## 2024-04-30 PROCEDURE — 4010F ACE/ARB THERAPY RXD/TAKEN: CPT | Mod: HCNC,CPTII,S$GLB, | Performed by: INTERNAL MEDICINE

## 2024-04-30 NOTE — PROGRESS NOTES
[facility] [address]     SUBJECTIVE:     History of Present Illness:  Patient is a 53 y.o. female presents with followup after atrial flutter cardioversion. Had bariatric surgery without events; 335 to  263 lbs   Primary Diagnosis:  1. Morbid obesity  2. Hypertension  3. Atrial flutter with rapid ventricular rate - s/p ablation  6/29/22  4. CARL with CPAP  ROS  Since last visit 10/23: feels well   A. Pulse normal at home without tachycardia   B. No chest pains  C. No syncope  D.lost weight recently  E. Blood pressures at home 120's at home   F. activity: walking more; using treadmill as well   G.no palpitations   Past Hospitalization    Review of patient's allergies indicates:  No Known Allergies    Past Medical History:   Diagnosis Date    Anxiety     Depression     Generalized anxiety disorder with panic attacks 02/23/2022    GERD (gastroesophageal reflux disease)     Hypertension     Migraine headache     Sleep difficulties     Typical atrial flutter 04/20/2022       Past Surgical History:   Procedure Laterality Date    BREAST SURGERY      COLONOSCOPY N/A 12/14/2022    Procedure: COLONOSCOPY sutab;  Surgeon: Wei Walsh MD;  Location: [facility];  Service: Endoscopy;  Laterality: N/A;    ENDOMETRIAL ABLATION      ENDOSCOPIC GASTROCNEMIUS RECESSION Right 8/19/2022    Procedure: RECESSION, GASTROCNEMIUS, ENDOSCOPIC;  Surgeon: Zelalem Solorzano DPM;  Location: [facility] OR;  Service: Podiatry;  Laterality: Right;  Arthrex [facility]  Stella/Arthradha/CHRISTOPHE/REJI,RN 8/18@1333    ENDOSCOPIC PLANTAR FASCIOTOMY Right 8/19/2022    Procedure: FASCIOTOMY, PLANTAR, ENDOSCOPIC;  Surgeon: Zelalem Solorzano DPM;  Location: [facility] OR;  Service: Podiatry;  Laterality: Right;  Arthrex set    ESOPHAGOGASTRODUODENOSCOPY N/A 12/14/2022    Procedure: EGD (ESOPHAGOGASTRODUODENOSCOPY);  Surgeon: Wei Walsh MD;  Location: [facility] ENDO;  Service: Endoscopy;  Laterality: N/A;    EYE SURGERY      FOOT SURGERY Right 10/2018    concepción     IMPLANTATION OF PERMANENT SACRAL NERVE STIMULATOR N/A 2/15/2022    Procedure: INSERTION, NEUROSTIMULATOR, PERMANENT, SACRAL;  Surgeon: David Brown MD;  Location: [facility] OR [address] [place] [address];  Service: Urology;  Laterality: N/A;    TONSILLECTOMY      TOTAL REDUCTION MAMMOPLASTY Bilateral     XI ROBOTIC GASTROENTEROSTOMY, MIAN-EN-Y N/A 1/26/2024    Procedure: XI ROBOTIC GASTROENTEROSTOMY, MIAN-EN-Y w/intraop egd;  Surgeon: Judy Grossman MD;  Location: [facility] OR [address] [place] [address];  Service: General;  Laterality: N/A;       Family History   Problem Relation Name Age of Onset    Atrial fibrillation Mother      Thyroid disease Mother      Dementia Mother      Diabetes Father      Prostate cancer Father      Stomach cancer Sister      Migraines Sister      Colon cancer Paternal Aunt      Colon cancer Paternal Uncle      Breast cancer Maternal Grandmother      Colon cancer Paternal Grandmother      Cancer Paternal Grandfather         Social History     Tobacco Use    Smoking status: Never     Passive exposure: Never    Smokeless tobacco: Never   Substance Use Topics    Alcohol use: No    Drug use: No        Home meds:  Current Outpatient Medications on File Prior to Visit   Medication Sig Dispense Refill    amoxicillin-clavulanate 875-125mg (AUGMENTIN) 875-125 mg per tablet       blood-glucose meter (TRUE METRIX AIR GLUCOSE METER) kit use as directed 2 each 2    BOTOX 200 unit SolR       buPROPion (WELLBUTRIN XL) 150 MG TB24 tablet Take 1 tablet (150 mg total) by mouth once daily. 90 tablet 3    clotrimazole-betamethasone 1-0.05% (LOTRISONE) cream Apply topically 2 (two) times daily. 15 g 1    diazePAM (VALIUM) 5 MG tablet Take 1 tablet (5 mg total) by mouth daily as needed for Anxiety. 15 tablet 5    diltiaZEM (CARDIZEM CD) 240 MG 24 hr capsule Take 1 capsule (240 mg total) by mouth once daily. 90 capsule 1    ergocalciferol (ERGOCALCIFEROL) 50,000 unit Cap Take 1 capsule (50,000 Units total) by mouth every 7 days. for 12 doses 12  capsule 0    EScitalopram oxalate (LEXAPRO) 20 MG tablet Take 1 tablet (20 mg total) by mouth once daily. 90 tablet 3    Qy-E-pgqwz-B12-L.acid,plan-inu 65 mg iron- 50 mg-1 mg DFE Cap Take by mouth.      ferrous sulfate (FEOSOL) 325 mg (65 mg iron) Tab tablet Take 325 mg by mouth.      GEMTESA 75 mg Tab Take 75 mg ( 1 tablet ) by mouth once daily. 30 tablet 11    HYDROcodone-acetaminophen (NORCO) 5-325 mg per tablet Take 1 tablet by mouth every 4 (four) hours as needed for Pain. 30 tablet 0    losartan (COZAAR) 50 MG tablet Take 1 tablet (50 mg total) by mouth once daily. 90 tablet 3    losartan (COZAAR) 50 MG tablet Take 1 tablet (50 mg total) by mouth once daily. 90 tablet 3    metronidazole 0.75% (METROCREAM) 0.75 % Crea Apply topically 2 (two) times daily. 45 g 3    multivit-minerals/folic acid (ADULT MULTIVITAMIN GUMMIES ORAL) Take 1 tablet by mouth Daily. Continue to hold until after Surgery.      multivitamin (THERAGRAN) per tablet Take 1 tablet by mouth.      omeprazole (PRILOSEC) 40 MG capsule Take 1 capsule (40 mg total) by mouth once daily. 90 capsule 0    ondansetron (ZOFRAN-ODT) 8 MG TbDL Take 1 tablet (8 mg total) by mouth every 6 (six) hours as needed (nausea after surgery). 30 tablet 0    oxybutynin (DITROPAN-XL) 10 MG 24 hr tablet Take 1 tablet (10 mg total) by mouth once daily. 30 tablet 11    oxyCODONE (ROXICODONE) 5 mg/5 mL Soln Take 5 mLs (5 mg total) by mouth every 6 (six) hours as needed. 75 mL 0    phenylephrine (TEODORA-SYNEPHRINE) 10 mg/mL injection       promethazine (PHENERGAN) 25 MG tablet Take 1 tablet (25 mg total) by mouth every 6 (six) hours as needed for Nausea. 12 tablet 5    propofoL (DIPRIVAN) 10 mg/mL infusion       ROPIvacaine 0.5%, PF, (NAROPIN) 5 mg/mL (0.5 %) injection       sumatriptan (IMITREX STATDOSE) 6 mg/0.5 mL kit INJECT 0.5 ML(6 MG TOTAL) UNDER THE SKIN EVERY 2 HOURS AS NEEDED FOR MIGRAINE. Max 12mg/24hrs 3 mL 5    sumatriptan (IMITREX) 100 MG tablet TAKE 1 TABLET BY  "MOUTH EVERY 2 HOURS AS NEEDED FOR MIGRAINE. Max 200mg/24hrs. 9 tablet 11    SUMAtriptan (IMITREX) 20 mg/actuation nasal spray USE ONE SPRAY IN THE NOSTRIL AS NEEDED FOR MIGRAINE(MAX 40MG/24 HOURS) 12 each 11    traMADoL (ULTRAM) 50 mg tablet Take 1 tablet (50 mg total) by mouth every 6 (six) hours. 5 tablet 0    traZODone (DESYREL) 150 MG tablet Take half or whole tablet by mouth before bed as needed for insomnia 90 tablet 3    triamcinolone acetonide 0.025% (KENALOG) 0.025 % Oint Apply topically daily as needed (vulvar itching). 15 g 0    TRUEPLUS LANCETS 33 gauge Misc TEST BLOOD SUGAR THREE TIMES DAILY 300 each 10    ubrogepant (UBRELVY) 50 mg tablet Take 1 tablet by mouth at the onset of a headache. May repeat based on response and tolerability after more than 2 hours if needed. Do not take more than 200mg in a 24 hour span. 16 tablet 5    ursodioL (ACTIGALL) 500 MG tablet Take 1 tablet (500 mg total) by mouth once daily. 30 tablet 5    walker (ULTRA-LIGHT ROLLATOR) Misc Rolator for stability and balance postoperatively as needed while standing and walking. 1 each 0     Current Facility-Administered Medications on File Prior to Visit   Medication Dose Route Frequency Provider Last Rate Last Admin    0.9%  NaCl infusion   Intravenous Continuous Sathish Briseno MD   Stopped at 02/15/22 0948    LIDOcaine (PF) 10 mg/ml (1%) injection 10 mg  1 mL Intradermal Once Sathish Briseno MD        sodium chloride 0.9% flush 10 mL  10 mL Intravenous PRN Zelalem Solorzano, KHANG        sodium chloride 0.9% flush 10 mL  10 mL Intravenous PRN Zelalem Solorzano, KHANG           Cardiac meds:   ASA 81 mg   Diltiazem 240 mg daily  Losartan 50 mg daily           OBJECTIVE:     Vital Signs (Most Recent)  Vitals:    04/30/24 1509   BP: 130/82   Pulse: 76   SpO2: 96%   Weight: 119.7 kg (263 lb 14.3 oz)   Height: 5' 4" (1.626 m)           Weight up from 315326 - 279 -318 - 268    Physical Exam:  Neck: normal carotids, no bruits; " normal JVP  Lungs :clear  Heart: fast heart rate , normal S1,S2, no murmurs, no gallops  Abd: no masses; no bruits;   Exts: normal DP and PT pulses bilaterally, no edema noted           LABS  : A1c normal; lipids OK ; CBC normal; CMP normal     : ;A1c 5.6; LFT's normal     Diagnostic Results:    1.EK/22: atrial flutter to sinus after cardioversion   1b. EK22: atrial flutter with better ventricular rate and variable AV block  1c. EK22: NSR:    1d. EK/22: NSR     2. Echo: 22: normal EF   3. Stress echo: : normal EF; negative for ischemia     ASSESSMENT/PLAN:     1. Recurrence of atrial flutter now in sinus post ablation and remaining in sinus   2. No symptoms of heart failure or angina - recent negative stress test with normal EF   3. Hypertension: controlled per patient   4. Shortness of breath: better and improved    Plan: 1.continue the same medications   2. Return 6 months   Alexis Ingram MD

## 2024-05-02 DIAGNOSIS — K44.9 HIATAL HERNIA WITH GERD WITHOUT ESOPHAGITIS: ICD-10-CM

## 2024-05-02 DIAGNOSIS — Z98.84 S/P GASTRIC BYPASS: ICD-10-CM

## 2024-05-02 DIAGNOSIS — E66.01 MORBID OBESITY WITH BMI OF 50.0-59.9, ADULT: ICD-10-CM

## 2024-05-02 DIAGNOSIS — K21.9 HIATAL HERNIA WITH GERD WITHOUT ESOPHAGITIS: ICD-10-CM

## 2024-05-02 RX ORDER — OMEPRAZOLE 40 MG/1
40 CAPSULE, DELAYED RELEASE ORAL DAILY
Qty: 90 CAPSULE | Refills: 0 | Status: CANCELLED | OUTPATIENT
Start: 2024-05-02 | End: 2024-07-31

## 2024-05-08 DIAGNOSIS — K44.9 HIATAL HERNIA WITH GERD WITHOUT ESOPHAGITIS: ICD-10-CM

## 2024-05-08 DIAGNOSIS — E66.01 MORBID OBESITY WITH BMI OF 50.0-59.9, ADULT: ICD-10-CM

## 2024-05-08 DIAGNOSIS — K21.9 HIATAL HERNIA WITH GERD WITHOUT ESOPHAGITIS: ICD-10-CM

## 2024-05-08 DIAGNOSIS — Z98.84 S/P GASTRIC BYPASS: ICD-10-CM

## 2024-05-08 RX ORDER — OMEPRAZOLE 40 MG/1
40 CAPSULE, DELAYED RELEASE ORAL DAILY
Qty: 90 CAPSULE | Refills: 0 | Status: CANCELLED | OUTPATIENT
Start: 2024-05-02 | End: 2024-07-31

## 2024-05-13 ENCOUNTER — TELEPHONE (OUTPATIENT)
Dept: ORTHOPEDICS | Facility: CLINIC | Age: 54
End: 2024-05-13
Payer: MEDICARE

## 2024-05-13 DIAGNOSIS — M17.11 PRIMARY OSTEOARTHRITIS OF RIGHT KNEE: Primary | ICD-10-CM

## 2024-05-13 DIAGNOSIS — M25.562 LEFT KNEE PAIN, UNSPECIFIED CHRONICITY: Primary | ICD-10-CM

## 2024-05-13 NOTE — TELEPHONE ENCOUNTER
----- Message from Deya Maher RN sent at 5/10/2024  4:47 PM CDT -----    ----- Message -----  From: Mirian Barrett  Sent: 5/10/2024   4:40 PM CDT  To: Carlo Keys Staff    Type:  Needs Medical Advice    Who Called: pt  Symptoms (please be specific):  bilateral knee pain    How long has patient had these symptoms:  n/a    Would the patient rather a call back or a response via [facility]? Call back   Best Call Back Number: [phone]  Additional Information: pt would like to get bilateral knee injections

## 2024-05-13 NOTE — TELEPHONE ENCOUNTER
Spoke with patient. She would like bilateral gel injections.  Will have [facility] order and notify patient if insurance does not approve. Verbalized understanding.

## 2024-05-14 ENCOUNTER — PATIENT MESSAGE (OUTPATIENT)
Dept: BARIATRICS | Facility: CLINIC | Age: 54
End: 2024-05-14
Payer: MEDICARE

## 2024-05-23 ENCOUNTER — PROCEDURE VISIT (OUTPATIENT)
Dept: NEUROLOGY | Facility: CLINIC | Age: 54
End: 2024-05-23
Payer: MEDICARE

## 2024-05-23 VITALS
BODY MASS INDEX: 44.11 KG/M2 | WEIGHT: 257 LBS | DIASTOLIC BLOOD PRESSURE: 92 MMHG | HEART RATE: 71 BPM | SYSTOLIC BLOOD PRESSURE: 148 MMHG

## 2024-05-23 DIAGNOSIS — G43.709 CHRONIC MIGRAINE WITHOUT AURA WITHOUT STATUS MIGRAINOSUS, NOT INTRACTABLE: Primary | ICD-10-CM

## 2024-05-23 PROCEDURE — 64615 CHEMODENERV MUSC MIGRAINE: CPT | Mod: S$GLB,,, | Performed by: PHYSICIAN ASSISTANT

## 2024-05-23 NOTE — PROCEDURES
PROCEDURE NOTE:  BOTOX was performed as an indicated therapy for intractable chronic migraine headaches given that the patient failed more than 2 headache medications     A time out was conducted just before the start of the procedure to verify the correct patient and procedure, procedure location, and all relevant critical information.      Botulinum Toxin Injection Procedure      PROCEDURE PERFORMED: Botulinum toxin injection (98022)  CLINICAL INDICATION: Chronic Migraines  After risks and benefits were explained including bleeding, infection, worsening of pain, damage to the areas being injected, weakness of muscles, loss of muscle control, dysphagia if injecting the head or neck, facial droop if injecting the facial area, painful injection, allergic or other reaction to the medications being injected, and the failure of the procedure to help the problem, a signed consent was obtained.   The patient was placed in a comfortable area and the sites to be treated were identified.The area to be treated was prepped three times with alcohol and the alcohol allowed to dry. Next, a 30 gauge needle was used to inject the medication in the area to be treated.      Total Botox used: 155 Units   Unavoidable waste: 45 Units      Injection sites:    muscle bilaterally ( a total of 10 units divided into 2 sites)   Procerus muscle (5 units)   Frontalis muscle bilaterally (a total of 20 units divided into 4 sites)   Temporalis muscle bilaterally (a total of 40 units divided into 8 sites)   Occipitalis muscle bilaterally (a total of 30 units divided into 6 sites)   Cervical paraspinal muscles (a total of 20 units divided into 4 sites)   Trapezius muscle bilaterally (a total of 30 units divided into 6 sites)   Complications: none   RTC for the next Botox injection: 12 weeks     Problem List Items Addressed This Visit    None  Visit Diagnoses       Chronic migraine without aura without status migrainosus, not intractable     -  Primary    Relevant Medications    onabotulinumtoxina injection 200 Units (Completed) (Start on 5/23/2024 11:00 AM)              Ludivina Viramontes PA-C  [facility]   [phone]

## 2024-06-11 ENCOUNTER — PATIENT MESSAGE (OUTPATIENT)
Dept: BARIATRICS | Facility: CLINIC | Age: 54
End: 2024-06-11
Payer: MEDICARE

## 2024-06-16 DIAGNOSIS — Z98.84 S/P GASTRIC BYPASS: ICD-10-CM

## 2024-06-16 DIAGNOSIS — E66.01 MORBID OBESITY WITH BMI OF 50.0-59.9, ADULT: ICD-10-CM

## 2024-06-16 DIAGNOSIS — K44.9 HIATAL HERNIA WITH GERD WITHOUT ESOPHAGITIS: ICD-10-CM

## 2024-06-16 DIAGNOSIS — K21.9 HIATAL HERNIA WITH GERD WITHOUT ESOPHAGITIS: ICD-10-CM

## 2024-06-16 RX ORDER — OMEPRAZOLE 40 MG/1
40 CAPSULE, DELAYED RELEASE ORAL DAILY
Qty: 90 CAPSULE | Refills: 0 | Status: CANCELLED | OUTPATIENT
Start: 2024-06-16 | End: 2024-09-14

## 2024-06-25 ENCOUNTER — PATIENT MESSAGE (OUTPATIENT)
Dept: FAMILY MEDICINE | Facility: CLINIC | Age: 54
End: 2024-06-25
Payer: MEDICARE

## 2024-06-27 ENCOUNTER — LAB VISIT (OUTPATIENT)
Dept: LAB | Facility: HOSPITAL | Age: 54
End: 2024-06-27
Attending: FAMILY MEDICINE
Payer: MEDICARE

## 2024-06-27 ENCOUNTER — OFFICE VISIT (OUTPATIENT)
Dept: FAMILY MEDICINE | Facility: CLINIC | Age: 54
End: 2024-06-27
Payer: MEDICARE

## 2024-06-27 ENCOUNTER — PATIENT OUTREACH (OUTPATIENT)
Dept: FAMILY MEDICINE | Facility: CLINIC | Age: 54
End: 2024-06-27
Payer: MEDICARE

## 2024-06-27 VITALS
HEART RATE: 77 BPM | TEMPERATURE: 98 F | WEIGHT: 246.06 LBS | SYSTOLIC BLOOD PRESSURE: 104 MMHG | HEIGHT: 64 IN | BODY MASS INDEX: 42.01 KG/M2 | OXYGEN SATURATION: 97 % | DIASTOLIC BLOOD PRESSURE: 70 MMHG

## 2024-06-27 DIAGNOSIS — I10 ESSENTIAL HYPERTENSION: ICD-10-CM

## 2024-06-27 DIAGNOSIS — M17.0 PRIMARY OSTEOARTHRITIS OF BOTH KNEES: ICD-10-CM

## 2024-06-27 DIAGNOSIS — H81.13 BENIGN PAROXYSMAL POSITIONAL VERTIGO DUE TO BILATERAL VESTIBULAR DISORDER: ICD-10-CM

## 2024-06-27 DIAGNOSIS — R42 DIZZINESS: ICD-10-CM

## 2024-06-27 DIAGNOSIS — G43.109 MIGRAINE WITH AURA AND WITHOUT STATUS MIGRAINOSUS, NOT INTRACTABLE: ICD-10-CM

## 2024-06-27 DIAGNOSIS — H81.13 BENIGN PAROXYSMAL POSITIONAL VERTIGO DUE TO BILATERAL VESTIBULAR DISORDER: Primary | ICD-10-CM

## 2024-06-27 LAB
ALBUMIN SERPL BCP-MCNC: 3.7 G/DL (ref 3.5–5.2)
ALP SERPL-CCNC: 115 U/L (ref 55–135)
ALT SERPL W/O P-5'-P-CCNC: 31 U/L (ref 10–44)
ANION GAP SERPL CALC-SCNC: 9 MMOL/L (ref 8–16)
AST SERPL-CCNC: 24 U/L (ref 10–40)
BASOPHILS # BLD AUTO: 0.05 K/UL (ref 0–0.2)
BASOPHILS NFR BLD: 0.6 % (ref 0–1.9)
BILIRUB SERPL-MCNC: 0.4 MG/DL (ref 0.1–1)
BUN SERPL-MCNC: 20 MG/DL (ref 6–20)
CALCIUM SERPL-MCNC: 9.8 MG/DL (ref 8.7–10.5)
CHLORIDE SERPL-SCNC: 104 MMOL/L (ref 95–110)
CO2 SERPL-SCNC: 25 MMOL/L (ref 23–29)
CREAT SERPL-MCNC: 0.8 MG/DL (ref 0.5–1.4)
DIFFERENTIAL METHOD BLD: NORMAL
EOSINOPHIL # BLD AUTO: 0.3 K/UL (ref 0–0.5)
EOSINOPHIL NFR BLD: 3.4 % (ref 0–8)
ERYTHROCYTE [DISTWIDTH] IN BLOOD BY AUTOMATED COUNT: 14.3 % (ref 11.5–14.5)
EST. GFR  (NO RACE VARIABLE): >60 ML/MIN/1.73 M^2
GLUCOSE SERPL-MCNC: 91 MG/DL (ref 70–110)
HCT VFR BLD AUTO: 43 % (ref 37–48.5)
HGB BLD-MCNC: 14 G/DL (ref 12–16)
IMM GRANULOCYTES # BLD AUTO: 0.02 K/UL (ref 0–0.04)
IMM GRANULOCYTES NFR BLD AUTO: 0.2 % (ref 0–0.5)
LYMPHOCYTES # BLD AUTO: 2.4 K/UL (ref 1–4.8)
LYMPHOCYTES NFR BLD: 27.5 % (ref 18–48)
MCH RBC QN AUTO: 28.1 PG (ref 27–31)
MCHC RBC AUTO-ENTMCNC: 32.6 G/DL (ref 32–36)
MCV RBC AUTO: 86 FL (ref 82–98)
MONOCYTES # BLD AUTO: 0.6 K/UL (ref 0.3–1)
MONOCYTES NFR BLD: 6.8 % (ref 4–15)
NEUTROPHILS # BLD AUTO: 5.3 K/UL (ref 1.8–7.7)
NEUTROPHILS NFR BLD: 61.5 % (ref 38–73)
NRBC BLD-RTO: 0 /100 WBC
PLATELET # BLD AUTO: 278 K/UL (ref 150–450)
PMV BLD AUTO: 10.9 FL (ref 9.2–12.9)
POTASSIUM SERPL-SCNC: 4.3 MMOL/L (ref 3.5–5.1)
PROT SERPL-MCNC: 7.8 G/DL (ref 6–8.4)
RBC # BLD AUTO: 4.99 M/UL (ref 4–5.4)
SODIUM SERPL-SCNC: 138 MMOL/L (ref 136–145)
WBC # BLD AUTO: 8.62 K/UL (ref 3.9–12.7)

## 2024-06-27 PROCEDURE — 85025 COMPLETE CBC W/AUTO DIFF WBC: CPT | Mod: HCNC | Performed by: FAMILY MEDICINE

## 2024-06-27 PROCEDURE — 36415 COLL VENOUS BLD VENIPUNCTURE: CPT | Mod: HCNC | Performed by: FAMILY MEDICINE

## 2024-06-27 PROCEDURE — 99999 PR PBB SHADOW E&M-EST. PATIENT-LVL V: CPT | Mod: PBBFAC,HCNC,, | Performed by: FAMILY MEDICINE

## 2024-06-27 PROCEDURE — 80053 COMPREHEN METABOLIC PANEL: CPT | Mod: HCNC | Performed by: FAMILY MEDICINE

## 2024-06-27 RX ORDER — MECLIZINE HYDROCHLORIDE 25 MG/1
25 TABLET ORAL 3 TIMES DAILY PRN
Qty: 30 TABLET | Refills: 2 | Status: SHIPPED | OUTPATIENT
Start: 2024-06-27

## 2024-06-27 NOTE — PROGRESS NOTES
(Portions of this note were dictated using voice recognition software and may contain dictation related errors in spelling/grammar/syntax not found on text review)    CC:   Chief Complaint   Patient presents with    Dizziness    Knee Pain       HPI: 54 y.o. female with medical history significant for generalized anxiety disorder, major depression, essential hypertension, migraine headaches presented today for evaluation of dizziness and vertigo.      Patient reports that she started to have symptoms a month ago, states having intermittent episodes of room spinning sensation, also has feeling of lightheadedness and imbalance sometimes, stated the symptoms aggravated with changing position, when she lays down and turn head quickly, starts to have symptoms which are transient and episode is over within seconds to minutes.    She has a history of vertigo several years ago for which she was advised to do Epley's exercises.  Patient started to do exercises at home, however, they are not effective, she has not tried taking any over-the-counter motion sickness medications so far.    She has osteoarthritis in both knees, walks with a stick, she follows up with the orthopedic, has been having steroid injection from time to time, reports that she will eventually need a replacement therapy.    Had gastric bypass surgery done in January, after that lost more than 45 lb, has been trying to maintain it, she is very happy about her transformation and weight loss.    She denies having fever, chills, cough, shortness of breath, chest pain, palpitations, leg swelling, GI symptoms, urine symptoms.      She denies having any other symptoms or concerns.    Past Medical History:   Diagnosis Date    Anxiety     Depression     Generalized anxiety disorder with panic attacks 02/23/2022    GERD (gastroesophageal reflux disease)     Hypertension     Migraine headache     Sleep difficulties     Typical atrial flutter 04/20/2022       Past  Surgical History:   Procedure Laterality Date    BREAST SURGERY      COLONOSCOPY N/A 12/14/2022    Procedure: COLONOSCOPY sutab;  Surgeon: Wei Walsh MD;  Location: [facility] ENDO;  Service: Endoscopy;  Laterality: N/A;    ENDOMETRIAL ABLATION      ENDOSCOPIC GASTROCNEMIUS RECESSION Right 8/19/2022    Procedure: RECESSION, GASTROCNEMIUS, ENDOSCOPIC;  Surgeon: Zelalem Solorzano DPM;  Location: [facility] OR;  Service: Podiatry;  Laterality: Right;  Arthrex centerline  Stella/Arthrex/CONFIRMED/WD,RN 8/18@1333    ENDOSCOPIC PLANTAR FASCIOTOMY Right 8/19/2022    Procedure: FASCIOTOMY, PLANTAR, ENDOSCOPIC;  Surgeon: Zelalem Solorzano DPM;  Location: [facility] OR;  Service: Podiatry;  Laterality: Right;  Arthrex set    ESOPHAGOGASTRODUODENOSCOPY N/A 12/14/2022    Procedure: EGD (ESOPHAGOGASTRODUODENOSCOPY);  Surgeon: Wei Walsh MD;  Location: [facility];  Service: Endoscopy;  Laterality: N/A;    EYE SURGERY      FOOT SURGERY Right 10/2018    bunion    IMPLANTATION OF PERMANENT SACRAL NERVE STIMULATOR N/A 2/15/2022    Procedure: INSERTION, NEUROSTIMULATOR, PERMANENT, SACRAL;  Surgeon: David Brown MD;  Location: [facility] OR [address] [place] [address];  Service: Urology;  Laterality: N/A;    TONSILLECTOMY      TOTAL REDUCTION MAMMOPLASTY Bilateral     XI ROBOTIC GASTROENTEROSTOMY, MIAN-EN-Y N/A 1/26/2024    Procedure: XI ROBOTIC GASTROENTEROSTOMY, MIAN-EN-Y w/intraop egd;  Surgeon: Judy Grossman MD;  Location: [facility] OR [address] [place] [address];  Service: General;  Laterality: N/A;       Family History   Problem Relation Name Age of Onset    Atrial fibrillation Mother      Thyroid disease Mother      Dementia Mother      Diabetes Father      Prostate cancer Father      Stomach cancer Sister      Migraines Sister      Colon cancer Paternal Aunt      Colon cancer Paternal Uncle      Breast cancer Maternal Grandmother      Colon cancer Paternal Grandmother      Cancer Paternal Grandfather         Social History     Tobacco Use    Smoking status: Never      Passive exposure: Never    Smokeless tobacco: Never   Substance Use Topics    Alcohol use: No    Drug use: No       Lab Results   Component Value Date    WBC 8.62 06/27/2024    HGB 14.0 06/27/2024    HCT 43.0 06/27/2024    MCV 86 06/27/2024     06/27/2024    CHOL 182 03/22/2024    TRIG 78 03/22/2024    HDL 53 03/22/2024    ALT 31 06/27/2024    AST 24 06/27/2024    BILITOT 0.4 06/27/2024    ALKPHOS 115 06/27/2024     06/27/2024    K 4.3 06/27/2024     06/27/2024    CREATININE 0.8 06/27/2024    ESTGFRAFRICA >60 04/20/2022    EGFRNONAA >60 04/20/2022    CALCIUM 9.8 06/27/2024    ALBUMIN 3.7 06/27/2024    BUN 20 06/27/2024    CO2 25 06/27/2024    TSH 2.044 07/05/2023    INR 1.0 06/08/2022    HGBA1C 5.6 01/08/2024    LDLCALC 113.4 03/22/2024    GLU 91 06/27/2024    [id number] 24 (L) 03/22/2024             Vital signs reviewed  PE:   APPEARANCE: Well nourished, well developed, in no acute distress.    HEAD: Normocephalic, atraumatic.  EYES: EOMI.  Conjunctivae noninjected.  EAR: Normal EAC and TM  NOSE: Mucosa pink. Airway clear.  MOUTH & THROAT: No tonsillar enlargement. No pharyngeal erythema or exudate.   NECK: Supple with no cervical lymphadenopathy.    CHEST: Good inspiratory effort. Lungs clear to auscultation with no wheezes or crackles.  CARDIOVASCULAR: Normal S1, S2. No rubs, murmurs, or gallops.  ABDOMEN: Bowel sounds normal. Not distended. Soft. No tenderness or masses. No organomegaly.  EXTREMITIES: No edema, cyanosis, or clubbing.  Neurological:  Strength and sensation grossly intact, no focal neurological deficit identified    Review of Systems   Constitutional:  Negative for chills, fatigue and fever.   HENT: Negative.     Respiratory:  Negative for cough, shortness of breath and wheezing.    Cardiovascular:  Negative for chest pain, palpitations and leg swelling.   Gastrointestinal: Negative.    Genitourinary: Negative.    Neurological:  Positive for dizziness and vertigo.    Psychiatric/Behavioral: Negative.     All other systems reviewed and are negative.      IMPRESSION  1. Benign paroxysmal positional vertigo due to bilateral vestibular disorder    2. Dizziness    3. Essential hypertension    4. Migraine with aura and without status migrainosus, not intractable    5. Primary osteoarthritis of both knees            PLAN      1. Dizziness    - CBC Auto Differential; Future  - Comprehensive Metabolic Panel; Future    - Urinalysis, Reflex to Urine Culture Urine, Clean Catch; Future      2. Benign paroxysmal positional vertigo due to bilateral vestibular disorder    - meclizine (ANTIVERT) 25 mg tablet; Take 1 tablet (25 mg total) by mouth 3 (three) times daily as needed for Dizziness or Nausea.  Dispense: 30 tablet; Refill: 2    - Ambulatory referral/consult to Physical/Occupational Therapy; Future    - CBC Auto Differential; Future  - Comprehensive Metabolic Panel; Future    - Urinalysis, Reflex to Urine Culture Urine, Clean Catch; Future    Order lab  to rule out any infection or electrolyte deficiencies     Recommended to have vestibular rehab through physical therapy, she is agreeable     Meclizine given to take on as needed basis for symptomatic relief        3. Essential hypertension    Stable     Continue current medication      4. Migraine with aura and without status migrainosus, not intractable    Stable     Continue current medications        5. Primary osteoarthritis of both knees     Followed by Orthopedic    Advised to take Tylenol Arthritis   To avoid NSAIDs due to history of gastric bypass surgery    Can use topical NSAIDs like diclofenac sodium      SCREENINGS        Age/demographic appropriate health maintenance:    Health Maintenance Due   Topic Date Due    HIV Screening  Never done    Shingles Vaccine (2 of 2) 01/26/2022    COVID-19 Vaccine (4 - 2023-24 season) 09/01/2023           Spent adequate time in obtaining history and explaining differentials     35 minutes  spent during this visit of which greater than 50% devoted to face-face counseling and coordination of care regarding diagnosis and management plan       Tahir Mosquera   6/27/2024

## 2024-07-01 RX ORDER — DIPHENHYDRAMINE HCL 25 MG
TABLET ORAL
Qty: 4 EACH | Refills: 3 | Status: SHIPPED | OUTPATIENT
Start: 2024-07-01

## 2024-07-02 NOTE — TELEPHONE ENCOUNTER
Refill Routing Note   Medication(s) are not appropriate for processing by [facility] for the following reason(s):        Non-participating provider    [facility] action(s):  Route               Appointments  past 12m or future 3m with PCP    Date Provider   Last Visit   6/27/2024 Tahir Mosquera MD   Next Visit   Visit date not found Tahir Mosquera MD   ED visits in past 90 days: 0        Note composed:9:27 PM 07/01/2024

## 2024-07-03 ENCOUNTER — PATIENT MESSAGE (OUTPATIENT)
Dept: BARIATRICS | Facility: CLINIC | Age: 54
End: 2024-07-03
Payer: MEDICARE

## 2024-07-09 ENCOUNTER — PATIENT MESSAGE (OUTPATIENT)
Dept: BARIATRICS | Facility: CLINIC | Age: 54
End: 2024-07-09
Payer: MEDICARE

## 2024-07-26 ENCOUNTER — OFFICE VISIT (OUTPATIENT)
Dept: BARIATRICS | Facility: CLINIC | Age: 54
End: 2024-07-26
Payer: MEDICARE

## 2024-07-26 ENCOUNTER — CLINICAL SUPPORT (OUTPATIENT)
Dept: BARIATRICS | Facility: CLINIC | Age: 54
End: 2024-07-26
Payer: MEDICARE

## 2024-07-26 VITALS
WEIGHT: 243.63 LBS | OXYGEN SATURATION: 97 % | HEART RATE: 72 BPM | BODY MASS INDEX: 41.82 KG/M2 | DIASTOLIC BLOOD PRESSURE: 59 MMHG | SYSTOLIC BLOOD PRESSURE: 132 MMHG

## 2024-07-26 DIAGNOSIS — Z98.84 S/P GASTRIC BYPASS: Primary | ICD-10-CM

## 2024-07-26 DIAGNOSIS — E66.01 MORBID OBESITY WITH BMI OF 40.0-44.9, ADULT: ICD-10-CM

## 2024-07-26 DIAGNOSIS — I10 ESSENTIAL HYPERTENSION: ICD-10-CM

## 2024-07-26 DIAGNOSIS — Z98.84 S/P BARIATRIC SURGERY: ICD-10-CM

## 2024-07-26 DIAGNOSIS — Z71.3 DIETARY COUNSELING AND SURVEILLANCE: Primary | ICD-10-CM

## 2024-07-26 DIAGNOSIS — G47.33 OSA (OBSTRUCTIVE SLEEP APNEA): ICD-10-CM

## 2024-07-26 PROCEDURE — 99999 PR PBB SHADOW E&M-EST. PATIENT-LVL V: CPT | Mod: PBBFAC,HCNC,, | Performed by: NURSE PRACTITIONER

## 2024-07-26 NOTE — PATIENT INSTRUCTIONS
Meal Ideas for Regular Bariatric Diet  *Recipes and products available at [email or web address]      Breakfast: (15-20g protein)    - Egg white omelet: 2 egg whites or ½ cup Egg Beaters. (Optional proteins: cheese, shrimp, black beans, chicken, sliced turkey) (Optional veggies: tomatoes, salsa, spinach, mushrooms, onions, green peppers, or small slice avocado)     - Egg and sausage: 1 egg or ¼ cup Egg Beaters (any variety), with 1 maritza or 2 links of Turkey sausage or Veggie breakfast sausage (Solaborate or mSchool)    - Crust-less breakfast quiche: To make a glass pie dish, mix 4oz part skim Ricotta, 1 cup skim milk, and 2 eggs as your base. Add protein: shredded cheese, sliced lean ham or turkey, turkey myers/sausage. Add veggies: tomato, onion, green onion, mushroom, green pepper, spinach, etc.    - Yogurt parfait: Mix 1 - 6oz container Dannon Light N Fit vanilla yogurt, with ¼ cup crushed unsalted nuts    - Cottage cheese and fruit: ½ cup part-skim cottage cheese or ricotta cheese topped with fresh fruit or sugar free preserves     - Lenore Smith's Vanilla Egg custard* (add 2 Tbsp instant coffee granules to make Cappuccino Custard*)    - Hi-Protein café latte (skim milk, decaf coffee, 1 scoop protein powder). Optional to add Sugar free syrup or extract flavoring.    - Breakfast Lox: spread fat free cream cheese on slices of smoked salmon. Serve over scrambled or egg over easy (sauteed with nonstick cookspray) OR on a cucumber slice    - Eggwhich: Scramble or cook 1 large egg over easy using nonstick cookspray. Place between 2 slices of Iranian myers and low fat cheese.     Lunch: (20-30g protein)    - ½ cup Black bean soup (Homemade or Progresso), with ¼ cup shredded low-fat cheese. Top with chopped tomato or fresh salsa.     - Lean deli turkey breast and low-fat sliced cheese, mustard or light jett to moisten, rolled up together, or wrapped in a Ramakrishna lettuce leaf    - Chicken salad made from dinner  leftovers, moisten with low-fat salad dressing or light jett. Also try leftover salmon, shrimp, tuna or boiled eggs. Serve ½ cup over dark green salad    - Fat-free canned refried beans, topped with ¼ cup shredded low-fat cheese. Top with chopped tomato or fresh salsa.     - Greek salad: Top mixed greens with 1-2oz grilled chicken, tomatoes, red onions, 2-3 kalamata olives, and sprinkle lightly with feta cheese. Spritz with Balsamic vinegar to taste.     - Crust-less lunch quiche: To make a glass pie dish, mix 4oz part skim Ricotta, 1 cup skim milk, and 2 eggs as your base. Add protein: shredded cheese, sliced lean ham or turkey, shrimp, chicken. Add veggies: tomato, onion, green onion, mushroom, green pepper, spinach, artichoke, broccoli, etc.    - Pizza bake: spread a  yogi felipe mushroom with tomato sauce, low-fat shredded mozzarella and turkey pepperoni or [place] myers. Add any veggies. Roast for 10-15 minutes, until cheese melted.     - Cucumber crab bites: Spread ¼ cup crab dip (lump crabmeat + light cream cheese and green onions) over sliced cucumber.     - Chicken with light spinach and artichoke dip*: Puree in : 6oz cooked and drained spinach, 2 cloves garlic, 1 can cannelloni beans, ½ cup chopped green onions, 1 can drained artichoke hearts (not marinated in oil), lemon juice and basil. Mix in 2oz chopped up chicken.    Supper: (20-30g protein)    - Serve grilled fish over dark green salad tossed with low-fat dressing, served with grilled asparagus coleman     - Rotisserie chicken salad: served with sliced strawberries, walnuts, fat-free feta cheese crumbles and 1 tbsp Simonss Own Light Raspberry [place] Vinaigrette    - Shrimp cocktail: Dip cold boiled shrimp in homemade low-sugar cocktail sauce (1/2 cup Umair One Carb ketchup, 2 tbsp horseradish, 1/4 tsp hot sauce, 1 tsp Worcestershire sauce, 1 tbsp freshly-squeezed lemon juice). Serve with dark green salad, walnuts, and crumbled blue  cheese drizzled with olive oil and Balsamic vinegar    - Tuna Melt: Spread tuna salad onto 2 thick slices of tomato. Top with low-fat cheese and broil until cheese is melted. May also be made with chicken salad of shrimp salad. [place] with different types of cheeses.    - Chicken or beef fajitas (no tortilla, rice, beans, chips). Top meat and veggies w/ fresh salsa, fat free sour cream.     - Homemade low-fat Chili using extra lean ground beef or ground turkey. Top with shredded cheese and salsa as desired. May add dollop fat-free sour cream if desired    - Chicken parmesan: Top chicken breast w/ low sugar marinara sauce, mozzarella cheese and bake until chicken reaches 165*.  Serve w/ spaghetti SQUASH or [place]ankan cut green beans    - Dinner Omelet with shrimp or chicken and onion, green peppers and chives.    - No noodle lasagna: Use sliced zucchini or eggplant in place of noodles.  Layer with part skim ricotta cheese and low sugar meat sauce (use very lean ground beef or ground turkey).    - Mexican chicken bake: Bake chunks of chicken breast or thigh with taco seasoning, Pace brand enchilada sauce, green onions and low-fat cheese. Serve with ¼ cup black beans or fat free refried beans topped with chopped tomatoes or salsa.    - Krissy frozen meatballs, simmered in Classico Marinara sauce. Different flavors of salsa or spaghetti sauce create different dishes! Sprinkle with parmesan cheese. Serve with grilled or steamed veggies, or a dark green salad.    - Simmer boneless skinless chicken thigh chunks in Classico Marinara sauce or roasted salsa until tender with chopped onion, bell pepper, garlic, mushrooms, spinach, etc.     - Hamburger or veggie burger, without the bun, dressed the way you like. Served with grilled or steamed veggies.    - Eggplant parmesan: Bake slices of eggplant at 350 degrees for 15 minutes. Layer tomato sauce, sliced eggplant and low-fat mozzarella cheese in a baking dish and cover with  foil. Bake 30-40 more minutes or until bubbly. Uncover and bake at 400 degrees for about 15 more minutes, or until top is slightly crisp.    - Fish tacos: grilled/baked white fish, wrapped in Ramakrishna lettuce leaf, topped with salsa, shredded low-fat cheese, and light coleslaw.    - Chicken savanah: Sprinkle chicken w/ 1 tsp of [place] ranch dip mix. Then grill chicken and top with black beans, salsa and 1 tsp fat free sour cream.     - Cauliflower pizza crust: Use cauliflower as crust (see recipe on pinterest, no flour!). Top w/ low fat cheese, turkey pepperoni and veggies and bake again    - chicken or turkey crust pizza: use ground chicken or turkey instead of cauliflower, spread in [place] and bake at 350 for about 20-30 minutes(may want to add garlic, black pepper, oregano and other herbs to ground meat mixture).  Remove and top w/ low fat cheese, turkey pepperoni and veggies and bake again for another 10 minutes or until cheese is browned.     Snacks: (100-200 calories; >5g protein)    - 1 low-fat cheese stick with 8 cherry tomatoes or 1 serving fresh fruit  - 4 thin slices fat-free turkey breast and 1 slice low-fat cheese  - 4 thin slices fat-free honey ham with wedge of melon  - 6-8 edamame pods (equivalent to about 1/4 cup edamame without pods).   - 1/4 cup unsalted nuts with ½ cup fruit  - 6-oz container Dannon Light n Fit vanilla yogurt, topped with 1oz unsalted nuts         - apple, celery or baby carrots spread with 2 Tbsp PB2  - apple slices with 1 oz slice low-fat cheese  - Apple slices dipped in 2 Tbsp of PB2  - celery, cucumber, bell pepper or baby carrots dipped in ¼ cup hummus bean spread or light spinach and artichoke dip (*recipe in lunch section)  - celery, cucumber, baby carrots dipped in high protein greek yogurt (Mix 16 oz plain greek yogurt + 1 packet of hidden [place] ranch dip mix)  - Omar Links Beef Steak - 14g protein! (similar to beef jerky)  - 2 wedges Laughing Cow - Light Herb  & Garlic Cheese with sliced cucumber or green bell pepper  - 1/2 cup low-fat cottage cheese with ¼ cup fruit or ¼ cup salsa  - RTD Protein drinks: Atkins, Low Carb Slim Fast, EAS light, Muscle Milk Light, etc.  - Homemade Protein drinks: GNC Soy95, Isopure, Nectar, UNJURY, Whey Gourmet, etc. Mix 1 scoop powder with 8oz skim/1% milk or light soymilk.  - Protein bars: Atkins, EAS, Pure Protein, Think Thin, Detour, etc. Must have 0-4 grams sugar - Read the label.    Takeout Options: No more than twice/week  Deli - Salads (no pasta or rice), meats, cheeses. Roasted chicken. Lox (salmon)    Mexican - Platters which don't include tortillas, chips, or rice. Go easy on the beans. Example: Fajitas without the tortillas. Ask the  not to bring chips to the table if they are too tempting.    Greek - Meat or fish and vegetable, but no bread or rice. Including hummus, baba ganoush, etc, is OK. Most sit-down Greek restaurants can provide you with cucumber slices for dipping instead of constantino bread.    Fast Food (Avoid as much as possible) - Salads (no croutons and limit salad dressing to 2 tbsp), grilled chicken sandwich without the bun and ask for no jett. Margarets low fat chili or Taco Bell pintos and cheese.    BBQ - The meats are fine if you ask for sauces on the side, but most of the traditional side dishes are loaded with carbs. Aaron slaw, baked beans and BBQ sauce are typically made with sugar.    Chinese - Nothing deep-fried, no rice or noodles. Many Chinese sauces have starch and sugar in them, so you'll have to use your judgement. If you find that these sauces trigger cravings, or cause Dumping, you can ask for the sauce to be made without sugar or just use soy sauce.

## 2024-07-26 NOTE — PROGRESS NOTES
NUTRITION NOTE    Referring Physician: Judy Grossman M.D.  Reason for MNT Referral: Follow-up 6 months s/p laparoscopic Harvinder-en-Y    PAST MEDICAL HISTORY:    Denies nausea, vomiting, and constipation.  Reports problems, including diarrhea .     Past Medical History:   Diagnosis Date    Anxiety     Depression     Generalized anxiety disorder with panic attacks 02/23/2022    GERD (gastroesophageal reflux disease)     Hypertension     Migraine headache     Sleep difficulties     Typical atrial flutter 04/20/2022     CLINICAL DATA:  54 y.o.-year-old White female.    Current Weight: 243 lbs  BMI: 41.82  Total Weight Loss: 77 lbs  Excess Weight Loss: 42 %    CURRENT DIET:  Bariatric Diet.  Diet Recall: 50-60 grams of protein/day; 64 oz of fluids/day    B: Protein shake or protein cereal with banana and almond milk  L: 1 baked chicken leg with mixed vegetables  D: 1 baked chicken wing with mixed vegetables  S: Halo Top ice cream    Meal Pattern: 3 meal(s) + 1 snack(s) + 1 protein supplement(s)  Adequate protein supplement intake. Premier Protein   Adequate dairy intake. Yogurt  Adequate vegetable intake.  Adequate fruit intake.Watermelon  Starchy CHO: corn, peas in mixed vegetable, bread in a sandwich or toast  Other: Halo Top ice cream  Beverages: Water, Crystal Light, Powerade Zero, Gatorade Zero  Alcohol: None    LABS:  Not taken    VITAMINS / MINERALS:  Ruben   Iron  Patch MD Multivitamin  Calcium Citrate + Vitamin D 1 per day    EXERCISE:  Walks around neighborhood and treadmill at home  Resistance bands    Restrictions to Exercise:  Arthritis in knee    ASSESSMENT:  Doing fairly well overall.  Weight loss.  Adequate calorie intake.  Inadequate protein intake.  Adequate fluid intake.  Following diet  mostly appropriately .  Exercising.  Inadequate vitamins & minerals.    BARIATRIC DIET DISCUSSION:  Instructed and provided written materials on bariatric diet plan.  Reinforced post-op nutrition  guidelines.  Reminded PT that high fat and high sugar foods could lead to dumping syndrome. Advised PT to remove skin from chicken  Suggested PT write down what she eats and record if she has diarrhea after eating  Asked PT to send pictures of vitamins to better advise on amount and frequency     PLAN / RECOMMENDATIONS:  Continue  diet plan.  Adjust diet by .  Increase protein intake.  Maintain fluid intake.  Increase exercise.  Continue appropriate vitamins & minerals.  Adjust vitamins & minerals by discontinuing patch, increasing calcium    Return to clinic in 6 months.    SESSION TIME: 30 minutes

## 2024-07-26 NOTE — PROGRESS NOTES
BARIATRIC POST-OPERATIVE VISIT:    HPI:  Starla Fisher is a 54 y.o. year old female presents for 6 months post op visit following LRNY.  she is doing well and tolerating the diet without difficulty.  she has no complaints.    Denies: nausea, vomiting, abdominal pain, changes in bowel movement pattern, fever, chills, dysphagia, chest pain, and shortness of breath.      Review of Systems   Constitutional:  Negative for activity change and fatigue.   Respiratory:  Negative for cough and shortness of breath.    Cardiovascular:  Negative for chest pain, palpitations and leg swelling.   Gastrointestinal:  Negative for abdominal pain, nausea and vomiting.   Endocrine: Negative for polydipsia, polyphagia and polyuria.   Genitourinary:  Negative for dysuria.   Musculoskeletal:  Negative for gait problem.   Skin:  Negative for rash.   Allergic/Immunologic: Negative for immunocompromised state.   Neurological:  Negative for dizziness, syncope and weakness.   Hematological:  Does not bruise/bleed easily.   Psychiatric/Behavioral:  Negative for behavioral problems.        EXERCISE & VITAMINS:  See Bariatric Assessment    MEDICATIONS/ALLERGIES:  Have been reviewed.    DIET: Regular Bariatric Diet.  1 protein shakes daily, ~90 grams protein.  64 fl oz SF clear beverage.    See Dietician note from today for a more detailed assessment.      Physical Exam  Vitals and nursing note reviewed.   Constitutional:       Appearance: She is well-developed. She is morbidly obese.   HENT:      Head: Normocephalic.      Nose: Nose normal.      Mouth/Throat:      Mouth: Mucous membranes are moist.   Eyes:      Extraocular Movements: Extraocular movements intact.   Cardiovascular:      Rate and Rhythm: Normal rate and regular rhythm.      Heart sounds: Normal heart sounds.   Pulmonary:      Effort: Pulmonary effort is normal.      Breath sounds: Normal breath sounds.   Abdominal:      General: Bowel sounds are normal.      Palpations: Abdomen  is soft.   Musculoskeletal:         General: Normal range of motion.      Cervical back: Normal range of motion.   Skin:     General: Skin is warm and dry.      Capillary Refill: Capillary refill takes less than 2 seconds.   Neurological:      Mental Status: She is alert and oriented to person, place, and time.   Psychiatric:         Mood and Affect: Mood normal.         ASSESSMENT:  - Morbid obesity s/p laparoscopic Harvinder-en-Y on 1/26/24.  - Co-morbidities: depression, hypertension, and obstructive sleep apnea  -  Weight loss,77 #'s and 42% EWL  -  Exercise routine walking   - Good Diet  - Good Vitamin regimen patches     PLAN:  - Ursodiol d/c   - Miralax daily for constipation  - Emphasized the importance of regular exercise and adherence to bariatric diet to achieve maximum weight loss.  - Encouraged patient to start regular exercise.  - Follow-up with dietician to advance diet.  - Continue daily vitamins and medications.  - RTC in 6 months or sooner if needed.  - Call the office for any issues.  - Check labs today.

## 2024-08-01 ENCOUNTER — LAB VISIT (OUTPATIENT)
Dept: LAB | Facility: HOSPITAL | Age: 54
End: 2024-08-01
Attending: NURSE PRACTITIONER
Payer: MEDICARE

## 2024-08-01 DIAGNOSIS — R79.9 ABNORMAL FINDING OF BLOOD CHEMISTRY, UNSPECIFIED: ICD-10-CM

## 2024-08-01 DIAGNOSIS — K21.9 GASTROESOPHAGEAL REFLUX DISEASE, UNSPECIFIED WHETHER ESOPHAGITIS PRESENT: ICD-10-CM

## 2024-08-01 DIAGNOSIS — G47.33 OSA (OBSTRUCTIVE SLEEP APNEA): ICD-10-CM

## 2024-08-01 DIAGNOSIS — D81.819 BIOTIN-DEPENDENT CARBOXYLASE DEFICIENCY, UNSPECIFIED: ICD-10-CM

## 2024-08-01 DIAGNOSIS — E66.01 MORBID OBESITY WITH BMI OF 50.0-59.9, ADULT: ICD-10-CM

## 2024-08-01 LAB
25(OH)D3+25(OH)D2 SERPL-MCNC: 31 NG/ML (ref 30–96)
ALBUMIN SERPL BCP-MCNC: 3.5 G/DL (ref 3.5–5.2)
ALP SERPL-CCNC: 108 U/L (ref 55–135)
ALT SERPL W/O P-5'-P-CCNC: 15 U/L (ref 10–44)
ANION GAP SERPL CALC-SCNC: 10 MMOL/L (ref 8–16)
AST SERPL-CCNC: 16 U/L (ref 10–40)
BASOPHILS # BLD AUTO: 0.05 K/UL (ref 0–0.2)
BASOPHILS NFR BLD: 0.8 % (ref 0–1.9)
BILIRUB SERPL-MCNC: 0.8 MG/DL (ref 0.1–1)
BUN SERPL-MCNC: 11 MG/DL (ref 6–20)
CALCIUM SERPL-MCNC: 9.5 MG/DL (ref 8.7–10.5)
CHLORIDE SERPL-SCNC: 109 MMOL/L (ref 95–110)
CHOLEST SERPL-MCNC: 187 MG/DL (ref 120–199)
CHOLEST/HDLC SERPL: 3.3 {RATIO} (ref 2–5)
CO2 SERPL-SCNC: 22 MMOL/L (ref 23–29)
CREAT SERPL-MCNC: 0.8 MG/DL (ref 0.5–1.4)
DIFFERENTIAL METHOD BLD: ABNORMAL
EOSINOPHIL # BLD AUTO: 0.3 K/UL (ref 0–0.5)
EOSINOPHIL NFR BLD: 4.9 % (ref 0–8)
ERYTHROCYTE [DISTWIDTH] IN BLOOD BY AUTOMATED COUNT: 14.5 % (ref 11.5–14.5)
EST. GFR  (NO RACE VARIABLE): >60 ML/MIN/1.73 M^2
GLUCOSE SERPL-MCNC: 140 MG/DL (ref 70–110)
HCT VFR BLD AUTO: 40.8 % (ref 37–48.5)
HDLC SERPL-MCNC: 57 MG/DL (ref 40–75)
HDLC SERPL: 30.5 % (ref 20–50)
HGB BLD-MCNC: 13 G/DL (ref 12–16)
IMM GRANULOCYTES # BLD AUTO: 0.05 K/UL (ref 0–0.04)
IMM GRANULOCYTES NFR BLD AUTO: 0.8 % (ref 0–0.5)
IRON SERPL-MCNC: 79 UG/DL (ref 30–160)
LDLC SERPL CALC-MCNC: 118 MG/DL (ref 63–159)
LYMPHOCYTES # BLD AUTO: 2.5 K/UL (ref 1–4.8)
LYMPHOCYTES NFR BLD: 39.2 % (ref 18–48)
MCH RBC QN AUTO: 28.1 PG (ref 27–31)
MCHC RBC AUTO-ENTMCNC: 31.9 G/DL (ref 32–36)
MCV RBC AUTO: 88 FL (ref 82–98)
MONOCYTES # BLD AUTO: 0.5 K/UL (ref 0.3–1)
MONOCYTES NFR BLD: 7.2 % (ref 4–15)
NEUTROPHILS # BLD AUTO: 3 K/UL (ref 1.8–7.7)
NEUTROPHILS NFR BLD: 47.1 % (ref 38–73)
NONHDLC SERPL-MCNC: 130 MG/DL
NRBC BLD-RTO: 0 /100 WBC
PLATELET # BLD AUTO: 270 K/UL (ref 150–450)
PMV BLD AUTO: 11 FL (ref 9.2–12.9)
POTASSIUM SERPL-SCNC: 4.1 MMOL/L (ref 3.5–5.1)
PROT SERPL-MCNC: 7.1 G/DL (ref 6–8.4)
RBC # BLD AUTO: 4.62 M/UL (ref 4–5.4)
SATURATED IRON: 23 % (ref 20–50)
SODIUM SERPL-SCNC: 141 MMOL/L (ref 136–145)
TOTAL IRON BINDING CAPACITY: 340 UG/DL (ref 250–450)
TRANSFERRIN SERPL-MCNC: 230 MG/DL (ref 200–375)
TRIGL SERPL-MCNC: 60 MG/DL (ref 30–150)
VIT B12 SERPL-MCNC: 543 PG/ML (ref 210–950)
WBC # BLD AUTO: 6.38 K/UL (ref 3.9–12.7)

## 2024-08-01 PROCEDURE — 80061 LIPID PANEL: CPT | Mod: HCNC | Performed by: NURSE PRACTITIONER

## 2024-08-01 PROCEDURE — 83540 ASSAY OF IRON: CPT | Mod: HCNC | Performed by: NURSE PRACTITIONER

## 2024-08-01 PROCEDURE — 85025 COMPLETE CBC W/AUTO DIFF WBC: CPT | Mod: HCNC | Performed by: NURSE PRACTITIONER

## 2024-08-01 PROCEDURE — 82607 VITAMIN B-12: CPT | Mod: HCNC | Performed by: NURSE PRACTITIONER

## 2024-08-01 PROCEDURE — 84425 ASSAY OF VITAMIN B-1: CPT | Mod: HCNC | Performed by: NURSE PRACTITIONER

## 2024-08-01 PROCEDURE — 80053 COMPREHEN METABOLIC PANEL: CPT | Mod: HCNC | Performed by: NURSE PRACTITIONER

## 2024-08-01 PROCEDURE — 82306 VITAMIN D 25 HYDROXY: CPT | Mod: HCNC | Performed by: NURSE PRACTITIONER

## 2024-08-06 DIAGNOSIS — H81.13 BENIGN PAROXYSMAL POSITIONAL VERTIGO DUE TO BILATERAL VESTIBULAR DISORDER: ICD-10-CM

## 2024-08-06 LAB — VIT B1 BLD-MCNC: 69 UG/L (ref 38–122)

## 2024-08-07 RX ORDER — MECLIZINE HYDROCHLORIDE 25 MG/1
25 TABLET ORAL 3 TIMES DAILY PRN
Qty: 30 TABLET | Refills: 2 | Status: SHIPPED | OUTPATIENT
Start: 2024-08-07

## 2024-08-12 ENCOUNTER — PATIENT MESSAGE (OUTPATIENT)
Dept: BARIATRICS | Facility: CLINIC | Age: 54
End: 2024-08-12
Payer: MEDICARE

## 2024-08-15 ENCOUNTER — PROCEDURE VISIT (OUTPATIENT)
Dept: NEUROLOGY | Facility: CLINIC | Age: 54
End: 2024-08-15
Payer: MEDICARE

## 2024-08-15 VITALS
BODY MASS INDEX: 41.89 KG/M2 | HEART RATE: 66 BPM | DIASTOLIC BLOOD PRESSURE: 85 MMHG | SYSTOLIC BLOOD PRESSURE: 133 MMHG | WEIGHT: 244.06 LBS

## 2024-08-15 DIAGNOSIS — G43.709 CHRONIC MIGRAINE WITHOUT AURA WITHOUT STATUS MIGRAINOSUS, NOT INTRACTABLE: Primary | ICD-10-CM

## 2024-08-15 RX ORDER — DILTIAZEM HYDROCHLORIDE 240 MG/1
240 CAPSULE, COATED, EXTENDED RELEASE ORAL DAILY
Qty: 90 CAPSULE | Refills: 1 | Status: SHIPPED | OUTPATIENT
Start: 2024-08-15 | End: 2025-08-15

## 2024-08-15 NOTE — PROCEDURES
Established Patient  Procedure Note    SUBJECTIVE:  Patient ID: Starla Fisher  Chief Complaint: Botulinum Toxin Injection      History of Present Illness:  Starla Fisher is a 54 y.o. female with migraine, obesity s/p gastric bypass, depression, autism, osteoarthritis, atrial flutter, KRISTEN, insomnia, anxiety, HTN, GERD  who presents to clinic alone for follow-up of headaches and Botox injections.       08/15/2024- Interval History: botox  Patient has had >50% reduction in Ha's since beginning botox. Prior to botox pt's Ha's were occurring >15/30 days per month. Since beginning botox, they are occurring 3-4/30 days per month. As pt has experienced an improvement in Ha's, will continue botox as is clinically indicated.   Pt has not tried ubrelvy yet as she was concerned w/ potential cost, called pharmacy during visit, pt has 0$ copay, will fill and try. She has continued to maintain wt loss alongside w/ wt loss goals since surgery.  Plan: continue botox, try ubrelvy 50mg prn, may continue imitrex inj, rtc in 12 wks for next botox     05/23/2024 - botox    02/28/2024 - botox    Recommendations made at last Office Visit on 02/06/2024:  - Discussed symptoms appear to be consistent with migraines, discussed treatment options and patient agreed with the following plan:  - ppx - continue botox  - abortive - try ubrelvy vs nurtec based on insurance approval, may continue imitrex inj 2nd line as she is known to tolerate   - nausea - continue phenergan  - obesity s/p gastric bypass - avoid po nsaids, mgmt per bariatric med and dietician   - atrial flutter on eliquis - avoid nsaids, caution w/ triptans, mgmt per cardiology  - kristen - continue compliant cpap use, mgmt per sleep med  - htn - stable,mgmt per cardiology  - risks, benefits, and potential side effects of botox, ubrelvy/nurtec, imitrex, phenergan discussed   - alternative treatment options offered   - importance of healthy diet, regular exercise and sleep hygiene  in the treatment of headaches    - Start tracking headaches via Migraine Buddy rogelio on phone   - RTC in 3 wks to continue botox     Treatments Tried:   Lexapro  Wellbutrin  Tpx  Emgality  Losartan  Diltiazem  Aimovig  Elavil  Botox - helps  Maxalt   Imitrex tab  Imitrex NS  Imitrex inj    Current Medications:    Current Outpatient Medications:     amoxicillin-clavulanate 875-125mg (AUGMENTIN) 875-125 mg per tablet, , Disp: , Rfl:     blood-glucose meter (TRUE METRIX AIR GLUCOSE METER) Misc, USE AS DIRECTED, Disp: 4 each, Rfl: 3    BOTOX 200 unit SolR, , Disp: , Rfl:     buPROPion (WELLBUTRIN XL) 150 MG TB24 tablet, Take 1 tablet (150 mg total) by mouth once daily., Disp: 90 tablet, Rfl: 3    clotrimazole-betamethasone 1-0.05% (LOTRISONE) cream, Apply topically 2 (two) times daily., Disp: 15 g, Rfl: 1    diazePAM (VALIUM) 5 MG tablet, Take 1 tablet (5 mg total) by mouth daily as needed for Anxiety., Disp: 15 tablet, Rfl: 5    diltiaZEM (CARDIZEM CD) 240 MG 24 hr capsule, Take 1 capsule (240 mg total) by mouth once daily., Disp: 90 capsule, Rfl: 1    EScitalopram oxalate (LEXAPRO) 20 MG tablet, Take 1 tablet (20 mg total) by mouth once daily., Disp: 90 tablet, Rfl: 3    Wa-E-hcfus-B12-L.acid,plan-inu 65 mg iron- 50 mg-1 mg DFE Cap, Take by mouth., Disp: , Rfl:     ferrous sulfate (FEOSOL) 325 mg (65 mg iron) Tab tablet, Take 325 mg by mouth., Disp: , Rfl:     GEMTESA 75 mg Tab, Take 75 mg ( 1 tablet ) by mouth once daily., Disp: 30 tablet, Rfl: 11    HYDROcodone-acetaminophen (NORCO) 5-325 mg per tablet, Take 1 tablet by mouth every 4 (four) hours as needed for Pain., Disp: 30 tablet, Rfl: 0    losartan (COZAAR) 50 MG tablet, Take 1 tablet (50 mg total) by mouth once daily., Disp: 90 tablet, Rfl: 3    meclizine (ANTIVERT) 25 mg tablet, Take 1 tablet (25 mg total) by mouth 3 (three) times daily as needed for Dizziness or Nausea., Disp: 30 tablet, Rfl: 2    metronidazole 0.75% (METROCREAM) 0.75 % Crea, Apply topically 2  (two) times daily. (Patient not taking: Reported on 6/27/2024), Disp: 45 g, Rfl: 3    multivit-minerals/folic acid (ADULT MULTIVITAMIN GUMMIES ORAL), Take 1 tablet by mouth Daily. Continue to hold until after Surgery., Disp: , Rfl:     multivitamin (THERAGRAN) per tablet, Take 1 tablet by mouth., Disp: , Rfl:     ondansetron (ZOFRAN-ODT) 8 MG TbDL, Take 1 tablet (8 mg total) by mouth every 6 (six) hours as needed (nausea after surgery)., Disp: 30 tablet, Rfl: 0    oxybutynin (DITROPAN-XL) 10 MG 24 hr tablet, Take 1 tablet (10 mg total) by mouth once daily., Disp: 30 tablet, Rfl: 11    phenylephrine (TEODORA-SYNEPHRINE) 10 mg/mL injection, , Disp: , Rfl:     promethazine (PHENERGAN) 25 MG tablet, Take 1 tablet (25 mg total) by mouth every 6 (six) hours as needed for Nausea., Disp: 12 tablet, Rfl: 5    propofoL (DIPRIVAN) 10 mg/mL infusion, , Disp: , Rfl:     ROPIvacaine 0.5%, PF, (NAROPIN) 5 mg/mL (0.5 %) injection, , Disp: , Rfl:     sumatriptan (IMITREX STATDOSE) 6 mg/0.5 mL kit, INJECT 0.5 ML(6 MG TOTAL) UNDER THE SKIN EVERY 2 HOURS AS NEEDED FOR MIGRAINE. Max 12mg/24hrs, Disp: 3 mL, Rfl: 5    sumatriptan (IMITREX) 100 MG tablet, TAKE 1 TABLET BY MOUTH EVERY 2 HOURS AS NEEDED FOR MIGRAINE. Max 200mg/24hrs., Disp: 9 tablet, Rfl: 11    SUMAtriptan (IMITREX) 20 mg/actuation nasal spray, USE ONE SPRAY IN THE NOSTRIL AS NEEDED FOR MIGRAINE(MAX 40MG/24 HOURS), Disp: 12 each, Rfl: 11    traMADoL (ULTRAM) 50 mg tablet, Take 1 tablet (50 mg total) by mouth every 6 (six) hours., Disp: 5 tablet, Rfl: 0    traZODone (DESYREL) 150 MG tablet, Take half or whole tablet by mouth before bed as needed for insomnia, Disp: 90 tablet, Rfl: 3    triamcinolone acetonide 0.025% (KENALOG) 0.025 % Oint, Apply topically daily as needed (vulvar itching)., Disp: 15 g, Rfl: 0    TRUEPLUS LANCETS 33 gauge Misc, TEST BLOOD SUGAR THREE TIMES DAILY, Disp: 300 each, Rfl: 10    ubrogepant (UBRELVY) 50 mg tablet, Take 1 tablet by mouth at the onset of a  headache. May repeat based on response and tolerability after more than 2 hours if needed. Do not take more than 200mg in a 24 hour span., Disp: 16 tablet, Rfl: 5    walker (ULTRA-LIGHT ROLLATOR) Misc, Rolator for stability and balance postoperatively as needed while standing and walking., Disp: 1 each, Rfl: 0    Current Facility-Administered Medications:     sodium chloride 0.9% flush 10 mL, 10 mL, Intravenous, PRN, Zelalem Solorzano., DPM    sodium chloride 0.9% flush 10 mL, 10 mL, Intravenous, PRN, Zelalem Solorzano, DPM    Facility-Administered Medications Ordered in Other Visits:     0.9%  NaCl infusion, , Intravenous, Continuous, Sathish Briseno MD, Stopped at 02/15/22 0948    LIDOcaine (PF) 10 mg/ml (1%) injection 10 mg, 1 mL, Intradermal, Once, Sathish Briseno MD    Review of Systems - as per HPI, otherwise a balanced 10 systems review is negative.    OBJECTIVE:  Vitals:  /85 (BP Location: Left arm, Patient Position: Sitting, BP Method: Large (Automatic))   Pulse 66   Wt 110.7 kg (244 lb 0.8 oz)   LMP  (LMP Unknown)   BMI 41.89 kg/m²     Physical Exam:  Constitutional: she appears well-developed and well-nourished. she is well groomed. NAD   HENT:    Head: Normocephalic and atraumatic  Eyes: Conjunctivae and EOM are normal  Musculoskeletal: Normal range of motion. No joint stiffness.   Skin: Skin is warm and dry.  Psychiatric: Mood and affect are normal    Neuro: Patient is alert and oriented to person, place, and time. Language is intact and fluent.  Recent and remote memory are intact.  Normal attention and concentration.  Facial movement is symmetric.  Gait is normal.     Review of Data:   Notes from neuro reviewed.   Labs:  Lab Visit on 08/01/2024   Component Date Value Ref Range Status    WBC 08/01/2024 6.38  3.90 - 12.70 K/uL Final    RBC 08/01/2024 4.62  4.00 - 5.40 M/uL Final    Hemoglobin 08/01/2024 13.0  12.0 - 16.0 g/dL Final    Hematocrit 08/01/2024 40.8  37.0 - 48.5 % Final     MCV 08/01/2024 88  82 - 98 fL Final    MCH 08/01/2024 28.1  27.0 - 31.0 pg Final    MCHC 08/01/2024 31.9 (L)  32.0 - 36.0 g/dL Final    RDW 08/01/2024 14.5  11.5 - 14.5 % Final    Platelets 08/01/2024 270  150 - 450 K/uL Final    MPV 08/01/2024 11.0  9.2 - 12.9 fL Final    Immature Granulocytes 08/01/2024 0.8 (H)  0.0 - 0.5 % Final    Gran # (ANC) 08/01/2024 3.0  1.8 - 7.7 K/uL Final    Immature Grans (Abs) 08/01/2024 0.05 (H)  0.00 - 0.04 K/uL Final    Lymph # 08/01/2024 2.5  1.0 - 4.8 K/uL Final    Mono # 08/01/2024 0.5  0.3 - 1.0 K/uL Final    Eos # 08/01/2024 0.3  0.0 - 0.5 K/uL Final    Baso # 08/01/2024 0.05  0.00 - 0.20 K/uL Final    nRBC 08/01/2024 0  0 /100 WBC Final    Gran % 08/01/2024 47.1  38.0 - 73.0 % Final    Lymph % 08/01/2024 39.2  18.0 - 48.0 % Final    Mono % 08/01/2024 7.2  4.0 - 15.0 % Final    Eosinophil % 08/01/2024 4.9  0.0 - 8.0 % Final    Basophil % 08/01/2024 0.8  0.0 - 1.9 % Final    Differential Method 08/01/2024 Automated   Final    Sodium 08/01/2024 141  136 - 145 mmol/L Final    Potassium 08/01/2024 4.1  3.5 - 5.1 mmol/L Final    Chloride 08/01/2024 109  95 - 110 mmol/L Final    CO2 08/01/2024 22 (L)  23 - 29 mmol/L Final    Glucose 08/01/2024 140 (H)  70 - 110 mg/dL Final    BUN 08/01/2024 11  6 - 20 mg/dL Final    Creatinine 08/01/2024 0.8  0.5 - 1.4 mg/dL Final    Calcium 08/01/2024 9.5  8.7 - 10.5 mg/dL Final    Total Protein 08/01/2024 7.1  6.0 - 8.4 g/dL Final    Albumin 08/01/2024 3.5  3.5 - 5.2 g/dL Final    Total Bilirubin 08/01/2024 0.8  0.1 - 1.0 mg/dL Final    Alkaline Phosphatase 08/01/2024 108  55 - 135 U/L Final    AST 08/01/2024 16  10 - 40 U/L Final    ALT 08/01/2024 15  10 - 44 U/L Final    eGFR 08/01/2024 >60  >60 mL/min/1.73 m^2 Final    Anion Gap 08/01/2024 10  8 - 16 mmol/L Final    Vitamin B-12 08/01/2024 543  210 - 950 pg/mL Final    Thiamine 08/01/2024 69  38 - 122 ug/L Final    Cholesterol 08/01/2024 187  120 - 199 mg/dL Final    Triglycerides 08/01/2024 60   30 - 150 mg/dL Final    HDL 08/01/2024 57  40 - 75 mg/dL Final    LDL Cholesterol 08/01/2024 118.0  63.0 - 159.0 mg/dL Final    HDL/Cholesterol Ratio 08/01/2024 30.5  20.0 - 50.0 % Final    Total Cholesterol/HDL Ratio 08/01/2024 3.3  2.0 - 5.0 Final    Non-HDL Cholesterol 08/01/2024 130  mg/dL Final    Iron 08/01/2024 79  30 - 160 ug/dL Final    Transferrin 08/01/2024 230  200 - 375 mg/dL Final    TIBC 08/01/2024 340  250 - 450 ug/dL Final    Saturated Iron 08/01/2024 23  20 - 50 % Final    Vit D, 25-Hydroxy 08/01/2024 31  30 - 96 ng/mL Final   Lab Visit on 06/27/2024   Component Date Value Ref Range Status    Specimen UA 06/27/2024 Urine, Clean Catch   Final    Color, UA 06/27/2024 Yellow  Yellow, Straw, Desiree Final    Appearance, UA 06/27/2024 Clear  Clear Final    pH, UA 06/27/2024 5.0  5.0 - 8.0 Final    Specific Gravity, UA 06/27/2024 1.025  1.005 - 1.030 Final    Protein, UA 06/27/2024 Negative  Negative Final    Glucose, UA 06/27/2024 Negative  Negative Final    Ketones, UA 06/27/2024 Negative  Negative Final    Bilirubin (UA) 06/27/2024 Negative  Negative Final    Occult Blood UA 06/27/2024 Negative  Negative Final    Nitrite, UA 06/27/2024 Negative  Negative Final    Urobilinogen, UA 06/27/2024 Negative  <2.0 EU/dL Final    Leukocytes, UA 06/27/2024 Trace (A)  Negative Final    RBC, UA 06/27/2024 1  0 - 4 /hpf Final    WBC, UA 06/27/2024 3  0 - 5 /hpf Final    Bacteria 06/27/2024 Rare  None-Occ /hpf Final    Squam Epithel, UA 06/27/2024 4  /hpf Final    Microscopic Comment 06/27/2024 SEE COMMENT   Final   Lab Visit on 06/27/2024   Component Date Value Ref Range Status    WBC 06/27/2024 8.62  3.90 - 12.70 K/uL Final    RBC 06/27/2024 4.99  4.00 - 5.40 M/uL Final    Hemoglobin 06/27/2024 14.0  12.0 - 16.0 g/dL Final    Hematocrit 06/27/2024 43.0  37.0 - 48.5 % Final    MCV 06/27/2024 86  82 - 98 fL Final    MCH 06/27/2024 28.1  27.0 - 31.0 pg Final    MCHC 06/27/2024 32.6  32.0 - 36.0 g/dL Final    RDW  06/27/2024 14.3  11.5 - 14.5 % Final    Platelets 06/27/2024 278  150 - 450 K/uL Final    MPV 06/27/2024 10.9  9.2 - 12.9 fL Final    Immature Granulocytes 06/27/2024 0.2  0.0 - 0.5 % Final    Gran # (ANC) 06/27/2024 5.3  1.8 - 7.7 K/uL Final    Immature Grans (Abs) 06/27/2024 0.02  0.00 - 0.04 K/uL Final    Lymph # 06/27/2024 2.4  1.0 - 4.8 K/uL Final    Mono # 06/27/2024 0.6  0.3 - 1.0 K/uL Final    Eos # 06/27/2024 0.3  0.0 - 0.5 K/uL Final    Baso # 06/27/2024 0.05  0.00 - 0.20 K/uL Final    nRBC 06/27/2024 0  0 /100 WBC Final    Gran % 06/27/2024 61.5  38.0 - 73.0 % Final    Lymph % 06/27/2024 27.5  18.0 - 48.0 % Final    Mono % 06/27/2024 6.8  4.0 - 15.0 % Final    Eosinophil % 06/27/2024 3.4  0.0 - 8.0 % Final    Basophil % 06/27/2024 0.6  0.0 - 1.9 % Final    Differential Method 06/27/2024 Automated   Final    Sodium 06/27/2024 138  136 - 145 mmol/L Final    Potassium 06/27/2024 4.3  3.5 - 5.1 mmol/L Final    Chloride 06/27/2024 104  95 - 110 mmol/L Final    CO2 06/27/2024 25  23 - 29 mmol/L Final    Glucose 06/27/2024 91  70 - 110 mg/dL Final    BUN 06/27/2024 20  6 - 20 mg/dL Final    Creatinine 06/27/2024 0.8  0.5 - 1.4 mg/dL Final    Calcium 06/27/2024 9.8  8.7 - 10.5 mg/dL Final    Total Protein 06/27/2024 7.8  6.0 - 8.4 g/dL Final    Albumin 06/27/2024 3.7  3.5 - 5.2 g/dL Final    Total Bilirubin 06/27/2024 0.4  0.1 - 1.0 mg/dL Final    Alkaline Phosphatase 06/27/2024 115  55 - 135 U/L Final    AST 06/27/2024 24  10 - 40 U/L Final    ALT 06/27/2024 31  10 - 44 U/L Final    eGFR 06/27/2024 >60  >60 mL/min/1.73 m^2 Final    Anion Gap 06/27/2024 9  8 - 16 mmol/L Final     Imaging:  No results found. However, due to the size of the patient record, not all encounters were searched. Please check Results Review for a complete set of results.    Note: I have independently reviewed any/all imaging/labs/tests and agree with the report (s) as documented.  Any discrepancies will be as noted/demarcated by free  text.  AMPARO LARRY 8/15/2024    ASSESSMENT:  1. Chronic migraine without aura without status migrainosus, not intractable        PLAN:  - Discussed symptoms appear to be consistent with migraines, discussed treatment options and patient agreed with the following plan:  - ppx - continue botox  - Botox administered in clinic for Chronic Migraine (see below)   - abortive - try ubrelvy , may continue imitrex inj 2nd line as she is known to tolerate   - nausea - continue phenergan  - obesity s/p gastric bypass - avoid po nsaids, mgmt per bariatric med and dietician   - atrial flutter on eliquis - avoid nsaids, caution w/ triptans, mgmt per cardiology  - kristen - continue compliant cpap use, mgmt per sleep med  - htn - stable,mgmt per cardiology  - importance of healthy diet, regular exercise and sleep hygiene in the treatment of headaches    - Start tracking headaches via Migraine Tab rogelio on phone   - RTC in 12 weeks for repeat Botox injections or sooner if needed     Orders Placed This Encounter    onabotulinumtoxina injection 200 Units       All questions and concerns addressed.  Patient verbalizes understanding and is agreeable with the above stated treatment plan.      PROCEDURE NOTE:  BOTOX was performed as an indicated therapy for intractable chronic migraine headaches given that the patient failed more than 2 headache medications    Two patient identifiers were confirmed with the patient prior to performing this procedure. A time out to determine correct patient and and agreement on procedure performed was conducted prior to the procedure.      Botulinum Toxin Injection Procedure   Procedure: Botulinum toxin injection (69615)  After risks and benefits were explained including bleeding, infection, worsening of pain, damage to the areas being injected, weakness of muscles, loss of muscle control, dysphagia if injecting the head or neck, facial droop if injecting the facial area, painful injection, allergic or other  reaction to the medications being injected, and the failure of the procedure to help the problem, a signed consent was obtained.   The patient was placed in a comfortable area and the sites to be treated were identified.The area to be treated was prepped three times with alcohol and the alcohol allowed to dry. Next, a 30 gauge needle was used to inject the medication in the area to be treated.      Total Botox used: 155 Units   Botox wastage: 45 Units     Injection sites:    muscle bilaterally ( a total of 10 units divided into 2 sites)   Procerus muscle (5 units)   Frontalis muscle bilaterally (a total of 20 units divided into 4 sites)   Temporalis muscle bilaterally (a total of 40 units divided into 8 sites)   Occipitalis muscle bilaterally (a total of 30 units divided into 6 sites)   Cervical paraspinal muscles (a total of 20 units divided into 4 sites)   Trapezius muscle bilaterally (a total of 30 units divided into 6 sites)   Complications: none   RTC for the next Botox injection: 12 weeks     The patient tolerated the procedure well and did not experience any complications.     CC: Tahir Mosquera MD Sarena Patel, PA-C  [facility] Department of Neurology   [phone]    Dr. Grady was available during today's encounter.

## 2024-09-03 ENCOUNTER — PATIENT MESSAGE (OUTPATIENT)
Dept: NEUROLOGY | Facility: CLINIC | Age: 54
End: 2024-09-03
Payer: MEDICARE

## 2024-09-03 ENCOUNTER — PATIENT MESSAGE (OUTPATIENT)
Dept: BARIATRICS | Facility: CLINIC | Age: 54
End: 2024-09-03
Payer: MEDICARE

## 2024-09-09 DIAGNOSIS — I10 ESSENTIAL HYPERTENSION: ICD-10-CM

## 2024-09-10 ENCOUNTER — PATIENT MESSAGE (OUTPATIENT)
Dept: PSYCHIATRY | Facility: CLINIC | Age: 54
End: 2024-09-10
Payer: MEDICARE

## 2024-09-10 ENCOUNTER — PATIENT MESSAGE (OUTPATIENT)
Dept: BARIATRICS | Facility: CLINIC | Age: 54
End: 2024-09-10
Payer: MEDICARE

## 2024-09-10 RX ORDER — LOSARTAN POTASSIUM 50 MG/1
50 TABLET ORAL DAILY
Qty: 90 TABLET | Refills: 3 | Status: SHIPPED | OUTPATIENT
Start: 2024-09-10 | End: 2025-09-10

## 2024-09-19 DIAGNOSIS — H81.13 BENIGN PAROXYSMAL POSITIONAL VERTIGO DUE TO BILATERAL VESTIBULAR DISORDER: ICD-10-CM

## 2024-09-19 RX ORDER — MECLIZINE HYDROCHLORIDE 25 MG/1
25 TABLET ORAL 3 TIMES DAILY PRN
Qty: 30 TABLET | Refills: 2 | Status: SHIPPED | OUTPATIENT
Start: 2024-09-19

## 2024-09-19 RX ORDER — MECLIZINE HYDROCHLORIDE 25 MG/1
25 TABLET ORAL 3 TIMES DAILY PRN
Qty: 30 TABLET | Refills: 2 | Status: CANCELLED | OUTPATIENT
Start: 2024-09-19

## 2024-10-01 ENCOUNTER — PATIENT MESSAGE (OUTPATIENT)
Dept: BARIATRICS | Facility: CLINIC | Age: 54
End: 2024-10-01
Payer: MEDICARE

## 2024-10-08 ENCOUNTER — PATIENT MESSAGE (OUTPATIENT)
Dept: BARIATRICS | Facility: CLINIC | Age: 54
End: 2024-10-08
Payer: MEDICARE

## 2024-11-01 ENCOUNTER — HOSPITAL ENCOUNTER (OUTPATIENT)
Dept: RADIOLOGY | Facility: HOSPITAL | Age: 54
Discharge: HOME | End: 2024-11-01
Attending: PHYSICIAN ASSISTANT
Payer: MEDICARE

## 2024-11-01 ENCOUNTER — OFFICE VISIT (OUTPATIENT)
Dept: ORTHOPEDICS | Facility: CLINIC | Age: 54
End: 2024-11-01
Payer: MEDICARE

## 2024-11-01 VITALS — HEIGHT: 64 IN | WEIGHT: 240.31 LBS | BODY MASS INDEX: 41.03 KG/M2

## 2024-11-01 DIAGNOSIS — M79.671 RIGHT FOOT PAIN: ICD-10-CM

## 2024-11-01 DIAGNOSIS — M25.561 ACUTE PAIN OF BOTH KNEES: ICD-10-CM

## 2024-11-01 DIAGNOSIS — M25.562 CHRONIC PAIN OF BOTH KNEES: ICD-10-CM

## 2024-11-01 DIAGNOSIS — M17.11 PRIMARY OSTEOARTHRITIS OF RIGHT KNEE: Primary | ICD-10-CM

## 2024-11-01 DIAGNOSIS — M25.562 ACUTE PAIN OF BOTH KNEES: ICD-10-CM

## 2024-11-01 DIAGNOSIS — M25.561 CHRONIC PAIN OF BOTH KNEES: ICD-10-CM

## 2024-11-01 DIAGNOSIS — M17.12 PRIMARY OSTEOARTHRITIS OF LEFT KNEE: ICD-10-CM

## 2024-11-01 DIAGNOSIS — G89.29 CHRONIC PAIN OF BOTH KNEES: ICD-10-CM

## 2024-11-01 PROCEDURE — 73564 X-RAY EXAM KNEE 4 OR MORE: CPT | Mod: 26,50,HCNC, | Performed by: RADIOLOGY

## 2024-11-01 PROCEDURE — 4010F ACE/ARB THERAPY RXD/TAKEN: CPT | Mod: HCNC,CPTII,S$GLB, | Performed by: PHYSICIAN ASSISTANT

## 2024-11-01 PROCEDURE — 3008F BODY MASS INDEX DOCD: CPT | Mod: HCNC,CPTII,S$GLB, | Performed by: PHYSICIAN ASSISTANT

## 2024-11-01 PROCEDURE — 20610 DRAIN/INJ JOINT/BURSA W/O US: CPT | Mod: 50,HCNC,S$GLB, | Performed by: PHYSICIAN ASSISTANT

## 2024-11-01 PROCEDURE — 73564 X-RAY EXAM KNEE 4 OR MORE: CPT | Mod: TC,50,HCNC,PN

## 2024-11-01 PROCEDURE — 1160F RVW MEDS BY RX/DR IN RCRD: CPT | Mod: HCNC,CPTII,S$GLB, | Performed by: PHYSICIAN ASSISTANT

## 2024-11-01 PROCEDURE — 3044F HG A1C LEVEL LT 7.0%: CPT | Mod: HCNC,CPTII,S$GLB, | Performed by: PHYSICIAN ASSISTANT

## 2024-11-01 PROCEDURE — 99213 OFFICE O/P EST LOW 20 MIN: CPT | Mod: 25,HCNC,S$GLB, | Performed by: PHYSICIAN ASSISTANT

## 2024-11-01 PROCEDURE — 1159F MED LIST DOCD IN RCRD: CPT | Mod: HCNC,CPTII,S$GLB, | Performed by: PHYSICIAN ASSISTANT

## 2024-11-01 PROCEDURE — 99999 PR PBB SHADOW E&M-EST. PATIENT-LVL V: CPT | Mod: PBBFAC,HCNC,, | Performed by: PHYSICIAN ASSISTANT

## 2024-11-01 RX ORDER — KETOROLAC TROMETHAMINE 30 MG/ML
30 INJECTION, SOLUTION INTRAMUSCULAR; INTRAVENOUS
Status: DISCONTINUED | OUTPATIENT
Start: 2024-11-01 | End: 2024-11-01 | Stop reason: HOSPADM

## 2024-11-01 RX ADMIN — KETOROLAC TROMETHAMINE 30 MG: 30 INJECTION, SOLUTION INTRAMUSCULAR; INTRAVENOUS at 11:11

## 2024-11-02 ENCOUNTER — PATIENT MESSAGE (OUTPATIENT)
Dept: BARIATRICS | Facility: CLINIC | Age: 54
End: 2024-11-02
Payer: MEDICARE

## 2024-11-03 NOTE — TELEPHONE ENCOUNTER
Refill Routing Note   Medication(s) are not appropriate for processing by [facility] for the following reason(s):        Non-participating provider    [facility] action(s):  Route             Appointments  past 12m or future 3m with PCP    Date Provider   Last Visit   6/27/2024 Tahir Mosquera MD   Next Visit   Visit date not found Tahir Mosquera MD   ED visits in past 90 days: 0        Note composed:9:22 PM 11/02/2024                   n/a

## 2024-11-04 RX ORDER — LANCETS 33 GAUGE
EACH MISCELLANEOUS
Qty: 300 EACH | Refills: 3 | Status: SHIPPED | OUTPATIENT
Start: 2024-11-04

## 2024-11-12 ENCOUNTER — PATIENT MESSAGE (OUTPATIENT)
Dept: BARIATRICS | Facility: CLINIC | Age: 54
End: 2024-11-12
Payer: MEDICARE

## 2024-11-14 ENCOUNTER — PROCEDURE VISIT (OUTPATIENT)
Dept: NEUROLOGY | Facility: CLINIC | Age: 54
End: 2024-11-14
Payer: MEDICARE

## 2024-11-14 VITALS
SYSTOLIC BLOOD PRESSURE: 129 MMHG | BODY MASS INDEX: 40.6 KG/M2 | HEART RATE: 88 BPM | WEIGHT: 236.56 LBS | DIASTOLIC BLOOD PRESSURE: 79 MMHG

## 2024-11-14 DIAGNOSIS — G43.709 CHRONIC MIGRAINE WITHOUT AURA WITHOUT STATUS MIGRAINOSUS, NOT INTRACTABLE: Primary | ICD-10-CM

## 2024-11-14 NOTE — PROCEDURES
Established Patient  Procedure Note    SUBJECTIVE:  Patient ID: Starla Fisher  Chief Complaint: Botulinum Toxin Injection      History of Present Illness:  Starla Fisher is a 54 y.o. female with migraine, obesity s/p gastric bypass, depression, autism, osteoarthritis, atrial flutter, CARL, insomnia, anxiety, HTN, GERD  who presents to clinic alone for follow-up of headaches and Botox injections.       11/14/2024- Interval History: botox  Ha's continue to improve. However, pt would like a referral to neuro for other neuro complaints outside of ha's including weakness in legs and arm, jerking movements in these extremities, and imbalance.   Plan: continue botox, try ubrelvy 50mg prn, may continue imitrex inj, referral to neuro, rtc in 12 wks for next botox     08/15/2024- Interval History: botox  Patient has had >50% reduction in Ha's since beginning botox. Prior to botox pt's Ha's were occurring >15/30 days per month. Since beginning botox, they are occurring 3-4/30 days per month. As pt has experienced an improvement in Ha's, will continue botox as is clinically indicated.   Pt has not tried ubrelvy yet as she was concerned w/ potential cost, called pharmacy during visit, pt has 0$ copay, will fill and try. She has continued to maintain wt loss alongside w/ wt loss goals since surgery.  Plan: continue botox, try ubrelvy 50mg prn, may continue imitrex inj, rtc in 12 wks for next botox     05/23/2024 - botox    02/28/2024 - botox    Recommendations made at last Office Visit on 02/06/2024:  - Discussed symptoms appear to be consistent with migraines, discussed treatment options and patient agreed with the following plan:  - ppx - continue botox  - abortive - try ubrelvy vs nurtec based on insurance approval, may continue imitrex inj 2nd line as she is known to tolerate   - nausea - continue phenergan  - obesity s/p gastric bypass - avoid po nsaids, mgmt per bariatric med and dietician   - atrial flutter on  eliquis - avoid nsaids, caution w/ triptans, mgmt per cardiology  - kristen - continue compliant cpap use, mgmt per sleep med  - htn - stable,mgmt per cardiology  - risks, benefits, and potential side effects of botox, ubrelvy/nurtec, imitrex, phenergan discussed   - alternative treatment options offered   - importance of healthy diet, regular exercise and sleep hygiene in the treatment of headaches    - Start tracking headaches via Migraine Buddy rogelio on phone   - RTC in 3 wks to continue botox     Treatments Tried:   Lexapro  Wellbutrin  Tpx  Emgality  Losartan  Diltiazem  Aimovig  Elavil  Botox - helps  Maxalt   Imitrex tab  Imitrex NS  Imitrex inj    Current Medications:    Current Outpatient Medications:     amoxicillin-clavulanate 875-125mg (AUGMENTIN) 875-125 mg per tablet, , Disp: , Rfl:     blood-glucose meter (TRUE METRIX AIR GLUCOSE METER) Misc, USE AS DIRECTED, Disp: 4 each, Rfl: 3    BOTOX 200 unit SolR, , Disp: , Rfl:     buPROPion (WELLBUTRIN XL) 150 MG TB24 tablet, Take 1 tablet (150 mg total) by mouth once daily., Disp: 90 tablet, Rfl: 3    clotrimazole-betamethasone 1-0.05% (LOTRISONE) cream, Apply topically 2 (two) times daily., Disp: 15 g, Rfl: 1    diazePAM (VALIUM) 5 MG tablet, Take 1 tablet (5 mg total) by mouth daily as needed for Anxiety., Disp: 15 tablet, Rfl: 5    diltiaZEM (CARDIZEM CD) 240 MG 24 hr capsule, Take 1 capsule (240 mg total) by mouth once daily., Disp: 90 capsule, Rfl: 1    EScitalopram oxalate (LEXAPRO) 20 MG tablet, Take 1 tablet (20 mg total) by mouth once daily., Disp: 90 tablet, Rfl: 3    Xa-Y-hxtbi-B12-L.acid,plan-inu 65 mg iron- 50 mg-1 mg DFE Cap, Take by mouth., Disp: , Rfl:     ferrous sulfate (FEOSOL) 325 mg (65 mg iron) Tab tablet, Take 325 mg by mouth., Disp: , Rfl:     GEMTESA 75 mg Tab, Take 75 mg ( 1 tablet ) by mouth once daily., Disp: 30 tablet, Rfl: 11    HYDROcodone-acetaminophen (NORCO) 5-325 mg per tablet, Take 1 tablet by mouth every 4 (four) hours as needed  for Pain., Disp: 30 tablet, Rfl: 0    losartan (COZAAR) 50 MG tablet, Take 1 tablet (50 mg total) by mouth once daily., Disp: 90 tablet, Rfl: 3    meclizine (ANTIVERT) 25 mg tablet, Take 1 tablet (25 mg total) by mouth 3 (three) times daily as needed for Dizziness or Nausea., Disp: 30 tablet, Rfl: 2    multivit-minerals/folic acid (ADULT MULTIVITAMIN GUMMIES ORAL), Take 1 tablet by mouth Daily. Continue to hold until after Surgery., Disp: , Rfl:     multivitamin (THERAGRAN) per tablet, Take 1 tablet by mouth., Disp: , Rfl:     ondansetron (ZOFRAN-ODT) 8 MG TbDL, Take 1 tablet (8 mg total) by mouth every 6 (six) hours as needed (nausea after surgery)., Disp: 30 tablet, Rfl: 0    phenylephrine (TEODORA-SYNEPHRINE) 10 mg/mL injection, , Disp: , Rfl:     promethazine (PHENERGAN) 25 MG tablet, Take 1 tablet (25 mg total) by mouth every 6 (six) hours as needed for Nausea., Disp: 12 tablet, Rfl: 5    propofoL (DIPRIVAN) 10 mg/mL infusion, , Disp: , Rfl:     ROPIvacaine 0.5%, PF, (NAROPIN) 5 mg/mL (0.5 %) injection, , Disp: , Rfl:     sumatriptan (IMITREX STATDOSE) 6 mg/0.5 mL kit, INJECT 0.5 ML(6 MG TOTAL) UNDER THE SKIN EVERY 2 HOURS AS NEEDED FOR MIGRAINE. Max 12mg/24hrs, Disp: 3 mL, Rfl: 5    sumatriptan (IMITREX) 100 MG tablet, TAKE 1 TABLET BY MOUTH EVERY 2 HOURS AS NEEDED FOR MIGRAINE. Max 200mg/24hrs., Disp: 9 tablet, Rfl: 11    SUMAtriptan (IMITREX) 20 mg/actuation nasal spray, USE ONE SPRAY IN THE NOSTRIL AS NEEDED FOR MIGRAINE(MAX 40MG/24 HOURS), Disp: 12 each, Rfl: 11    traMADoL (ULTRAM) 50 mg tablet, Take 1 tablet (50 mg total) by mouth every 6 (six) hours., Disp: 5 tablet, Rfl: 0    traZODone (DESYREL) 150 MG tablet, Take half or whole tablet by mouth before bed as needed for insomnia, Disp: 90 tablet, Rfl: 3    triamcinolone acetonide 0.025% (KENALOG) 0.025 % Oint, Apply topically daily as needed (vulvar itching)., Disp: 15 g, Rfl: 0    TRUEPLUS LANCETS 33 gauge Misc, TEST BLOOD SUGAR THREE TIMES DAILY, Disp:  300 each, Rfl: 3    ubrogepant (UBRELVY) 50 mg tablet, Take 1 tablet by mouth at the onset of a headache. May repeat based on response and tolerability after more than 2 hours if needed. Do not take more than 200mg in a 24 hour span., Disp: 16 tablet, Rfl: 5    walker (ULTRA-LIGHT ROLLATOR) Misc, Rolator for stability and balance postoperatively as needed while standing and walking., Disp: 1 each, Rfl: 0    metronidazole 0.75% (METROCREAM) 0.75 % Crea, Apply topically 2 (two) times daily. (Patient not taking: Reported on 6/27/2024), Disp: 45 g, Rfl: 3    oxybutynin (DITROPAN-XL) 10 MG 24 hr tablet, Take 1 tablet (10 mg total) by mouth once daily., Disp: 30 tablet, Rfl: 11    Current Facility-Administered Medications:     sodium chloride 0.9% flush 10 mL, 10 mL, Intravenous, PRN, Zelalem Solorzano.J., DPM    sodium chloride 0.9% flush 10 mL, 10 mL, Intravenous, PRN, Zelalem SolorzanoJ., DPM    Facility-Administered Medications Ordered in Other Visits:     0.9%  NaCl infusion, , Intravenous, Continuous, Sathish Briseno MD, Stopped at 02/15/22 0948    LIDOcaine (PF) 10 mg/ml (1%) injection 10 mg, 1 mL, Intradermal, Once, Sathish Briseno MD    Review of Systems - as per HPI, otherwise a balanced 10 systems review is negative.    OBJECTIVE:  Vitals:  /79 (BP Location: Left arm, Patient Position: Sitting)   Pulse 88   Wt 107.3 kg (236 lb 8.9 oz)   LMP  (LMP Unknown)   BMI 40.60 kg/m²     Physical Exam:  Constitutional: she appears well-developed and well-nourished. she is well groomed. NAD   HENT:    Head: Normocephalic and atraumatic  Eyes: Conjunctivae and EOM are normal  Musculoskeletal: Normal range of motion. No joint stiffness.   Skin: Skin is warm and dry.  Psychiatric: Mood and affect are normal    Neuro: Patient is alert and oriented to person, place, and time. Language is intact and fluent.  Recent and remote memory are intact.  Normal attention and concentration.  Facial movement is symmetric.   Gait is normal.     Review of Data:   Notes from neuro reviewed.   Labs:  No visits with results within 3 Month(s) from this visit.   Latest known visit with results is:   Lab Visit on 08/01/2024   Component Date Value Ref Range Status    WBC 08/01/2024 6.38  3.90 - 12.70 K/uL Final    RBC 08/01/2024 4.62  4.00 - 5.40 M/uL Final    Hemoglobin 08/01/2024 13.0  12.0 - 16.0 g/dL Final    Hematocrit 08/01/2024 40.8  37.0 - 48.5 % Final    MCV 08/01/2024 88  82 - 98 fL Final    MCH 08/01/2024 28.1  27.0 - 31.0 pg Final    MCHC 08/01/2024 31.9 (L)  32.0 - 36.0 g/dL Final    RDW 08/01/2024 14.5  11.5 - 14.5 % Final    Platelets 08/01/2024 270  150 - 450 K/uL Final    MPV 08/01/2024 11.0  9.2 - 12.9 fL Final    Immature Granulocytes 08/01/2024 0.8 (H)  0.0 - 0.5 % Final    Gran # (ANC) 08/01/2024 3.0  1.8 - 7.7 K/uL Final    Immature Grans (Abs) 08/01/2024 0.05 (H)  0.00 - 0.04 K/uL Final    Lymph # 08/01/2024 2.5  1.0 - 4.8 K/uL Final    Mono # 08/01/2024 0.5  0.3 - 1.0 K/uL Final    Eos # 08/01/2024 0.3  0.0 - 0.5 K/uL Final    Baso # 08/01/2024 0.05  0.00 - 0.20 K/uL Final    nRBC 08/01/2024 0  0 /100 WBC Final    Gran % 08/01/2024 47.1  38.0 - 73.0 % Final    Lymph % 08/01/2024 39.2  18.0 - 48.0 % Final    Mono % 08/01/2024 7.2  4.0 - 15.0 % Final    Eosinophil % 08/01/2024 4.9  0.0 - 8.0 % Final    Basophil % 08/01/2024 0.8  0.0 - 1.9 % Final    Differential Method 08/01/2024 Automated   Final    Sodium 08/01/2024 141  136 - 145 mmol/L Final    Potassium 08/01/2024 4.1  3.5 - 5.1 mmol/L Final    Chloride 08/01/2024 109  95 - 110 mmol/L Final    CO2 08/01/2024 22 (L)  23 - 29 mmol/L Final    Glucose 08/01/2024 140 (H)  70 - 110 mg/dL Final    BUN 08/01/2024 11  6 - 20 mg/dL Final    Creatinine 08/01/2024 0.8  0.5 - 1.4 mg/dL Final    Calcium 08/01/2024 9.5  8.7 - 10.5 mg/dL Final    Total Protein 08/01/2024 7.1  6.0 - 8.4 g/dL Final    Albumin 08/01/2024 3.5  3.5 - 5.2 g/dL Final    Total Bilirubin 08/01/2024 0.8  0.1 -  1.0 mg/dL Final    Alkaline Phosphatase 08/01/2024 108  55 - 135 U/L Final    AST 08/01/2024 16  10 - 40 U/L Final    ALT 08/01/2024 15  10 - 44 U/L Final    eGFR 08/01/2024 >60  >60 mL/min/1.73 m^2 Final    Anion Gap 08/01/2024 10  8 - 16 mmol/L Final    Vitamin B-12 08/01/2024 543  210 - 950 pg/mL Final    Thiamine 08/01/2024 69  38 - 122 ug/L Final    Cholesterol 08/01/2024 187  120 - 199 mg/dL Final    Triglycerides 08/01/2024 60  30 - 150 mg/dL Final    HDL 08/01/2024 57  40 - 75 mg/dL Final    LDL Cholesterol 08/01/2024 118.0  63.0 - 159.0 mg/dL Final    HDL/Cholesterol Ratio 08/01/2024 30.5  20.0 - 50.0 % Final    Total Cholesterol/HDL Ratio 08/01/2024 3.3  2.0 - 5.0 Final    Non-HDL Cholesterol 08/01/2024 130  mg/dL Final    Iron 08/01/2024 79  30 - 160 ug/dL Final    Transferrin 08/01/2024 230  200 - 375 mg/dL Final    TIBC 08/01/2024 340  250 - 450 ug/dL Final    Saturated Iron 08/01/2024 23  20 - 50 % Final    Vit D, 25-Hydroxy 08/01/2024 31  30 - 96 ng/mL Final     Imaging:  No results found. However, due to the size of the patient record, not all encounters were searched. Please check Results Review for a complete set of results.    Note: I have independently reviewed any/all imaging/labs/tests and agree with the report (s) as documented.  Any discrepancies will be as noted/demarcated by free text.  AMPARO LARRY 11/14/2024    ASSESSMENT:  1. Chronic migraine without aura without status migrainosus, not intractable        PLAN:  - Discussed symptoms appear to be consistent with migraines, discussed treatment options and patient agreed with the following plan:  - ppx - continue botox  - Botox administered in clinic for Chronic Migraine (see below)   - abortive - try ubrelvy , may continue imitrex inj 2nd line as she is known to tolerate   - nausea - continue phenergan  - referral to neuro for other neuro complaints outside of ha's including weakness in legs and arm, jerking movements in these extremities,  and imbalance  - obesity s/p gastric bypass - avoid po nsaids, mgmt per bariatric med and dietician   - atrial flutter on eliquis - avoid nsaids, caution w/ triptans, mgmt per cardiology  - kristen - continue compliant cpap use, mgmt per sleep med  - htn - stable,mgmt per cardiology  - importance of healthy diet, regular exercise and sleep hygiene in the treatment of headaches    - Start tracking headaches via Migraine Tab rogelio on phone   - RTC in 12 weeks for repeat Botox injections or sooner if needed     Orders Placed This Encounter    onabotulinumtoxina injection 200 Units       All questions and concerns addressed.  Patient verbalizes understanding and is agreeable with the above stated treatment plan.      PROCEDURE NOTE:  BOTOX was performed as an indicated therapy for intractable chronic migraine headaches given that the patient failed more than 2 headache medications    Two patient identifiers were confirmed with the patient prior to performing this procedure. A time out to determine correct patient and and agreement on procedure performed was conducted prior to the procedure.      Botulinum Toxin Injection Procedure   Procedure: Botulinum toxin injection (63152)  After risks and benefits were explained including bleeding, infection, worsening of pain, damage to the areas being injected, weakness of muscles, loss of muscle control, dysphagia if injecting the head or neck, facial droop if injecting the facial area, painful injection, allergic or other reaction to the medications being injected, and the failure of the procedure to help the problem, a signed consent was obtained.   The patient was placed in a comfortable area and the sites to be treated were identified.The area to be treated was prepped three times with alcohol and the alcohol allowed to dry. Next, a 30 gauge needle was used to inject the medication in the area to be treated.      Total Botox used: 155 Units   Botox wastage: 45 Units      Injection sites:    muscle bilaterally ( a total of 10 units divided into 2 sites)   Procerus muscle (5 units)   Frontalis muscle bilaterally (a total of 20 units divided into 4 sites)   Temporalis muscle bilaterally (a total of 40 units divided into 8 sites)   Occipitalis muscle bilaterally (a total of 30 units divided into 6 sites)   Cervical paraspinal muscles (a total of 20 units divided into 4 sites)   Trapezius muscle bilaterally (a total of 30 units divided into 6 sites)   Complications: none   RTC for the next Botox injection: 12 weeks     The patient tolerated the procedure well and did not experience any complications.     CC: Tahir Mosquera MD Sarena Patel, PA-C  [facility]   [phone]    Dr. Grady was available during today's encounter.

## 2024-11-15 DIAGNOSIS — H81.13 BENIGN PAROXYSMAL POSITIONAL VERTIGO DUE TO BILATERAL VESTIBULAR DISORDER: ICD-10-CM

## 2024-11-15 RX ORDER — MECLIZINE HYDROCHLORIDE 25 MG/1
25 TABLET ORAL 3 TIMES DAILY PRN
Qty: 30 TABLET | Refills: 2 | Status: SHIPPED | OUTPATIENT
Start: 2024-11-15

## 2024-11-20 DIAGNOSIS — G43.719 INTRACTABLE CHRONIC MIGRAINE WITHOUT AURA AND WITHOUT STATUS MIGRAINOSUS: ICD-10-CM

## 2024-11-20 RX ORDER — SUMATRIPTAN SUCCINATE 100 MG/1
TABLET ORAL
Qty: 9 TABLET | Refills: 11 | Status: CANCELLED | OUTPATIENT
Start: 2024-11-15

## 2024-11-29 DIAGNOSIS — G43.719 INTRACTABLE CHRONIC MIGRAINE WITHOUT AURA AND WITHOUT STATUS MIGRAINOSUS: ICD-10-CM

## 2024-11-29 RX ORDER — SUMATRIPTAN SUCCINATE 100 MG/1
TABLET ORAL
Qty: 9 TABLET | Refills: 11 | Status: CANCELLED | OUTPATIENT
Start: 2024-11-15

## 2024-12-03 ENCOUNTER — PATIENT MESSAGE (OUTPATIENT)
Dept: BARIATRICS | Facility: CLINIC | Age: 54
End: 2024-12-03
Payer: MEDICARE

## 2024-12-04 DIAGNOSIS — G43.709 CHRONIC MIGRAINE WITHOUT AURA WITHOUT STATUS MIGRAINOSUS, NOT INTRACTABLE: Primary | ICD-10-CM

## 2024-12-06 DIAGNOSIS — G43.719 INTRACTABLE CHRONIC MIGRAINE WITHOUT AURA AND WITHOUT STATUS MIGRAINOSUS: ICD-10-CM

## 2024-12-06 RX ORDER — SUMATRIPTAN SUCCINATE 100 MG/1
TABLET ORAL
Qty: 9 TABLET | Refills: 11 | Status: SHIPPED | OUTPATIENT
Start: 2024-12-06

## 2024-12-10 ENCOUNTER — TELEPHONE (OUTPATIENT)
Dept: FAMILY MEDICINE | Facility: CLINIC | Age: 54
End: 2024-12-10
Payer: MEDICARE

## 2024-12-10 ENCOUNTER — PATIENT MESSAGE (OUTPATIENT)
Dept: BARIATRICS | Facility: CLINIC | Age: 54
End: 2024-12-10
Payer: MEDICARE

## 2024-12-10 NOTE — TELEPHONE ENCOUNTER
----- Message from Brandy sent at 12/5/2024  2:31 PM CST -----  Type:  Sooner Appointment Request    Caller is requesting a sooner appointment.  Caller declined first available appointment listed below.  Caller will not accept being placed on the waitlist and is requesting a message be sent to doctor.  Name of Caller:pt  When is the first available appointment?12/11  Symptoms:Pain in right side   Would the patient rather a call back or a response via [facility]? Call   Best Call Back Number: [phone]  Additional Information: Symptom: Abdominal Pain - Female - Not Pregnant  Outcome: Schedule an appointment to be seen within 24 hours.  Reason: Caller denied all higher acuity questions    The caller accepted this outcome.

## 2024-12-23 RX ORDER — DILTIAZEM HYDROCHLORIDE 240 MG/1
240 CAPSULE, COATED, EXTENDED RELEASE ORAL DAILY
Qty: 90 CAPSULE | Refills: 1 | Status: SHIPPED | OUTPATIENT
Start: 2024-12-23 | End: 2025-12-23

## 2024-12-31 ENCOUNTER — HOSPITAL ENCOUNTER (EMERGENCY)
Facility: HOSPITAL | Age: 54
Discharge: HOME | End: 2025-01-01
Attending: STUDENT IN AN ORGANIZED HEALTH CARE EDUCATION/TRAINING PROGRAM
Payer: MEDICARE

## 2024-12-31 DIAGNOSIS — Z00.8 MEDICAL CLEARANCE FOR PSYCHIATRIC ADMISSION: ICD-10-CM

## 2024-12-31 DIAGNOSIS — R45.851 DEPRESSION WITH SUICIDAL IDEATION: Primary | ICD-10-CM

## 2024-12-31 DIAGNOSIS — F32.A DEPRESSION WITH SUICIDAL IDEATION: Primary | ICD-10-CM

## 2024-12-31 LAB
ALBUMIN SERPL BCP-MCNC: 3.6 G/DL (ref 3.5–5.2)
ALP SERPL-CCNC: 119 U/L (ref 40–150)
ALT SERPL W/O P-5'-P-CCNC: 18 U/L (ref 10–44)
AMPHET+METHAMPHET UR QL: NEGATIVE
ANION GAP SERPL CALC-SCNC: 12 MMOL/L (ref 8–16)
APAP SERPL-MCNC: <3 UG/ML (ref 10–20)
AST SERPL-CCNC: 16 U/L (ref 10–40)
BACTERIA #/AREA URNS HPF: ABNORMAL /HPF
BARBITURATES UR QL SCN>200 NG/ML: NEGATIVE
BASOPHILS # BLD AUTO: 0.05 K/UL (ref 0–0.2)
BASOPHILS NFR BLD: 0.6 % (ref 0–1.9)
BENZODIAZ UR QL SCN>200 NG/ML: NEGATIVE
BILIRUB SERPL-MCNC: 0.3 MG/DL (ref 0.1–1)
BILIRUB UR QL STRIP: NEGATIVE
BUN SERPL-MCNC: 7 MG/DL (ref 6–20)
BZE UR QL SCN: NEGATIVE
CALCIUM SERPL-MCNC: 9.2 MG/DL (ref 8.7–10.5)
CANNABINOIDS UR QL SCN: NEGATIVE
CHLORIDE SERPL-SCNC: 109 MMOL/L (ref 95–110)
CLARITY UR: ABNORMAL
CO2 SERPL-SCNC: 22 MMOL/L (ref 23–29)
COLOR UR: YELLOW
CREAT SERPL-MCNC: 0.8 MG/DL (ref 0.5–1.4)
CREAT UR-MCNC: 175.8 MG/DL (ref 15–325)
DIFFERENTIAL METHOD BLD: NORMAL
EOSINOPHIL # BLD AUTO: 0.3 K/UL (ref 0–0.5)
EOSINOPHIL NFR BLD: 3.3 % (ref 0–8)
ERYTHROCYTE [DISTWIDTH] IN BLOOD BY AUTOMATED COUNT: 13.3 % (ref 11.5–14.5)
EST. GFR  (NO RACE VARIABLE): >60 ML/MIN/1.73 M^2
ETHANOL SERPL-MCNC: <10 MG/DL
GLUCOSE SERPL-MCNC: 101 MG/DL (ref 70–110)
GLUCOSE UR QL STRIP: NEGATIVE
HCT VFR BLD AUTO: 40.6 % (ref 37–48.5)
HGB BLD-MCNC: 13.1 G/DL (ref 12–16)
HGB UR QL STRIP: NEGATIVE
HYALINE CASTS #/AREA URNS LPF: 1 /LPF
IMM GRANULOCYTES # BLD AUTO: 0.01 K/UL (ref 0–0.04)
IMM GRANULOCYTES NFR BLD AUTO: 0.1 % (ref 0–0.5)
KETONES UR QL STRIP: NEGATIVE
LEUKOCYTE ESTERASE UR QL STRIP: ABNORMAL
LYMPHOCYTES # BLD AUTO: 3.2 K/UL (ref 1–4.8)
LYMPHOCYTES NFR BLD: 38 % (ref 18–48)
MCH RBC QN AUTO: 28.2 PG (ref 27–31)
MCHC RBC AUTO-ENTMCNC: 32.3 G/DL (ref 32–36)
MCV RBC AUTO: 87 FL (ref 82–98)
METHADONE UR QL SCN>300 NG/ML: NEGATIVE
MICROSCOPIC COMMENT: ABNORMAL
MONOCYTES # BLD AUTO: 0.6 K/UL (ref 0.3–1)
MONOCYTES NFR BLD: 6.8 % (ref 4–15)
NEUTROPHILS # BLD AUTO: 4.2 K/UL (ref 1.8–7.7)
NEUTROPHILS NFR BLD: 51.2 % (ref 38–73)
NITRITE UR QL STRIP: NEGATIVE
NRBC BLD-RTO: 0 /100 WBC
OPIATES UR QL SCN: NEGATIVE
PCP UR QL SCN>25 NG/ML: NEGATIVE
PH UR STRIP: 6 [PH] (ref 5–8)
PLATELET # BLD AUTO: 283 K/UL (ref 150–450)
PMV BLD AUTO: 10.2 FL (ref 9.2–12.9)
POTASSIUM SERPL-SCNC: 3.8 MMOL/L (ref 3.5–5.1)
PROT SERPL-MCNC: 7.4 G/DL (ref 6–8.4)
PROT UR QL STRIP: ABNORMAL
RBC # BLD AUTO: 4.65 M/UL (ref 4–5.4)
RBC #/AREA URNS HPF: 2 /HPF (ref 0–4)
SODIUM SERPL-SCNC: 143 MMOL/L (ref 136–145)
SP GR UR STRIP: 1.02 (ref 1–1.03)
SQUAMOUS #/AREA URNS HPF: 16 /HPF
TOXICOLOGY INFORMATION: NORMAL
UNIDENT CRYS URNS QL MICRO: 4
URN SPEC COLLECT METH UR: ABNORMAL
UROBILINOGEN UR STRIP-ACNC: NEGATIVE EU/DL
WBC # BLD AUTO: 8.28 K/UL (ref 3.9–12.7)
WBC #/AREA URNS HPF: 14 /HPF (ref 0–5)

## 2024-12-31 PROCEDURE — 80307 DRUG TEST PRSMV CHEM ANLYZR: CPT | Performed by: STUDENT IN AN ORGANIZED HEALTH CARE EDUCATION/TRAINING PROGRAM

## 2024-12-31 PROCEDURE — 82077 ASSAY SPEC XCP UR&BREATH IA: CPT | Performed by: STUDENT IN AN ORGANIZED HEALTH CARE EDUCATION/TRAINING PROGRAM

## 2024-12-31 PROCEDURE — 80143 DRUG ASSAY ACETAMINOPHEN: CPT | Performed by: STUDENT IN AN ORGANIZED HEALTH CARE EDUCATION/TRAINING PROGRAM

## 2024-12-31 PROCEDURE — 80053 COMPREHEN METABOLIC PANEL: CPT | Performed by: STUDENT IN AN ORGANIZED HEALTH CARE EDUCATION/TRAINING PROGRAM

## 2024-12-31 PROCEDURE — 99285 EMERGENCY DEPT VISIT HI MDM: CPT | Mod: HCNC

## 2024-12-31 PROCEDURE — 85025 COMPLETE CBC W/AUTO DIFF WBC: CPT | Performed by: STUDENT IN AN ORGANIZED HEALTH CARE EDUCATION/TRAINING PROGRAM

## 2024-12-31 PROCEDURE — 81000 URINALYSIS NONAUTO W/SCOPE: CPT | Mod: 59 | Performed by: STUDENT IN AN ORGANIZED HEALTH CARE EDUCATION/TRAINING PROGRAM

## 2024-12-31 PROCEDURE — 87086 URINE CULTURE/COLONY COUNT: CPT | Performed by: STUDENT IN AN ORGANIZED HEALTH CARE EDUCATION/TRAINING PROGRAM

## 2025-01-01 VITALS
TEMPERATURE: 98 F | SYSTOLIC BLOOD PRESSURE: 120 MMHG | OXYGEN SATURATION: 97 % | RESPIRATION RATE: 18 BRPM | HEART RATE: 63 BPM | DIASTOLIC BLOOD PRESSURE: 70 MMHG

## 2025-01-01 PROBLEM — N32.81 OVERACTIVE BLADDER: Status: ACTIVE | Noted: 2025-01-01

## 2025-01-01 PROBLEM — Z13.9 ENCOUNTER FOR MEDICAL SCREENING EXAMINATION: Status: ACTIVE | Noted: 2025-01-01

## 2025-01-01 PROBLEM — K21.9 GERD (GASTROESOPHAGEAL REFLUX DISEASE): Status: ACTIVE | Noted: 2025-01-01

## 2025-01-01 LAB
BACTERIA UR CULT: NORMAL
BACTERIA UR CULT: NORMAL

## 2025-01-01 NOTE — ED NOTES
Sister states that pt has not been taking medications properly. Lexapro is missing more than should, sister states taking to many also Wellbutrin also    Sister states hx of migraines and Toradol is the only medications that will help

## 2025-01-01 NOTE — ED NOTES
Belongings placed in a belongings bag (1-shirt, 1 pants, a pair of crocs, ID, cell phone, earring and 2 rings

## 2025-01-01 NOTE — ED NOTES
MD Baker at bedside talking/ assessing pt. Pt is calm and cooperative at this time answering questions appropriately

## 2025-01-01 NOTE — ED PROVIDER NOTES
ED Provider Note - 12/31/2024    History     Chief Complaint   Patient presents with    Suicidal     Patient presents to ER stating that she has been depressed and sad lately. Patient states that she has thoughts of hurting herself and had a plan today to hurt herself. Patient non-compliant with psych meds.        HPI     Starla Fisher is a 54 y.o. year old female with past medical and surgical history as seen below, presenting with chief complaint of suicidal ideations.  States her depression is getting worse because she feels like a burden to her family in his also trying to grief family deaths.  Stated to family members that she did not have any reason to live any more.        Past Medical History:   Diagnosis Date    Anxiety     Depression     Generalized anxiety disorder with panic attacks 02/23/2022    GERD (gastroesophageal reflux disease)     Hypertension     Migraine headache     Sleep difficulties     Typical atrial flutter 04/20/2022     Past Surgical History:   Procedure Laterality Date    BREAST SURGERY      COLONOSCOPY N/A 12/14/2022    Procedure: COLONOSCOPY sutab;  Surgeon: Wei Walsh MD;  Location: [facility] ENDO;  Service: Endoscopy;  Laterality: N/A;    ENDOMETRIAL ABLATION      ENDOSCOPIC GASTROCNEMIUS RECESSION Right 8/19/2022    Procedure: RECESSION, GASTROCNEMIUS, ENDOSCOPIC;  Surgeon: Zelalem Solorzano DPM;  Location: [facility] OR;  Service: Podiatry;  Laterality: Right;  Arthrex Randolphline  Stella/Adams/CHRISTOPHE/REJI,WILIAM 8/18@1333    ENDOSCOPIC PLANTAR FASCIOTOMY Right 8/19/2022    Procedure: FASCIOTOMY, PLANTAR, ENDOSCOPIC;  Surgeon: Zelalem Solorzano DPM;  Location: [facility] OR;  Service: Podiatry;  Laterality: Right;  Arthrex set    ESOPHAGOGASTRODUODENOSCOPY N/A 12/14/2022    Procedure: EGD (ESOPHAGOGASTRODUODENOSCOPY);  Surgeon: Wei Walsh MD;  Location: [facility] ENDO;  Service: Endoscopy;  Laterality: N/A;    EYE SURGERY      FOOT SURGERY Right 10/2018    bunion    IMPLANTATION OF  PERMANENT SACRAL NERVE STIMULATOR N/A 2/15/2022    Procedure: INSERTION, NEUROSTIMULATOR, PERMANENT, SACRAL;  Surgeon: David Brown MD;  Location: [facility] OR [address] [place] [address];  Service: Urology;  Laterality: N/A;    TONSILLECTOMY      TOTAL REDUCTION MAMMOPLASTY Bilateral     XI ROBOTIC GASTROENTEROSTOMY, MIAN-EN-Y N/A 1/26/2024    Procedure: XI ROBOTIC GASTROENTEROSTOMY, MIAN-EN-Y w/intraop egd;  Surgeon: Judy Grossman MD;  Location: [facility] OR [address] [place] [address];  Service: General;  Laterality: N/A;         Family History   Problem Relation Name Age of Onset    Atrial fibrillation Mother      Thyroid disease Mother      Dementia Mother      Diabetes Father      Prostate cancer Father      Stomach cancer Sister      Migraines Sister      Colon cancer Paternal Aunt      Colon cancer Paternal Uncle      Breast cancer Maternal Grandmother      Colon cancer Paternal Grandmother      Cancer Paternal Grandfather       Social History     Tobacco Use    Smoking status: Never     Passive exposure: Never    Smokeless tobacco: Never   Substance Use Topics    Alcohol use: No    Drug use: No     Social Drivers of Health with Concerns     Alcohol Use: Not on file   Financial Resource Strain: Not on file   Food Insecurity: Not on file   Transportation Needs: Not on file   Physical Activity: Not on file   Stress: Not on file   Housing Stability: Not on file   Utilities: Not on file   Health Literacy: Not on file   Social Isolation: Not on file      Review of patient's allergies indicates:  No Known Allergies    Review of Systems     A full Review of Systems (ROS) was performed and was negative unless otherwise stated in the HPI.      Physical Exam     Vitals:    12/31/24 2128   BP: (!) 182/93   BP Location: Left arm   Pulse: 72   Resp: 18   SpO2: 98%        Physical Exam    Nursing note and vitals reviewed.  Constitutional: She appears well-developed and well-nourished.   HENT:   Head: Normocephalic and atraumatic.   Right Ear: External ear  normal.   Left Ear: External ear normal.   Nose: Nose normal.   Eyes: Conjunctivae and EOM are normal. Pupils are equal, round, and reactive to light.   Neck: No tracheal deviation present.   Normal range of motion.  Cardiovascular:  Normal rate, regular rhythm, normal heart sounds and intact distal pulses.           Pulmonary/Chest: Breath sounds normal. No respiratory distress. She has no wheezes.   Musculoskeletal:         General: No edema. Normal range of motion.      Cervical back: Normal range of motion.     Neurological: She is alert and oriented to person, place, and time. She has normal strength. No cranial nerve deficit or sensory deficit. Gait normal.   Skin: Skin is warm and dry.   Psychiatric: Her behavior is normal. She is not actively hallucinating. Thought content is not delusional. Cognition and memory are not impaired. She exhibits a depressed mood. She expresses suicidal ideation. She expresses no homicidal ideation. She expresses suicidal plans.         Lab Results- Independently reviewed by myself      Labs Reviewed   COMPREHENSIVE METABOLIC PANEL - Abnormal       Result Value    Sodium 143      Potassium 3.8      Chloride 109      CO2 22 (*)     Glucose 101      BUN 7      Creatinine 0.8      Calcium 9.2      Total Protein 7.4      Albumin 3.6      Total Bilirubin 0.3      Alkaline Phosphatase 119      AST 16      ALT 18      eGFR >60      Anion Gap 12     URINALYSIS, REFLEX TO URINE CULTURE - Abnormal    Specimen UA Urine, Clean Catch      Color, UA Yellow      Appearance, UA Hazy (*)     pH, UA 6.0      Specific Gravity, UA 1.025      Protein, UA Trace (*)     Glucose, UA Negative      Ketones, UA Negative      Bilirubin (UA) Negative      Occult Blood UA Negative      Nitrite, UA Negative      Urobilinogen, UA Negative      Leukocytes, UA 1+ (*)     Narrative:     Specimen Source->Urine   ACETAMINOPHEN LEVEL - Abnormal    Acetaminophen (Tylenol), Serum <3.0 (*)    URINALYSIS MICROSCOPIC -  Abnormal    RBC, UA 2      WBC, UA 14 (*)     Bacteria Occasional      Squam Epithel, UA 16      Hyaline Casts, UA 1      Unclass Harika UA 4      Microscopic Comment SEE COMMENT      Narrative:     Specimen Source->Urine   CULTURE, URINE   CBC W/ AUTO DIFFERENTIAL    WBC 8.28      RBC 4.65      Hemoglobin 13.1      Hematocrit 40.6      MCV 87      MCH 28.2      MCHC 32.3      RDW 13.3      Platelets 283      MPV 10.2      Immature Granulocytes 0.1      Gran # (ANC) 4.2      Immature Grans (Abs) 0.01      Lymph # 3.2      Mono # 0.6      Eos # 0.3      Baso # 0.05      nRBC 0      Gran % 51.2      Lymph % 38.0      Mono % 6.8      Eosinophil % 3.3      Basophil % 0.6      Differential Method Automated     DRUG SCREEN PANEL, URINE EMERGENCY    Benzodiazepines Negative      Methadone metabolites Negative      Cocaine (Metab.) Negative      Opiate Scrn, Ur Negative      Barbiturate Screen, Ur Negative      Amphetamine Screen, Ur Negative      THC Negative      Phencyclidine Negative      Creatinine, Urine 175.8      Toxicology Information SEE COMMENT      Narrative:     Specimen Source->Urine   ALCOHOL,MEDICAL (ETHANOL)    Alcohol, Serum <10             Imaging     Imaging Results    None                    ED Course         Procedures         Orders Placed This Encounter    Urine culture    CBC auto differential    Comprehensive metabolic panel    Urinalysis, Reflex to Urine Culture Urine, Clean Catch    Drug screen panel, emergency    Ethanol    Acetaminophen level    Urinalysis Microscopic    Vital signs while awake    Undress patient and allow them to wear facility provided apparel.    Direct Psych Observation    PEC/Psych Hold - Physicians Emergency Certificate / 72 Hour Psych Hold          ED Course as of 12/31/24 2343   Tue Dec 31, 2024   2311 CBC auto differential  CBC: No significant anemia, platelet disorder, or leukocytosis.   [KB]   2311 Comprehensive metabolic panel(!)  CMP: Normal electrolytes, renal  function, liver enzymes   [KB]   2311 Urinalysis, Reflex to Urine Culture Urine, Clean Catch(!)  Questionable urinalysis with 1+ leukocytes and negative nitrites. [KB]   2311 Urinalysis Microscopic(!)  Fourteen white blood cells with occasional bacteria.  Poor sample with 16 squamous. [KB]      ED Course User Index  [KB] Sanket Baker MD        Medically cleared for psychiatry placement: 12/31/2024 11:40 PM     Medical Decision Making       The patient's list of active medical problems, social history, medications, and allergies as documented per RN staff has been reviewed.           Medical Decision Making  This is a 54 y.o. female presenting with complaints of Suicidal ideation with plan.     Differential includes but is not limited to Suicidal ideation, Anxiety, Depression, Unspecified personality disorder, Borderline personality disorder, Bipolar disorder, and Panic disorder.    Patient has been placed under a pec for the above reasons.  The patient at this time is medically cleared and seems appropriate, safe, and stable for transfer to appropriate behavioral health facility for further evaluation by Psychiatry.      Amount and/or Complexity of Data Reviewed  Independent Historian: EMS  Labs: ordered. Decision-making details documented in ED Course.                        Clinical Impression       Disposition   ED Disposition Condition    Transfer to Psych Facility Stable              Final diagnoses:  [F32.A, R45.851] Depression with suicidal ideation (Primary)  [Z00.8] Medical clearance for psychiatric admission        Sanket Baker MD        12/31/2024          DISCLAIMER: This note was prepared with [employer] voice recognition transcription software. Garbled syntax, mangled pronouns, and other bizarre constructions may be attributed to that software system.       Sanket Baker MD  12/31/24 2838

## 2025-01-02 PROBLEM — R63.8 PREDICTED EXCESSIVE ENERGY INTAKE: Status: RESOLVED | Noted: 2025-01-02 | Resolved: 2025-01-02

## 2025-01-02 PROBLEM — R63.8 PREDICTED EXCESSIVE ENERGY INTAKE: Status: ACTIVE | Noted: 2025-01-02

## 2025-01-06 ENCOUNTER — PATIENT MESSAGE (OUTPATIENT)
Dept: BARIATRICS | Facility: CLINIC | Age: 55
End: 2025-01-06
Payer: MEDICARE

## 2025-01-10 DIAGNOSIS — H81.13 BENIGN PAROXYSMAL POSITIONAL VERTIGO DUE TO BILATERAL VESTIBULAR DISORDER: ICD-10-CM

## 2025-01-10 RX ORDER — MECLIZINE HYDROCHLORIDE 25 MG/1
25 TABLET ORAL 3 TIMES DAILY PRN
Qty: 30 TABLET | Refills: 2 | OUTPATIENT
Start: 2025-01-10

## 2025-01-14 ENCOUNTER — PATIENT MESSAGE (OUTPATIENT)
Dept: BARIATRICS | Facility: CLINIC | Age: 55
End: 2025-01-14
Payer: MEDICARE

## 2025-01-14 ENCOUNTER — OFFICE VISIT (OUTPATIENT)
Dept: URGENT CARE | Facility: CLINIC | Age: 55
End: 2025-01-14
Payer: MEDICARE

## 2025-01-14 VITALS
DIASTOLIC BLOOD PRESSURE: 94 MMHG | TEMPERATURE: 98 F | HEART RATE: 77 BPM | RESPIRATION RATE: 20 BRPM | OXYGEN SATURATION: 96 % | WEIGHT: 236 LBS | HEIGHT: 64 IN | BODY MASS INDEX: 40.29 KG/M2 | SYSTOLIC BLOOD PRESSURE: 141 MMHG

## 2025-01-14 DIAGNOSIS — G43.809 OTHER MIGRAINE WITHOUT STATUS MIGRAINOSUS, NOT INTRACTABLE: Primary | ICD-10-CM

## 2025-01-14 PROCEDURE — 96372 THER/PROPH/DIAG INJ SC/IM: CPT | Mod: S$GLB,,, | Performed by: FAMILY MEDICINE

## 2025-01-14 PROCEDURE — 99213 OFFICE O/P EST LOW 20 MIN: CPT | Mod: 25,S$GLB,,

## 2025-01-14 RX ORDER — KETOROLAC TROMETHAMINE 30 MG/ML
30 INJECTION, SOLUTION INTRAMUSCULAR; INTRAVENOUS
Status: COMPLETED | OUTPATIENT
Start: 2025-01-14 | End: 2025-01-14

## 2025-01-14 RX ADMIN — KETOROLAC TROMETHAMINE 30 MG: 30 INJECTION, SOLUTION INTRAMUSCULAR; INTRAVENOUS at 01:01

## 2025-01-14 NOTE — PROGRESS NOTES
"Subjective:      Patient ID: Starla Fisher is a 54 y.o. female.    Vitals:  height is 5' 4" (1.626 m) and weight is 107 kg (236 lb). Her oral temperature is 98.1 °F (36.7 °C). Her blood pressure is 141/94 (abnormal) and her pulse is 77. Her respiration is 20 and oxygen saturation is 96%.     Chief Complaint: Migraine    Pt present with symptoms of- Migraine Headache that she woke up with this morning. Pt states she has a history of migraines and has been treated with botox injecionsand they return when her injections wear off. Patient requesting shot in clinic for symptom relief. Denies any loss of vision.     Migraine   This is a new problem. The current episode started today. The problem occurs constantly. The problem has been gradually worsening. The pain radiates to the left neck and right neck. The pain quality is similar to prior headaches. The quality of the pain is described as aching and throbbing. The pain is at a severity of 10/10. The pain is severe. Associated symptoms include nausea. Pertinent negatives include no abdominal pain, coughing, sore throat or vomiting. The symptoms are aggravated by bright light. Treatments tried: imitrex Injection this morning. The treatment provided no relief. Her past medical history is significant for migraine headaches.       Constitution: Negative.   HENT: Negative.  Negative for sore throat.    Neck: neck negative.   Cardiovascular: Negative.    Eyes: Negative.    Respiratory:  Negative for cough.    Gastrointestinal:  Positive for nausea. Negative for abdominal pain and vomiting.   Endocrine: negative.   Genitourinary: Negative.    Skin: Negative.    Allergic/Immunologic: Negative.    Neurological:  Positive for history of migraines.      Objective:     Physical Exam   Constitutional: She is oriented to person, place, and time.  Non-toxic appearance.   HENT:   Head: Normocephalic.   Ears:   Right Ear: External ear normal.   Left Ear: External ear normal.   Nose: " Nose normal.   Mouth/Throat: Mucous membranes are moist.   Eyes: Conjunctivae are normal. Pupils are equal, round, and reactive to light. Right eye exhibits no discharge. Left eye exhibits no discharge.   Cardiovascular: Normal rate, regular rhythm and normal heart sounds.   Pulmonary/Chest: Breath sounds normal. No stridor. No respiratory distress. She has no wheezes. She has no rhonchi. She has no rales.   Abdominal: Soft.   Musculoskeletal: Normal range of motion.         General: Normal range of motion.   Neurological: She is alert and oriented to person, place, and time.   Skin: Skin is warm, dry and not diaphoretic.   Psychiatric: Her behavior is normal. Mood, judgment and thought content normal.       Assessment:     1. Other migraine without status migrainosus, not intractable        Plan:       Other migraine without status migrainosus, not intractable  -     ketorolac injection 30 mg      Discussed with patient benefits and risks/side effects of medication and patient still states only way for her to get relief is with injection. Patent has taken Toradol injection in the past. Patent states last seen at other urgent care and tolerated injection well with no side effects.     Patient Instructions   - You must understand that you have received an Urgent Care treatment only and that you may be released before all of your medical problems are known or treated.   - You, the patient, will arrange for follow up care as instructed.   - If your condition worsens or fails to improve we recommend that you receive another evaluation at the ER immediately or contact your PCP to discuss your concerns or return here.  -monitor for any worsening symptoms and please be seen emergently.  -follow up with neurology.  -please drink plenty of fluids.  -please get plenty of rest.  -follow up with mental health providers as discussed.

## 2025-01-14 NOTE — PATIENT INSTRUCTIONS
- You must understand that you have received an Urgent Care treatment only and that you may be released before all of your medical problems are known or treated.   - You, the patient, will arrange for follow up care as instructed.   - If your condition worsens or fails to improve we recommend that you receive another evaluation at the ER immediately or contact your PCP to discuss your concerns or return here.  -monitor for any worsening symptoms and please be seen emergently.  -follow up with neurology.  -please drink plenty of fluids.  -please get plenty of rest.  -follow up with mental health providers as discussed.

## 2025-01-15 ENCOUNTER — PATIENT MESSAGE (OUTPATIENT)
Dept: PSYCHIATRY | Facility: CLINIC | Age: 55
End: 2025-01-15
Payer: MEDICARE

## 2025-01-21 ENCOUNTER — TELEPHONE (OUTPATIENT)
Dept: CARDIOLOGY | Facility: CLINIC | Age: 55
End: 2025-01-21
Payer: MEDICARE

## 2025-01-21 ENCOUNTER — PATIENT MESSAGE (OUTPATIENT)
Dept: CARDIOLOGY | Facility: CLINIC | Age: 55
End: 2025-01-21
Payer: MEDICARE
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
## 2025-01-22 ENCOUNTER — PATIENT MESSAGE (OUTPATIENT)
Dept: CARDIOLOGY | Facility: CLINIC | Age: 55
End: 2025-01-22
Payer: MEDICARE

## 2025-01-23 ENCOUNTER — PATIENT MESSAGE (OUTPATIENT)
Dept: CARDIOLOGY | Facility: CLINIC | Age: 55
End: 2025-01-23
Payer: MEDICARE

## 2025-01-27 ENCOUNTER — PATIENT MESSAGE (OUTPATIENT)
Dept: SLEEP MEDICINE | Facility: CLINIC | Age: 55
End: 2025-01-27
Payer: MEDICARE

## 2025-01-28 ENCOUNTER — TELEPHONE (OUTPATIENT)
Dept: CARDIOLOGY | Facility: CLINIC | Age: 55
End: 2025-01-28
Payer: MEDICARE

## 2025-01-28 ENCOUNTER — TELEPHONE (OUTPATIENT)
Dept: FAMILY MEDICINE | Facility: CLINIC | Age: 55
End: 2025-01-28
Payer: MEDICARE

## 2025-01-28 DIAGNOSIS — Z12.31 ENCOUNTER FOR SCREENING MAMMOGRAM FOR BREAST CANCER: Primary | ICD-10-CM

## 2025-01-28 NOTE — TELEPHONE ENCOUNTER
Rt call to pt lvm for pt to call me back        ad          ----- Message from Yuly sent at 1/28/2025 12:09 PM CST -----  Type:  Sooner Apoointment Request    Caller is requesting a sooner appointment.  Caller declined first available appointment listed below.  Caller will not accept being placed on the waitlist and is requesting a message be sent to doctor.  Name of Caller: Pt  When is the first available appointment?  Symptoms: reschedule appt  Would the patient rather a call back or a response via [facility]? Call  Best Call Back Number:  [phone]  Additional Information: Pt has an appt conflict and would like to reschedule appt to Friday (if possible)

## 2025-01-28 NOTE — TELEPHONE ENCOUNTER
----- Message from Kenn sent at 1/24/2025  3:15 PM CST -----  Type:  Mammogram    Caller is requesting to schedule their annual mammogram appointment.  Order is not listed in EPIC.  Please enter order and contact patient to schedule.  Name of Caller:pt  Where would they like the mammogram performed?[facility]  Would the patient rather a call back or a response via [facility]? call  Best Call Back Number: [phone]  Additional Information:

## 2025-02-03 ENCOUNTER — OFFICE VISIT (OUTPATIENT)
Dept: PSYCHIATRY | Facility: CLINIC | Age: 55
End: 2025-02-03
Payer: MEDICARE

## 2025-02-03 VITALS
BODY MASS INDEX: 39.83 KG/M2 | WEIGHT: 232.06 LBS | SYSTOLIC BLOOD PRESSURE: 143 MMHG | HEART RATE: 72 BPM | DIASTOLIC BLOOD PRESSURE: 68 MMHG

## 2025-02-03 DIAGNOSIS — F33.1 MAJOR DEPRESSIVE DISORDER, RECURRENT, MODERATE: Primary | ICD-10-CM

## 2025-02-03 DIAGNOSIS — F41.1 GENERALIZED ANXIETY DISORDER WITH PANIC ATTACKS: ICD-10-CM

## 2025-02-03 DIAGNOSIS — F41.0 GENERALIZED ANXIETY DISORDER WITH PANIC ATTACKS: ICD-10-CM

## 2025-02-03 DIAGNOSIS — G47.00 INSOMNIA DISORDER, WITH NON-SLEEP DISORDER MENTAL COMORBIDITY: ICD-10-CM

## 2025-02-03 PROCEDURE — 3077F SYST BP >= 140 MM HG: CPT | Mod: CPTII,S$GLB,, | Performed by: NURSE PRACTITIONER

## 2025-02-03 PROCEDURE — 3008F BODY MASS INDEX DOCD: CPT | Mod: CPTII,S$GLB,, | Performed by: NURSE PRACTITIONER

## 2025-02-03 PROCEDURE — 90833 PSYTX W PT W E/M 30 MIN: CPT | Mod: S$GLB,,, | Performed by: NURSE PRACTITIONER

## 2025-02-03 PROCEDURE — 99999 PR PBB SHADOW E&M-EST. PATIENT-LVL II: CPT | Mod: PBBFAC,,, | Performed by: NURSE PRACTITIONER

## 2025-02-03 PROCEDURE — 3078F DIAST BP <80 MM HG: CPT | Mod: CPTII,S$GLB,, | Performed by: NURSE PRACTITIONER

## 2025-02-03 PROCEDURE — 1111F DSCHRG MED/CURRENT MED MERGE: CPT | Mod: CPTII,S$GLB,, | Performed by: NURSE PRACTITIONER

## 2025-02-03 PROCEDURE — 3044F HG A1C LEVEL LT 7.0%: CPT | Mod: CPTII,S$GLB,, | Performed by: NURSE PRACTITIONER

## 2025-02-03 PROCEDURE — 99214 OFFICE O/P EST MOD 30 MIN: CPT | Mod: S$GLB,,, | Performed by: NURSE PRACTITIONER

## 2025-02-03 RX ORDER — ESCITALOPRAM OXALATE 20 MG/1
20 TABLET ORAL DAILY
Qty: 90 TABLET | Refills: 3 | Status: SHIPPED | OUTPATIENT
Start: 2025-02-03

## 2025-02-03 RX ORDER — ARIPIPRAZOLE 10 MG/1
10 TABLET ORAL DAILY
Qty: 90 TABLET | Refills: 3 | Status: SHIPPED | OUTPATIENT
Start: 2025-02-03

## 2025-02-03 RX ORDER — TRAZODONE HYDROCHLORIDE 150 MG/1
TABLET ORAL
Qty: 90 TABLET | Refills: 3 | Status: SHIPPED | OUTPATIENT
Start: 2025-02-03

## 2025-02-03 NOTE — PROGRESS NOTES
Outpatient Psychiatry Follow-Up Visit (MD/NP)    2/3/2025    Clinical Status of Patient:  Outpatient (Ambulatory)    Chief Complaint:  Starla Fisher is a 54 y.o. female who presents today for follow-up of depression and anxiety.  Met with patient, sister, and cousin.      Last visit was: 11/29/23 Chart and  reviewed.    Interval History and Content of Current Session:  Current Psychiatric Medications/changes  Lexapro 20 mg daily  Trazodone 150 mg before bed as needed for insomnia  Start Wellbutrin  mg daily  Valium 5 mg as needed for severe anxiety or panic attacks. Don't take more than one tablet in a day and be careful to not drive. May increase risk for fall.  Will follow-up in 1 month to determine need dosage increase or med change      Pt reports she went to [facility] inpatient last month for depression with SI.  Started Abilify. Also started dayprogram at [facility].       Affect appears bright with euthymic mood. Thought processes are linear, clear, and organized. Denies SI/HI/AVH. Discussed coping skills for anxiety and mood.. Sleep improved with Trazodone.       Psychotherapy:  Target symptoms: anxiety   Why chosen therapy is appropriate versus another modality: relevant to diagnosis  Outcome monitoring methods: self-report  Therapeutic intervention type: insight oriented psychotherapy  Topics discussed/themes: building skills sets for symptom management, symptom recognition  The patient's response to the intervention is accepting. The patient's progress toward treatment goals is good.   Duration of intervention: 19 minutes.    Review of Systems   PSYCHIATRIC: Pertinant items are noted in the narrative.  CONSTITUTIONAL: No weight gain or loss.   MUSCULOSKELETAL: No pain or stiffness of the joints.  NEUROLOGIC: No weakness, sensory changes, seizures, confusion, memory loss, tremor or other abnormal movements.  ENDOCRINE: No polydipsia or polyuria.  INTEGUMENTARY: No rashes or lacerations.  EYES:  "No exophthalmos, jaundice or blindness.  ENT: No dizziness, tinnitus or hearing loss.  RESPIRATORY: No shortness of breath.  CARDIOVASCULAR: No tachycardia or chest pain.  GASTROINTESTINAL: No nausea, vomiting, pain, constipation or diarrhea.  GENITOURINARY: No frequency, dysuria or sexual dysfunction.  HEMATOLOGIC/LYMPHATIC: No excessive bleeding, prolonged or excessive bleeding after dental extraction/injury.  ALLERGIC/IMMUNOLOGIC: No allergic response to materials, foods or animals at this time.    Past Medical, Family and Social History: The patient's past medical, family and social history have been reviewed and updated as appropriate within the electronic medical record - see encounter notes.    Compliance: yes    Side effects: see above    Risk Parameters:  Patient reports no suicidal ideation  Patient reports no homicidal ideation  Patient reports no self-injurious behavior  Patient reports no violent behavior    Exam (detailed: at least 9 elements; comprehensive: all 15 elements)   Constitutional  Vitals:  Most recent vital signs, dated greater than 90 days prior to this appointment, were reviewed.   Vitals:    02/03/25 0943   BP: (!) 143/68   Pulse: 72   Weight: 105.3 kg (232 lb 0.6 oz)     Ht: 5' 4" (1.626 m)  Wt: 144.6 kg (318 lb 12.6 oz)  BMI: 54.72 kg/m²  BP: 150/84>1 day  Pulse: 75>1 day     General:  unremarkable     Musculoskeletal  Muscle Strength/Tone:  no tremor, no tic   Gait & Station:  in wheelchair     Psychiatric  Speech:  no latency; no press   Mood & Affect:  euthymic  congruent and appropriate   Thought Process:  concrete   Associations:  intact   Thought Content:  normal, no suicidality, no homicidality, delusions, or paranoia   Insight:  limited awareness of illness   Judgement: limited   Orientation:  grossly intact   Memory: intact for content of interview   Language: grossly intact   Attention Span & Concentration:  able to focus   Fund of Knowledge:  impaired     Assessment and " Diagnosis   Status/Progress: Based on the examination today, the patient's problem(s) is/are improved and adequately but not ideally controlled.  New problems have not been presented today.   Co-morbidities and Lack of compliance are complicating management of the primary condition.  There are no active rule-out diagnoses for this patient at this time.     General Impression:       ICD-10-CM ICD-9-CM   1. Major depressive disorder, recurrent, moderate  F33.1 296.32   2. Generalized anxiety disorder with panic attacks  F41.1 300.02    F41.0 300.01   3. Insomnia disorder, with non-sleep disorder mental comorbidity  G47.00 780.52       Intervention/Counseling/Treatment Plan   Medication Management: The risks and benefits of medication were discussed with the patient.  Lexapro 20 mg daily  Trazodone 150 mg before bed as needed for insomnia  Abilify 10 mg daily    Return to Clinic: 3 months    Risks, benefits, side effects and alternative treatments discussed with patient. Patient agrees with the current plan as documented.  Encouraged Patient to keep future appointments.  Take medications as prescribed and abstain from substance abuse.  Pt to present to ED for thoughts to harm herself or others

## 2025-02-06 ENCOUNTER — PROCEDURE VISIT (OUTPATIENT)
Dept: NEUROLOGY | Facility: CLINIC | Age: 55
End: 2025-02-06
Payer: MEDICARE

## 2025-02-06 VITALS
HEART RATE: 65 BPM | WEIGHT: 231 LBS | BODY MASS INDEX: 39.65 KG/M2 | SYSTOLIC BLOOD PRESSURE: 133 MMHG | DIASTOLIC BLOOD PRESSURE: 84 MMHG

## 2025-02-06 DIAGNOSIS — G43.709 CHRONIC MIGRAINE WITHOUT AURA WITHOUT STATUS MIGRAINOSUS, NOT INTRACTABLE: Primary | ICD-10-CM

## 2025-02-06 PROCEDURE — 64615 CHEMODENERV MUSC MIGRAINE: CPT | Mod: S$GLB,,, | Performed by: PHYSICIAN ASSISTANT

## 2025-02-06 PROCEDURE — 99214 OFFICE O/P EST MOD 30 MIN: CPT | Mod: 25,S$GLB,, | Performed by: PHYSICIAN ASSISTANT

## 2025-02-06 RX ORDER — UBROGEPANT 100 MG/1
TABLET ORAL
Qty: 16 TABLET | Refills: 5 | Status: SHIPPED | OUTPATIENT
Start: 2025-02-06

## 2025-02-06 NOTE — PROCEDURES
"Established Patient  Procedure Note    SUBJECTIVE:  Patient ID: Starla Fisher  Chief Complaint: Headache      History of Present Illness:  Starla Fisher is a 54 y.o. female with migraine, obesity s/p gastric bypass, depression, autism, osteoarthritis, atrial flutter, CARL, insomnia, anxiety, HTN, GERD  who presents to clinic alone for follow-up of headaches and Botox injections.       02/06/2025- Interval History: botox  Pt has had 2 UC visits 2/2 ha's, resolved w/ toradol injections. She tried ubrelvy 50mg, but feels this only dull Ha's. She does also endorse waiting to take her rescues. Feels imitrex ns, inj, tabs are no longer helpful. Discussed options.   Of note, she is also c/o other neuro symptoms including "shaking"/tremors, generalized weakness, dropping things out of her hands, dizziness/vertigo. Referred to neuro to eval further.  Plan: continue botox, increase ubrelvy 100mg prn,  referral to neuro, rtc in 12 wks for next botox     11/14/2024- Interval History: botox  Ha's continue to improve. However, pt would like a referral to neuro for other neuro complaints outside of ha's including weakness in legs and arm, jerking movements in these extremities, and imbalance.   Plan: continue botox, try ubrelvy 50mg prn, may continue imitrex inj, referral to neuro, rtc in 12 wks for next botox     08/15/2024- Interval History: botox  Patient has had >50% reduction in Ha's since beginning botox. Prior to botox pt's Ha's were occurring >15/30 days per month. Since beginning botox, they are occurring 3-4/30 days per month. As pt has experienced an improvement in Ha's, will continue botox as is clinically indicated.   Pt has not tried ubrelvy yet as she was concerned w/ potential cost, called pharmacy during visit, pt has 0$ copay, will fill and try. She has continued to maintain wt loss alongside w/ wt loss goals since surgery.  Plan: continue botox, try ubrelvy 50mg prn, may continue imitrex inj, rtc in 12 " wks for next botox     05/23/2024 - botox    02/28/2024 - botox    Recommendations made at last Office Visit on 02/06/2024:  - Discussed symptoms appear to be consistent with migraines, discussed treatment options and patient agreed with the following plan:  - ppx - continue botox  - abortive - try ubrelvy vs nurtec based on insurance approval, may continue imitrex inj 2nd line as she is known to tolerate   - nausea - continue phenergan  - obesity s/p gastric bypass - avoid po nsaids, mgmt per bariatric med and dietician   - atrial flutter on eliquis - avoid nsaids, caution w/ triptans, mgmt per cardiology  - kristen - continue compliant cpap use, mgmt per sleep med  - htn - stable,mgmt per cardiology  - risks, benefits, and potential side effects of botox, ubrelvy/nurtec, imitrex, phenergan discussed   - alternative treatment options offered   - importance of healthy diet, regular exercise and sleep hygiene in the treatment of headaches    - Start tracking headaches via Migraine Tab rogelio on phone   - RTC in 3 wks to continue botox     Treatments Tried:   Lexapro  Wellbutrin  Tpx  Emgality  Losartan  Diltiazem  Aimovig  Elavil  Botox - helps  Maxalt   Imitrex tab  Imitrex NS  Imitrex inj  Ubrelvy 50mg - dulls ha    Current Medications:    Current Outpatient Medications:     ammonium lactate (LAC-HYDRIN) 12 % lotion, Apply topically 2 (two) times daily. for 5 days, Disp: 225 g, Rfl: 0    ARIPiprazole (ABILIFY) 10 MG Tab, Take 1 tablet (10 mg total) by mouth once daily., Disp: 90 tablet, Rfl: 3    BOTOX 200 unit SolR, , Disp: , Rfl:     diltiaZEM (CARDIZEM CD) 240 MG 24 hr capsule, Take 1 capsule (240 mg total) by mouth once daily., Disp: 90 capsule, Rfl: 1    EScitalopram oxalate (LEXAPRO) 20 MG tablet, Take 1 tablet (20 mg total) by mouth once daily., Disp: 90 tablet, Rfl: 3    No-P-clivv-B12-L.acid,plan-inu 65 mg iron- 50 mg-1 mg DFE Cap, Take by mouth., Disp: , Rfl:     ferrous sulfate (FEOSOL) 325 mg (65 mg iron)  Tab tablet, Take 325 mg by mouth., Disp: , Rfl:     GEMTESA 75 mg Tab, Take 75 mg ( 1 tablet ) by mouth once daily., Disp: 30 tablet, Rfl: 11    hydrOXYzine pamoate (VISTARIL) 50 MG Cap, Take 1 capsule (50 mg total) by mouth every 6 (six) hours as needed (Anxiety)., Disp: 90 capsule, Rfl: 0    losartan (COZAAR) 50 MG tablet, Take 1 tablet (50 mg total) by mouth once daily., Disp: 90 tablet, Rfl: 3    meclizine (ANTIVERT) 25 mg tablet, Take 1 tablet (25 mg total) by mouth 3 (three) times daily as needed for Dizziness or Nausea., Disp: 30 tablet, Rfl: 2    multivit-minerals/folic acid (ADULT MULTIVITAMIN GUMMIES ORAL), Take 1 tablet by mouth Daily. Continue to hold until after Surgery., Disp: , Rfl:     multivitamin (THERAGRAN) per tablet, Take 1 tablet by mouth., Disp: , Rfl:     ondansetron (ZOFRAN-ODT) 8 MG TbDL, Take 1 tablet (8 mg total) by mouth every 6 (six) hours as needed (nausea after surgery)., Disp: 30 tablet, Rfl: 0    phenylephrine (TEODORA-SYNEPHRINE) 10 mg/mL injection, , Disp: , Rfl:     promethazine (PHENERGAN) 25 MG tablet, Take 1 tablet (25 mg total) by mouth every 6 (six) hours as needed for Nausea., Disp: 12 tablet, Rfl: 5    propofoL (DIPRIVAN) 10 mg/mL infusion, , Disp: , Rfl:     ROPIvacaine 0.5%, PF, (NAROPIN) 5 mg/mL (0.5 %) injection, , Disp: , Rfl:     traMADoL (ULTRAM) 50 mg tablet, Take 1 tablet (50 mg total) by mouth every 6 (six) hours., Disp: 5 tablet, Rfl: 0    traZODone (DESYREL) 150 MG tablet, Take half or whole tablet by mouth before bed as needed for insomnia, Disp: 90 tablet, Rfl: 3    triamcinolone acetonide 0.025% (KENALOG) 0.025 % Oint, Apply topically daily as needed (vulvar itching)., Disp: 15 g, Rfl: 0    TRUEPLUS LANCETS 33 gauge Misc, TEST BLOOD SUGAR THREE TIMES DAILY, Disp: 300 each, Rfl: 3    walker (ULTRA-LIGHT ROLLATOR) Misc, Rolator for stability and balance postoperatively as needed while standing and walking., Disp: 1 each, Rfl: 0    oxybutynin (DITROPAN-XL) 10 MG 24  hr tablet, Take 1 tablet (10 mg total) by mouth once daily., Disp: 30 tablet, Rfl: 11    ubrogepant (UBRELVY) 100 mg tablet, Take 1 tablet by mouth at the onset of a headache. May repeat based on response and tolerability after more than 2 hours if needed. Do not take more than 200mg in a 24 hour span., Disp: 16 tablet, Rfl: 5    Current Facility-Administered Medications:     sodium chloride 0.9% flush 10 mL, 10 mL, Intravenous, PRN, Zelalem Solorzano, DPM    sodium chloride 0.9% flush 10 mL, 10 mL, Intravenous, PRN, Zelalem Solorzano, DPM    Facility-Administered Medications Ordered in Other Visits:     0.9%  NaCl infusion, , Intravenous, Continuous, Sathish Briseno MD, Stopped at 02/15/22 0948    LIDOcaine (PF) 10 mg/ml (1%) injection 10 mg, 1 mL, Intradermal, Once, Sathish Briseno MD    Review of Systems - as per HPI, otherwise a balanced 10 systems review is negative.    OBJECTIVE:  Vitals:  /84 (BP Location: Left arm, Patient Position: Sitting)   Pulse 65   Wt 104.8 kg (231 lb)   LMP  (LMP Unknown)   BMI 39.65 kg/m²     Physical Exam:  Constitutional: she appears well-developed and well-nourished. she is well groomed. NAD   HENT:    Head: Normocephalic and atraumatic  Eyes: Conjunctivae and EOM are normal  Musculoskeletal: Normal range of motion. No joint stiffness.   Skin: Skin is warm and dry.  Psychiatric: Mood and affect are normal    Neuro: Patient is alert and oriented to person, place, and time. Language is intact and fluent.  Recent and remote memory are intact.  Normal attention and concentration.  Facial movement is symmetric.  Gait is normal.     Review of Data:   Notes from neuro reviewed.   Labs:  Admission on 12/31/2024, Discharged on 01/01/2025   Component Date Value Ref Range Status    WBC 12/31/2024 8.28  3.90 - 12.70 K/uL Final    RBC 12/31/2024 4.65  4.00 - 5.40 M/uL Final    Hemoglobin 12/31/2024 13.1  12.0 - 16.0 g/dL Final    Hematocrit 12/31/2024 40.6  37.0 - 48.5 %  Final    MCV 12/31/2024 87  82 - 98 fL Final    MCH 12/31/2024 28.2  27.0 - 31.0 pg Final    MCHC 12/31/2024 32.3  32.0 - 36.0 g/dL Final    RDW 12/31/2024 13.3  11.5 - 14.5 % Final    Platelets 12/31/2024 283  150 - 450 K/uL Final    MPV 12/31/2024 10.2  9.2 - 12.9 fL Final    Immature Granulocytes 12/31/2024 0.1  0.0 - 0.5 % Final    Gran # (ANC) 12/31/2024 4.2  1.8 - 7.7 K/uL Final    Immature Grans (Abs) 12/31/2024 0.01  0.00 - 0.04 K/uL Final    Lymph # 12/31/2024 3.2  1.0 - 4.8 K/uL Final    Mono # 12/31/2024 0.6  0.3 - 1.0 K/uL Final    Eos # 12/31/2024 0.3  0.0 - 0.5 K/uL Final    Baso # 12/31/2024 0.05  0.00 - 0.20 K/uL Final    nRBC 12/31/2024 0  0 /100 WBC Final    Gran % 12/31/2024 51.2  38.0 - 73.0 % Final    Lymph % 12/31/2024 38.0  18.0 - 48.0 % Final    Mono % 12/31/2024 6.8  4.0 - 15.0 % Final    Eosinophil % 12/31/2024 3.3  0.0 - 8.0 % Final    Basophil % 12/31/2024 0.6  0.0 - 1.9 % Final    Differential Method 12/31/2024 Automated   Final    Sodium 12/31/2024 143  136 - 145 mmol/L Final    Potassium 12/31/2024 3.8  3.5 - 5.1 mmol/L Final    Chloride 12/31/2024 109  95 - 110 mmol/L Final    CO2 12/31/2024 22 (L)  23 - 29 mmol/L Final    Glucose 12/31/2024 101  70 - 110 mg/dL Final    BUN 12/31/2024 7  6 - 20 mg/dL Final    Creatinine 12/31/2024 0.8  0.5 - 1.4 mg/dL Final    Calcium 12/31/2024 9.2  8.7 - 10.5 mg/dL Final    Total Protein 12/31/2024 7.4  6.0 - 8.4 g/dL Final    Albumin 12/31/2024 3.6  3.5 - 5.2 g/dL Final    Total Bilirubin 12/31/2024 0.3  0.1 - 1.0 mg/dL Final    Alkaline Phosphatase 12/31/2024 119  40 - 150 U/L Final    AST 12/31/2024 16  10 - 40 U/L Final    ALT 12/31/2024 18  10 - 44 U/L Final    eGFR 12/31/2024 >60  >60 mL/min/1.73 m^2 Final    Anion Gap 12/31/2024 12  8 - 16 mmol/L Final    Specimen UA 12/31/2024 Urine, Clean Catch   Final    Color, UA 12/31/2024 Yellow  Yellow, Straw, Desiree Final    Appearance, UA 12/31/2024 Hazy (A)  Clear Final    pH, UA 12/31/2024 6.0  5.0  - 8.0 Final    Specific Gravity, UA 12/31/2024 1.025  1.005 - 1.030 Final    Protein, UA 12/31/2024 Trace (A)  Negative Final    Glucose, UA 12/31/2024 Negative  Negative Final    Ketones, UA 12/31/2024 Negative  Negative Final    Bilirubin (UA) 12/31/2024 Negative  Negative Final    Occult Blood UA 12/31/2024 Negative  Negative Final    Nitrite, UA 12/31/2024 Negative  Negative Final    Urobilinogen, UA 12/31/2024 Negative  <2.0 EU/dL Final    Leukocytes, UA 12/31/2024 1+ (A)  Negative Final    Benzodiazepines 12/31/2024 Negative  Negative Final    Methadone metabolites 12/31/2024 Negative  Negative Final    Cocaine (Metab.) 12/31/2024 Negative  Negative Final    Opiate Scrn, Ur 12/31/2024 Negative  Negative Final    Barbiturate Screen, Ur 12/31/2024 Negative  Negative Final    Amphetamine Screen, Ur 12/31/2024 Negative  Negative Final    THC 12/31/2024 Negative  Negative Final    Phencyclidine 12/31/2024 Negative  Negative Final    Creatinine, Urine 12/31/2024 175.8  15.0 - 325.0 mg/dL Final    Toxicology Information 12/31/2024 SEE COMMENT   Final    Alcohol, Serum 12/31/2024 <10  <10 mg/dL Final    Acetaminophen (Tylenol), Serum 12/31/2024 <3.0 (L)  10.0 - 20.0 ug/mL Final    RBC, UA 12/31/2024 2  0 - 4 /hpf Final    WBC, UA 12/31/2024 14 (H)  0 - 5 /hpf Final    Bacteria 12/31/2024 Occasional  None-Occ /hpf Final    Squam Epithel, UA 12/31/2024 16  /hpf Final    Hyaline Casts, UA 12/31/2024 1  0-1/lpf /lpf Final    Unclass Harika UA 12/31/2024 4  None-Moderate Final    Microscopic Comment 12/31/2024 SEE COMMENT   Final    Urine Culture, Routine 12/31/2024 Multiple organisms isolated. None in predominance.  Repeat if   Final    Urine Culture, Routine 12/31/2024 clinically necessary.   Final     Imaging:  No results found. However, due to the size of the patient record, not all encounters were searched. Please check Results Review for a complete set of results.    Note: I have independently reviewed any/all  imaging/labs/tests and agree with the report (s) as documented.  Any discrepancies will be as noted/demarcated by free text.  AMPARO LARRY 2/6/2025    ASSESSMENT:  1. Chronic migraine without aura without status migrainosus, not intractable        PLAN:  - Discussed symptoms appear to be consistent with migraines, discussed treatment options and patient agreed with the following plan:  - ppx - continue botox  - Botox administered in clinic for Chronic Migraine (see below)   - abortive - try ubrelvy   - nausea - continue phenergan  - referral to neuro for other neuro complaints outside of ha's including weakness in legs and arm, jerking movements in these extremities, and imbalance  - obesity s/p gastric bypass - avoid po nsaids, mgmt per bariatric med and dietician   - atrial flutter on eliquis - avoid nsaids, caution w/ triptans, mgmt per cardiology  - kristen - continue compliant cpap use, mgmt per sleep med  - htn - stable,mgmt per cardiology  - importance of healthy diet, regular exercise and sleep hygiene in the treatment of headaches    - Start tracking headaches via Migraine Tab rogelio on phone   - RTC in 12 weeks for repeat Botox injections or sooner if needed     Orders Placed This Encounter    onabotulinumtoxina injection 200 Units    ubrogepant (UBRELVY) 100 mg tablet       All questions and concerns addressed.  Patient verbalizes understanding and is agreeable with the above stated treatment plan.      PROCEDURE NOTE:  BOTOX was performed as an indicated therapy for intractable chronic migraine headaches given that the patient failed more than 2 headache medications    Two patient identifiers were confirmed with the patient prior to performing this procedure. A time out to determine correct patient and and agreement on procedure performed was conducted prior to the procedure.      Botulinum Toxin Injection Procedure   Procedure: Botulinum toxin injection (07227)  After risks and benefits were explained including  bleeding, infection, worsening of pain, damage to the areas being injected, weakness of muscles, loss of muscle control, dysphagia if injecting the head or neck, facial droop if injecting the facial area, painful injection, allergic or other reaction to the medications being injected, and the failure of the procedure to help the problem, a signed consent was obtained.   The patient was placed in a comfortable area and the sites to be treated were identified.The area to be treated was prepped three times with alcohol and the alcohol allowed to dry. Next, a 30 gauge needle was used to inject the medication in the area to be treated.      Total Botox used: 155 Units   Botox wastage: 45 Units     Injection sites:    muscle bilaterally ( a total of 10 units divided into 2 sites)   Procerus muscle (5 units)   Frontalis muscle bilaterally (a total of 20 units divided into 4 sites)   Temporalis muscle bilaterally (a total of 40 units divided into 8 sites)   Occipitalis muscle bilaterally (a total of 30 units divided into 6 sites)   Cervical paraspinal muscles (a total of 20 units divided into 4 sites)   Trapezius muscle bilaterally (a total of 30 units divided into 6 sites)   Complications: none   RTC for the next Botox injection: 12 weeks     The patient tolerated the procedure well and did not experience any complications.     CC: Tahir Mosquera MD Sarena Patel, PA-C  [facility] Department of Neurology   [phone]    Dr. Grady was available during today's encounter.

## 2025-02-10 ENCOUNTER — PATIENT MESSAGE (OUTPATIENT)
Dept: BARIATRICS | Facility: CLINIC | Age: 55
End: 2025-02-10
Payer: MEDICARE

## 2025-02-11 ENCOUNTER — OFFICE VISIT (OUTPATIENT)
Dept: UROLOGY | Facility: CLINIC | Age: 55
End: 2025-02-11
Payer: MEDICARE

## 2025-02-11 VITALS
WEIGHT: 234.81 LBS | BODY MASS INDEX: 40.3 KG/M2 | DIASTOLIC BLOOD PRESSURE: 65 MMHG | SYSTOLIC BLOOD PRESSURE: 98 MMHG | HEART RATE: 61 BPM

## 2025-02-11 DIAGNOSIS — N32.81 OAB (OVERACTIVE BLADDER): Primary | ICD-10-CM

## 2025-02-11 PROCEDURE — 99999 PR PBB SHADOW E&M-EST. PATIENT-LVL III: CPT | Mod: PBBFAC,,, | Performed by: UROLOGY

## 2025-02-11 RX ORDER — VIBEGRON 75 MG/1
75 TABLET, FILM COATED ORAL DAILY
Qty: 90 TABLET | Refills: 4 | Status: SHIPPED | OUTPATIENT
Start: 2025-02-11

## 2025-02-11 NOTE — PROGRESS NOTES
[facility] Department of Urology      Return Overactive Bladder (OAB) Note    2/11/2025    Referred by:  No ref. provider found    History of Present Illness    CHIEF COMPLAINT:  Patient presents for follow-up evaluation of overactive bladder symptoms and medication refill.    HPI:  Patient is a 54-year-old established female returning for evaluation of overactive bladder symptoms. She had a sacral neuromodulation implant approximately 4 years ago, which initially provided significant relief. 1 year ago, she had 6 urgency incontinence episodes per day. Her neuromodulator program was changed to program 4 on extreme cycling setting, which initially improved symptoms but then progressed to 3-4 urgency incontinence episodes per day.    Detropan was added, providing some improvement but causing difficult voiding with a post-void residual of 3 mL. The medication was switched to Gemtesa due to these side effects. Patient reports Gemtesa has been helpful, but she has exhausted her supply. While taking Gemtesa, she had approximately 5 urinary leakage episodes per day, an improvement from her previous condition.    Patient notes symptoms have improved since bariatric surgery (gastric bypass) in January of last year. She reports that current leaks are less severe than before. Previously, the leaks were of such magnitude that she would need to change her bedsheets.    Patient's sacral neuromodulator is currently set to program 2 at 2.3 milliamps.    MEDICATIONS:  Patient is on Gemtesa (mirabegron) for overactive bladder. She reports that the medication is helping and symptoms have improved.    MEDICAL HISTORY:  Patient has a history of overactive bladder, diagnosed at least 4 years ago.    SURGICAL HISTORY:  Patient underwent sacral neuromodulation implant surgery approximately 4 years ago for overactive bladder. Initially, the outcome was successful, and she was doing well. In January 2024, she had bariatric surgery (gastric  bypass) for weight loss, which resulted in improved urinary symptoms.    TEST RESULTS:  A post-void residual test was conducted 1 year ago, showing a result of 3 mL. During the current visit, a sacral neuromodulation device check was performed. Program 3 at 2.4 milliamps was selected, and patient reports vaginal sensation.          A review of 10+ systems was conducted with pertinent positive and negative findings documented in HPI with all other systems reviewed and negative.    Past medical, family, surgical and social history reviewed as documented in chart with pertinent positive medical, family, surgical and social history detailed in HPI.    Exam Findings:    Physical Exam    General: No acute distress. Nontoxic appearing.  HENT: Normocephalic. Atraumatic.  Respiratory: Normal respiratory effort. No conversational dyspnea. No audible wheezing.  Abdomen: No obvious distension.  Skin: No visible abnormalities.  Extremities: No edema upper extremities. No edema lower extremities.  Neurological: Alert and oriented x3. Normal speech.  Psychiatric: Normal mood. Normal affect. No evidence of SI.          The patient underwent analysis of implanted neuromodulator pulse generator today performed by myself. Analyzed parameters included sensory threshold and sensory location on multiple electrode configurations, configuration selection, pulse width (210, an microsecond) and frequency (14Hz). Switched to Program 3 with vaginal sensation at 2.4 mA.       Assessment/Plan:    Assessment & Plan    IMPRESSION:  Evaluated effectiveness of sacral neuromodulation implant for overactive bladder  Reviewed patient's response to previous medication change from Detropan to Gemtesa  Assessed impact of recent bariatric surgery on urinary symptoms  Checked programming of sacral neuromodulation device  Changed stimulation program from program 2 at 2.3 milliamps to program 3 at 2.4 milliamps due to potential lead movement from weight  loss    PLAN SUMMARY:  - Continued Gemtesa with 90-day prescription and year's worth of refills  - Discussed potential lead movement in sacral neuromodulation device due to weight loss  - Follow-up in 1 year    OVERACTIVE BLADDER:  - Continued Gemtesa and provided 90-day prescription with a year's worth of refills.    PRESENCE OF FUNCTIONAL IMPLANT (SACRAL NEUROMODULATION DEVICE):  - Explained potential for lead movement in sacral neuromodulation device due to weight loss.    FOLLOW-UP:  - Follow up in 1 year.              Visit complexity today is associated with medical care services that are part of the ongoing care related to the single serious and/or complex condition of Overactive bladder (OAB). A longitudinal relationship exists or is being developed between the patient and this practitioner for the care of this condition.

## 2025-02-12 ENCOUNTER — TELEPHONE (OUTPATIENT)
Dept: FAMILY MEDICINE | Facility: CLINIC | Age: 55
End: 2025-02-12
Payer: MEDICARE

## 2025-02-12 ENCOUNTER — OFFICE VISIT (OUTPATIENT)
Dept: FAMILY MEDICINE | Facility: CLINIC | Age: 55
End: 2025-02-12
Payer: MEDICARE

## 2025-02-12 VITALS
BODY MASS INDEX: 39.75 KG/M2 | OXYGEN SATURATION: 96 % | HEIGHT: 64 IN | TEMPERATURE: 98 F | SYSTOLIC BLOOD PRESSURE: 110 MMHG | WEIGHT: 232.81 LBS | HEART RATE: 88 BPM | DIASTOLIC BLOOD PRESSURE: 60 MMHG

## 2025-02-12 DIAGNOSIS — I10 ESSENTIAL HYPERTENSION: ICD-10-CM

## 2025-02-12 DIAGNOSIS — E66.01 CLASS 2 SEVERE OBESITY DUE TO EXCESS CALORIES WITH SERIOUS COMORBIDITY AND BODY MASS INDEX (BMI) OF 39.0 TO 39.9 IN ADULT: ICD-10-CM

## 2025-02-12 DIAGNOSIS — F41.0 GENERALIZED ANXIETY DISORDER WITH PANIC ATTACKS: ICD-10-CM

## 2025-02-12 DIAGNOSIS — E66.812 CLASS 2 SEVERE OBESITY DUE TO EXCESS CALORIES WITH SERIOUS COMORBIDITY AND BODY MASS INDEX (BMI) OF 39.0 TO 39.9 IN ADULT: ICD-10-CM

## 2025-02-12 DIAGNOSIS — H81.13 BENIGN PAROXYSMAL POSITIONAL VERTIGO DUE TO BILATERAL VESTIBULAR DISORDER: ICD-10-CM

## 2025-02-12 DIAGNOSIS — G43.109 MIGRAINE WITH AURA AND WITHOUT STATUS MIGRAINOSUS, NOT INTRACTABLE: ICD-10-CM

## 2025-02-12 DIAGNOSIS — Z12.31 SCREENING MAMMOGRAM FOR BREAST CANCER: ICD-10-CM

## 2025-02-12 DIAGNOSIS — Z00.00 ANNUAL PHYSICAL EXAM: Primary | ICD-10-CM

## 2025-02-12 DIAGNOSIS — F41.1 GENERALIZED ANXIETY DISORDER WITH PANIC ATTACKS: ICD-10-CM

## 2025-02-12 DIAGNOSIS — R79.9 ABNORMAL BLOOD CHEMISTRY: ICD-10-CM

## 2025-02-12 DIAGNOSIS — I48.3 TYPICAL ATRIAL FLUTTER: ICD-10-CM

## 2025-02-12 DIAGNOSIS — F32.5 MAJOR DEPRESSIVE DISORDER WITH SINGLE EPISODE, IN FULL REMISSION: ICD-10-CM

## 2025-02-12 DIAGNOSIS — Z12.31 ENCOUNTER FOR SCREENING MAMMOGRAM FOR BREAST CANCER: Primary | ICD-10-CM

## 2025-02-12 PROCEDURE — 99396 PREV VISIT EST AGE 40-64: CPT | Mod: S$GLB,,, | Performed by: FAMILY MEDICINE

## 2025-02-12 PROCEDURE — 3074F SYST BP LT 130 MM HG: CPT | Mod: CPTII,S$GLB,, | Performed by: FAMILY MEDICINE

## 2025-02-12 PROCEDURE — 3078F DIAST BP <80 MM HG: CPT | Mod: CPTII,S$GLB,, | Performed by: FAMILY MEDICINE

## 2025-02-12 PROCEDURE — 99999 PR PBB SHADOW E&M-EST. PATIENT-LVL V: CPT | Mod: PBBFAC,,, | Performed by: FAMILY MEDICINE

## 2025-02-12 PROCEDURE — 1159F MED LIST DOCD IN RCRD: CPT | Mod: CPTII,S$GLB,, | Performed by: FAMILY MEDICINE

## 2025-02-12 PROCEDURE — 3008F BODY MASS INDEX DOCD: CPT | Mod: CPTII,S$GLB,, | Performed by: FAMILY MEDICINE

## 2025-02-12 RX ORDER — MECLIZINE HYDROCHLORIDE 25 MG/1
25 TABLET ORAL 3 TIMES DAILY PRN
Qty: 30 TABLET | Refills: 2 | Status: SHIPPED | OUTPATIENT
Start: 2025-02-12

## 2025-02-12 NOTE — PROGRESS NOTES
(Portions of this note were dictated using voice recognition software and may contain dictation related errors in spelling/grammar/syntax not found on text review)    CC:   Chief Complaint   Patient presents with    Annual Exam       HPI: 54 y.o. female presented for routine annual examination and labs.  She has medical history significant for anxiety and depression, GERD, hypertension, migraine headaches, status post gastric bypass surgery on 01/2024.    She takes losartan 50 mg for blood pressure, reports compliance with the medication and blood pressure has been controlled.      She follows up with Cardiology for atrial flutter, takes Cardizem 240 mg on daily basis, reports having vertigo and dizzy spells lately, has follow up appointment with Cardiology and Neurology in March, takes meclizine as needed, stated that it provides the relief    She follows up with Neurology for management of migraine headaches, she does Botox injection every 3 months, symptoms are stable.      She follows up with Psychiatry anxiety and depression, takes Abilify and trazodone, denies having any side effects.      She is due for annual labs.      She has does not smoke, has no toxic habits.      She has lost almost 80-90 lb after the weight loss surgery since last year, has been trying to lose more weight with lifestyle modification, happy about the transformation.      She denies having any other symptoms or concerns    Past Medical History:   Diagnosis Date    Anxiety     Depression     Generalized anxiety disorder with panic attacks 02/23/2022    GERD (gastroesophageal reflux disease)     Hypertension     Migraine headache     Sleep difficulties     Typical atrial flutter 04/20/2022       Past Surgical History:   Procedure Laterality Date    BREAST SURGERY      COLONOSCOPY N/A 12/14/2022    Procedure: COLONOSCOPY sutab;  Surgeon: Wei Walsh MD;  Location: [facility];  Service: Endoscopy;  Laterality: N/A;    ENDOMETRIAL  ABLATION      ENDOSCOPIC GASTROCNEMIUS RECESSION Right 8/19/2022    Procedure: RECESSION, GASTROCNEMIUS, ENDOSCOPIC;  Surgeon: Zelalem Solorzano DPM;  Location: [facility] OR;  Service: Podiatry;  Laterality: Right;  Arthrex centerline  Stella/Arthrex/CONFIRMED/WD,RN 8/18@1333    ENDOSCOPIC PLANTAR FASCIOTOMY Right 8/19/2022    Procedure: FASCIOTOMY, PLANTAR, ENDOSCOPIC;  Surgeon: Zelalem Solorzano DPM;  Location: [facility] OR;  Service: Podiatry;  Laterality: Right;  Arthrex set    ESOPHAGOGASTRODUODENOSCOPY N/A 12/14/2022    Procedure: EGD (ESOPHAGOGASTRODUODENOSCOPY);  Surgeon: Wei Walsh MD;  Location: [facility];  Service: Endoscopy;  Laterality: N/A;    EYE SURGERY      FOOT SURGERY Right 10/2018    bunion    IMPLANTATION OF PERMANENT SACRAL NERVE STIMULATOR N/A 2/15/2022    Procedure: INSERTION, NEUROSTIMULATOR, PERMANENT, SACRAL;  Surgeon: David Brown MD;  Location: [facility] OR [address] [place] [address];  Service: Urology;  Laterality: N/A;    TONSILLECTOMY      TOTAL REDUCTION MAMMOPLASTY Bilateral     XI ROBOTIC GASTROENTEROSTOMY, MIAN-EN-Y N/A 1/26/2024    Procedure: XI ROBOTIC GASTROENTEROSTOMY, MIAN-EN-Y w/intraop egd;  Surgeon: Judy Grossman MD;  [facility];  Service: General;  Laterality: N/A;       Family History   Problem Relation Name Age of Onset    Atrial fibrillation Mother      Thyroid disease Mother      Dementia Mother      Diabetes Father      Prostate cancer Father      Stomach cancer Sister      Migraines Sister      Colon cancer Paternal Aunt      Colon cancer Paternal Uncle      Breast cancer Maternal Grandmother      Colon cancer Paternal Grandmother      Cancer Paternal Grandfather         Social History     Tobacco Use    Smoking status: Never     Passive exposure: Never    Smokeless tobacco: Never   Substance Use Topics    Alcohol use: No    Drug use: No       Lab Results   Component Value Date    WBC 8.28 12/31/2024    HGB 13.1 12/31/2024    HCT 40.6 12/31/2024    MCV 87  12/31/2024     12/31/2024    CHOL 187 08/01/2024    TRIG 60 08/01/2024    HDL 57 08/01/2024    ALT 18 12/31/2024    AST 16 12/31/2024    BILITOT 0.3 12/31/2024    ALKPHOS 119 12/31/2024     12/31/2024    K 3.8 12/31/2024     12/31/2024    CREATININE 0.8 12/31/2024    ESTGFRAFRICA >60 04/20/2022    EGFRNONAA >60 04/20/2022    CALCIUM 9.2 12/31/2024    ALBUMIN 3.6 12/31/2024    BUN 7 12/31/2024    CO2 22 (L) 12/31/2024    TSH 2.044 07/05/2023    INR 1.0 06/08/2022    HGBA1C 5.6 01/08/2024    LDLCALC 118.0 08/01/2024     12/31/2024    [id number] 31 08/01/2024             Vital signs reviewed  PE:   APPEARANCE: Well nourished, well developed, in no acute distress.    HEAD: Normocephalic, atraumatic.  EYES: EOMI.  Conjunctivae noninjected.  NOSE: Mucosa pink. Airway clear.  NECK: Supple with no cervical lymphadenopathy.    CHEST: Good inspiratory effort. Lungs clear to auscultation with no wheezes or crackles.  CARDIOVASCULAR: Normal S1, S2. No rubs, murmurs, or gallops.  ABDOMEN: Bowel sounds normal. Not distended. Soft. No tenderness or masses. No organomegaly.  EXTREMITIES: No edema, cyanosis, or clubbing.    Review of Systems   Constitutional:  Negative for chills, fatigue and fever.   HENT: Negative.     Respiratory:  Negative for cough, shortness of breath and wheezing.    Cardiovascular:  Negative for chest pain, palpitations and leg swelling.   Gastrointestinal: Negative.    Genitourinary: Negative.    Neurological: Negative.    Psychiatric/Behavioral: Negative.     All other systems reviewed and are negative.      IMPRESSION  1. Annual physical exam    2. Migraine with aura and without status migrainosus, not intractable    3. Screening mammogram for breast cancer    4. Generalized anxiety disorder with panic attacks    5. Essential hypertension    6. Typical atrial flutter    7. Abnormal blood chemistry    8. Benign paroxysmal positional vertigo due to bilateral vestibular disorder     9. Major depressive disorder with single episode, in full remission    10. Class 2 severe obesity due to excess calories with serious comorbidity and body mass index (BMI) of 39.0 to 39.9 in adult            PLAN      1. Migraine with aura and without status migrainosus, not intractable    Stable     Continue current medications     Followed by Neurology      2. Annual physical exam (Primary)    - CBC Auto Differential; Future  - Comprehensive Metabolic Panel; Future  - TSH; Future  - Lipid Panel; Future  - Hemoglobin A1C; Future      3. Screening mammogram for breast cancer    - Mammo Digital Screening Bilat w/ Ryan (XPD); Future      4. Generalized anxiety disorder with panic attacks    - CBC Auto Differential; Future  - Comprehensive Metabolic Panel; Future  - TSH; Future  - Lipid Panel; Future  - Hemoglobin A1C; Future    Followed by psychiatry     Continue current medications       5. Essential hypertension    - CBC Auto Differential; Future  - Comprehensive Metabolic Panel; Future  - TSH; Future  - Lipid Panel; Future    Controlled     Continue current medications    6. Typical atrial flutter    - Comprehensive Metabolic Panel; Future  - TSH; Future  - Lipid Panel; Future  - Hemoglobin A1C; Future    Followed by cardiology       7. Abnormal blood chemistry    - Hemoglobin A1C; Future      8. Benign paroxysmal positional vertigo due to bilateral vestibular disorder    - meclizine (ANTIVERT) 25 mg tablet; Take 1 tablet (25 mg total) by mouth 3 (three) times daily as needed for Dizziness or Nausea.  Dispense: 30 tablet; Refill: 2      9. Major depressive disorder with single episode, in full remission    Followed by psychiatry     Continue current medications      10. Class 2 severe obesity due to excess calories with serious comorbidity and body mass index (BMI) of 39.0 to 39.9 in adult    Counseling provided on healthy lifestyle, encouraged to do moderate intensity regular exercise 30 minutes every day 5 days  a week, to include vegetables and fruits and cut back on saturated fats and carbohydrates.          SCREENINGS      Immunizations:     Up-to-date with Tdap     Up-to-date with COVID vaccines      Age/demographic appropriate health maintenance:    Up-to-date with pap smear    Mammogram ordered     Up-to-date with colonoscopy      Health Maintenance Due   Topic Date Due    Mammogram  01/05/2025           Spent adequate time in obtaining history and explaining differentials     35 minutes spent during this visit of which greater than 50% devoted to face-face counseling and coordination of care regarding diagnosis and management plan       Tahir Mosquera   2/12/2025

## 2025-02-13 ENCOUNTER — TELEPHONE (OUTPATIENT)
Dept: BARIATRICS | Facility: CLINIC | Age: 55
End: 2025-02-13
Payer: MEDICARE

## 2025-02-13 NOTE — TELEPHONE ENCOUNTER
Returned pt's call in regard to scheduling 1 yr post op appts with diet and JOS. Pt has been successfully scheduled and verbalized understanding of appt time and date. Understanding was verbalized and the call was disconnected.

## 2025-02-13 NOTE — TELEPHONE ENCOUNTER
----- Message from Blanka sent at 2/13/2025  2:51 PM CST -----  Regarding: appt  Contact: [phone]  ..Type:  Needs Medical Advice    Who Called: pt   Symptoms (please be specific): requesting annual type appt      Would the patient rather a call back or a response via [facility]? Call   Best Call Back Number: [phone]  Additional Information: n/a

## 2025-02-17 ENCOUNTER — HOSPITAL ENCOUNTER (OUTPATIENT)
Dept: RADIOLOGY | Facility: HOSPITAL | Age: 55
Discharge: HOME | End: 2025-02-17
Attending: FAMILY MEDICINE
Payer: MEDICARE

## 2025-02-17 DIAGNOSIS — Z12.31 SCREENING MAMMOGRAM FOR BREAST CANCER: ICD-10-CM

## 2025-02-17 PROCEDURE — 77067 SCR MAMMO BI INCL CAD: CPT | Mod: TC

## 2025-02-18 ENCOUNTER — RESULTS FOLLOW-UP (OUTPATIENT)
Dept: FAMILY MEDICINE | Facility: CLINIC | Age: 55
End: 2025-02-18

## 2025-02-20 ENCOUNTER — TELEPHONE (OUTPATIENT)
Dept: PODIATRY | Facility: CLINIC | Age: 55
End: 2025-02-20
Payer: MEDICARE

## 2025-02-20 DIAGNOSIS — H81.13 BENIGN PAROXYSMAL POSITIONAL VERTIGO DUE TO BILATERAL VESTIBULAR DISORDER: ICD-10-CM

## 2025-02-20 RX ORDER — MECLIZINE HYDROCHLORIDE 25 MG/1
25 TABLET ORAL 3 TIMES DAILY PRN
Qty: 30 TABLET | Refills: 2 | Status: SHIPPED | OUTPATIENT
Start: 2025-02-20

## 2025-02-20 NOTE — TELEPHONE ENCOUNTER
----- Message from Margaret sent at 2/20/2025  2:19 PM CST -----  Regarding: Call back  Contact: [phone]  Type:  RX Refill RequestWho Called: PT Refill or New Rx: Refills RX Name and Strength: meclizine (ANTIVERT) 25 mg tablet 90 tabletHow is the patient currently taking it? (ex. 1XDay): Take 1 tablet (25 mg total) by mouth 3 (three) times daily as needed for Dizziness or NauseaIs this a 30 day or 90 day RX: 90 Preferred Pharmacy with phone number: [facility]  Phone: [phone]: [phone] or Mail Order:

## 2025-02-20 NOTE — TELEPHONE ENCOUNTER
----- Message from Linda sent at 2/20/2025  8:14 AM CST -----  Type:  Sooner Appointment RequestCaller is requesting a sooner appointment.  Caller declined first available appointment listed below.  Caller will not accept being placed on the waitlist and is requesting a message be sent to doctor.Name of Caller:ptWhen is the first available appointment?04/24/25Symptoms:Foot ProblemsWould the patient rather a call back or a response via [facility]? callBest Call Back Number: [phone] Information:

## 2025-02-20 NOTE — TELEPHONE ENCOUNTER
Left voice message with Ms Fisher informing her that she has an appt with Dr Solorzano for 3/17/25 at 8 am and that at this time that is the best available appointment due to Dr Solorzano being booked the rest of February and being out of clinic on March 2 thru March 7. Let her know that if there are any cancellations we will reach out to her. Call back encouraged if needed

## 2025-02-24 ENCOUNTER — OFFICE VISIT (OUTPATIENT)
Dept: BARIATRICS | Facility: CLINIC | Age: 55
End: 2025-02-24
Payer: MEDICARE

## 2025-02-24 ENCOUNTER — CLINICAL SUPPORT (OUTPATIENT)
Dept: BARIATRICS | Facility: CLINIC | Age: 55
End: 2025-02-24
Payer: MEDICARE

## 2025-02-24 VITALS
HEART RATE: 81 BPM | WEIGHT: 234.13 LBS | TEMPERATURE: 99 F | OXYGEN SATURATION: 95 % | SYSTOLIC BLOOD PRESSURE: 131 MMHG | BODY MASS INDEX: 39.97 KG/M2 | HEIGHT: 64 IN | DIASTOLIC BLOOD PRESSURE: 68 MMHG

## 2025-02-24 DIAGNOSIS — Z98.84 S/P BARIATRIC SURGERY: ICD-10-CM

## 2025-02-24 DIAGNOSIS — Z71.3 DIETARY COUNSELING AND SURVEILLANCE: Primary | ICD-10-CM

## 2025-02-24 DIAGNOSIS — Z79.899 POLYPHARMACY: ICD-10-CM

## 2025-02-24 DIAGNOSIS — Z98.84 S/P GASTRIC BYPASS: ICD-10-CM

## 2025-02-24 DIAGNOSIS — Z00.00 ENCOUNTER FOR MEDICARE ANNUAL WELLNESS EXAM: ICD-10-CM

## 2025-02-24 DIAGNOSIS — E66.01 MORBID OBESITY WITH BMI OF 40.0-44.9, ADULT: ICD-10-CM

## 2025-02-24 DIAGNOSIS — Z79.899 LONG-TERM USE OF HIGH-RISK MEDICATION: ICD-10-CM

## 2025-02-24 DIAGNOSIS — R79.9 ABNORMAL FINDING OF BLOOD CHEMISTRY, UNSPECIFIED: ICD-10-CM

## 2025-02-24 DIAGNOSIS — I10 ESSENTIAL HYPERTENSION: ICD-10-CM

## 2025-02-24 DIAGNOSIS — G47.33 OSA (OBSTRUCTIVE SLEEP APNEA): ICD-10-CM

## 2025-02-24 PROCEDURE — 3078F DIAST BP <80 MM HG: CPT | Mod: CPTII,S$GLB,, | Performed by: NURSE PRACTITIONER

## 2025-02-24 PROCEDURE — 3075F SYST BP GE 130 - 139MM HG: CPT | Mod: CPTII,S$GLB,, | Performed by: NURSE PRACTITIONER

## 2025-02-24 PROCEDURE — 99213 OFFICE O/P EST LOW 20 MIN: CPT | Mod: S$GLB,,, | Performed by: NURSE PRACTITIONER

## 2025-02-24 PROCEDURE — 1160F RVW MEDS BY RX/DR IN RCRD: CPT | Mod: CPTII,S$GLB,, | Performed by: NURSE PRACTITIONER

## 2025-02-24 PROCEDURE — 99999 PR PBB SHADOW E&M-EST. PATIENT-LVL V: CPT | Mod: PBBFAC,,, | Performed by: NURSE PRACTITIONER

## 2025-02-24 PROCEDURE — 3008F BODY MASS INDEX DOCD: CPT | Mod: CPTII,S$GLB,, | Performed by: NURSE PRACTITIONER

## 2025-02-24 PROCEDURE — 3044F HG A1C LEVEL LT 7.0%: CPT | Mod: CPTII,S$GLB,, | Performed by: NURSE PRACTITIONER

## 2025-02-24 PROCEDURE — 1159F MED LIST DOCD IN RCRD: CPT | Mod: CPTII,S$GLB,, | Performed by: NURSE PRACTITIONER

## 2025-02-24 NOTE — PROGRESS NOTES
BARIATRIC POST-OPERATIVE VISIT:    HPI:  Starla Fisher is a 54 y.o. year old female presents for 12 months post op visit following LRNY.  she is doing well and tolerating the diet without difficulty.  she has no complaints.    Denies: nausea, vomiting, abdominal pain, changes in bowel movement pattern, fever, chills, dysphagia, chest pain, and shortness of breath.      Review of Systems   Constitutional:  Negative for activity change and fatigue.   Respiratory:  Negative for cough and shortness of breath.    Cardiovascular:  Negative for chest pain, palpitations and leg swelling.   Gastrointestinal:  Negative for abdominal pain, nausea and vomiting.   Endocrine: Negative for polydipsia, polyphagia and polyuria.   Genitourinary:  Negative for dysuria.   Musculoskeletal:  Negative for gait problem.   Skin:  Negative for rash.   Allergic/Immunologic: Negative for immunocompromised state.   Neurological:  Negative for dizziness, syncope and weakness.   Hematological:  Does not bruise/bleed easily.   Psychiatric/Behavioral:  Negative for behavioral problems.        EXERCISE & VITAMINS:  See Bariatric Assessment    MEDICATIONS/ALLERGIES:  Have been reviewed.    DIET: Regular Bariatric Diet.  1 protein shakes daily, ~90 grams protein.  64 fl oz SF clear beverage.      See Dietician note from today for a more detailed assessment.      Physical Exam  Vitals and nursing note reviewed.   Constitutional:       Appearance: She is well-developed. She is morbidly obese.   HENT:      Head: Normocephalic.      Nose: Nose normal.      Mouth/Throat:      Mouth: Mucous membranes are moist.   Eyes:      Extraocular Movements: Extraocular movements intact.   Cardiovascular:      Rate and Rhythm: Normal rate and regular rhythm.      Heart sounds: Normal heart sounds.   Pulmonary:      Effort: Pulmonary effort is normal.      Breath sounds: Normal breath sounds.   Abdominal:      General: Bowel sounds are normal.      Palpations:  Abdomen is soft.   Musculoskeletal:         General: Normal range of motion.      Cervical back: Normal range of motion.   Skin:     General: Skin is warm and dry.      Capillary Refill: Capillary refill takes less than 2 seconds.   Neurological:      Mental Status: She is alert and oriented to person, place, and time.   Psychiatric:         Mood and Affect: Mood normal.         ASSESSMENT:  - Morbid obesity s/p laparoscopic Harvinder-en-Y on 1/26/24.  - Co-morbidities: depression, hypertension, and obstructive sleep apnea  -  Weight loss 86#'s and 47% EWL  -  Exercise routine walking daily and day program  - Good Diet  - Good Vitamin regimen patches     PLAN:  - Miralax daily for constipation  - Emphasized the importance of regular exercise and adherence to bariatric diet to achieve maximum weight loss.  - Encouraged patient to start regular exercise.  - Follow-up with dietician to advance diet.  - Continue daily vitamins and medications.  - RTC in 6 months or sooner if needed.  - Call the office for any issues.  - Check labs today.

## 2025-02-24 NOTE — PATIENT INSTRUCTIONS
Meal Ideas for Regular Bariatric Diet  *Recipes and products available at [email or web address].[email or web address]      Breakfast: (15-20g protein)    - Egg white omelet: 2 egg whites or ½ cup Egg Beaters. (Optional proteins: cheese, shrimp, black beans, chicken, sliced turkey) (Optional veggies: tomatoes, salsa, spinach, mushrooms, onions, green peppers, or small slice avocado)     - Egg and sausage: 1 egg or ¼ cup Egg Beaters (any variety), with 1 maritza or 2 links of Turkey sausage or Veggie breakfast sausage (TotalTakeout or ZipMatch)    - Crust-less breakfast quiche: To make a glass pie dish, mix 4oz part skim Ricotta, 1 cup skim milk, and 2 eggs as your base. Add protein: shredded cheese, sliced lean ham or turkey, turkey myers/sausage. Add veggies: tomato, onion, green onion, mushroom, green pepper, spinach, etc.    - Yogurt parfait: Mix 1 - 6oz container Dannon Light N Fit vanilla yogurt, with ¼ cup crushed unsalted nuts    - Cottage cheese and fruit: ½ cup part-skim cottage cheese or ricotta cheese topped with fresh fruit or sugar free preserves     - Lenore Smith's Vanilla Egg custard* (add 2 Tbsp instant coffee granules to make Cappuccino Custard*)    - Hi-Protein café latte (skim milk, decaf coffee, 1 scoop protein powder). Optional to add Sugar free syrup or extract flavoring.    - Breakfast Lox: spread fat free cream cheese on slices of smoked salmon. Serve over scrambled or egg over easy (sauteed with nonstick cookspray) OR on a cucumber slice    - Eggwhich: Scramble or cook 1 large egg over easy using nonstick cookspray. Place between 2 slices of Thai myers and low fat cheese.     Lunch: (20-30g protein)    - ½ cup Black bean soup (Homemade or Progresso), with ¼ cup shredded low-fat cheese. Top with chopped tomato or fresh salsa.     - Lean deli turkey breast and low-fat sliced cheese, mustard or light jett to moisten, rolled up together, or wrapped in a Ramakrishna lettuce leaf    - Chicken salad made from dinner  leftovers, moisten with low-fat salad dressing or light jett. Also try leftover salmon, shrimp, tuna or boiled eggs. Serve ½ cup over dark green salad    - Fat-free canned refried beans, topped with ¼ cup shredded low-fat cheese. Top with chopped tomato or fresh salsa.     - Greek salad: Top mixed greens with 1-2oz grilled chicken, tomatoes, red onions, 2-3 kalamata olives, and sprinkle lightly with feta cheese. Spritz with Balsamic vinegar to taste.     - Crust-less lunch quiche: To make a glass pie dish, mix 4oz part skim Ricotta, 1 cup skim milk, and 2 eggs as your base. Add protein: shredded cheese, sliced lean ham or turkey, shrimp, chicken. Add veggies: tomato, onion, green onion, mushroom, green pepper, spinach, artichoke, broccoli, etc.    - Pizza bake: spread a  yogi felipe mushroom with tomato sauce, low-fat shredded mozzarella and turkey pepperoni or Clark myers. Add any veggies. Roast for 10-15 minutes, until cheese melted.     - Cucumber crab bites: Spread ¼ cup crab dip (lump crabmeat + light cream cheese and green onions) over sliced cucumber.     - Chicken with light spinach and artichoke dip*: Puree in : 6oz cooked and drained spinach, 2 cloves garlic, 1 can cannelloni beans, ½ cup chopped green onions, 1 can drained artichoke hearts (not marinated in oil), lemon juice and basil. Mix in 2oz chopped up chicken.    Supper: (20-30g protein)    - Serve grilled fish over dark green salad tossed with low-fat dressing, served with grilled asparagus coleman     - Rotisserie chicken salad: served with sliced strawberries, walnuts, fat-free feta cheese crumbles and 1 tbsp Simonss Own Light Raspberry [place] Vinaigrette    - Shrimp cocktail: Dip cold boiled shrimp in homemade low-sugar cocktail sauce (1/2 cup Umair One Carb ketchup, 2 tbsp horseradish, 1/4 tsp hot sauce, 1 tsp Worcestershire sauce, 1 tbsp freshly-squeezed lemon juice). Serve with dark green salad, walnuts, and crumbled blue  cheese drizzled with olive oil and Balsamic vinegar    - Tuna Melt: Spread tuna salad onto 2 thick slices of tomato. Top with low-fat cheese and broil until cheese is melted. May also be made with chicken salad of shrimp salad. Berwind with different types of cheeses.    - Chicken or beef fajitas (no tortilla, rice, beans, chips). Top meat and veggies w/ fresh salsa, fat free sour cream.     - Homemade low-fat Chili using extra lean ground beef or ground turkey. Top with shredded cheese and salsa as desired. May add dollop fat-free sour cream if desired    - Chicken parmesan: Top chicken breast w/ low sugar marinara sauce, mozzarella cheese and bake until chicken reaches 165*.  Serve w/ spaghetti SQUASH or Israeli cut green beans    - Dinner Omelet with shrimp or chicken and onion, green peppers and chives.    - No noodle lasagna: Use sliced zucchini or eggplant in place of noodles.  Layer with part skim ricotta cheese and low sugar meat sauce (use very lean ground beef or ground turkey).    - Mexican chicken bake: Bake chunks of chicken breast or thigh with taco seasoning, Pace brand enchilada sauce, green onions and low-fat cheese. Serve with ¼ cup black beans or fat free refried beans topped with chopped tomatoes or salsa.    - Krissy frozen meatballs, simmered in Classico Marinara sauce. Different flavors of salsa or spaghetti sauce create different dishes! Sprinkle with parmesan cheese. Serve with grilled or steamed veggies, or a dark green salad.    - Simmer boneless skinless chicken thigh chunks in Classico Marinara sauce or roasted salsa until tender with chopped onion, bell pepper, garlic, mushrooms, spinach, etc.     - Hamburger or veggie burger, without the bun, dressed the way you like. Served with grilled or steamed veggies.    - Eggplant parmesan: Bake slices of eggplant at 350 degrees for 15 minutes. Layer tomato sauce, sliced eggplant and low-fat mozzarella cheese in a baking dish and cover with  foil. Bake 30-40 more minutes or until bubbly. Uncover and bake at 400 degrees for about 15 more minutes, or until top is slightly crisp.    - Fish tacos: grilled/baked white fish, wrapped in Ramakrishna lettuce leaf, topped with salsa, shredded low-fat cheese, and light coleslaw.    - Chicken savanah: Sprinkle chicken w/ 1 tsp of hidden valley ranch dip mix. Then grill chicken and top with black beans, salsa and 1 tsp fat free sour cream.     - Cauliflower pizza crust: Use cauliflower as crust (see recipe on pinterest, no flour!). Top w/ low fat cheese, turkey pepperoni and veggies and bake again    - chicken or turkey crust pizza: use ground chicken or turkey instead of cauliflower, spread in [place] and bake at 350 for about 20-30 minutes(may want to add garlic, black pepper, oregano and other herbs to ground meat mixture).  Remove and top w/ low fat cheese, turkey pepperoni and veggies and bake again for another 10 minutes or until cheese is browned.     Snacks: (100-200 calories; >5g protein)    - 1 low-fat cheese stick with 8 cherry tomatoes or 1 serving fresh fruit  - 4 thin slices fat-free turkey breast and 1 slice low-fat cheese  - 4 thin slices fat-free honey ham with wedge of melon  - 6-8 edamame pods (equivalent to about 1/4 cup edamame without pods).   - 1/4 cup unsalted nuts with ½ cup fruit  - 6-oz container Dannon Light n Fit vanilla yogurt, topped with 1oz unsalted nuts         - apple, celery or baby carrots spread with 2 Tbsp PB2  - apple slices with 1 oz slice low-fat cheese  - Apple slices dipped in 2 Tbsp of PB2  - celery, cucumber, bell pepper or baby carrots dipped in ¼ cup hummus bean spread or light spinach and artichoke dip (*recipe in lunch section)  - celery, cucumber, baby carrots dipped in high protein greek yogurt (Mix 16 oz plain greek yogurt + 1 packet of hidden valley ranch dip mix)  - Omar Links Beef Steak - 14g protein! (similar to beef jerky)  - 2 wedges Laughing Cow - Light Herb  & Garlic Cheese with sliced cucumber or green bell pepper  - 1/2 cup low-fat cottage cheese with ¼ cup fruit or ¼ cup salsa  - RTD Protein drinks: Atkins, Low Carb Slim Fast, EAS light, Muscle Milk Light, etc.  - Homemade Protein drinks: GNC Soy95, Isopure, Nectar, UNJURY, Whey Gourmet, etc. Mix 1 scoop powder with 8oz skim/1% milk or light soymilk.  - Protein bars: Atkins, EAS, Pure Protein, Think Thin, Detour, etc. Must have 0-4 grams sugar - Read the label.    Takeout Options: No more than twice/week  Deli - Salads (no pasta or rice), meats, cheeses. Roasted chicken. Lox (salmon)    Mexican - Platters which don't include tortillas, chips, or rice. Go easy on the beans. Example: Fajitas without the tortillas. Ask the  not to bring chips to the table if they are too tempting.    Greek - Meat or fish and vegetable, but no bread or rice. Including hummus, baba ganoush, etc, is OK. Most sit-down Greek restaurants can provide you with cucumber slices for dipping instead of constantino bread.    Fast Food (Avoid as much as possible) - Salads (no croutons and limit salad dressing to 2 tbsp), grilled chicken sandwich without the bun and ask for no jett. Margarets low fat chili or Taco Bell pintos and cheese.    BBQ - The meats are fine if you ask for sauces on the side, but most of the traditional side dishes are loaded with carbs. Aaron slaw, baked beans and BBQ sauce are typically made with sugar.    Chinese - Nothing deep-fried, no rice or noodles. Many Chinese sauces have starch and sugar in them, so you'll have to use your judgement. If you find that these sauces trigger cravings, or cause Dumping, you can ask for the sauce to be made without sugar or just use soy sauce.

## 2025-02-24 NOTE — PROGRESS NOTES
NUTRITION NOTE    Referring Physician: Judy Grossman M.D.  Reason for MNT Referral: Follow-up 1 years s/p laparoscopic Harvinder-en-Y gastric bypass    PAST MEDICAL HISTORY:    Denies nausea, vomiting, constipation, and diarrhea.  Reports doing well.    Past Medical History:   Diagnosis Date    Anxiety     Depression     Generalized anxiety disorder with panic attacks 02/23/2022    GERD (gastroesophageal reflux disease)     Hypertension     Migraine headache     Sleep difficulties     Typical atrial flutter 04/20/2022       CLINICAL DATA:  54 y.o.-year-old White female.    Current Weight: 234 lbs  BMI: 40.19  Total Weight Loss: 86 lbs  Excess Weight Loss: 47 %    PT states she would like to lose another 15-25 lbs    CURRENT DIET:  Bariatric Diet.    BARIATRIC DIET DISCUSSION:  Reminded PT that high fat and high sugar foods could lead to dumping syndrome. Advised PT to remove skin from chicken  Suggested PT write down what she eats and record if she has diarrhea after eating  Asked PT to send pictures of vitamins to better advise on amount and frequency      PLAN / RECOMMENDATIONS:  Adjust vitamins & minerals by discontinuing patch, increasing calcium    Diet Recall:  grams of protein/day; 35-51 oz of fluids/day    Premier Protein with coffee  B: Oatmeal or scrambled egg with cottage cheese  L: Protein shake or red beans and rice, or baked fish with spinach  S: Halo Top or fruit or protein shake  D: Baked salmon and mixed vegetables or peas or corn    Meal Pattern: 3 meal(s) + 1 snack(s) + 1-2 protein supplement(s)  Adequate protein supplement intake. Premier Protein  Adequate dairy intake. Greek yogurt  Adequate vegetable intake. Salad, mixed vegetables  Adequate fruit intake. Watermelon, strawberries, blueberries  Starchy CHO: oatmeal, corn, peas, rice, bread  Other: Halo Top  Beverages: Water, Gatorade Zero, Crystal Light     LABS:  Reviewed.  Low Vit D    VITAMINS / MINERALS:  Flintstones multivitamin, 1 per  day  Patch MD Multivitamin  Calcium Citrate + Vitamin D 1 per day  B-1 daily  B-12 pill daily    EXERCISE:  Walking    Restrictions to Exercise:  Uses cane    ASSESSMENT:  Doing fairly well overall.  Weight loss.  Adequate calorie intake.  Adequate protein intake.  Inadequate fluid intake.  Following diet inappropriately.  Exercising.as tolerated  Adequate vitamins & minerals.    BARIATRIC DIET DISCUSSION:  Instructed and provided written materials on bariatric diet plan.  Reinforced post-op nutrition guidelines.  Discussed switching to light Greek yogurt  Discussed protein cereals - look for cereals with more protein than sugar  Recommended switching to B-12 sublingual 500 mcg daily, showed example, PT took picture  Recommended switching to calcium chew, 1300 mg calcium carbonate, showed example, PT took picture  Suggested PT take Vit D daily, 5000 IU, showed example, PT took picture    PLAN / RECOMMENDATIONS:  Adjust diet by cutting out starchy CHO.  Maintain protein intake.  Increase fluid intake.  Continue exercise.  Continue appropriate vitamins & minerals.  Adjust vitamins & minerals as noted in discussion.    Return to clinic in 1 years.    SESSION TIME: 30 minutes

## 2025-03-08 NOTE — PROGRESS NOTES
[facility] Cardiology [address]     SUBJECTIVE:     History of Present Illness:  Patient is a 54 y.o. female presents with followup after atrial flutter cardioversion. Had bariatric surgery without events; 335 to  263 lbs -235 lbs   Primary Diagnosis:  1. Morbid obesity  2. Hypertension  3. Atrial flutter with rapid ventricular rate - s/p ablation  6/29/22  4. CARL with CPAP  ROS  Since last visit 4/24:  No chest pains  No shortness of breath; no PND or orthopnea  Blood pressures at home: 100 cassidy range  Activity: walks; does exercises with group therapy; using walker due to balance issue; feels lightheaded ;    5. No syncope  Past Hospitalization    Review of patient's allergies indicates:  No Known Allergies    Past Medical History:   Diagnosis Date    Anxiety     Depression     Generalized anxiety disorder with panic attacks 02/23/2022    GERD (gastroesophageal reflux disease)     Hypertension     Migraine headache     Sleep difficulties     Typical atrial flutter 04/20/2022       Past Surgical History:   Procedure Laterality Date    BREAST SURGERY      COLONOSCOPY N/A 12/14/2022    Procedure: COLONOSCOPY sutab;  Surgeon: Wei Walsh MD;  Location: [facility] ENDO;  Service: Endoscopy;  Laterality: N/A;    ENDOMETRIAL ABLATION      ENDOSCOPIC GASTROCNEMIUS RECESSION Right 8/19/2022    Procedure: RECESSION, GASTROCNEMIUS, ENDOSCOPIC;  Surgeon: Zelalem Solorzano DPM;  Location: [facility] OR;  Service: Podiatry;  Laterality: Right;  [facility]  Stella/[facility]/CHRISTOPHE/REJI,RN 8/18@3423    ENDOSCOPIC PLANTAR FASCIOTOMY Right 8/19/2022    Procedure: FASCIOTOMY, PLANTAR, ENDOSCOPIC;  Surgeon: Zelalem Solorzano DPM;  Location: [facility] OR;  Service: Podiatry;  Laterality: Right;  [facility] set    ESOPHAGOGASTRODUODENOSCOPY N/A 12/14/2022    Procedure: EGD (ESOPHAGOGASTRODUODENOSCOPY);  Surgeon: Wei Walsh MD;  Location: [facility] ENDO;  Service: Endoscopy;  Laterality: N/A;    EYE SURGERY      FOOT SURGERY Right 10/2018    [place]     IMPLANTATION OF PERMANENT SACRAL NERVE STIMULATOR N/A 2/15/2022    Procedure: INSERTION, NEUROSTIMULATOR, PERMANENT, SACRAL;  Surgeon: David Brown MD;  Location: [facility] OR [address] [place] [address];  Service: Urology;  Laterality: N/A;    TONSILLECTOMY      TOTAL REDUCTION MAMMOPLASTY Bilateral     XI ROBOTIC GASTROENTEROSTOMY, MIAN-EN-Y N/A 1/26/2024    Procedure: XI ROBOTIC GASTROENTEROSTOMY, MIAN-EN-Y w/intraop egd;  Surgeon: Judy Grossman MD;  Location: [facility] OR [address] [place] [address];  Service: General;  Laterality: N/A;       Family History   Problem Relation Name Age of Onset    Atrial fibrillation Mother      Thyroid disease Mother      Dementia Mother      Diabetes Father      Prostate cancer Father      Stomach cancer Sister      Migraines Sister      Colon cancer Paternal Aunt      Colon cancer Paternal Uncle      Breast cancer Maternal Grandmother      Colon cancer Paternal Grandmother      Cancer Paternal Grandfather         Social History     Tobacco Use    Smoking status: Never     Passive exposure: Never    Smokeless tobacco: Never   Substance Use Topics    Alcohol use: No    Drug use: No        Home meds:  Current Outpatient Medications on File Prior to Visit   Medication Sig Dispense Refill    ARIPiprazole (ABILIFY) 10 MG Tab Take 1 tablet (10 mg total) by mouth once daily. 90 tablet 3    BOTOX 200 unit SolR       diltiaZEM (CARDIZEM CD) 240 MG 24 hr capsule Take 1 capsule (240 mg total) by mouth once daily. 90 capsule 1    EScitalopram oxalate (LEXAPRO) 20 MG tablet Take 1 tablet (20 mg total) by mouth once daily. 90 tablet 3    Kw-D-ykxje-B12-L.acid,plan-inu 65 mg iron- 50 mg-1 mg DFE Cap Take by mouth.      ferrous sulfate (FEOSOL) 325 mg (65 mg iron) Tab tablet Take 325 mg by mouth.      GEMTESA 75 mg Tab Take 1 tablet (75 mg total) by mouth once daily. 90 tablet 4    losartan (COZAAR) 50 MG tablet Take 1 tablet (50 mg total) by mouth once daily. 90 tablet 3    meclizine (ANTIVERT) 25 mg tablet Take 1 tablet (25 mg  total) by mouth 3 (three) times daily as needed for Dizziness or Nausea. 30 tablet 2    multivit-minerals/folic acid (ADULT MULTIVITAMIN GUMMIES ORAL) Take 1 tablet by mouth Daily. Continue to hold until after Surgery.      multivitamin (THERAGRAN) per tablet Take 1 tablet by mouth.      ondansetron (ZOFRAN-ODT) 8 MG TbDL Take 1 tablet (8 mg total) by mouth every 6 (six) hours as needed (nausea after surgery). 30 tablet 0    oxybutynin (DITROPAN-XL) 10 MG 24 hr tablet Take 1 tablet (10 mg total) by mouth once daily. 30 tablet 11    phenylephrine (TEODORA-SYNEPHRINE) 10 mg/mL injection       promethazine (PHENERGAN) 25 MG tablet Take 1 tablet (25 mg total) by mouth every 6 (six) hours as needed for Nausea. 12 tablet 5    propofoL (DIPRIVAN) 10 mg/mL infusion       ROPIvacaine 0.5%, PF, (NAROPIN) 5 mg/mL (0.5 %) injection       traMADoL (ULTRAM) 50 mg tablet Take 1 tablet (50 mg total) by mouth every 6 (six) hours. 5 tablet 0    traZODone (DESYREL) 150 MG tablet Take half or whole tablet by mouth before bed as needed for insomnia 90 tablet 3    triamcinolone acetonide 0.025% (KENALOG) 0.025 % Oint Apply topically daily as needed (vulvar itching). 15 g 0    TRUEPLUS LANCETS 33 gauge Misc TEST BLOOD SUGAR THREE TIMES DAILY 300 each 3    ubrogepant (UBRELVY) 100 mg tablet Take 1 tablet by mouth at the onset of a headache. May repeat based on response and tolerability after more than 2 hours if needed. Do not take more than 200mg in a 24 hour span. 16 tablet 5    walker (ULTRA-LIGHT ROLLATOR) Misc Rolator for stability and balance postoperatively as needed while standing and walking. 1 each 0     Current Facility-Administered Medications on File Prior to Visit   Medication Dose Route Frequency Provider Last Rate Last Admin    0.9%  NaCl infusion   Intravenous Continuous Sathish Briseno MD   Stopped at 02/15/22 0948    LIDOcaine (PF) 10 mg/ml (1%) injection 10 mg  1 mL Intradermal Once Sathish Briseno MD        sodium  "chloride 0.9% flush 10 mL  10 mL Intravenous PRN Zelalem Solorzano DPM        sodium chloride 0.9% flush 10 mL  10 mL Intravenous PRN Zelalem Solorzano DPM           Cardiac meds:   ASA 81 mg   Diltiazem 240 mg daily  Losartan 50 mg daily - change to 25 mg           OBJECTIVE:     Vital Signs (Most Recent)  Vitals:    25 1422   BP: 105/69   Pulse: 65   SpO2: 96%   Weight: 106.9 kg (235 lb 12.5 oz)   Height: 5' 4" (1.626 m)             Weight up from 315-326 - 279 -318 - 268 -235     Physical Exam:  Neck: normal carotids, no bruits; normal JVP  Lungs :clear  Heart: fast heart rate , normal S1,S2, no murmurs, no gallops  Abd: no masses; no bruits;   Exts: normal DP and PT pulses bilaterally, no edema noted           LABS  : A1c normal; lipids OK ; CBC normal; CMP normal     : ;A1c 5.6; LFT's normal     Diagnostic Results:    1.EK/22: atrial flutter to sinus after cardioversion   1b. EK22: atrial flutter with better ventricular rate and variable AV block  1c. EK22: NSR:    1d. EK/22: NSR   2. Echo: 22: normal EF   3. Stress echo: : normal EF; negative for ischemia     ASSESSMENT/PLAN:     1. Recurrence of atrial flutter now in sinus post ablation and remaining in sinus   2. No symptoms of heart failure or angina - recent negative stress test with normal EF   3. Hypertension: controlled per patient   4. Shortness of breath: better and improved    5. Weakness: is this from lower blood pressures since she has lost over `100 lbs?   Plan: 1.change losartan 25 mg ; monitor blood pressures: if after 2 weeks, blood pressures are still good and less 120, stop the losartan completely   2. Continue diltiazem  3. Return 1 month  Alexis Ingram MD                "

## 2025-03-10 ENCOUNTER — TELEPHONE (OUTPATIENT)
Dept: BARIATRICS | Facility: CLINIC | Age: 55
End: 2025-03-10
Payer: MEDICARE

## 2025-03-10 ENCOUNTER — PATIENT MESSAGE (OUTPATIENT)
Dept: BARIATRICS | Facility: CLINIC | Age: 55
End: 2025-03-10
Payer: MEDICARE

## 2025-03-10 NOTE — TELEPHONE ENCOUNTER
----- Message from Salima Mercedes NP sent at 3/10/2025  9:38 AM CDT -----  Regarding: FW: Scheduling Request  Contact: [phone]  This patient can follow up with PCP, she is one year out and I just saw her a few weeks ago. ThanksAmanda  ----- Message -----  From: Franklyn Ortiz  Sent: 3/10/2025   9:37 AM CDT  To: Daren Borrego Staff  Subject: Scheduling Request                               Scheduling RequestAppt Type:  EpDate/Time Preference: Next AvailableCaller Name:PtContact Preference: [phone] Comment: pt has been dizzy and off balanced since her surgeryPlease Call to Advise,Thank you.

## 2025-03-10 NOTE — TELEPHONE ENCOUNTER
Called and spoke with the pt.  She stated her sister was concerned that she had a vitamin deficiency.  Reviewed her Bariatric labs completed on 2/17/2025.  B1, B12 and iron levels normal, Vit D was low. Reviewed with the pt the vitamins that she was taking. She is not currently taking any B1.  Advised her to start 50mg B1 (Thiamine) daily.  Will follow up with a portal message with a picture of options.    Informed her that Salima Mercedes NP advised for the pt to have follow up with her PCP.  Verified her PCP and will message her office to please reach out to the pt.

## 2025-03-10 NOTE — TELEPHONE ENCOUNTER
----- Message from Franklyn sent at 3/10/2025  9:35 AM CDT -----  Regarding: Scheduling Request  Contact: [phone]  Scheduling RequestAppt Type:  EpDate/Time Preference: Next AvailableCaller Name:PtContact Preference: [phone] Comment: pt has been dizzy and off balanced since her surgeryPlease Call to Advise,Thank you.

## 2025-03-11 ENCOUNTER — TELEPHONE (OUTPATIENT)
Dept: CARDIOLOGY | Facility: CLINIC | Age: 55
End: 2025-03-11
Payer: MEDICARE

## 2025-03-12 ENCOUNTER — OFFICE VISIT (OUTPATIENT)
Dept: FAMILY MEDICINE | Facility: CLINIC | Age: 55
End: 2025-03-12
Payer: MEDICARE

## 2025-03-12 ENCOUNTER — OFFICE VISIT (OUTPATIENT)
Dept: CARDIOLOGY | Facility: CLINIC | Age: 55
End: 2025-03-12
Payer: MEDICARE

## 2025-03-12 ENCOUNTER — LAB VISIT (OUTPATIENT)
Dept: LAB | Facility: HOSPITAL | Age: 55
End: 2025-03-12
Attending: FAMILY MEDICINE
Payer: MEDICARE

## 2025-03-12 ENCOUNTER — RESULTS FOLLOW-UP (OUTPATIENT)
Dept: FAMILY MEDICINE | Facility: CLINIC | Age: 55
End: 2025-03-12

## 2025-03-12 VITALS
SYSTOLIC BLOOD PRESSURE: 124 MMHG | HEIGHT: 64 IN | DIASTOLIC BLOOD PRESSURE: 64 MMHG | WEIGHT: 236.31 LBS | OXYGEN SATURATION: 98 % | TEMPERATURE: 98 F | BODY MASS INDEX: 40.34 KG/M2 | HEART RATE: 77 BPM

## 2025-03-12 VITALS
HEIGHT: 64 IN | SYSTOLIC BLOOD PRESSURE: 105 MMHG | BODY MASS INDEX: 40.26 KG/M2 | HEART RATE: 65 BPM | DIASTOLIC BLOOD PRESSURE: 69 MMHG | OXYGEN SATURATION: 96 % | WEIGHT: 235.81 LBS

## 2025-03-12 DIAGNOSIS — I10 ESSENTIAL HYPERTENSION: Primary | ICD-10-CM

## 2025-03-12 DIAGNOSIS — R42 DIZZINESS: Primary | ICD-10-CM

## 2025-03-12 DIAGNOSIS — E66.01 MORBID OBESITY WITH BMI OF 50.0-59.9, ADULT: ICD-10-CM

## 2025-03-12 DIAGNOSIS — G47.33 OSA (OBSTRUCTIVE SLEEP APNEA): ICD-10-CM

## 2025-03-12 DIAGNOSIS — I10 ESSENTIAL HYPERTENSION: ICD-10-CM

## 2025-03-12 DIAGNOSIS — I48.3 TYPICAL ATRIAL FLUTTER: ICD-10-CM

## 2025-03-12 DIAGNOSIS — R42 DIZZINESS: ICD-10-CM

## 2025-03-12 DIAGNOSIS — R42 VERTIGO: ICD-10-CM

## 2025-03-12 DIAGNOSIS — E55.9 VITAMIN D DEFICIENCY: ICD-10-CM

## 2025-03-12 DIAGNOSIS — E53.8 VITAMIN B12 DEFICIENCY: ICD-10-CM

## 2025-03-12 LAB
ALBUMIN SERPL BCP-MCNC: 3.9 G/DL (ref 3.5–5.2)
ALP SERPL-CCNC: 107 U/L (ref 40–150)
ALT SERPL W/O P-5'-P-CCNC: 26 U/L (ref 10–44)
ANION GAP SERPL CALC-SCNC: 10 MMOL/L (ref 8–16)
AST SERPL-CCNC: 26 U/L (ref 10–40)
BASOPHILS # BLD AUTO: 0.06 K/UL (ref 0–0.2)
BASOPHILS NFR BLD: 0.7 % (ref 0–1.9)
BILIRUB SERPL-MCNC: 0.4 MG/DL (ref 0.1–1)
BUN SERPL-MCNC: 16 MG/DL (ref 6–20)
CALCIUM SERPL-MCNC: 9.1 MG/DL (ref 8.7–10.5)
CHLORIDE SERPL-SCNC: 105 MMOL/L (ref 95–110)
CO2 SERPL-SCNC: 23 MMOL/L (ref 23–29)
CREAT SERPL-MCNC: 0.7 MG/DL (ref 0.5–1.4)
DIFFERENTIAL METHOD BLD: ABNORMAL
EOSINOPHIL # BLD AUTO: 0.5 K/UL (ref 0–0.5)
EOSINOPHIL NFR BLD: 5.9 % (ref 0–8)
ERYTHROCYTE [DISTWIDTH] IN BLOOD BY AUTOMATED COUNT: 13.2 % (ref 11.5–14.5)
EST. GFR  (NO RACE VARIABLE): >60 ML/MIN/1.73 M^2
GLUCOSE SERPL-MCNC: 82 MG/DL (ref 70–110)
HCT VFR BLD AUTO: 41.6 % (ref 37–48.5)
HGB BLD-MCNC: 13.1 G/DL (ref 12–16)
IMM GRANULOCYTES # BLD AUTO: 0.02 K/UL (ref 0–0.04)
IMM GRANULOCYTES NFR BLD AUTO: 0.2 % (ref 0–0.5)
LYMPHOCYTES # BLD AUTO: 2.8 K/UL (ref 1–4.8)
LYMPHOCYTES NFR BLD: 31.4 % (ref 18–48)
MCH RBC QN AUTO: 28.2 PG (ref 27–31)
MCHC RBC AUTO-ENTMCNC: 31.5 G/DL (ref 32–36)
MCV RBC AUTO: 90 FL (ref 82–98)
MONOCYTES # BLD AUTO: 0.6 K/UL (ref 0.3–1)
MONOCYTES NFR BLD: 6.8 % (ref 4–15)
NEUTROPHILS # BLD AUTO: 4.9 K/UL (ref 1.8–7.7)
NEUTROPHILS NFR BLD: 55 % (ref 38–73)
NRBC BLD-RTO: 0 /100 WBC
PLATELET # BLD AUTO: 298 K/UL (ref 150–450)
PMV BLD AUTO: 10.4 FL (ref 9.2–12.9)
POTASSIUM SERPL-SCNC: 4.4 MMOL/L (ref 3.5–5.1)
PROT SERPL-MCNC: 7.8 G/DL (ref 6–8.4)
RBC # BLD AUTO: 4.65 M/UL (ref 4–5.4)
SODIUM SERPL-SCNC: 138 MMOL/L (ref 136–145)
WBC # BLD AUTO: 8.88 K/UL (ref 3.9–12.7)

## 2025-03-12 PROCEDURE — 1160F RVW MEDS BY RX/DR IN RCRD: CPT | Mod: HCNC,CPTII,S$GLB, | Performed by: INTERNAL MEDICINE

## 2025-03-12 PROCEDURE — 99213 OFFICE O/P EST LOW 20 MIN: CPT | Mod: HCNC,S$GLB,, | Performed by: INTERNAL MEDICINE

## 2025-03-12 PROCEDURE — 3074F SYST BP LT 130 MM HG: CPT | Mod: HCNC,CPTII,S$GLB, | Performed by: FAMILY MEDICINE

## 2025-03-12 PROCEDURE — 80053 COMPREHEN METABOLIC PANEL: CPT | Mod: HCNC | Performed by: FAMILY MEDICINE

## 2025-03-12 PROCEDURE — 3008F BODY MASS INDEX DOCD: CPT | Mod: HCNC,CPTII,S$GLB, | Performed by: INTERNAL MEDICINE

## 2025-03-12 PROCEDURE — 99999 PR PBB SHADOW E&M-EST. PATIENT-LVL III: CPT | Mod: PBBFAC,HCNC,, | Performed by: INTERNAL MEDICINE

## 2025-03-12 PROCEDURE — 3078F DIAST BP <80 MM HG: CPT | Mod: HCNC,CPTII,S$GLB, | Performed by: FAMILY MEDICINE

## 2025-03-12 PROCEDURE — 85025 COMPLETE CBC W/AUTO DIFF WBC: CPT | Mod: HCNC | Performed by: FAMILY MEDICINE

## 2025-03-12 PROCEDURE — 4010F ACE/ARB THERAPY RXD/TAKEN: CPT | Mod: HCNC,CPTII,S$GLB, | Performed by: FAMILY MEDICINE

## 2025-03-12 PROCEDURE — 99214 OFFICE O/P EST MOD 30 MIN: CPT | Mod: HCNC,S$GLB,, | Performed by: FAMILY MEDICINE

## 2025-03-12 PROCEDURE — 3008F BODY MASS INDEX DOCD: CPT | Mod: HCNC,CPTII,S$GLB, | Performed by: FAMILY MEDICINE

## 2025-03-12 PROCEDURE — 3044F HG A1C LEVEL LT 7.0%: CPT | Mod: HCNC,CPTII,S$GLB, | Performed by: FAMILY MEDICINE

## 2025-03-12 PROCEDURE — 3074F SYST BP LT 130 MM HG: CPT | Mod: HCNC,CPTII,S$GLB, | Performed by: INTERNAL MEDICINE

## 2025-03-12 PROCEDURE — 36415 COLL VENOUS BLD VENIPUNCTURE: CPT | Mod: HCNC | Performed by: FAMILY MEDICINE

## 2025-03-12 PROCEDURE — 99999 PR PBB SHADOW E&M-EST. PATIENT-LVL V: CPT | Mod: PBBFAC,HCNC,, | Performed by: FAMILY MEDICINE

## 2025-03-12 PROCEDURE — 3078F DIAST BP <80 MM HG: CPT | Mod: HCNC,CPTII,S$GLB, | Performed by: INTERNAL MEDICINE

## 2025-03-12 PROCEDURE — 3044F HG A1C LEVEL LT 7.0%: CPT | Mod: HCNC,CPTII,S$GLB, | Performed by: INTERNAL MEDICINE

## 2025-03-12 PROCEDURE — 1159F MED LIST DOCD IN RCRD: CPT | Mod: HCNC,CPTII,S$GLB, | Performed by: INTERNAL MEDICINE

## 2025-03-12 PROCEDURE — 4010F ACE/ARB THERAPY RXD/TAKEN: CPT | Mod: HCNC,CPTII,S$GLB, | Performed by: INTERNAL MEDICINE

## 2025-03-12 RX ORDER — LANOLIN ALCOHOL/MO/W.PET/CERES
CREAM (GRAM) TOPICAL DAILY
Qty: 90 TABLET | Refills: 2 | Status: SHIPPED | OUTPATIENT
Start: 2025-03-12

## 2025-03-12 RX ORDER — CHOLECALCIFEROL (VITAMIN D3) 25 MCG
2000 TABLET ORAL DAILY
Qty: 120 TABLET | Refills: 2 | Status: SHIPPED | OUTPATIENT
Start: 2025-03-12

## 2025-03-12 RX ORDER — LOSARTAN POTASSIUM 25 MG/1
25 TABLET ORAL DAILY
Qty: 30 TABLET | Refills: 0 | Status: SHIPPED | OUTPATIENT
Start: 2025-03-12 | End: 2026-03-12

## 2025-03-12 NOTE — PROGRESS NOTES
(Portions of this note were dictated using voice recognition software and may contain dictation related errors in spelling/grammar/syntax not found on text review)    CC:   Chief Complaint   Patient presents with    Follow-up     Vitamin deficiency    Knee Pain    Dizziness       HPI: 54 y.o. female presented today for evaluation of dizziness and vertigo.  She has medical history significant for migraine headaches, GERD, essential hypertension, anxiety and depression, benign positional vertigo.    Patient stated that she has been feeling dizzy for the past 6 months, symptoms were initially intermittent and were not very bothersome but has been persistent and more frequent in the last 1-2 months, stated that she feels weak and very cold and clammy, reports symptoms of lightheadedness and dizziness as she is going to pass out, denies having any loss of consciousness or fall, no syncopal presyncopal episode reported, also has vertigo sometimes especially while driving and in elevator, can not drive the car currently because of the room spinning sensation, her sister is driving her, reports having restless legs at night with shaking which wakes her up.  She has been taking meclizine 25 mg 1-2 times daily with little relief    She had weight loss surgery January of 2024 and almost lost 100 lb, currently taking same blood pressure medication, has been monitoring blood pressure at home and stated that it is at the lower end of the normal.    She had labs done recently and was found to have vitamin B12 and vitamin-D deficiency, has not started taking any supplements currently, taking ferrous sulfate along with multivitamin on daily basis    Fever, chills, cough, shortness for breath, headaches, chest pain/tightness, palpitations, shortness for breath, leg swelling, urinary symptoms, changes in bowel habits.    No other symptoms or concerns reported    Past Medical History:   Diagnosis Date    Anxiety     Depression      Generalized anxiety disorder with panic attacks 02/23/2022    GERD (gastroesophageal reflux disease)     Hypertension     Migraine headache     Sleep difficulties     Typical atrial flutter 04/20/2022       Past Surgical History:   Procedure Laterality Date    BREAST SURGERY      COLONOSCOPY N/A 12/14/2022    Procedure: COLONOSCOPY sutab;  Surgeon: Wei Walsh MD;  Location: [facility] ENDO;  Service: Endoscopy;  Laterality: N/A;    ENDOMETRIAL ABLATION      ENDOSCOPIC GASTROCNEMIUS RECESSION Right 8/19/2022    Procedure: RECESSION, GASTROCNEMIUS, ENDOSCOPIC;  Surgeon: Zelalem Solorzano DPM;  Location: [facility] OR;  Service: Podiatry;  Laterality: Right;  [facility] centerline  Stella/[facility]/CONFIRMED/WD,RN 8/18@1333    ENDOSCOPIC PLANTAR FASCIOTOMY Right 8/19/2022    Procedure: FASCIOTOMY, PLANTAR, ENDOSCOPIC;  Surgeon: Zelalem Solorzano DPM;  Location: [facility] OR;  Service: Podiatry;  Laterality: Right;  [facility] set    ESOPHAGOGASTRODUODENOSCOPY N/A 12/14/2022    Procedure: EGD (ESOPHAGOGASTRODUODENOSCOPY);  Surgeon: Wei Walsh MD;  Location: [facility] ENDO;  Service: Endoscopy;  Laterality: N/A;    EYE SURGERY      FOOT SURGERY Right 10/2018    bunion    IMPLANTATION OF PERMANENT SACRAL NERVE STIMULATOR N/A 2/15/2022    Procedure: INSERTION, NEUROSTIMULATOR, PERMANENT, SACRAL;  Surgeon: David Brown MD;  Location: [facility] OR [address] [place] [address];  Service: Urology;  Laterality: N/A;    TONSILLECTOMY      TOTAL REDUCTION MAMMOPLASTY Bilateral     XI ROBOTIC GASTROENTEROSTOMY, MIAN-EN-Y N/A 1/26/2024    Procedure: XI ROBOTIC GASTROENTEROSTOMY, MIAN-EN-Y w/intraop egd;  Surgeon: Judy Grossman MD;  Location: [facility] OR [address] [place] [address];  Service: General;  Laterality: N/A;       Family History   Problem Relation Name Age of Onset    Atrial fibrillation Mother      Thyroid disease Mother      Dementia Mother      Diabetes Father      Prostate cancer Father      Stomach cancer Sister      Migraines Sister      Colon cancer Paternal Aunt       Colon cancer Paternal Uncle      Breast cancer Maternal Grandmother      Colon cancer Paternal Grandmother      Cancer Paternal Grandfather         Social History[1]    Lab Results   Component Value Date    WBC 8.88 03/12/2025    HGB 13.1 03/12/2025    HCT 41.6 03/12/2025    MCV 90 03/12/2025     03/12/2025    CHOL 201 (H) 02/17/2025    CHOL 201 (H) 02/17/2025    TRIG 86 02/17/2025    TRIG 86 02/17/2025    HDL 63 02/17/2025    HDL 63 02/17/2025    ALT 19 02/17/2025    ALT 19 02/17/2025    AST 17 02/17/2025    AST 17 02/17/2025    BILITOT 0.5 02/17/2025    BILITOT 0.5 02/17/2025    ALKPHOS 104 02/17/2025    ALKPHOS 104 02/17/2025     02/17/2025     02/17/2025    K 4.0 02/17/2025    K 4.0 02/17/2025     02/17/2025     02/17/2025    CREATININE 0.8 02/17/2025    CREATININE 0.8 02/17/2025    ESTGFRAFRICA >60 04/20/2022    EGFRNONAA >60 04/20/2022    CALCIUM 9.0 02/17/2025    CALCIUM 9.0 02/17/2025    ALBUMIN 3.5 02/17/2025    ALBUMIN 3.5 02/17/2025    BUN 11 02/17/2025    BUN 11 02/17/2025    CO2 25 02/17/2025    CO2 25 02/17/2025    TSH 1.446 02/17/2025    INR 1.0 06/08/2022    HGBA1C 5.3 02/17/2025    LDLCALC 120.8 02/17/2025    LDLCALC 120.8 02/17/2025     (H) 02/17/2025     (H) 02/17/2025    NLBBGDJC61LL 24 (L) 02/17/2025             Vital signs reviewed  PE:   APPEARANCE: Well nourished, well developed, in no acute distress.    HEAD: Normocephalic, atraumatic.  EYES: EOMI.  Conjunctivae noninjected.  NOSE: Mucosa pink. Airway clear.  NECK: Supple with no cervical lymphadenopathy.    CHEST: Good inspiratory effort. Lungs clear to auscultation with no wheezes or crackles.  CARDIOVASCULAR: Normal S1, S2. No rubs, murmurs, or gallops.  ABDOMEN: Bowel sounds normal. Not distended. Soft. No tenderness or masses. No organomegaly.  EXTREMITIES: No edema, cyanosis, or clubbing.  Neurological:  Strength and sensation grossly intact, no focal neurological deficit noted      Review  of Systems   Constitutional:  Negative for chills, fatigue and fever.   HENT: Negative.     Respiratory: Negative.     Cardiovascular: Negative.    Gastrointestinal: Negative.    Genitourinary: Negative.    Neurological:  Positive for dizziness, vertigo, weakness and light-headedness. Negative for tremors, seizures, syncope, facial asymmetry, speech difficulty, numbness, headaches, memory loss and coordination difficulties.   Psychiatric/Behavioral: Negative.     All other systems reviewed and are negative.      IMPRESSION  1. Dizziness    2. Vertigo    3. Vitamin D deficiency    4. Vitamin B12 deficiency    5. Essential hypertension            PLAN      1. Vertigo    - Ambulatory referral/consult to Physical/Occupational Therapy; Future    - Ambulatory referral/consult to ENT; Future    Can take meclizine 25 mg t.i.d. as needed      2. Vitamin D deficiency    - vitamin D (VITAMIN D3) 1000 units Tab; Take 2 tablets (2,000 Units total) by mouth once daily.  Dispense: 120 tablet; Refill: 2      3. Vitamin B12 deficiency    - cyanocobalamin (VITAMIN B-12) 1000 MCG tablet; Take by mouth once daily.  Dispense: 90 tablet; Refill: 2      4. Dizziness (Primary)    Seems multifactorial, might be due to hypotension or hypoglycemia since she is taking the same doses of the medication and almost lost 100 lb weight after weight loss surgery    Advised to keep up appointment with the Neurology      - CBC Auto Differential; Future  - Comprehensive Metabolic Panel; Future  - Urinalysis, Reflex to Urine Culture Urine, Clean Catch; Future    Basic labs ordered     She has appointment with Cardiology today     She has appointment with Neurology on 03/25      5. Essential hypertension     Blood pressure lower end of the normal     To follow up with Cardiology today    Dose of losartan adjusted by Cardiology 25 mg     Continue Cardizem     Advised to monitor blood pressure at home      SCREENINGS        Age/demographic appropriate  health maintenance:    There are no preventive care reminders to display for this patient.        Return ED/urgent care precautions      Spent adequate time in obtaining history and explaining differentials     35 minutes spent during this visit of which greater than 50% devoted to face-face counseling and coordination of care regarding diagnosis and management plan       Haro Demetri   3/12/2025       [1]   Social History  Tobacco Use    Smoking status: Never     Passive exposure: Never    Smokeless tobacco: Never   Substance Use Topics    Alcohol use: No    Drug use: No

## 2025-03-17 ENCOUNTER — OFFICE VISIT (OUTPATIENT)
Dept: PODIATRY | Facility: CLINIC | Age: 55
End: 2025-03-17
Payer: MEDICARE

## 2025-03-17 ENCOUNTER — TELEPHONE (OUTPATIENT)
Dept: FAMILY MEDICINE | Facility: CLINIC | Age: 55
End: 2025-03-17
Payer: MEDICARE

## 2025-03-17 ENCOUNTER — HOSPITAL ENCOUNTER (OUTPATIENT)
Facility: HOSPITAL | Age: 55
Discharge: HOME | End: 2025-03-17
Attending: PODIATRIST
Payer: MEDICARE

## 2025-03-17 VITALS — DIASTOLIC BLOOD PRESSURE: 76 MMHG | HEART RATE: 77 BPM | SYSTOLIC BLOOD PRESSURE: 145 MMHG

## 2025-03-17 DIAGNOSIS — G57.61 MORTON NEUROMA OF SECOND INTERSPACE OF RIGHT FOOT: ICD-10-CM

## 2025-03-17 DIAGNOSIS — G57.91 ENTRAPMENT NEUROPATHY OF PERIPHERAL NERVE OF RIGHT LOWER EXTREMITY: ICD-10-CM

## 2025-03-17 DIAGNOSIS — R26.89 IMPAIRMENT OF BALANCE: ICD-10-CM

## 2025-03-17 DIAGNOSIS — G57.91 ENTRAPMENT NEUROPATHY OF PERIPHERAL NERVE OF RIGHT LOWER EXTREMITY: Primary | ICD-10-CM

## 2025-03-17 DIAGNOSIS — M54.16 LUMBAR BACK PAIN WITH RADICULOPATHY AFFECTING RIGHT LOWER EXTREMITY: ICD-10-CM

## 2025-03-17 PROCEDURE — 3077F SYST BP >= 140 MM HG: CPT | Mod: HCNC,CPTII,S$GLB, | Performed by: PODIATRIST

## 2025-03-17 PROCEDURE — 3044F HG A1C LEVEL LT 7.0%: CPT | Mod: HCNC,CPTII,S$GLB, | Performed by: PODIATRIST

## 2025-03-17 PROCEDURE — 1160F RVW MEDS BY RX/DR IN RCRD: CPT | Mod: HCNC,CPTII,S$GLB, | Performed by: PODIATRIST

## 2025-03-17 PROCEDURE — 3078F DIAST BP <80 MM HG: CPT | Mod: HCNC,CPTII,S$GLB, | Performed by: PODIATRIST

## 2025-03-17 PROCEDURE — 4010F ACE/ARB THERAPY RXD/TAKEN: CPT | Mod: HCNC,CPTII,S$GLB, | Performed by: PODIATRIST

## 2025-03-17 PROCEDURE — 73630 X-RAY EXAM OF FOOT: CPT | Mod: TC,HCNC,PN,RT

## 2025-03-17 PROCEDURE — 99999 PR PBB SHADOW E&M-EST. PATIENT-LVL V: CPT | Mod: PBBFAC,HCNC,, | Performed by: PODIATRIST

## 2025-03-17 PROCEDURE — 73630 X-RAY EXAM OF FOOT: CPT | Mod: 26,HCNC,RT, | Performed by: RADIOLOGY

## 2025-03-17 PROCEDURE — 1159F MED LIST DOCD IN RCRD: CPT | Mod: HCNC,CPTII,S$GLB, | Performed by: PODIATRIST

## 2025-03-17 PROCEDURE — 99214 OFFICE O/P EST MOD 30 MIN: CPT | Mod: HCNC,S$GLB,, | Performed by: PODIATRIST

## 2025-03-17 RX ORDER — PREGABALIN 75 MG/1
75 CAPSULE ORAL 2 TIMES DAILY
Qty: 60 CAPSULE | Refills: 1 | Status: SHIPPED | OUTPATIENT
Start: 2025-03-17

## 2025-03-17 NOTE — TELEPHONE ENCOUNTER
----- Message from Sushma sent at 3/14/2025  1:52 PM CDT -----  Type:  Patient Returning CallWho Called:ptWho Left Message for Patient:ptDoes the patient know what this is regarding?:pt was call to go over her labs she had done this week Would the patient rather a call back or a response via [facility]? [facility] Call Back [phone] Information: call

## 2025-03-17 NOTE — TELEPHONE ENCOUNTER
;      [facility] Emergency Department  [address], [place] [address]  [phone]     Clinical Summary     PERSON INFORMATION   Name YUE HENDRIX Age  52 Years  1966 12:00 AM   Acct# [id number] Sex Female Phone [phone]   Dispo Type Home - (i.e. Home on oxygen, DME)-  Arrival 10/31/2018 6:44 AM Checkout 10/31/2018 9:57 AM    Address: [address] [place], [place] [id number]      Visit Reason NAUSEA VOMITING SHAKEY     ED Physician Note      ED Time Seen By Provider Entered On:  10/31/2018 6:56     Performed On:  10/31/2018 6:56  by Amy Dias NP               Time Seen By Provider   Time Seen by Provider :   10/31/2018 6:56    Amy Dias NP - 10/31/2018 6:56        Patient:   YUE HENDRIX            MRN: [id number]            FIN: [id number]               Age:   52 years     Sex:  Female     :  1966   Associated Diagnoses:   Dizziness; Nausea; Vomiting   Author:   Larissa VILLAVICENCIO, Amy Newman      Basic Information   History source: Patient.   Arrival mode: Private vehicle, walking.   History limitation: None.   Additional information: Chief Complaint from Nursing Triage Note : Chief Complaint   10/31/2018 7:04          Chief Complaint           N/V  .      History of Present Illness   The patient presents with nausea and vomiting.  The onset was 1  hours ago.  The course/duration of symptoms is fluctuating in intensity.  The character of symptoms is bilious and dry heaves.  The degree at present is minimal.  There are exacerbating factors including eating and drinking.  The relieving factor is remaining NPO.  Risk factors consist of diabetes mellitus.  Therapy today: none.  Associated symptoms: dizziness, denies abdominal pain, denies diarrhea, denies fever, denies chills, denies chest pain, denies shortness of breath, denies headache, denies back pain and denies blood in stool.  Additional history: sexual  ----- Message from Luisa sent at 3/17/2025  2:33 PM CDT -----  Regarding: Test Results  Contact: Patient  Type:  Test ResultsWho Called: Patient Name of Test (Lab/Mammo/Etc): Lab/Urine Date of Test: 03/12/2025 Ordering Provider:  Demetri Where the test was performed: [facility] Would the patient rather a call back or a response via [facility]? Call back Best Call Back Number:  [phone] Information:  Patient stated she has not received results and would like a call back to discuss   history: non-contributory.  Patient states she woke up this morning with nausea and 2 episodes of vomiting.  Denies abdominal pain.  Denies diarrhea.  States that she did take 2 tramadol pills last night and believes that they may have been ..        Review of Systems   Constitutional symptoms:  No fever,    Skin symptoms:  No rash,    Eye symptoms:  No recent vision problems,    ENMT symptoms:  No sore throat,    Respiratory symptoms:  No shortness of breath,    Cardiovascular symptoms:  No chest pain,    Gastrointestinal symptoms:  Nausea, vomiting, no abdominal pain, no diarrhea.    Genitourinary symptoms:  No dysuria,    Musculoskeletal symptoms:  No Joint pain,    Neurologic symptoms:  No headache,       Health Status   Allergies: No known allergies.   Medications: Per nurse's notes.   Menstrual history: Hysterectomy.      Past Medical/ Family/ Social History      Medical history   Endocrine: diabetes type 2.   Surgical history: Cholecystectomy, hysterectomy, Gastric bypass.      Physical Examination               Vital Signs   Vital Signs   10/31/2018 7:07          Temperature Oral          97.7 F  LOW                             Peripheral Pulse Rate     83 bpm                             Respiratory Rate          18 br/min                             Systolic Blood Pressure   135 mmHg                             Diastolic Blood Pressure  84 mmHg                             Mean Arterial Pressure    101 mmHg                             Oxygen Saturation         94 %  .               Per nurse's notes.              Oxygen saturation:  94 %.   Vital Signs were reviewed.   Vital Signs were reviewed.   General:  Alert, no acute distress.    Skin:  Warm, dry.    Head:  Normocephalic, atraumatic.    Neck:  Supple, trachea midline.    Eye:  Pupils are equal, round and reactive to light, extraocular movements are intact.    Ears, nose, mouth and throat:  Oral mucosa moist.   Cardiovascular:  Regular rate and  rhythm, S1, S2.    Respiratory:  Lungs are clear to auscultation, respirations are non-labored, Symmetrical chest wall expansion.    Chest wall:  No deformity.   Musculoskeletal:  Normal ROM, no tenderness, no swelling, no deformity.    Gastrointestinal:  Soft, Non distended, Tenderness: Negative, Guarding: Negative, Rebound: Negative, Bowel sounds: Normal, Organomegaly: Negative, Trauma: Negative, Mass: Negative, Scars: Negative.    Neurological:  Alert and oriented to person, place, time, and situation, No focal neurological deficit observed, CN II-XII intact, Motor strength: Motor Exam reveals normal 5/5 strength to all four extremities with flexion and extension, Sensory: Sensation to all four extremities is grossly intact, Reflexes: Symmetrical, Coordination: Romberg test negative, Finger to nose testing is normal bilaterally, Speech: Normal, Gait: Normal.    Psychiatric:  Appropriate mood & affect.      Medical Decision Making   multiple medical diagnosis considered. pt presents to  ER with c/o nausea and vomiting that started upon waking up this morning.  Patient states she also feels slight dizziness and shakiness.  Patient denies diarrhea.  Denies abdominal pain.  Denies chest pain or shortness of breath.  States that she did have a small cyst removed from her scalp yesterday and took 2 tramadol last night.  Patient states that she woke up with the nausea and vomiting x2.  Patient denies headache.  Denies change in vision or blurred vision.  Denies fever. pt is afebrile, non-toxic appearing, and in no acute distress. vital signs are stable.  Benign abdomen.  Patient was given IV fluids and Zofran and meclizine.  Urine is unremarkable.  Labs are unremarkable.  CT scan of the brain is unremarkable.  On reexam patient states she is feeling better.  Symptoms are improving.  Prescription for Zofran and meclizine given.  Advised on a bland diet.  Avoid fried and spicy foods.  Advised to stop the tramadol.   Advised to return if she develops any new or worsening symptoms.  Patient was given a copy of her labs and CT scan results.  Advised that she call her PMD and follow-up in the next 1-2 days.  advised when to return to ER. pt agrees with plan of care and verbalized understanding of discharge instructions. close follow-up and when to return given. all questions answered.    Results review:  Lab results : Lab View   10/31/2018 7:55          UA Appear                 Clear                             UA Color                  Yellow                             UA pH                     5.5                             UA Spec Grav              >=1.030                             UA Glucose                Negative                             UA Bili                   Negative                             UA Ketones                Negative                             UA Blood                  Negative                             UA Protein                Negative                             UA Urobilinogen           0.2 mg/dL                             UA Nitrite                Negative                             UA Leuk Est               Negative    10/31/2018 7:00          Glucose Lvl               199 mg/dL  HI                             BUN                       15 mg/dL                             Creatinine                0.73 mg/dL                             eGFR AfrAmer              >60 mL/min/1.73m2  NA                             eGFR NonAfrAmer           >60 mL/min/1.73m2  NA                             Sodium                    138 mEq/L                             Potassium                 4.0 mEq/L                             Chloride                  103 mEq/L                             TCO2                      25 mEq/L                             AGAP                      14 mEq/L                             Calcium                   9.1 mg/dL                             Alk Phos                  91  unit/L                             Bili Total                0.4 mg/dL                             Total Protein             7.0 g/dL                             Albumin                   3.7 g/dL                             Globulin_                 3.3 g/dL                             A/G Ratio_                1.1  NA                             AST/GOT                   14 unit/L                             ALT/GPT                   16 unit/L                             Lipase Level              42 unit/L                             WBC                       8.6 K/cumm                             RBC                       4.35 M/cumm                             Hgb                       13.3 g/dL                             Hct                       40 %                             MCV                       93 FL                             MCH                       31 pg                             MCHC                      33 g/dL                             RDW                       12.4 %                             Platelet                  141 K/cumm  LOW                             NRBC                      0.0 %                             Abs Neutro                7.1 K/cumm                             Neutrophil                83 %  NA                             Abs Lymph                 1.0 K/cumm                             Lymphocyte                12 %  NA                             Abs Mono                  0.3 K/cumm                             Monocyte                  4 %  NA                             Immature Gran             0.6 %  HI                             Eosinophil                0 %                             Basophil                  0 %  .   Radiology results:  FINDINGS:     Extra axial spaces: Normal in size and morphology for the patient's  age.  Hemorrhage: None.  Ventricular system: Normal in size and morphology for the patient's  age.  Basal cisterns: Normal.  Cerebral  parenchyma: Normal.  Midline shift: None.  Cerebellum: Normal.  Brainstem: Normal.    OTHER:    Calvarium: Normal.  Visualized Paranasal sinuses: Clear.  Visualized Orbits: Normal.  Visualized upper cervical spine: Normal.  Sella and skull base: Normal.    IMPRESSION:    There is no evidence for acute intracranial abnormality. Read full  report.  .      Reexamination/ Reevaluation   on re-examination pt is resting comfortably on cart. no acute distress.  Patient states she is feeling better.  Tolerating oral fluids.  No vomiting in the emergency room.   Vital signs   results included from flowsheet : Vital Signs   10/31/2018 9:40          Peripheral Pulse Rate     82 bpm                             Respiratory Rate          18 br/min                             Systolic Blood Pressure   111 mmHg                             Diastolic Blood Pressure  69 mmHg                             Mean Arterial Pressure    83 mmHg                             Oxygen Saturation         94 %    10/31/2018 8:40          Peripheral Pulse Rate     80 bpm                             Respiratory Rate          18 br/min                             Systolic Blood Pressure   112 mmHg                             Diastolic Blood Pressure  67 mmHg                             Mean Arterial Pressure    82 mmHg                             Oxygen Saturation         96 %    10/31/2018 7:37          Peripheral Pulse Rate     80 bpm                             Respiratory Rate          18 br/min                             Systolic Blood Pressure   118 mmHg                             Diastolic Blood Pressure  67 mmHg                             Mean Arterial Pressure    84 mmHg                             Oxygen Saturation         97 %     Course: improving.   Pain status: decreased.   Assessment: exam improved.   Interventions: Order Profile (Selected)   Inpatient Orders  Ordered  Insert IV:   Vital Signs:   Canceled  MA FFDM Screen w CAD JIAN:   MA  FFDM Screen w CAD JIAN:   MA Mammogram Screening Digital:   Completed  CBC with Differential:   CMP:   CT Brain w/o Contrast:   Lipase:   SODIUM CHLORIDE 0.9% SOLUTION 1,000 mL: 500 mL/hr, IV, Stop: 10/31/18 9:01:00  Urinalysis:   meclizine: 25 mg, 2 tab, PO, Now  ondansetron 2 mg/ml injection: 4 mg, 2 mL, IV Push, One Time  zAuto Diff:   zUrinalysis w/o Microscopic-Bill Only: .      Impression and Plan   Diagnosis   Dizziness ([id number] R42, Discharge, Medical)   Nausea (MBX14-MB R11.0, Discharge, Medical)   Vomiting (UGP37-NJ R11.10, Discharge, Medical)   Plan   Condition: Stable.    Disposition: Discharged: to home.    Prescriptions: Launch prescriptions   Pharmacy:  meclizine 25 mg oral tablet (Prescribe): 25 mg, 1 tab(s), PO, TID, PRN: for dizziness, 30 tab(s), 0 Refill(s)  Zofran ODT 4 mg oral tablet, disintegrating (Prescribe): 4 mg, 1 tab(s), PO, q4hr, 10 tab(s), 0 Refill(s).    Patient was given the following educational materials: Vertigo, Easy-to-Read, Nausea and Vomiting, Easy-to-Read.    Follow up with: Follow up with primary care provider Call for follow up appointment  Follow-Up As Directed  Return if symptoms worsen.    Counseled: Patient, Regarding diagnosis, Regarding diagnostic results, Regarding treatment plan, Regarding prescription, Patient indicated understanding of instructions.          VITALS INFORMATION  Vitals/Ht/Wt  Temperature Oral:  97.7 F  Peripheral Pulse Rate:  83 bpm  Respiratory Rate:  18 br/min  Oxygen Saturation:  94 %  Systolic Blood Pressure:  135 mmHg  Diastolic Blood Pressure:  84 mmHg  Mean Arterial Pressure:  101 mmHg  Height:  167 cm  Weight:  91 kg  ED Hand-Off Communication  ED Hand-Off Reason:  Shift Report  ED Hand-Off Reason / In Hospital:  SBAR reviewed during report  Patient on Cardiac Monitor:  Yes  Sitter Present:  No  Cardiac Monitor Box:  rm 23  Potential Core Measure:  N/A  Hand-off Report Given to:  China Hagen       MEDICAL INFORMATION   Allergy  Info:          sulfa drugs             Prescriptions:                  Prescription Display   meclizine (meclizine 25 mg oral tablet) 25 mg = 1 tab(s), PO, Tab, TID, PRN for dizziness, # 30 tab(s), 0 Refill(s)   ondansetron (Zofran ODT 4 mg oral tablet, disintegrating) 4 mg = 1 tab(s), PO, DIS Tablet, q4hr, # 10 tab(s), 0 Refill(s)          DISCHARGE INFORMATION   Discharge Disposition: Home - (i.e. Home on oxygen, DME)- 01     PATIENT EDUCATION INFORMATION   Instructions:       Nausea and Vomiting, Easy-to-Read; Vertigo, Easy-to-Read   Follow up:                  With: Address: When:   Follow up with primary care provider     Comments:   Call for follow up appointment   Follow-Up As Directed   Return if symptoms worsen            DIAGNOSIS  Dizziness; Nausea; Vomiting

## 2025-03-17 NOTE — PROGRESS NOTES
Subjective:     Patient ID: Starla Fisher is a 54 y.o. female.    Chief Complaint: Foot Pain (Right foot pain; PMHx of right foot surgery 8/19/22)    Presents complaining of pain to the right lower extremity which appears to be getting worse.  She describes a burning pain at rest to the toes of the right foot that is aggravated by walking standing and wearing enclosed shoes.  She also has intermittent low back pain that radiates down the right lower extremity which is aggravated when she sits down too long or she standing and walking for too long.  She relates that her sense of balance is impaired and that is affecting her ability to stand and walk.  Denies any specific injury.  She has a history of gastrocnemius recession and plantar fasciotomy 2-1/2 years ago under mac with regional anesthetic.    Vitals:    03/17/25 0800   BP: (!) 145/76   Pulse: 77   PainSc: 10-Worst pain ever   PainLoc: Foot      Past Medical History:   Diagnosis Date    Anxiety     Depression     Generalized anxiety disorder with panic attacks 02/23/2022    GERD (gastroesophageal reflux disease)     Hypertension     Migraine headache     Sleep difficulties     Typical atrial flutter 04/20/2022       Past Surgical History:   Procedure Laterality Date    BREAST SURGERY      COLONOSCOPY N/A 12/14/2022    Procedure: COLONOSCOPY sutab;  Surgeon: Wei Walsh MD;  Location: [facility];  Service: Endoscopy;  Laterality: N/A;    ENDOMETRIAL ABLATION      ENDOSCOPIC GASTROCNEMIUS RECESSION Right 8/19/2022    Procedure: RECESSION, GASTROCNEMIUS, ENDOSCOPIC;  Surgeon: Zelalem Solorzano DPM;  Location: [facility] OR;  Service: Podiatry;  Laterality: Right;  Arthrex centerline  Stella/Arthradha/CHRISTOPHE/REJI,WILIAM 8/18@1333    ENDOSCOPIC PLANTAR FASCIOTOMY Right 8/19/2022    Procedure: FASCIOTOMY, PLANTAR, ENDOSCOPIC;  Surgeon: Zelalem Solorzano DPM;  Location: [facility] OR;  Service: Podiatry;  Laterality: Right;  Arthrex set    ESOPHAGOGASTRODUODENOSCOPY N/A  12/14/2022    Procedure: EGD (ESOPHAGOGASTRODUODENOSCOPY);  Surgeon: Wei Walsh MD;  Location: [facility];  Service: Endoscopy;  Laterality: N/A;    EYE SURGERY      FOOT SURGERY Right 10/2018    bunion    IMPLANTATION OF PERMANENT SACRAL NERVE STIMULATOR N/A 2/15/2022    Procedure: INSERTION, NEUROSTIMULATOR, PERMANENT, SACRAL;  Surgeon: David Brown MD;  Location: [facility] OR [address] [place] [address];  Service: Urology;  Laterality: N/A;    TONSILLECTOMY      TOTAL REDUCTION MAMMOPLASTY Bilateral     XI ROBOTIC GASTROENTEROSTOMY, MIAN-EN-Y N/A 1/26/2024    Procedure: XI ROBOTIC GASTROENTEROSTOMY, MIAN-EN-Y w/intraop egd;  Surgeon: Judy Grossman MD;  Location: [facility] OR [address] [place] [address];  Service: General;  Laterality: N/A;       Family History   Problem Relation Name Age of Onset    Atrial fibrillation Mother      Thyroid disease Mother      Dementia Mother      Diabetes Father      Prostate cancer Father      Stomach cancer Sister      Migraines Sister      Colon cancer Paternal Aunt      Colon cancer Paternal Uncle      Breast cancer Maternal Grandmother      Colon cancer Paternal Grandmother      Cancer Paternal Grandfather         Social History     Socioeconomic History    Marital status: Single   Tobacco Use    Smoking status: Never     Passive exposure: Never    Smokeless tobacco: Never   Substance and Sexual Activity    Alcohol use: No    Drug use: No    Sexual activity: Not Currently     Partners: Male       Current Medications[1]    Review of patient's allergies indicates:  No Known Allergies      Review of Systems   Constitutional: Negative for chills and malaise/fatigue.   HENT:  Negative for congestion and hearing loss.    Cardiovascular:  Negative for chest pain and claudication.   Respiratory:  Negative for cough and shortness of breath.    Musculoskeletal:  Positive for back pain. Negative for joint pain.   Neurological:  Positive for loss of balance.   Psychiatric/Behavioral:  Negative for altered mental status.          Objective:     Physical Exam  Vitals reviewed.   Constitutional:       General: She is not in acute distress.     Appearance: She is obese. She is not ill-appearing.   Cardiovascular:      Pulses:           Dorsalis pedis pulses are 2+ on the right side and 2+ on the left side.        Posterior tibial pulses are 2+ on the right side and 2+ on the left side.   Musculoskeletal:      Comments: Reducible flexion contracture of the PIPJ and DIPJ right 2nd toe that reduces forefoot loading in addition to moderate sized tailor's bunion and mild extension contracture of the right 5th toe at the MTP with varus rotation of the 5th toe.    There is pain on palpation to distal intermetatarsal spaces 2-4 with the worse pain at the 2nd and 4th intermetatarsal spaces.  Negative Lachman test of the lesser toes.  Mild discomfort on palpation to the distal 1st intermetatarsal space.    Positive provocation sign overlying the dorsal central right midfoot slightly distal to the anterior ankle with palpable underlying bony prominence that radiates towards the toes.  In addition there is a positive provocation sign as marked out per media pictures to the mid to distal 3rd of the anterior compartment of the right leg that radiates overlying the top of the foot and overlying the neck of the fibula which also radiates distally.  Positive provocation sign overlying the sural nerve lateral to the Achilles at the mid to distal 3rd of the posterior lateral right leg.  In addition there is pain with squeezing the right calf that is localized but when palpating the deep posterior compartment of the right leg commencing near the mid aspect of the leg and palpating distally there is pain that radiates both proximally and distally with moderate pain on palpation over the distal tarsal tunnel that radiates along the plantar right foot.  There is pain on palpation directly posterior to the right knee that is localized.       Skin:     General:  Skin is warm.      Capillary Refill: Capillary refill takes less than 2 seconds.      Findings: No ecchymosis or erythema.      Nails: There is no clubbing.   Neurological:      Mental Status: She is alert.      Sensory: Sensation is intact.      Comments: Manual muscle strength 4/5 to right lower extremity compared to left.                   Assessment:      Encounter Diagnoses   Name Primary?    Entrapment neuropathy of peripheral nerve of right lower extremity Yes    Lang neuroma of second interspace of right foot     Impairment of balance     Lumbar back pain with radiculopathy affecting right lower extremity      Plan:     Starla was seen today for foot pain.    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  -     X-Ray Foot Complete Right; Future    Lang neuroma of second interspace of right foot  -     pregabalin (LYRICA) 75 MG capsule; Take 1 capsule (75 mg total) by mouth 2 (two) times daily.  -     X-Ray Foot Complete Right; Future    Impairment of balance  -     EMG W/ ULTRASOUND AND NERVE CONDUCTION TEST 1 Extremity; Future  -     Ambulatory referral/consult to Neurology; Future    Lumbar back pain with radiculopathy affecting right lower extremity  -     EMG W/ ULTRASOUND AND NERVE CONDUCTION TEST 1 Extremity; Future  -     Ambulatory referral/consult to Neurology; Future  -     pregabalin (LYRICA) 75 MG capsule; Take 1 capsule (75 mg total) by mouth 2 (two) times daily.  -     X-Ray Lumbar Spine 5 View; Future      I counseled the patient on her conditions, their implications and medical management.    Ordered EMG/NCV test to assess for nerve entrapment syndrome versus lumbar radiculopathy affecting right lower  extremity.  She also has a history of prior regional anesthetic which may be affecting this in addition to endoscopic gastroc recession and plantar fasciotomies which could potentially affect the sural nerve and potentially the posterior tibial nerve distally.  Her symptoms are not simply explained by the previous procedure.  She also has symptoms of the lumbar radiculopathy which is intermittent.  Lumbar x-ray ordered.  Referral placed to neurology for 2nd opinion and assistance with further evaluation and management.    Gradually titrate Lyrica 75 mg p.o. b.i.d. as discussed.    RTC within 4-6 weeks to re-evaluate.  Consider possible diagnostic injections versus therapeutic injections.  Avoid tight-fitting shoes.    A portion of this note was generated by voice recognition software and may contain spelling and grammar errors.      .          [1]   Current Outpatient Medications   Medication Sig Dispense Refill    ARIPiprazole (ABILIFY) 10 MG Tab Take 1 tablet (10 mg total) by mouth once daily. 90 tablet 3    BOTOX 200 unit SolR       cyanocobalamin (VITAMIN B-12) 1000 MCG tablet Take by mouth once daily. 90 tablet 2    diltiaZEM (CARDIZEM CD) 240 MG 24 hr capsule Take 1 capsule (240 mg total) by mouth once daily. 90 capsule 1    EScitalopram oxalate (LEXAPRO) 20 MG tablet Take 1 tablet (20 mg total) by mouth once daily. 90 tablet 3    Ir-X-xfckg-B12-L.acid,plan-inu 65 mg iron- 50 mg-1 mg DFE Cap Take by mouth.      ferrous sulfate (FEOSOL) 325 mg (65 mg iron) Tab tablet Take 325 mg by mouth.      GEMTESA 75 mg Tab Take 1 tablet (75 mg total) by mouth once daily. 90 tablet 4    losartan (COZAAR) 25 MG tablet Take 1 tablet (25 mg total) by mouth once daily. 30 tablet 0    meclizine (ANTIVERT) 25 mg tablet Take 1 tablet (25 mg total) by mouth 3 (three) times daily as needed for Dizziness or Nausea. 30 tablet 2    multivit-minerals/folic acid (ADULT MULTIVITAMIN GUMMIES ORAL) Take 1 tablet by mouth Daily. Continue to  hold until after Surgery.      multivitamin (THERAGRAN) per tablet Take 1 tablet by mouth.      ondansetron (ZOFRAN-ODT) 8 MG TbDL Take 1 tablet (8 mg total) by mouth every 6 (six) hours as needed (nausea after surgery). 30 tablet 0    phenylephrine (TEODORA-SYNEPHRINE) 10 mg/mL injection       promethazine (PHENERGAN) 25 MG tablet Take 1 tablet (25 mg total) by mouth every 6 (six) hours as needed for Nausea. 12 tablet 5    propofoL (DIPRIVAN) 10 mg/mL infusion       ROPIvacaine 0.5%, PF, (NAROPIN) 5 mg/mL (0.5 %) injection       traMADoL (ULTRAM) 50 mg tablet Take 1 tablet (50 mg total) by mouth every 6 (six) hours. 5 tablet 0    traZODone (DESYREL) 150 MG tablet Take half or whole tablet by mouth before bed as needed for insomnia 90 tablet 3    triamcinolone acetonide 0.025% (KENALOG) 0.025 % Oint Apply topically daily as needed (vulvar itching). 15 g 0    TRUEPLUS LANCETS 33 gauge Misc TEST BLOOD SUGAR THREE TIMES DAILY 300 each 3    ubrogepant (UBRELVY) 100 mg tablet Take 1 tablet by mouth at the onset of a headache. May repeat based on response and tolerability after more than 2 hours if needed. Do not take more than 200mg in a 24 hour span. 16 tablet 5    vitamin D (VITAMIN D3) 1000 units Tab Take 2 tablets (2,000 Units total) by mouth once daily. 120 tablet 2    walker (ULTRA-LIGHT ROLLATOR) Misc Rolator for stability and balance postoperatively as needed while standing and walking. 1 each 0    oxybutynin (DITROPAN-XL) 10 MG 24 hr tablet Take 1 tablet (10 mg total) by mouth once daily. 30 tablet 11    pregabalin (LYRICA) 75 MG capsule Take 1 capsule (75 mg total) by mouth 2 (two) times daily. 60 capsule 1     Current Facility-Administered Medications   Medication Dose Route Frequency Provider Last Rate Last Admin    sodium chloride 0.9% flush 10 mL  10 mL Intravenous PRN Zelalem Solorzano, BENTLEYM        sodium chloride 0.9% flush 10 mL  10 mL Intravenous PRN Zelalem Solorzano, BENTLEYM         Facility-Administered  Medications Ordered in Other Visits   Medication Dose Route Frequency Provider Last Rate Last Admin    0.9%  NaCl infusion   Intravenous Continuous Sathish Briseno MD   Stopped at 02/15/22 0907    LIDOcaine (PF) 10 mg/ml (1%) injection 10 mg  1 mL Intradermal Once Sathish Briseno MD

## 2025-03-18 NOTE — TELEPHONE ENCOUNTER
Sent my chart message to inform labs are in nml range.also call and spoke with pt to inform.Pt agrees verbalize and understands.

## 2025-03-20 ENCOUNTER — PATIENT MESSAGE (OUTPATIENT)
Dept: PODIATRY | Facility: HOSPITAL | Age: 55
End: 2025-03-20
Payer: MEDICARE

## 2025-03-20 ENCOUNTER — PATIENT MESSAGE (OUTPATIENT)
Dept: PODIATRY | Facility: CLINIC | Age: 55
End: 2025-03-20
Payer: MEDICARE

## 2025-03-25 ENCOUNTER — TELEPHONE (OUTPATIENT)
Dept: NEUROLOGY | Facility: CLINIC | Age: 55
End: 2025-03-25
Payer: MEDICARE

## 2025-03-25 NOTE — TELEPHONE ENCOUNTER
Attempted to contact patient twice. Patient did not answer both times. Unable to leave voicemail as mailbox is full.     ----- Message from Med Assistant Nicole sent at 3/25/2025 11:28 AM CDT -----  Good Morning! Can I offer her a Virtual since she cannot make it on today and the next appointment is in May?  ----- Message -----  From: Sarah Chiu  Sent: 3/25/2025  10:45 AM CDT  To: Jabari Hopkins Staff    Type:  Sooner Appointment Request Patient is requesting a sooner appointment.  Patient declined first available appointment listed as well as another facility and provider .  Patient will not accept being placed on the waitlist and is requesting a message be sent to doctor. Name of Caller: ptWhen is the first available appointment? 08/07Symptoms: G57.91 (ICD-10-CM) - Entrapment neuropathy of peripheral nerve of right lower [id number] (ICD-10-CM) - Impairment of [id number] (ICD-10-CM) - Lumbar back pain with radiculopathy affecting right lower extremity Would the patient rather a call back or a response via [facility]? Call [facility] Call Back Number: Telephone Information:Mobile          [phone] Information: Pt had to cancel today due to transportation not being able to bring her. She would like to reschedule for Thursday, 3/27, if possible. Please call pt to schedule.

## 2025-03-31 ENCOUNTER — TELEPHONE (OUTPATIENT)
Dept: PODIATRY | Facility: CLINIC | Age: 55
End: 2025-03-31
Payer: MEDICARE

## 2025-03-31 NOTE — TELEPHONE ENCOUNTER
----- Message from Fiona sent at 3/31/2025 12:54 PM CDT -----  Type:  Test ResultsWho Called: Starla ROMERO Name of Test (Lab/Mammo/Etc): Foot exam Date of Test: 03/20 Ordering Provider: Dr [facility] Call back Best Call Back Number: [phone] Information:  Pt states she had test done on her foot a couple weeks ago and the results were [facility], but the pt states she has been having trouble with the rogelio sn would like a call back in regards to her test results.

## 2025-03-31 NOTE — TELEPHONE ENCOUNTER
Spoke with Ms Fisher to inform her that Dr Solorzano sent a message to her over the portal on 3/20/25 and let her know that I will read the message to her verbatim.    As follows:    Hi Ms. Fisher,     The right foot x-ray did not show any broken bones. I think your pain is mediated from the nerve entrapments that we discussed. Please take Lyrica as we discussed. If your pain does not improve significantly with the medication then most likely we will inject the nerves as previously discussed.     Take care,  Dr. Solorzano.    Ms Fisher verbalized understanding of aforementioned message and was reminded that she has an appt with Dr Solorzano slated for 4/28/25 at 9 am. Verbalized understanding with no other needs voiced at this time. Call back encouraged if needed.

## 2025-04-01 DIAGNOSIS — H81.13 BENIGN PAROXYSMAL POSITIONAL VERTIGO DUE TO BILATERAL VESTIBULAR DISORDER: ICD-10-CM

## 2025-04-02 RX ORDER — MECLIZINE HYDROCHLORIDE 25 MG/1
25 TABLET ORAL 3 TIMES DAILY PRN
Qty: 30 TABLET | Refills: 2 | Status: SHIPPED | OUTPATIENT
Start: 2025-04-02

## 2025-04-07 ENCOUNTER — CLINICAL SUPPORT (OUTPATIENT)
Dept: REHABILITATION | Facility: HOSPITAL | Age: 55
End: 2025-04-07
Attending: FAMILY MEDICINE
Payer: MEDICARE

## 2025-04-07 DIAGNOSIS — R42 VERTIGO: ICD-10-CM

## 2025-04-07 PROCEDURE — 97162 PT EVAL MOD COMPLEX 30 MIN: CPT | Mod: HCNC,PN

## 2025-04-07 NOTE — PROGRESS NOTES
[facility]  Physical Therapy Neurological Rehabilitation Initial Evaluation     Name: Starla Fisher  Clinic Number: [id number]    Therapy Diagnosis:   Encounter Diagnosis   Name Primary?    Vertigo      Physician: Tahir Mosquera MD    Physician Orders: PT Eval and Treat   Medical Diagnosis from Referral: R42 (ICD-10-CM) - Vertigo   Evaluation Date: 4/7/2025  Authorization Period Expiration: 12/31/25  Plan of Care Expiration: 6/2/25  Progress Note Due: 5/6/25  Visit # / Visits authorized: 1/ 1  FOTO: 1/ 3    Precautions: Standard and Fall    Time In: 13:45  Time Out: 14:30  Total Billable Time: 45 minutes    Subjective      Date of onset: 1 year ago     History of current condition - Starla reports: Her dizziness started about 1 year ago. Describes dizziness as a spinning sensation, that lasts about a couple hours.  Agreevating factors: stress and depression, turning quickly.  She goes to group therapy a couple days per week. Alleviating factors: getting out of the house,    Hx of migraines, notes that migraines can sometimes cause her dizziness.     Prior Therapy: Has done orthopedic Physical Therapy   Social History: Lives with her sister, watches TV most of the day  Falls: fell 1 month ago, lost her balance   DME: single point cane, rolling walker   Home Environment: threshold step   Exercise Routine / History: none   Family Present at time of Eval: none   Occupation: does not work   Prior Level of Function: Independent   Current Level of Function: Her sister drives her around     Pain:  Current 0/10, worst 8/10, best 0/10   Location: headache      Patient's goals: To decrease dizziness    Medical History:   Past Medical History:   Diagnosis Date    Anxiety     Depression     Generalized anxiety disorder with panic attacks 02/23/2022    GERD (gastroesophageal reflux disease)     Hypertension     Migraine headache     Sleep difficulties     Typical atrial flutter 04/20/2022       Surgical  History:   Starla Fisher  has a past surgical history that includes Endometrial ablation; Tonsillectomy; Breast surgery; Eye surgery; Foot surgery (Right, 10/2018); Total Reduction Mammoplasty (Bilateral); Implantation of permanent sacral nerve stimulator (N/A, 2/15/2022); Endoscopic gastrocnemius recession (Right, 8/19/2022); Endoscopic plantar fasciotomy (Right, 8/19/2022); Colonoscopy (N/A, 12/14/2022); Esophagogastroduodenoscopy (N/A, 12/14/2022); and xi robotic gastroenterostomy, vik-en-y (N/A, 1/26/2024).    Medications:   Starla has a current medication list which includes the following prescription(s): aripiprazole, botox, cyanocobalamin, diltiazem, escitalopram oxalate, gy-b-cpzpr-b12-l.acid,plan-inu, ferrous sulfate, gemtesa, losartan, meclizine, multivit-minerals/folic acid, multivitamin, ondansetron, oxybutynin, phenylephrine, pregabalin, promethazine, propofol, ropivacaine 0.5% (pf), tramadol, trazodone, triamcinolone acetonide 0.025%, trueplus lancets, ubrelvy, vitamin d, and ultra-light rollator, and the following Facility-Administered Medications: 0.9% nacl, lidocaine (pf) 10 mg/ml (1%), sodium chloride 0.9%, and sodium chloride 0.9%.    Allergies:   Review of patient's allergies indicates:  No Known Allergies     Objective      Outcome Measure:   Dizziness Handicap Inventory: 70/100 (higher score = greater disability)    SYSTEMS SCREEN    - Follows commands: 100% of time   - Speech: no deficits     Mental status: alert, oriented to person, place, and time, does not maintain eye contact during session  Appearance: Casually dressed  Behavior:  calm  Attention Span and Concentration:  Normal    Posture Alignment: WFL     Sensation: Light Touch: Impaired: B lower extremity, left side hyposensitive    Vitals: /96  HR 64         RANGE OF MOTION--LOWER EXTREMITIES  (R) LE Grossly within normal limits   (L) LE: Grossly within normal limits     Strength: manual muscle test grades below   Upper  Extremity Strength: WNL   Lower Extremity Strength: WNL      ROM:   CERVICAL SPINE  Flexion: WNL   Extension: WNL    L rotation: about 50 degrees  R rotation: about 50 degrees  Are concurrent symptoms present with any of these movements: As noted above none    VESTIBULAR EXAMINATION    Oculomotor Screen in room light (fixation present):   Known eye dysfunction: Notes bilateral double vision for years, has prism in both of her lenses.  Left eye dominant  Ocular ROM: right eye exophoria    Tracking/Smooth Pursuits: Impaired: saccadic intrusions  Saccades: Impaired: slight upbeat nystagmus with vertical + dizziness  Convergence: impaired around 20 cm   Spontaneous Nystagmus: Absent   Gaze Holding Nystagmus: Absent     Slow VOR Screen: impaired, difficulty staying on target, + dizziness and lightheadedness  VOR Cancellation: impaired, difficulty staying on target  Head Thrust Test: unable to assess secondary to closing her eyes     POSITIONAL CANAL TESTING  Looking for nystagmus (slow phase followed by quick phase to the affected side for BPPV)    Susie Hallpike (posterior / CL anterior)   Right : Positive nystagmus for torsional nystagmus lasting about 5 sec then developed slow vertical nystagmus last about 5 sec , Positive dizziness   Left: Negative nystagmus, Negative dizziness  Horizontal Canals  Not tested     Performed Epley maneuver for right posterior canal BPPV.         Postural control: MCTSIB: Evaluation 04/08/2025 (Average of 3 trials)   1. Eyes Open/feet together/Firm: 30 seconds   2. Eyes Closed/feet together/Firm:  30 seconds   3. Eyes Open/feet together/Foam:  30 seconds   4. Eyes Closed/feet together/Foam:  14 seconds   TOTAL 104/120        Evaluation 04/08/2025   Timed Up and Go NT   Self Selected Walking Speed 0.67 m/sec (6m/7s)   Fast Walking Speed DNT       Gait Assessment:(if indicated)  - AD used: SPC  - Assistance: mod Independent   - Distance: 50 feet during eval     GAIT DEVIATIONS:  Starla  displays the following deviations with ambulation: slow selena, decreased trunk rotation, decreased bilateral foot clearance    Impairments contributing to deviations: impaired motor control,       Intake Outcome Measure for FOTO Neuro Survey    Therapist reviewed FOTO scores for Starla Fisher on 4/7/2025.   FOTO report - see Media section or FOTO account episode details.    Intake Score: 54%           Patient Education and Home Exercises     Education provided:   - Physical Therapy Plan of Care, role of Physical Therapy, schedule, balance    Written Home Exercises Provided:  not at this time .    Assessment     Starla is a 54 y.o. female referred to outpatient Physical Therapy with a medical diagnosis of vertigo and past medical history of migraine, depression, anxiety. Patient presents with reports of dizziness that started about a year ago, she describes as spinning sensation.  She notes that stress and anxiety play a role in her symptoms.  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 She had saccadic intrusions with smooth pursuits and impaired saccades, indicating central pathology.  Her positional testing was abnormal with positive torsinal nystagmus in right Susie Quinn [place] that then changed directions to vertical nystagmus.  She also demo's impaired static balance scoring 104/120 on the mCTSIB.  Her dizziness is likely multifactorial with central impairments and changes with mood.  She will benefit from skilled Physical Therapy to address impairments and maximize mobility.     Patient prognosis is Fair.   Patient will benefit from skilled outpatient Physical Therapy to address the deficits stated above and in the chart below, provide patient /family education, and to maximize patient's level of independence.     Plan of care discussed with patient: Yes  Patient's spiritual, cultural and educational needs considered and patient is agreeable to the plan of care  and goals as stated below:     Anticipated Barriers for therapy: co morbidities    Medical Necessity is demonstrated by the following  History  Co-morbidities and personal factors that may impact the plan of care [] LOW: no personal factors / co-morbidities  [x] MODERATE: 1-2 personal factors / co-morbidities  [] HIGH: 3+ personal factors / co-morbidities    Moderate / High Support Documentation:   Co-morbidities affecting plan of care: gastic bypass, vertigo, anxiety, depression, falls    Personal Factors:   coping style     Examination  Body Structures and Functions, activity limitations and participation restrictions that may impact the plan of care [] LOW: addressing 1-2 elements  [x] MODERATE: 3+ elements  [] HIGH: 4+ elements (please support below)    Moderate / High Support Documentation: vestibular, oculomotor, balance, gait     Clinical Presentation [] LOW: stable  [x] MODERATE: Evolving  [] HIGH: Unstable     Decision Making/ Complexity Score: moderate       Goals:  Short Term Goals: 4 weeks   Patient to perform HEP Independent  Patient to score greater than or equal to 110/120 on the mCTSIB for improved static balance  Patient able to perform VOR x 1 at 90 bpm with less than or equal to 1 point increase in symptoms for improved vestibular function    Long Term Goals: 8 weeks   Patient to score less than or equal to 55/100 on the DHI for decrease impairment from dizziness  Patient able to ambulate at 1.0 m/s with LRAD for improved gait selena and quality  Patient to reports less than or equal to 2 dizzy episodes per week for improved quality of life.   Patient to participate in walking program atleast 3 days per week for improved mobility and quality of life   Plan     Plan of care Certification: 4/7/2025 to 6/2/25.    Outpatient Physical Therapy 2 times weekly for 8 weeks to include the following interventions: Gait Training, Neuromuscular Re-ed, Patient Education, Self Care, Therapeutic Activities, and  Therapeutic Exercise.     Leslie Carbajal, PT        Physician's Signature: _________________________________________ Date: ________________

## 2025-04-08 ENCOUNTER — LAB VISIT (OUTPATIENT)
Dept: LAB | Facility: HOSPITAL | Age: 55
End: 2025-04-08
Payer: MEDICARE

## 2025-04-08 ENCOUNTER — OFFICE VISIT (OUTPATIENT)
Facility: CLINIC | Age: 55
End: 2025-04-08
Payer: MEDICARE

## 2025-04-08 ENCOUNTER — PATIENT MESSAGE (OUTPATIENT)
Dept: BARIATRICS | Facility: CLINIC | Age: 55
End: 2025-04-08
Payer: MEDICARE

## 2025-04-08 VITALS
HEART RATE: 67 BPM | HEIGHT: 64 IN | SYSTOLIC BLOOD PRESSURE: 109 MMHG | WEIGHT: 240.75 LBS | BODY MASS INDEX: 41.1 KG/M2 | DIASTOLIC BLOOD PRESSURE: 71 MMHG

## 2025-04-08 DIAGNOSIS — R20.2 NUMBNESS AND TINGLING OF BOTH UPPER EXTREMITIES: ICD-10-CM

## 2025-04-08 DIAGNOSIS — R20.0 NUMBNESS AND TINGLING OF BOTH UPPER EXTREMITIES: ICD-10-CM

## 2025-04-08 DIAGNOSIS — R20.0 NUMBNESS AND TINGLING OF BOTH LOWER EXTREMITIES: Primary | ICD-10-CM

## 2025-04-08 DIAGNOSIS — R26.89 IMPAIRMENT OF BALANCE: ICD-10-CM

## 2025-04-08 DIAGNOSIS — M54.16 LUMBAR BACK PAIN WITH RADICULOPATHY AFFECTING RIGHT LOWER EXTREMITY: ICD-10-CM

## 2025-04-08 DIAGNOSIS — R20.2 NUMBNESS AND TINGLING OF BOTH LOWER EXTREMITIES: ICD-10-CM

## 2025-04-08 DIAGNOSIS — R20.0 NUMBNESS AND TINGLING OF BOTH LOWER EXTREMITIES: ICD-10-CM

## 2025-04-08 DIAGNOSIS — R20.2 NUMBNESS AND TINGLING OF BOTH LOWER EXTREMITIES: Primary | ICD-10-CM

## 2025-04-08 DIAGNOSIS — G57.91 ENTRAPMENT NEUROPATHY OF PERIPHERAL NERVE OF RIGHT LOWER EXTREMITY: ICD-10-CM

## 2025-04-08 PROBLEM — R42 VERTIGO: Status: ACTIVE | Noted: 2025-04-08

## 2025-04-08 LAB — FOLATE SERPL-MCNC: 9.4 NG/ML (ref 4–24)

## 2025-04-08 PROCEDURE — 86334 IMMUNOFIX E-PHORESIS SERUM: CPT | Mod: HCNC

## 2025-04-08 PROCEDURE — 83521 IG LIGHT CHAINS FREE EACH: CPT | Mod: HCNC

## 2025-04-08 PROCEDURE — 84165 PROTEIN E-PHORESIS SERUM: CPT | Mod: HCNC,,, | Performed by: PATHOLOGY

## 2025-04-08 PROCEDURE — 82175 ASSAY OF ARSENIC: CPT | Mod: HCNC

## 2025-04-08 PROCEDURE — 82746 ASSAY OF FOLIC ACID SERUM: CPT | Mod: HCNC

## 2025-04-08 PROCEDURE — 84165 PROTEIN E-PHORESIS SERUM: CPT | Mod: HCNC

## 2025-04-08 PROCEDURE — 84630 ASSAY OF ZINC: CPT | Mod: HCNC

## 2025-04-08 PROCEDURE — 82525 ASSAY OF COPPER: CPT | Mod: HCNC

## 2025-04-08 PROCEDURE — 84207 ASSAY OF VITAMIN B-6: CPT | Mod: HCNC

## 2025-04-08 PROCEDURE — 36415 COLL VENOUS BLD VENIPUNCTURE: CPT | Mod: HCNC

## 2025-04-08 PROCEDURE — 99999 PR PBB SHADOW E&M-EST. PATIENT-LVL V: CPT | Mod: PBBFAC,HCNC,,

## 2025-04-08 RX ORDER — PREGABALIN 100 MG/1
100 CAPSULE ORAL 2 TIMES DAILY
Qty: 180 CAPSULE | Refills: 1 | Status: SHIPPED | OUTPATIENT
Start: 2025-04-08

## 2025-04-08 NOTE — PROGRESS NOTES
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 Starla Fisher is a 54 y.o. female with PMHx of HTN, obesity s/p laparoscopic Harvinder-en-Y on 1/26/24, anxiety, depression, and SHx of R gastrocnemius recession and R plantar fasciotomy (2022) presenting for evaluation of neuropathy.    Patient reports that she's had a constant burning pain in her R foot all the way up to the R knee. This started about a 3-4 months ago and it's getting in worse in severity. She states that the same area has numbness and tingling. Since the onset of RLE symptoms, the N/T has also spread to her L foot and is present all the way up to her L knee. She endorses low back pain that radiates down the back of both legs (R>L). She denies any injury to her lower back. She starts PT very soon for her lower back which she's helping will give her some relief. She currently takes lyrica 75mg BID which she states gives her somewhat mild relief from the N/T and burning pain. She also endorses intermittent N/T in her B/L forearms that started approximately 1 month ago. She denies any N/T in her hands. She denies any neck pain or radiation of pain into her arms.    Of note, she endorses significant vertigo that started about 1 year ago. The vertigo has gotten so bad that she is unable to drive and has frequent falls. She is prescribed meclizine which she states helps for 3-4 hours before wearing off.    She states that she has weakness in both legs and feet that she first noticed 3 months ago. She's been having significant difficulty walking lately and thinks it's a " "combination of both the vertigo and weakness in her legs.    Patient is not on a specific diet but tries to eat healthy. She does not exercise. She does not drink. She takes multiple supplements given her hx of gastric bypass surgery, including a B12 supplement. Patient does not work anymore as she is now on disability, but she used to work at [employer].    Chart Review:  MRI Right Knee (5/13/23)  Within limitations of this examination, no focal meniscal tear, cruciate ligament injury or occult osseous injury. No significant joint effusion.    XR Right Foot (3/17/25)  Lisfranc interval is congruent. No acute fracture, dislocation, or osseous destruction. Flattening of the longitudinal arch. Calcaneal enthesopathic change at the plantar fascial attachment and to a lesser extent at the distal Achilles insertion.     Pertinent work up based on chart review for current condition:    Lab Results   Component Value Date    WBC 8.88 03/12/2025    HGB 13.1 03/12/2025    HCT 41.6 03/12/2025     03/12/2025    CHOL 201 (H) 02/17/2025    CHOL 201 (H) 02/17/2025    TRIG 86 02/17/2025    TRIG 86 02/17/2025    HDL 63 02/17/2025    HDL 63 02/17/2025    ALT 26 03/12/2025    AST 26 03/12/2025     03/12/2025    K 4.4 03/12/2025     03/12/2025    CREATININE 0.7 03/12/2025    BUN 16 03/12/2025    CO2 23 03/12/2025    TSH 1.446 02/17/2025    HGBA1C 5.3 02/17/2025    AOAUIPHP20 274 02/17/2025    FOLATE 12.4 07/05/2023       Review of Systems:  12 system review of systems is negative except for the symptoms mentioned in HPI.     PHYSICAL EXAMINATION     Vitals:    04/08/25 0803   BP: 109/71   Patient Position: Sitting   Pulse: 67   Weight: 109.2 kg (240 lb 11.9 oz)   Height: 5' 4" (1.626 m)     Wt Readings from Last 3 Encounters:   04/08/25 0803 109.2 kg (240 lb 11.9 oz)   03/12/25 1422 106.9 kg (235 lb 12.5 oz)   03/12/25 1306 107.2 kg (236 lb 5.3 oz)     Body mass index is 41.32 kg/m².     GENERAL/CONSTITUTIONAL/SYSTEMIC: "    -Well appearing; well nourished    HIGHER INTEGRATIVE FUNCTIONS:   -Attention & concentration: Normal   -Orientation: Oriented to person, place & time  -Memory: Normal  -Language: Normal   -Fund of Knowledge: Normal     CRANIAL NERVES:   -CN 2: Visual fields full  -CN 2,3: PERRL  -CN 3,4,6: EOMI  -CN 5: Facial sensation intact bilaterally  -CN 7: Facial strength/movement intact bilaterally  -CN 8: Hearing normal bilaterally  -CN 9,10: Palate elevates symmetrically  -CN 11: Normal shoulder shrug and head turn  -CN 12: Tongue protrudes midline     MOTOR:   -Tone: normal in upper and lower extremities  -UE/LE motor: 5/5 throughout with some give-away weakness noted     SENSATION:   - Light touch diminished in BLE from B/L feet up to B/L knees, entire area. Otherwise intact throughout.  - Vibratory sense moderate-severely diminished in B/L toes, mildly in B/L ankles. Otherwise intact.  - Intact bilaterally to pin prick    REFLEXES:     RIGHT Reflex   LEFT   2+ Biceps 2+   2+ Brachiorad. 2+   2+ Triceps 2+    Pectoralis     Jaw Jerk    - Kaur's -        2+ Patellar 2+   1+ Ankle 1+    Suprapatellar              Down PLANTAR Down       COORDINATION:   -FNF normal bilaterally  - Romberg negative    GAIT:   - Slow, slightly antalgic gait, requires cane. Able to stand on heels and toes without difficulty.    Scheduled Follow-up :  Future Appointments   Date Time Provider Department Center   4/14/2025  2:30 PM Brittany Chatterjee PTA [facility] OP [facility] [place]   4/17/2025  5:40 PM Ely Johnson NP [facility] SLEEP [facility] [facility]   4/21/2025  1:45 PM Brittany Chatterjee PTA [facility] [place]   4/23/2025 10:45 AM Alexis Ingram MD [facility]  [facility]   4/28/2025  9:00 AM Zelalem Solorzano DPM [facility] PODIAT [facility] [facility]   4/28/2025  2:30 PM Nazia Luis, KELSEY [facility] [place]   5/1/2025 11:00 AM Ludivina Viramontes PA-C [facility] NEURO [place]   5/2/2025 11:15 AM Brittany Chatterjee PTA [facility] [place]    5/5/2025  1:45 PM Leslie Carbajal, PT [facility] OP [facility] [place] [facility]   5/9/2025 10:30 AM Brittany Chatterjee PTA [facility] [place]   5/12/2025  1:00 PM Leslie Carbajal PT [facility] [place]   5/14/2025  8:00 AM Adis Benitez MD [facility] NEURO [place] [facility]   5/14/2025  9:00 AM Vanessa Nicholson CCC-NENO [facility] FR[facility] [place] [facility]   5/14/2025 10:00 AM Eli Putnam NP [facility] [place] [facility]   5/16/2025 10:30 AM Brittany Chatterjee PTA [facility] [place]   5/19/2025  1:45 PM Leslie Carbajal, KELSEY [facility] [place]       After Visit Medication List :     Medication List            Accurate as of April 8, 2025  9:29 AM. If you have any questions, ask your nurse or doctor.                CHANGE how you take these medications      pregabalin 100 MG capsule  Commonly known as: LYRICA  Take 1 capsule (100 mg total) by mouth 2 (two) times daily.  What changed:   medication strength  how much to take  Changed by: Marshall Kellerman, PA-C            CONTINUE taking these medications      ADULT MULTIVITAMIN GUMMIES ORAL     ARIPiprazole 10 MG Tab  Commonly known as: ABILIFY  Take 1 tablet (10 mg total) by mouth once daily.     BOTOX 200 unit Solr  Generic drug: onabotulinumtoxina     cyanocobalamin 1000 MCG tablet  Commonly known as: VITAMIN B-12  Take by mouth once daily.     diltiaZEM 240 MG 24 hr capsule  Commonly known as: CARDIZEM CD  Take 1 capsule (240 mg total) by mouth once daily.     EScitalopram oxalate 20 MG tablet  Commonly known as: LEXAPRO  Take 1 tablet (20 mg total) by mouth once daily.     Tk-R-kytpt-B12-L.acid,plan-inu 65 mg iron- 50 mg-1 mg DFE Cap     ferrous sulfate 325 mg (65 mg iron) Tab tablet  Commonly known as: FEOSOL     GEMTESA 75 mg Tab  Generic drug: vibegron  Take 1 tablet (75 mg total) by mouth once daily.     losartan 25 MG tablet  Commonly known as: COZAAR  Take 1 tablet (25 mg total) by mouth once daily.     meclizine 25 mg tablet  Commonly known as: ANTIVERT  Take 1 tablet (25 mg  total) by mouth 3 (three) times daily as needed for Dizziness or Nausea.     multivitamin per tablet  Commonly known as: THERAGRAN     ondansetron 8 MG Tbdl  Commonly known as: ZOFRAN-ODT  Take 1 tablet (8 mg total) by mouth every 6 (six) hours as needed (nausea after surgery).     oxybutynin 10 MG 24 hr tablet  Commonly known as: DITROPAN-XL  Take 1 tablet (10 mg total) by mouth once daily.     PHENYLephrine 10 mg/mL injection     promethazine 25 MG tablet  Commonly known as: PHENERGAN  Take 1 tablet (25 mg total) by mouth every 6 (six) hours as needed for Nausea.     propofoL 10 mg/mL infusion  Commonly known as: DIPRIVAN     ROPIvacaine 0.5% (PF) 5 mg/mL (0.5 %) injection  Commonly known as: NAROPIN     traMADoL 50 mg tablet  Commonly known as: ULTRAM  Take 1 tablet (50 mg total) by mouth every 6 (six) hours.     traZODone 150 MG tablet  Commonly known as: DESYREL  Take half or whole tablet by mouth before bed as needed for insomnia     triamcinolone acetonide 0.025% 0.025 % Oint  Commonly known as: KENALOG  Apply topically daily as needed (vulvar itching).     TRUEPLUS LANCETS 33 gauge Misc  Generic drug: lancets  TEST BLOOD SUGAR THREE TIMES DAILY     UBRELVY 100 mg tablet  Generic drug: ubrogepant  Take 1 tablet by mouth at the onset of a headache. May repeat based on response and tolerability after more than 2 hours if needed. Do not take more than 200mg in a 24 hour span.     ULTRA-LIGHT ROLLATOR Misc  Generic drug: walker  Rolator for stability and balance postoperatively as needed while standing and walking.     vitamin D 1000 units Tab  Commonly known as: VITAMIN D3  Take 2 tablets (2,000 Units total) by mouth once daily.               Where to Get Your Medications        These medications were sent to [facility] [place] [address] [facility] [id number]      Hours: Always Open Phone: [phone]   pregabalin 100 MG capsule           Assessment/Plan:   Ms. Starla BAUTISTA Phillip is a 54 y.o.  female with PMHx of HTN, obesity s/p laparoscopic Harvinder-en-Y on 1/26/24, anxiety, depression, and SHx of R gastrocnemius recession and R plantar fasciotomy (2022) presenting for evaluation of neuropathy.    - Suspect that her BLE N/T is being caused largely in part by lumbar radiculopathy. Patient recently saw podiatry who mentioned that her symptoms may be related to a posterior tibial nerve injury/entrapment or sural nerve injury/entrapment caused by her R gastrocnemius recession and R plantar fasciotomy in 2022. Suspect that these nerve injuries/entrapments would be unlikely given that her surgery was in early 2022 and her symptoms started about 3-4 months ago. Also, posterior tibial nerve injury/entrapment would be even more unlikely given no motor deficits appreciated on today's exam. However, EMG/NCV would help give a more definitive answer.  - EMG/NCV was already ordered, but only included 1 extremity despite patient reporting N/T in all 4 extremities. Reordered to include all 4 extremities.  - Ordered routine neuropathy labs to assess for contributing factors  - Increased pregabalin from 75mg BID to 100mg BID for neuropathic pain    Follow-up in 3 months.    I discussed with the patient in depth the risk/benefits of the following plan and the patient was amenable. 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 This includes face to face time and non-face to face time preparing to see the patient (eg, review of tests), obtaining and/or reviewing separately obtained history, documenting clinical information in the electronic or other health record, independently interpreting results and communicating results to the patient/family/caregiver, or care coordinator.          Marshall J Kellerman, PA-C  Supervising physician Ruthann Jade MD was available for all questions during this exam.  [facility]  [address]. [address]th [place] [address].

## 2025-04-09 ENCOUNTER — RESULTS FOLLOW-UP (OUTPATIENT)
Facility: CLINIC | Age: 55
End: 2025-04-09

## 2025-04-09 LAB
ADDRESS: NORMAL
ALBUMIN, SPE (OHS): 4.02 G/DL (ref 3.35–5.55)
ALPHA 1 GLOB (OHS): 0.28 GM/DL (ref 0.17–0.41)
ALPHA 2 GLOB (OHS): 0.8 GM/DL (ref 0.43–0.99)
ARSENIC BLD-MCNC: <1 NG/ML
ATTENDING PHYSICIAN NAME: NORMAL
BETA GLOB (OHS): 0.91 GM/DL (ref 0.5–1.1)
CADMIUM BLD-MCNC: <0.2 NG/ML
COUNTY OF RESIDENCE: NORMAL
EMPLOYER NAME: NORMAL
FACILITY PHONE #: NORMAL
GAMMA GLOBULIN (OHS): 1.19 GM/DL (ref 0.67–1.58)
HX OF OCCUPATION: NORMAL
KAPPA LC FREE SER-MCNC: 1.35 MG/L (ref 0.26–1.65)
KAPPA LC FREE/LAMBDA FREE SER: 2.45 MG/DL (ref 0.33–1.94)
LAMBDA LC FREE SERPL-MCNC: 1.81 MG/DL (ref 0.57–2.63)
LEAD BLDV-MCNC: <1 MCG/DL
M HEALTH CARE PROVIDER PHONE: NORMAL
M PATIENT CITY: NORMAL
MERCURY BLD-MCNC: <1 NG/ML
PATHOLOGIST INTERPRETATION - IFE SERUM (OHS): NORMAL
PATHOLOGIST REVIEW - SPE (OHS): NORMAL
PHONE #: NORMAL
PROT SERPL-MCNC: 7.2 GM/DL (ref 6–8.4)
PROVIDER CITY: NORMAL
PROVIDER POSTAL CODE: NORMAL
PROVIDER STATE: NORMAL
REFER PHYSICIAN ADDR: NORMAL
SPECIMEN SOURCE: NORMAL

## 2025-04-11 LAB
W COPPER: 1168 UG/L
W ZINC: 82 UG/DL

## 2025-04-14 DIAGNOSIS — E53.1 INADEQUATE VITAMIN B6 INTAKE: Primary | ICD-10-CM

## 2025-04-14 LAB — W VITAMIN B6: 7 UG/L

## 2025-04-14 RX ORDER — LANOLIN ALCOHOL/MO/W.PET/CERES
50 CREAM (GRAM) TOPICAL DAILY
Qty: 14 TABLET | Refills: 0 | Status: SHIPPED | OUTPATIENT
Start: 2025-04-14

## 2025-04-15 DIAGNOSIS — I10 ESSENTIAL HYPERTENSION: Primary | ICD-10-CM

## 2025-04-15 RX ORDER — LOSARTAN POTASSIUM 25 MG/1
25 TABLET ORAL DAILY
Qty: 30 TABLET | Refills: 11 | Status: SHIPPED | OUTPATIENT
Start: 2025-04-15 | End: 2026-04-15

## 2025-04-21 ENCOUNTER — CLINICAL SUPPORT (OUTPATIENT)
Dept: REHABILITATION | Facility: HOSPITAL | Age: 55
End: 2025-04-21
Payer: MEDICARE

## 2025-04-21 DIAGNOSIS — R42 VERTIGO: Primary | ICD-10-CM

## 2025-04-21 PROCEDURE — 97112 NEUROMUSCULAR REEDUCATION: CPT | Mod: HCNC,PN,CQ

## 2025-04-22 NOTE — PROGRESS NOTES
"  Outpatient Rehab    Physical Therapy Visit    Patient Name: Starla Fisher  MRN: [id number]  YOB: 1970  Encounter Date: 4/21/2025    Therapy Diagnosis:   Encounter Diagnosis   Name Primary?    Vertigo Yes     Physician: Tahir Mosquera MD    Physician Orders: Eval and Treat  Medical Diagnosis: Vertigo    Visit # / Visits Authorized:  1 / 10  Insurance Authorization Period: 4/7/2025 to 6/10/2025  Date of Evaluation: 04/07/25  Plan of Care Certification: 04/07/25 to 06/10/25     PT/PTA: PTA   Number of PTA visits since last PT visit:1  Time In: 1345   Time Out: 1430  Total Time: 45   Total Billable Time: 45       Subjective   having trouble getting here two days a week, reliant on ride from sister.  Dizziness has been terrible lately, 8/10..  Pain reported as 8/10. resting dizziness    Objective            Treatment:  Balance/Neuromuscular Re-Education  NMR 1: VOR x1: 3x20" horizontal (seated in front of blank wall and self paced)  NMR 2: VOR x1: 3x20" vertical (seated in front of blank wall and self paced)  NMR 3: Narrow base of support on airex: 2x30" eyes open, 2x30" eyes closed, 2x30" head turns, 2x30" head nods  NMR 4: Seated leans and bows: x10  NMR 5: Walking with head turns: 2x45" feet with single point cane  NMR 6: Walking with head nods: 2x45" feet with single point cane  NMR 7: Cone pick ups x10  NMR 8: Cone weaving with turns: 4 lengths x 15 feet    Time Entry(in minutes):  Neuromuscular Re-Education Time Entry: 45    Assessment & Plan   Assessment: Starla arrived to session ambulating with single point cane and reporting high levels of dizziness. She was agreeable to attempting treatment.  Session focused on VOR review which patient was extremely challenged with completing correctly despite max verbal cuing and visual demo.  Easily distractible and required task reinforcement and redirection throughout session.  Remainder of session consisted of habituation activity with time between exercises " for seated rest breaks due to high levels of dizziness.  She would benefit from continued PT services to decrease feelings of dizziness and improve overall quality of life.  Evaluation/Treatment Tolerance: Other (Comment) (Patient limited by dizziness)    Patient will continue to benefit from skilled outpatient physical therapy to address the deficits listed in the problem list box on initial evaluation, provide pt/family education and to maximize pt's level of independence in the home and community environment.     Patient's spiritual, cultural, and educational needs considered and patient agreeable to plan of care and goals.           Plan: Plan to cont with progression of PT goals per POC.    Goals:     Brittany Chatterjee, PTA

## 2025-04-25 ENCOUNTER — TELEPHONE (OUTPATIENT)
Dept: PODIATRY | Facility: CLINIC | Age: 55
End: 2025-04-25
Payer: MEDICARE

## 2025-04-25 NOTE — TELEPHONE ENCOUNTER
Attempted to reach out to Ms Fisher to inform her that Dr Solorzano has ordered an xray of her lumbar spine and that she will need to get that xray done downstairs in the [facility] on the 1st floor prior to her appt on 4/28/25 at 9 am

## 2025-05-01 ENCOUNTER — PROCEDURE VISIT (OUTPATIENT)
Dept: NEUROLOGY | Facility: CLINIC | Age: 55
End: 2025-05-01
Payer: MEDICARE

## 2025-05-01 VITALS
DIASTOLIC BLOOD PRESSURE: 77 MMHG | BODY MASS INDEX: 41.32 KG/M2 | SYSTOLIC BLOOD PRESSURE: 116 MMHG | HEART RATE: 68 BPM | WEIGHT: 240.75 LBS

## 2025-05-01 DIAGNOSIS — G43.709 CHRONIC MIGRAINE WITHOUT AURA WITHOUT STATUS MIGRAINOSUS, NOT INTRACTABLE: Primary | ICD-10-CM

## 2025-05-01 NOTE — PROCEDURES
"Established Patient  Procedure Note    SUBJECTIVE:  Patient ID: Starla Fisher  Chief Complaint: Injections      History of Present Illness:  Starla Fisher is a 54 y.o. female with migraine, obesity s/p gastric bypass, depression, autism, osteoarthritis, atrial flutter, CARL, insomnia, anxiety, HTN, GERD  who presents to clinic alone for follow-up of headaches and Botox injections.       05/01/2025- Interval History: botox  Since last visit, pt has not had any UC visits from [facility]. She feels increasing ubrelvy has been helpful. But does note an increase in ha's the week before her botox appts. Pt would benefit from completing botox injections q11 wks to avoid UC visits and worsening of her ha's. Will adjust as clinically indicated.   Plan: continue botox, continue ubrelvy 100mg prn, rtc in 11 wks for next botox     02/06/2025- Interval History: botox  Pt has had 2 UC visits 2/2 ha's, resolved w/ toradol injections. She tried ubrelvy 50mg, but feels this only dull Ha's. She does also endorse waiting to take her rescues. Feels imitrex ns, inj, tabs are no longer helpful. Discussed options.   Of note, she is also c/o other neuro symptoms including "shaking"/tremors, generalized weakness, dropping things out of her hands, dizziness/vertigo. Referred to neuro to eval further.  Plan: continue botox, increase ubrelvy 100mg prn,  referral to neuro, rtc in 12 wks for next botox     11/14/2024- Interval History: botox  Ha's continue to improve. However, pt would like a referral to neuro for other neuro complaints outside of ha's including weakness in legs and arm, jerking movements in these extremities, and imbalance.   Plan: continue botox, try ubrelvy 50mg prn, may continue imitrex inj, referral to neuro, rtc in 12 wks for next botox     08/15/2024- Interval History: botox  Patient has had >50% reduction in Ha's since beginning botox. Prior to botox pt's Ha's were occurring >15/30 days per month. Since beginning botox, " they are occurring 3-4/30 days per month. As pt has experienced an improvement in Ha's, will continue botox as is clinically indicated.   Pt has not tried ubrelvy yet as she was concerned w/ potential cost, called pharmacy during visit, pt has 0$ copay, will fill and try. She has continued to maintain wt loss alongside w/ wt loss goals since surgery.  Plan: continue botox, try ubrelvy 50mg prn, may continue imitrex inj, rtc in 12 wks for next botox     05/23/2024 - botox    02/28/2024 - botox    Recommendations made at last Office Visit on 02/06/2024:  - Discussed symptoms appear to be consistent with migraines, discussed treatment options and patient agreed with the following plan:  - ppx - continue botox  - abortive - try ubrelvy vs nurtec based on insurance approval, may continue imitrex inj 2nd line as she is known to tolerate   - nausea - continue phenergan  - obesity s/p gastric bypass - avoid po nsaids, mgmt per bariatric med and dietician   - atrial flutter on eliquis - avoid nsaids, caution w/ triptans, mgmt per cardiology  - kristen - continue compliant cpap use, mgmt per sleep med  - htn - stable,mgmt per cardiology  - risks, benefits, and potential side effects of botox, ubrelvy/nurtec, imitrex, phenergan discussed   - alternative treatment options offered   - importance of healthy diet, regular exercise and sleep hygiene in the treatment of headaches    - Start tracking headaches via Migraine Tab rogelio on phone   - RTC in 3 wks to continue botox     Treatments Tried:   Lexapro  Wellbutrin  Tpx  Emgality  Losartan  Diltiazem  Aimovig  Elavil  Botox - helps  Maxalt   Imitrex tab  Imitrex NS  Imitrex inj  Ubrelvy 100mg - helps    Current Medications:    Current Outpatient Medications:     ARIPiprazole (ABILIFY) 10 MG Tab, Take 1 tablet (10 mg total) by mouth once daily., Disp: 90 tablet, Rfl: 3    BOTOX 200 unit SolR, , Disp: , Rfl:     cyanocobalamin (VITAMIN B-12) 1000 MCG tablet, Take by mouth once daily.,  Disp: 90 tablet, Rfl: 2    diltiaZEM (CARDIZEM CD) 240 MG 24 hr capsule, Take 1 capsule (240 mg total) by mouth once daily., Disp: 90 capsule, Rfl: 1    EScitalopram oxalate (LEXAPRO) 20 MG tablet, Take 1 tablet (20 mg total) by mouth once daily., Disp: 90 tablet, Rfl: 3    Js-L-mdblx-B12-L.acid,plan-inu 65 mg iron- 50 mg-1 mg DFE Cap, Take by mouth., Disp: , Rfl:     ferrous sulfate (FEOSOL) 325 mg (65 mg iron) Tab tablet, Take 325 mg by mouth., Disp: , Rfl:     GEMTESA 75 mg Tab, Take 1 tablet (75 mg total) by mouth once daily., Disp: 90 tablet, Rfl: 4    losartan (COZAAR) 25 MG tablet, Take 1 tablet (25 mg total) by mouth once daily., Disp: 30 tablet, Rfl: 11    meclizine (ANTIVERT) 25 mg tablet, Take 1 tablet (25 mg total) by mouth 3 (three) times daily as needed for Dizziness or Nausea., Disp: 30 tablet, Rfl: 2    multivit-minerals/folic acid (ADULT MULTIVITAMIN GUMMIES ORAL), Take 1 tablet by mouth Daily. Continue to hold until after Surgery., Disp: , Rfl:     multivitamin (THERAGRAN) per tablet, Take 1 tablet by mouth., Disp: , Rfl:     ondansetron (ZOFRAN-ODT) 8 MG TbDL, Take 1 tablet (8 mg total) by mouth every 6 (six) hours as needed (nausea after surgery)., Disp: 30 tablet, Rfl: 0    phenylephrine (TEODORA-SYNEPHRINE) 10 mg/mL injection, , Disp: , Rfl:     pregabalin (LYRICA) 100 MG capsule, Take 1 capsule (100 mg total) by mouth 2 (two) times daily., Disp: 180 capsule, Rfl: 1    promethazine (PHENERGAN) 25 MG tablet, Take 1 tablet (25 mg total) by mouth every 6 (six) hours as needed for Nausea., Disp: 12 tablet, Rfl: 5    propofoL (DIPRIVAN) 10 mg/mL infusion, , Disp: , Rfl:     pyridoxine, vitamin B6, (B-6) 50 MG Tab, Take 1 tablet (50 mg total) by mouth once daily., Disp: 14 tablet, Rfl: 0    ROPIvacaine 0.5%, PF, (NAROPIN) 5 mg/mL (0.5 %) injection, , Disp: , Rfl:     traMADoL (ULTRAM) 50 mg tablet, Take 1 tablet (50 mg total) by mouth every 6 (six) hours., Disp: 5 tablet, Rfl: 0    traZODone (DESYREL)  150 MG tablet, Take half or whole tablet by mouth before bed as needed for insomnia, Disp: 90 tablet, Rfl: 3    triamcinolone acetonide 0.025% (KENALOG) 0.025 % Oint, Apply topically daily as needed (vulvar itching)., Disp: 15 g, Rfl: 0    TRUEPLUS LANCETS 33 gauge Misc, TEST BLOOD SUGAR THREE TIMES DAILY, Disp: 300 each, Rfl: 3    ubrogepant (UBRELVY) 100 mg tablet, Take 1 tablet by mouth at the onset of a headache. May repeat based on response and tolerability after more than 2 hours if needed. Do not take more than 200mg in a 24 hour span., Disp: 16 tablet, Rfl: 5    vitamin D (VITAMIN D3) 1000 units Tab, Take 2 tablets (2,000 Units total) by mouth once daily., Disp: 120 tablet, Rfl: 2    walker (ULTRA-LIGHT ROLLATOR) Misc, Rolator for stability and balance postoperatively as needed while standing and walking., Disp: 1 each, Rfl: 0    oxybutynin (DITROPAN-XL) 10 MG 24 hr tablet, Take 1 tablet (10 mg total) by mouth once daily., Disp: 30 tablet, Rfl: 11    Current Facility-Administered Medications:     sodium chloride 0.9% flush 10 mL, 10 mL, Intravenous, PRN, Zelalem Solorzano.J., DPM    sodium chloride 0.9% flush 10 mL, 10 mL, Intravenous, PRN, Zelalem Solorzano., DPM    Facility-Administered Medications Ordered in Other Visits:     0.9%  NaCl infusion, , Intravenous, Continuous, Sathish Briseno MD, Stopped at 02/15/22 0948    LIDOcaine (PF) 10 mg/ml (1%) injection 10 mg, 1 mL, Intradermal, Once, Sathish Briseno MD    Review of Systems - as per HPI, otherwise a balanced 10 systems review is negative.    OBJECTIVE:  Vitals:  /77 (BP Location: Left arm, Patient Position: Sitting)   Pulse 68   Wt 109.2 kg (240 lb 11.9 oz)   BMI 41.32 kg/m²     Physical Exam:  Constitutional: she appears well-developed and well-nourished. she is well groomed. NAD   HENT:    Head: Normocephalic and atraumatic  Eyes: Conjunctivae and EOM are normal  Musculoskeletal: Normal range of motion. No joint stiffness.   Skin: Skin  is warm and dry.  Psychiatric: Mood and affect are normal    Neuro: Patient is alert and oriented to person, place, and time. Language is intact and fluent.  Recent and remote memory are intact.  Normal attention and concentration.  Facial movement is symmetric.  Gait is normal.     Review of Data:   Notes from neuro reviewed.   Labs:  Lab Visit on 04/08/2025   Component Date Value Ref Range Status    Copper 04/08/2025 1168  810 - 1990 ug/L Final    Zinc 04/08/2025 82  60 - 130 ug/dL Final    Folate Level 04/08/2025 9.4  4.0 - 24.0 ng/mL Final    Arsenic 04/08/2025 <1  <13 ng/mL Final    Lead 04/08/2025 <1.0  <3.5 mcg/dL Final    Cadmium 04/08/2025 <0.2  <5.0 ng/mL Final    Mercury 04/08/2025 <1  <10 ng/mL Final    Venous/Capillary 04/08/2025 Capillary   Final    Patient Address 04/08/2025 na   Final    Patient City 04/08/2025 na   Final    Patient County 04/08/2025 na   Final    Patient Home Phone 04/08/2025 na   Final    Patient Race 04/08/2025    Final    Patient Ethnicity 04/08/2025 Non-   Final    Patient Occupation 04/08/2025 na   Final    Patient Employer 04/08/2025 na   Final    Guardian First Name 04/08/2025 na   Final    Guardian Last Name 04/08/2025 na   Final    Health Care Provider Name 04/08/2025 na   Final    Health Care Provider Street Address 04/08/2025 na   Final    Health Care Provider City 04/08/2025 na   Final    Health Care Provider [facility] 04/08/2025 na   Final    Health Care Provider Zip Code 04/08/2025 na   Final    Health Care Provider Phone 04/08/2025 na   Final    Submitting Laboratory Phone 04/08/2025 na   Final    Pathologist Interpretation KAREN 04/08/2025 No monoclonal peaks identified.          Interpreting Pathologist: Kati Muniz MD       Final    Kappa Free Light Chain 04/08/2025 2.45 (H)  0.33 - 1.94 mg/dL Final    Kappa/Lambda FLC Ratio 04/08/2025 1.35  0.26 - 1.65 Final    Lambda Free Light Chain 04/08/2025 1.81  0.57 - 2.63 mg/dL Final    Protein Total  04/08/2025 7.2  6.0 - 8.4 gm/dL Final    Alpha 1 Glob 04/08/2025 0.28  0.17 - 0.41 gm/dl Final    Alpha 2 Glob 04/08/2025 0.80  0.43 - 0.99 gm/dl Final    Beta Glob 04/08/2025 0.91  0.50 - 1.10 gm/dl Final    Gamma Globulin 04/08/2025 1.19  0.67 - 1.58 gm/dl Final    Albumin, SPE 04/08/2025 4.02  3.35 - 5.55 g/dL Final    Vitamin B6 04/08/2025 7  5 - 50 ug/L Final    Pathologist Interpretation SPE 04/08/2025 Normal total protein, normal pattern.           Interpreting Pathologist: Kati Muniz MD       Final   Lab Visit on 03/12/2025   Component Date Value Ref Range Status    Specimen UA 03/12/2025 Urine, Clean Catch   Final    Color, UA 03/12/2025 Yellow  Yellow, Straw, Desiree Final    Appearance, UA 03/12/2025 Clear  Clear Final    pH, UA 03/12/2025 5.0  5.0 - 8.0 Final    Specific Gravity, UA 03/12/2025 1.010  1.005 - 1.030 Final    Protein, UA 03/12/2025 Negative  Negative Final    Glucose, UA 03/12/2025 Negative  Negative Final    Ketones, UA 03/12/2025 Negative  Negative Final    Bilirubin (UA) 03/12/2025 Negative  Negative Final    Occult Blood UA 03/12/2025 Negative  Negative Final    Nitrite, UA 03/12/2025 Negative  Negative Final    Urobilinogen, UA 03/12/2025 Negative  <2.0 EU/dL Final    Leukocytes, UA 03/12/2025 Negative  Negative Final   Lab Visit on 03/12/2025   Component Date Value Ref Range Status    WBC 03/12/2025 8.88  3.90 - 12.70 K/uL Final    RBC 03/12/2025 4.65  4.00 - 5.40 M/uL Final    Hemoglobin 03/12/2025 13.1  12.0 - 16.0 g/dL Final    Hematocrit 03/12/2025 41.6  37.0 - 48.5 % Final    MCV 03/12/2025 90  82 - 98 fL Final    MCH 03/12/2025 28.2  27.0 - 31.0 pg Final    MCHC 03/12/2025 31.5 (L)  32.0 - 36.0 g/dL Final    RDW 03/12/2025 13.2  11.5 - 14.5 % Final    Platelets 03/12/2025 298  150 - 450 K/uL Final    MPV 03/12/2025 10.4  9.2 - 12.9 fL Final    Immature Granulocytes 03/12/2025 0.2  0.0 - 0.5 % Final    Gran # (ANC) 03/12/2025 4.9  1.8 - 7.7 K/uL Final    Immature Grans  (Abs) 03/12/2025 0.02  0.00 - 0.04 K/uL Final    Lymph # 03/12/2025 2.8  1.0 - 4.8 K/uL Final    Mono # 03/12/2025 0.6  0.3 - 1.0 K/uL Final    Eos # 03/12/2025 0.5  0.0 - 0.5 K/uL Final    Baso # 03/12/2025 0.06  0.00 - 0.20 K/uL Final    nRBC 03/12/2025 0  0 /100 WBC Final    Gran % 03/12/2025 55.0  38.0 - 73.0 % Final    Lymph % 03/12/2025 31.4  18.0 - 48.0 % Final    Mono % 03/12/2025 6.8  4.0 - 15.0 % Final    Eosinophil % 03/12/2025 5.9  0.0 - 8.0 % Final    Basophil % 03/12/2025 0.7  0.0 - 1.9 % Final    Differential Method 03/12/2025 Automated   Final    Sodium 03/12/2025 138  136 - 145 mmol/L Final    Potassium 03/12/2025 4.4  3.5 - 5.1 mmol/L Final    Chloride 03/12/2025 105  95 - 110 mmol/L Final    CO2 03/12/2025 23  23 - 29 mmol/L Final    Glucose 03/12/2025 82  70 - 110 mg/dL Final    BUN 03/12/2025 16  6 - 20 mg/dL Final    Creatinine 03/12/2025 0.7  0.5 - 1.4 mg/dL Final    Calcium 03/12/2025 9.1  8.7 - 10.5 mg/dL Final    Total Protein 03/12/2025 7.8  6.0 - 8.4 g/dL Final    Albumin 03/12/2025 3.9  3.5 - 5.2 g/dL Final    Total Bilirubin 03/12/2025 0.4  0.1 - 1.0 mg/dL Final    Alkaline Phosphatase 03/12/2025 107  40 - 150 U/L Final    AST 03/12/2025 26  10 - 40 U/L Final    ALT 03/12/2025 26  10 - 44 U/L Final    eGFR 03/12/2025 >60  >60 mL/min/1.73 m^2 Final    Anion Gap 03/12/2025 10  8 - 16 mmol/L Final   Lab Visit on 02/17/2025   Component Date Value Ref Range Status    WBC 02/17/2025 5.70  3.90 - 12.70 K/uL Final    RBC 02/17/2025 4.23  4.00 - 5.40 M/uL Final    Hemoglobin 02/17/2025 12.0  12.0 - 16.0 g/dL Final    Hematocrit 02/17/2025 37.8  37.0 - 48.5 % Final    MCV 02/17/2025 89  82 - 98 fL Final    MCH 02/17/2025 28.4  27.0 - 31.0 pg Final    MCHC 02/17/2025 31.7 (L)  32.0 - 36.0 g/dL Final    RDW 02/17/2025 12.7  11.5 - 14.5 % Final    Platelets 02/17/2025 222  150 - 450 K/uL Final    MPV 02/17/2025 10.0  9.2 - 12.9 fL Final    Immature Granulocytes 02/17/2025 0.0  0.0 - 0.5 % Final     Gran # (ANC) 02/17/2025 2.8  1.8 - 7.7 K/uL Final    Immature Grans (Abs) 02/17/2025 0.00  0.00 - 0.04 K/uL Final    Lymph # 02/17/2025 2.3  1.0 - 4.8 K/uL Final    Mono # 02/17/2025 0.3  0.3 - 1.0 K/uL Final    Eos # 02/17/2025 0.3  0.0 - 0.5 K/uL Final    Baso # 02/17/2025 0.03  0.00 - 0.20 K/uL Final    nRBC 02/17/2025 0  0 /100 WBC Final    Gran % 02/17/2025 49.6  38.0 - 73.0 % Final    Lymph % 02/17/2025 40.2  18.0 - 48.0 % Final    Mono % 02/17/2025 5.3  4.0 - 15.0 % Final    Eosinophil % 02/17/2025 4.4  0.0 - 8.0 % Final    Basophil % 02/17/2025 0.5  0.0 - 1.9 % Final    Differential Method 02/17/2025 Automated   Final    Sodium 02/17/2025 138  136 - 145 mmol/L Final    Potassium 02/17/2025 4.0  3.5 - 5.1 mmol/L Final    Chloride 02/17/2025 107  95 - 110 mmol/L Final    CO2 02/17/2025 25  23 - 29 mmol/L Final    Glucose 02/17/2025 140 (H)  70 - 110 mg/dL Final    BUN 02/17/2025 11  6 - 20 mg/dL Final    Creatinine 02/17/2025 0.8  0.5 - 1.4 mg/dL Final    Calcium 02/17/2025 9.0  8.7 - 10.5 mg/dL Final    Total Protein 02/17/2025 6.9  6.0 - 8.4 g/dL Final    Albumin 02/17/2025 3.5  3.5 - 5.2 g/dL Final    Total Bilirubin 02/17/2025 0.5  0.1 - 1.0 mg/dL Final    Alkaline Phosphatase 02/17/2025 104  40 - 150 U/L Final    AST 02/17/2025 17  10 - 40 U/L Final    ALT 02/17/2025 19  10 - 44 U/L Final    eGFR 02/17/2025 >60  >60 mL/min/1.73 m^2 Final    Anion Gap 02/17/2025 6 (L)  8 - 16 mmol/L Final    TSH 02/17/2025 1.446  0.400 - 4.000 uIU/mL Final    Cholesterol 02/17/2025 201 (H)  120 - 199 mg/dL Final    Triglycerides 02/17/2025 86  30 - 150 mg/dL Final    HDL 02/17/2025 63  40 - 75 mg/dL Final    LDL Cholesterol 02/17/2025 120.8  63.0 - 159.0 mg/dL Final    HDL/Cholesterol Ratio 02/17/2025 31.3  20.0 - 50.0 % Final    Total Cholesterol/HDL Ratio 02/17/2025 3.2  2.0 - 5.0 Final    Non-HDL Cholesterol 02/17/2025 138  mg/dL Final    Hemoglobin A1C 02/17/2025 5.3  4.0 - 5.6 % Final    Estimated Avg Glucose  02/17/2025 105  68 - 131 mg/dL Final    WBC 02/17/2025 5.70  3.90 - 12.70 K/uL Final    RBC 02/17/2025 4.23  4.00 - 5.40 M/uL Final    Hemoglobin 02/17/2025 12.0  12.0 - 16.0 g/dL Final    Hematocrit 02/17/2025 37.8  37.0 - 48.5 % Final    MCV 02/17/2025 89  82 - 98 fL Final    MCH 02/17/2025 28.4  27.0 - 31.0 pg Final    MCHC 02/17/2025 31.7 (L)  32.0 - 36.0 g/dL Final    RDW 02/17/2025 12.7  11.5 - 14.5 % Final    Platelets 02/17/2025 222  150 - 450 K/uL Final    MPV 02/17/2025 10.0  9.2 - 12.9 fL Final    Immature Granulocytes 02/17/2025 0.0  0.0 - 0.5 % Final    Gran # (ANC) 02/17/2025 2.8  1.8 - 7.7 K/uL Final    Immature Grans (Abs) 02/17/2025 0.00  0.00 - 0.04 K/uL Final    Lymph # 02/17/2025 2.3  1.0 - 4.8 K/uL Final    Mono # 02/17/2025 0.3  0.3 - 1.0 K/uL Final    Eos # 02/17/2025 0.3  0.0 - 0.5 K/uL Final    Baso # 02/17/2025 0.03  0.00 - 0.20 K/uL Final    nRBC 02/17/2025 0  0 /100 WBC Final    Gran % 02/17/2025 49.6  38.0 - 73.0 % Final    Lymph % 02/17/2025 40.2  18.0 - 48.0 % Final    Mono % 02/17/2025 5.3  4.0 - 15.0 % Final    Eosinophil % 02/17/2025 4.4  0.0 - 8.0 % Final    Basophil % 02/17/2025 0.5  0.0 - 1.9 % Final    Differential Method 02/17/2025 Automated   Final    Sodium 02/17/2025 138  136 - 145 mmol/L Final    Potassium 02/17/2025 4.0  3.5 - 5.1 mmol/L Final    Chloride 02/17/2025 107  95 - 110 mmol/L Final    CO2 02/17/2025 25  23 - 29 mmol/L Final    Glucose 02/17/2025 140 (H)  70 - 110 mg/dL Final    BUN 02/17/2025 11  6 - 20 mg/dL Final    Creatinine 02/17/2025 0.8  0.5 - 1.4 mg/dL Final    Calcium 02/17/2025 9.0  8.7 - 10.5 mg/dL Final    Total Protein 02/17/2025 6.9  6.0 - 8.4 g/dL Final    Albumin 02/17/2025 3.5  3.5 - 5.2 g/dL Final    Total Bilirubin 02/17/2025 0.5  0.1 - 1.0 mg/dL Final    Alkaline Phosphatase 02/17/2025 104  40 - 150 U/L Final    AST 02/17/2025 17  10 - 40 U/L Final    ALT 02/17/2025 19  10 - 44 U/L Final    eGFR 02/17/2025 >60  >60 mL/min/1.73 m^2 Final     Anion Gap 02/17/2025 6 (L)  8 - 16 mmol/L Final    Vitamin B-12 02/17/2025 274  210 - 950 pg/mL Final    Thiamine 02/17/2025 63  38 - 122 ug/L Final    Cholesterol 02/17/2025 201 (H)  120 - 199 mg/dL Final    Triglycerides 02/17/2025 86  30 - 150 mg/dL Final    HDL 02/17/2025 63  40 - 75 mg/dL Final    LDL Cholesterol 02/17/2025 120.8  63.0 - 159.0 mg/dL Final    HDL/Cholesterol Ratio 02/17/2025 31.3  20.0 - 50.0 % Final    Total Cholesterol/HDL Ratio 02/17/2025 3.2  2.0 - 5.0 Final    Non-HDL Cholesterol 02/17/2025 138  mg/dL Final    Iron 02/17/2025 88  30 - 160 ug/dL Final    Transferrin 02/17/2025 249  200 - 375 mg/dL Final    TIBC 02/17/2025 369  250 - 450 ug/dL Final    Saturated Iron 02/17/2025 24  20 - 50 % Final    Vit D, 25-Hydroxy 02/17/2025 24 (L)  30 - 96 ng/mL Final     Imaging:  No results found. However, due to the size of the patient record, not all encounters were searched. Please check Results Review for a complete set of results.    Note: I have independently reviewed any/all imaging/labs/tests and agree with the report (s) as documented.  Any discrepancies will be as noted/demarcated by free text.  AMPARO LARRY 5/1/2025    ASSESSMENT:  1. Chronic migraine without aura without status migrainosus, not intractable        PLAN:  - Discussed symptoms appear to be consistent with migraines, discussed treatment options and patient agreed with the following plan:  - ppx - continue botox  - Botox administered in clinic for Chronic Migraine (see below)   - abortive - ubrelvy 100mg prn  - nausea - continue phenergan  - referral to neuro for other neuro complaints outside of ha's including weakness in legs and arm, jerking movements in these extremities, and imbalance  - obesity s/p gastric bypass - avoid po nsaids, mgmt per bariatric med and dietician   - atrial flutter on eliquis - avoid nsaids, caution w/ triptans, mgmt per cardiology  - kristen - continue compliant cpap use, mgmt per sleep med  - htn -  stable,mgmt per cardiology  - importance of healthy diet, regular exercise and sleep hygiene in the treatment of headaches    - Start tracking headaches via Migraine Tab rogelio on phone   - RTC in 11 weeks for repeat Botox injections or sooner if needed     Orders Placed This Encounter    onabotulinumtoxina injection 200 Units       All questions and concerns addressed.  Patient verbalizes understanding and is agreeable with the above stated treatment plan.      PROCEDURE NOTE:  BOTOX was performed as an indicated therapy for intractable chronic migraine headaches given that the patient failed more than 2 headache medications    Two patient identifiers were confirmed with the patient prior to performing this procedure. A time out to determine correct patient and and agreement on procedure performed was conducted prior to the procedure.      Botulinum Toxin Injection Procedure   Procedure: Botulinum toxin injection (01215)  After risks and benefits were explained including bleeding, infection, worsening of pain, damage to the areas being injected, weakness of muscles, loss of muscle control, dysphagia if injecting the head or neck, facial droop if injecting the facial area, painful injection, allergic or other reaction to the medications being injected, and the failure of the procedure to help the problem, a signed consent was obtained.   The patient was placed in a comfortable area and the sites to be treated were identified.The area to be treated was prepped three times with alcohol and the alcohol allowed to dry. Next, a 30 gauge needle was used to inject the medication in the area to be treated.      Total Botox used: 155 Units   Botox wastage: 45 Units     Injection sites:    muscle bilaterally ( a total of 10 units divided into 2 sites)   Procerus muscle (5 units)   Frontalis muscle bilaterally (a total of 20 units divided into 4 sites)   Temporalis muscle bilaterally (a total of 40 units divided into  8 sites)   Occipitalis muscle bilaterally (a total of 30 units divided into 6 sites)   Cervical paraspinal muscles (a total of 20 units divided into 4 sites)   Trapezius muscle bilaterally (a total of 30 units divided into 6 sites)   Complications: none   RTC for the next Botox injection: 11 weeks     The patient tolerated the procedure well and did not experience any complications.     CC: Tahir Mosquera MD Sarena Patel, PA-C  [facility]   [phone]    Dr. Grady was available during today's encounter.

## 2025-05-01 NOTE — TELEPHONE ENCOUNTER
Patient rescheduled the home sleep study on April 11th.   Patient expressed no known problems or needs

## 2025-05-07 ENCOUNTER — TELEPHONE (OUTPATIENT)
Dept: OTOLARYNGOLOGY | Facility: CLINIC | Age: 55
End: 2025-05-07
Payer: MEDICARE

## 2025-05-07 NOTE — TELEPHONE ENCOUNTER
----- Message from Supriya sent at 5/7/2025  2:38 PM CDT -----  Type:  Reschedule Appointment Request Name of Caller:ABNER HEREDIA [[id number]]When is the first available appointment?No accessWould the patient rather a call back or a response via [facility]? Call back Best Call Back Number:[phone] Additional Information: Patient states she would like to reschedule appointment on 5/14 to a later date. Patient did not accept next available appointment and would like to be seen sooner. Patient would like a call back with further assistance.

## 2025-05-13 ENCOUNTER — PATIENT MESSAGE (OUTPATIENT)
Dept: BARIATRICS | Facility: CLINIC | Age: 55
End: 2025-05-13
Payer: MEDICARE

## 2025-05-22 ENCOUNTER — CLINICAL SUPPORT (OUTPATIENT)
Dept: OTOLARYNGOLOGY | Facility: CLINIC | Age: 55
End: 2025-05-22
Payer: MEDICARE

## 2025-05-22 ENCOUNTER — OFFICE VISIT (OUTPATIENT)
Dept: OTOLARYNGOLOGY | Facility: CLINIC | Age: 55
End: 2025-05-22
Payer: MEDICARE

## 2025-05-22 VITALS
HEART RATE: 66 BPM | BODY MASS INDEX: 41.51 KG/M2 | SYSTOLIC BLOOD PRESSURE: 133 MMHG | WEIGHT: 241.88 LBS | DIASTOLIC BLOOD PRESSURE: 69 MMHG

## 2025-05-22 DIAGNOSIS — R42 VERTIGO: ICD-10-CM

## 2025-05-22 DIAGNOSIS — H90.3 SENSORINEURAL HEARING LOSS (SNHL) OF BOTH EARS: ICD-10-CM

## 2025-05-22 DIAGNOSIS — H81.13 BENIGN PAROXYSMAL POSITIONAL VERTIGO DUE TO BILATERAL VESTIBULAR DISORDER: ICD-10-CM

## 2025-05-22 DIAGNOSIS — R42 DIZZINESS AND GIDDINESS: Primary | ICD-10-CM

## 2025-05-22 DIAGNOSIS — H90.3 SENSORINEURAL HEARING LOSS, BILATERAL: ICD-10-CM

## 2025-05-22 DIAGNOSIS — H81.11 BENIGN PAROXYSMAL POSITIONAL VERTIGO OF RIGHT EAR: Primary | ICD-10-CM

## 2025-05-22 DIAGNOSIS — H93.13 TINNITUS OF BOTH EARS: ICD-10-CM

## 2025-05-22 PROCEDURE — 99999 PR PBB SHADOW E&M-EST. PATIENT-LVL V: CPT | Mod: PBBFAC,HCNC,, | Performed by: NURSE PRACTITIONER

## 2025-05-22 RX ORDER — MECLIZINE HYDROCHLORIDE 25 MG/1
25 TABLET ORAL 3 TIMES DAILY PRN
Qty: 30 TABLET | Refills: 2 | Status: SHIPPED | OUTPATIENT
Start: 2025-05-22

## 2025-05-22 NOTE — PATIENT INSTRUCTIONS
"  Patient Instruction After Office Treatments (Epley or Semont maneuvers)    After the maneuver is performed wait 10 minutes before going home to avoid quick spins. It takes time for the debris to settle in the correct location (think of a snow globe). Avoid driving yourself home.        Sleep semi-recumbent for the next few nights.  Sleep at a 45 degree angle (eg. Chair).  Stay vertical during the day.  Do not got to the dentist or hairdresser.       No exercise for a week.    For one week, avoid any head movements that could provoke your dizziness.  Use a couple of pillows when you sleep.  Avoid sleeping on the "bad" side.  Avoid quick head movements. Act as if you're wearing an  invisible neck brace.    Wait at least 2 days before doing the Johnson-Daroff exercise or home epley maneuver unless otherwise instructed by your physician. Complete the exercises 3 times before bed that way if you are dizzy symptoms can resolve while sleeping. Repeat every night for a week.    At one week post-treatment, put yourself in the position that usually makes you dizzy under conditions in which you can't fall or hurt yourself. Let your doctor know how you did.      Helpful Websites and Links:    Epley maneuver (Right) video  Https://www.youtube.com/watch?v=llvUbxEoadQ    Epley maneuver (Left) video  Https://www.Dhir Diamondsube.com/watch?v=bmeIVU19kCK    Johnson Daroff exercise video  https://www.youBilbusube.com/watch?v=jqTHpDUjQ39    Pradip Castro MD  Http://www.dizziness-and-balance.com/disorders/bppv/bppv.html           Johnson-Daroff Exercises                             HOME TREATMENT OF BPPV:    The Johnson-Daroff Exercises are a method of treating BPPV, usually used when the office treatment fails. They succeed in 95% of cases but are more arduous than the office treatments. These exercises are performed in three sets per day for two weeks. In each set, one performs the maneuver as shown five times.     1 repetition = maneuver done to " each side in turn (takes 2 minutes)     Suggested Schedule for Johnson-Daroff exercises   Time Exercise Duration   Morning 5 repetitions 10 minutes   Noon 5 repetitions 10 minutes   Evening 5 repetitions 10 minutes     Start sitting upright (position 1). Then move into the side-lying position (position 2), with the head angled upward about alf. An easy way to remember this is to imagine someone standing about 6 feet in front of you, and just keep looking at their head at all times. Stay in the side-lying position for 30 seconds, or until the dizziness subsides if this is longer, then go back to the sitting position (position 3). Stay there for 30 seconds, and then go to the opposite side (position 4) and follow the same routine..    These exercises should be performed for two weeks, three times per day, or for three weeks, twice per day. This adds up to 52 sets in total. In most persons, complete relief from symptoms is obtained after 30 sets, or about 10 days. In approximately 30 percent of patients, BPPV will recur within one year. If BPPV recurs, you may wish to add one 10-minute exercise to your daily routine (Luke et al, 1999). The Johnson-Daroff exercises as well as the Semont and Epley maneuvers are compared in an article by Alex (1994), listed in the reference section.

## 2025-05-22 NOTE — PROGRESS NOTES
"  Chief Complaint   Patient presents with    Dizziness     For a long time        HPI:   The patient who is self-referred for evaluation of dizziness. The symptoms started several months ago and have been intermittent. The sensation is also described as: spinning sensation.The attacks occur intermittently and last a few seconds. Symptoms are worse with movement, particularly in the morning when getting out of bed, describing a sensation of "floating on a cloud" and requiring gradual transitions from lying to standing position. Her balance is affected, requiring use of a cane for ambulation. Physical therapy attempts resulted in worsening of symptoms. She experiences bilateral tinnitus and hearing loss.  Associated CNS symptoms: none. Recent infections: none. Head trauma: denied. Drug ingestion prior to onset: none. Noise exposure: no occupational exposure.Previous workup: none. Previous treatment includes: vestibular therapy.  Response to this treatment has been poor   Patient reports a history of migraine headaches.      Past Medical History:   Diagnosis Date    Anxiety     Depression     Generalized anxiety disorder with panic attacks 02/23/2022    GERD (gastroesophageal reflux disease)     Hypertension     Migraine headache     Sleep difficulties     Typical atrial flutter 04/20/2022     Social History[1]  Past Surgical History:   Procedure Laterality Date    BREAST SURGERY      COLONOSCOPY N/A 12/14/2022    Procedure: COLONOSCOPY sutab;  Surgeon: Wei Walsh MD;  Location: [facility] ENDO;  Service: Endoscopy;  Laterality: N/A;    ENDOMETRIAL ABLATION      ENDOSCOPIC GASTROCNEMIUS RECESSION Right 8/19/2022    Procedure: RECESSION, GASTROCNEMIUS, ENDOSCOPIC;  Surgeon: Zelalem Solorzano DPM;  Location: [facility] OR;  Service: Podiatry;  Laterality: Right;  Arthrex centerline  Stella/Adams/CHRISTOPHE/REJI,RN 8/18@2683    ENDOSCOPIC PLANTAR FASCIOTOMY Right 8/19/2022    Procedure: FASCIOTOMY, PLANTAR, ENDOSCOPIC;  " Surgeon: Zelalem Solorzano DPM;  Location: [facility] OR;  Service: Podiatry;  Laterality: Right;  Arthrex set    ESOPHAGOGASTRODUODENOSCOPY N/A 12/14/2022    Procedure: EGD (ESOPHAGOGASTRODUODENOSCOPY);  Surgeon: Wei Walsh MD;  Location: [facility] ENDO;  Service: Endoscopy;  Laterality: N/A;    EYE SURGERY      FOOT SURGERY Right 10/2018    bunion    IMPLANTATION OF PERMANENT SACRAL NERVE STIMULATOR N/A 2/15/2022    Procedure: INSERTION, NEUROSTIMULATOR, PERMANENT, SACRAL;  Surgeon: David Brown MD;  Location: [facility] OR [address] [place] [address];  Service: Urology;  Laterality: N/A;    TONSILLECTOMY      TOTAL REDUCTION MAMMOPLASTY Bilateral     XI ROBOTIC GASTROENTEROSTOMY, MIAN-EN-Y N/A 1/26/2024    Procedure: XI ROBOTIC GASTROENTEROSTOMY, MIAN-EN-Y w/intraop egd;  Surgeon: Judy Grossman MD;  Location: [facility] OR [address] [place] [address];  Service: General;  Laterality: N/A;     Family History   Problem Relation Name Age of Onset    Atrial fibrillation Mother      Thyroid disease Mother      Dementia Mother      Diabetes Father      Prostate cancer Father      Stomach cancer Sister      Migraines Sister      Colon cancer Paternal Aunt      Colon cancer Paternal Uncle      Breast cancer Maternal Grandmother      Colon cancer Paternal Grandmother      Cancer Paternal Grandfather             Review of Systems  General: negative for chills, fever or weight loss  Psychological: negative for mood changes or depression  Ophthalmic: negative for blurry vision, photophobia or eye pain  ENT: see HPI  Respiratory: no cough, shortness of breath, or wheezing  Cardiovascular: no chest pain or dyspnea on exertion  Gastrointestinal: no abdominal pain, change in bowel habits, or black/ bloody stools  Musculoskeletal: negative for gait disturbance or muscular weakness  Neurological: no syncope or seizures; no ataxia  Dermatological: negative for pruritis,  rash and jaundice  Hematologic/lymphatic: no easy bruising, no new adenopathy    Physical Exam:    Vitals:     05/22/25 1247   BP: 133/69   Pulse: 66       Constitutional: Well appearing / communicating without difficutly.  NAD.  Eyes: EOM I Bilaterally  Head/Face: Normocephalic.  Negative paranasal sinus pressure/tenderness.  Salivary glands WNL.  House Brackmann I Bilaterally.    Right Ear: Auricle normal appearance. External Auditory Canal within normal limits no lesions or masses,TM w/o masses/lesions/perforations. TM mobility noted.   Left Ear: Auricle normal appearance. External Auditory Canal within normal limits no lesions or masses,TM w/o masses/lesions/perforations. TM mobility noted.  Vestibular exam: negative Fukuda step test; negative Romberg; positive Right Dixx- Hallpike; negative left Dixx- Hallpike  Nose: No gross nasal septal deviation. Inferior Turbinates 3+ bilaterally. No septal perforation. No masses/lesions. External nasal skin appears normal without masses/lesions.  Oral Cavity: Gingiva/lips within normal limits.  Dentition/gingiva healthy appearing. Mucus membranes moist. Floor of mouth soft, no masses palpated. Oral Tongue mobile. Hard Palate appears normal.    Oropharynx: Base of tongue appears normal. No masses/lesions noted. Tonsillar fossa/pharyngeal wall without lesions. Posterior oropharynx WNL.  Soft palate without masses. Midline uvula.   Neck/Lymphatic: No LAD I-VI bilaterally.  No thyromegaly.  No masses noted on exam.    Mirror laryngoscopy/nasopharyngoscopy: Active gag reflex.  Unable to perform.    Neuro/Psychiatric: AOx3.  Normal mood and affect.   Cardiovascular: Normal carotid pulses bilaterally, no increasing jugular venous distention noted at cervical region bilaterally.    Respiratory: Normal respiratory effort, no stridor, no retractions noted.      Audiogram: Reviewed and interpreted by me, and discussed with the patient today.    Tympanometry revealed a Type A tympanogram for both ears. Audiogram results revealed a mild to moderately severe sensorineural hearing loss  bilaterally. Speech reception thresholds were obtained at 25dB for both ears and speech discrimination scores were 76% for the right ear and 72% for the left ear.       Assessment:    ICD-10-CM ICD-9-CM    1. Benign paroxysmal positional vertigo of right ear  H81.11 386.11       2. Vertigo  R42 780.4 Ambulatory referral/consult to ENT      MRI IAC/Temporal Bones W W/O Contrast      3. Sensorineural hearing loss (SNHL) of both ears  H90.3 389.18       4. Tinnitus of both ears  H93.13 388.30         The primary encounter diagnosis was Benign paroxysmal positional vertigo of right ear. Diagnoses of Vertigo, Sensorineural hearing loss (SNHL) of both ears, and Tinnitus of both ears were also pertinent to this visit.      Plan:  Orders Placed This Encounter   Procedures    MRI IAC/Temporal Bones W W/O Contrast       BENIGN PAROXYSMAL POSITIONAL VERTIGO (BPPV):  - Diagnosed BPPV based on clinical presentation and positive Grassflat-Hallpike test.  - Discontinued Meclizine due to potential negative effects on balance and lack of therapeutic benefit for BPPV.  - Explained BPPV as displacement of otolith crystals in inner ear, causing vertigo with positional changes.  - Discussed that BPPV is common, more frequent in women, and can occur spontaneously.  - treatment involves repositioning maneuvers and exercises.  - Patient to perform prescribed Johnson-Daroff exercises 3 times daily.  - Patient to minimize quick head movements to reduce vertigo triggers.  - Patient to continue physical therapy for vestibular rehabilitation.    BILATERAL SENSORINEURAL HEARING LOSS:  -We reviewed the patient's recent audiogram and hearing loss in detail.  We also discussed that she is a good candidate for hearing aids, if and when she the patient is motivated.   We also discussed the use hearing protection when exposed to loud noise, including lawn equipment.    -Recommended annual audiograms    ADDITIONAL DIAGNOSTIC TESTING:  - Ordered MRI brain to  rule out intracranial pathology, though deemed low probability based on current assessment.         Eli Putnam NP    This note was generated with the assistance of ambient listening technology. Verbal consent was obtained by the patient and accompanying visitor(s) for the recording of patient appointment to facilitate this note. I attest to having reviewed and edited the generated note for accuracy, though some syntax or spelling errors may persist. Please contact the author of this note for any clarification.                 [1]   Social History  Socioeconomic History    Marital status: Single   Tobacco Use    Smoking status: Never     Passive exposure: Never    Smokeless tobacco: Never   Substance and Sexual Activity    Alcohol use: No    Drug use: No    Sexual activity: Not Currently     Partners: Male

## 2025-05-22 NOTE — PROGRESS NOTES
Starla Fisher was seen today in the clinic for an audiologic evaluation.  Her main complaint was dizziness for the past several years that she described as room spinning and sometimes positional.  She also reported difficulty hearing.    Tympanometry revealed a Type A tympanogram for both ears.  Audiogram results revealed a mild to moderately severe sensorineural hearing loss bilaterally.  Speech reception thresholds were obtained at 25dB for both ears and speech discrimination scores were 76% for the right ear and 72% for the left ear.    Recommendations:  Otologic evaluation  Annual evaluation  Hearing aid consult   Hearing protection in noise

## 2025-06-03 ENCOUNTER — PATIENT MESSAGE (OUTPATIENT)
Dept: BARIATRICS | Facility: CLINIC | Age: 55
End: 2025-06-03
Payer: MEDICARE

## 2025-06-12 RX ORDER — CEFUROXIME AXETIL 250 MG/1
TABLET ORAL
Qty: 1 ML | Refills: 5 | Status: SHIPPED | OUTPATIENT
Start: 2025-06-12

## 2025-06-13 ENCOUNTER — HOSPITAL ENCOUNTER (OUTPATIENT)
Dept: RADIOLOGY | Facility: HOSPITAL | Age: 55
Discharge: HOME | End: 2025-06-13
Attending: NURSE PRACTITIONER
Payer: MEDICARE

## 2025-06-13 DIAGNOSIS — R42 VERTIGO: ICD-10-CM

## 2025-06-26 ENCOUNTER — TELEPHONE (OUTPATIENT)
Dept: OTOLARYNGOLOGY | Facility: CLINIC | Age: 55
End: 2025-06-26
Payer: MEDICARE

## 2025-06-26 ENCOUNTER — HOSPITAL ENCOUNTER (OUTPATIENT)
Dept: RADIOLOGY | Facility: HOSPITAL | Age: 55
Discharge: HOME | End: 2025-06-26
Attending: NURSE PRACTITIONER
Payer: MEDICARE

## 2025-06-26 ENCOUNTER — RESULTS FOLLOW-UP (OUTPATIENT)
Dept: OTOLARYNGOLOGY | Facility: CLINIC | Age: 55
End: 2025-06-26

## 2025-06-26 PROCEDURE — 70553 MRI BRAIN STEM W/O & W/DYE: CPT | Mod: 26,,, | Performed by: RADIOLOGY

## 2025-06-26 PROCEDURE — A9585 GADOBUTROL INJECTION: HCPCS | Performed by: NURSE PRACTITIONER

## 2025-06-26 PROCEDURE — 25500020 PHARM REV CODE 255: Performed by: NURSE PRACTITIONER

## 2025-06-26 PROCEDURE — 70553 MRI BRAIN STEM W/O & W/DYE: CPT | Mod: TC

## 2025-06-26 RX ORDER — GADOBUTROL 604.72 MG/ML
10 INJECTION INTRAVENOUS
Status: COMPLETED | OUTPATIENT
Start: 2025-06-26 | End: 2025-06-26

## 2025-06-26 RX ADMIN — GADOBUTROL 10 ML: 604.72 INJECTION INTRAVENOUS at 11:06

## 2025-06-26 NOTE — TELEPHONE ENCOUNTER
----- Message from Eli Putnam NP sent at 6/26/2025  2:14 PM CDT -----  Please let Ms. Fisher know that her MRI showed normal inner ear structures. No mass or lesions noted. No acute abnormality of the brain.   ----- Message -----  From: Interface, Rad Results In  Sent: 6/26/2025   2:03 PM CDT  To: Eli Putnam NP

## 2025-07-07 ENCOUNTER — PATIENT MESSAGE (OUTPATIENT)
Dept: BARIATRICS | Facility: CLINIC | Age: 55
End: 2025-07-07
Payer: MEDICARE

## 2025-07-08 ENCOUNTER — PATIENT MESSAGE (OUTPATIENT)
Dept: BARIATRICS | Facility: CLINIC | Age: 55
End: 2025-07-08
Payer: MEDICARE

## 2025-07-17 ENCOUNTER — PROCEDURE VISIT (OUTPATIENT)
Dept: NEUROLOGY | Facility: CLINIC | Age: 55
End: 2025-07-17
Payer: MEDICARE

## 2025-07-17 VITALS
SYSTOLIC BLOOD PRESSURE: 138 MMHG | WEIGHT: 239.88 LBS | DIASTOLIC BLOOD PRESSURE: 80 MMHG | BODY MASS INDEX: 41.17 KG/M2 | HEART RATE: 72 BPM

## 2025-07-17 DIAGNOSIS — G43.709 CHRONIC MIGRAINE WITHOUT AURA WITHOUT STATUS MIGRAINOSUS, NOT INTRACTABLE: Primary | ICD-10-CM

## 2025-07-17 NOTE — PROGRESS NOTES
"Established Patient  Procedure Note    SUBJECTIVE:  Patient ID: Starla Fisher  Chief Complaint: No chief complaint on file.      History of Present Illness:  Starla Fisher is a 55 y.o. female with migraine, obesity s/p gastric bypass, depression, autism, osteoarthritis, atrial flutter, CARL, insomnia, anxiety, HTN, GERD  who presents to clinic alone for follow-up of headaches and Botox injections.       07/17/2025- Interval History: botox  Since adjusting botox to q11 wks, has not noticed an increase in ha's prior to botox. She feels they remain well controlled now. Will continue as is clinically indicated.   Plan: continue botox, continue ubrelvy 100mg prn, rtc in 11 wks for next botox     05/01/2025- Interval History: botox  Since last visit, pt has not had any UC visits from [facility]. She feels increasing ubrelvy has been helpful. But does note an increase in ha's the week before her botox appts. Pt would benefit from completing botox injections q11 wks to avoid UC visits and worsening of her ha's. Will adjust as clinically indicated.   Plan: continue botox, continue ubrelvy 100mg prn, rtc in 11 wks for next botox     02/06/2025- Interval History: botox  Pt has had 2 UC visits 2/2 ha's, resolved w/ toradol injections. She tried ubrelvy 50mg, but feels this only dull Ha's. She does also endorse waiting to take her rescues. Feels imitrex ns, inj, tabs are no longer helpful. Discussed options.   Of note, she is also c/o other neuro symptoms including "shaking"/tremors, generalized weakness, dropping things out of her hands, dizziness/vertigo. Referred to neuro to sruthi further.  Plan: continue botox, increase ubrelvy 100mg prn,  referral to neuro, rtc in 12 wks for next botox     11/14/2024- Interval History: botox  Ha's continue to improve. However, pt would like a referral to neuro for other neuro complaints outside of ha's including weakness in legs and arm, jerking movements in these extremities, and " imbalance.   Plan: continue botox, try ubrelvy 50mg prn, may continue imitrex inj, referral to neuro, rtc in 12 wks for next botox     08/15/2024- Interval History: botox  Patient has had >50% reduction in Ha's since beginning botox. Prior to botox pt's Ha's were occurring >15/30 days per month. Since beginning botox, they are occurring 3-4/30 days per month. As pt has experienced an improvement in Ha's, will continue botox as is clinically indicated.   Pt has not tried ubrelvy yet as she was concerned w/ potential cost, called pharmacy during visit, pt has 0$ copay, will fill and try. She has continued to maintain wt loss alongside w/ wt loss goals since surgery.  Plan: continue botox, try ubrelvy 50mg prn, may continue imitrex inj, rtc in 12 wks for next botox     05/23/2024 - botox    02/28/2024 - botox    Recommendations made at last Office Visit on 02/06/2024:  - Discussed symptoms appear to be consistent with migraines, discussed treatment options and patient agreed with the following plan:  - ppx - continue botox  - abortive - try ubrelvy vs nurtec based on insurance approval, may continue imitrex inj 2nd line as she is known to tolerate   - nausea - continue phenergan  - obesity s/p gastric bypass - avoid po nsaids, mgmt per bariatric med and dietician   - atrial flutter on eliquis - avoid nsaids, caution w/ triptans, mgmt per cardiology  - kristen - continue compliant cpap use, mgmt per sleep med  - htn - stable,mgmt per cardiology  - risks, benefits, and potential side effects of botox, ubrelvy/nurtec, imitrex, phenergan discussed   - alternative treatment options offered   - importance of healthy diet, regular exercise and sleep hygiene in the treatment of headaches    - Start tracking headaches via Migraine Tab rogelio on phone   - RTC in 3 wks to continue botox     Treatments Tried:   Lexapro  Wellbutrin  Tpx  Emgality  Losartan  Diltiazem  Aimovig  Elavil  Botox - helps  Maxalt   Imitrex tab  Imitrex  NS  Imitrex inj  Ubrelvy 100mg - helps    Current Medications:    Current Outpatient Medications:     ARIPiprazole (ABILIFY) 10 MG Tab, Take 1 tablet (10 mg total) by mouth once daily., Disp: 90 tablet, Rfl: 3    BOTOX 200 unit SolR, , Disp: , Rfl:     cyanocobalamin (VITAMIN B-12) 1000 MCG tablet, Take by mouth once daily., Disp: 90 tablet, Rfl: 2    diltiaZEM (CARDIZEM CD) 240 MG 24 hr capsule, Take 1 capsule (240 mg total) by mouth once daily., Disp: 90 capsule, Rfl: 1    EScitalopram oxalate (LEXAPRO) 20 MG tablet, Take 1 tablet (20 mg total) by mouth once daily., Disp: 90 tablet, Rfl: 3    Ke-X-gwgkg-B12-L.acid,plan-inu 65 mg iron- 50 mg-1 mg DFE Cap, Take by mouth., Disp: , Rfl:     ferrous sulfate (FEOSOL) 325 mg (65 mg iron) Tab tablet, Take 325 mg by mouth., Disp: , Rfl:     GEMTESA 75 mg Tab, Take 1 tablet (75 mg total) by mouth once daily., Disp: 90 tablet, Rfl: 4    losartan (COZAAR) 25 MG tablet, Take 1 tablet (25 mg total) by mouth once daily., Disp: 30 tablet, Rfl: 11    meclizine (ANTIVERT) 25 mg tablet, Take 1 tablet (25 mg total) by mouth 3 (three) times daily as needed for Dizziness or Nausea., Disp: 30 tablet, Rfl: 2    multivit-minerals/folic acid (ADULT MULTIVITAMIN GUMMIES ORAL), Take 1 tablet by mouth Daily. Continue to hold until after Surgery., Disp: , Rfl:     multivitamin (THERAGRAN) per tablet, Take 1 tablet by mouth., Disp: , Rfl:     ondansetron (ZOFRAN-ODT) 8 MG TbDL, Take 1 tablet (8 mg total) by mouth every 6 (six) hours as needed (nausea after surgery)., Disp: 30 tablet, Rfl: 0    phenylephrine (TEODORA-SYNEPHRINE) 10 mg/mL injection, , Disp: , Rfl:     pregabalin (LYRICA) 100 MG capsule, Take 1 capsule (100 mg total) by mouth 2 (two) times daily., Disp: 180 capsule, Rfl: 1    promethazine (PHENERGAN) 25 MG tablet, Take 1 tablet (25 mg total) by mouth every 6 (six) hours as needed for Nausea., Disp: 12 tablet, Rfl: 5    propofoL (DIPRIVAN) 10 mg/mL infusion, , Disp: , Rfl:      pyridoxine, vitamin B6, (B-6) 50 MG Tab, Take 1 tablet (50 mg total) by mouth once daily., Disp: 14 tablet, Rfl: 0    ROPIvacaine 0.5%, PF, (NAROPIN) 5 mg/mL (0.5 %) injection, , Disp: , Rfl:     sumatriptan (IMITREX STATDOSE) 6 mg/0.5 mL kit, INJECT 0.5ML INTO THE SKIN AS NEEDED FOR MIGRAINE, Disp: 1 mL, Rfl: 5    traMADoL (ULTRAM) 50 mg tablet, Take 1 tablet (50 mg total) by mouth every 6 (six) hours., Disp: 5 tablet, Rfl: 0    traZODone (DESYREL) 150 MG tablet, Take half or whole tablet by mouth before bed as needed for insomnia, Disp: 90 tablet, Rfl: 3    triamcinolone acetonide 0.025% (KENALOG) 0.025 % Oint, Apply topically daily as needed (vulvar itching)., Disp: 15 g, Rfl: 0    TRUEPLUS LANCETS 33 gauge Misc, TEST BLOOD SUGAR THREE TIMES DAILY, Disp: 300 each, Rfl: 3    ubrogepant (UBRELVY) 100 mg tablet, Take 1 tablet by mouth at the onset of a headache. May repeat based on response and tolerability after more than 2 hours if needed. Do not take more than 200mg in a 24 hour span., Disp: 16 tablet, Rfl: 5    vitamin D (VITAMIN D3) 1000 units Tab, Take 2 tablets (2,000 Units total) by mouth once daily., Disp: 120 tablet, Rfl: 2    walker (ULTRA-LIGHT ROLLATOR) Misc, Rolator for stability and balance postoperatively as needed while standing and walking., Disp: 1 each, Rfl: 0    oxybutynin (DITROPAN-XL) 10 MG 24 hr tablet, Take 1 tablet (10 mg total) by mouth once daily., Disp: 30 tablet, Rfl: 11    Current Facility-Administered Medications:     sodium chloride 0.9% flush 10 mL, 10 mL, Intravenous, PRN, Zelalem Solorzano, DPM    sodium chloride 0.9% flush 10 mL, 10 mL, Intravenous, PRN, Zelalem Solorzano, DPM    Facility-Administered Medications Ordered in Other Visits:     0.9%  NaCl infusion, , Intravenous, Continuous, Sathish Briseno MD, Stopped at 02/15/22 0948    LIDOcaine (PF) 10 mg/ml (1%) injection 10 mg, 1 mL, Intradermal, Once, Sathish Briseno MD    Review of Systems - as per HPI, otherwise a  balanced 10 systems review is negative.    OBJECTIVE:  Vitals:  /80 (BP Location: Left arm, Patient Position: Sitting)   Pulse 72   Wt 108.8 kg (239 lb 13.8 oz)   BMI 41.17 kg/m²     Physical Exam:  Constitutional: she appears well-developed and well-nourished. she is well groomed. NAD   HENT:    Head: Normocephalic and atraumatic  Eyes: Conjunctivae and EOM are normal  Musculoskeletal: Normal range of motion. No joint stiffness.   Skin: Skin is warm and dry.  Psychiatric: Mood and affect are normal    Neuro: Patient is alert and oriented to person, place, and time. Language is intact and fluent.  Recent and remote memory are intact.  Normal attention and concentration.  Facial movement is symmetric.  Gait is normal.     Review of Data:   Notes from neuro reviewed.   Labs:  No visits with results within 3 Month(s) from this visit.   Latest known visit with results is:   Lab Visit on 04/08/2025   Component Date Value Ref Range Status    Copper 04/08/2025 1168  810 - 1990 ug/L Final    Zinc 04/08/2025 82  60 - 130 ug/dL Final    Folate Level 04/08/2025 9.4  4.0 - 24.0 ng/mL Final    Arsenic 04/08/2025 <1  <13 ng/mL Final    Lead 04/08/2025 <1.0  <3.5 mcg/dL Final    Cadmium 04/08/2025 <0.2  <5.0 ng/mL Final    Mercury 04/08/2025 <1  <10 ng/mL Final    Venous/Capillary 04/08/2025 Capillary   Final    Patient Address 04/08/2025 na   Final    Patient City 04/08/2025 na   Final    Patient [facility] 04/08/2025 na   Final    Patient Home Phone 04/08/2025 na   Final    Patient Race 04/08/2025    Final    Patient Ethnicity 04/08/2025 Non-   Final    Patient Occupation 04/08/2025 na   Final    Patient Employer 04/08/2025 na   Final    Guardian First Name 04/08/2025 na   Final    Guardian Last Name 04/08/2025 na   Final    Health Care Provider Name 04/08/2025 na   Final    Health Care Provider Street Address 04/08/2025 na   Final    Health Care Provider [facility] 04/08/2025 na   Final    Health Care Provider  State 04/08/2025 na   Final    Health Care Provider Zip Code 04/08/2025 na   Final    Health Care Provider Phone 04/08/2025 na   Final    Submitting Laboratory Phone 04/08/2025 na   Final    Pathologist Interpretation KAREN 04/08/2025 No monoclonal peaks identified.          Interpreting Pathologist: Kati Muniz MD       Final    Kappa Free Light Chain 04/08/2025 2.45 (H)  0.33 - 1.94 mg/dL Final    Kappa/Lambda FLC Ratio 04/08/2025 1.35  0.26 - 1.65 Final    Lambda Free Light Chain 04/08/2025 1.81  0.57 - 2.63 mg/dL Final    Protein Total 04/08/2025 7.2  6.0 - 8.4 gm/dL Final    Alpha 1 Glob 04/08/2025 0.28  0.17 - 0.41 gm/dl Final    Alpha 2 Glob 04/08/2025 0.80  0.43 - 0.99 gm/dl Final    Beta Glob 04/08/2025 0.91  0.50 - 1.10 gm/dl Final    Gamma Globulin 04/08/2025 1.19  0.67 - 1.58 gm/dl Final    Albumin, SPE 04/08/2025 4.02  3.35 - 5.55 g/dL Final    Vitamin B6 04/08/2025 7  5 - 50 ug/L Final    Pathologist Interpretation SPE 04/08/2025 Normal total protein, normal pattern.           Interpreting Pathologist: Kati Muniz MD       Final     Imaging:  No results found. However, due to the size of the patient record, not all encounters were searched. Please check Results Review for a complete set of results.    Note: I have independently reviewed any/all imaging/labs/tests and agree with the report (s) as documented.  Any discrepancies will be as noted/demarcated by free text.  AMPARO LARRY 7/17/2025    ASSESSMENT:  1. Chronic migraine without aura without status migrainosus, not intractable          PLAN:  - Discussed symptoms appear to be consistent with migraines, discussed treatment options and patient agreed with the following plan:  - ppx - continue botox  - Botox administered in clinic for Chronic Migraine (see below)   - abortive - ubrelvy 100mg prn  - nausea - continue phenergan  - referral to neuro for other neuro complaints outside of ha's including weakness in legs and arm, jerking  movements in these extremities, and imbalance  - obesity s/p gastric bypass - avoid po nsaids, mgmt per bariatric med and dietician   - atrial flutter on eliquis - avoid nsaids, caution w/ triptans, mgmt per cardiology  - kristen - continue compliant cpap use, mgmt per sleep med  - htn - stable,mgmt per cardiology  - importance of healthy diet, regular exercise and sleep hygiene in the treatment of headaches    - Start tracking headaches via Migraine Tab rogelio on phone   - RTC in 11 weeks for repeat Botox injections or sooner if needed     Orders Placed This Encounter    onabotulinumtoxina injection 200 Units         All questions and concerns addressed.  Patient verbalizes understanding and is agreeable with the above stated treatment plan.      PROCEDURE NOTE:  BOTOX was performed as an indicated therapy for intractable chronic migraine headaches given that the patient failed more than 2 headache medications    Two patient identifiers were confirmed with the patient prior to performing this procedure. A time out to determine correct patient and and agreement on procedure performed was conducted prior to the procedure.      Botulinum Toxin Injection Procedure   Procedure: Botulinum toxin injection (02631)  After risks and benefits were explained including bleeding, infection, worsening of pain, damage to the areas being injected, weakness of muscles, loss of muscle control, dysphagia if injecting the head or neck, facial droop if injecting the facial area, painful injection, allergic or other reaction to the medications being injected, and the failure of the procedure to help the problem, a signed consent was obtained.   The patient was placed in a comfortable area and the sites to be treated were identified.The area to be treated was prepped three times with alcohol and the alcohol allowed to dry. Next, a 30 gauge needle was used to inject the medication in the area to be treated.      Total Botox used: 155 Units    Botox wastage: 45 Units     Injection sites:    muscle bilaterally ( a total of 10 units divided into 2 sites)   Procerus muscle (5 units)   Frontalis muscle bilaterally (a total of 20 units divided into 4 sites)   Temporalis muscle bilaterally (a total of 40 units divided into 8 sites)   Occipitalis muscle bilaterally (a total of 30 units divided into 6 sites)   Cervical paraspinal muscles (a total of 20 units divided into 4 sites)   Trapezius muscle bilaterally (a total of 30 units divided into 6 sites)   Complications: none   RTC for the next Botox injection: 11 weeks     The patient tolerated the procedure well and did not experience any complications.     CC: Tahir Mosquera MD Sarena Patel, PA-C  [facility]   [phone]    Dr. Grady was available during today's encounter.

## 2025-08-04 ENCOUNTER — TELEPHONE (OUTPATIENT)
Dept: FAMILY MEDICINE | Facility: CLINIC | Age: 55
End: 2025-08-04
Payer: MEDICARE

## 2025-08-04 NOTE — TELEPHONE ENCOUNTER
Patient would like orders for a walker with a seat . Patient said her cane is not working. Please advice.

## 2025-08-04 NOTE — TELEPHONE ENCOUNTER
Copied from CRM #[id number]. Topic: General Inquiry - Patient Advice  >> Jul 31, 2025  3:11 PM Linda wrote:  Type:  Call    Who Called:pt  Does the patient know what this is regarding?:Walker  Would the patient rather a call back or a response via [facility]? call  Best Call Back Number:[phone]  Additional Information: Pt would like a walker with a seat Ordered.  The cane is not working anymore.

## 2025-08-05 ENCOUNTER — TELEPHONE (OUTPATIENT)
Dept: FAMILY MEDICINE | Facility: CLINIC | Age: 55
End: 2025-08-05
Payer: MEDICARE

## 2025-08-05 ENCOUNTER — PATIENT MESSAGE (OUTPATIENT)
Dept: FAMILY MEDICINE | Facility: CLINIC | Age: 55
End: 2025-08-05
Payer: MEDICARE

## 2025-08-05 NOTE — TELEPHONE ENCOUNTER
Copied from CRM #[id number]. Topic: Appointments - Amb Referral  >> Aug 1, 2025  4:09 PM Aurora wrote:  Type:  Needs Medical Advice    Who Called: pt  Would the patient rather a call back or a response via [facility]? call  Best Call Back Number: [phone]   Additional Information: pt states she called on yesterday regarding orders for a walker with a seat asking for a call back states cane isnt working anymore

## 2025-08-05 NOTE — TELEPHONE ENCOUNTER
Copied from CRM #[id number]. Topic: General Inquiry - Return Call  >> Aug 5, 2025 11:09 BLANCA Soriano wrote:  Type:  Patient Returning Call    Who Called: Pt   Who Left Message for Patient: Josefina   Does the patient know what this is regarding?: Returning a missed call   Would the patient rather a call back or a response via [facility]? Call back   Best Call Back Number: [phone]

## 2025-08-08 ENCOUNTER — TELEPHONE (OUTPATIENT)
Dept: FAMILY MEDICINE | Facility: CLINIC | Age: 55
End: 2025-08-08
Payer: MEDICARE

## 2025-08-08 DIAGNOSIS — M54.16 LUMBAR RADICULOPATHY: Primary | ICD-10-CM

## 2025-08-11 ENCOUNTER — TELEPHONE (OUTPATIENT)
Dept: FAMILY MEDICINE | Facility: CLINIC | Age: 55
End: 2025-08-11
Payer: MEDICARE

## 2025-08-12 ENCOUNTER — PATIENT MESSAGE (OUTPATIENT)
Dept: BARIATRICS | Facility: CLINIC | Age: 55
End: 2025-08-12
Payer: MEDICARE

## 2025-08-20 ENCOUNTER — TELEPHONE (OUTPATIENT)
Dept: FAMILY MEDICINE | Facility: CLINIC | Age: 55
End: 2025-08-20
Payer: MEDICARE

## 2025-08-20 DIAGNOSIS — M54.16 LUMBAR RADICULOPATHY: Primary | ICD-10-CM

## 2025-08-25 RX ORDER — LANCETS 33 GAUGE
EACH MISCELLANEOUS 3 TIMES DAILY
Qty: 300 EACH | Refills: 3 | Status: SHIPPED | OUTPATIENT
Start: 2025-08-25

## 2025-08-26 ENCOUNTER — TELEPHONE (OUTPATIENT)
Dept: FAMILY MEDICINE | Facility: CLINIC | Age: 55
End: 2025-08-26
Payer: MEDICARE

## 2025-08-29 ENCOUNTER — TELEPHONE (OUTPATIENT)
Dept: FAMILY MEDICINE | Facility: CLINIC | Age: 55
End: 2025-08-29
Payer: MEDICARE

## 2025-08-29 DIAGNOSIS — M54.16 LUMBAR BACK PAIN WITH RADICULOPATHY AFFECTING LOWER EXTREMITY: ICD-10-CM

## 2025-08-29 DIAGNOSIS — M17.9 OSTEOARTHRITIS OF KNEE, UNSPECIFIED LATERALITY, UNSPECIFIED OSTEOARTHRITIS TYPE: Primary | ICD-10-CM

## (undated) DEVICE — SYS SMOKE EVACUATION LAP

## (undated) DEVICE — ELECTRODE REM PLYHSV RETURN 9

## (undated) DEVICE — SUT MONOCRYL 3-0 PS-1

## (undated) DEVICE — TRAY MINOR GEN SURG

## (undated) DEVICE — NDL HYPO REG 25G X 1 1/2

## (undated) DEVICE — OBTURATOR BLADELESS 8MM XI

## (undated) DEVICE — SYS LABEL CORRECT MED

## (undated) DEVICE — SUT VICRYL+ 27 UR-6 VIOL

## (undated) DEVICE — DRAPE ARM DAVINCI XI

## (undated) DEVICE — GAUZE AVANT SPNG 4PLY STRL 4X4

## (undated) DEVICE — SUT 2/0 30IN SILK BLK BRAI

## (undated) DEVICE — SUT VICRYL PLUS 3-0 SH 27IN

## (undated) DEVICE — SUT CTD VICRYL VIL BR UR-6

## (undated) DEVICE — SYR ONLY LUER LOCK 20CC

## (undated) DEVICE — RELOAD SUREFORM 60 2.5 WHT 6R

## (undated) DEVICE — IRRIGATOR ENDOSCOPY DISP.

## (undated) DEVICE — DRESSING XEROFORM FOIL PK 1X8

## (undated) DEVICE — KIT ANTIFOG W/SPONG & FLUID

## (undated) DEVICE — KIT ANTIFOG

## (undated) DEVICE — SUT VLOC 90 3-0 V-20 NDL 6

## (undated) DEVICE — BRACE LOWER LEG SIZE 7 MEDIUM

## (undated) DEVICE — SEE MEDLINE ITEM 154981

## (undated) DEVICE — SYR 10CC LUER LOCK

## (undated) DEVICE — ADHESIVE DERMABOND ADVANCED

## (undated) DEVICE — DURAPREP SURG SCRUB 26ML

## (undated) DEVICE — GLOVE BIOGEL PI MICRO INDIC 8

## (undated) DEVICE — DRAPE CESAREAN IOBAN POUCH

## (undated) DEVICE — SUT 3-0 VICRYL / SH (J416)

## (undated) DEVICE — BANDAGE DERMACEA STRETCH 4X1IN

## (undated) DEVICE — DRESSING ANTIMICROBIAL 3/4 IN

## (undated) DEVICE — SUT MONOCRYL 3-0 SH U/D

## (undated) DEVICE — COVERS PROBE NR-48 STERILE

## (undated) DEVICE — TROCAR ENDOPATH XCEL 12X100MM

## (undated) DEVICE — KIT PROBE COVER WITH GEL

## (undated) DEVICE — SUT VICRYL CTD 2-0 GI 27 SH

## (undated) DEVICE — SEAL CANNULA DA VINCI 12MM

## (undated) DEVICE — DRAIN PENROSE XRAY 18 X 1/4 ST

## (undated) DEVICE — APPLICATOR VISTASEAL FLEX 45CM

## (undated) DEVICE — KIT INTERSTIM II PERC LEAD EXT

## (undated) DEVICE — TOURNIQUET SB QC DP 18X4IN

## (undated) DEVICE — TROCAR ENDOPATH XCEL 5X75MM

## (undated) DEVICE — SCREENER ISTIM EXT NEROSTIMLTR

## (undated) DEVICE — PAD PREP CUFFED NS 24X48IN

## (undated) DEVICE — SOL WATER STRL IRR 1000ML

## (undated) DEVICE — COVER OVERHEAD SURG LT BLUE

## (undated) DEVICE — SUT MCRYL PLUS 4-0 PS2 27IN

## (undated) DEVICE — RELOAD SUREFORM 60 3.5 BLU 6R

## (undated) DEVICE — Device

## (undated) DEVICE — CANNULA REDUCER 12-8MM

## (undated) DEVICE — BANDAGE ESMARK ELASTIC ST 6X9

## (undated) DEVICE — TOWEL OR NONABSORB ADH 17X26

## (undated) DEVICE — SEALER VESSEL EXTEND

## (undated) DEVICE — APPLICATOR CHLORAPREP ORN 26ML

## (undated) DEVICE — STAPLER SUREFORM 60 SPU

## (undated) DEVICE — BLADE SURG CARBON STEEL SZ11

## (undated) DEVICE — SEE L#120831

## (undated) DEVICE — GLOVE BIOGEL ECLIPSE SZ 8

## (undated) DEVICE — DRESSING ANTIMICROBIAL 1 INCH

## (undated) DEVICE — GOWN POLY REINF BRTH SLV XL

## (undated) DEVICE — GOWN SURGICAL X-LARGE

## (undated) DEVICE — HANDSET INTERSTIM X COMM

## (undated) DEVICE — OBTURATOR BLADELESS 8MM XI CLR

## (undated) DEVICE — SUT PROLENE 4-0 MONO 18IN

## (undated) DEVICE — COVER PROBE 6X48

## (undated) DEVICE — KIT ENDO CARPEL TUNNAL SINGLE

## (undated) DEVICE — SCRUB HIBICLENS 4% CHG 4OZ

## (undated) DEVICE — TOWEL OR DISP STRL BLUE 4/PK

## (undated) DEVICE — SUT 2/0 36IN ETHIBOND EXCE

## (undated) DEVICE — DRESSING TRANS 2X2 TEGADERM

## (undated) DEVICE — TOURNIQUET SB QC DP 34X4IN

## (undated) DEVICE — SUT CTD VICRYL VIL BR CT-2

## (undated) DEVICE — DRESSING TRANS 4X4 TEGADERM

## (undated) DEVICE — TRAY DRY SKIN SCRUB PREP

## (undated) DEVICE — GAUZE SPONGE 4X4 12PLY

## (undated) DEVICE — SEAL UNIVERSAL 5MM-8MM XI

## (undated) DEVICE — DRAPE COLUMN DAVINCI XI

## (undated) DEVICE — STOCKINET TUBULAR 1 PLY 6X60IN

## (undated) DEVICE — DEVICE SYNCHROSEAL DA VINCI

## (undated) DEVICE — SYS ENDOBLADE PLNTR FASC REL

## (undated) DEVICE — DRESSING TELFA STRL 4X3 LF

## (undated) DEVICE — SUT VICRYL PLUS 4-0 P3 18IN

## (undated) DEVICE — DRAPE C ARM 42 X 120 10/BX

## (undated) DEVICE — BANDAGE MATRIX HK LOOP 4IN 5YD

## (undated) DEVICE — TUBE SET SINGLE LUMEN FILTERED

## (undated) DEVICE — DRAPE C-ARMOR EQUIPMENT COVER

## (undated) DEVICE — SEALANT VISTASEAL FIBRIN 4ML

## (undated) DEVICE — TRAY MINOR GEN SURG OMC

## (undated) DEVICE — SUT GUT PL. 4-0 27 FS-2

## (undated) DEVICE — NDL 18GA X1 1/2 REG BEVEL

## (undated) DEVICE — DRAPE SCOPE PILLOW WARMER

## (undated) DEVICE — COVER TIP CURVED SCISSORS XI